# Patient Record
Sex: MALE | Race: WHITE | Employment: OTHER | URBAN - METROPOLITAN AREA
[De-identification: names, ages, dates, MRNs, and addresses within clinical notes are randomized per-mention and may not be internally consistent; named-entity substitution may affect disease eponyms.]

---

## 2020-05-07 ENCOUNTER — ANESTHESIA EVENT (OUTPATIENT)
Dept: OPERATING ROOM | Age: 65
DRG: 003 | End: 2020-05-07
Payer: COMMERCIAL

## 2020-05-07 ENCOUNTER — APPOINTMENT (OUTPATIENT)
Dept: GENERAL RADIOLOGY | Age: 65
DRG: 003 | End: 2020-05-07
Payer: COMMERCIAL

## 2020-05-07 ENCOUNTER — HOSPITAL ENCOUNTER (INPATIENT)
Age: 65
LOS: 29 days | Discharge: NURSING, REHAB OR LONG-TERM CARE | DRG: 003 | End: 2020-06-05
Attending: INTERNAL MEDICINE | Admitting: THORACIC SURGERY (CARDIOTHORACIC VASCULAR SURGERY)
Payer: COMMERCIAL

## 2020-05-07 PROBLEM — R07.9 CHEST PAIN: Status: ACTIVE | Noted: 2020-05-07

## 2020-05-07 PROBLEM — I21.4 NSTEMI (NON-ST ELEVATED MYOCARDIAL INFARCTION) (HCC): Status: ACTIVE | Noted: 2020-05-07

## 2020-05-07 LAB
A/G RATIO: 1.2 (ref 1.1–2.2)
ABO/RH: NORMAL
ALBUMIN SERPL-MCNC: 3.2 G/DL (ref 3.4–5)
ALBUMIN SERPL-MCNC: 4 G/DL (ref 3.4–5)
ALP BLD-CCNC: 43 U/L (ref 40–129)
ALP BLD-CCNC: 55 U/L (ref 40–129)
ALT SERPL-CCNC: 14 U/L (ref 10–40)
ALT SERPL-CCNC: 16 U/L (ref 10–40)
ANION GAP SERPL CALCULATED.3IONS-SCNC: 12 MMOL/L (ref 3–16)
ANION GAP SERPL CALCULATED.3IONS-SCNC: 14 MMOL/L (ref 3–16)
ANTIBODY SCREEN: NORMAL
APTT: 124.9 SEC (ref 24.2–36.2)
APTT: 78.9 SEC (ref 24.2–36.2)
APTT: 82.6 SEC (ref 24.2–36.2)
AST SERPL-CCNC: 17 U/L (ref 15–37)
AST SERPL-CCNC: 17 U/L (ref 15–37)
BASE EXCESS ARTERIAL: -3 MMOL/L (ref -3–3)
BASE EXCESS ARTERIAL: -3.6 MMOL/L (ref -3–3)
BASOPHILS ABSOLUTE: 0.1 K/UL (ref 0–0.2)
BASOPHILS RELATIVE PERCENT: 1.3 %
BILIRUB SERPL-MCNC: <0.2 MG/DL (ref 0–1)
BILIRUB SERPL-MCNC: <0.2 MG/DL (ref 0–1)
BILIRUBIN DIRECT: <0.2 MG/DL (ref 0–0.3)
BILIRUBIN URINE: NEGATIVE
BILIRUBIN, INDIRECT: ABNORMAL MG/DL (ref 0–1)
BLOOD, URINE: NEGATIVE
BUN BLDV-MCNC: 13 MG/DL (ref 7–20)
BUN BLDV-MCNC: 16 MG/DL (ref 7–20)
CALCIUM SERPL-MCNC: 8.4 MG/DL (ref 8.3–10.6)
CALCIUM SERPL-MCNC: 9.5 MG/DL (ref 8.3–10.6)
CARBOXYHEMOGLOBIN ARTERIAL: 1 % (ref 0–1.5)
CARBOXYHEMOGLOBIN ARTERIAL: 1.1 % (ref 0–1.5)
CHLORIDE BLD-SCNC: 105 MMOL/L (ref 99–110)
CHLORIDE BLD-SCNC: 106 MMOL/L (ref 99–110)
CHOLESTEROL, TOTAL: 163 MG/DL (ref 0–199)
CLARITY: CLEAR
CO2: 19 MMOL/L (ref 21–32)
CO2: 21 MMOL/L (ref 21–32)
COLOR: YELLOW
CREAT SERPL-MCNC: 0.8 MG/DL (ref 0.8–1.3)
CREAT SERPL-MCNC: 1.1 MG/DL (ref 0.8–1.3)
EKG ATRIAL RATE: 101 BPM
EKG ATRIAL RATE: 91 BPM
EKG DIAGNOSIS: NORMAL
EKG DIAGNOSIS: NORMAL
EKG P AXIS: 63 DEGREES
EKG P AXIS: 69 DEGREES
EKG P-R INTERVAL: 154 MS
EKG P-R INTERVAL: 154 MS
EKG Q-T INTERVAL: 342 MS
EKG Q-T INTERVAL: 366 MS
EKG QRS DURATION: 100 MS
EKG QRS DURATION: 98 MS
EKG QTC CALCULATION (BAZETT): 443 MS
EKG QTC CALCULATION (BAZETT): 450 MS
EKG R AXIS: -13 DEGREES
EKG R AXIS: -21 DEGREES
EKG T AXIS: 51 DEGREES
EKG T AXIS: 70 DEGREES
EKG VENTRICULAR RATE: 101 BPM
EKG VENTRICULAR RATE: 91 BPM
EOSINOPHILS ABSOLUTE: 0.2 K/UL (ref 0–0.6)
EOSINOPHILS RELATIVE PERCENT: 2.4 %
EPITHELIAL CELLS, UA: 0 /HPF (ref 0–5)
ESTIMATED AVERAGE GLUCOSE: 122.6 MG/DL
FIBRINOGEN: 328 MG/DL (ref 200–397)
GFR AFRICAN AMERICAN: >60
GFR AFRICAN AMERICAN: >60
GFR NON-AFRICAN AMERICAN: >60
GFR NON-AFRICAN AMERICAN: >60
GLOBULIN: 3.4 G/DL
GLUCOSE BLD-MCNC: 109 MG/DL (ref 70–99)
GLUCOSE BLD-MCNC: 94 MG/DL (ref 70–99)
GLUCOSE URINE: NEGATIVE MG/DL
HBA1C MFR BLD: 5.9 %
HCO3 ARTERIAL: 20.5 MMOL/L (ref 21–29)
HCO3 ARTERIAL: 20.7 MMOL/L (ref 21–29)
HCT VFR BLD CALC: 33.8 % (ref 40.5–52.5)
HCT VFR BLD CALC: 38 % (ref 40.5–52.5)
HDLC SERPL-MCNC: 47 MG/DL (ref 40–60)
HEMOGLOBIN, ART, EXTENDED: 10.8 G/DL (ref 13.5–17.5)
HEMOGLOBIN, ART, EXTENDED: 11.4 G/DL (ref 13.5–17.5)
HEMOGLOBIN: 10.9 G/DL (ref 13.5–17.5)
HEMOGLOBIN: 12.5 G/DL (ref 13.5–17.5)
HYALINE CASTS: 1 /LPF (ref 0–8)
INR BLD: 0.98 (ref 0.86–1.14)
KETONES, URINE: NEGATIVE MG/DL
LDL CHOLESTEROL CALCULATED: 103 MG/DL
LEUKOCYTE ESTERASE, URINE: NEGATIVE
LIPASE: 29 U/L (ref 13–60)
LV EF: 60 %
LVEF MODALITY: NORMAL
LYMPHOCYTES ABSOLUTE: 1.1 K/UL (ref 1–5.1)
LYMPHOCYTES RELATIVE PERCENT: 11.2 %
MCH RBC QN AUTO: 27 PG (ref 26–34)
MCH RBC QN AUTO: 27.4 PG (ref 26–34)
MCHC RBC AUTO-ENTMCNC: 32.3 G/DL (ref 31–36)
MCHC RBC AUTO-ENTMCNC: 32.9 G/DL (ref 31–36)
MCV RBC AUTO: 83.4 FL (ref 80–100)
MCV RBC AUTO: 83.6 FL (ref 80–100)
METHEMOGLOBIN ARTERIAL: 0.6 %
METHEMOGLOBIN ARTERIAL: 0.8 %
MICROSCOPIC EXAMINATION: YES
MONOCYTES ABSOLUTE: 0.5 K/UL (ref 0–1.3)
MONOCYTES RELATIVE PERCENT: 5.5 %
NEUTROPHILS ABSOLUTE: 7.9 K/UL (ref 1.7–7.7)
NEUTROPHILS RELATIVE PERCENT: 79.6 %
NITRITE, URINE: NEGATIVE
O2 CONTENT ARTERIAL: 15 ML/DL
O2 CONTENT ARTERIAL: 15 ML/DL
O2 SAT, ARTERIAL: 95 %
O2 SAT, ARTERIAL: 98.6 %
O2 THERAPY: ABNORMAL
O2 THERAPY: ABNORMAL
P2Y12 RESULT: 146 PRU (ref 194–418)
PCO2 ARTERIAL: 31.8 MMHG (ref 35–45)
PCO2 ARTERIAL: 32.8 MMHG (ref 35–45)
PDW BLD-RTO: 14.6 % (ref 12.4–15.4)
PDW BLD-RTO: 14.8 % (ref 12.4–15.4)
PH ARTERIAL: 7.4 (ref 7.35–7.45)
PH ARTERIAL: 7.42 (ref 7.35–7.45)
PH UA: 5.5 (ref 5–8)
PLATELET # BLD: 355 K/UL (ref 135–450)
PLATELET # BLD: 391 K/UL (ref 135–450)
PMV BLD AUTO: 7 FL (ref 5–10.5)
PMV BLD AUTO: 7.3 FL (ref 5–10.5)
PO2 ARTERIAL: 65.7 MMHG (ref 75–108)
PO2 ARTERIAL: 95.2 MMHG (ref 75–108)
POTASSIUM REFLEX MAGNESIUM: 3.6 MMOL/L (ref 3.5–5.1)
POTASSIUM REFLEX MAGNESIUM: 4.1 MMOL/L (ref 3.5–5.1)
PRO-BNP: 602 PG/ML (ref 0–124)
PROTEIN UA: ABNORMAL MG/DL
PROTHROMBIN TIME: 11.4 SEC (ref 10–13.2)
RBC # BLD: 4.04 M/UL (ref 4.2–5.9)
RBC # BLD: 4.56 M/UL (ref 4.2–5.9)
RBC UA: 9 /HPF (ref 0–4)
SARS-COV-2, NAAT: NOT DETECTED
SODIUM BLD-SCNC: 136 MMOL/L (ref 136–145)
SODIUM BLD-SCNC: 141 MMOL/L (ref 136–145)
SPECIFIC GRAVITY UA: 1.02 (ref 1–1.03)
TCO2 ARTERIAL: 21.5 MMOL/L
TCO2 ARTERIAL: 21.7 MMOL/L
TOTAL PROTEIN: 6 G/DL (ref 6.4–8.2)
TOTAL PROTEIN: 7.4 G/DL (ref 6.4–8.2)
TRIGL SERPL-MCNC: 66 MG/DL (ref 0–150)
TROPONIN: 0.07 NG/ML
TROPONIN: 0.11 NG/ML
TROPONIN: <0.01 NG/ML
URINE REFLEX TO CULTURE: ABNORMAL
URINE TYPE: ABNORMAL
UROBILINOGEN, URINE: 0.2 E.U./DL
VLDLC SERPL CALC-MCNC: 13 MG/DL
WBC # BLD: 10.4 K/UL (ref 4–11)
WBC # BLD: 9.9 K/UL (ref 4–11)
WBC UA: 3 /HPF (ref 0–5)

## 2020-05-07 PROCEDURE — 85027 COMPLETE CBC AUTOMATED: CPT

## 2020-05-07 PROCEDURE — 85610 PROTHROMBIN TIME: CPT

## 2020-05-07 PROCEDURE — U0002 COVID-19 LAB TEST NON-CDC: HCPCS

## 2020-05-07 PROCEDURE — 93458 L HRT ARTERY/VENTRICLE ANGIO: CPT

## 2020-05-07 PROCEDURE — 96374 THER/PROPH/DIAG INJ IV PUSH: CPT

## 2020-05-07 PROCEDURE — 93882 EXTRACRANIAL UNI/LTD STUDY: CPT

## 2020-05-07 PROCEDURE — 93306 TTE W/DOPPLER COMPLETE: CPT

## 2020-05-07 PROCEDURE — 80053 COMPREHEN METABOLIC PANEL: CPT

## 2020-05-07 PROCEDURE — 71045 X-RAY EXAM CHEST 1 VIEW: CPT

## 2020-05-07 PROCEDURE — 86850 RBC ANTIBODY SCREEN: CPT

## 2020-05-07 PROCEDURE — 33967 INSERT I-AORT PERCUT DEVICE: CPT

## 2020-05-07 PROCEDURE — 4A023N7 MEASUREMENT OF CARDIAC SAMPLING AND PRESSURE, LEFT HEART, PERCUTANEOUS APPROACH: ICD-10-PCS | Performed by: INTERNAL MEDICINE

## 2020-05-07 PROCEDURE — 93005 ELECTROCARDIOGRAM TRACING: CPT | Performed by: PHYSICIAN ASSISTANT

## 2020-05-07 PROCEDURE — 2780000010 HC IMPLANT OTHER

## 2020-05-07 PROCEDURE — 82803 BLOOD GASES ANY COMBINATION: CPT

## 2020-05-07 PROCEDURE — 6360000002 HC RX W HCPCS: Performed by: PHYSICIAN ASSISTANT

## 2020-05-07 PROCEDURE — 36600 WITHDRAWAL OF ARTERIAL BLOOD: CPT

## 2020-05-07 PROCEDURE — 99153 MOD SED SAME PHYS/QHP EA: CPT

## 2020-05-07 PROCEDURE — 86901 BLOOD TYPING SEROLOGIC RH(D): CPT

## 2020-05-07 PROCEDURE — 6360000002 HC RX W HCPCS: Performed by: INTERNAL MEDICINE

## 2020-05-07 PROCEDURE — 85384 FIBRINOGEN ACTIVITY: CPT

## 2020-05-07 PROCEDURE — 6370000000 HC RX 637 (ALT 250 FOR IP): Performed by: NURSE PRACTITIONER

## 2020-05-07 PROCEDURE — 6360000002 HC RX W HCPCS

## 2020-05-07 PROCEDURE — 93458 L HRT ARTERY/VENTRICLE ANGIO: CPT | Performed by: INTERNAL MEDICINE

## 2020-05-07 PROCEDURE — C1894 INTRO/SHEATH, NON-LASER: HCPCS

## 2020-05-07 PROCEDURE — 86900 BLOOD TYPING SEROLOGIC ABO: CPT

## 2020-05-07 PROCEDURE — 84484 ASSAY OF TROPONIN QUANT: CPT

## 2020-05-07 PROCEDURE — C1769 GUIDE WIRE: HCPCS

## 2020-05-07 PROCEDURE — 85576 BLOOD PLATELET AGGREGATION: CPT

## 2020-05-07 PROCEDURE — 85730 THROMBOPLASTIN TIME PARTIAL: CPT

## 2020-05-07 PROCEDURE — 80061 LIPID PANEL: CPT

## 2020-05-07 PROCEDURE — 36415 COLL VENOUS BLD VENIPUNCTURE: CPT

## 2020-05-07 PROCEDURE — 83036 HEMOGLOBIN GLYCOSYLATED A1C: CPT

## 2020-05-07 PROCEDURE — B2111ZZ FLUOROSCOPY OF MULTIPLE CORONARY ARTERIES USING LOW OSMOLAR CONTRAST: ICD-10-PCS | Performed by: INTERNAL MEDICINE

## 2020-05-07 PROCEDURE — 93971 EXTREMITY STUDY: CPT

## 2020-05-07 PROCEDURE — 94010 BREATHING CAPACITY TEST: CPT

## 2020-05-07 PROCEDURE — 33967 INSERT I-AORT PERCUT DEVICE: CPT | Performed by: INTERNAL MEDICINE

## 2020-05-07 PROCEDURE — 6360000004 HC RX CONTRAST MEDICATION: Performed by: INTERNAL MEDICINE

## 2020-05-07 PROCEDURE — 5A02210 ASSISTANCE WITH CARDIAC OUTPUT USING BALLOON PUMP, CONTINUOUS: ICD-10-PCS | Performed by: INTERNAL MEDICINE

## 2020-05-07 PROCEDURE — 2100000000 HC CCU R&B

## 2020-05-07 PROCEDURE — B2151ZZ FLUOROSCOPY OF LEFT HEART USING LOW OSMOLAR CONTRAST: ICD-10-PCS | Performed by: INTERNAL MEDICINE

## 2020-05-07 PROCEDURE — 2500000003 HC RX 250 WO HCPCS

## 2020-05-07 PROCEDURE — 83690 ASSAY OF LIPASE: CPT

## 2020-05-07 PROCEDURE — 99222 1ST HOSP IP/OBS MODERATE 55: CPT | Performed by: THORACIC SURGERY (CARDIOTHORACIC VASCULAR SURGERY)

## 2020-05-07 PROCEDURE — 93010 ELECTROCARDIOGRAM REPORT: CPT | Performed by: INTERNAL MEDICINE

## 2020-05-07 PROCEDURE — 86923 COMPATIBILITY TEST ELECTRIC: CPT

## 2020-05-07 PROCEDURE — 93005 ELECTROCARDIOGRAM TRACING: CPT | Performed by: EMERGENCY MEDICINE

## 2020-05-07 PROCEDURE — 94760 N-INVAS EAR/PLS OXIMETRY 1: CPT

## 2020-05-07 PROCEDURE — 6370000000 HC RX 637 (ALT 250 FOR IP): Performed by: INTERNAL MEDICINE

## 2020-05-07 PROCEDURE — 85025 COMPLETE CBC W/AUTO DIFF WBC: CPT

## 2020-05-07 PROCEDURE — 2500000003 HC RX 250 WO HCPCS: Performed by: INTERNAL MEDICINE

## 2020-05-07 PROCEDURE — 87641 MR-STAPH DNA AMP PROBE: CPT

## 2020-05-07 PROCEDURE — 6370000000 HC RX 637 (ALT 250 FOR IP): Performed by: PHYSICIAN ASSISTANT

## 2020-05-07 PROCEDURE — 81001 URINALYSIS AUTO W/SCOPE: CPT

## 2020-05-07 PROCEDURE — 2709999900 HC NON-CHARGEABLE SUPPLY

## 2020-05-07 PROCEDURE — 83880 ASSAY OF NATRIURETIC PEPTIDE: CPT

## 2020-05-07 PROCEDURE — P9035 PLATELET PHERES LEUKOREDUCED: HCPCS

## 2020-05-07 PROCEDURE — 99285 EMERGENCY DEPT VISIT HI MDM: CPT

## 2020-05-07 PROCEDURE — 99152 MOD SED SAME PHYS/QHP 5/>YRS: CPT

## 2020-05-07 PROCEDURE — P9016 RBC LEUKOCYTES REDUCED: HCPCS

## 2020-05-07 RX ORDER — SODIUM CHLORIDE 0.9 % (FLUSH) 0.9 %
10 SYRINGE (ML) INJECTION EVERY 12 HOURS SCHEDULED
Status: DISCONTINUED | OUTPATIENT
Start: 2020-05-07 | End: 2020-05-07 | Stop reason: SDUPTHER

## 2020-05-07 RX ORDER — ASPIRIN 81 MG/1
81 TABLET, CHEWABLE ORAL DAILY
Status: DISCONTINUED | OUTPATIENT
Start: 2020-05-08 | End: 2020-05-08

## 2020-05-07 RX ORDER — PROMETHAZINE HYDROCHLORIDE 25 MG/1
12.5 TABLET ORAL EVERY 6 HOURS PRN
Status: DISCONTINUED | OUTPATIENT
Start: 2020-05-07 | End: 2020-06-05 | Stop reason: HOSPADM

## 2020-05-07 RX ORDER — MORPHINE SULFATE 2 MG/ML
2 INJECTION, SOLUTION INTRAMUSCULAR; INTRAVENOUS ONCE
Status: COMPLETED | OUTPATIENT
Start: 2020-05-07 | End: 2020-05-07

## 2020-05-07 RX ORDER — FENTANYL CITRATE 50 UG/ML
50 INJECTION, SOLUTION INTRAMUSCULAR; INTRAVENOUS ONCE
Status: DISCONTINUED | OUTPATIENT
Start: 2020-05-07 | End: 2020-05-07

## 2020-05-07 RX ORDER — HEPARIN SODIUM 1000 [USP'U]/ML
40 INJECTION, SOLUTION INTRAVENOUS; SUBCUTANEOUS PRN
Status: DISCONTINUED | OUTPATIENT
Start: 2020-05-07 | End: 2020-05-07

## 2020-05-07 RX ORDER — ATORVASTATIN CALCIUM 80 MG/1
80 TABLET, FILM COATED ORAL NIGHTLY
Status: DISCONTINUED | OUTPATIENT
Start: 2020-05-07 | End: 2020-05-08

## 2020-05-07 RX ORDER — SODIUM CHLORIDE 9 MG/ML
INJECTION, SOLUTION INTRAVENOUS CONTINUOUS
Status: DISCONTINUED | OUTPATIENT
Start: 2020-05-08 | End: 2020-05-08 | Stop reason: DRUGHIGH

## 2020-05-07 RX ORDER — SODIUM CHLORIDE 0.9 % (FLUSH) 0.9 %
10 SYRINGE (ML) INJECTION PRN
Status: DISCONTINUED | OUTPATIENT
Start: 2020-05-07 | End: 2020-05-08 | Stop reason: ALTCHOICE

## 2020-05-07 RX ORDER — ALBUTEROL SULFATE 2.5 MG/3ML
2.5 SOLUTION RESPIRATORY (INHALATION) EVERY 4 HOURS PRN
Status: DISCONTINUED | OUTPATIENT
Start: 2020-05-07 | End: 2020-06-05 | Stop reason: HOSPADM

## 2020-05-07 RX ORDER — ALBUTEROL SULFATE 90 UG/1
2 AEROSOL, METERED RESPIRATORY (INHALATION) EVERY 6 HOURS PRN
COMMUNITY
End: 2020-11-11

## 2020-05-07 RX ORDER — ASPIRIN 81 MG/1
324 TABLET, CHEWABLE ORAL ONCE
Status: DISCONTINUED | OUTPATIENT
Start: 2020-05-07 | End: 2020-05-07

## 2020-05-07 RX ORDER — SODIUM CHLORIDE 0.9 % (FLUSH) 0.9 %
10 SYRINGE (ML) INJECTION EVERY 12 HOURS SCHEDULED
Status: DISCONTINUED | OUTPATIENT
Start: 2020-05-07 | End: 2020-05-08 | Stop reason: ALTCHOICE

## 2020-05-07 RX ORDER — POLYETHYLENE GLYCOL 3350 17 G/17G
17 POWDER, FOR SOLUTION ORAL DAILY PRN
Status: DISCONTINUED | OUTPATIENT
Start: 2020-05-07 | End: 2020-06-05 | Stop reason: HOSPADM

## 2020-05-07 RX ORDER — NITROGLYCERIN 2.5 MG/D
1 PATCH TRANSDERMAL DAILY
Status: DISCONTINUED | OUTPATIENT
Start: 2020-05-07 | End: 2020-05-07

## 2020-05-07 RX ORDER — PANTOPRAZOLE SODIUM 40 MG/10ML
40 INJECTION, POWDER, LYOPHILIZED, FOR SOLUTION INTRAVENOUS ONCE
Status: COMPLETED | OUTPATIENT
Start: 2020-05-08 | End: 2020-05-08

## 2020-05-07 RX ORDER — ACETAMINOPHEN 650 MG/1
650 SUPPOSITORY RECTAL EVERY 6 HOURS PRN
Status: DISCONTINUED | OUTPATIENT
Start: 2020-05-07 | End: 2020-06-05 | Stop reason: HOSPADM

## 2020-05-07 RX ORDER — METOCLOPRAMIDE HYDROCHLORIDE 5 MG/ML
10 INJECTION INTRAMUSCULAR; INTRAVENOUS ONCE
Status: COMPLETED | OUTPATIENT
Start: 2020-05-08 | End: 2020-05-08

## 2020-05-07 RX ORDER — NITROGLYCERIN 20 MG/100ML
20 INJECTION INTRAVENOUS CONTINUOUS
Status: DISCONTINUED | OUTPATIENT
Start: 2020-05-07 | End: 2020-05-08 | Stop reason: ALTCHOICE

## 2020-05-07 RX ORDER — HEPARIN SODIUM 10000 [USP'U]/100ML
13.4 INJECTION, SOLUTION INTRAVENOUS CONTINUOUS
Status: DISCONTINUED | OUTPATIENT
Start: 2020-05-07 | End: 2020-05-08

## 2020-05-07 RX ORDER — SODIUM CHLORIDE 0.9 % (FLUSH) 0.9 %
10 SYRINGE (ML) INJECTION PRN
Status: DISCONTINUED | OUTPATIENT
Start: 2020-05-07 | End: 2020-05-07 | Stop reason: SDUPTHER

## 2020-05-07 RX ORDER — HEPARIN SODIUM 10000 [USP'U]/100ML
12 INJECTION, SOLUTION INTRAVENOUS CONTINUOUS
Status: DISCONTINUED | OUTPATIENT
Start: 2020-05-07 | End: 2020-05-07

## 2020-05-07 RX ORDER — ATORVASTATIN CALCIUM 40 MG/1
40 TABLET, FILM COATED ORAL NIGHTLY
Status: DISCONTINUED | OUTPATIENT
Start: 2020-05-07 | End: 2020-05-07

## 2020-05-07 RX ORDER — AMLODIPINE BESYLATE 5 MG/1
5 TABLET ORAL DAILY
Status: DISCONTINUED | OUTPATIENT
Start: 2020-05-07 | End: 2020-05-07

## 2020-05-07 RX ORDER — DOCUSATE SODIUM 100 MG/1
100 CAPSULE, LIQUID FILLED ORAL 2 TIMES DAILY
Status: DISCONTINUED | OUTPATIENT
Start: 2020-05-07 | End: 2020-05-09

## 2020-05-07 RX ORDER — ONDANSETRON 2 MG/ML
4 INJECTION INTRAMUSCULAR; INTRAVENOUS EVERY 6 HOURS PRN
Status: DISCONTINUED | OUTPATIENT
Start: 2020-05-07 | End: 2020-05-08 | Stop reason: ALTCHOICE

## 2020-05-07 RX ORDER — CLOPIDOGREL BISULFATE 75 MG/1
75 TABLET ORAL DAILY
Status: ON HOLD | COMMUNITY
End: 2020-06-05 | Stop reason: HOSPADM

## 2020-05-07 RX ORDER — LISINOPRIL 40 MG/1
40 TABLET ORAL DAILY
Status: ON HOLD | COMMUNITY
End: 2020-06-05 | Stop reason: HOSPADM

## 2020-05-07 RX ORDER — CHLORHEXIDINE GLUCONATE 4 G/100ML
SOLUTION TOPICAL SEE ADMIN INSTRUCTIONS
Status: DISCONTINUED | OUTPATIENT
Start: 2020-05-07 | End: 2020-05-08

## 2020-05-07 RX ORDER — SODIUM CHLORIDE 9 MG/ML
10 INJECTION INTRAVENOUS ONCE
Status: DISCONTINUED | OUTPATIENT
Start: 2020-05-08 | End: 2020-06-05 | Stop reason: HOSPADM

## 2020-05-07 RX ORDER — CLOPIDOGREL BISULFATE 75 MG/1
75 TABLET ORAL DAILY
Status: DISCONTINUED | OUTPATIENT
Start: 2020-05-07 | End: 2020-05-07

## 2020-05-07 RX ORDER — HEPARIN SODIUM 1000 [USP'U]/ML
30 INJECTION, SOLUTION INTRAVENOUS; SUBCUTANEOUS PRN
Status: DISCONTINUED | OUTPATIENT
Start: 2020-05-07 | End: 2020-05-07

## 2020-05-07 RX ORDER — NITROGLYCERIN 0.4 MG/1
0.4 TABLET SUBLINGUAL EVERY 5 MIN PRN
Status: DISCONTINUED | OUTPATIENT
Start: 2020-05-07 | End: 2020-05-12

## 2020-05-07 RX ORDER — CHLORHEXIDINE GLUCONATE 0.12 MG/ML
15 RINSE ORAL ONCE
Status: COMPLETED | OUTPATIENT
Start: 2020-05-08 | End: 2020-05-08

## 2020-05-07 RX ORDER — HEPARIN SODIUM 1000 [USP'U]/ML
60 INJECTION, SOLUTION INTRAVENOUS; SUBCUTANEOUS PRN
Status: DISCONTINUED | OUTPATIENT
Start: 2020-05-07 | End: 2020-05-07

## 2020-05-07 RX ORDER — HEPARIN SODIUM 1000 [USP'U]/ML
5000 INJECTION, SOLUTION INTRAVENOUS; SUBCUTANEOUS ONCE
Status: DISCONTINUED | OUTPATIENT
Start: 2020-05-07 | End: 2020-05-07 | Stop reason: SDUPTHER

## 2020-05-07 RX ORDER — ACETAMINOPHEN 325 MG/1
650 TABLET ORAL EVERY 6 HOURS PRN
Status: DISCONTINUED | OUTPATIENT
Start: 2020-05-07 | End: 2020-05-07 | Stop reason: SDUPTHER

## 2020-05-07 RX ORDER — HEPARIN SODIUM 1000 [USP'U]/ML
80 INJECTION, SOLUTION INTRAVENOUS; SUBCUTANEOUS ONCE
Status: DISCONTINUED | OUTPATIENT
Start: 2020-05-07 | End: 2020-05-07

## 2020-05-07 RX ORDER — LISINOPRIL 40 MG/1
40 TABLET ORAL DAILY
Status: DISCONTINUED | OUTPATIENT
Start: 2020-05-07 | End: 2020-05-07

## 2020-05-07 RX ORDER — LORAZEPAM 2 MG/ML
0.5 INJECTION INTRAMUSCULAR EVERY 6 HOURS PRN
Status: DISCONTINUED | OUTPATIENT
Start: 2020-05-07 | End: 2020-05-10

## 2020-05-07 RX ORDER — AMLODIPINE BESYLATE 5 MG/1
5 TABLET ORAL DAILY
Status: ON HOLD | COMMUNITY
End: 2020-06-05 | Stop reason: HOSPADM

## 2020-05-07 RX ORDER — HEPARIN SODIUM 1000 [USP'U]/ML
80 INJECTION, SOLUTION INTRAVENOUS; SUBCUTANEOUS PRN
Status: DISCONTINUED | OUTPATIENT
Start: 2020-05-07 | End: 2020-05-07

## 2020-05-07 RX ORDER — HYDROCHLOROTHIAZIDE 12.5 MG/1
12.5 TABLET ORAL DAILY
COMMUNITY
Start: 2014-04-21 | End: 2020-05-07

## 2020-05-07 RX ORDER — METOPROLOL TARTRATE 50 MG/1
50 TABLET, FILM COATED ORAL 2 TIMES DAILY
Status: DISCONTINUED | OUTPATIENT
Start: 2020-05-07 | End: 2020-05-08

## 2020-05-07 RX ORDER — ACETAMINOPHEN 325 MG/1
650 TABLET ORAL EVERY 4 HOURS PRN
Status: DISCONTINUED | OUTPATIENT
Start: 2020-05-07 | End: 2020-05-08 | Stop reason: DRUGHIGH

## 2020-05-07 RX ADMIN — MORPHINE SULFATE 2 MG: 2 INJECTION, SOLUTION INTRAMUSCULAR; INTRAVENOUS at 06:47

## 2020-05-07 RX ADMIN — DOCUSATE SODIUM 100 MG: 100 CAPSULE, LIQUID FILLED ORAL at 20:20

## 2020-05-07 RX ADMIN — IOPAMIDOL 60 ML: 755 INJECTION, SOLUTION INTRAVENOUS at 12:11

## 2020-05-07 RX ADMIN — HEPARIN SODIUM AND DEXTROSE 15.1 ML/HR: 10000; 5 INJECTION INTRAVENOUS at 13:05

## 2020-05-07 RX ADMIN — MUPIROCIN: 20 OINTMENT TOPICAL at 23:41

## 2020-05-07 RX ADMIN — DOCUSATE SODIUM 100 MG: 100 CAPSULE, LIQUID FILLED ORAL at 15:31

## 2020-05-07 RX ADMIN — NITROGLYCERIN 0.4 MG: 0.4 TABLET, ORALLY DISINTEGRATING SUBLINGUAL at 06:40

## 2020-05-07 RX ADMIN — LORAZEPAM 0.5 MG: 2 INJECTION INTRAMUSCULAR; INTRAVENOUS at 20:14

## 2020-05-07 RX ADMIN — ATORVASTATIN CALCIUM 80 MG: 80 TABLET, FILM COATED ORAL at 20:20

## 2020-05-07 RX ADMIN — NITROGLYCERIN 0.4 MG: 0.4 TABLET, ORALLY DISINTEGRATING SUBLINGUAL at 06:33

## 2020-05-07 RX ADMIN — METOPROLOL TARTRATE 50 MG: 50 TABLET ORAL at 15:31

## 2020-05-07 RX ADMIN — NITROGLYCERIN 20 MCG/MIN: 20 INJECTION INTRAVENOUS at 13:05

## 2020-05-07 SDOH — HEALTH STABILITY: MENTAL HEALTH: HOW OFTEN DO YOU HAVE A DRINK CONTAINING ALCOHOL?: MONTHLY OR LESS

## 2020-05-07 ASSESSMENT — HEART SCORE: ECG: 1

## 2020-05-07 ASSESSMENT — PAIN DESCRIPTION - ORIENTATION
ORIENTATION: LEFT
ORIENTATION: LEFT
ORIENTATION: LEFT;MID
ORIENTATION: LEFT

## 2020-05-07 ASSESSMENT — ENCOUNTER SYMPTOMS
VOMITING: 0
COUGH: 0
SHORTNESS OF BREATH: 1
ABDOMINAL PAIN: 0
DIARRHEA: 0
NAUSEA: 1
CHEST TIGHTNESS: 1

## 2020-05-07 ASSESSMENT — PAIN SCALES - GENERAL
PAINLEVEL_OUTOF10: 4
PAINLEVEL_OUTOF10: 0
PAINLEVEL_OUTOF10: 10
PAINLEVEL_OUTOF10: 6
PAINLEVEL_OUTOF10: 0
PAINLEVEL_OUTOF10: 6
PAINLEVEL_OUTOF10: 0
PAINLEVEL_OUTOF10: 6
PAINLEVEL_OUTOF10: 8
PAINLEVEL_OUTOF10: 0
PAINLEVEL_OUTOF10: 6
PAINLEVEL_OUTOF10: 4

## 2020-05-07 ASSESSMENT — PAIN DESCRIPTION - PROGRESSION
CLINICAL_PROGRESSION: NOT CHANGED
CLINICAL_PROGRESSION: GRADUALLY WORSENING

## 2020-05-07 ASSESSMENT — PAIN DESCRIPTION - DESCRIPTORS
DESCRIPTORS: CRUSHING
DESCRIPTORS: ACHING
DESCRIPTORS: ACHING
DESCRIPTORS: DISCOMFORT

## 2020-05-07 ASSESSMENT — PAIN DESCRIPTION - ONSET
ONSET: GRADUAL
ONSET: SUDDEN
ONSET: GRADUAL

## 2020-05-07 ASSESSMENT — PAIN DESCRIPTION - FREQUENCY
FREQUENCY: CONTINUOUS
FREQUENCY: INTERMITTENT
FREQUENCY: INTERMITTENT
FREQUENCY: CONTINUOUS

## 2020-05-07 ASSESSMENT — PAIN DESCRIPTION - LOCATION
LOCATION: CHEST

## 2020-05-07 ASSESSMENT — PAIN - FUNCTIONAL ASSESSMENT
PAIN_FUNCTIONAL_ASSESSMENT: ACTIVITIES ARE NOT PREVENTED
PAIN_FUNCTIONAL_ASSESSMENT: ACTIVITIES ARE NOT PREVENTED

## 2020-05-07 ASSESSMENT — PAIN DESCRIPTION - PAIN TYPE
TYPE: ACUTE PAIN

## 2020-05-07 NOTE — ED NOTES
Per patient at this time, pt states he took 2 regular dose ASA this morning. Initially had reported one but now states it was a double pack of asa.      Mirlela Ang RN  05/07/20 9544

## 2020-05-07 NOTE — CONSULTS
superficial thrombosis. No records available. Took plavix last 5/6. Not given today. Check p2y12 stat. - repeat covid test. Must be run today. - LE duplex. Important given hx 'clot' RLE.   - carotid duplex  - echo  - watch u/o since 1 kidney, contrast today, IABP in.  Recheck Cr in am    Await results  Plan on urgent cabg in am 5/8      Discussed with Dr. Eloy Espinosa MD  5/7/2020  1:28 PM

## 2020-05-07 NOTE — PROGRESS NOTES
Pt arrived to unit at 0925 . Pt is alert and oriented X4. Pt oriented to unit and to room. Pt oriented to call light and to phone. White board updated. Pt denies any further needs at this time. Sinus arrhythmia on tele. Admission questions completed and informed beside eliud elena. Will continue to monitor and assess.    Electronically signed by Pradeep Caruso RN on 5/7/2020 at 9:43 AM

## 2020-05-07 NOTE — CONSULTS
active. Musculoskeletal: No muscle wasting or digital clubbing. Extremities: Extremities normal, atraumatic. No cyanosis or edema. Pulses: 2+ radial and carotid pulses, symmetric. Skin: No rashes or lesions. Pysch: Normal mood and affect. Alert and oriented x 4.    Neurologic: Normal gross motor and sensory exam.       Labs     CBC:   Lab Results   Component Value Date    WBC 9.9 05/07/2020    RBC 4.56 05/07/2020    HGB 12.5 05/07/2020    HCT 38.0 05/07/2020    MCV 83.4 05/07/2020    RDW 14.8 05/07/2020     05/07/2020     CMP:  Lab Results   Component Value Date     05/07/2020    K 4.1 05/07/2020     05/07/2020    CO2 21 05/07/2020    BUN 16 05/07/2020    CREATININE 1.1 05/07/2020    GFRAA >60 05/07/2020    AGRATIO 1.2 05/07/2020    LABGLOM >60 05/07/2020    GLUCOSE 109 05/07/2020    PROT 7.4 05/07/2020    CALCIUM 9.5 05/07/2020    BILITOT <0.2 05/07/2020    ALKPHOS 55 05/07/2020    AST 17 05/07/2020    ALT 16 05/07/2020     PT/INR:  No results found for: PTINR  HgBA1c:No results found for: LABA1C  Lab Results   Component Value Date    TROPONINI 0.07 (H) 05/07/2020     @lipid@      CURRENT Medications:  Current Facility-Administered Medications: nitroGLYCERIN (NITROSTAT) SL tablet 0.4 mg, 0.4 mg, Sublingual, Q5 Min PRN  albuterol (PROVENTIL) nebulizer solution 2.5 mg, 2.5 mg, Nebulization, Q4H PRN  amLODIPine (NORVASC) tablet 5 mg, 5 mg, Oral, Daily  clopidogrel (PLAVIX) tablet 75 mg, 75 mg, Oral, Daily  lisinopril (PRINIVIL;ZESTRIL) tablet 40 mg, 40 mg, Oral, Daily  sodium chloride flush 0.9 % injection 10 mL, 10 mL, Intravenous, 2 times per day  sodium chloride flush 0.9 % injection 10 mL, 10 mL, Intravenous, PRN  acetaminophen (TYLENOL) tablet 650 mg, 650 mg, Oral, Q6H PRN **OR** acetaminophen (TYLENOL) suppository 650 mg, 650 mg, Rectal, Q6H PRN  polyethylene glycol (GLYCOLAX) packet 17 g, 17 g, Oral, Daily PRN  promethazine (PHENERGAN) tablet 12.5 mg, 12.5 mg, Oral, Q6H PRN **OR**

## 2020-05-07 NOTE — PROGRESS NOTES
Clinical Pharmacy Note  Heparin Dosing       Lab Results   Component Value Date    APTT 78.9 05/07/2020     Lab Results   Component Value Date    HGB 10.9 05/07/2020    HCT 33.8 05/07/2020     05/07/2020    INR 0.98 05/07/2020       Current Infusion Rate: 15.1 mL/hr    Plan:  Rate: decrease to 13.4 mL/hr  Next aPTT: 05/08/20 0130    Pharmacy will continue to monitor and adjust based on aPTT results.   Chelly Weston [facility],5/7/2020,7:28 PM

## 2020-05-07 NOTE — PRE SEDATION
therapy use last 7 days: yes - plavix  Other anticoagulant use last 7 days: no  Additional Medication Information:  n/a      Pre-Sedation Documentation and Exam:   I have personally completed a history, physical exam & review of systems for this patient (see notes).     Mallampati Airway Assessment:  Mallampati Class I - (soft palate, fauces, uvula & anterior/posterior tonsillar pillars are visible)    Prior History of Anesthesia Complications:   none    ASA Classification:  Class 2 - A normal healthy patient with mild systemic disease    Sedation/ Anesthesia Plan:   intravenous sedation    Medications Planned:   midazolam (Versed) intravenously    Patient is an appropriate candidate for plan of sedation: yes    Electronically signed by Kyra Mendez MD on 5/7/2020 at 11:22 AM

## 2020-05-07 NOTE — ED PROVIDER NOTES
lungs every 6 hours as needed for Wheezing    AMLODIPINE (NORVASC) 5 MG TABLET    Take 5 mg by mouth daily    CLOPIDOGREL (PLAVIX) 75 MG TABLET    Take 75 mg by mouth daily    LISINOPRIL (PRINIVIL;ZESTRIL) 40 MG TABLET    Take 40 mg by mouth daily         ALLERGIES     Patient has no known allergies. FAMILYHISTORY     History reviewed. No pertinent family history. SOCIAL HISTORY       Social History     Tobacco Use    Smoking status: Former Smoker    Smokeless tobacco: Never Used   Substance Use Topics    Alcohol use: Never     Frequency: Never    Drug use: Never       SCREENINGS    [place] Coma Scale  Eye Opening: Spontaneous  Best Verbal Response: Oriented  Best Motor Response: Obeys commands  [place] Coma Scale Score: 15 Heart Score for chest pain patients  History: Highly Suspicious  ECG: Non-Specifc repolarization disturbance/LBTB/PM  Patient Age: > 39 and < 65 years  *Risk factors for Atherosclerotic disease: Hypertension, Positive family History  Risk Factors: 1 or 2 risk factors  Troponin: < 1X normal limit  Heart Score Total: 5      PHYSICAL EXAM    (up to 7 for level 4, 8 or more for level 5)     ED Triage Vitals   BP Temp Temp Source Pulse Resp SpO2 Height Weight   05/07/20 0543 05/07/20 0546 05/07/20 0546 05/07/20 0546 05/07/20 0546 05/07/20 0546 -- 05/07/20 0554   (!) 179/103 97.5 °F (36.4 °C) Oral 98 22 96 %  188 lb (85.3 kg)       Physical Exam  Vitals signs and nursing note reviewed. Constitutional:       General: He is not in acute distress. Appearance: He is well-developed. He is not ill-appearing, toxic-appearing or diaphoretic. HENT:      Head: Normocephalic and atraumatic. Mouth/Throat:      Mouth: Mucous membranes are moist.      Pharynx: Oropharynx is clear. Eyes:      Conjunctiva/sclera: Conjunctivae normal.      Pupils: Pupils are equal, round, and reactive to light. Cardiovascular:      Rate and Rhythm: Normal rate and regular rhythm.       Pulses: Normal pulses. Radial pulses are 2+ on the right side and 2+ on the left side. Dorsalis pedis pulses are 2+ on the right side and 2+ on the left side. Heart sounds: No murmur. Pulmonary:      Effort: Pulmonary effort is normal. No respiratory distress. Breath sounds: Normal breath sounds. No rales. Abdominal:      General: Abdomen is flat. Bowel sounds are normal. There is no distension. Palpations: Abdomen is soft. Tenderness: There is no abdominal tenderness. Musculoskeletal:      Right lower leg: No edema. Left lower leg: No edema. Skin:     General: Skin is warm and dry. Neurological:      General: No focal deficit present. Mental Status: He is alert and oriented to person, place, and time. Mental status is at baseline. Psychiatric:         Behavior: Behavior normal. Behavior is cooperative. Thought Content:  Thought content normal.       DIAGNOSTIC RESULTS   LABS:    Labs Reviewed   CBC WITH AUTO DIFFERENTIAL - Abnormal; Notable for the following components:       Result Value    Hemoglobin 12.5 (*)     Hematocrit 38.0 (*)     Neutrophils Absolute 7.9 (*)     All other components within normal limits    Narrative:     Performed at:  [address] [address]   Phone [phone]   COMPREHENSIVE METABOLIC PANEL W/ REFLEX TO MG FOR LOW K - Abnormal; Notable for the following components:    Glucose 109 (*)     All other components within normal limits    Narrative:     Performed at:  [address] [address]   Phone [phone]   BRAIN NATRIURETIC PEPTIDE - Abnormal; Notable for the following components:    Pro- (*)     All other components within normal limits    Narrative:     Performed at:  [address] [address]   Phone [phone]   CULTURE, BLOOD 1 LIPASE    Narrative:     Performed at:  [facility]  [address] [facility] [address]   Phone [phone]   TROPONIN    Narrative:     Performed at:  [facility]  [address] [facility] [address]   Phone [phone]   LACTIC ACID, PLASMA       All other labs were within normal range or not returned as of this dictation. EKG: All EKG's are interpreted by the Emergency Department Physician in the absence of a cardiologist.  Please see their note for interpretation of EKG. RADIOLOGY:   Non-plain film images such as CT, Ultrasound and MRI are read by the radiologist. Plain radiographic images are visualized and preliminarily interpreted by the ED Provider with the below findings:        Interpretation per the Radiologist below, if available at the time of this note:    XR CHEST PORTABLE   Final Result   Or bronchial wall thickening suggests inflammation, such as that seen with   asthma, bronchitis or smoking. Consolidative airspace disease. Rounded opacity in the right hilar region likely represents a vessel en face,   however cannot exclude a prominent lymph node or nodule. Comparison imaging   would be helpful if available, otherwise recommend CT chest.           Xr Chest Portable    Result Date: 5/7/2020  EXAMINATION: ONE XRAY VIEW OF THE CHEST 5/7/2020 5:47 am COMPARISON: None. HISTORY: ORDERING SYSTEM PROVIDED HISTORY: cp TECHNOLOGIST PROVIDED HISTORY: Reason for exam:->cp Reason for Exam: cp Acuity: Acute Type of Exam: Initial FINDINGS: No pneumothorax, pleural effusion or consolidative airspace disease. Rounded opacity in the right hilar region. Bronchial walls are thickened. Normal heart size and mediastinal contours. Acute osseous abnormality. Or bronchial wall thickening suggests inflammation, such as that seen with asthma, bronchitis or smoking. Consolidative airspace disease.  Rounded opacity in

## 2020-05-07 NOTE — ED PROVIDER NOTES
The Ekg interpreted by me in the absence of a cardiologist shows. Normal Sinus rhythm   Rate of   101  Axis is   Normal  QTc is  within an acceptable range  Intervals and Durations are unremarkable. Nonspecific ST-T wave changes appreciated. No evidence of acute ischemia. Repeat EKG  The Ekg interpreted by me in the absence of a cardiologist shows. Normal Sinus rhythm   Rate of   91  Axis is   Normal  QTc is  within an acceptable range  Intervals and Durations are unremarkable. Nonspecific ST-T wave changes appreciated. No evidence of acute ischemia.                        Светлана Obando MD  05/07/20 [address] [facility] Caleb Moon MD  05/07/20 8910

## 2020-05-08 ENCOUNTER — APPOINTMENT (OUTPATIENT)
Dept: GENERAL RADIOLOGY | Age: 65
DRG: 003 | End: 2020-05-08
Payer: COMMERCIAL

## 2020-05-08 ENCOUNTER — ANESTHESIA (OUTPATIENT)
Dept: OPERATING ROOM | Age: 65
DRG: 003 | End: 2020-05-08
Payer: COMMERCIAL

## 2020-05-08 VITALS
TEMPERATURE: 99 F | SYSTOLIC BLOOD PRESSURE: 141 MMHG | DIASTOLIC BLOOD PRESSURE: 70 MMHG | RESPIRATION RATE: 10 BRPM | OXYGEN SATURATION: 100 %

## 2020-05-08 LAB
ACTIVATED CLOTTING TIME: 110 SEC (ref 99–130)
ACTIVATED CLOTTING TIME: 376 SEC (ref 99–130)
ACTIVATED CLOTTING TIME: 434 SEC (ref 99–130)
ACTIVATED CLOTTING TIME: 467 SEC (ref 99–130)
ACTIVATED CLOTTING TIME: 475 SEC (ref 99–130)
ACTIVATED CLOTTING TIME: 501 SEC (ref 99–130)
ACTIVATED CLOTTING TIME: 624 SEC (ref 99–130)
ACTIVATED CLOTTING TIME: 89 SEC (ref 99–130)
ANION GAP SERPL CALCULATED.3IONS-SCNC: 11 MMOL/L (ref 3–16)
ANION GAP SERPL CALCULATED.3IONS-SCNC: 8 MMOL/L (ref 3–16)
ANION GAP SERPL CALCULATED.3IONS-SCNC: 9 MMOL/L (ref 3–16)
APTT: 28.1 SEC (ref 24.2–36.2)
APTT: 53 SEC (ref 24.2–36.2)
BASE EXCESS ARTERIAL: -1 (ref -3–3)
BASE EXCESS ARTERIAL: -11 (ref -3–3)
BASE EXCESS ARTERIAL: -3 (ref -3–3)
BASE EXCESS ARTERIAL: -3 (ref -3–3)
BASE EXCESS ARTERIAL: -4 (ref -3–3)
BASE EXCESS ARTERIAL: -5 (ref -3–3)
BASE EXCESS ARTERIAL: -8 (ref -3–3)
BASE EXCESS ARTERIAL: 0 (ref -3–3)
BASE EXCESS ARTERIAL: 0 (ref -3–3)
BASE EXCESS ARTERIAL: 3 (ref -3–3)
BUN BLDV-MCNC: 14 MG/DL (ref 7–20)
BUN BLDV-MCNC: 14 MG/DL (ref 7–20)
BUN BLDV-MCNC: 15 MG/DL (ref 7–20)
CALCIUM IONIZED: 1.05 MMOL/L (ref 1.12–1.32)
CALCIUM IONIZED: 1.08 MMOL/L (ref 1.12–1.32)
CALCIUM IONIZED: 1.11 MMOL/L (ref 1.12–1.32)
CALCIUM IONIZED: 1.12 MMOL/L (ref 1.12–1.32)
CALCIUM IONIZED: 1.13 MMOL/L (ref 1.12–1.32)
CALCIUM IONIZED: 1.22 MMOL/L (ref 1.12–1.32)
CALCIUM IONIZED: 1.23 MMOL/L (ref 1.12–1.32)
CALCIUM IONIZED: 1.27 MMOL/L (ref 1.12–1.32)
CALCIUM IONIZED: 1.34 MMOL/L (ref 1.12–1.32)
CALCIUM IONIZED: 1.44 MMOL/L (ref 1.12–1.32)
CALCIUM SERPL-MCNC: 8.2 MG/DL (ref 8.3–10.6)
CALCIUM SERPL-MCNC: 8.7 MG/DL (ref 8.3–10.6)
CALCIUM SERPL-MCNC: 9.1 MG/DL (ref 8.3–10.6)
CHLORIDE BLD-SCNC: 101 MMOL/L (ref 99–110)
CHLORIDE BLD-SCNC: 105 MMOL/L (ref 99–110)
CHLORIDE BLD-SCNC: 107 MMOL/L (ref 99–110)
CO2: 21 MMOL/L (ref 21–32)
CO2: 21 MMOL/L (ref 21–32)
CO2: 23 MMOL/L (ref 21–32)
CREAT SERPL-MCNC: 1 MG/DL (ref 0.8–1.3)
CREAT SERPL-MCNC: 1.1 MG/DL (ref 0.8–1.3)
CREAT SERPL-MCNC: 1.2 MG/DL (ref 0.8–1.3)
GFR AFRICAN AMERICAN: >60
GFR NON-AFRICAN AMERICAN: >60
GLUCOSE BLD-MCNC: 101 MG/DL (ref 70–99)
GLUCOSE BLD-MCNC: 106 MG/DL (ref 70–99)
GLUCOSE BLD-MCNC: 111 MG/DL (ref 70–99)
GLUCOSE BLD-MCNC: 113 MG/DL (ref 70–99)
GLUCOSE BLD-MCNC: 120 MG/DL (ref 70–99)
GLUCOSE BLD-MCNC: 122 MG/DL (ref 70–99)
GLUCOSE BLD-MCNC: 128 MG/DL (ref 70–99)
GLUCOSE BLD-MCNC: 135 MG/DL (ref 70–99)
GLUCOSE BLD-MCNC: 136 MG/DL (ref 70–99)
GLUCOSE BLD-MCNC: 136 MG/DL (ref 70–99)
GLUCOSE BLD-MCNC: 142 MG/DL (ref 70–99)
GLUCOSE BLD-MCNC: 148 MG/DL (ref 70–99)
GLUCOSE BLD-MCNC: 152 MG/DL (ref 70–99)
GLUCOSE BLD-MCNC: 186 MG/DL (ref 70–99)
GLUCOSE BLD-MCNC: 204 MG/DL (ref 70–99)
GLUCOSE BLD-MCNC: 61 MG/DL (ref 70–99)
GLUCOSE BLD-MCNC: 72 MG/DL (ref 70–99)
GLUCOSE BLD-MCNC: 95 MG/DL (ref 70–99)
GLUCOSE BLD-MCNC: 96 MG/DL (ref 70–99)
GLUCOSE BLD-MCNC: 98 MG/DL (ref 70–99)
GLUCOSE BLD-MCNC: 99 MG/DL (ref 70–99)
HCO3 ARTERIAL: 16.8 MMOL/L (ref 21–29)
HCO3 ARTERIAL: 20.6 MMOL/L (ref 21–29)
HCO3 ARTERIAL: 21.6 MMOL/L (ref 21–29)
HCO3 ARTERIAL: 21.9 MMOL/L (ref 21–29)
HCO3 ARTERIAL: 22.4 MMOL/L (ref 21–29)
HCO3 ARTERIAL: 22.6 MMOL/L (ref 21–29)
HCO3 ARTERIAL: 23.8 MMOL/L (ref 21–29)
HCO3 ARTERIAL: 23.9 MMOL/L (ref 21–29)
HCO3 ARTERIAL: 24.2 MMOL/L (ref 21–29)
HCO3 ARTERIAL: 24.4 MMOL/L (ref 21–29)
HCO3 ARTERIAL: 24.7 MMOL/L (ref 21–29)
HCO3 ARTERIAL: 25.4 MMOL/L (ref 21–29)
HCO3 ARTERIAL: 27.3 MMOL/L (ref 21–29)
HCT VFR BLD CALC: 24.1 % (ref 40.5–52.5)
HCT VFR BLD CALC: 24.5 % (ref 40.5–52.5)
HCT VFR BLD CALC: 33.2 % (ref 40.5–52.5)
HEMOGLOBIN: 10.8 G/DL (ref 13.5–17.5)
HEMOGLOBIN: 6.7 GM/DL (ref 13.5–17.5)
HEMOGLOBIN: 7 GM/DL (ref 13.5–17.5)
HEMOGLOBIN: 7.2 GM/DL (ref 13.5–17.5)
HEMOGLOBIN: 7.6 GM/DL (ref 13.5–17.5)
HEMOGLOBIN: 7.7 G/DL (ref 13.5–17.5)
HEMOGLOBIN: 7.9 GM/DL (ref 13.5–17.5)
HEMOGLOBIN: 7.9 GM/DL (ref 13.5–17.5)
HEMOGLOBIN: 8 G/DL (ref 13.5–17.5)
HEMOGLOBIN: 8.5 GM/DL (ref 13.5–17.5)
HEMOGLOBIN: 9.4 GM/DL (ref 13.5–17.5)
HEMOGLOBIN: 9.7 GM/DL (ref 13.5–17.5)
INR BLD: 1.28 (ref 0.86–1.14)
LACTATE: 0.45 MMOL/L (ref 0.4–2)
LACTATE: 0.67 MMOL/L (ref 0.4–2)
LACTATE: 0.74 MMOL/L (ref 0.4–2)
LACTATE: 0.78 MMOL/L (ref 0.4–2)
LACTATE: 0.79 MMOL/L (ref 0.4–2)
LACTATE: 0.83 MMOL/L (ref 0.4–2)
LACTATE: 1.17 MMOL/L (ref 0.4–2)
LACTATE: 1.44 MMOL/L (ref 0.4–2)
LACTATE: 3.94 MMOL/L (ref 0.4–2)
MAGNESIUM: 2.7 MG/DL (ref 1.8–2.4)
MCH RBC QN AUTO: 26.6 PG (ref 26–34)
MCH RBC QN AUTO: 26.8 PG (ref 26–34)
MCH RBC QN AUTO: 27 PG (ref 26–34)
MCHC RBC AUTO-ENTMCNC: 31.9 G/DL (ref 31–36)
MCHC RBC AUTO-ENTMCNC: 32.4 G/DL (ref 31–36)
MCHC RBC AUTO-ENTMCNC: 32.5 G/DL (ref 31–36)
MCV RBC AUTO: 82.3 FL (ref 80–100)
MCV RBC AUTO: 83.3 FL (ref 80–100)
MCV RBC AUTO: 83.5 FL (ref 80–100)
MRSA SCREEN RT-PCR: NORMAL
O2 SAT, ARTERIAL: 100 % (ref 93–100)
O2 SAT, ARTERIAL: 94 % (ref 93–100)
O2 SAT, ARTERIAL: 96 % (ref 93–100)
O2 SAT, ARTERIAL: 96 % (ref 93–100)
O2 SAT, ARTERIAL: 98 % (ref 93–100)
O2 SAT, ARTERIAL: 98 % (ref 93–100)
PCO2 ARTERIAL: 34.7 MM HG (ref 35–45)
PCO2 ARTERIAL: 36 MM HG (ref 35–45)
PCO2 ARTERIAL: 38.6 MM HG (ref 35–45)
PCO2 ARTERIAL: 40.1 MM HG (ref 35–45)
PCO2 ARTERIAL: 40.3 MM HG (ref 35–45)
PCO2 ARTERIAL: 40.5 MM HG (ref 35–45)
PCO2 ARTERIAL: 40.5 MM HG (ref 35–45)
PCO2 ARTERIAL: 40.6 MM HG (ref 35–45)
PCO2 ARTERIAL: 43.2 MM HG (ref 35–45)
PCO2 ARTERIAL: 44 MM HG (ref 35–45)
PCO2 ARTERIAL: 46 MM HG (ref 35–45)
PCO2 ARTERIAL: 47.5 MM HG (ref 35–45)
PCO2 ARTERIAL: 69.8 MM HG (ref 35–45)
PDW BLD-RTO: 14.3 % (ref 12.4–15.4)
PDW BLD-RTO: 14.6 % (ref 12.4–15.4)
PDW BLD-RTO: 14.6 % (ref 12.4–15.4)
PERFORMED ON: ABNORMAL
PERFORMED ON: NORMAL
PERFORMED ON: NORMAL
PH ARTERIAL: 7.1 (ref 7.35–7.45)
PH ARTERIAL: 7.23 (ref 7.35–7.45)
PH ARTERIAL: 7.29 (ref 7.35–7.45)
PH ARTERIAL: 7.34 (ref 7.35–7.45)
PH ARTERIAL: 7.35 (ref 7.35–7.45)
PH ARTERIAL: 7.35 (ref 7.35–7.45)
PH ARTERIAL: 7.38 (ref 7.35–7.45)
PH ARTERIAL: 7.39 (ref 7.35–7.45)
PH ARTERIAL: 7.41 (ref 7.35–7.45)
PH ARTERIAL: 7.44 (ref 7.35–7.45)
PLATELET # BLD: 274 K/UL (ref 135–450)
PLATELET # BLD: 319 K/UL (ref 135–450)
PLATELET # BLD: 360 K/UL (ref 135–450)
PMV BLD AUTO: 7.1 FL (ref 5–10.5)
PMV BLD AUTO: 7.3 FL (ref 5–10.5)
PMV BLD AUTO: 7.6 FL (ref 5–10.5)
PO2 ARTERIAL: 117 MM HG (ref 75–108)
PO2 ARTERIAL: 122 MM HG (ref 75–108)
PO2 ARTERIAL: 163.4 MM HG (ref 75–108)
PO2 ARTERIAL: 183.7 MM HG (ref 75–108)
PO2 ARTERIAL: 214.8 MM HG (ref 75–108)
PO2 ARTERIAL: 218.7 MM HG (ref 75–108)
PO2 ARTERIAL: 230.5 MM HG (ref 75–108)
PO2 ARTERIAL: 280.1 MM HG (ref 75–108)
PO2 ARTERIAL: 318.2 MM HG (ref 75–108)
PO2 ARTERIAL: 521.1 MM HG (ref 75–108)
PO2 ARTERIAL: 74.5 MM HG (ref 75–108)
PO2 ARTERIAL: 97 MM HG (ref 75–108)
PO2 ARTERIAL: 98 MM HG (ref 75–108)
POC HEMATOCRIT: 20 % (ref 40.5–52.5)
POC HEMATOCRIT: 21 % (ref 40.5–52.5)
POC HEMATOCRIT: 21 % (ref 40.5–52.5)
POC HEMATOCRIT: 22 % (ref 40.5–52.5)
POC HEMATOCRIT: 23 % (ref 40.5–52.5)
POC HEMATOCRIT: 23 % (ref 40.5–52.5)
POC HEMATOCRIT: 25 % (ref 40.5–52.5)
POC HEMATOCRIT: 28 % (ref 40.5–52.5)
POC HEMATOCRIT: 29 % (ref 40.5–52.5)
POC POTASSIUM: 4.2 MMOL/L (ref 3.5–5.1)
POC POTASSIUM: 4.4 MMOL/L (ref 3.5–5.1)
POC POTASSIUM: 4.5 MMOL/L (ref 3.5–5.1)
POC POTASSIUM: 4.5 MMOL/L (ref 3.5–5.1)
POC POTASSIUM: 4.6 MMOL/L (ref 3.5–5.1)
POC POTASSIUM: 4.7 MMOL/L (ref 3.5–5.1)
POC POTASSIUM: 5.2 MMOL/L (ref 3.5–5.1)
POC SAMPLE TYPE: ABNORMAL
POC SODIUM: 133 MMOL/L (ref 136–145)
POC SODIUM: 134 MMOL/L (ref 136–145)
POC SODIUM: 134 MMOL/L (ref 136–145)
POC SODIUM: 135 MMOL/L (ref 136–145)
POC SODIUM: 135 MMOL/L (ref 136–145)
POC SODIUM: 136 MMOL/L (ref 136–145)
POC SODIUM: 137 MMOL/L (ref 136–145)
POC SODIUM: 139 MMOL/L (ref 136–145)
POC SODIUM: 144 MMOL/L (ref 136–145)
POC SODIUM: 146 MMOL/L (ref 136–145)
POTASSIUM SERPL-SCNC: 4.3 MMOL/L (ref 3.5–5.1)
POTASSIUM SERPL-SCNC: 4.4 MMOL/L (ref 3.5–5.1)
POTASSIUM SERPL-SCNC: 4.5 MMOL/L (ref 3.5–5.1)
PROTHROMBIN TIME: 14.9 SEC (ref 10–13.2)
RBC # BLD: 2.89 M/UL (ref 4.2–5.9)
RBC # BLD: 2.98 M/UL (ref 4.2–5.9)
RBC # BLD: 3.98 M/UL (ref 4.2–5.9)
SODIUM BLD-SCNC: 133 MMOL/L (ref 136–145)
SODIUM BLD-SCNC: 134 MMOL/L (ref 136–145)
SODIUM BLD-SCNC: 139 MMOL/L (ref 136–145)
TCO2 ARTERIAL: 18 MMOL/L
TCO2 ARTERIAL: 22 MMOL/L
TCO2 ARTERIAL: 23 MMOL/L
TCO2 ARTERIAL: 24 MMOL/L
TCO2 ARTERIAL: 25 MMOL/L
TCO2 ARTERIAL: 25 MMOL/L
TCO2 ARTERIAL: 26 MMOL/L
TCO2 ARTERIAL: 27 MMOL/L
TCO2 ARTERIAL: 29 MMOL/L
WBC # BLD: 12.5 K/UL (ref 4–11)
WBC # BLD: 23.4 K/UL (ref 4–11)
WBC # BLD: 24.8 K/UL (ref 4–11)

## 2020-05-08 PROCEDURE — C9113 INJ PANTOPRAZOLE SODIUM, VIA: HCPCS | Performed by: NURSE PRACTITIONER

## 2020-05-08 PROCEDURE — P9041 ALBUMIN (HUMAN),5%, 50ML: HCPCS | Performed by: THORACIC SURGERY (CARDIOTHORACIC VASCULAR SURGERY)

## 2020-05-08 PROCEDURE — 3700000000 HC ANESTHESIA ATTENDED CARE: Performed by: THORACIC SURGERY (CARDIOTHORACIC VASCULAR SURGERY)

## 2020-05-08 PROCEDURE — 3600000018 HC SURGERY OHS ADDTL 15MIN: Performed by: THORACIC SURGERY (CARDIOTHORACIC VASCULAR SURGERY)

## 2020-05-08 PROCEDURE — 7100000000 HC PACU RECOVERY - FIRST 15 MIN

## 2020-05-08 PROCEDURE — 85347 COAGULATION TIME ACTIVATED: CPT

## 2020-05-08 PROCEDURE — 33519 CABG ARTERY-VEIN THREE: CPT | Performed by: THORACIC SURGERY (CARDIOTHORACIC VASCULAR SURGERY)

## 2020-05-08 PROCEDURE — 84132 ASSAY OF SERUM POTASSIUM: CPT

## 2020-05-08 PROCEDURE — 6370000000 HC RX 637 (ALT 250 FOR IP): Performed by: THORACIC SURGERY (CARDIOTHORACIC VASCULAR SURGERY)

## 2020-05-08 PROCEDURE — 2580000003 HC RX 258: Performed by: ANESTHESIOLOGY

## 2020-05-08 PROCEDURE — 5A1955Z RESPIRATORY VENTILATION, GREATER THAN 96 CONSECUTIVE HOURS: ICD-10-PCS | Performed by: THORACIC SURGERY (CARDIOTHORACIC VASCULAR SURGERY)

## 2020-05-08 PROCEDURE — 2500000003 HC RX 250 WO HCPCS: Performed by: INTERNAL MEDICINE

## 2020-05-08 PROCEDURE — 3700000001 HC ADD 15 MINUTES (ANESTHESIA): Performed by: THORACIC SURGERY (CARDIOTHORACIC VASCULAR SURGERY)

## 2020-05-08 PROCEDURE — 2709999900 HC NON-CHARGEABLE SUPPLY: Performed by: THORACIC SURGERY (CARDIOTHORACIC VASCULAR SURGERY)

## 2020-05-08 PROCEDURE — 06BQ4ZZ EXCISION OF LEFT SAPHENOUS VEIN, PERCUTANEOUS ENDOSCOPIC APPROACH: ICD-10-PCS | Performed by: THORACIC SURGERY (CARDIOTHORACIC VASCULAR SURGERY)

## 2020-05-08 PROCEDURE — 83605 ASSAY OF LACTIC ACID: CPT

## 2020-05-08 PROCEDURE — 6360000002 HC RX W HCPCS: Performed by: ANESTHESIOLOGY

## 2020-05-08 PROCEDURE — 99233 SBSQ HOSP IP/OBS HIGH 50: CPT | Performed by: INTERNAL MEDICINE

## 2020-05-08 PROCEDURE — 82947 ASSAY GLUCOSE BLOOD QUANT: CPT

## 2020-05-08 PROCEDURE — 6360000002 HC RX W HCPCS: Performed by: INTERNAL MEDICINE

## 2020-05-08 PROCEDURE — 4A143B0 MONITORING OF VENOUS PRESSURE, CENTRAL, PERCUTANEOUS APPROACH: ICD-10-PCS | Performed by: ANESTHESIOLOGY

## 2020-05-08 PROCEDURE — 36415 COLL VENOUS BLD VENIPUNCTURE: CPT

## 2020-05-08 PROCEDURE — 2500000003 HC RX 250 WO HCPCS

## 2020-05-08 PROCEDURE — 36620 INSERTION CATHETER ARTERY: CPT | Performed by: ANESTHESIOLOGY

## 2020-05-08 PROCEDURE — 2580000003 HC RX 258: Performed by: NURSE PRACTITIONER

## 2020-05-08 PROCEDURE — 6360000002 HC RX W HCPCS: Performed by: THORACIC SURGERY (CARDIOTHORACIC VASCULAR SURGERY)

## 2020-05-08 PROCEDURE — 94002 VENT MGMT INPAT INIT DAY: CPT

## 2020-05-08 PROCEDURE — 82330 ASSAY OF CALCIUM: CPT

## 2020-05-08 PROCEDURE — 84295 ASSAY OF SERUM SODIUM: CPT

## 2020-05-08 PROCEDURE — 2500000003 HC RX 250 WO HCPCS: Performed by: THORACIC SURGERY (CARDIOTHORACIC VASCULAR SURGERY)

## 2020-05-08 PROCEDURE — B246ZZ4 ULTRASONOGRAPHY OF RIGHT AND LEFT HEART, TRANSESOPHAGEAL: ICD-10-PCS | Performed by: THORACIC SURGERY (CARDIOTHORACIC VASCULAR SURGERY)

## 2020-05-08 PROCEDURE — 85027 COMPLETE CBC AUTOMATED: CPT

## 2020-05-08 PROCEDURE — 5A1221Z PERFORMANCE OF CARDIAC OUTPUT, CONTINUOUS: ICD-10-PCS | Performed by: THORACIC SURGERY (CARDIOTHORACIC VASCULAR SURGERY)

## 2020-05-08 PROCEDURE — 31500 INSERT EMERGENCY AIRWAY: CPT

## 2020-05-08 PROCEDURE — 33533 CABG ARTERIAL SINGLE: CPT | Performed by: THORACIC SURGERY (CARDIOTHORACIC VASCULAR SURGERY)

## 2020-05-08 PROCEDURE — 2580000003 HC RX 258: Performed by: THORACIC SURGERY (CARDIOTHORACIC VASCULAR SURGERY)

## 2020-05-08 PROCEDURE — 02100Z9 BYPASS CORONARY ARTERY, ONE ARTERY FROM LEFT INTERNAL MAMMARY, OPEN APPROACH: ICD-10-PCS | Performed by: THORACIC SURGERY (CARDIOTHORACIC VASCULAR SURGERY)

## 2020-05-08 PROCEDURE — 85730 THROMBOPLASTIN TIME PARTIAL: CPT

## 2020-05-08 PROCEDURE — 82803 BLOOD GASES ANY COMBINATION: CPT

## 2020-05-08 PROCEDURE — 2100000000 HC CCU R&B

## 2020-05-08 PROCEDURE — 3600000008 HC SURGERY OHS BASE: Performed by: THORACIC SURGERY (CARDIOTHORACIC VASCULAR SURGERY)

## 2020-05-08 PROCEDURE — 6360000002 HC RX W HCPCS: Performed by: NURSE PRACTITIONER

## 2020-05-08 PROCEDURE — 83735 ASSAY OF MAGNESIUM: CPT

## 2020-05-08 PROCEDURE — 2500000003 HC RX 250 WO HCPCS: Performed by: ANESTHESIOLOGY

## 2020-05-08 PROCEDURE — 80048 BASIC METABOLIC PNL TOTAL CA: CPT

## 2020-05-08 PROCEDURE — 71045 X-RAY EXAM CHEST 1 VIEW: CPT

## 2020-05-08 PROCEDURE — 6370000000 HC RX 637 (ALT 250 FOR IP): Performed by: NURSE PRACTITIONER

## 2020-05-08 PROCEDURE — P9041 ALBUMIN (HUMAN),5%, 50ML: HCPCS | Performed by: ANESTHESIOLOGY

## 2020-05-08 PROCEDURE — 2500000003 HC RX 250 WO HCPCS: Performed by: NURSE PRACTITIONER

## 2020-05-08 PROCEDURE — 85610 PROTHROMBIN TIME: CPT

## 2020-05-08 PROCEDURE — 03HY32Z INSERTION OF MONITORING DEVICE INTO UPPER ARTERY, PERCUTANEOUS APPROACH: ICD-10-PCS | Performed by: ANESTHESIOLOGY

## 2020-05-08 PROCEDURE — 85014 HEMATOCRIT: CPT

## 2020-05-08 PROCEDURE — C1729 CATH, DRAINAGE: HCPCS | Performed by: THORACIC SURGERY (CARDIOTHORACIC VASCULAR SURGERY)

## 2020-05-08 PROCEDURE — 94761 N-INVAS EAR/PLS OXIMETRY MLT: CPT

## 2020-05-08 PROCEDURE — 7100000001 HC PACU RECOVERY - ADDTL 15 MIN

## 2020-05-08 PROCEDURE — 2720000010 HC SURG SUPPLY STERILE: Performed by: THORACIC SURGERY (CARDIOTHORACIC VASCULAR SURGERY)

## 2020-05-08 PROCEDURE — 0BH17EZ INSERTION OF ENDOTRACHEAL AIRWAY INTO TRACHEA, VIA NATURAL OR ARTIFICIAL OPENING: ICD-10-PCS | Performed by: THORACIC SURGERY (CARDIOTHORACIC VASCULAR SURGERY)

## 2020-05-08 PROCEDURE — 94640 AIRWAY INHALATION TREATMENT: CPT

## 2020-05-08 PROCEDURE — C1751 CATH, INF, PER/CENT/MIDLINE: HCPCS | Performed by: THORACIC SURGERY (CARDIOTHORACIC VASCULAR SURGERY)

## 2020-05-08 PROCEDURE — 021209W BYPASS CORONARY ARTERY, THREE ARTERIES FROM AORTA WITH AUTOLOGOUS VENOUS TISSUE, OPEN APPROACH: ICD-10-PCS | Performed by: THORACIC SURGERY (CARDIOTHORACIC VASCULAR SURGERY)

## 2020-05-08 PROCEDURE — 6360000002 HC RX W HCPCS

## 2020-05-08 PROCEDURE — 33508 ENDOSCOPIC VEIN HARVEST: CPT | Performed by: THORACIC SURGERY (CARDIOTHORACIC VASCULAR SURGERY)

## 2020-05-08 RX ORDER — LIDOCAINE HYDROCHLORIDE 10 MG/ML
INJECTION, SOLUTION EPIDURAL; INFILTRATION; INTRACAUDAL; PERINEURAL
Status: DISPENSED
Start: 2020-05-08 | End: 2020-05-08

## 2020-05-08 RX ORDER — ALBUMIN, HUMAN INJ 5% 5 %
25 SOLUTION INTRAVENOUS PRN
Status: DISCONTINUED | OUTPATIENT
Start: 2020-05-08 | End: 2020-05-11

## 2020-05-08 RX ORDER — ACETAMINOPHEN 10 MG/ML
1000 INJECTION, SOLUTION INTRAVENOUS EVERY 6 HOURS
Status: COMPLETED | OUTPATIENT
Start: 2020-05-08 | End: 2020-05-09

## 2020-05-08 RX ORDER — PHENYLEPHRINE HCL IN 0.9% NACL 1 MG/10 ML
SYRINGE (ML) INTRAVENOUS PRN
Status: DISCONTINUED | OUTPATIENT
Start: 2020-05-08 | End: 2020-05-08 | Stop reason: SDUPTHER

## 2020-05-08 RX ORDER — LANOLIN ALCOHOL/MO/W.PET/CERES
400 CREAM (GRAM) TOPICAL DAILY
Status: DISCONTINUED | OUTPATIENT
Start: 2020-05-09 | End: 2020-05-14

## 2020-05-08 RX ORDER — METOCLOPRAMIDE HYDROCHLORIDE 5 MG/ML
10 INJECTION INTRAMUSCULAR; INTRAVENOUS EVERY 6 HOURS PRN
Status: DISCONTINUED | OUTPATIENT
Start: 2020-05-08 | End: 2020-05-08

## 2020-05-08 RX ORDER — PANTOPRAZOLE SODIUM 40 MG/1
40 TABLET, DELAYED RELEASE ORAL DAILY
Status: DISCONTINUED | OUTPATIENT
Start: 2020-05-09 | End: 2020-05-11

## 2020-05-08 RX ORDER — CHLORHEXIDINE GLUCONATE 0.12 MG/ML
15 RINSE ORAL 2 TIMES DAILY
Status: DISCONTINUED | OUTPATIENT
Start: 2020-05-08 | End: 2020-06-05 | Stop reason: HOSPADM

## 2020-05-08 RX ORDER — AMINOCAPROIC ACID 250 MG/ML
INJECTION, SOLUTION INTRAVENOUS PRN
Status: DISCONTINUED | OUTPATIENT
Start: 2020-05-08 | End: 2020-05-08 | Stop reason: SDUPTHER

## 2020-05-08 RX ORDER — KETAMINE HCL IN NACL, ISO-OSM 100MG/10ML
SYRINGE (ML) INJECTION PRN
Status: DISCONTINUED | OUTPATIENT
Start: 2020-05-08 | End: 2020-05-08 | Stop reason: SDUPTHER

## 2020-05-08 RX ORDER — 0.9 % SODIUM CHLORIDE 0.9 %
500 INTRAVENOUS SOLUTION INTRAVENOUS CONTINUOUS PRN
Status: DISCONTINUED | OUTPATIENT
Start: 2020-05-08 | End: 2020-05-11

## 2020-05-08 RX ORDER — SODIUM CHLORIDE 9 MG/ML
INJECTION, SOLUTION INTRAVENOUS CONTINUOUS
Status: DISCONTINUED | OUTPATIENT
Start: 2020-05-08 | End: 2020-05-11

## 2020-05-08 RX ORDER — ALBUTEROL SULFATE 90 UG/1
2 AEROSOL, METERED RESPIRATORY (INHALATION) EVERY 6 HOURS PRN
Status: DISCONTINUED | OUTPATIENT
Start: 2020-05-08 | End: 2020-06-05 | Stop reason: HOSPADM

## 2020-05-08 RX ORDER — MEPERIDINE HYDROCHLORIDE 50 MG/ML
25 INJECTION INTRAMUSCULAR; INTRAVENOUS; SUBCUTANEOUS
Status: ACTIVE | OUTPATIENT
Start: 2020-05-08 | End: 2020-05-08

## 2020-05-08 RX ORDER — NICOTINE POLACRILEX 4 MG
15 LOZENGE BUCCAL PRN
Status: DISCONTINUED | OUTPATIENT
Start: 2020-05-08 | End: 2020-05-11

## 2020-05-08 RX ORDER — SODIUM CHLORIDE 9 MG/ML
INJECTION, SOLUTION INTRAVENOUS CONTINUOUS PRN
Status: DISCONTINUED | OUTPATIENT
Start: 2020-05-08 | End: 2020-05-08 | Stop reason: SDUPTHER

## 2020-05-08 RX ORDER — HYDRALAZINE HYDROCHLORIDE 20 MG/ML
5 INJECTION INTRAMUSCULAR; INTRAVENOUS EVERY 5 MIN PRN
Status: DISCONTINUED | OUTPATIENT
Start: 2020-05-08 | End: 2020-05-28

## 2020-05-08 RX ORDER — PROPOFOL 10 MG/ML
INJECTION, EMULSION INTRAVENOUS PRN
Status: DISCONTINUED | OUTPATIENT
Start: 2020-05-08 | End: 2020-05-08 | Stop reason: SDUPTHER

## 2020-05-08 RX ORDER — CALCIUM CHLORIDE 100 MG/ML
1 INJECTION INTRAVENOUS; INTRAVENTRICULAR ONCE
Status: COMPLETED | OUTPATIENT
Start: 2020-05-08 | End: 2020-05-08

## 2020-05-08 RX ORDER — SODIUM CHLORIDE 0.9 % (FLUSH) 0.9 %
10 SYRINGE (ML) INJECTION PRN
Status: DISCONTINUED | OUTPATIENT
Start: 2020-05-08 | End: 2020-05-11

## 2020-05-08 RX ORDER — PANTOPRAZOLE SODIUM 40 MG/10ML
40 INJECTION, POWDER, LYOPHILIZED, FOR SOLUTION INTRAVENOUS DAILY
Status: DISCONTINUED | OUTPATIENT
Start: 2020-05-09 | End: 2020-06-05 | Stop reason: HOSPADM

## 2020-05-08 RX ORDER — ROCURONIUM BROMIDE 10 MG/ML
INJECTION, SOLUTION INTRAVENOUS PRN
Status: DISCONTINUED | OUTPATIENT
Start: 2020-05-08 | End: 2020-05-08 | Stop reason: SDUPTHER

## 2020-05-08 RX ORDER — CALCIUM CHLORIDE 100 MG/ML
1 INJECTION INTRAVENOUS; INTRAVENTRICULAR ONCE
Status: DISCONTINUED | OUTPATIENT
Start: 2020-05-08 | End: 2020-05-08

## 2020-05-08 RX ORDER — ATORVASTATIN CALCIUM 40 MG/1
40 TABLET, FILM COATED ORAL NIGHTLY
Status: DISCONTINUED | OUTPATIENT
Start: 2020-05-09 | End: 2020-05-21

## 2020-05-08 RX ORDER — 0.9 % SODIUM CHLORIDE 0.9 %
250 INTRAVENOUS SOLUTION INTRAVENOUS ONCE
Status: COMPLETED | OUTPATIENT
Start: 2020-05-08 | End: 2020-05-08

## 2020-05-08 RX ORDER — FENTANYL CITRATE 50 UG/ML
25 INJECTION, SOLUTION INTRAMUSCULAR; INTRAVENOUS
Status: DISCONTINUED | OUTPATIENT
Start: 2020-05-08 | End: 2020-06-02

## 2020-05-08 RX ORDER — METOPROLOL TARTRATE 5 MG/5ML
2.5 INJECTION INTRAVENOUS EVERY 10 MIN PRN
Status: DISCONTINUED | OUTPATIENT
Start: 2020-05-08 | End: 2020-05-13

## 2020-05-08 RX ORDER — INSULIN GLARGINE 100 [IU]/ML
0.15 INJECTION, SOLUTION SUBCUTANEOUS NIGHTLY
Status: DISCONTINUED | OUTPATIENT
Start: 2020-05-09 | End: 2020-05-11

## 2020-05-08 RX ORDER — PROPOFOL 10 MG/ML
10 INJECTION, EMULSION INTRAVENOUS
Status: DISCONTINUED | OUTPATIENT
Start: 2020-05-08 | End: 2020-05-27

## 2020-05-08 RX ORDER — MIDAZOLAM HYDROCHLORIDE 1 MG/ML
1 INJECTION INTRAMUSCULAR; INTRAVENOUS
Status: DISPENSED | OUTPATIENT
Start: 2020-05-08 | End: 2020-05-09

## 2020-05-08 RX ORDER — DEXMEDETOMIDINE HYDROCHLORIDE 4 UG/ML
0.4 INJECTION, SOLUTION INTRAVENOUS CONTINUOUS
Status: DISCONTINUED | OUTPATIENT
Start: 2020-05-08 | End: 2020-06-04

## 2020-05-08 RX ORDER — OXYCODONE HYDROCHLORIDE 10 MG/1
10 TABLET ORAL EVERY 4 HOURS PRN
Status: DISCONTINUED | OUTPATIENT
Start: 2020-05-08 | End: 2020-05-28

## 2020-05-08 RX ORDER — FONDAPARINUX SODIUM 2.5 MG/.5ML
2.5 INJECTION SUBCUTANEOUS DAILY
Status: DISCONTINUED | OUTPATIENT
Start: 2020-05-09 | End: 2020-05-09

## 2020-05-08 RX ORDER — ONDANSETRON 2 MG/ML
4 INJECTION INTRAMUSCULAR; INTRAVENOUS EVERY 8 HOURS PRN
Status: DISCONTINUED | OUTPATIENT
Start: 2020-05-08 | End: 2020-06-05 | Stop reason: HOSPADM

## 2020-05-08 RX ORDER — SENNA AND DOCUSATE SODIUM 50; 8.6 MG/1; MG/1
1 TABLET, FILM COATED ORAL 2 TIMES DAILY
Status: DISCONTINUED | OUTPATIENT
Start: 2020-05-08 | End: 2020-05-19

## 2020-05-08 RX ORDER — POTASSIUM CHLORIDE 750 MG/1
10 TABLET, FILM COATED, EXTENDED RELEASE ORAL
Status: DISCONTINUED | OUTPATIENT
Start: 2020-05-09 | End: 2020-05-09

## 2020-05-08 RX ORDER — ALBUTEROL SULFATE 2.5 MG/3ML
SOLUTION RESPIRATORY (INHALATION)
Status: COMPLETED
Start: 2020-05-08 | End: 2020-05-08

## 2020-05-08 RX ORDER — LISINOPRIL 5 MG/1
5 TABLET ORAL EVERY 24 HOURS
Status: DISCONTINUED | OUTPATIENT
Start: 2020-05-10 | End: 2020-05-08

## 2020-05-08 RX ORDER — POTASSIUM CHLORIDE 29.8 MG/ML
20 INJECTION INTRAVENOUS PRN
Status: DISCONTINUED | OUTPATIENT
Start: 2020-05-08 | End: 2020-06-05 | Stop reason: HOSPADM

## 2020-05-08 RX ORDER — DEXTROSE MONOHYDRATE 50 MG/ML
100 INJECTION, SOLUTION INTRAVENOUS PRN
Status: DISCONTINUED | OUTPATIENT
Start: 2020-05-08 | End: 2020-05-11

## 2020-05-08 RX ORDER — HEPARIN SODIUM 1000 [USP'U]/ML
INJECTION, SOLUTION INTRAVENOUS; SUBCUTANEOUS PRN
Status: DISCONTINUED | OUTPATIENT
Start: 2020-05-08 | End: 2020-05-08 | Stop reason: SDUPTHER

## 2020-05-08 RX ORDER — PROTAMINE SULFATE 10 MG/ML
50 INJECTION, SOLUTION INTRAVENOUS
Status: ACTIVE | OUTPATIENT
Start: 2020-05-08 | End: 2020-05-08

## 2020-05-08 RX ORDER — SODIUM CHLORIDE 9 MG/ML
10 INJECTION INTRAVENOUS DAILY
Status: DISCONTINUED | OUTPATIENT
Start: 2020-05-09 | End: 2020-06-05 | Stop reason: HOSPADM

## 2020-05-08 RX ORDER — NITROGLYCERIN 20 MG/100ML
10 INJECTION INTRAVENOUS CONTINUOUS PRN
Status: DISCONTINUED | OUTPATIENT
Start: 2020-05-08 | End: 2020-05-18

## 2020-05-08 RX ORDER — PROTAMINE SULFATE 10 MG/ML
INJECTION, SOLUTION INTRAVENOUS PRN
Status: DISCONTINUED | OUTPATIENT
Start: 2020-05-08 | End: 2020-05-08 | Stop reason: SDUPTHER

## 2020-05-08 RX ORDER — ALBUTEROL SULFATE 2.5 MG/3ML
2.5 SOLUTION RESPIRATORY (INHALATION)
Status: DISCONTINUED | OUTPATIENT
Start: 2020-05-08 | End: 2020-06-05 | Stop reason: HOSPADM

## 2020-05-08 RX ORDER — DEXTROSE MONOHYDRATE 25 G/50ML
12.5 INJECTION, SOLUTION INTRAVENOUS PRN
Status: DISCONTINUED | OUTPATIENT
Start: 2020-05-08 | End: 2020-05-11

## 2020-05-08 RX ORDER — SODIUM CHLORIDE 0.9 % (FLUSH) 0.9 %
10 SYRINGE (ML) INJECTION EVERY 12 HOURS SCHEDULED
Status: DISCONTINUED | OUTPATIENT
Start: 2020-05-08 | End: 2020-05-11

## 2020-05-08 RX ORDER — ALBUMIN, HUMAN INJ 5% 5 %
SOLUTION INTRAVENOUS
Status: COMPLETED
Start: 2020-05-08 | End: 2020-05-08

## 2020-05-08 RX ORDER — ACETAMINOPHEN 325 MG/1
650 TABLET ORAL EVERY 4 HOURS PRN
Status: DISCONTINUED | OUTPATIENT
Start: 2020-05-08 | End: 2020-05-30

## 2020-05-08 RX ORDER — METOCLOPRAMIDE HYDROCHLORIDE 5 MG/ML
5 INJECTION INTRAMUSCULAR; INTRAVENOUS EVERY 6 HOURS PRN
Status: DISCONTINUED | OUTPATIENT
Start: 2020-05-08 | End: 2020-06-05 | Stop reason: HOSPADM

## 2020-05-08 RX ORDER — CALCIUM CHLORIDE 100 MG/ML
1 INJECTION INTRAVENOUS; INTRAVENTRICULAR ONCE
Status: DISCONTINUED | OUTPATIENT
Start: 2020-05-08 | End: 2020-05-08 | Stop reason: ALTCHOICE

## 2020-05-08 RX ORDER — MILRINONE LACTATE 0.2 MG/ML
0.38 INJECTION, SOLUTION INTRAVENOUS CONTINUOUS PRN
Status: DISCONTINUED | OUTPATIENT
Start: 2020-05-08 | End: 2020-05-11

## 2020-05-08 RX ORDER — NITROGLYCERIN 40 MG/1
1 PATCH TRANSDERMAL DAILY
Status: COMPLETED | OUTPATIENT
Start: 2020-05-09 | End: 2020-05-11

## 2020-05-08 RX ORDER — ALBUMIN, HUMAN INJ 5% 5 %
SOLUTION INTRAVENOUS PRN
Status: DISCONTINUED | OUTPATIENT
Start: 2020-05-08 | End: 2020-05-08 | Stop reason: SDUPTHER

## 2020-05-08 RX ORDER — LANOLIN ALCOHOL/MO/W.PET/CERES
400 CREAM (GRAM) TOPICAL 2 TIMES DAILY
Status: DISCONTINUED | OUTPATIENT
Start: 2020-05-09 | End: 2020-05-08

## 2020-05-08 RX ORDER — SODIUM CHLORIDE 9 MG/ML
INJECTION, SOLUTION INTRAVENOUS CONTINUOUS
Status: DISCONTINUED | OUTPATIENT
Start: 2020-05-08 | End: 2020-05-08

## 2020-05-08 RX ORDER — FENTANYL CITRATE 0.05 MG/ML
INJECTION, SOLUTION INTRAMUSCULAR; INTRAVENOUS PRN
Status: DISCONTINUED | OUTPATIENT
Start: 2020-05-08 | End: 2020-05-08 | Stop reason: SDUPTHER

## 2020-05-08 RX ORDER — MAGNESIUM SULFATE IN WATER 40 MG/ML
2 INJECTION, SOLUTION INTRAVENOUS PRN
Status: DISCONTINUED | OUTPATIENT
Start: 2020-05-08 | End: 2020-06-05 | Stop reason: HOSPADM

## 2020-05-08 RX ORDER — FUROSEMIDE 10 MG/ML
40 INJECTION INTRAMUSCULAR; INTRAVENOUS 2 TIMES DAILY
Status: DISCONTINUED | OUTPATIENT
Start: 2020-05-09 | End: 2020-05-12

## 2020-05-08 RX ORDER — MIDAZOLAM HYDROCHLORIDE 1 MG/ML
INJECTION INTRAMUSCULAR; INTRAVENOUS PRN
Status: DISCONTINUED | OUTPATIENT
Start: 2020-05-08 | End: 2020-05-08 | Stop reason: SDUPTHER

## 2020-05-08 RX ORDER — OXYCODONE HYDROCHLORIDE 5 MG/1
5 TABLET ORAL EVERY 4 HOURS PRN
Status: DISCONTINUED | OUTPATIENT
Start: 2020-05-08 | End: 2020-05-28

## 2020-05-08 RX ADMIN — MIDAZOLAM 2 MG: 1 INJECTION INTRAMUSCULAR; INTRAVENOUS at 06:42

## 2020-05-08 RX ADMIN — CHLORHEXIDINE GLUCONATE 15 ML: 1.2 RINSE ORAL at 05:21

## 2020-05-08 RX ADMIN — MIDAZOLAM 5 MG: 1 INJECTION INTRAMUSCULAR; INTRAVENOUS at 08:59

## 2020-05-08 RX ADMIN — Medication 10 ML: at 20:45

## 2020-05-08 RX ADMIN — MUPIROCIN: 20 OINTMENT TOPICAL at 14:45

## 2020-05-08 RX ADMIN — ALBUMIN (HUMAN) 25 G: 12.5 INJECTION, SOLUTION INTRAVENOUS at 16:02

## 2020-05-08 RX ADMIN — AMINOCAPROIC ACID 5000 MG: 250 INJECTION, SOLUTION INTRAVENOUS at 08:28

## 2020-05-08 RX ADMIN — ALBUTEROL SULFATE 2.5 MG: 2.5 SOLUTION RESPIRATORY (INHALATION) at 20:08

## 2020-05-08 RX ADMIN — PROPOFOL 10 MCG/KG/MIN: 10 INJECTION, EMULSION INTRAVENOUS at 13:35

## 2020-05-08 RX ADMIN — ALBUTEROL SULFATE 2.5 MG: 2.5 SOLUTION RESPIRATORY (INHALATION) at 16:19

## 2020-05-08 RX ADMIN — SODIUM BICARBONATE 50 MEQ: 84 INJECTION, SOLUTION INTRAVENOUS at 13:15

## 2020-05-08 RX ADMIN — FENTANYL CITRATE 25 MCG: 50 INJECTION INTRAMUSCULAR; INTRAVENOUS at 16:43

## 2020-05-08 RX ADMIN — FENTANYL CITRATE 25 MCG: 50 INJECTION INTRAMUSCULAR; INTRAVENOUS at 20:35

## 2020-05-08 RX ADMIN — ROCURONIUM BROMIDE 50 MG: 10 INJECTION INTRAVENOUS at 08:59

## 2020-05-08 RX ADMIN — MIDAZOLAM 2 MG: 1 INJECTION INTRAMUSCULAR; INTRAVENOUS at 06:15

## 2020-05-08 RX ADMIN — PROPOFOL 120 MG: 10 INJECTION, EMULSION INTRAVENOUS at 07:06

## 2020-05-08 RX ADMIN — Medication 100 MCG: at 07:10

## 2020-05-08 RX ADMIN — SODIUM CHLORIDE: 9 INJECTION, SOLUTION INTRAVENOUS at 02:15

## 2020-05-08 RX ADMIN — FENTANYL CITRATE 25 MCG: 50 INJECTION INTRAMUSCULAR; INTRAVENOUS at 12:43

## 2020-05-08 RX ADMIN — SODIUM CHLORIDE: 9 INJECTION, SOLUTION INTRAVENOUS at 05:00

## 2020-05-08 RX ADMIN — FENTANYL CITRATE 100 MCG: 50 INJECTION, SOLUTION INTRAMUSCULAR; INTRAVENOUS at 07:07

## 2020-05-08 RX ADMIN — ALBUMIN (HUMAN) 250 ML: 12.5 INJECTION, SOLUTION INTRAVENOUS at 10:56

## 2020-05-08 RX ADMIN — DEXMEDETOMIDINE HYDROCHLORIDE 0.2 MCG/KG/HR: 4 INJECTION, SOLUTION INTRAVENOUS at 13:00

## 2020-05-08 RX ADMIN — FENTANYL CITRATE 25 MCG: 50 INJECTION INTRAMUSCULAR; INTRAVENOUS at 13:42

## 2020-05-08 RX ADMIN — ROCURONIUM BROMIDE 50 MG: 10 INJECTION INTRAVENOUS at 07:20

## 2020-05-08 RX ADMIN — FENTANYL CITRATE 100 MCG: 50 INJECTION, SOLUTION INTRAMUSCULAR; INTRAVENOUS at 06:15

## 2020-05-08 RX ADMIN — MIDAZOLAM HYDROCHLORIDE 1 MG: 2 INJECTION, SOLUTION INTRAMUSCULAR; INTRAVENOUS at 16:14

## 2020-05-08 RX ADMIN — INSULIN HUMAN 1.8 UNITS/HR: 1 INJECTION, SOLUTION INTRAVENOUS at 16:35

## 2020-05-08 RX ADMIN — ALBUTEROL SULFATE 2.5 MG: 2.5 SOLUTION RESPIRATORY (INHALATION) at 15:55

## 2020-05-08 RX ADMIN — FENTANYL CITRATE 25 MCG: 50 INJECTION INTRAMUSCULAR; INTRAVENOUS at 13:00

## 2020-05-08 RX ADMIN — Medication 10 MG: at 06:25

## 2020-05-08 RX ADMIN — SODIUM CHLORIDE 250 ML: 9 INJECTION, SOLUTION INTRAVENOUS at 00:36

## 2020-05-08 RX ADMIN — MILRINONE LACTATE IN DEXTROSE 0.38 MCG/KG/MIN: 200 INJECTION, SOLUTION INTRAVENOUS at 11:43

## 2020-05-08 RX ADMIN — MIDAZOLAM HYDROCHLORIDE 1 MG: 2 INJECTION, SOLUTION INTRAMUSCULAR; INTRAVENOUS at 22:47

## 2020-05-08 RX ADMIN — LORAZEPAM 0.5 MG: 2 INJECTION INTRAMUSCULAR; INTRAVENOUS at 16:25

## 2020-05-08 RX ADMIN — Medication 100 MCG: at 07:18

## 2020-05-08 RX ADMIN — SODIUM BICARBONATE 50 MEQ: 84 INJECTION, SOLUTION INTRAVENOUS at 13:59

## 2020-05-08 RX ADMIN — HEPARIN SODIUM 35000 UNITS: 1000 INJECTION, SOLUTION INTRAVENOUS; SUBCUTANEOUS at 08:27

## 2020-05-08 RX ADMIN — OXYCODONE HYDROCHLORIDE 10 MG: 10 TABLET ORAL at 22:44

## 2020-05-08 RX ADMIN — MIDAZOLAM 1 MG: 1 INJECTION INTRAMUSCULAR; INTRAVENOUS at 06:25

## 2020-05-08 RX ADMIN — MIDAZOLAM 5 MG: 1 INJECTION INTRAMUSCULAR; INTRAVENOUS at 10:11

## 2020-05-08 RX ADMIN — INSULIN HUMAN 2 UNITS/HR: 1 INJECTION, SOLUTION INTRAVENOUS at 11:50

## 2020-05-08 RX ADMIN — SODIUM CHLORIDE 5 MG/HR: 9 INJECTION, SOLUTION INTRAVENOUS at 12:00

## 2020-05-08 RX ADMIN — FENTANYL CITRATE 25 MCG: 50 INJECTION INTRAMUSCULAR; INTRAVENOUS at 19:17

## 2020-05-08 RX ADMIN — FENTANYL CITRATE 25 MCG: 50 INJECTION INTRAMUSCULAR; INTRAVENOUS at 14:42

## 2020-05-08 RX ADMIN — Medication 20 MEQ: at 21:01

## 2020-05-08 RX ADMIN — MUPIROCIN: 20 OINTMENT TOPICAL at 21:05

## 2020-05-08 RX ADMIN — PROPOFOL 45 MCG/KG/MIN: 10 INJECTION, EMULSION INTRAVENOUS at 18:06

## 2020-05-08 RX ADMIN — MIDAZOLAM HYDROCHLORIDE 1 MG: 2 INJECTION, SOLUTION INTRAMUSCULAR; INTRAVENOUS at 14:48

## 2020-05-08 RX ADMIN — Medication 10 MG: at 06:15

## 2020-05-08 RX ADMIN — MIDAZOLAM HYDROCHLORIDE 2 MG: 2 INJECTION, SOLUTION INTRAMUSCULAR; INTRAVENOUS at 12:33

## 2020-05-08 RX ADMIN — CEFAZOLIN 2 G: 1 INJECTION, POWDER, FOR SOLUTION INTRAMUSCULAR; INTRAVENOUS at 17:32

## 2020-05-08 RX ADMIN — SODIUM CHLORIDE: 9 INJECTION, SOLUTION INTRAVENOUS at 12:15

## 2020-05-08 RX ADMIN — FENTANYL CITRATE 250 MCG: 50 INJECTION, SOLUTION INTRAMUSCULAR; INTRAVENOUS at 08:59

## 2020-05-08 RX ADMIN — CEFAZOLIN SODIUM 2 G: 10 INJECTION, POWDER, FOR SOLUTION INTRAVENOUS at 10:57

## 2020-05-08 RX ADMIN — SODIUM CHLORIDE: 9 INJECTION, SOLUTION INTRAVENOUS at 06:55

## 2020-05-08 RX ADMIN — NITROGLYCERIN 10 MCG/MIN: 20 INJECTION INTRAVENOUS at 11:48

## 2020-05-08 RX ADMIN — FENTANYL CITRATE 25 MCG: 50 INJECTION INTRAMUSCULAR; INTRAVENOUS at 21:47

## 2020-05-08 RX ADMIN — Medication 10 MG: at 06:20

## 2020-05-08 RX ADMIN — OXYCODONE HYDROCHLORIDE 10 MG: 10 TABLET ORAL at 18:42

## 2020-05-08 RX ADMIN — CEFAZOLIN SODIUM 2 G: 10 INJECTION, POWDER, FOR SOLUTION INTRAVENOUS at 07:10

## 2020-05-08 RX ADMIN — Medication 20 MEQ: at 14:31

## 2020-05-08 RX ADMIN — MIDAZOLAM HYDROCHLORIDE 1 MG: 2 INJECTION, SOLUTION INTRAMUSCULAR; INTRAVENOUS at 13:30

## 2020-05-08 RX ADMIN — FENTANYL CITRATE 25 MCG: 50 INJECTION INTRAMUSCULAR; INTRAVENOUS at 18:09

## 2020-05-08 RX ADMIN — PANTOPRAZOLE SODIUM 40 MG: 40 INJECTION, POWDER, FOR SOLUTION INTRAVENOUS at 05:21

## 2020-05-08 RX ADMIN — Medication 100 MCG: at 08:45

## 2020-05-08 RX ADMIN — SODIUM BICARBONATE 50 MEQ: 84 INJECTION, SOLUTION INTRAVENOUS at 13:13

## 2020-05-08 RX ADMIN — HEPARIN SODIUM 10000 UNITS: 1000 INJECTION, SOLUTION INTRAVENOUS; SUBCUTANEOUS at 08:37

## 2020-05-08 RX ADMIN — FENTANYL CITRATE 50 MCG: 50 INJECTION, SOLUTION INTRAMUSCULAR; INTRAVENOUS at 06:20

## 2020-05-08 RX ADMIN — Medication 100 MCG: at 08:23

## 2020-05-08 RX ADMIN — NITROGLYCERIN 120 MCG/MIN: 20 INJECTION INTRAVENOUS at 00:44

## 2020-05-08 RX ADMIN — OXYCODONE HYDROCHLORIDE 10 MG: 10 TABLET ORAL at 14:34

## 2020-05-08 RX ADMIN — CALCIUM CHLORIDE 1 G: 100 INJECTION INTRAVENOUS; INTRAVENTRICULAR at 13:30

## 2020-05-08 RX ADMIN — MIDAZOLAM HYDROCHLORIDE 1 MG: 2 INJECTION, SOLUTION INTRAMUSCULAR; INTRAVENOUS at 18:24

## 2020-05-08 RX ADMIN — PROPOFOL 45 MCG/KG/MIN: 10 INJECTION, EMULSION INTRAVENOUS at 23:09

## 2020-05-08 RX ADMIN — VANCOMYCIN HYDROCHLORIDE 1500 MG: 5 INJECTION, POWDER, LYOPHILIZED, FOR SOLUTION INTRAVENOUS at 18:52

## 2020-05-08 RX ADMIN — METOCLOPRAMIDE HYDROCHLORIDE 10 MG: 5 INJECTION INTRAMUSCULAR; INTRAVENOUS at 05:21

## 2020-05-08 RX ADMIN — MIDAZOLAM HYDROCHLORIDE 1 MG: 2 INJECTION, SOLUTION INTRAMUSCULAR; INTRAVENOUS at 17:23

## 2020-05-08 RX ADMIN — SENNOSIDES AND DOCUSATE SODIUM 1 TABLET: 8.6; 5 TABLET ORAL at 20:45

## 2020-05-08 RX ADMIN — CHLORHEXIDINE GLUCONATE 15 ML: 1.2 RINSE ORAL at 14:38

## 2020-05-08 RX ADMIN — MIDAZOLAM HYDROCHLORIDE 1 MG: 2 INJECTION, SOLUTION INTRAMUSCULAR; INTRAVENOUS at 20:14

## 2020-05-08 RX ADMIN — MILRINONE LACTATE IN DEXTROSE 0.24 MCG/KG/MIN: 200 INJECTION, SOLUTION INTRAVENOUS at 19:01

## 2020-05-08 RX ADMIN — LORAZEPAM 0.5 MG: 2 INJECTION INTRAMUSCULAR; INTRAVENOUS at 02:14

## 2020-05-08 RX ADMIN — Medication 100 MCG: at 07:13

## 2020-05-08 RX ADMIN — PROTAMINE SULFATE 30 MG: 10 INJECTION, SOLUTION INTRAVENOUS at 10:55

## 2020-05-08 RX ADMIN — ROCURONIUM BROMIDE 50 MG: 10 INJECTION INTRAVENOUS at 10:11

## 2020-05-08 RX ADMIN — PROTAMINE SULFATE 400 MG: 10 INJECTION, SOLUTION INTRAVENOUS at 10:41

## 2020-05-08 RX ADMIN — ACETAMINOPHEN 1000 MG: 10 INJECTION, SOLUTION INTRAVENOUS at 17:02

## 2020-05-08 RX ADMIN — FENTANYL CITRATE 250 MCG: 50 INJECTION, SOLUTION INTRAMUSCULAR; INTRAVENOUS at 10:11

## 2020-05-08 RX ADMIN — Medication 1500 MG: at 06:45

## 2020-05-08 RX ADMIN — ROCURONIUM BROMIDE 50 MG: 10 INJECTION INTRAVENOUS at 07:16

## 2020-05-08 RX ADMIN — MIDAZOLAM HYDROCHLORIDE 1 MG: 2 INJECTION, SOLUTION INTRAMUSCULAR; INTRAVENOUS at 13:00

## 2020-05-08 RX ADMIN — FENTANYL CITRATE 250 MCG: 50 INJECTION, SOLUTION INTRAMUSCULAR; INTRAVENOUS at 07:34

## 2020-05-08 RX ADMIN — CHLORHEXIDINE GLUCONATE 15 ML: 1.2 RINSE ORAL at 20:44

## 2020-05-08 RX ADMIN — Medication 100 MCG: at 07:21

## 2020-05-08 ASSESSMENT — PULMONARY FUNCTION TESTS
PIF_VALUE: 21
PIF_VALUE: 19
PIF_VALUE: 17
PIF_VALUE: 20
PIF_VALUE: 15
PIF_VALUE: 0
PIF_VALUE: 5
PIF_VALUE: 17
PIF_VALUE: 1
PIF_VALUE: 7
PIF_VALUE: 19
PIF_VALUE: 21
PIF_VALUE: 0
PIF_VALUE: 20
PIF_VALUE: 25
PIF_VALUE: 19
PIF_VALUE: 0
PIF_VALUE: 18
PIF_VALUE: 0
PIF_VALUE: 20
PIF_VALUE: 21
PIF_VALUE: 20
PIF_VALUE: 18
PIF_VALUE: 0
PIF_VALUE: 19
PIF_VALUE: 18
PIF_VALUE: 17
PIF_VALUE: 17
PIF_VALUE: 0
PIF_VALUE: 17
PIF_VALUE: 0
PIF_VALUE: 19
PIF_VALUE: 18
PIF_VALUE: 0
PIF_VALUE: 18
PIF_VALUE: 17
PIF_VALUE: 0
PIF_VALUE: 18
PIF_VALUE: 21
PIF_VALUE: 0
PIF_VALUE: 1
PIF_VALUE: 0
PIF_VALUE: 18
PIF_VALUE: 19
PIF_VALUE: 19
PIF_VALUE: 0
PIF_VALUE: 19
PIF_VALUE: 0
PIF_VALUE: 19
PIF_VALUE: 0
PIF_VALUE: 0
PIF_VALUE: 20
PIF_VALUE: 0
PIF_VALUE: 19
PIF_VALUE: 18
PIF_VALUE: 18
PIF_VALUE: 16
PIF_VALUE: 19
PIF_VALUE: 20
PIF_VALUE: 18
PIF_VALUE: 0
PIF_VALUE: 20
PIF_VALUE: 17
PIF_VALUE: 23
PIF_VALUE: 14
PIF_VALUE: 22
PIF_VALUE: 0
PIF_VALUE: 17
PIF_VALUE: 21
PIF_VALUE: 19
PIF_VALUE: 21
PIF_VALUE: 16
PIF_VALUE: 20
PIF_VALUE: 1
PIF_VALUE: 19
PIF_VALUE: 0
PIF_VALUE: 0
PIF_VALUE: 17
PIF_VALUE: 0
PIF_VALUE: 19
PIF_VALUE: 0
PIF_VALUE: 18
PIF_VALUE: 0
PIF_VALUE: 21
PIF_VALUE: 17
PIF_VALUE: 19
PIF_VALUE: 0
PIF_VALUE: 17
PIF_VALUE: 18
PIF_VALUE: 0
PIF_VALUE: 17
PIF_VALUE: 19
PIF_VALUE: 20
PIF_VALUE: 18
PIF_VALUE: 16
PIF_VALUE: 0
PIF_VALUE: 17
PIF_VALUE: 20
PIF_VALUE: 19
PIF_VALUE: 20
PIF_VALUE: 18
PIF_VALUE: 20
PIF_VALUE: 15
PIF_VALUE: 20
PIF_VALUE: 0
PIF_VALUE: 0
PIF_VALUE: 1
PIF_VALUE: 0
PIF_VALUE: 19
PIF_VALUE: 19
PIF_VALUE: 17
PIF_VALUE: 19
PIF_VALUE: 18
PIF_VALUE: 18
PIF_VALUE: 0
PIF_VALUE: 25
PIF_VALUE: 18
PIF_VALUE: 19
PIF_VALUE: 20
PIF_VALUE: 0
PIF_VALUE: 17
PIF_VALUE: 19
PIF_VALUE: 0
PIF_VALUE: 18
PIF_VALUE: 0
PIF_VALUE: 19
PIF_VALUE: 0
PIF_VALUE: 20
PIF_VALUE: 0
PIF_VALUE: 19
PIF_VALUE: 21
PIF_VALUE: 17
PIF_VALUE: 18
PIF_VALUE: 0
PIF_VALUE: 20
PIF_VALUE: 21
PIF_VALUE: 16
PIF_VALUE: 20
PIF_VALUE: 0
PIF_VALUE: 21
PIF_VALUE: 20
PIF_VALUE: 18
PIF_VALUE: 0
PIF_VALUE: 19
PIF_VALUE: 0
PIF_VALUE: 18
PIF_VALUE: 18
PIF_VALUE: 19
PIF_VALUE: 17
PIF_VALUE: 0
PIF_VALUE: 18
PIF_VALUE: 22
PIF_VALUE: 20
PIF_VALUE: 19
PIF_VALUE: 15
PIF_VALUE: 17
PIF_VALUE: 20
PIF_VALUE: 17
PIF_VALUE: 17
PIF_VALUE: 19
PIF_VALUE: 18
PIF_VALUE: 0
PIF_VALUE: 0
PIF_VALUE: 17
PIF_VALUE: 0
PIF_VALUE: 19
PIF_VALUE: 19
PIF_VALUE: 20
PIF_VALUE: 19
PIF_VALUE: 0
PIF_VALUE: 20
PIF_VALUE: 0
PIF_VALUE: 21
PIF_VALUE: 0
PIF_VALUE: 0
PIF_VALUE: 18
PIF_VALUE: 0
PIF_VALUE: 19
PIF_VALUE: 17
PIF_VALUE: 19
PIF_VALUE: 18
PIF_VALUE: 0
PIF_VALUE: 15
PIF_VALUE: 19
PIF_VALUE: 20
PIF_VALUE: 0
PIF_VALUE: 20
PIF_VALUE: 1
PIF_VALUE: 25
PIF_VALUE: 0
PIF_VALUE: 19
PIF_VALUE: 0
PIF_VALUE: 17
PIF_VALUE: 21
PIF_VALUE: 0
PIF_VALUE: 0
PIF_VALUE: 20
PIF_VALUE: 18
PIF_VALUE: 18
PIF_VALUE: 21
PIF_VALUE: 18
PIF_VALUE: 0
PIF_VALUE: 20
PIF_VALUE: 23
PIF_VALUE: 17
PIF_VALUE: 20
PIF_VALUE: 18
PIF_VALUE: 20
PIF_VALUE: 0
PIF_VALUE: 19
PIF_VALUE: 0
PIF_VALUE: 19
PIF_VALUE: 21
PIF_VALUE: 17
PIF_VALUE: 20
PIF_VALUE: 0
PIF_VALUE: 19
PIF_VALUE: 0
PIF_VALUE: 16
PIF_VALUE: 18
PIF_VALUE: 20
PIF_VALUE: 23
PIF_VALUE: 18
PIF_VALUE: 18
PIF_VALUE: 0
PIF_VALUE: 18
PIF_VALUE: 19
PIF_VALUE: 0
PIF_VALUE: 19
PIF_VALUE: 20
PIF_VALUE: 17
PIF_VALUE: 20
PIF_VALUE: 17
PIF_VALUE: 18
PIF_VALUE: 20
PIF_VALUE: 19
PIF_VALUE: 21
PIF_VALUE: 0
PIF_VALUE: 0
PIF_VALUE: 17
PIF_VALUE: 0
PIF_VALUE: 21
PIF_VALUE: 1
PIF_VALUE: 17
PIF_VALUE: 18
PIF_VALUE: 0
PIF_VALUE: 20
PIF_VALUE: 0
PIF_VALUE: 20
PIF_VALUE: 0
PIF_VALUE: 19
PIF_VALUE: 1
PIF_VALUE: 19
PIF_VALUE: 20
PIF_VALUE: 15
PIF_VALUE: 19
PIF_VALUE: 18
PIF_VALUE: 21
PIF_VALUE: 16
PIF_VALUE: 20
PIF_VALUE: 17
PIF_VALUE: 0
PIF_VALUE: 0
PIF_VALUE: 17
PIF_VALUE: 21
PIF_VALUE: 17
PIF_VALUE: 0
PIF_VALUE: 0
PIF_VALUE: 17
PIF_VALUE: 0
PIF_VALUE: 0
PIF_VALUE: 2
PIF_VALUE: 17
PIF_VALUE: 21
PIF_VALUE: 26
PIF_VALUE: 20
PIF_VALUE: 0
PIF_VALUE: 0
PIF_VALUE: 21
PIF_VALUE: 17
PIF_VALUE: 20
PIF_VALUE: 0
PIF_VALUE: 18
PIF_VALUE: 0
PIF_VALUE: 18
PIF_VALUE: 18
PIF_VALUE: 23
PIF_VALUE: 18
PIF_VALUE: 16
PIF_VALUE: 17
PIF_VALUE: 20
PIF_VALUE: 21
PIF_VALUE: 18
PIF_VALUE: 0
PIF_VALUE: 0
PIF_VALUE: 22
PIF_VALUE: 19
PIF_VALUE: 19
PIF_VALUE: 0
PIF_VALUE: 9
PIF_VALUE: 20
PIF_VALUE: 0
PIF_VALUE: 19

## 2020-05-08 ASSESSMENT — PAIN SCALES - GENERAL
PAINLEVEL_OUTOF10: 5
PAINLEVEL_OUTOF10: 0
PAINLEVEL_OUTOF10: 5
PAINLEVEL_OUTOF10: 0
PAINLEVEL_OUTOF10: 10
PAINLEVEL_OUTOF10: 10
PAINLEVEL_OUTOF10: 5
PAINLEVEL_OUTOF10: 5
PAINLEVEL_OUTOF10: 0
PAINLEVEL_OUTOF10: 5
PAINLEVEL_OUTOF10: 5
PAINLEVEL_OUTOF10: 0
PAINLEVEL_OUTOF10: 0
PAINLEVEL_OUTOF10: 4
PAINLEVEL_OUTOF10: 6
PAINLEVEL_OUTOF10: 0
PAINLEVEL_OUTOF10: 5
PAINLEVEL_OUTOF10: 0
PAINLEVEL_OUTOF10: 5

## 2020-05-08 ASSESSMENT — PAIN DESCRIPTION - PROGRESSION
CLINICAL_PROGRESSION: NOT CHANGED

## 2020-05-08 ASSESSMENT — ENCOUNTER SYMPTOMS: SHORTNESS OF BREATH: 0

## 2020-05-08 NOTE — BRIEF OP NOTE
Date: 5/8/2020  Patient:  Leonor Miguel    Brief Op Note    Pre-op Diagnosis:  CAD, s/p IABP    Post-op Diagnosis:  same    Procedure:    1. JENN  2. Left greater saphenous EVH  3. Urgent cabgx4 (LIMA-LAD, SVG-D1, SVG-OM, SVG-PDA)    Surgeon: Kaylee Cancer    Assistant(s):  Keya Flynn    Anesthesia:  General    EBL: n/a    XC:  79       CPB:  94    Specimens:  none    Findings:  Well preserved LV function    Drains:  Left pleural, mediastinal    Drips:  Milrinone, norepi    Complications:  none    Disposition: To CVU in stable condition    Post-Op Note:  Dictated.      Frieda Kumar MD  5/8/2020  11:22 AM

## 2020-05-08 NOTE — PROGRESS NOTES
Cardiology progress  Referring Physician: Dr. Jimenez Irizarry   Reason for Consultation: elevated troponin  Chief Complaint:   Chief Complaint   Patient presents with    Chest Pain     patient presents to ER from work, crushing 10/10 CP x90 minutes that started while patient was at work. patient took 1, 81mg ASA, EMS gave 1 nitro. Interval History:  S/p cabgx4 (LIMA-LAD, SVG-D1, SVG-OM, SVG-PDA)  Extubated, however needed emergent re-intubation due to severe agitation  Hemodynamically stable      Subjective:   History of Present Illness:     Fitz Lowery is a 60 y.o. male who presents with the above chief complaint. He admits to several weeks of substernal chest pain. The pain has progressed over the past several weeks to now occurring at rest. No prior cardiac testing. The pain is associated with dyspnea. No  alleviating or specific exacerbating factors. Quite smoking ~ 4 months ago. He admits to a history of DVT and taking a \"blood thinner\". Cardiology is consulted for elevated troponin     Prior cardiac testing:    None     Past Medical History:   has a past medical history of Blood clot in vein, Bronchitis, Hypertension, Kidney disease, NSTEMI (non-ST elevated myocardial infarction) ([facility] [address] Respiratory compromise, and Tattoos. Surgical History:   has a past surgical history that includes total nephrectomy (Left); Coronary artery bypass graft (05/08/2020); and Coronary artery bypass graft (N/A, 5/8/2020). Social History:   reports that he quit smoking about 6 months ago. His smoking use included cigarettes. He has never used smokeless tobacco. He reports current alcohol use. He reports that he does not use drugs. Family History:  family history includes Heart Disease in his brother, father, mother, and paternal aunt. Home Medications:  Were reviewed and are listed in nursing record and/or below  Prior to Admission medications    Medication Sig Start Date End Date Taking?  Authorizing Provider mEq, Intravenous, Q30 Min PRN  [START ON 5/9/2020] atorvastatin (LIPITOR) tablet 40 mg, 40 mg, Oral, Nightly  [START ON 5/9/2020] fondaparinux (ARIXTRA) injection 2.5 mg, 2.5 mg, Subcutaneous, Daily  [START ON 5/9/2020] aspirin EC tablet 325 mg, 325 mg, Oral, Daily  meperidine (DEMEROL) injection 25 mg, 25 mg, Intravenous, Once PRN  albuterol (PROVENTIL) nebulizer solution 2.5 mg, 2.5 mg, Nebulization, Q4H WA  albuterol sulfate  (90 Base) MCG/ACT inhaler 2 puff, 2 puff, Inhalation, Q6H PRN  albumin human 5 % IV solution 25 g, 25 g, Intravenous, PRN  0.9 % sodium chloride bolus, 500 mL, Intravenous, Continuous PRN  milrinone (PRIMACOR) 20 mg in dextrose 5 % 100 mL infusion, 0.375 mcg/kg/min, Intravenous, Continuous PRN  norepinephrine (LEVOPHED) 16 mg in dextrose 5% 250 mL infusion, 2 mcg/min, Intravenous, Continuous PRN  nitroGLYCERIN 50 mg in dextrose 5% 250 mL infusion, 10 mcg/min, Intravenous, Continuous PRN  niCARdipine (CARDENE) 25 mg in sodium chloride 0.9 % 250 mL infusion, 5 mg/hr, Intravenous, Continuous PRN  insulin regular (MYXREDLIN) 100 units in sodium chloride 0.9 % 100 ml infusion, 1 Units/hr, Intravenous, Continuous  [START ON 5/9/2020] insulin glargine (LANTUS) injection vial 12 Units, 0.15 Units/kg, Subcutaneous, Nightly  [START ON 5/10/2020] insulin lispro (HUMALOG) injection vial 0-12 Units, 0-12 Units, Subcutaneous, TID WC  [START ON 5/10/2020] insulin lispro (HUMALOG) injection vial 0-6 Units, 0-6 Units, Subcutaneous, Nightly  glucose (GLUTOSE) 40 % oral gel 15 g, 15 g, Oral, PRN  dextrose 50 % IV solution, 12.5 g, Intravenous, PRN  glucagon (rDNA) injection 1 mg, 1 mg, Intramuscular, PRN  dextrose 5 % solution, 100 mL/hr, Intravenous, PRN  [START ON 5/9/2020] magnesium oxide (MAG-OX) tablet 400 mg, 400 mg, Oral, Daily  metoclopramide (REGLAN) injection 5 mg, 5 mg, Intravenous, Q6H PRN  dexmedetomidine (PRECEDEX) 400 mcg in sodium chloride 0.9 % 100 mL infusion, 0.2 mcg/kg/hr, Intravenous, Continuous  calcium chloride 1 g in sodium chloride 0.9 % 100 mL IVPB, 1 g, Intravenous, Once  acetaminophen (OFIRMEV) infusion 1,000 mg, 1,000 mg, Intravenous, Q6H  propofol injection, 10 mcg/kg/min, Intravenous, Titrated  nitroGLYCERIN (NITROSTAT) SL tablet 0.4 mg, 0.4 mg, Sublingual, Q5 Min PRN  albuterol (PROVENTIL) nebulizer solution 2.5 mg, 2.5 mg, Nebulization, Q4H PRN  [DISCONTINUED] acetaminophen (TYLENOL) tablet 650 mg, 650 mg, Oral, Q6H PRN **OR** acetaminophen (TYLENOL) suppository 650 mg, 650 mg, Rectal, Q6H PRN  polyethylene glycol (GLYCOLAX) packet 17 g, 17 g, Oral, Daily PRN  promethazine (PHENERGAN) tablet 12.5 mg, 12.5 mg, Oral, Q6H PRN **OR** [DISCONTINUED] ondansetron (ZOFRAN) injection 4 mg, 4 mg, Intravenous, Q6H PRN  magnesium hydroxide (MILK OF MAGNESIA) 400 MG/5ML suspension 30 mL, 30 mL, Oral, Daily PRN  docusate sodium (COLACE) capsule 100 mg, 100 mg, Oral, BID  [COMPLETED] pantoprazole (PROTONIX) injection 40 mg, 40 mg, Intravenous, Once **AND** sodium chloride (PF) 0.9 % injection 10 mL, 10 mL, Intravenous, Once  LORazepam (ATIVAN) injection 0.5 mg, 0.5 mg, Intravenous, Q6H PRN     Cardiac testing     EKG: SR, hyperacute anterior T waves     Echo:     Summary   Normal LV size, wall thickness and wall motion: EF is    60%. Grade I   diastolic dysfunction. Left atrium is of normal size. Normal right ventricular size and function. Trivial tricuspid regurgitation.          Stress Test:     Cath: 5/7/2020    ANGIOGRAM/CORONARY ARTERIOGRAM:        The left main coronary artery is severely calcified with 80% stenosis .     The left anterior descending artery is severely calcified with 80% proximal stenosis      The left circumflex artery is 99% occluded at its ostium .     The right coronary artery is dominant artery with diffuse disease, mid 60%, rPDA 90% .     LEFT VENTRICULOGRAM:  Left ventricular angiogram was done in the 30° HAYDEN projection and revealed normal left ventricular wall motion and systolic function with an estimated ejection fraction of 55%.         INTERVENTION  1. 50 cc IABP      SUMMARY:      3V obstructive CAD         RECOMMENDATION:      -Urgent CABG  - heparin gtt  - nitro gtt  - asa/statin     All above diagnostic testing was independently visualized and reviewed by me (not simply review of report)     Patient Active Problem List   Diagnosis    Chest pain    NSTEMI (non-ST elevated myocardial infarction) ([address]    Coronary artery disease involving native coronary artery of native heart with unstable angina pectoris ([address]    S/P CABG x 4         Assessment and Plan   1) NSTEMI   -s/p urgent CABG for critical LM   - IABP remains, can likely explant tomorrow   - asa/statin/beta blockade  - normal EF post op    2) Carotid artery disease   - asa/statin    3) tobacco abuse   - cessation strongly advised     Thank you for allowing us to participate in the care of Sandy Calvert.     Aries Justin MD [address] [facility]   [address]   (C): [phone]  [facility] [phone]

## 2020-05-08 NOTE — PROGRESS NOTES
Late entry due to patient care:    Pt arrived to [place] [address] room 1304 from [place] [address] via bed with CVOR staff and anesthesia at [phone]. Connected to CVU cardiac monitor. Pt arrived intubated and placed on ventilator on arrival. RIJ TLC with Bison-Lencho PA catheter in place at 51cm, connected to Vigilance cardiac monitorand Louis monitor on arrival for hemodynamic monitoring. Epicardial ventricular pacing wires connected to temporary PM, noted capture and sensing then PM turned off. Intrinsic rhythm NSR with HR 90's. IABP in place to R femoral on 1:1 setting. R fem site CD&I. Pt arrived with milrinone @0.375, and levo @ 7, see eMAR. CT x2 connected to atrium with minimal thin,sanguinous drainage, placed to -20cm suction on arrival. Hoang catheter draining clear, yellow urine. OG placed and connected to continuous LWS, placement verified by gastric content and CXR. R brachial art line in place, site WNL, connected to transduce for ABP. Labs drawn and sent to lab. Also ran POCT on bedside EPOC, including ABG for anesthesia review. Family in waiting room receiving post-op report from surgeon. 1230: Pt waking up and lifting head off pillow. Extubated per anesthesia. 1300: Pt is thrashing around, raising chest off of bed, trying to raise and bend legs. Unable to follow commands, desats quickly when agitated, HR rising, BP dropping with agitation. Multiple medication adjustments made to gtts to help keep hemodynamics wnl(see eMAR) Has required multiple doses of versed, fentanyl, ketamine, precedex started. 4-5 nurses at bedside to keep pt safely in bed. Bilateral wrist restraints placed at 1315. 3 amps bicarb given total.1gm CaCl given/  1330: Decision made to reintubate for airway protection, and pt safety. Intubated per anesthesia. Propofol started at 10mcg. OG replaced. CXR to verify placement. Pt currently with milrinone, insulin, levo, and propofol infusing. 1400: K being replaced per protocol.  Wean milrinone 1ml q2 for

## 2020-05-08 NOTE — PLAN OF CARE
Nutrition Problem: Increased nutrient needs  Intervention: Food and/or Nutrient Delivery: Continue current diet  Nutritional Goals: tolerate most appropriate form of nutrition

## 2020-05-08 NOTE — CONSULTS
Nutrition Assessment    Type and Reason for Visit: Initial, Consult(per heart surgery protocol)    Nutrition Recommendations:   Attempt to f/u another time for ed needs    Nutrition Assessment: Pt adm r/t chest pain. Found to have 3 vessel disease. Taken to surgery this date for CABG. Pt extubated. Diet advancedto cardiac / ccc / 2 gm Na / 1500 ml per day. Pt with no hx of DM with carb modification added to ensure tight glucose control. Malnutrition Assessment:  · Malnutrition Status: Insufficient data  Due to current CDC guidelines recommending 6-ft distancing for social isolation for COVID19 prevention, NFPE/malnutrition assessment was deferred at this time. Nutrition Risk Level: Moderate    Nutrient Needs:  · Estimated Daily Total Kcal: 9215-8782 (25-30 x ABW 82 kg)  · Estimated Daily Protein (g):  (1.2-1.5 x ABW)  · Estimated Daily Total Fluid (ml/day): 1500 per MD    Nutrition Diagnosis:   · Problem: Increased nutrient needs  · Etiology: related to Increased demand for energy/nutrients     Signs and symptoms:  as evidenced by Presence of wounds    Objective Information:  · Nutrition-Focused Physical Findings: BM 5/7. Colace & Senokot on board.  Noted no edema  · Wound Type: Surgical Wound(cath sites to right groin & wrist, SI to sternum, LLE & chest tube sites)  · Current Nutrition Therapies:  · Oral Diet Orders: 2gm Sodium, Carb Control 4 Carbs/Meal, Cardiac, Fluid Restriction   · Oral Diet intake: Unable to assess(pt just post op)  · Anthropometric Measures:  · Ht: 6' (182.9 cm)   · Current Body Wt: 181 lb (82.1 kg)  · Admission Body Wt: 184 lb (83.5 kg)  · Ideal Body Wt: 178 lb (80.7 kg), % Ideal Body    · BMI Classification: BMI 18.5 - 24.9 Normal Weight    Nutrition Interventions:   Continue current diet  Continued Inpatient Monitoring, Education not appropriate at this time    Nutrition Evaluation:   · Evaluation: Goals set   · Goals: tolerate most appropriate form of nutrition

## 2020-05-08 NOTE — PLAN OF CARE
Problem: Falls - Risk of:  Goal: Will remain free from falls  Description: Will remain free from falls  Outcome: Ongoing  Note: A: Sound com dome light, fall risk arm band, bed/chair alarm, bed in lowest position, wheels of bed/chair locked, non skid footwear, call light in reach, fall precaution education, intentional rounding. PT/OT eval/treat;  Goal: Absence of physical injury  Description: Absence of physical injury  Outcome: Ongoing     Problem: Pain:  Goal: Pain level will decrease  Description: Pain level will decrease  Outcome: Ongoing  Note: A: Assess for pain using appropriate scale. Reposition/non-pharmaceutical interventions and/or medicate as needed for pain. Reassess for effectiveness of intervention. Notify physician of unmanaged pain. Goal: Control of acute pain  Description: Control of acute pain  Outcome: Ongoing     Problem: Fluid Volume - Imbalance:  Goal: Will show no signs and symptoms of excessive bleeding  Description: Will show no signs and symptoms of excessive bleeding  Outcome: Ongoing  Note: A: Assess and educate for signs/symptoms of excessive bleeding, impaired coagulation    Goal: Ability to achieve a balanced intake and output will improve  Description: Ability to achieve a balanced intake and output will improve  Outcome: Ongoing  Note: A: Strict I/O, daily weight, follow ordered fluid restriction. Educate patient and family. Goal: Chest tube drainage is within specified parameters  Description: Chest tube drainage is within specified parameters  Outcome: Ongoing  Note: A: CT to suction as ordered. Assess for air leak and crepitus. Assess quality and quantity of CT drainage.  Notify physician if drainage greater then ordered parameters or meets criteria for removal.       Problem: Cardiac Output - Decreased:  Goal: Cardiac output within specified parameters  Description: Cardiac output within specified parameters  Outcome: Ongoing  Goal: Hemodynamic stability will skin temp, sensation, pulses, and cap refill       Problem: Tissue Perfusion - Renal, Altered:  Goal: Ability to achieve a balanced intake and output will improve  Description: Ability to achieve a balanced intake and output will improve  Outcome: Ongoing  Note: A: Strict I/O, daily weight, follow ordered fluid restriction. Educate patient and family. Goal: Electrolytes within specified parameters  Description: Electrolytes within specified parameters  Outcome: Ongoing  Note: A: Review lab results. Notify physician of abnormal results. Electrolyte per ordered protocols  Goal: Serum creatinine will be within specified parameters  Description: Serum creatinine will be within specified parameters  Outcome: Ongoing  Note: A: Review lab results. Notify physician of abnormal results. Problem: Restraint Use - Nonviolent/Non-Self-Destructive Behavior:  Goal: Absence of restraint indications  Description: Absence of restraint indications  Outcome: Ongoing  Note: A: Assess need for continued use. Assess for restraint related injury. Provide assistance with elimination, nutrition, and ROM. Educate patient/family re: criteria for removal.    Goal: Absence of restraint-related injury  Description: Absence of restraint-related injury  Outcome: Ongoing     Problem: Nutrition  Goal: Optimal nutrition therapy  5/8/2020 1416 by Jason Hwang RD, LD  Outcome: Ongoing     Problem: Gas Exchange - Impaired:  Goal: Levels of oxygenation will improve  Description: Levels of oxygenation will improve  Outcome: Ongoing  Note: A: SpO2 monitoring, titrate O2 to keep SpO2 >90%. Pt intubated on ventilator. Problem: Infection - Central Venous Catheter-Associated Bloodstream Infection:  Goal: Will show no infection signs and symptoms  Description: Will show no infection signs and symptoms  Outcome: Ongoing  Note: A: Assess need for continued use. Assess and educate for signs/symptoms of infection.   Occlusive dressing with antimicrobial patch in place. Alcohol swab cap on unused port. Aseptic technique when accessing ports. Problem: Urinary Elimination:  Goal: Complications related to the disease process, condition or treatment will be avoided or minimized  Description: Complications related to the disease process, condition or treatment will be avoided or minimized  Outcome: Ongoing  Note: A: Assess need for continued use. Assess and educate for signs/symptoms of infection. Monitor quality and quantity of urine output. Stat lock in place, drainage bag hanging below level of bladder, routine catheter care. Problem: Infection - Ventilator-Associated Pneumonia:  Goal: Prevent a ventilator associated event (VAE)  Description: Prevent a ventilator associated event (VAE)  Outcome: Ongoing  Note: A: Assess readiness to wean. Assess and communicate tolerance of ventilator settings; Collaborate with RT and pulmonologist.  RT to monitor and manage cuff pressure. Assess for signs/symptoms of infection. HOB 30 degrees. Routine oral care. Suction as needed, assess quality and quantity of secretions. GI prophylaxis as ordered. Manage sedation as ordered to keep RASS -1 to +1. Goal: Absence of pulmonary infection  Description: Absence of pulmonary infection  Outcome: Ongoing     Problem: Venous Thromboembolism:  Goal: Will show no signs or symptoms of venous thromboembolism  Description: Will show no signs or symptoms of venous thromboembolism  Outcome: Ongoing  Note: A: Assess and educate for signs/symptoms of VTE, PE or bleeding. Compression stockings, sequential compression device, promote progress activity plan.       Goal: Absence of signs or symptoms of impaired coagulation  Description: Absence of signs or symptoms of impaired coagulation  Outcome: Ongoing  Note: A: Assess and educate for signs/symptoms of excessive bleeding, impaired coagulation       Problem: Tobacco Use:  Goal: Will participate in inpatient tobacco-use cessation

## 2020-05-08 NOTE — ANESTHESIA PRE PROCEDURE
MD Hilary 13.4 mL/hr at 05/07/20 1935 13.4 mL/hr at 05/07/20 1935    nitroGLYCERIN 50 mg in dextrose 5% 250 mL infusion  20 mcg/min Intravenous Continuous Jairo Crowell MD 36 mL/hr at 05/08/20 0044 120 mcg/min at 05/08/20 0044    metoprolol tartrate (LOPRESSOR) tablet 50 mg  50 mg Oral BID Jairo Crowell MD   50 mg at 05/07/20 1531    sodium chloride flush 0.9 % injection 10 mL  10 mL Intravenous 2 times per day Jairo Crowell MD        sodium chloride flush 0.9 % injection 10 mL  10 mL Intravenous PRN Jairo Crowell MD        acetaminophen (TYLENOL) tablet 650 mg  650 mg Oral Q4H PRN Jairo Crowell MD        magnesium hydroxide (MILK OF MAGNESIA) 400 MG/5ML suspension 30 mL  30 mL Oral Daily PRN Jairo Crowell MD        docusate sodium (COLACE) capsule 100 mg  100 mg Oral BID Peggye Fonder, APRN - CNP   100 mg at 05/07/20 2020    sodium chloride flush 0.9 % injection 10 mL  10 mL Intravenous 2 times per day Peggye Fonder, APRN - CNP        sodium chloride flush 0.9 % injection 10 mL  10 mL Intravenous PRN Nolagye Harshad, APRN - CNP        vancomycin (VANCOCIN) 1500 mg in dextrose 5 % 250 mL IVPB  1,500 mg Intravenous On Call to [facility], KATHY - CNP        mupirocin (BACTROBAN) 2 % ointment   Nasal BID Peggye Fonder, APRN - CNP        chlorhexidine (HIBICLENS) 4 % liquid   Topical See Admin Instructions Peggye Fonder, APRN - CNP        0.9 % sodium chloride infusion   Intravenous Continuous Peggye Fonder, APRN - CNP 50 mL/hr at 05/08/20 0500      ceFAZolin (ANCEF) 2 g in dextrose 5 % 100 mL IVPB  2 g Intravenous On Call to [facility], KATHY - CNP        sodium chloride (PF) 0.9 % injection 10 mL  10 mL Intravenous Once Peggye Fondlawrence, KATHY - CNP        LORazepam (ATIVAN) injection 0.5 mg  0.5 mg Intravenous Q6H PRN Farzad Bell MD   0.5 mg at 05/08/20 0214       Allergies:  No Known Allergies    Problem List:    Patient Active Problem List   Diagnosis Code    Chest pain R07.9    NSTEMI (non-ST elevated myocardial infarction) ([address] I21.4       Past Medical History:        Diagnosis Date    Blood clot in vein 2018    right leg. no previous scans to know whether supf or dvt. no coumadin. put on plavix.  Bronchitis     doesn't remember when. outpt abx    Hypertension     Kidney disease     s/p nephrectomy age 17, pt not sure why     Respiratory compromise 2020    chemical exposure at work, seen at [facility], covid neg, given steroids    Tattoos        Past Surgical History:        Procedure Laterality Date    TOTAL NEPHRECTOMY Left     17 yo - pt doesn't know why       Social History:    Social History     Tobacco Use    Smoking status: Former Smoker     Types: Cigarettes     Last attempt to quit: 2019     Years since quittin.5    Smokeless tobacco: Never Used    Tobacco comment: started age 27   Substance Use Topics    Alcohol use: Yes     Frequency: Monthly or less     Comment: once a month, shot once a week. depends on mood. Counseling given: Not Answered  Comment: started age 27      Vital Signs (Current):   Vitals:    20 0533 20 0541 20 0602 20 0632   BP: (!) 130/50  (!) 145/61 130/69   Pulse: 69 70 70 79   Resp: 19 21   Temp:  97.5 °F (36.4 °C)     TempSrc:  Oral     SpO2: 92% 95% 93% 97%   Weight:       Height:                                                  BP Readings from Last 3 Encounters:   20 130/69       NPO Status:                                                                                 BMI:   Wt Readings from Last 3 Encounters:   20 180 lb 12.4 oz (82 kg)     Body mass index is 24.52 kg/m².     CBC:   Lab Results   Component Value Date    WBC 12.5 2020    RBC 3.98 2020    HGB 10.8 2020    HCT 33.2 2020    MCV 83.3 2020    RDW 14.6 2020     2020       CMP:   Lab Results   Component Value Date     2020    K 4.5

## 2020-05-08 NOTE — H&P
(Oral)   Resp 20   Ht 6' (1.829 m)   Wt 180 lb 12.4 oz (82 kg)   SpO2 94%   BMI 24.52 kg/m²  O2 Flow Rate (L/min): 2 L/min  CVP (Mean): 0 mmHg  PAP: 23/9  PAP (Mean): 15 mmHg  CCI: 5.2 L/min  SVO2 (%): 81 %  Admission Weight: Weight: 188 lb (85.3 kg)      CV: reg  Pulm: decreased throughout  Abd: soft  Ext: warm    CBC:   Lab Results   Component Value Date    WBC 12.5 05/08/2020    HGB 10.8 05/08/2020    HCT 33.2 05/08/2020    MCV 83.3 05/08/2020     05/08/2020     BMP:   Lab Results   Component Value Date     05/08/2020    K 4.5 05/08/2020    K 3.6 05/07/2020     05/08/2020    CO2 21 05/08/2020    BUN 14 05/08/2020    CREATININE 1.0 05/08/2020    CALCIUM 8.7 05/08/2020     Cardiac Enzymes:   Lab Results   Component Value Date    TROPONINI 0.11 05/07/2020     PT/INR:   Lab Results   Component Value Date    PROTIME 11.4 05/07/2020    INR 0.98 05/07/2020     APTT:   Lab Results   Component Value Date    APTT 53.0 05/08/2020     Liver Profile:  Lab Results   Component Value Date    AST 17 05/07/2020    ALT 14 05/07/2020    BILIDIR <0.2 05/07/2020    BILITOT <0.2 05/07/2020    ALKPHOS 43 05/07/2020    LABALBU 3.2 05/07/2020     Lab Results   Component Value Date    CHOL 163 05/07/2020    HDL 47 05/07/2020    TRIG 66 05/07/2020     HgbA1c:  Lab Results   Component Value Date    LABA1C 5.9 05/07/2020     UA:   Lab Results   Component Value Date    COLORU YELLOW 05/07/2020    PHUR 5.5 05/07/2020    WBCUA 3 05/07/2020    RBCUA 9 05/07/2020    CLARITYU Clear 05/07/2020    SPECGRAV 1.024 05/07/2020    LEUKOCYTESUR Negative 05/07/2020    UROBILINOGEN 0.2 05/07/2020    BILIRUBINUR Negative 05/07/2020    BLOODU Negative 05/07/2020    GLUCOSEU Negative 05/07/2020     The patient was counseled at length about the risks of sravani Covid-19 during their perioperative period and any recovery window from their procedure.   The patient was made aware that sravani Covid-19  may worsen their prognosis for
with multivessel disease; left main coronary artery disease with 80% stenosis, LAD with 80% proximal stenosis, left circumflex with 99% occlusion, right coronary artery is dominant with mid 60% disease  Balloon pump in place  Nitro drip  Aspirin and statin  Hold Plavix in light of future CABG  Left ventricular function still intact    Hypertension  Continue nitro drip      DVT Prophylaxis: Heparin  Diet: Diet NPO Effective Now  Code Status: Full Code  PT/OT Eval Status: Will need postop    Dispo -CVICU    I spent greater than 30 minutes of critical care time with patient regards to his life-threatening NSTEMI requiring emergent left heart cath and eventual CABG       Ranjan Cordova MD    Thank you No primary care provider on file. for the opportunity to be involved in this patient's care. If you have any questions or concerns please feel free to contact me at [phone].

## 2020-05-09 LAB
ANION GAP SERPL CALCULATED.3IONS-SCNC: 11 MMOL/L (ref 3–16)
APTT: 25.9 SEC (ref 24.2–36.2)
BASE EXCESS ARTERIAL: -1 (ref -3–3)
BASE EXCESS ARTERIAL: -1 (ref -3–3)
BUN BLDV-MCNC: 18 MG/DL (ref 7–20)
CALCIUM IONIZED: 1.17 MMOL/L (ref 1.12–1.32)
CALCIUM IONIZED: 1.2 MMOL/L (ref 1.12–1.32)
CALCIUM SERPL-MCNC: 8.4 MG/DL (ref 8.3–10.6)
CHLORIDE BLD-SCNC: 107 MMOL/L (ref 99–110)
CO2: 22 MMOL/L (ref 21–32)
CREAT SERPL-MCNC: 1.4 MG/DL (ref 0.8–1.3)
GFR AFRICAN AMERICAN: >60
GFR NON-AFRICAN AMERICAN: 51
GLUCOSE BLD-MCNC: 101 MG/DL (ref 70–99)
GLUCOSE BLD-MCNC: 118 MG/DL (ref 70–99)
GLUCOSE BLD-MCNC: 59 MG/DL (ref 70–99)
GLUCOSE BLD-MCNC: 67 MG/DL (ref 70–99)
GLUCOSE BLD-MCNC: 76 MG/DL (ref 70–99)
GLUCOSE BLD-MCNC: 78 MG/DL (ref 70–99)
GLUCOSE BLD-MCNC: 80 MG/DL (ref 70–99)
GLUCOSE BLD-MCNC: 81 MG/DL (ref 70–99)
GLUCOSE BLD-MCNC: 82 MG/DL (ref 70–99)
GLUCOSE BLD-MCNC: 83 MG/DL (ref 70–99)
GLUCOSE BLD-MCNC: 83 MG/DL (ref 70–99)
GLUCOSE BLD-MCNC: 86 MG/DL (ref 70–99)
GLUCOSE BLD-MCNC: 86 MG/DL (ref 70–99)
GLUCOSE BLD-MCNC: 87 MG/DL (ref 70–99)
GLUCOSE BLD-MCNC: 87 MG/DL (ref 70–99)
GLUCOSE BLD-MCNC: 93 MG/DL (ref 70–99)
GLUCOSE BLD-MCNC: 94 MG/DL (ref 70–99)
GLUCOSE BLD-MCNC: 94 MG/DL (ref 70–99)
HCO3 ARTERIAL: 23.9 MMOL/L (ref 21–29)
HCO3 ARTERIAL: 23.9 MMOL/L (ref 21–29)
HCT VFR BLD CALC: 20.7 % (ref 40.5–52.5)
HCT VFR BLD CALC: 21.9 % (ref 40.5–52.5)
HCT VFR BLD CALC: 25 % (ref 40.5–52.5)
HEMOGLOBIN: 6.5 G/DL (ref 13.5–17.5)
HEMOGLOBIN: 7 G/DL (ref 13.5–17.5)
HEMOGLOBIN: 8 G/DL (ref 13.5–17.5)
HEMOGLOBIN: 8 GM/DL (ref 13.5–17.5)
INR BLD: 1.07 (ref 0.86–1.14)
LACTATE: 0.55 MMOL/L (ref 0.4–2)
LACTATE: 0.55 MMOL/L (ref 0.4–2)
MAGNESIUM: 2.5 MG/DL (ref 1.8–2.4)
MCH RBC QN AUTO: 26.7 PG (ref 26–34)
MCH RBC QN AUTO: 26.8 PG (ref 26–34)
MCH RBC QN AUTO: 26.8 PG (ref 26–34)
MCHC RBC AUTO-ENTMCNC: 31.5 G/DL (ref 31–36)
MCHC RBC AUTO-ENTMCNC: 31.8 G/DL (ref 31–36)
MCHC RBC AUTO-ENTMCNC: 32 G/DL (ref 31–36)
MCV RBC AUTO: 83.7 FL (ref 80–100)
MCV RBC AUTO: 83.9 FL (ref 80–100)
MCV RBC AUTO: 84.8 FL (ref 80–100)
O2 SAT, ARTERIAL: 96 % (ref 93–100)
O2 SAT, ARTERIAL: 99 % (ref 93–100)
PCO2 ARTERIAL: 37.1 MM HG (ref 35–45)
PCO2 ARTERIAL: 38.5 MM HG (ref 35–45)
PDW BLD-RTO: 14.8 % (ref 12.4–15.4)
PDW BLD-RTO: 14.8 % (ref 12.4–15.4)
PDW BLD-RTO: 15.3 % (ref 12.4–15.4)
PERFORMED ON: ABNORMAL
PERFORMED ON: NORMAL
PH ARTERIAL: 7.4 (ref 7.35–7.45)
PH ARTERIAL: 7.42 (ref 7.35–7.45)
PLATELET # BLD: 184 K/UL (ref 135–450)
PLATELET # BLD: 191 K/UL (ref 135–450)
PLATELET # BLD: 221 K/UL (ref 135–450)
PMV BLD AUTO: 7.1 FL (ref 5–10.5)
PMV BLD AUTO: 7.4 FL (ref 5–10.5)
PMV BLD AUTO: 7.6 FL (ref 5–10.5)
PO2 ARTERIAL: 124.5 MM HG (ref 75–108)
PO2 ARTERIAL: 80.8 MM HG (ref 75–108)
POC HEMATOCRIT: 24 % (ref 40.5–52.5)
POC POTASSIUM: 4.5 MMOL/L (ref 3.5–5.1)
POC SAMPLE TYPE: ABNORMAL
POC SODIUM: 144 MMOL/L (ref 136–145)
POTASSIUM SERPL-SCNC: 4.7 MMOL/L (ref 3.5–5.1)
PROTHROMBIN TIME: 12.4 SEC (ref 10–13.2)
RBC # BLD: 2.44 M/UL (ref 4.2–5.9)
RBC # BLD: 2.62 M/UL (ref 4.2–5.9)
RBC # BLD: 2.98 M/UL (ref 4.2–5.9)
SODIUM BLD-SCNC: 140 MMOL/L (ref 136–145)
TCO2 ARTERIAL: 25 MMOL/L
TCO2 ARTERIAL: 25 MMOL/L
VANCOMYCIN TROUGH: 15.6 UG/ML (ref 10–20)
WBC # BLD: 12 K/UL (ref 4–11)
WBC # BLD: 12.4 K/UL (ref 4–11)
WBC # BLD: 15.4 K/UL (ref 4–11)

## 2020-05-09 PROCEDURE — 94750 HC PULMONARY COMPLIANCE STUDY: CPT

## 2020-05-09 PROCEDURE — 6360000002 HC RX W HCPCS: Performed by: INTERNAL MEDICINE

## 2020-05-09 PROCEDURE — 2700000000 HC OXYGEN THERAPY PER DAY

## 2020-05-09 PROCEDURE — 99024 POSTOP FOLLOW-UP VISIT: CPT | Performed by: THORACIC SURGERY (CARDIOTHORACIC VASCULAR SURGERY)

## 2020-05-09 PROCEDURE — 6370000000 HC RX 637 (ALT 250 FOR IP): Performed by: THORACIC SURGERY (CARDIOTHORACIC VASCULAR SURGERY)

## 2020-05-09 PROCEDURE — 36430 TRANSFUSION BLD/BLD COMPNT: CPT

## 2020-05-09 PROCEDURE — 85027 COMPLETE CBC AUTOMATED: CPT

## 2020-05-09 PROCEDURE — 82803 BLOOD GASES ANY COMBINATION: CPT

## 2020-05-09 PROCEDURE — 85014 HEMATOCRIT: CPT

## 2020-05-09 PROCEDURE — 94761 N-INVAS EAR/PLS OXIMETRY MLT: CPT

## 2020-05-09 PROCEDURE — 99232 SBSQ HOSP IP/OBS MODERATE 35: CPT | Performed by: INTERNAL MEDICINE

## 2020-05-09 PROCEDURE — 2500000003 HC RX 250 WO HCPCS: Performed by: THORACIC SURGERY (CARDIOTHORACIC VASCULAR SURGERY)

## 2020-05-09 PROCEDURE — 82330 ASSAY OF CALCIUM: CPT

## 2020-05-09 PROCEDURE — 6360000002 HC RX W HCPCS: Performed by: NURSE PRACTITIONER

## 2020-05-09 PROCEDURE — 2500000003 HC RX 250 WO HCPCS

## 2020-05-09 PROCEDURE — 2580000003 HC RX 258: Performed by: THORACIC SURGERY (CARDIOTHORACIC VASCULAR SURGERY)

## 2020-05-09 PROCEDURE — 83605 ASSAY OF LACTIC ACID: CPT

## 2020-05-09 PROCEDURE — 83735 ASSAY OF MAGNESIUM: CPT

## 2020-05-09 PROCEDURE — 85730 THROMBOPLASTIN TIME PARTIAL: CPT

## 2020-05-09 PROCEDURE — 94003 VENT MGMT INPAT SUBQ DAY: CPT

## 2020-05-09 PROCEDURE — 6360000002 HC RX W HCPCS: Performed by: THORACIC SURGERY (CARDIOTHORACIC VASCULAR SURGERY)

## 2020-05-09 PROCEDURE — 37799 UNLISTED PX VASCULAR SURGERY: CPT

## 2020-05-09 PROCEDURE — 84132 ASSAY OF SERUM POTASSIUM: CPT

## 2020-05-09 PROCEDURE — C9113 INJ PANTOPRAZOLE SODIUM, VIA: HCPCS | Performed by: THORACIC SURGERY (CARDIOTHORACIC VASCULAR SURGERY)

## 2020-05-09 PROCEDURE — 94640 AIRWAY INHALATION TREATMENT: CPT

## 2020-05-09 PROCEDURE — 84295 ASSAY OF SERUM SODIUM: CPT

## 2020-05-09 PROCEDURE — 82947 ASSAY GLUCOSE BLOOD QUANT: CPT

## 2020-05-09 PROCEDURE — 85610 PROTHROMBIN TIME: CPT

## 2020-05-09 PROCEDURE — 80202 ASSAY OF VANCOMYCIN: CPT

## 2020-05-09 PROCEDURE — 80048 BASIC METABOLIC PNL TOTAL CA: CPT

## 2020-05-09 PROCEDURE — 2100000000 HC CCU R&B

## 2020-05-09 RX ORDER — ASPIRIN 81 MG/1
324 TABLET, CHEWABLE ORAL DAILY
Status: DISCONTINUED | OUTPATIENT
Start: 2020-05-09 | End: 2020-05-28

## 2020-05-09 RX ORDER — 0.9 % SODIUM CHLORIDE 0.9 %
20 INTRAVENOUS SOLUTION INTRAVENOUS ONCE
Status: COMPLETED | OUTPATIENT
Start: 2020-05-09 | End: 2020-05-09

## 2020-05-09 RX ORDER — MIDAZOLAM HYDROCHLORIDE 1 MG/ML
1 INJECTION INTRAMUSCULAR; INTRAVENOUS
Status: DISCONTINUED | OUTPATIENT
Start: 2020-05-09 | End: 2020-05-11

## 2020-05-09 RX ADMIN — POTASSIUM BICARBONATE 10 MEQ: 782 TABLET, EFFERVESCENT ORAL at 15:06

## 2020-05-09 RX ADMIN — ACETAMINOPHEN 1000 MG: 10 INJECTION, SOLUTION INTRAVENOUS at 00:10

## 2020-05-09 RX ADMIN — CHLORHEXIDINE GLUCONATE 15 ML: 1.2 RINSE ORAL at 20:27

## 2020-05-09 RX ADMIN — CEFAZOLIN 2 G: 1 INJECTION, POWDER, FOR SOLUTION INTRAMUSCULAR; INTRAVENOUS at 09:38

## 2020-05-09 RX ADMIN — ACETAMINOPHEN 1000 MG: 10 INJECTION, SOLUTION INTRAVENOUS at 06:36

## 2020-05-09 RX ADMIN — SENNOSIDES AND DOCUSATE SODIUM 1 TABLET: 8.6; 5 TABLET ORAL at 20:26

## 2020-05-09 RX ADMIN — FENTANYL CITRATE 25 MCG: 50 INJECTION INTRAMUSCULAR; INTRAVENOUS at 02:10

## 2020-05-09 RX ADMIN — ATORVASTATIN CALCIUM 40 MG: 40 TABLET, FILM COATED ORAL at 20:26

## 2020-05-09 RX ADMIN — DEXTROSE MONOHYDRATE 12.5 G: 500 INJECTION PARENTERAL at 10:20

## 2020-05-09 RX ADMIN — SODIUM CHLORIDE 500 ML: 9 INJECTION, SOLUTION INTRAVENOUS at 22:15

## 2020-05-09 RX ADMIN — ALBUTEROL SULFATE 2.5 MG: 2.5 SOLUTION RESPIRATORY (INHALATION) at 07:53

## 2020-05-09 RX ADMIN — ALBUTEROL SULFATE 2.5 MG: 2.5 SOLUTION RESPIRATORY (INHALATION) at 20:11

## 2020-05-09 RX ADMIN — MIDAZOLAM HYDROCHLORIDE 1 MG: 2 INJECTION, SOLUTION INTRAMUSCULAR; INTRAVENOUS at 20:37

## 2020-05-09 RX ADMIN — FENTANYL CITRATE 25 MCG: 50 INJECTION INTRAMUSCULAR; INTRAVENOUS at 04:54

## 2020-05-09 RX ADMIN — FENTANYL CITRATE 25 MCG: 50 INJECTION INTRAMUSCULAR; INTRAVENOUS at 09:30

## 2020-05-09 RX ADMIN — CEFAZOLIN 2 G: 1 INJECTION, POWDER, FOR SOLUTION INTRAMUSCULAR; INTRAVENOUS at 17:33

## 2020-05-09 RX ADMIN — ASPIRIN 81 MG 324 MG: 81 TABLET ORAL at 10:54

## 2020-05-09 RX ADMIN — FENTANYL CITRATE 25 MCG: 50 INJECTION INTRAMUSCULAR; INTRAVENOUS at 12:49

## 2020-05-09 RX ADMIN — ACETAMINOPHEN 1000 MG: 10 INJECTION, SOLUTION INTRAVENOUS at 11:50

## 2020-05-09 RX ADMIN — PROPOFOL 45 MCG/KG/MIN: 10 INJECTION, EMULSION INTRAVENOUS at 04:00

## 2020-05-09 RX ADMIN — DEXMEDETOMIDINE HYDROCHLORIDE 1 MCG/KG/HR: 4 INJECTION, SOLUTION INTRAVENOUS at 21:30

## 2020-05-09 RX ADMIN — OXYCODONE HYDROCHLORIDE 10 MG: 10 TABLET ORAL at 19:51

## 2020-05-09 RX ADMIN — MIDAZOLAM HYDROCHLORIDE 1 MG: 2 INJECTION, SOLUTION INTRAMUSCULAR; INTRAVENOUS at 06:44

## 2020-05-09 RX ADMIN — PROPOFOL 25 MCG/KG/MIN: 10 INJECTION, EMULSION INTRAVENOUS at 22:18

## 2020-05-09 RX ADMIN — FENTANYL CITRATE 25 MCG: 50 INJECTION INTRAMUSCULAR; INTRAVENOUS at 14:45

## 2020-05-09 RX ADMIN — CHLORHEXIDINE GLUCONATE 15 ML: 1.2 RINSE ORAL at 07:53

## 2020-05-09 RX ADMIN — ALBUTEROL SULFATE 2.5 MG: 2.5 SOLUTION RESPIRATORY (INHALATION) at 11:47

## 2020-05-09 RX ADMIN — FENTANYL CITRATE 25 MCG: 50 INJECTION INTRAMUSCULAR; INTRAVENOUS at 19:47

## 2020-05-09 RX ADMIN — LORAZEPAM 0.5 MG: 2 INJECTION INTRAMUSCULAR; INTRAVENOUS at 12:45

## 2020-05-09 RX ADMIN — FENTANYL CITRATE 25 MCG: 50 INJECTION INTRAMUSCULAR; INTRAVENOUS at 03:13

## 2020-05-09 RX ADMIN — PROPOFOL 45 MCG/KG/MIN: 10 INJECTION, EMULSION INTRAVENOUS at 08:59

## 2020-05-09 RX ADMIN — Medication 10 ML: at 08:21

## 2020-05-09 RX ADMIN — FENTANYL CITRATE 25 MCG: 50 INJECTION INTRAMUSCULAR; INTRAVENOUS at 13:44

## 2020-05-09 RX ADMIN — MIDAZOLAM HYDROCHLORIDE 1 MG: 2 INJECTION, SOLUTION INTRAMUSCULAR; INTRAVENOUS at 16:36

## 2020-05-09 RX ADMIN — MUPIROCIN: 20 OINTMENT TOPICAL at 08:14

## 2020-05-09 RX ADMIN — LORAZEPAM 0.5 MG: 2 INJECTION INTRAMUSCULAR; INTRAVENOUS at 18:06

## 2020-05-09 RX ADMIN — FENTANYL CITRATE 25 MCG: 50 INJECTION INTRAMUSCULAR; INTRAVENOUS at 17:28

## 2020-05-09 RX ADMIN — DEXMEDETOMIDINE HYDROCHLORIDE 0.8 MCG/KG/HR: 4 INJECTION, SOLUTION INTRAVENOUS at 15:56

## 2020-05-09 RX ADMIN — SODIUM CHLORIDE 20 ML: 9 INJECTION, SOLUTION INTRAVENOUS at 12:05

## 2020-05-09 RX ADMIN — FENTANYL CITRATE 25 MCG: 50 INJECTION INTRAMUSCULAR; INTRAVENOUS at 18:47

## 2020-05-09 RX ADMIN — FENTANYL CITRATE 25 MCG: 50 INJECTION INTRAMUSCULAR; INTRAVENOUS at 23:20

## 2020-05-09 RX ADMIN — FUROSEMIDE 40 MG: 10 INJECTION, SOLUTION INTRAMUSCULAR; INTRAVENOUS at 17:52

## 2020-05-09 RX ADMIN — MIDAZOLAM HYDROCHLORIDE 1 MG: 2 INJECTION, SOLUTION INTRAMUSCULAR; INTRAVENOUS at 18:36

## 2020-05-09 RX ADMIN — MIDAZOLAM HYDROCHLORIDE 1 MG: 2 INJECTION, SOLUTION INTRAMUSCULAR; INTRAVENOUS at 07:56

## 2020-05-09 RX ADMIN — FENTANYL CITRATE 25 MCG: 50 INJECTION INTRAMUSCULAR; INTRAVENOUS at 11:02

## 2020-05-09 RX ADMIN — ALBUTEROL SULFATE 2.5 MG: 2.5 SOLUTION RESPIRATORY (INHALATION) at 15:46

## 2020-05-09 RX ADMIN — DOCUSATE SODIUM 100 MG: 50 LIQUID ORAL at 10:54

## 2020-05-09 RX ADMIN — INSULIN GLARGINE 12 UNITS: 100 INJECTION, SOLUTION SUBCUTANEOUS at 20:53

## 2020-05-09 RX ADMIN — Medication 10 ML: at 20:26

## 2020-05-09 RX ADMIN — POTASSIUM BICARBONATE 10 MEQ: 782 TABLET, EFFERVESCENT ORAL at 20:25

## 2020-05-09 RX ADMIN — FENTANYL CITRATE 25 MCG: 50 INJECTION INTRAMUSCULAR; INTRAVENOUS at 07:53

## 2020-05-09 RX ADMIN — PANTOPRAZOLE SODIUM 40 MG: 40 INJECTION, POWDER, FOR SOLUTION INTRAVENOUS at 06:36

## 2020-05-09 RX ADMIN — MIDAZOLAM HYDROCHLORIDE 1 MG: 2 INJECTION, SOLUTION INTRAMUSCULAR; INTRAVENOUS at 11:11

## 2020-05-09 RX ADMIN — FENTANYL CITRATE 25 MCG: 50 INJECTION INTRAMUSCULAR; INTRAVENOUS at 16:08

## 2020-05-09 RX ADMIN — MIDAZOLAM HYDROCHLORIDE 1 MG: 2 INJECTION, SOLUTION INTRAMUSCULAR; INTRAVENOUS at 14:05

## 2020-05-09 RX ADMIN — OXYCODONE HYDROCHLORIDE 10 MG: 10 TABLET ORAL at 08:35

## 2020-05-09 RX ADMIN — FENTANYL CITRATE 25 MCG: 50 INJECTION INTRAMUSCULAR; INTRAVENOUS at 01:02

## 2020-05-09 RX ADMIN — MIDAZOLAM HYDROCHLORIDE 1 MG: 2 INJECTION, SOLUTION INTRAMUSCULAR; INTRAVENOUS at 09:19

## 2020-05-09 RX ADMIN — FENTANYL CITRATE 25 MCG: 50 INJECTION INTRAMUSCULAR; INTRAVENOUS at 12:00

## 2020-05-09 RX ADMIN — MUPIROCIN: 20 OINTMENT TOPICAL at 20:26

## 2020-05-09 RX ADMIN — FUROSEMIDE 40 MG: 10 INJECTION, SOLUTION INTRAMUSCULAR; INTRAVENOUS at 10:57

## 2020-05-09 RX ADMIN — CEFAZOLIN 2 G: 1 INJECTION, POWDER, FOR SOLUTION INTRAMUSCULAR; INTRAVENOUS at 02:43

## 2020-05-09 RX ADMIN — SENNOSIDES AND DOCUSATE SODIUM 1 TABLET: 8.6; 5 TABLET ORAL at 10:54

## 2020-05-09 RX ADMIN — Medication 10 ML: at 06:36

## 2020-05-09 RX ADMIN — METOPROLOL TARTRATE 12.5 MG: 25 TABLET, FILM COATED ORAL at 08:35

## 2020-05-09 RX ADMIN — FENTANYL CITRATE 25 MCG: 50 INJECTION INTRAMUSCULAR; INTRAVENOUS at 05:50

## 2020-05-09 ASSESSMENT — PULMONARY FUNCTION TESTS
PIF_VALUE: 11
PIF_VALUE: 11
PIF_VALUE: 23
PIF_VALUE: 22
PIF_VALUE: 12
PIF_VALUE: 18
PIF_VALUE: 12
PIF_VALUE: 18
PIF_VALUE: 20
PIF_VALUE: 11
PIF_VALUE: 11
PIF_VALUE: 12
PIF_VALUE: 10
PIF_VALUE: 11
PIF_VALUE: 13
PIF_VALUE: 18
PIF_VALUE: 18
PIF_VALUE: 14
PIF_VALUE: 18
PIF_VALUE: 11
PIF_VALUE: 22
PIF_VALUE: 11
PIF_VALUE: 15
PIF_VALUE: 22
PIF_VALUE: 17
PIF_VALUE: 15
PIF_VALUE: 11
PIF_VALUE: 15
PIF_VALUE: 19
PIF_VALUE: 15
PIF_VALUE: 13
PIF_VALUE: 11
PIF_VALUE: 22
PIF_VALUE: 12
PIF_VALUE: 17
PIF_VALUE: 17
PIF_VALUE: 11
PIF_VALUE: 11
PIF_VALUE: 21
PIF_VALUE: 12
PIF_VALUE: 15
PIF_VALUE: 13
PIF_VALUE: 11
PIF_VALUE: 18
PIF_VALUE: 11
PIF_VALUE: 18
PIF_VALUE: 15
PIF_VALUE: 21
PIF_VALUE: 17
PIF_VALUE: 12
PIF_VALUE: 12
PIF_VALUE: 21
PIF_VALUE: 20
PIF_VALUE: 15
PIF_VALUE: 14
PIF_VALUE: 11
PIF_VALUE: 14
PIF_VALUE: 25
PIF_VALUE: 12
PIF_VALUE: 16
PIF_VALUE: 12
PIF_VALUE: 13
PIF_VALUE: 14
PIF_VALUE: 12
PIF_VALUE: 11
PIF_VALUE: 13
PIF_VALUE: 24
PIF_VALUE: 12
PIF_VALUE: 16
PIF_VALUE: 16
PIF_VALUE: 11
PIF_VALUE: 14
PIF_VALUE: 12
PIF_VALUE: 12
PIF_VALUE: 11
PIF_VALUE: 12
PIF_VALUE: 14
PIF_VALUE: 16
PIF_VALUE: 12
PIF_VALUE: 18
PIF_VALUE: 11
PIF_VALUE: 18
PIF_VALUE: 21
PIF_VALUE: 15
PIF_VALUE: 12
PIF_VALUE: 13
PIF_VALUE: 24
PIF_VALUE: 11
PIF_VALUE: 11
PIF_VALUE: 20
PIF_VALUE: 11
PIF_VALUE: 11
PIF_VALUE: 18
PIF_VALUE: 11
PIF_VALUE: 15
PIF_VALUE: 12
PIF_VALUE: 17
PIF_VALUE: 14
PIF_VALUE: 13
PIF_VALUE: 19
PIF_VALUE: 13
PIF_VALUE: 11
PIF_VALUE: 11
PIF_VALUE: 12
PIF_VALUE: 35
PIF_VALUE: 12
PIF_VALUE: 19
PIF_VALUE: 18
PIF_VALUE: 16

## 2020-05-09 ASSESSMENT — PAIN SCALES - GENERAL
PAINLEVEL_OUTOF10: 0
PAINLEVEL_OUTOF10: 6
PAINLEVEL_OUTOF10: 7
PAINLEVEL_OUTOF10: 5
PAINLEVEL_OUTOF10: 0
PAINLEVEL_OUTOF10: 3
PAINLEVEL_OUTOF10: 7
PAINLEVEL_OUTOF10: 0
PAINLEVEL_OUTOF10: 8
PAINLEVEL_OUTOF10: 7
PAINLEVEL_OUTOF10: 0
PAINLEVEL_OUTOF10: 6
PAINLEVEL_OUTOF10: 8
PAINLEVEL_OUTOF10: 1
PAINLEVEL_OUTOF10: 6
PAINLEVEL_OUTOF10: 5
PAINLEVEL_OUTOF10: 8
PAINLEVEL_OUTOF10: 5
PAINLEVEL_OUTOF10: 0
PAINLEVEL_OUTOF10: 5
PAINLEVEL_OUTOF10: 5
PAINLEVEL_OUTOF10: 8
PAINLEVEL_OUTOF10: 5
PAINLEVEL_OUTOF10: 1
PAINLEVEL_OUTOF10: 6
PAINLEVEL_OUTOF10: 0
PAINLEVEL_OUTOF10: 0
PAINLEVEL_OUTOF10: 1

## 2020-05-09 NOTE — PROGRESS NOTES
[facility]  Cardiology Consult    Giorgio Dominguez  1955    May 9, 2020        CC: Came in with chest pain      Subjective:     History of Present Illness:    Giorgio Dominguez is a 60 y.o. patient with a PMH significant for deep vein thrombosis, hypertension, coronary disease presented with complaints of chest pain  Had emergent left heart catheterization done. Had emergent coronary bypass surgery done. Past Medical History:   has a past medical history of Blood clot in vein, Bronchitis, Hypertension, Kidney disease, NSTEMI (non-ST elevated myocardial infarction) ([address] Respiratory compromise, and Tattoos. Surgical History:   has a past surgical history that includes total nephrectomy (Left); Coronary artery bypass graft (05/08/2020); and Coronary artery bypass graft (N/A, 5/8/2020). Social History:   reports that he quit smoking about 6 months ago. His smoking use included cigarettes. He has never used smokeless tobacco. He reports current alcohol use. He reports that he does not use drugs. Family History:  family history includes Heart Disease in his brother, father, mother, and paternal aunt. Home Medications:  Were reviewed and are listed in nursing record and/or below  Prior to Admission medications    Medication Sig Start Date End Date Taking?  Authorizing Provider   clopidogrel (PLAVIX) 75 MG tablet Take 75 mg by mouth daily   Yes Historical Provider, MD   lisinopril (PRINIVIL;ZESTRIL) 40 MG tablet Take 40 mg by mouth daily   Yes Historical Provider, MD   amLODIPine (NORVASC) 5 MG tablet Take 5 mg by mouth daily   Yes Historical Provider, MD   albuterol sulfate HFA (VENTOLIN HFA) 108 (90 Base) MCG/ACT inhaler Inhale 2 puffs into the lungs every 6 hours as needed for Wheezing   Yes Historical Provider, MD        CURRENT Medications:  0.9 % sodium chloride infusion, Continuous  sodium chloride flush 0.9 % injection 10 mL, 2 times per day  sodium chloride flush 0.9 % injection 10 wheeze, rales   Chest Wall:  No tenderness or deformity. Cardiovascular:    Pulses  Palpation: normal   Ascultation: Regular rate, S1/ S2 normal. No murmur, rub, or gallop. 2+ radial and pedal pulses, symmetric  Carotid  Femoral   Abdomen and Gastrointestinal:   Soft, non-tender, bowel sounds active. Liver and Spleen  Masses   Musculoskeletal: No muscle wasting  Back  Gait   Extremities: Extremities normal, atraumatic. No cyanosis or edema. No cyanosis clubbing       Skin: Inspection and palpation performed, no rashes or lesions. Pysch:  Not available   Neurologic:  Cannot be performed      Labs     All labs have been reviewed    Lab Results   Component Value Date    WBC 12.0 05/09/2020    RBC 2.62 05/09/2020    HGB 7.0 05/09/2020    HCT 21.9 05/09/2020    MCV 83.7 05/09/2020    RDW 14.8 05/09/2020     05/09/2020     Lab Results   Component Value Date     05/09/2020    K 4.7 05/09/2020    K 3.6 05/07/2020     05/09/2020    CO2 22 05/09/2020    BUN 18 05/09/2020    CREATININE 1.4 05/09/2020    GFRAA >60 05/09/2020    AGRATIO 1.2 05/07/2020    LABGLOM 51 05/09/2020    GLUCOSE 76 05/09/2020    PROT 6.0 05/07/2020    CALCIUM 8.4 05/09/2020    BILITOT <0.2 05/07/2020    ALKPHOS 43 05/07/2020    AST 17 05/07/2020    ALT 14 05/07/2020     No results found for: PTINR  Lab Results   Component Value Date    LABA1C 5.9 05/07/2020     Lab Results   Component Value Date    TROPONINI 0.11 (H) 05/07/2020       Cardiac, Vascular and Imaging Data all Personally Reviewed in Detail by Myself      EKG: Normal sinus rhythm    Echocardiogram:  Left Ventricle   Left ventricular cavity size is normal.   Normal left ventricular wall thickness. Ejection fraction is visually estimated to be 60%. Diastolic filling   parameters suggest grade I diastolic dysfunction.       Assessment and Plan     -Non-ST elevation myocardial infarction, acute coronary syndrome  Severe left main coronary artery disease stenosis  Emergent

## 2020-05-09 NOTE — PROGRESS NOTES
Late Entry:  @ 0715 Bedside handoff with Wood Herrera RN  @ 4729 New order noted to change IABP to 1:3 for 1 hr then back to 1:2. Changed at this time. @ 0730 Assessment completed. Pt turns head to verbal stimulation, becomes restless with stimulation turning head side to side and tugging at restraints, not following commands. SBP at high end of ordered parameters will attempt to wean NTG gtt.  @ 0800 RT at bedside for treatment. Pt restless, trying to sit up in bed. CPOT 5, RASS +1. PRN medication administered, see eMAR.  @ 0825 Pt resting quietly, moves head and opens eyes to stimulation. CPOT 1, RASS -1. IABP changed back to 1:2 at this time per order. @ 6372 Initial dose of betablocker administered via OG at this time. @ 4015 Dr Wai Loredo to bedside for CTS rounds. Status reviewed, questions answered, plan of care discussed. Order received to change oral medication to OG medications. Order received to start Precedex, wean Propofol and to place IABP to 1:3  @ 0915 Jan, pt's girlfriend called, updates provided. @ 0930 Dr Segundo Dhillon returned to bedside, IABP paused per bedside order prior to IABP removal. Pt medicated with PRN dose Versed and Fentanyl for IABP removal, see eMAR. IABP removed per Dr Segundo Dhillon. Manual pressure per Dr Segundo Dhillon, then [facility] applied with max pressure. Doppler pedal pulses. Order received to check pulses q5min x3 then removed femstop. If any bleeding or hematoma hold manual pressure. @ 8948 Right groin site soft, no hematoma, drainage noted on gauze under FemStop, weaning pressure to removed FemStop  @ 1000 Femstop weaned off, no oozing/hematoma, ecchymosis at site. Dry dressing and tegaderm applied to puncture site. RLE knee brace in place for remainder of flat bedrest to protect right groin site. @ 1006 NTG weaned off at this time. @ 1015 SBP below ordered parameters, Levophed gtt started see eMAR for titrations and flowsheets for SBP.  Dr Segundo Dhillon called RN for follow s/p IABP removal.

## 2020-05-09 NOTE — PROGRESS NOTES
Pt had a uneventful night with no complications. Infusions were titrated to keep him hemodynamically stable according to the order. See MAR. Pt was evaluated using the CPOT scale and given pain meds throughout the shift. See MAR.

## 2020-05-09 NOTE — PROGRESS NOTES
139 140   K 4.4 4.3 4.7    107 107   CO2 21 23 22   BUN 14 15 18   CREATININE 1.1 1.2 1.4*   CALCIUM 8.2* 9.1 8.4   MG 2.70*  --  2.50*     Cardiac Enzymes:   Recent Labs     05/07/20  0548 05/07/20  0953 05/07/20  1317   TROPONINI <0.01 0.07* 0.11*     PT/INR:   Recent Labs     05/07/20  1200 05/08/20  1150 05/09/20  0445   PROTIME 11.4 14.9* 12.4   INR 0.98 1.28* 1.07     APTT:   Recent Labs     05/08/20  0254 05/08/20  1150 05/09/20  0445   APTT 53.0* 28.1 25.9       Assessment/Plan:  S/P POD#1  CV - sinus tachycardia, HDS   -milrinone has d/c. CI 3.6   -nitroglycerin is being weaned   -on propofol, precedex added, and versed IVP; he is been very agitated    -on 12.5 metoprolol  pulm - CXR reviewed   -ABG 7.41/37.1/124.5/23.9/25/.1   -wean to extubate as tolerated   Renal - BUN/Cr 18/1.4 (creatinine slowly trending upward); will monitor    ->2.2L of urine / 24 hours   -today weight not charted yet   - retain Hoang for accurate I+O   -lasix 40 BID  GI        -NPO  Heme - acute blood loss anemia.   - H/H 7.0/21.9   -will recheck at noon, if H/H <7, will transfuse 1 U PRBC, RN updated    IABP removed this morning bedside, groin check performed, good distal pulses.  HDS  Keep chest tubes and pacing wires      Edmund Cao MD  5/9/2020  9:01 AM

## 2020-05-09 NOTE — OP NOTE
[address]                                OPERATIVE REPORT    PATIENT NAME: Elvin Jara                      :        1955  MED REC NO:   [id number]                          ROOM:       56  ACCOUNT NO:   [de-identified]                           ADMIT DATE: 2020  PROVIDER:     Jelly Hodges MD      DATE OF PROCEDURE:  2020    PREOPERATIVE DIAGNOSIS:  Coronary artery disease, status post  intraaortic balloon pump placement. POSTOPERATIVE DIAGNOSIS:  Coronary artery disease, status post  intraaortic balloon pump placement. OPERATION PERFORMED:  1.  JENN. 2.  Left greater saphenous endoscopic vein harvesting. 3.  Urgent coronary artery bypass grafting x4 (LIMA to LAD, SVG to D1,  SVG to OM, SVG to PDA). SURGEON:  Jelly Hodges MD    ASSISTANTS:  Larita Lefort, SA; Justin José    ANESTHESIA:  General.    INDICATIONS:  The patient is a 68-year-old gentleman with multiple risks  factors for coronary disease who presented with progressively worsening  anginal symptoms. He ruled in for a non-ST elevation myocardial  infarction and underwent cardiac catheterization on 2020 revealing  severe three-vessel coronary artery disease including significant left  main stenosis. Intra-aortic balloon pump was placed for ongoing chest  pain. The patient presents today for urgent coronary artery bypass  grafting. He is aware of the risks and possible complications of the  procedure and wishes to proceed. OPERATIVE FINDINGS:  The preprocedural JENN confirmed the presence of  well-preserved left ventricular function with an ejection fraction about  50% and absence of any significant valvular abnormalities. The  saphenous vein conduit was taken from the left leg and was of good  caliber and quality.   The patient has history of a blood clot of some  sort in the right leg, unclear whether it

## 2020-05-09 NOTE — PLAN OF CARE
Problem: Falls - Risk of:  Goal: Will remain free from falls  Description: Will remain free from falls  Outcome: Met This Shift     Problem: Falls - Risk of:  Goal: Absence of physical injury  Description: Absence of physical injury  Outcome: Met This Shift     Problem: Pain:  Goal: Control of acute pain  Description: Control of acute pain  Outcome: Ongoing     Problem: Pain:  Goal: Control of chronic pain  Description: Control of chronic pain  Outcome: Ongoing     Problem: Restraint Use - Nonviolent/Non-Self-Destructive Behavior:  Goal: Absence of restraint indications  Description: Absence of restraint indications  Outcome: Ongoing     Problem: Restraint Use - Nonviolent/Non-Self-Destructive Behavior:  Goal: Absence of restraint-related injury  Description: Absence of restraint-related injury  Outcome: Ongoing

## 2020-05-10 ENCOUNTER — APPOINTMENT (OUTPATIENT)
Dept: GENERAL RADIOLOGY | Age: 65
DRG: 003 | End: 2020-05-10
Payer: COMMERCIAL

## 2020-05-10 LAB
ANION GAP SERPL CALCULATED.3IONS-SCNC: 13 MMOL/L (ref 3–16)
APTT: 27.1 SEC (ref 24.2–36.2)
BASE EXCESS ARTERIAL: -1 (ref -3–3)
BASE EXCESS ARTERIAL: -2 (ref -3–3)
BASE EXCESS VENOUS: -3 (ref -3–3)
BLOOD BANK DISPENSE STATUS: NORMAL
BLOOD BANK DISPENSE STATUS: NORMAL
BLOOD BANK PRODUCT CODE: NORMAL
BLOOD BANK PRODUCT CODE: NORMAL
BPU ID: NORMAL
BPU ID: NORMAL
BUN BLDV-MCNC: 21 MG/DL (ref 7–20)
CALCIUM SERPL-MCNC: 8 MG/DL (ref 8.3–10.6)
CHLORIDE BLD-SCNC: 105 MMOL/L (ref 99–110)
CO2: 19 MMOL/L (ref 21–32)
CREAT SERPL-MCNC: 1.3 MG/DL (ref 0.8–1.3)
DESCRIPTION BLOOD BANK: NORMAL
DESCRIPTION BLOOD BANK: NORMAL
GFR AFRICAN AMERICAN: >60
GFR NON-AFRICAN AMERICAN: 56
GLUCOSE BLD-MCNC: 100 MG/DL (ref 70–99)
GLUCOSE BLD-MCNC: 112 MG/DL (ref 70–99)
GLUCOSE BLD-MCNC: 116 MG/DL (ref 70–99)
GLUCOSE BLD-MCNC: 140 MG/DL (ref 70–99)
GLUCOSE BLD-MCNC: 97 MG/DL (ref 70–99)
HCO3 ARTERIAL: 21.8 MMOL/L (ref 21–29)
HCO3 ARTERIAL: 22.6 MMOL/L (ref 21–29)
HCO3 VENOUS: 21 MMOL/L (ref 23–29)
HCT VFR BLD CALC: 24.4 % (ref 40.5–52.5)
HEMOGLOBIN: 7.9 G/DL (ref 13.5–17.5)
INR BLD: 1.25 (ref 0.86–1.14)
MAGNESIUM: 2.3 MG/DL (ref 1.8–2.4)
MCH RBC QN AUTO: 27.1 PG (ref 26–34)
MCHC RBC AUTO-ENTMCNC: 32.4 G/DL (ref 31–36)
MCV RBC AUTO: 83.7 FL (ref 80–100)
O2 SAT, ARTERIAL: 100 % (ref 93–100)
O2 SAT, ARTERIAL: 96 % (ref 93–100)
O2 SAT, VEN: 94 %
PCO2 ARTERIAL: 30.4 MM HG (ref 35–45)
PCO2 ARTERIAL: 31 MM HG (ref 35–45)
PCO2, VEN: 30.2 MM HG (ref 40–50)
PDW BLD-RTO: 15.2 % (ref 12.4–15.4)
PERFORMED ON: ABNORMAL
PERFORMED ON: NORMAL
PH ARTERIAL: 7.46 (ref 7.35–7.45)
PH ARTERIAL: 7.48 (ref 7.35–7.45)
PH VENOUS: 7.45 (ref 7.35–7.45)
PLATELET # BLD: 180 K/UL (ref 135–450)
PMV BLD AUTO: 8.2 FL (ref 5–10.5)
PO2 ARTERIAL: 208 MM HG (ref 75–108)
PO2 ARTERIAL: 73.1 MM HG (ref 75–108)
PO2, VEN: 66 MM HG
POC SAMPLE TYPE: ABNORMAL
POTASSIUM SERPL-SCNC: 4.3 MMOL/L (ref 3.5–5.1)
PROTHROMBIN TIME: 14.5 SEC (ref 10–13.2)
RBC # BLD: 2.92 M/UL (ref 4.2–5.9)
SODIUM BLD-SCNC: 137 MMOL/L (ref 136–145)
TCO2 ARTERIAL: 23 MMOL/L
TCO2 ARTERIAL: 24 MMOL/L
TCO2 CALC VENOUS: 22 MMOL/L
WBC # BLD: 11.3 K/UL (ref 4–11)

## 2020-05-10 PROCEDURE — 6370000000 HC RX 637 (ALT 250 FOR IP): Performed by: NURSE PRACTITIONER

## 2020-05-10 PROCEDURE — 6370000000 HC RX 637 (ALT 250 FOR IP): Performed by: THORACIC SURGERY (CARDIOTHORACIC VASCULAR SURGERY)

## 2020-05-10 PROCEDURE — 83735 ASSAY OF MAGNESIUM: CPT

## 2020-05-10 PROCEDURE — 6360000002 HC RX W HCPCS: Performed by: INTERNAL MEDICINE

## 2020-05-10 PROCEDURE — 2580000003 HC RX 258: Performed by: THORACIC SURGERY (CARDIOTHORACIC VASCULAR SURGERY)

## 2020-05-10 PROCEDURE — 2500000003 HC RX 250 WO HCPCS: Performed by: THORACIC SURGERY (CARDIOTHORACIC VASCULAR SURGERY)

## 2020-05-10 PROCEDURE — 37799 UNLISTED PX VASCULAR SURGERY: CPT

## 2020-05-10 PROCEDURE — C9113 INJ PANTOPRAZOLE SODIUM, VIA: HCPCS | Performed by: THORACIC SURGERY (CARDIOTHORACIC VASCULAR SURGERY)

## 2020-05-10 PROCEDURE — 2700000000 HC OXYGEN THERAPY PER DAY

## 2020-05-10 PROCEDURE — 82803 BLOOD GASES ANY COMBINATION: CPT

## 2020-05-10 PROCEDURE — 99232 SBSQ HOSP IP/OBS MODERATE 35: CPT | Performed by: INTERNAL MEDICINE

## 2020-05-10 PROCEDURE — 6370000000 HC RX 637 (ALT 250 FOR IP): Performed by: INTERNAL MEDICINE

## 2020-05-10 PROCEDURE — 6360000002 HC RX W HCPCS: Performed by: THORACIC SURGERY (CARDIOTHORACIC VASCULAR SURGERY)

## 2020-05-10 PROCEDURE — 94640 AIRWAY INHALATION TREATMENT: CPT

## 2020-05-10 PROCEDURE — 94750 HC PULMONARY COMPLIANCE STUDY: CPT

## 2020-05-10 PROCEDURE — 99024 POSTOP FOLLOW-UP VISIT: CPT | Performed by: THORACIC SURGERY (CARDIOTHORACIC VASCULAR SURGERY)

## 2020-05-10 PROCEDURE — 71045 X-RAY EXAM CHEST 1 VIEW: CPT

## 2020-05-10 PROCEDURE — 6360000002 HC RX W HCPCS: Performed by: NURSE PRACTITIONER

## 2020-05-10 PROCEDURE — 85027 COMPLETE CBC AUTOMATED: CPT

## 2020-05-10 PROCEDURE — 85730 THROMBOPLASTIN TIME PARTIAL: CPT

## 2020-05-10 PROCEDURE — 85610 PROTHROMBIN TIME: CPT

## 2020-05-10 PROCEDURE — 82947 ASSAY GLUCOSE BLOOD QUANT: CPT

## 2020-05-10 PROCEDURE — 94003 VENT MGMT INPAT SUBQ DAY: CPT

## 2020-05-10 PROCEDURE — 2100000000 HC CCU R&B

## 2020-05-10 PROCEDURE — 80048 BASIC METABOLIC PNL TOTAL CA: CPT

## 2020-05-10 RX ORDER — QUETIAPINE FUMARATE 25 MG/1
25 TABLET, FILM COATED ORAL EVERY 12 HOURS PRN
Status: DISCONTINUED | OUTPATIENT
Start: 2020-05-10 | End: 2020-05-10

## 2020-05-10 RX ORDER — LORAZEPAM 2 MG/ML
1 INJECTION INTRAMUSCULAR EVERY 6 HOURS PRN
Status: DISCONTINUED | OUTPATIENT
Start: 2020-05-10 | End: 2020-05-11

## 2020-05-10 RX ADMIN — LORAZEPAM 1 MG: 2 INJECTION, SOLUTION INTRAMUSCULAR; INTRAVENOUS at 18:01

## 2020-05-10 RX ADMIN — MIDAZOLAM HYDROCHLORIDE 1 MG: 2 INJECTION, SOLUTION INTRAMUSCULAR; INTRAVENOUS at 11:35

## 2020-05-10 RX ADMIN — DEXMEDETOMIDINE HYDROCHLORIDE 1.2 MCG/KG/HR: 4 INJECTION, SOLUTION INTRAVENOUS at 13:36

## 2020-05-10 RX ADMIN — Medication 10 ML: at 08:06

## 2020-05-10 RX ADMIN — DEXMEDETOMIDINE HYDROCHLORIDE 1 MCG/KG/HR: 4 INJECTION, SOLUTION INTRAVENOUS at 06:52

## 2020-05-10 RX ADMIN — ACETAMINOPHEN 650 MG: 650 SUPPOSITORY RECTAL at 20:36

## 2020-05-10 RX ADMIN — CEFAZOLIN 2 G: 1 INJECTION, POWDER, FOR SOLUTION INTRAMUSCULAR; INTRAVENOUS at 01:40

## 2020-05-10 RX ADMIN — ALBUTEROL SULFATE 2.5 MG: 2.5 SOLUTION RESPIRATORY (INHALATION) at 08:28

## 2020-05-10 RX ADMIN — POTASSIUM BICARBONATE 10 MEQ: 782 TABLET, EFFERVESCENT ORAL at 21:09

## 2020-05-10 RX ADMIN — MUPIROCIN: 20 OINTMENT TOPICAL at 08:05

## 2020-05-10 RX ADMIN — PROPOFOL 30 MCG/KG/MIN: 10 INJECTION, EMULSION INTRAVENOUS at 14:56

## 2020-05-10 RX ADMIN — ALBUTEROL SULFATE 2.5 MG: 2.5 SOLUTION RESPIRATORY (INHALATION) at 12:01

## 2020-05-10 RX ADMIN — Medication 10 ML: at 05:58

## 2020-05-10 RX ADMIN — FENTANYL CITRATE 25 MCG: 50 INJECTION INTRAMUSCULAR; INTRAVENOUS at 01:39

## 2020-05-10 RX ADMIN — DEXMEDETOMIDINE HYDROCHLORIDE 1.4 MCG/KG/HR: 4 INJECTION, SOLUTION INTRAVENOUS at 16:16

## 2020-05-10 RX ADMIN — PROPOFOL 30 MCG/KG/MIN: 10 INJECTION, EMULSION INTRAVENOUS at 22:55

## 2020-05-10 RX ADMIN — CHLORHEXIDINE GLUCONATE 15 ML: 1.2 RINSE ORAL at 08:10

## 2020-05-10 RX ADMIN — MUPIROCIN: 20 OINTMENT TOPICAL at 21:13

## 2020-05-10 RX ADMIN — SENNOSIDES AND DOCUSATE SODIUM 1 TABLET: 8.6; 5 TABLET ORAL at 08:04

## 2020-05-10 RX ADMIN — INSULIN LISPRO 2 UNITS: 100 INJECTION, SOLUTION INTRAVENOUS; SUBCUTANEOUS at 13:22

## 2020-05-10 RX ADMIN — DEXMEDETOMIDINE HYDROCHLORIDE 1.2 MCG/KG/HR: 4 INJECTION, SOLUTION INTRAVENOUS at 20:00

## 2020-05-10 RX ADMIN — INSULIN GLARGINE 12 UNITS: 100 INJECTION, SOLUTION SUBCUTANEOUS at 20:46

## 2020-05-10 RX ADMIN — ASPIRIN 81 MG 324 MG: 81 TABLET ORAL at 08:05

## 2020-05-10 RX ADMIN — FENTANYL CITRATE 25 MCG: 50 INJECTION INTRAMUSCULAR; INTRAVENOUS at 04:30

## 2020-05-10 RX ADMIN — Medication 400 MG: at 08:05

## 2020-05-10 RX ADMIN — POTASSIUM BICARBONATE 10 MEQ: 782 TABLET, EFFERVESCENT ORAL at 16:19

## 2020-05-10 RX ADMIN — MIDAZOLAM HYDROCHLORIDE 1 MG: 2 INJECTION, SOLUTION INTRAMUSCULAR; INTRAVENOUS at 09:38

## 2020-05-10 RX ADMIN — ACETAMINOPHEN 650 MG: 325 TABLET, FILM COATED ORAL at 16:19

## 2020-05-10 RX ADMIN — METOPROLOL TARTRATE 12.5 MG: 25 TABLET, FILM COATED ORAL at 21:18

## 2020-05-10 RX ADMIN — FENTANYL CITRATE 25 MCG: 50 INJECTION INTRAMUSCULAR; INTRAVENOUS at 20:36

## 2020-05-10 RX ADMIN — OXYCODONE HYDROCHLORIDE 10 MG: 10 TABLET ORAL at 22:24

## 2020-05-10 RX ADMIN — PANTOPRAZOLE SODIUM 40 MG: 40 INJECTION, POWDER, FOR SOLUTION INTRAVENOUS at 05:58

## 2020-05-10 RX ADMIN — Medication 10 ML: at 21:14

## 2020-05-10 RX ADMIN — ALBUTEROL SULFATE 2.5 MG: 2.5 SOLUTION RESPIRATORY (INHALATION) at 16:05

## 2020-05-10 RX ADMIN — FENTANYL CITRATE 25 MCG: 50 INJECTION INTRAMUSCULAR; INTRAVENOUS at 07:52

## 2020-05-10 RX ADMIN — CHLORHEXIDINE GLUCONATE 15 ML: 1.2 RINSE ORAL at 21:12

## 2020-05-10 RX ADMIN — FUROSEMIDE 40 MG: 10 INJECTION, SOLUTION INTRAMUSCULAR; INTRAVENOUS at 18:06

## 2020-05-10 RX ADMIN — MIDAZOLAM HYDROCHLORIDE 1 MG: 2 INJECTION, SOLUTION INTRAMUSCULAR; INTRAVENOUS at 00:10

## 2020-05-10 RX ADMIN — DEXMEDETOMIDINE HYDROCHLORIDE 1 MCG/KG/HR: 4 INJECTION, SOLUTION INTRAVENOUS at 02:55

## 2020-05-10 RX ADMIN — FENTANYL CITRATE 25 MCG: 50 INJECTION INTRAMUSCULAR; INTRAVENOUS at 03:26

## 2020-05-10 RX ADMIN — SENNOSIDES AND DOCUSATE SODIUM 1 TABLET: 8.6; 5 TABLET ORAL at 21:05

## 2020-05-10 RX ADMIN — FUROSEMIDE 40 MG: 10 INJECTION, SOLUTION INTRAMUSCULAR; INTRAVENOUS at 08:54

## 2020-05-10 RX ADMIN — OXYCODONE HYDROCHLORIDE 10 MG: 10 TABLET ORAL at 00:31

## 2020-05-10 RX ADMIN — PROPOFOL 30 MCG/KG/MIN: 10 INJECTION, EMULSION INTRAVENOUS at 05:30

## 2020-05-10 RX ADMIN — FENTANYL CITRATE 25 MCG: 50 INJECTION INTRAMUSCULAR; INTRAVENOUS at 05:36

## 2020-05-10 RX ADMIN — METOPROLOL TARTRATE 12.5 MG: 25 TABLET, FILM COATED ORAL at 08:04

## 2020-05-10 RX ADMIN — ALBUTEROL SULFATE 2.5 MG: 2.5 SOLUTION RESPIRATORY (INHALATION) at 20:09

## 2020-05-10 RX ADMIN — OXYCODONE HYDROCHLORIDE 10 MG: 10 TABLET ORAL at 08:04

## 2020-05-10 RX ADMIN — FENTANYL CITRATE 25 MCG: 50 INJECTION INTRAMUSCULAR; INTRAVENOUS at 19:35

## 2020-05-10 RX ADMIN — ATORVASTATIN CALCIUM 40 MG: 40 TABLET, FILM COATED ORAL at 21:05

## 2020-05-10 RX ADMIN — PROPOFOL 30 MCG/KG/MIN: 10 INJECTION, EMULSION INTRAVENOUS at 22:24

## 2020-05-10 RX ADMIN — ACETAMINOPHEN 650 MG: 325 TABLET, FILM COATED ORAL at 03:58

## 2020-05-10 RX ADMIN — POTASSIUM BICARBONATE 10 MEQ: 782 TABLET, EFFERVESCENT ORAL at 08:12

## 2020-05-10 RX ADMIN — DOCUSATE SODIUM 100 MG: 50 LIQUID ORAL at 08:04

## 2020-05-10 RX ADMIN — INSULIN LISPRO 1 UNITS: 100 INJECTION, SOLUTION INTRAVENOUS; SUBCUTANEOUS at 20:45

## 2020-05-10 RX ADMIN — LORAZEPAM 0.5 MG: 2 INJECTION INTRAMUSCULAR; INTRAVENOUS at 02:47

## 2020-05-10 ASSESSMENT — PULMONARY FUNCTION TESTS
PIF_VALUE: 11
PIF_VALUE: 12
PIF_VALUE: 21
PIF_VALUE: 16
PIF_VALUE: 11
PIF_VALUE: 19
PIF_VALUE: 22
PIF_VALUE: 20
PIF_VALUE: 12
PIF_VALUE: 16
PIF_VALUE: 14
PIF_VALUE: 12
PIF_VALUE: 10
PIF_VALUE: 16
PIF_VALUE: 11
PIF_VALUE: 11
PIF_VALUE: 10
PIF_VALUE: 10
PIF_VALUE: 13
PIF_VALUE: 11
PIF_VALUE: 11
PIF_VALUE: 22
PIF_VALUE: 11
PIF_VALUE: 18
PIF_VALUE: 11
PIF_VALUE: 11
PIF_VALUE: 12
PIF_VALUE: 11
PIF_VALUE: 13
PIF_VALUE: 20
PIF_VALUE: 11
PIF_VALUE: 19

## 2020-05-10 ASSESSMENT — PAIN SCALES - GENERAL
PAINLEVEL_OUTOF10: 7
PAINLEVEL_OUTOF10: 6
PAINLEVEL_OUTOF10: 0
PAINLEVEL_OUTOF10: 0
PAINLEVEL_OUTOF10: 7
PAINLEVEL_OUTOF10: 0
PAINLEVEL_OUTOF10: 6
PAINLEVEL_OUTOF10: 0
PAINLEVEL_OUTOF10: 6
PAINLEVEL_OUTOF10: 6
PAINLEVEL_OUTOF10: 7
PAINLEVEL_OUTOF10: 7

## 2020-05-10 NOTE — PROGRESS NOTES
Continuous  insulin glargine (LANTUS) injection vial 12 Units, Nightly  insulin lispro (HUMALOG) injection vial 0-12 Units, TID WC  insulin lispro (HUMALOG) injection vial 0-6 Units, Nightly  glucose (GLUTOSE) 40 % oral gel 15 g, PRN  dextrose 50 % IV solution, PRN  glucagon (rDNA) injection 1 mg, PRN  dextrose 5 % solution, PRN  magnesium oxide (MAG-OX) tablet 400 mg, Daily  metoclopramide (REGLAN) injection 5 mg, Q6H PRN  dexmedetomidine (PRECEDEX) 400 mcg in sodium chloride 0.9 % 100 mL infusion, Continuous  calcium chloride 1 g in sodium chloride 0.9 % 100 mL IVPB, Once  propofol injection, Titrated  nitroGLYCERIN (NITROSTAT) SL tablet 0.4 mg, Q5 Min PRN  albuterol (PROVENTIL) nebulizer solution 2.5 mg, Q4H PRN  acetaminophen (TYLENOL) suppository 650 mg, Q6H PRN  polyethylene glycol (GLYCOLAX) packet 17 g, Daily PRN  promethazine (PHENERGAN) tablet 12.5 mg, Q6H PRN  magnesium hydroxide (MILK OF MAGNESIA) 400 MG/5ML suspension 30 mL, Daily PRN  sodium chloride (PF) 0.9 % injection 10 mL, Once  LORazepam (ATIVAN) injection 0.5 mg, Q6H PRN        Allergies:  Patient has no known allergies. Review of Systems: SEE HPI   · Not available      Objective:     PHYSICAL EXAM:      Vitals:    05/10/20 0600   BP:    Pulse: 89   Resp: 24   Temp:    SpO2: 100%    Weight: 190 lb 14.7 oz (86.6 kg)       General Appearance:   Intubated   Head:  Normocephalic, without obvious abnormality, atraumatic. Eyes:  Pupils equal and round. No scleral icterus. Mouth: Moist mucosa, no pharyngeal erythema. Nose: Nares normal. No drainage or sinus tenderness. Neck: Supple, symmetrical, trachea midline. No adenopathy. No tenderness/mass/nodules. No carotid bruit or elevated JVD. Lungs:   Respiratory Effort: Normal   Auscultation: Clear to auscultation bilaterally, respirations unlabored. No wheeze, rales   Chest Wall:  No tenderness or deformity.    Cardiovascular:    Pulses  Palpation: normal   Ascultation: Regular rate,

## 2020-05-10 NOTE — PLAN OF CARE
Problem: Pain:  Goal: Control of acute pain  Description: Control of acute pain  5/10/2020 0644 by Delmis Gurrola RN  Outcome: Ongoing    Problem: Pain:  Goal: Control of chronic pain  Description: Control of chronic pain  Outcome: Ongoing     Problem: Restraint Use - Nonviolent/Non-Self-Destructive Behavior:  Goal: Absence of restraint indications  Description: Absence of restraint indications  5/10/2020 0644 by Delmis Gurrola RN  Outcome: Ongoing

## 2020-05-10 NOTE — PROGRESS NOTES
[facility]  Respiratory Therapy  Spontaneous Breathing Trial      Name: Fredrick Pennington Record Number: [id number]  Age; 60 y.o. Gender: male  : 1955  Today's date: 5/10/2020  Room: [address]      Patient Admission Diagnosis        Assessment            Patient Active Problem List   Diagnosis    Chest pain    [facility]    Coronary artery disease involving native coronary artery of native heart with unstable angina pectoris ([facility]    S/P CABG x 4       Social History     Social History     Tobacco Use    Smoking status: Former Smoker     Types: Cigarettes     Last attempt to quit: 2019     Years since quittin.5    Smokeless tobacco: Never Used    Tobacco comment: started age 27   Substance Use Topics    Alcohol use: Yes     Frequency: Monthly or less     Comment: once a month, shot once a week. depends on mood.  Drug use: Never           The patient was assessed for readiness to wean at 0830. The patient did not meet the criteria for a spontaneous breathing trial due to desaturation into 70's, increased RR 35-40. Placed pt on ordered settings, VC+ 20/450/0.8/+5/40%. sp02 remained in 60-70's. Peep increased from [address] to 10 cmh20. Fi02 increased to 100%. Suctioned small, white, cloudy. Dr. Oscar Craig called per Seven MARTINEZ.     Electronically signed by Radha Funk RCP on 5/10/2020 at 8:49 AM

## 2020-05-10 NOTE — PROGRESS NOTES
0800: Dr. Marco A Abdi at bedside. Wants to attempt to do an SBT and extubate patient. Propofol d/c'ed, Precedex decreased as well. Patient eventually flipped to SBT at 0830. Patient became very agitated, attempting to pull out ET tube and successfully pulled out OG. RN able to given medications down tube prior to him pulling it out. Remains restrained. RR increased to 40s, became tachycardic 130s-140s. SpO2 decreased to high 70s-low 80s and maintained there. Suctioned several times, did not effect SpO2. Decision made to switch patient back due to safety. 4 RN's + RT was attempting to hold down patient and protect his sternum. Patient eventually settled with PRN versed and increase in Precedex, turning back on Propofol. Orders to wait a few hours and perhaps attempt to try SBT again. 1200: Patient handed off to \[facility]\"".

## 2020-05-10 NOTE — PROGRESS NOTES
CREATININE 1.1 1.2 1.4* 1.3   CALCIUM 8.2* 9.1 8.4 8.0*   MG 2.70*  --  2.50* 2.30     Cardiac Enzymes:   Recent Labs     05/07/20  0953 05/07/20  1317   TROPONINI 0.07* 0.11*     PT/INR:   Recent Labs     05/08/20  1150 05/09/20  0445 05/10/20  0506   PROTIME 14.9* 12.4 14.5*   INR 1.28* 1.07 1. 25*     APTT:   Recent Labs     05/08/20  1150 05/09/20  0445 05/10/20  0506   APTT 28.1 25.9 27.1       Assessment/Plan:  S/P POD#2  Patient remains intubated (improving)    Neuro  -Less agitation, spontaneous movements 5/10   -on propofol and precedex, wean as tolerated 5/10  CV       -NSR 5/10, HDS   -no requirement for any inotropes or vasopressors 5/10   -sinus tachycardia 5/9, HDS              -milrinone has d/c 5/9. CI 3.6              -nitroglycerin is being weaned              -on propofol, precedex added, and versed IVP; he is been very agitated 5/9              -on 12.5 metoprolol   - on most recent ekg  pulm    -SBT trial this morning.  ABG ordered 5/10              -ABG 7.41/37.1/124.5/23.9/25/.1 (5/9/20)              -wean to extubate as tolerated   Renal   -BUN/Cr 21/1.3 (5/10/2020) -urine 3.4L / 24 hours   - BUN/Cr 18/1.4 (creatinine slowly trending upward); will monitor               ->2.2L of urine / 24 hours 5/9              -pre op weight 188, most recent charted weight 190              - retain Hoang for accurate I+O              -lasix 40 IV BID  GI        -NPO, OGT LWIS   -if unable to extubate patient today, would consider starting enteral feeds tomorrow  Heme   -received 1U PRBC yesterday, H/H stable today    -acute blood loss anemia.              - H/H 7.0/21.9 on 5/9/20               -will recheck at noon, if H/H <7, will transfuse 1 U PRBC, RN updated    Dispo: Plan for wean to extubate today; anticipate may require precedex transiently post-extubation for agitation; would start enteral feeds tomorrow if unable to extubate       Adela Jay MD  5/10/2020  8:40 AM

## 2020-05-11 ENCOUNTER — APPOINTMENT (OUTPATIENT)
Dept: GENERAL RADIOLOGY | Age: 65
DRG: 003 | End: 2020-05-11
Payer: COMMERCIAL

## 2020-05-11 ENCOUNTER — APPOINTMENT (OUTPATIENT)
Dept: CT IMAGING | Age: 65
DRG: 003 | End: 2020-05-11
Payer: COMMERCIAL

## 2020-05-11 LAB
ALBUMIN SERPL-MCNC: 2.6 G/DL (ref 3.4–5)
ALP BLD-CCNC: 71 U/L (ref 40–129)
ALT SERPL-CCNC: 839 U/L (ref 10–40)
AMMONIA: 51 UMOL/L (ref 16–60)
AMYLASE: 43 U/L (ref 25–115)
ANION GAP SERPL CALCULATED.3IONS-SCNC: 13 MMOL/L (ref 3–16)
APTT: 26.4 SEC (ref 24.2–36.2)
AST SERPL-CCNC: 1040 U/L (ref 15–37)
BASE EXCESS ARTERIAL: -0.2 MMOL/L (ref -3–3)
BILIRUB SERPL-MCNC: 0.3 MG/DL (ref 0–1)
BILIRUBIN DIRECT: <0.2 MG/DL (ref 0–0.3)
BILIRUBIN, INDIRECT: ABNORMAL MG/DL (ref 0–1)
BLOOD BANK DISPENSE STATUS: NORMAL
BLOOD BANK PRODUCT CODE: NORMAL
BPU ID: NORMAL
BUN BLDV-MCNC: 24 MG/DL (ref 7–20)
CALCIUM SERPL-MCNC: 8.3 MG/DL (ref 8.3–10.6)
CARBOXYHEMOGLOBIN ARTERIAL: 1.3 % (ref 0–1.5)
CHLORIDE BLD-SCNC: 107 MMOL/L (ref 99–110)
CO2: 21 MMOL/L (ref 21–32)
CREAT SERPL-MCNC: 1.2 MG/DL (ref 0.8–1.3)
DESCRIPTION BLOOD BANK: NORMAL
EKG ATRIAL RATE: 90 BPM
EKG DIAGNOSIS: NORMAL
EKG P AXIS: 78 DEGREES
EKG P-R INTERVAL: 134 MS
EKG Q-T INTERVAL: 378 MS
EKG QRS DURATION: 102 MS
EKG QTC CALCULATION (BAZETT): 462 MS
EKG R AXIS: -10 DEGREES
EKG T AXIS: -35 DEGREES
EKG VENTRICULAR RATE: 90 BPM
GFR AFRICAN AMERICAN: >60
GFR NON-AFRICAN AMERICAN: >60
GLUCOSE BLD-MCNC: 102 MG/DL (ref 70–99)
GLUCOSE BLD-MCNC: 130 MG/DL (ref 70–99)
GLUCOSE BLD-MCNC: 144 MG/DL (ref 70–99)
GLUCOSE BLD-MCNC: 176 MG/DL (ref 70–99)
GLUCOSE BLD-MCNC: 179 MG/DL (ref 70–99)
GLUCOSE BLD-MCNC: 74 MG/DL (ref 70–99)
GLUCOSE BLD-MCNC: 83 MG/DL (ref 70–99)
HCO3 ARTERIAL: 22.9 MMOL/L (ref 21–29)
HCT VFR BLD CALC: 23.1 % (ref 40.5–52.5)
HEMOGLOBIN, ART, EXTENDED: 7.5 G/DL (ref 13.5–17.5)
HEMOGLOBIN: 7.6 G/DL (ref 13.5–17.5)
INR BLD: 1.16 (ref 0.86–1.14)
LACTIC ACID: 1.2 MMOL/L (ref 0.4–2)
LIPASE: 28 U/L (ref 13–60)
MAGNESIUM: 2.3 MG/DL (ref 1.8–2.4)
MCH RBC QN AUTO: 27.3 PG (ref 26–34)
MCHC RBC AUTO-ENTMCNC: 32.7 G/DL (ref 31–36)
MCV RBC AUTO: 83.4 FL (ref 80–100)
METHEMOGLOBIN ARTERIAL: 1.1 %
O2 CONTENT ARTERIAL: 10 ML/DL
O2 SAT, ARTERIAL: 97.3 %
O2 THERAPY: ABNORMAL
PCO2 ARTERIAL: 30.2 MMHG (ref 35–45)
PDW BLD-RTO: 15.3 % (ref 12.4–15.4)
PERFORMED ON: ABNORMAL
PERFORMED ON: NORMAL
PERFORMED ON: NORMAL
PH ARTERIAL: 7.49 (ref 7.35–7.45)
PHOSPHORUS: 4 MG/DL (ref 2.5–4.9)
PLATELET # BLD: 170 K/UL (ref 135–450)
PMV BLD AUTO: 7.8 FL (ref 5–10.5)
PO2 ARTERIAL: 78 MMHG (ref 75–108)
POTASSIUM SERPL-SCNC: 4.1 MMOL/L (ref 3.5–5.1)
PROTHROMBIN TIME: 13.5 SEC (ref 10–13.2)
RBC # BLD: 2.77 M/UL (ref 4.2–5.9)
SODIUM BLD-SCNC: 141 MMOL/L (ref 136–145)
TCO2 ARTERIAL: 23.8 MMOL/L
TOTAL PROTEIN: 5.6 G/DL (ref 6.4–8.2)
WBC # BLD: 10.5 K/UL (ref 4–11)

## 2020-05-11 PROCEDURE — 6360000002 HC RX W HCPCS: Performed by: THORACIC SURGERY (CARDIOTHORACIC VASCULAR SURGERY)

## 2020-05-11 PROCEDURE — 70450 CT HEAD/BRAIN W/O DYE: CPT

## 2020-05-11 PROCEDURE — 2500000003 HC RX 250 WO HCPCS: Performed by: THORACIC SURGERY (CARDIOTHORACIC VASCULAR SURGERY)

## 2020-05-11 PROCEDURE — 2580000003 HC RX 258: Performed by: THORACIC SURGERY (CARDIOTHORACIC VASCULAR SURGERY)

## 2020-05-11 PROCEDURE — 85610 PROTHROMBIN TIME: CPT

## 2020-05-11 PROCEDURE — 82803 BLOOD GASES ANY COMBINATION: CPT

## 2020-05-11 PROCEDURE — 93005 ELECTROCARDIOGRAM TRACING: CPT | Performed by: THORACIC SURGERY (CARDIOTHORACIC VASCULAR SURGERY)

## 2020-05-11 PROCEDURE — 85027 COMPLETE CBC AUTOMATED: CPT

## 2020-05-11 PROCEDURE — 2500000003 HC RX 250 WO HCPCS

## 2020-05-11 PROCEDURE — 83690 ASSAY OF LIPASE: CPT

## 2020-05-11 PROCEDURE — 6370000000 HC RX 637 (ALT 250 FOR IP): Performed by: NURSE PRACTITIONER

## 2020-05-11 PROCEDURE — 82150 ASSAY OF AMYLASE: CPT

## 2020-05-11 PROCEDURE — 6370000000 HC RX 637 (ALT 250 FOR IP): Performed by: THORACIC SURGERY (CARDIOTHORACIC VASCULAR SURGERY)

## 2020-05-11 PROCEDURE — 6360000002 HC RX W HCPCS: Performed by: NURSE PRACTITIONER

## 2020-05-11 PROCEDURE — 80076 HEPATIC FUNCTION PANEL: CPT

## 2020-05-11 PROCEDURE — C9113 INJ PANTOPRAZOLE SODIUM, VIA: HCPCS | Performed by: THORACIC SURGERY (CARDIOTHORACIC VASCULAR SURGERY)

## 2020-05-11 PROCEDURE — 83605 ASSAY OF LACTIC ACID: CPT

## 2020-05-11 PROCEDURE — 2100000000 HC CCU R&B

## 2020-05-11 PROCEDURE — 94640 AIRWAY INHALATION TREATMENT: CPT

## 2020-05-11 PROCEDURE — 85730 THROMBOPLASTIN TIME PARTIAL: CPT

## 2020-05-11 PROCEDURE — 2500000003 HC RX 250 WO HCPCS: Performed by: NURSE PRACTITIONER

## 2020-05-11 PROCEDURE — 84100 ASSAY OF PHOSPHORUS: CPT

## 2020-05-11 PROCEDURE — 82140 ASSAY OF AMMONIA: CPT

## 2020-05-11 PROCEDURE — 99024 POSTOP FOLLOW-UP VISIT: CPT | Performed by: THORACIC SURGERY (CARDIOTHORACIC VASCULAR SURGERY)

## 2020-05-11 PROCEDURE — 2580000003 HC RX 258: Performed by: NURSE PRACTITIONER

## 2020-05-11 PROCEDURE — 99233 SBSQ HOSP IP/OBS HIGH 50: CPT | Performed by: INTERNAL MEDICINE

## 2020-05-11 PROCEDURE — 94003 VENT MGMT INPAT SUBQ DAY: CPT

## 2020-05-11 PROCEDURE — 93010 ELECTROCARDIOGRAM REPORT: CPT | Performed by: INTERNAL MEDICINE

## 2020-05-11 PROCEDURE — 2700000000 HC OXYGEN THERAPY PER DAY

## 2020-05-11 PROCEDURE — 94761 N-INVAS EAR/PLS OXIMETRY MLT: CPT

## 2020-05-11 PROCEDURE — 80048 BASIC METABOLIC PNL TOTAL CA: CPT

## 2020-05-11 PROCEDURE — 37799 UNLISTED PX VASCULAR SURGERY: CPT

## 2020-05-11 PROCEDURE — 71045 X-RAY EXAM CHEST 1 VIEW: CPT

## 2020-05-11 PROCEDURE — 83735 ASSAY OF MAGNESIUM: CPT

## 2020-05-11 PROCEDURE — 94750 HC PULMONARY COMPLIANCE STUDY: CPT

## 2020-05-11 RX ORDER — DEXTROSE MONOHYDRATE 50 MG/ML
100 INJECTION, SOLUTION INTRAVENOUS PRN
Status: DISCONTINUED | OUTPATIENT
Start: 2020-05-11 | End: 2020-06-05 | Stop reason: HOSPADM

## 2020-05-11 RX ORDER — LORAZEPAM 2 MG/ML
1 INJECTION INTRAMUSCULAR
Status: DISCONTINUED | OUTPATIENT
Start: 2020-05-11 | End: 2020-05-22

## 2020-05-11 RX ORDER — LORAZEPAM 1 MG/1
3 TABLET ORAL
Status: DISCONTINUED | OUTPATIENT
Start: 2020-05-11 | End: 2020-05-22

## 2020-05-11 RX ORDER — NICOTINE POLACRILEX 4 MG
15 LOZENGE BUCCAL PRN
Status: DISCONTINUED | OUTPATIENT
Start: 2020-05-11 | End: 2020-06-05 | Stop reason: HOSPADM

## 2020-05-11 RX ORDER — LORAZEPAM 1 MG/1
1 TABLET ORAL
Status: DISCONTINUED | OUTPATIENT
Start: 2020-05-11 | End: 2020-05-22

## 2020-05-11 RX ORDER — LORAZEPAM 2 MG/ML
2 INJECTION INTRAMUSCULAR
Status: DISCONTINUED | OUTPATIENT
Start: 2020-05-11 | End: 2020-05-22

## 2020-05-11 RX ORDER — LORAZEPAM 2 MG/ML
3 INJECTION INTRAMUSCULAR
Status: DISCONTINUED | OUTPATIENT
Start: 2020-05-11 | End: 2020-05-22

## 2020-05-11 RX ORDER — SODIUM CHLORIDE 0.9 % (FLUSH) 0.9 %
10 SYRINGE (ML) INJECTION EVERY 12 HOURS SCHEDULED
Status: DISCONTINUED | OUTPATIENT
Start: 2020-05-11 | End: 2020-06-05 | Stop reason: HOSPADM

## 2020-05-11 RX ORDER — HEPARIN SODIUM 5000 [USP'U]/ML
5000 INJECTION, SOLUTION INTRAVENOUS; SUBCUTANEOUS EVERY 8 HOURS SCHEDULED
Status: DISCONTINUED | OUTPATIENT
Start: 2020-05-11 | End: 2020-06-05 | Stop reason: HOSPADM

## 2020-05-11 RX ORDER — LORAZEPAM 2 MG/ML
4 INJECTION INTRAMUSCULAR
Status: DISCONTINUED | OUTPATIENT
Start: 2020-05-11 | End: 2020-05-22

## 2020-05-11 RX ORDER — LORAZEPAM 1 MG/1
4 TABLET ORAL
Status: DISCONTINUED | OUTPATIENT
Start: 2020-05-11 | End: 2020-05-22

## 2020-05-11 RX ORDER — LORAZEPAM 1 MG/1
2 TABLET ORAL
Status: DISCONTINUED | OUTPATIENT
Start: 2020-05-11 | End: 2020-05-22

## 2020-05-11 RX ORDER — FERROUS SULFATE 300 MG/5ML
300 LIQUID (ML) ORAL DAILY
Status: DISCONTINUED | OUTPATIENT
Start: 2020-05-12 | End: 2020-05-12

## 2020-05-11 RX ORDER — SODIUM CHLORIDE 0.9 % (FLUSH) 0.9 %
10 SYRINGE (ML) INJECTION PRN
Status: DISCONTINUED | OUTPATIENT
Start: 2020-05-11 | End: 2020-06-05 | Stop reason: HOSPADM

## 2020-05-11 RX ORDER — DEXTROSE MONOHYDRATE 25 G/50ML
12.5 INJECTION, SOLUTION INTRAVENOUS PRN
Status: DISCONTINUED | OUTPATIENT
Start: 2020-05-11 | End: 2020-06-05 | Stop reason: HOSPADM

## 2020-05-11 RX ADMIN — SENNOSIDES AND DOCUSATE SODIUM 1 TABLET: 8.6; 5 TABLET ORAL at 20:17

## 2020-05-11 RX ADMIN — LORAZEPAM 1 MG: 2 INJECTION, SOLUTION INTRAMUSCULAR; INTRAVENOUS at 09:05

## 2020-05-11 RX ADMIN — FENTANYL CITRATE 25 MCG: 50 INJECTION INTRAMUSCULAR; INTRAVENOUS at 16:17

## 2020-05-11 RX ADMIN — LORAZEPAM 1 MG: 2 INJECTION, SOLUTION INTRAMUSCULAR; INTRAVENOUS at 17:45

## 2020-05-11 RX ADMIN — MIDAZOLAM HYDROCHLORIDE 1 MG: 2 INJECTION, SOLUTION INTRAMUSCULAR; INTRAVENOUS at 04:26

## 2020-05-11 RX ADMIN — IRON SUCROSE 100 MG: 20 INJECTION, SOLUTION INTRAVENOUS at 08:44

## 2020-05-11 RX ADMIN — POTASSIUM BICARBONATE 10 MEQ: 782 TABLET, EFFERVESCENT ORAL at 08:42

## 2020-05-11 RX ADMIN — POTASSIUM BICARBONATE 10 MEQ: 782 TABLET, EFFERVESCENT ORAL at 13:35

## 2020-05-11 RX ADMIN — LORAZEPAM 1 MG: 2 INJECTION, SOLUTION INTRAMUSCULAR; INTRAVENOUS at 16:20

## 2020-05-11 RX ADMIN — SENNOSIDES AND DOCUSATE SODIUM 1 TABLET: 8.6; 5 TABLET ORAL at 08:43

## 2020-05-11 RX ADMIN — OXYCODONE HYDROCHLORIDE 10 MG: 10 TABLET ORAL at 13:15

## 2020-05-11 RX ADMIN — ACETAMINOPHEN 650 MG: 325 TABLET, FILM COATED ORAL at 05:45

## 2020-05-11 RX ADMIN — INSULIN LISPRO 3 UNITS: 100 INJECTION, SOLUTION INTRAVENOUS; SUBCUTANEOUS at 23:35

## 2020-05-11 RX ADMIN — DEXMEDETOMIDINE HYDROCHLORIDE 1.2 MCG/KG/HR: 4 INJECTION, SOLUTION INTRAVENOUS at 08:36

## 2020-05-11 RX ADMIN — INSULIN LISPRO 3 UNITS: 100 INJECTION, SOLUTION INTRAVENOUS; SUBCUTANEOUS at 20:11

## 2020-05-11 RX ADMIN — ASPIRIN 81 MG 324 MG: 81 TABLET ORAL at 08:43

## 2020-05-11 RX ADMIN — MIDAZOLAM HYDROCHLORIDE 1 MG: 2 INJECTION, SOLUTION INTRAMUSCULAR; INTRAVENOUS at 01:01

## 2020-05-11 RX ADMIN — FENTANYL CITRATE 25 MCG: 50 INJECTION INTRAMUSCULAR; INTRAVENOUS at 03:50

## 2020-05-11 RX ADMIN — FUROSEMIDE 40 MG: 10 INJECTION, SOLUTION INTRAMUSCULAR; INTRAVENOUS at 08:43

## 2020-05-11 RX ADMIN — OXYCODONE HYDROCHLORIDE 10 MG: 10 TABLET ORAL at 17:45

## 2020-05-11 RX ADMIN — Medication 400 MG: at 08:43

## 2020-05-11 RX ADMIN — DEXMEDETOMIDINE HYDROCHLORIDE 1.2 MCG/KG/HR: 4 INJECTION, SOLUTION INTRAVENOUS at 12:43

## 2020-05-11 RX ADMIN — PROPOFOL 30 MCG/KG/MIN: 10 INJECTION, EMULSION INTRAVENOUS at 05:45

## 2020-05-11 RX ADMIN — SODIUM CHLORIDE, PRESERVATIVE FREE 10 ML: 5 INJECTION INTRAVENOUS at 20:17

## 2020-05-11 RX ADMIN — PROPOFOL 40 MCG/KG/MIN: 10 INJECTION, EMULSION INTRAVENOUS at 17:52

## 2020-05-11 RX ADMIN — HEPARIN SODIUM 5000 UNITS: 5000 INJECTION INTRAVENOUS; SUBCUTANEOUS at 21:50

## 2020-05-11 RX ADMIN — POTASSIUM BICARBONATE 10 MEQ: 782 TABLET, EFFERVESCENT ORAL at 20:17

## 2020-05-11 RX ADMIN — MUPIROCIN: 20 OINTMENT TOPICAL at 20:16

## 2020-05-11 RX ADMIN — FOLIC ACID: 5 INJECTION, SOLUTION INTRAMUSCULAR; INTRAVENOUS; SUBCUTANEOUS at 15:07

## 2020-05-11 RX ADMIN — FENTANYL CITRATE 25 MCG: 50 INJECTION INTRAMUSCULAR; INTRAVENOUS at 09:04

## 2020-05-11 RX ADMIN — Medication 10 ML: at 06:01

## 2020-05-11 RX ADMIN — ALBUTEROL SULFATE 2.5 MG: 2.5 SOLUTION RESPIRATORY (INHALATION) at 07:57

## 2020-05-11 RX ADMIN — ALBUTEROL SULFATE 2.5 MG: 2.5 SOLUTION RESPIRATORY (INHALATION) at 15:37

## 2020-05-11 RX ADMIN — ALBUTEROL SULFATE 2.5 MG: 2.5 SOLUTION RESPIRATORY (INHALATION) at 11:04

## 2020-05-11 RX ADMIN — DOCUSATE SODIUM 100 MG: 50 LIQUID ORAL at 08:43

## 2020-05-11 RX ADMIN — DEXMEDETOMIDINE HYDROCHLORIDE 1.2 MCG/KG/HR: 4 INJECTION, SOLUTION INTRAVENOUS at 17:12

## 2020-05-11 RX ADMIN — METOPROLOL TARTRATE 12.5 MG: 25 TABLET, FILM COATED ORAL at 08:42

## 2020-05-11 RX ADMIN — ALBUTEROL SULFATE 2.5 MG: 2.5 SOLUTION RESPIRATORY (INHALATION) at 19:33

## 2020-05-11 RX ADMIN — Medication 10 ML: at 08:45

## 2020-05-11 RX ADMIN — PROPOFOL 30 MCG/KG/MIN: 10 INJECTION, EMULSION INTRAVENOUS at 23:36

## 2020-05-11 RX ADMIN — OXYCODONE HYDROCHLORIDE 10 MG: 10 TABLET ORAL at 05:45

## 2020-05-11 RX ADMIN — FUROSEMIDE 40 MG: 10 INJECTION, SOLUTION INTRAMUSCULAR; INTRAVENOUS at 17:15

## 2020-05-11 RX ADMIN — MUPIROCIN: 20 OINTMENT TOPICAL at 08:44

## 2020-05-11 RX ADMIN — CHLORHEXIDINE GLUCONATE 15 ML: 1.2 RINSE ORAL at 20:42

## 2020-05-11 RX ADMIN — FENTANYL CITRATE 25 MCG: 50 INJECTION INTRAMUSCULAR; INTRAVENOUS at 02:45

## 2020-05-11 RX ADMIN — ATORVASTATIN CALCIUM 40 MG: 40 TABLET, FILM COATED ORAL at 20:17

## 2020-05-11 RX ADMIN — FENTANYL CITRATE 25 MCG: 50 INJECTION INTRAMUSCULAR; INTRAVENOUS at 07:43

## 2020-05-11 RX ADMIN — FENTANYL CITRATE 25 MCG: 50 INJECTION INTRAMUSCULAR; INTRAVENOUS at 17:17

## 2020-05-11 RX ADMIN — PANTOPRAZOLE SODIUM 40 MG: 40 INJECTION, POWDER, FOR SOLUTION INTRAVENOUS at 06:00

## 2020-05-11 RX ADMIN — DEXMEDETOMIDINE HYDROCHLORIDE 1.2 MCG/KG/HR: 4 INJECTION, SOLUTION INTRAVENOUS at 00:15

## 2020-05-11 RX ADMIN — FENTANYL CITRATE 25 MCG: 50 INJECTION INTRAMUSCULAR; INTRAVENOUS at 04:51

## 2020-05-11 RX ADMIN — FENTANYL CITRATE 25 MCG: 50 INJECTION INTRAMUSCULAR; INTRAVENOUS at 12:35

## 2020-05-11 RX ADMIN — CHLORHEXIDINE GLUCONATE 15 ML: 1.2 RINSE ORAL at 08:05

## 2020-05-11 RX ADMIN — HEPARIN SODIUM 5000 UNITS: 5000 INJECTION INTRAVENOUS; SUBCUTANEOUS at 13:35

## 2020-05-11 RX ADMIN — DEXMEDETOMIDINE HYDROCHLORIDE 1.1 MCG/KG/HR: 4 INJECTION, SOLUTION INTRAVENOUS at 21:01

## 2020-05-11 RX ADMIN — DEXMEDETOMIDINE HYDROCHLORIDE 1.2 MCG/KG/HR: 4 INJECTION, SOLUTION INTRAVENOUS at 04:25

## 2020-05-11 RX ADMIN — PROPOFOL 20 MCG/KG/MIN: 10 INJECTION, EMULSION INTRAVENOUS at 12:22

## 2020-05-11 ASSESSMENT — PAIN SCALES - GENERAL
PAINLEVEL_OUTOF10: 0
PAINLEVEL_OUTOF10: 0
PAINLEVEL_OUTOF10: 8
PAINLEVEL_OUTOF10: 0
PAINLEVEL_OUTOF10: 7
PAINLEVEL_OUTOF10: 0
PAINLEVEL_OUTOF10: 8
PAINLEVEL_OUTOF10: 7
PAINLEVEL_OUTOF10: 0

## 2020-05-11 ASSESSMENT — PULMONARY FUNCTION TESTS
PIF_VALUE: 11
PIF_VALUE: 14
PIF_VALUE: 10
PIF_VALUE: 11
PIF_VALUE: 20
PIF_VALUE: 12
PIF_VALUE: 11
PIF_VALUE: 17
PIF_VALUE: 12
PIF_VALUE: 11
PIF_VALUE: 19
PIF_VALUE: 11
PIF_VALUE: 12
PIF_VALUE: 25
PIF_VALUE: 11
PIF_VALUE: 12
PIF_VALUE: 11
PIF_VALUE: 12
PIF_VALUE: 10
PIF_VALUE: 12
PIF_VALUE: 10
PIF_VALUE: 18

## 2020-05-11 NOTE — PROGRESS NOTES
At 0700 report received from [facility] ENDER RAMIREZ. Patient calm and sedated and intubated. Vitals stable at that time. CT x2, Estela, swan, TLC all in  At 0800 assessment completed. Dr. Dwaine Willingham and Misty Lees NP at the bedside. New orders placed to transition patient, remove swan and pacer wires. Medications given. At 0930-patient transitioned. Flat Top removed, central line dressing changed. Pacer wires removed without difficulty, dressing placed. At 1000 CT ordered of the head. Misty Lees placed a [employer]. Will obtain Xray for placement. Patient doesn't follow commands. Arouses at times very agitated and restless. Restraints in place. Will continue to monitor.   Electronically signed by Breanne Hooper RN on 5/11/2020 at 10:04 AM

## 2020-05-11 NOTE — PROGRESS NOTES
Progress Note    S/P cabgx4 5/8/20    Vital Signs:                                                 BP (!) 100/59   Pulse 88   Temp 100 °F (37.8 °C) (Core)   Resp 27   Ht 6' (1.829 m)   Wt 185 lb 13.6 oz (84.3 kg)   SpO2 100%   BMI 25.21 kg/m²  O2 Flow Rate (L/min): 3 L/min   NSR  CVP (Mean): 12 mmHg  PAP: 31/19  PAP (Mean): 24 mmHg  SVR (Using ABP Mean): 830.77 dyne*sec/cm5  CCI: 2.6 L/min  SVO2 (%): 82 %  Admission Weight: 188 lb (85.3 kg)      Vent Settings:  Vent Information  $Ventilation: $Subsequent Day  Skin Assessment: Clean, dry, & intact  Suction Catheter Diameter: 14  Equipment ID: [id number]  Equipment Changed: HME  Vent Type: [id number]  Vent Mode: AC/VC+  Vt Ordered: 450 mL  Rate Set: 20 bmp  Peak Flow: 0 L/min  Pressure Support: 0 cmH20  FiO2 : 40 %  SpO2: 100 %  SpO2/FiO2 ratio: 250  Sensitivity: 3  PEEP/CPAP: 5  I Time/ I Time %: 0.8 s  Humidification Source: HME     Drips:  Propofol, precedex    I/O:      Intake/Output Summary (Last 24 hours) at 5/11/2020 0805  Last data filed at 5/11/2020 0600  Gross per 24 hour   Intake 1488.2 ml   Output 3260 ml   Net -1771.8 ml     Chest Tube:  90, 70, 120    CV: reg, wound c/d/i  Pulm: decreased  Abd: soft  Ext: warm, 1+ edema    Data Review:  CBC:   Recent Labs     05/09/20  1452 05/10/20  0506 05/11/20  0412   WBC 15.4* 11.3* 10.5   HGB 8.0* 7.9* 7.6*   HCT 25.0* 24.4* 23.1*   MCV 83.9 83.7 83.4    180 170     BMP:   Recent Labs     05/09/20  0445 05/10/20  0506 05/11/20  0412    137 141   K 4.7 4.3 4.1    105 107   CO2 22 19* 21   BUN 18 21* 24*   CREATININE 1.4* 1.3 1.2   CALCIUM 8.4 8.0* 8.3   MG 2.50* 2.30 2.30     Cardiac Enzymes: No results for input(s): CKTOTAL, CKMB, CKMBINDEX, TROPONINI in the last 72 hours.   PT/INR:   Recent Labs     05/09/20  0445 05/10/20  0506 05/11/20  0412   PROTIME 12.4 14.5* 13.5*   INR 1.07 1.25* 1.16*     APTT:   Recent Labs     05/09/20  0445 05/10/20  0506 05/11/20  0412   APTT 25.9 27.1 26.4

## 2020-05-11 NOTE — PLAN OF CARE
Nutrition Problem: Increased nutrient needs  Intervention: Food and/or Nutrient Delivery: Start Tube Feeding  Nutritional Goals: tolerate most appropriate form of nutrition

## 2020-05-11 NOTE — PLAN OF CARE
Problem: Falls - Risk of:  Goal: Will remain free from falls  Description: Will remain free from falls  Outcome: Met This Shift     Problem: Falls - Risk of:  Goal: Absence of physical injury  Description: Absence of physical injury  Outcome: Met This Shift     Problem: Restraint Use - Nonviolent/Non-Self-Destructive Behavior:  Goal: Absence of restraint-related injury  Description: Absence of restraint-related injury  Outcome: Met This Shift

## 2020-05-11 NOTE — PROGRESS NOTES
Patient to CT and back no complications. On transport vent. QUYEN Molina and JUAN Saldaña at bedside.     Electronically signed by Hussein Khoury on 5/11/2020 at 11:03 AM

## 2020-05-11 NOTE — PROGRESS NOTES
PRN  pantoprazole (PROTONIX) injection 40 mg, 40 mg, Intravenous, Daily **AND** sodium chloride (PF) 0.9 % injection 10 mL, 10 mL, Intravenous, Daily  metoprolol tartrate (LOPRESSOR) tablet 12.5 mg, 12.5 mg, Oral, BID  chlorhexidine (PERIDEX) 0.12 % solution 15 mL, 15 mL, Mouth/Throat, BID  furosemide (LASIX) injection 40 mg, 40 mg, Intravenous, BID  hydrALAZINE (APRESOLINE) injection 5 mg, 5 mg, Intravenous, Q5 Min PRN  metoprolol (LOPRESSOR) injection 2.5 mg, 2.5 mg, Intravenous, Q10 Min PRN  mupirocin (BACTROBAN) 2 % ointment, , Nasal, BID  nitroGLYCERIN (NITRODUR) 0.2 MG/HR 1 patch, 1 patch, Transdermal, Daily  atorvastatin (LIPITOR) tablet 40 mg, 40 mg, Oral, Nightly  albuterol (PROVENTIL) nebulizer solution 2.5 mg, 2.5 mg, Nebulization, Q4H WA  albuterol sulfate  (90 Base) MCG/ACT inhaler 2 puff, 2 puff, Inhalation, Q6H PRN  norepinephrine (LEVOPHED) 16 mg in dextrose 5% 250 mL infusion, 2 mcg/min, Intravenous, Continuous PRN  nitroGLYCERIN 50 mg in dextrose 5% 250 mL infusion, 10 mcg/min, Intravenous, Continuous PRN  niCARdipine (CARDENE) 25 mg in sodium chloride 0.9 % 250 mL infusion, 5 mg/hr, Intravenous, Continuous PRN  magnesium oxide (MAG-OX) tablet 400 mg, 400 mg, Oral, Daily  metoclopramide (REGLAN) injection 5 mg, 5 mg, Intravenous, Q6H PRN  dexmedetomidine (PRECEDEX) 400 mcg in sodium chloride 0.9 % 100 mL infusion, 0.4 mcg/kg/hr, Intravenous, Continuous  propofol injection, 10 mcg/kg/min, Intravenous, Titrated  nitroGLYCERIN (NITROSTAT) SL tablet 0.4 mg, 0.4 mg, Sublingual, Q5 Min PRN  albuterol (PROVENTIL) nebulizer solution 2.5 mg, 2.5 mg, Nebulization, Q4H PRN  [DISCONTINUED] acetaminophen (TYLENOL) tablet 650 mg, 650 mg, Oral, Q6H PRN **OR** acetaminophen (TYLENOL) suppository 650 mg, 650 mg, Rectal, Q6H PRN  polyethylene glycol (GLYCOLAX) packet 17 g, 17 g, Oral, Daily PRN  promethazine (PHENERGAN) tablet 12.5 mg, 12.5 mg, Oral, Q6H PRN **OR** [DISCONTINUED] ondansetron (ZOFRAN)

## 2020-05-12 LAB
ABO/RH: NORMAL
ANION GAP SERPL CALCULATED.3IONS-SCNC: 10 MMOL/L (ref 3–16)
ANTIBODY SCREEN: NORMAL
APTT: 28.6 SEC (ref 24.2–36.2)
BASE EXCESS ARTERIAL: 0.4 MMOL/L (ref -3–3)
BILIRUBIN URINE: NEGATIVE
BLOOD, URINE: ABNORMAL
BUN BLDV-MCNC: 38 MG/DL (ref 7–20)
CALCIUM SERPL-MCNC: 8 MG/DL (ref 8.3–10.6)
CARBOXYHEMOGLOBIN ARTERIAL: 1 % (ref 0–1.5)
CHLORIDE BLD-SCNC: 111 MMOL/L (ref 99–110)
CLARITY: ABNORMAL
CO2: 23 MMOL/L (ref 21–32)
COLOR: YELLOW
COMMENT UA: ABNORMAL
CREAT SERPL-MCNC: 1.4 MG/DL (ref 0.8–1.3)
EPITHELIAL CELLS, UA: 3 /HPF (ref 0–5)
GFR AFRICAN AMERICAN: >60
GFR NON-AFRICAN AMERICAN: 51
GLUCOSE BLD-MCNC: 120 MG/DL (ref 70–99)
GLUCOSE BLD-MCNC: 159 MG/DL (ref 70–99)
GLUCOSE BLD-MCNC: 164 MG/DL (ref 70–99)
GLUCOSE BLD-MCNC: 168 MG/DL (ref 70–99)
GLUCOSE BLD-MCNC: 172 MG/DL (ref 70–99)
GLUCOSE BLD-MCNC: 175 MG/DL (ref 70–99)
GLUCOSE URINE: NEGATIVE MG/DL
HCO3 ARTERIAL: 24 MMOL/L (ref 21–29)
HCT VFR BLD CALC: 21.6 % (ref 40.5–52.5)
HCT VFR BLD CALC: 23 % (ref 40.5–52.5)
HEMOGLOBIN, ART, EXTENDED: 7 G/DL (ref 13.5–17.5)
HEMOGLOBIN: 6.9 G/DL (ref 13.5–17.5)
HEMOGLOBIN: 7.3 G/DL (ref 13.5–17.5)
HYALINE CASTS: 6 /LPF (ref 0–8)
INR BLD: 1.15 (ref 0.86–1.14)
KETONES, URINE: NEGATIVE MG/DL
LEUKOCYTE ESTERASE, URINE: ABNORMAL
MAGNESIUM: 2.4 MG/DL (ref 1.8–2.4)
MCH RBC QN AUTO: 26.7 PG (ref 26–34)
MCHC RBC AUTO-ENTMCNC: 31.8 G/DL (ref 31–36)
MCV RBC AUTO: 83.8 FL (ref 80–100)
METHEMOGLOBIN ARTERIAL: 1 %
MICROSCOPIC EXAMINATION: YES
NITRITE, URINE: NEGATIVE
O2 CONTENT ARTERIAL: 10 ML/DL
O2 SAT, ARTERIAL: 98.6 %
O2 THERAPY: ABNORMAL
PCO2 ARTERIAL: 32.8 MMHG (ref 35–45)
PDW BLD-RTO: 15.4 % (ref 12.4–15.4)
PERFORMED ON: ABNORMAL
PH ARTERIAL: 7.47 (ref 7.35–7.45)
PH UA: 5.5 (ref 5–8)
PLATELET # BLD: 189 K/UL (ref 135–450)
PMV BLD AUTO: 7.9 FL (ref 5–10.5)
PO2 ARTERIAL: 91.1 MMHG (ref 75–108)
POTASSIUM SERPL-SCNC: 4.3 MMOL/L (ref 3.5–5.1)
PROTEIN UA: 30 MG/DL
PROTHROMBIN TIME: 13.4 SEC (ref 10–13.2)
RBC # BLD: 2.58 M/UL (ref 4.2–5.9)
RBC UA: 9 /HPF (ref 0–4)
SODIUM BLD-SCNC: 144 MMOL/L (ref 136–145)
SPECIFIC GRAVITY UA: 1.02 (ref 1–1.03)
TCO2 ARTERIAL: 25 MMOL/L
TRIGL SERPL-MCNC: 117 MG/DL (ref 0–150)
URINE REFLEX TO CULTURE: YES
URINE TYPE: ABNORMAL
UROBILINOGEN, URINE: 0.2 E.U./DL
WBC # BLD: 9.4 K/UL (ref 4–11)
WBC UA: 14 /HPF (ref 0–5)

## 2020-05-12 PROCEDURE — 94761 N-INVAS EAR/PLS OXIMETRY MLT: CPT

## 2020-05-12 PROCEDURE — 81001 URINALYSIS AUTO W/SCOPE: CPT

## 2020-05-12 PROCEDURE — 87086 URINE CULTURE/COLONY COUNT: CPT

## 2020-05-12 PROCEDURE — 6370000000 HC RX 637 (ALT 250 FOR IP): Performed by: NURSE PRACTITIONER

## 2020-05-12 PROCEDURE — 2500000003 HC RX 250 WO HCPCS: Performed by: THORACIC SURGERY (CARDIOTHORACIC VASCULAR SURGERY)

## 2020-05-12 PROCEDURE — 6370000000 HC RX 637 (ALT 250 FOR IP): Performed by: THORACIC SURGERY (CARDIOTHORACIC VASCULAR SURGERY)

## 2020-05-12 PROCEDURE — 2580000003 HC RX 258: Performed by: THORACIC SURGERY (CARDIOTHORACIC VASCULAR SURGERY)

## 2020-05-12 PROCEDURE — 84478 ASSAY OF TRIGLYCERIDES: CPT

## 2020-05-12 PROCEDURE — 87070 CULTURE OTHR SPECIMN AEROBIC: CPT

## 2020-05-12 PROCEDURE — 83735 ASSAY OF MAGNESIUM: CPT

## 2020-05-12 PROCEDURE — 86900 BLOOD TYPING SEROLOGIC ABO: CPT

## 2020-05-12 PROCEDURE — 2700000000 HC OXYGEN THERAPY PER DAY

## 2020-05-12 PROCEDURE — 6360000002 HC RX W HCPCS: Performed by: NURSE PRACTITIONER

## 2020-05-12 PROCEDURE — 85018 HEMOGLOBIN: CPT

## 2020-05-12 PROCEDURE — 80048 BASIC METABOLIC PNL TOTAL CA: CPT

## 2020-05-12 PROCEDURE — 2500000003 HC RX 250 WO HCPCS: Performed by: NURSE PRACTITIONER

## 2020-05-12 PROCEDURE — 82803 BLOOD GASES ANY COMBINATION: CPT

## 2020-05-12 PROCEDURE — 99024 POSTOP FOLLOW-UP VISIT: CPT | Performed by: THORACIC SURGERY (CARDIOTHORACIC VASCULAR SURGERY)

## 2020-05-12 PROCEDURE — 85610 PROTHROMBIN TIME: CPT

## 2020-05-12 PROCEDURE — 2100000000 HC CCU R&B

## 2020-05-12 PROCEDURE — 85014 HEMATOCRIT: CPT

## 2020-05-12 PROCEDURE — C9113 INJ PANTOPRAZOLE SODIUM, VIA: HCPCS | Performed by: THORACIC SURGERY (CARDIOTHORACIC VASCULAR SURGERY)

## 2020-05-12 PROCEDURE — 37799 UNLISTED PX VASCULAR SURGERY: CPT

## 2020-05-12 PROCEDURE — 86923 COMPATIBILITY TEST ELECTRIC: CPT

## 2020-05-12 PROCEDURE — 2580000003 HC RX 258: Performed by: NURSE PRACTITIONER

## 2020-05-12 PROCEDURE — 94003 VENT MGMT INPAT SUBQ DAY: CPT

## 2020-05-12 PROCEDURE — 94750 HC PULMONARY COMPLIANCE STUDY: CPT

## 2020-05-12 PROCEDURE — 86901 BLOOD TYPING SEROLOGIC RH(D): CPT

## 2020-05-12 PROCEDURE — 6360000002 HC RX W HCPCS: Performed by: THORACIC SURGERY (CARDIOTHORACIC VASCULAR SURGERY)

## 2020-05-12 PROCEDURE — 87205 SMEAR GRAM STAIN: CPT

## 2020-05-12 PROCEDURE — 85027 COMPLETE CBC AUTOMATED: CPT

## 2020-05-12 PROCEDURE — 94640 AIRWAY INHALATION TREATMENT: CPT

## 2020-05-12 PROCEDURE — 87040 BLOOD CULTURE FOR BACTERIA: CPT

## 2020-05-12 PROCEDURE — 85730 THROMBOPLASTIN TIME PARTIAL: CPT

## 2020-05-12 PROCEDURE — 86850 RBC ANTIBODY SCREEN: CPT

## 2020-05-12 PROCEDURE — P9016 RBC LEUKOCYTES REDUCED: HCPCS

## 2020-05-12 RX ORDER — 0.9 % SODIUM CHLORIDE 0.9 %
20 INTRAVENOUS SOLUTION INTRAVENOUS ONCE
Status: DISCONTINUED | OUTPATIENT
Start: 2020-05-12 | End: 2020-05-18

## 2020-05-12 RX ORDER — FERROUS SULFATE 300 MG/5ML
300 LIQUID (ML) ORAL 2 TIMES DAILY
Status: DISCONTINUED | OUTPATIENT
Start: 2020-05-12 | End: 2020-06-05 | Stop reason: HOSPADM

## 2020-05-12 RX ORDER — FUROSEMIDE 10 MG/ML
40 INJECTION INTRAMUSCULAR; INTRAVENOUS 2 TIMES DAILY
Status: DISCONTINUED | OUTPATIENT
Start: 2020-05-12 | End: 2020-05-15

## 2020-05-12 RX ADMIN — MUPIROCIN: 20 OINTMENT TOPICAL at 09:32

## 2020-05-12 RX ADMIN — ACETAMINOPHEN 650 MG: 325 TABLET, FILM COATED ORAL at 09:20

## 2020-05-12 RX ADMIN — FUROSEMIDE 40 MG: 10 INJECTION, SOLUTION INTRAMUSCULAR; INTRAVENOUS at 12:39

## 2020-05-12 RX ADMIN — CHLORHEXIDINE GLUCONATE 15 ML: 1.2 RINSE ORAL at 20:23

## 2020-05-12 RX ADMIN — ACETAMINOPHEN 650 MG: 325 TABLET, FILM COATED ORAL at 21:00

## 2020-05-12 RX ADMIN — ALBUTEROL SULFATE 2.5 MG: 2.5 SOLUTION RESPIRATORY (INHALATION) at 21:27

## 2020-05-12 RX ADMIN — INSULIN LISPRO 3 UNITS: 100 INJECTION, SOLUTION INTRAVENOUS; SUBCUTANEOUS at 20:32

## 2020-05-12 RX ADMIN — LORAZEPAM 1 MG: 2 INJECTION, SOLUTION INTRAMUSCULAR; INTRAVENOUS at 05:45

## 2020-05-12 RX ADMIN — CALCIUM CHLORIDE 1 G: 100 INJECTION, SOLUTION INTRAVENOUS; INTRAVENTRICULAR at 08:31

## 2020-05-12 RX ADMIN — METOPROLOL TARTRATE 12.5 MG: 25 TABLET, FILM COATED ORAL at 20:22

## 2020-05-12 RX ADMIN — DEXMEDETOMIDINE HYDROCHLORIDE 0.9 MCG/KG/HR: 4 INJECTION, SOLUTION INTRAVENOUS at 13:34

## 2020-05-12 RX ADMIN — POTASSIUM BICARBONATE 10 MEQ: 782 TABLET, EFFERVESCENT ORAL at 20:22

## 2020-05-12 RX ADMIN — HEPARIN SODIUM 5000 UNITS: 5000 INJECTION INTRAVENOUS; SUBCUTANEOUS at 05:38

## 2020-05-12 RX ADMIN — MINERAL SUPPLEMENT IRON 300 MG / 5 ML STRENGTH LIQUID 100 PER BOX UNFLAVORED 300 MG: at 20:26

## 2020-05-12 RX ADMIN — Medication 10 ML: at 05:46

## 2020-05-12 RX ADMIN — Medication 10 ML: at 05:48

## 2020-05-12 RX ADMIN — DEXMEDETOMIDINE HYDROCHLORIDE 0.8 MCG/KG/HR: 4 INJECTION, SOLUTION INTRAVENOUS at 02:19

## 2020-05-12 RX ADMIN — ALBUTEROL SULFATE 2.5 MG: 2.5 SOLUTION RESPIRATORY (INHALATION) at 12:03

## 2020-05-12 RX ADMIN — PROPOFOL 30 MCG/KG/MIN: 10 INJECTION, EMULSION INTRAVENOUS at 05:40

## 2020-05-12 RX ADMIN — POTASSIUM BICARBONATE 10 MEQ: 782 TABLET, EFFERVESCENT ORAL at 13:35

## 2020-05-12 RX ADMIN — MUPIROCIN: 20 OINTMENT TOPICAL at 20:26

## 2020-05-12 RX ADMIN — CHLORHEXIDINE GLUCONATE 15 ML: 1.2 RINSE ORAL at 09:33

## 2020-05-12 RX ADMIN — DOCUSATE SODIUM 100 MG: 50 LIQUID ORAL at 09:20

## 2020-05-12 RX ADMIN — FOLIC ACID: 5 INJECTION, SOLUTION INTRAMUSCULAR; INTRAVENOUS; SUBCUTANEOUS at 09:39

## 2020-05-12 RX ADMIN — PROPOFOL 20 MCG/KG/MIN: 10 INJECTION, EMULSION INTRAVENOUS at 21:24

## 2020-05-12 RX ADMIN — DEXMEDETOMIDINE HYDROCHLORIDE 0.9 MCG/KG/HR: 4 INJECTION, SOLUTION INTRAVENOUS at 08:07

## 2020-05-12 RX ADMIN — PROPOFOL 15 MCG/KG/MIN: 10 INJECTION, EMULSION INTRAVENOUS at 13:34

## 2020-05-12 RX ADMIN — HEPARIN SODIUM 5000 UNITS: 5000 INJECTION INTRAVENOUS; SUBCUTANEOUS at 13:35

## 2020-05-12 RX ADMIN — Medication 10 ML: at 03:58

## 2020-05-12 RX ADMIN — Medication 400 MG: at 09:20

## 2020-05-12 RX ADMIN — Medication 10 ML: at 03:16

## 2020-05-12 RX ADMIN — INSULIN LISPRO 3 UNITS: 100 INJECTION, SOLUTION INTRAVENOUS; SUBCUTANEOUS at 08:42

## 2020-05-12 RX ADMIN — ALBUTEROL SULFATE 2.5 MG: 2.5 SOLUTION RESPIRATORY (INHALATION) at 15:45

## 2020-05-12 RX ADMIN — ACETAMINOPHEN 650 MG: 325 TABLET, FILM COATED ORAL at 13:35

## 2020-05-12 RX ADMIN — MINERAL SUPPLEMENT IRON 300 MG / 5 ML STRENGTH LIQUID 100 PER BOX UNFLAVORED 300 MG: at 09:39

## 2020-05-12 RX ADMIN — OXYCODONE 5 MG: 5 TABLET ORAL at 05:44

## 2020-05-12 RX ADMIN — INSULIN LISPRO 3 UNITS: 100 INJECTION, SOLUTION INTRAVENOUS; SUBCUTANEOUS at 16:02

## 2020-05-12 RX ADMIN — HEPARIN SODIUM 5000 UNITS: 5000 INJECTION INTRAVENOUS; SUBCUTANEOUS at 21:25

## 2020-05-12 RX ADMIN — SENNOSIDES AND DOCUSATE SODIUM 1 TABLET: 8.6; 5 TABLET ORAL at 20:22

## 2020-05-12 RX ADMIN — LORAZEPAM 1 MG: 2 INJECTION, SOLUTION INTRAMUSCULAR; INTRAVENOUS at 03:16

## 2020-05-12 RX ADMIN — ASPIRIN 81 MG 324 MG: 81 TABLET ORAL at 09:20

## 2020-05-12 RX ADMIN — POTASSIUM BICARBONATE 10 MEQ: 782 TABLET, EFFERVESCENT ORAL at 09:20

## 2020-05-12 RX ADMIN — PANTOPRAZOLE SODIUM 40 MG: 40 INJECTION, POWDER, FOR SOLUTION INTRAVENOUS at 05:47

## 2020-05-12 RX ADMIN — DEXMEDETOMIDINE HYDROCHLORIDE 0.9 MCG/KG/HR: 4 INJECTION, SOLUTION INTRAVENOUS at 19:26

## 2020-05-12 RX ADMIN — SENNOSIDES AND DOCUSATE SODIUM 1 TABLET: 8.6; 5 TABLET ORAL at 09:20

## 2020-05-12 RX ADMIN — FENTANYL CITRATE 25 MCG: 50 INJECTION INTRAMUSCULAR; INTRAVENOUS at 03:58

## 2020-05-12 RX ADMIN — METOPROLOL TARTRATE 12.5 MG: 25 TABLET, FILM COATED ORAL at 09:20

## 2020-05-12 RX ADMIN — INSULIN LISPRO 3 UNITS: 100 INJECTION, SOLUTION INTRAVENOUS; SUBCUTANEOUS at 04:16

## 2020-05-12 RX ADMIN — OXYCODONE HYDROCHLORIDE 10 MG: 10 TABLET ORAL at 15:55

## 2020-05-12 RX ADMIN — FUROSEMIDE 40 MG: 10 INJECTION, SOLUTION INTRAMUSCULAR; INTRAVENOUS at 18:34

## 2020-05-12 RX ADMIN — SODIUM CHLORIDE, PRESERVATIVE FREE 10 ML: 5 INJECTION INTRAVENOUS at 14:24

## 2020-05-12 RX ADMIN — FENTANYL CITRATE 25 MCG: 50 INJECTION INTRAMUSCULAR; INTRAVENOUS at 02:12

## 2020-05-12 RX ADMIN — LORAZEPAM 1 MG: 2 INJECTION, SOLUTION INTRAMUSCULAR; INTRAVENOUS at 14:24

## 2020-05-12 RX ADMIN — Medication 10 ML: at 05:47

## 2020-05-12 RX ADMIN — ALBUTEROL SULFATE 2.5 MG: 2.5 SOLUTION RESPIRATORY (INHALATION) at 08:20

## 2020-05-12 RX ADMIN — Medication 10 ML: at 02:12

## 2020-05-12 ASSESSMENT — PULMONARY FUNCTION TESTS
PIF_VALUE: 16
PIF_VALUE: 13
PIF_VALUE: 14
PIF_VALUE: 13
PIF_VALUE: 13
PIF_VALUE: 12
PIF_VALUE: 29
PIF_VALUE: 13
PIF_VALUE: 13
PIF_VALUE: 12
PIF_VALUE: 12
PIF_VALUE: 13
PIF_VALUE: 15
PIF_VALUE: 13
PIF_VALUE: 16
PIF_VALUE: 13
PIF_VALUE: 16
PIF_VALUE: 12
PIF_VALUE: 13
PIF_VALUE: 17
PIF_VALUE: 11
PIF_VALUE: 12
PIF_VALUE: 13
PIF_VALUE: 12
PIF_VALUE: 17
PIF_VALUE: 13
PIF_VALUE: 12
PIF_VALUE: 13

## 2020-05-12 ASSESSMENT — PAIN SCALES - GENERAL
PAINLEVEL_OUTOF10: 0
PAINLEVEL_OUTOF10: 4
PAINLEVEL_OUTOF10: 5
PAINLEVEL_OUTOF10: 0
PAINLEVEL_OUTOF10: 5
PAINLEVEL_OUTOF10: 2
PAINLEVEL_OUTOF10: 0
PAINLEVEL_OUTOF10: 2
PAINLEVEL_OUTOF10: 0

## 2020-05-12 NOTE — PROGRESS NOTES
drip to 1 mcg/min.    @ 0204 - Levophed drip discontinued. BP = 118/56 (MAP of 75). @ 0212 - Started waking-up, coughing against the ventilator, and pulling on his wrist restraints. This RN was able to suction a small amount of thin and cloudy white ETT secretions CPOT= 5. PRN Fentanyl 25 mcg IV given (see MAR). @ 6718 - After this RN finished bathing him, he was restless w/ RR on the 30s and HR on the 110s. CIWA = 8. PRN Ativan 1 mg IV was given (see MAR). @ 0355 - RASS (+) 1. This RN increased the Precedex drip to 0.9 mcg/kg/min (see MAR). This RN was able to suction again a small amount of thin and cloudy white ETT secretions. @ 0358 - CPOT=5. This RN gave another PRN Fentanyl 25 mcg IV (see MAR). Ref. Range 5/12/2020 04:15   Hemoglobin Quant Latest Ref Range: 13.5 - 17.5 g/dL 6.9 (LL)   Hematocrit Latest Ref Range: 40.5 - 52.5 % 21.6 (L)     @ 0445 - This RN paged on-call CVTS to inform about the H/H results as written above.    @ 0448 - Dr. Noah Urena called this RN back. This RN informed the said MD that the pt. is a POD # 4 Urgent CABG x 4, still intubated, that the reason for the call is this AM's H/H of 6.9/21.6, that yesterday's H/H was 7.6/ 23.1, that yesterday AM shift's chest tube output was 50 and for this shift, just 30, that the pt.'s recent Temp = 99.9 w/ Tmax of 100F this shift, that HR runs around 110s, that BP currently is 118/55, that the pt. had to be on Levophed drip this shift for about 5 hours and this was discontinued @ 2 AM, that current drips are Propofol & Precedex, and the pt. is on a tube feeding. Dr. Alexandru Martinez said that since the pt. is stable, he'll let Dr. Maribell Hernández decide if she wants to transfuse PRBC when she (Dr. Maribell Hernández) does her rounds later. No new orders from Dr. Alexandru Martinez. @ 7544 - CPOT=4. This RN gave his PRN Oxycodone IR 5 mg through the NGT (see MAR). @ 0545 - CIWA = 8, RR = 30s, HR on the 110s. PRN Ativan 1 mg IV was given (see MAR).     @ 0291 - Calm now

## 2020-05-13 ENCOUNTER — APPOINTMENT (OUTPATIENT)
Dept: GENERAL RADIOLOGY | Age: 65
DRG: 003 | End: 2020-05-13
Payer: COMMERCIAL

## 2020-05-13 LAB
ALBUMIN SERPL-MCNC: 2.4 G/DL (ref 3.4–5)
ALP BLD-CCNC: 154 U/L (ref 40–129)
ALT SERPL-CCNC: 524 U/L (ref 10–40)
ANION GAP SERPL CALCULATED.3IONS-SCNC: 12 MMOL/L (ref 3–16)
APTT: 30.4 SEC (ref 24.2–36.2)
AST SERPL-CCNC: 447 U/L (ref 15–37)
BASE EXCESS ARTERIAL: 1.4 MMOL/L (ref -3–3)
BILIRUB SERPL-MCNC: <0.2 MG/DL (ref 0–1)
BILIRUBIN DIRECT: <0.2 MG/DL (ref 0–0.3)
BILIRUBIN, INDIRECT: ABNORMAL MG/DL (ref 0–1)
BUN BLDV-MCNC: 40 MG/DL (ref 7–20)
CALCIUM SERPL-MCNC: 8.2 MG/DL (ref 8.3–10.6)
CARBOXYHEMOGLOBIN ARTERIAL: 0.9 % (ref 0–1.5)
CHLORIDE BLD-SCNC: 108 MMOL/L (ref 99–110)
CO2: 23 MMOL/L (ref 21–32)
CREAT SERPL-MCNC: 1.3 MG/DL (ref 0.8–1.3)
GFR AFRICAN AMERICAN: >60
GFR NON-AFRICAN AMERICAN: 56
GLUCOSE BLD-MCNC: 131 MG/DL (ref 70–99)
GLUCOSE BLD-MCNC: 137 MG/DL (ref 70–99)
GLUCOSE BLD-MCNC: 158 MG/DL (ref 70–99)
GLUCOSE BLD-MCNC: 165 MG/DL (ref 70–99)
GLUCOSE BLD-MCNC: 167 MG/DL (ref 70–99)
GLUCOSE BLD-MCNC: 170 MG/DL (ref 70–99)
GLUCOSE BLD-MCNC: 72 MG/DL (ref 70–99)
GLUCOSE BLD-MCNC: 82 MG/DL (ref 70–99)
HCO3 ARTERIAL: 24.4 MMOL/L (ref 21–29)
HCT VFR BLD CALC: 23.9 % (ref 40.5–52.5)
HEMOGLOBIN, ART, EXTENDED: 8.3 G/DL (ref 13.5–17.5)
HEMOGLOBIN: 7.8 G/DL (ref 13.5–17.5)
INR BLD: 1.1 (ref 0.86–1.14)
MAGNESIUM: 2.4 MG/DL (ref 1.8–2.4)
MCH RBC QN AUTO: 27.7 PG (ref 26–34)
MCHC RBC AUTO-ENTMCNC: 32.8 G/DL (ref 31–36)
MCV RBC AUTO: 84.4 FL (ref 80–100)
METHEMOGLOBIN ARTERIAL: 0.6 %
O2 CONTENT ARTERIAL: 11 ML/DL
O2 SAT, ARTERIAL: 96.7 %
O2 THERAPY: ABNORMAL
PCO2 ARTERIAL: 31.4 MMHG (ref 35–45)
PDW BLD-RTO: 15.6 % (ref 12.4–15.4)
PERFORMED ON: ABNORMAL
PERFORMED ON: NORMAL
PERFORMED ON: NORMAL
PH ARTERIAL: 7.5 (ref 7.35–7.45)
PLATELET # BLD: 233 K/UL (ref 135–450)
PMV BLD AUTO: 8.3 FL (ref 5–10.5)
PO2 ARTERIAL: 77.1 MMHG (ref 75–108)
POTASSIUM SERPL-SCNC: 4.1 MMOL/L (ref 3.5–5.1)
PROTHROMBIN TIME: 12.8 SEC (ref 10–13.2)
RBC # BLD: 2.83 M/UL (ref 4.2–5.9)
SODIUM BLD-SCNC: 143 MMOL/L (ref 136–145)
TCO2 ARTERIAL: 25.4 MMOL/L
TOTAL PROTEIN: 5.8 G/DL (ref 6.4–8.2)
URINE CULTURE, ROUTINE: NORMAL
WBC # BLD: 9.6 K/UL (ref 4–11)

## 2020-05-13 PROCEDURE — 6370000000 HC RX 637 (ALT 250 FOR IP): Performed by: NURSE PRACTITIONER

## 2020-05-13 PROCEDURE — 2500000003 HC RX 250 WO HCPCS: Performed by: NURSE PRACTITIONER

## 2020-05-13 PROCEDURE — 85027 COMPLETE CBC AUTOMATED: CPT

## 2020-05-13 PROCEDURE — 2100000000 HC CCU R&B

## 2020-05-13 PROCEDURE — 94003 VENT MGMT INPAT SUBQ DAY: CPT

## 2020-05-13 PROCEDURE — 94640 AIRWAY INHALATION TREATMENT: CPT

## 2020-05-13 PROCEDURE — 94761 N-INVAS EAR/PLS OXIMETRY MLT: CPT

## 2020-05-13 PROCEDURE — 6370000000 HC RX 637 (ALT 250 FOR IP): Performed by: THORACIC SURGERY (CARDIOTHORACIC VASCULAR SURGERY)

## 2020-05-13 PROCEDURE — 82803 BLOOD GASES ANY COMBINATION: CPT

## 2020-05-13 PROCEDURE — C9113 INJ PANTOPRAZOLE SODIUM, VIA: HCPCS | Performed by: THORACIC SURGERY (CARDIOTHORACIC VASCULAR SURGERY)

## 2020-05-13 PROCEDURE — 85610 PROTHROMBIN TIME: CPT

## 2020-05-13 PROCEDURE — 85730 THROMBOPLASTIN TIME PARTIAL: CPT

## 2020-05-13 PROCEDURE — 6360000002 HC RX W HCPCS: Performed by: NURSE PRACTITIONER

## 2020-05-13 PROCEDURE — 2500000003 HC RX 250 WO HCPCS: Performed by: THORACIC SURGERY (CARDIOTHORACIC VASCULAR SURGERY)

## 2020-05-13 PROCEDURE — 80048 BASIC METABOLIC PNL TOTAL CA: CPT

## 2020-05-13 PROCEDURE — 93970 EXTREMITY STUDY: CPT

## 2020-05-13 PROCEDURE — 2700000000 HC OXYGEN THERAPY PER DAY

## 2020-05-13 PROCEDURE — 83735 ASSAY OF MAGNESIUM: CPT

## 2020-05-13 PROCEDURE — 99024 POSTOP FOLLOW-UP VISIT: CPT | Performed by: THORACIC SURGERY (CARDIOTHORACIC VASCULAR SURGERY)

## 2020-05-13 PROCEDURE — 6360000002 HC RX W HCPCS: Performed by: THORACIC SURGERY (CARDIOTHORACIC VASCULAR SURGERY)

## 2020-05-13 PROCEDURE — 71045 X-RAY EXAM CHEST 1 VIEW: CPT

## 2020-05-13 PROCEDURE — 80076 HEPATIC FUNCTION PANEL: CPT

## 2020-05-13 PROCEDURE — 2580000003 HC RX 258: Performed by: THORACIC SURGERY (CARDIOTHORACIC VASCULAR SURGERY)

## 2020-05-13 PROCEDURE — 2580000003 HC RX 258: Performed by: NURSE PRACTITIONER

## 2020-05-13 RX ORDER — METOPROLOL TARTRATE 5 MG/5ML
2.5 INJECTION INTRAVENOUS EVERY 10 MIN PRN
Status: COMPLETED | OUTPATIENT
Start: 2020-05-13 | End: 2020-06-03

## 2020-05-13 RX ORDER — METOPROLOL TARTRATE 5 MG/5ML
2.5 INJECTION INTRAVENOUS EVERY 10 MIN PRN
Status: DISCONTINUED | OUTPATIENT
Start: 2020-05-13 | End: 2020-05-13

## 2020-05-13 RX ADMIN — ALBUTEROL SULFATE 2.5 MG: 2.5 SOLUTION RESPIRATORY (INHALATION) at 15:58

## 2020-05-13 RX ADMIN — CHLORHEXIDINE GLUCONATE 15 ML: 1.2 RINSE ORAL at 20:31

## 2020-05-13 RX ADMIN — OXYCODONE HYDROCHLORIDE 10 MG: 10 TABLET ORAL at 22:40

## 2020-05-13 RX ADMIN — SENNOSIDES AND DOCUSATE SODIUM 1 TABLET: 8.6; 5 TABLET ORAL at 07:55

## 2020-05-13 RX ADMIN — ALBUTEROL SULFATE 2.5 MG: 2.5 SOLUTION RESPIRATORY (INHALATION) at 08:05

## 2020-05-13 RX ADMIN — INSULIN LISPRO 3 UNITS: 100 INJECTION, SOLUTION INTRAVENOUS; SUBCUTANEOUS at 16:16

## 2020-05-13 RX ADMIN — FOLIC ACID: 5 INJECTION, SOLUTION INTRAMUSCULAR; INTRAVENOUS; SUBCUTANEOUS at 08:27

## 2020-05-13 RX ADMIN — ALBUTEROL SULFATE 2.5 MG: 2.5 SOLUTION RESPIRATORY (INHALATION) at 11:29

## 2020-05-13 RX ADMIN — SODIUM CHLORIDE 5 MG/HR: 9 INJECTION, SOLUTION INTRAVENOUS at 07:41

## 2020-05-13 RX ADMIN — METOPROLOL TARTRATE 25 MG: 25 TABLET, FILM COATED ORAL at 07:55

## 2020-05-13 RX ADMIN — FENTANYL CITRATE 25 MCG: 50 INJECTION INTRAMUSCULAR; INTRAVENOUS at 21:40

## 2020-05-13 RX ADMIN — PANTOPRAZOLE SODIUM 40 MG: 40 INJECTION, POWDER, FOR SOLUTION INTRAVENOUS at 06:03

## 2020-05-13 RX ADMIN — MINERAL SUPPLEMENT IRON 300 MG / 5 ML STRENGTH LIQUID 100 PER BOX UNFLAVORED 300 MG: at 20:44

## 2020-05-13 RX ADMIN — CALCIUM CHLORIDE 1 G: 100 INJECTION, SOLUTION INTRAVENOUS at 07:47

## 2020-05-13 RX ADMIN — FENTANYL CITRATE 25 MCG: 50 INJECTION INTRAMUSCULAR; INTRAVENOUS at 01:42

## 2020-05-13 RX ADMIN — SENNOSIDES AND DOCUSATE SODIUM 1 TABLET: 8.6; 5 TABLET ORAL at 20:30

## 2020-05-13 RX ADMIN — DEXMEDETOMIDINE HYDROCHLORIDE 1 MCG/KG/HR: 4 INJECTION, SOLUTION INTRAVENOUS at 08:46

## 2020-05-13 RX ADMIN — ACETAMINOPHEN 650 MG: 325 TABLET, FILM COATED ORAL at 11:21

## 2020-05-13 RX ADMIN — POTASSIUM BICARBONATE 10 MEQ: 782 TABLET, EFFERVESCENT ORAL at 13:57

## 2020-05-13 RX ADMIN — LORAZEPAM 1 MG: 2 INJECTION, SOLUTION INTRAMUSCULAR; INTRAVENOUS at 07:54

## 2020-05-13 RX ADMIN — OXYCODONE HYDROCHLORIDE 10 MG: 10 TABLET ORAL at 07:55

## 2020-05-13 RX ADMIN — SODIUM CHLORIDE, PRESERVATIVE FREE 10 ML: 5 INJECTION INTRAVENOUS at 08:08

## 2020-05-13 RX ADMIN — Medication 10 ML: at 06:03

## 2020-05-13 RX ADMIN — PROPOFOL 30 MCG/KG/MIN: 10 INJECTION, EMULSION INTRAVENOUS at 19:18

## 2020-05-13 RX ADMIN — CHLORHEXIDINE GLUCONATE 15 ML: 1.2 RINSE ORAL at 08:16

## 2020-05-13 RX ADMIN — PROPOFOL 25 MCG/KG/MIN: 10 INJECTION, EMULSION INTRAVENOUS at 11:16

## 2020-05-13 RX ADMIN — LORAZEPAM 1 MG: 2 INJECTION, SOLUTION INTRAMUSCULAR; INTRAVENOUS at 14:27

## 2020-05-13 RX ADMIN — HEPARIN SODIUM 5000 UNITS: 5000 INJECTION INTRAVENOUS; SUBCUTANEOUS at 06:03

## 2020-05-13 RX ADMIN — INSULIN LISPRO 3 UNITS: 100 INJECTION, SOLUTION INTRAVENOUS; SUBCUTANEOUS at 20:22

## 2020-05-13 RX ADMIN — OXYCODONE HYDROCHLORIDE 10 MG: 10 TABLET ORAL at 14:16

## 2020-05-13 RX ADMIN — HYDRALAZINE HYDROCHLORIDE 10 MG: 20 INJECTION INTRAMUSCULAR; INTRAVENOUS at 06:30

## 2020-05-13 RX ADMIN — ASPIRIN 81 MG 324 MG: 81 TABLET ORAL at 07:55

## 2020-05-13 RX ADMIN — DOCUSATE SODIUM 100 MG: 50 LIQUID ORAL at 07:55

## 2020-05-13 RX ADMIN — HYDRALAZINE HYDROCHLORIDE 5 MG: 20 INJECTION INTRAMUSCULAR; INTRAVENOUS at 01:24

## 2020-05-13 RX ADMIN — LORAZEPAM 2 MG: 2 INJECTION INTRAMUSCULAR; INTRAVENOUS at 01:59

## 2020-05-13 RX ADMIN — HYDRALAZINE HYDROCHLORIDE 5 MG: 20 INJECTION INTRAMUSCULAR; INTRAVENOUS at 01:32

## 2020-05-13 RX ADMIN — FENTANYL CITRATE 25 MCG: 50 INJECTION INTRAMUSCULAR; INTRAVENOUS at 23:55

## 2020-05-13 RX ADMIN — HEPARIN SODIUM 5000 UNITS: 5000 INJECTION INTRAVENOUS; SUBCUTANEOUS at 21:40

## 2020-05-13 RX ADMIN — ALBUTEROL SULFATE 2.5 MG: 2.5 SOLUTION RESPIRATORY (INHALATION) at 19:59

## 2020-05-13 RX ADMIN — POTASSIUM BICARBONATE 10 MEQ: 782 TABLET, EFFERVESCENT ORAL at 07:55

## 2020-05-13 RX ADMIN — ACETAMINOPHEN 650 MG: 325 TABLET, FILM COATED ORAL at 16:18

## 2020-05-13 RX ADMIN — DEXMEDETOMIDINE HYDROCHLORIDE 1 MCG/KG/HR: 4 INJECTION, SOLUTION INTRAVENOUS at 00:55

## 2020-05-13 RX ADMIN — DEXMEDETOMIDINE HYDROCHLORIDE 1 MCG/KG/HR: 4 INJECTION, SOLUTION INTRAVENOUS at 13:55

## 2020-05-13 RX ADMIN — LORAZEPAM 2 MG: 2 INJECTION INTRAMUSCULAR; INTRAVENOUS at 20:30

## 2020-05-13 RX ADMIN — INSULIN LISPRO 3 UNITS: 100 INJECTION, SOLUTION INTRAVENOUS; SUBCUTANEOUS at 11:23

## 2020-05-13 RX ADMIN — Medication 400 MG: at 07:55

## 2020-05-13 RX ADMIN — LORAZEPAM 2 MG: 2 INJECTION INTRAMUSCULAR; INTRAVENOUS at 22:33

## 2020-05-13 RX ADMIN — PROPOFOL 35 MCG/KG/MIN: 10 INJECTION, EMULSION INTRAVENOUS at 23:28

## 2020-05-13 RX ADMIN — FUROSEMIDE 40 MG: 10 INJECTION, SOLUTION INTRAMUSCULAR; INTRAVENOUS at 18:15

## 2020-05-13 RX ADMIN — HYDRALAZINE HYDROCHLORIDE 5 MG: 20 INJECTION INTRAMUSCULAR; INTRAVENOUS at 01:18

## 2020-05-13 RX ADMIN — POTASSIUM BICARBONATE 10 MEQ: 782 TABLET, EFFERVESCENT ORAL at 20:30

## 2020-05-13 RX ADMIN — MINERAL SUPPLEMENT IRON 300 MG / 5 ML STRENGTH LIQUID 100 PER BOX UNFLAVORED 300 MG: at 08:08

## 2020-05-13 RX ADMIN — METOPROLOL TARTRATE 25 MG: 25 TABLET, FILM COATED ORAL at 20:30

## 2020-05-13 RX ADMIN — HEPARIN SODIUM 5000 UNITS: 5000 INJECTION INTRAVENOUS; SUBCUTANEOUS at 13:57

## 2020-05-13 RX ADMIN — FENTANYL CITRATE 25 MCG: 50 INJECTION INTRAMUSCULAR; INTRAVENOUS at 19:35

## 2020-05-13 RX ADMIN — SODIUM CHLORIDE, PRESERVATIVE FREE 10 ML: 5 INJECTION INTRAVENOUS at 20:31

## 2020-05-13 RX ADMIN — FUROSEMIDE 40 MG: 10 INJECTION, SOLUTION INTRAMUSCULAR; INTRAVENOUS at 08:08

## 2020-05-13 RX ADMIN — DEXMEDETOMIDINE HYDROCHLORIDE 1 MCG/KG/HR: 4 INJECTION, SOLUTION INTRAVENOUS at 19:18

## 2020-05-13 ASSESSMENT — PULMONARY FUNCTION TESTS
PIF_VALUE: 11
PIF_VALUE: 11
PIF_VALUE: 13
PIF_VALUE: 11
PIF_VALUE: 14
PIF_VALUE: 11
PIF_VALUE: 11
PIF_VALUE: 12
PIF_VALUE: 14
PIF_VALUE: 10
PIF_VALUE: 12
PIF_VALUE: 11
PIF_VALUE: 11
PIF_VALUE: 13
PIF_VALUE: 11
PIF_VALUE: 11
PIF_VALUE: 26
PIF_VALUE: 11
PIF_VALUE: 13
PIF_VALUE: 26
PIF_VALUE: 14
PIF_VALUE: 14
PIF_VALUE: 12
PIF_VALUE: 11
PIF_VALUE: 11
PIF_VALUE: 15
PIF_VALUE: 11
PIF_VALUE: 12
PIF_VALUE: 11
PIF_VALUE: 13
PIF_VALUE: 13
PIF_VALUE: 14
PIF_VALUE: 14
PIF_VALUE: 11

## 2020-05-13 ASSESSMENT — PAIN SCALES - GENERAL
PAINLEVEL_OUTOF10: 0
PAINLEVEL_OUTOF10: 0
PAINLEVEL_OUTOF10: 3
PAINLEVEL_OUTOF10: 0
PAINLEVEL_OUTOF10: 0
PAINLEVEL_OUTOF10: 7
PAINLEVEL_OUTOF10: 6
PAINLEVEL_OUTOF10: 7

## 2020-05-13 NOTE — CARE COORDINATION
[id number] [facility] Wound Ostomy Continence Nurse  Consult Note       NAME:  Alma Turner  MEDICAL RECORD NUMBER:  [id number]  AGE: 60 y.o. GENDER: male  : 1955  TODAY'S DATE:  2020    Subjective   Reason for WOCN Evaluation and Assessment: DTI to bilat heels      Alma Turner is a 60 y.o. male referred by:   [] Physician  [x] Nursing  [] Other:     Wound Identification:  Wound Type: pressure  Contributing Factors: chronic pressure and decreased mobility    Wound History: noted today on assess  Current Wound Care Treatment:  Heel border, boots    Patient Goal of Care:  [x] Wound Healing  [] Odor Control  [] Palliative Care  [] Pain Control   [] Other:         PAST MEDICAL HISTORY        Diagnosis Date    Blood clot in vein 2018    right leg. no previous scans to know whether supf or dvt. no coumadin. put on plavix.  Bronchitis     doesn't remember when.  outpt abx    Hypertension     Kidney disease     s/p nephrectomy age 17, pt not sure why     NSTEMI (non-ST elevated myocardial infarction) ([facility] 2020    3VD    Respiratory compromise 2020    chemical exposure at work, seen at [facility], covid neg, given steroids    Tattoos        PAST SURGICAL HISTORY    Past Surgical History:   Procedure Laterality Date    CORONARY ARTERY BYPASS GRAFT  2020    cabgx4 (LIMA-LAD, SVG-D1, SVG-OM, SVG-PDA)    CORONARY ARTERY BYPASS GRAFT N/A 2020    CABG CORONARY ARTERY BYPASS GRAFT X3, LIMA TO LAS,  TRANSESOPHAGEAL ECHOCARDIOGRAM, TOTAL CARDIOPULMONARY BYPASS performed by Louis Jones MD at [address]     17 yo - pt doesn't know why       FAMILY HISTORY    Family History   Problem Relation Age of Onset    Heart Disease Mother          of MI age 36    Heart Disease Brother         MI mid 46s    Heart Disease Father          of MI age 68    Heart Disease Paternal Aunt         MI in her 46s       SOCIAL HISTORY    Social History     Tobacco Use    Smoking status: Former Smoker     Types: Cigarettes     Last attempt to quit: 2019     Years since quittin.5    Smokeless tobacco: Never Used    Tobacco comment: started age 27   Substance Use Topics    Alcohol use: Yes     Frequency: Monthly or less     Comment: once a month, shot once a week. depends on mood.  Drug use: Never       ALLERGIES    No Known Allergies    MEDICATIONS    No current facility-administered medications on file prior to encounter.       Current Outpatient Medications on File Prior to Encounter   Medication Sig Dispense Refill    clopidogrel (PLAVIX) 75 MG tablet Take 75 mg by mouth daily      lisinopril (PRINIVIL;ZESTRIL) 40 MG tablet Take 40 mg by mouth daily      amLODIPine (NORVASC) 5 MG tablet Take 5 mg by mouth daily      albuterol sulfate HFA (VENTOLIN HFA) 108 (90 Base) MCG/ACT inhaler Inhale 2 puffs into the lungs every 6 hours as needed for Wheezing         Objective    BP (!) 89/59   Pulse 91   Temp 98.9 °F (37.2 °C) (Axillary)   Resp 23   Ht 6' (1.829 m)   Wt 189 lb 9.5 oz (86 kg)   SpO2 99%   BMI 25.71 kg/m²     LABS:  WBC:    Lab Results   Component Value Date    WBC 9.6 2020     H/H:    Lab Results   Component Value Date    HGB 7.8 2020    HCT 23.9 2020     PTT:    Lab Results   Component Value Date    APTT 30.4 2020   [APTT}  PT/INR:    Lab Results   Component Value Date    PROTIME 12.8 2020    INR 1.10 2020     HgBA1c:    Lab Results   Component Value Date    LABA1C 5.9 2020       Assessment   Kip Risk Score: Kip Scale Score: 12    Patient Active Problem List   Diagnosis Code    Chest pain R07.9    NSTEMI (non-ST elevated myocardial infarction) ([facility] I21.4    Coronary artery disease involving native coronary artery of native heart with unstable angina pectoris (HCC) I25.110    S/P CABG x 4 Z95.1       Measurements:        Wound 20 Heel Left (Active)   Wound Image   2020 11:15 AM   Wound Deep tissue/Injury

## 2020-05-13 NOTE — PROGRESS NOTES
stable in SR. Replete Ca. - BB   - hold statin until lfts normalize  pulm - still intubated. Wean as able. Check abg.   - check cxr  ID - febrile last 24hrs. wbc normal. cx 5/12 neg so far. Renal - BUN/Cr a bit improved. Diurese. - retain Hoang for accurate I+O  Heme - acute blood loss anemia. Cont Fe.   - scds, sc hep. GI - TF at goal  Neuro - Withdrawal from alcohol. [facility] protocol.       Toi Smith MD  5/13/2020  6:36 AM

## 2020-05-14 LAB
ANION GAP SERPL CALCULATED.3IONS-SCNC: 12 MMOL/L (ref 3–16)
ANION GAP SERPL CALCULATED.3IONS-SCNC: 13 MMOL/L (ref 3–16)
APTT: 28.3 SEC (ref 24.2–36.2)
BASE EXCESS ARTERIAL: 1.6 MMOL/L (ref -3–3)
BUN BLDV-MCNC: 39 MG/DL (ref 7–20)
BUN BLDV-MCNC: 41 MG/DL (ref 7–20)
C-REACTIVE PROTEIN: 301.2 MG/L (ref 0–5.1)
CALCIUM SERPL-MCNC: 8 MG/DL (ref 8.3–10.6)
CALCIUM SERPL-MCNC: 9.4 MG/DL (ref 8.3–10.6)
CARBOXYHEMOGLOBIN ARTERIAL: 1.1 % (ref 0–1.5)
CHLORIDE BLD-SCNC: 113 MMOL/L (ref 99–110)
CHLORIDE BLD-SCNC: 113 MMOL/L (ref 99–110)
CO2: 23 MMOL/L (ref 21–32)
CO2: 24 MMOL/L (ref 21–32)
CREAT SERPL-MCNC: 1 MG/DL (ref 0.8–1.3)
CREAT SERPL-MCNC: 1 MG/DL (ref 0.8–1.3)
CULTURE, RESPIRATORY: NORMAL
GAMMA GLUTAMYL TRANSFERASE: 71 U/L (ref 8–61)
GFR AFRICAN AMERICAN: >60
GFR AFRICAN AMERICAN: >60
GFR NON-AFRICAN AMERICAN: >60
GFR NON-AFRICAN AMERICAN: >60
GLUCOSE BLD-MCNC: 120 MG/DL (ref 70–99)
GLUCOSE BLD-MCNC: 128 MG/DL (ref 70–99)
GLUCOSE BLD-MCNC: 135 MG/DL (ref 70–99)
GLUCOSE BLD-MCNC: 143 MG/DL (ref 70–99)
GLUCOSE BLD-MCNC: 151 MG/DL (ref 70–99)
GLUCOSE BLD-MCNC: 161 MG/DL (ref 70–99)
GLUCOSE BLD-MCNC: 163 MG/DL (ref 70–99)
GLUCOSE BLD-MCNC: 163 MG/DL (ref 70–99)
GLUCOSE BLD-MCNC: 166 MG/DL (ref 70–99)
GLUCOSE BLD-MCNC: 77 MG/DL (ref 70–99)
GRAM STAIN RESULT: NORMAL
HCO3 ARTERIAL: 25.7 MMOL/L (ref 21–29)
HCT VFR BLD CALC: 24.3 % (ref 40.5–52.5)
HEMOGLOBIN, ART, EXTENDED: 7.7 G/DL (ref 13.5–17.5)
HEMOGLOBIN: 7.8 G/DL (ref 13.5–17.5)
INR BLD: 1.14 (ref 0.86–1.14)
MAGNESIUM: 2.4 MG/DL (ref 1.8–2.4)
MAGNESIUM: 2.5 MG/DL (ref 1.8–2.4)
MCH RBC QN AUTO: 27.3 PG (ref 26–34)
MCHC RBC AUTO-ENTMCNC: 32.2 G/DL (ref 31–36)
MCV RBC AUTO: 85 FL (ref 80–100)
METHEMOGLOBIN ARTERIAL: 0.8 %
O2 CONTENT ARTERIAL: 11 ML/DL
O2 SAT, ARTERIAL: 99.5 %
O2 THERAPY: ABNORMAL
PCO2 ARTERIAL: 37.1 MMHG (ref 35–45)
PDW BLD-RTO: 15.8 % (ref 12.4–15.4)
PERFORMED ON: ABNORMAL
PERFORMED ON: NORMAL
PH ARTERIAL: 7.45 (ref 7.35–7.45)
PLATELET # BLD: 248 K/UL (ref 135–450)
PMV BLD AUTO: 8.4 FL (ref 5–10.5)
PO2 ARTERIAL: 113 MMHG (ref 75–108)
POTASSIUM SERPL-SCNC: 4.6 MMOL/L (ref 3.5–5.1)
POTASSIUM SERPL-SCNC: 4.6 MMOL/L (ref 3.5–5.1)
PROCALCITONIN: 1.32 NG/ML (ref 0–0.15)
PROTHROMBIN TIME: 13.2 SEC (ref 10–13.2)
RBC # BLD: 2.86 M/UL (ref 4.2–5.9)
SODIUM BLD-SCNC: 148 MMOL/L (ref 136–145)
SODIUM BLD-SCNC: 150 MMOL/L (ref 136–145)
TCO2 ARTERIAL: 26.8 MMOL/L
WBC # BLD: 9.2 K/UL (ref 4–11)

## 2020-05-14 PROCEDURE — 2580000003 HC RX 258: Performed by: INTERNAL MEDICINE

## 2020-05-14 PROCEDURE — 94003 VENT MGMT INPAT SUBQ DAY: CPT

## 2020-05-14 PROCEDURE — 2580000003 HC RX 258: Performed by: THORACIC SURGERY (CARDIOTHORACIC VASCULAR SURGERY)

## 2020-05-14 PROCEDURE — 6370000000 HC RX 637 (ALT 250 FOR IP): Performed by: NURSE PRACTITIONER

## 2020-05-14 PROCEDURE — 2100000000 HC CCU R&B

## 2020-05-14 PROCEDURE — 6370000000 HC RX 637 (ALT 250 FOR IP): Performed by: THORACIC SURGERY (CARDIOTHORACIC VASCULAR SURGERY)

## 2020-05-14 PROCEDURE — 2500000003 HC RX 250 WO HCPCS: Performed by: THORACIC SURGERY (CARDIOTHORACIC VASCULAR SURGERY)

## 2020-05-14 PROCEDURE — 2580000003 HC RX 258: Performed by: NURSE PRACTITIONER

## 2020-05-14 PROCEDURE — C9113 INJ PANTOPRAZOLE SODIUM, VIA: HCPCS | Performed by: THORACIC SURGERY (CARDIOTHORACIC VASCULAR SURGERY)

## 2020-05-14 PROCEDURE — 85027 COMPLETE CBC AUTOMATED: CPT

## 2020-05-14 PROCEDURE — 82977 ASSAY OF GGT: CPT

## 2020-05-14 PROCEDURE — 86140 C-REACTIVE PROTEIN: CPT

## 2020-05-14 PROCEDURE — 80048 BASIC METABOLIC PNL TOTAL CA: CPT

## 2020-05-14 PROCEDURE — 99223 1ST HOSP IP/OBS HIGH 75: CPT | Performed by: INTERNAL MEDICINE

## 2020-05-14 PROCEDURE — 94761 N-INVAS EAR/PLS OXIMETRY MLT: CPT

## 2020-05-14 PROCEDURE — 85730 THROMBOPLASTIN TIME PARTIAL: CPT

## 2020-05-14 PROCEDURE — 6360000002 HC RX W HCPCS: Performed by: INTERNAL MEDICINE

## 2020-05-14 PROCEDURE — 82803 BLOOD GASES ANY COMBINATION: CPT

## 2020-05-14 PROCEDURE — 85610 PROTHROMBIN TIME: CPT

## 2020-05-14 PROCEDURE — 6360000002 HC RX W HCPCS: Performed by: NURSE PRACTITIONER

## 2020-05-14 PROCEDURE — 83735 ASSAY OF MAGNESIUM: CPT

## 2020-05-14 PROCEDURE — 6360000002 HC RX W HCPCS: Performed by: THORACIC SURGERY (CARDIOTHORACIC VASCULAR SURGERY)

## 2020-05-14 PROCEDURE — 84145 PROCALCITONIN (PCT): CPT

## 2020-05-14 PROCEDURE — 2500000003 HC RX 250 WO HCPCS: Performed by: NURSE PRACTITIONER

## 2020-05-14 PROCEDURE — 99024 POSTOP FOLLOW-UP VISIT: CPT | Performed by: THORACIC SURGERY (CARDIOTHORACIC VASCULAR SURGERY)

## 2020-05-14 PROCEDURE — 37799 UNLISTED PX VASCULAR SURGERY: CPT

## 2020-05-14 PROCEDURE — 94750 HC PULMONARY COMPLIANCE STUDY: CPT

## 2020-05-14 PROCEDURE — 2700000000 HC OXYGEN THERAPY PER DAY

## 2020-05-14 PROCEDURE — 94640 AIRWAY INHALATION TREATMENT: CPT

## 2020-05-14 RX ADMIN — DEXMEDETOMIDINE HYDROCHLORIDE 1.2 MCG/KG/HR: 4 INJECTION, SOLUTION INTRAVENOUS at 03:50

## 2020-05-14 RX ADMIN — OXYCODONE HYDROCHLORIDE 10 MG: 10 TABLET ORAL at 17:54

## 2020-05-14 RX ADMIN — OXYCODONE HYDROCHLORIDE 10 MG: 10 TABLET ORAL at 08:19

## 2020-05-14 RX ADMIN — PROPOFOL 35 MCG/KG/MIN: 10 INJECTION, EMULSION INTRAVENOUS at 22:46

## 2020-05-14 RX ADMIN — PANTOPRAZOLE SODIUM 40 MG: 40 INJECTION, POWDER, FOR SOLUTION INTRAVENOUS at 06:18

## 2020-05-14 RX ADMIN — ALBUTEROL SULFATE 2.5 MG: 2.5 SOLUTION RESPIRATORY (INHALATION) at 08:09

## 2020-05-14 RX ADMIN — INSULIN LISPRO 3 UNITS: 100 INJECTION, SOLUTION INTRAVENOUS; SUBCUTANEOUS at 20:04

## 2020-05-14 RX ADMIN — FENTANYL CITRATE 25 MCG: 50 INJECTION INTRAMUSCULAR; INTRAVENOUS at 05:37

## 2020-05-14 RX ADMIN — ALBUTEROL SULFATE 2.5 MG: 2.5 SOLUTION RESPIRATORY (INHALATION) at 20:16

## 2020-05-14 RX ADMIN — FENTANYL CITRATE 25 MCG: 50 INJECTION INTRAMUSCULAR; INTRAVENOUS at 15:38

## 2020-05-14 RX ADMIN — Medication 10 ML: at 23:30

## 2020-05-14 RX ADMIN — CALCIUM CHLORIDE 1 G: 100 INJECTION, SOLUTION INTRAVENOUS; INTRAVENTRICULAR at 08:01

## 2020-05-14 RX ADMIN — MINERAL SUPPLEMENT IRON 300 MG / 5 ML STRENGTH LIQUID 100 PER BOX UNFLAVORED 300 MG: at 20:13

## 2020-05-14 RX ADMIN — DOCUSATE SODIUM 100 MG: 50 LIQUID ORAL at 08:19

## 2020-05-14 RX ADMIN — DEXMEDETOMIDINE HYDROCHLORIDE 1.2 MCG/KG/HR: 4 INJECTION, SOLUTION INTRAVENOUS at 00:00

## 2020-05-14 RX ADMIN — MINERAL SUPPLEMENT IRON 300 MG / 5 ML STRENGTH LIQUID 100 PER BOX UNFLAVORED 300 MG: at 08:21

## 2020-05-14 RX ADMIN — PIPERACILLIN AND TAZOBACTAM 3.38 G: 3; .375 INJECTION, POWDER, LYOPHILIZED, FOR SOLUTION INTRAVENOUS at 17:03

## 2020-05-14 RX ADMIN — Medication 10 ML: at 21:06

## 2020-05-14 RX ADMIN — DEXMEDETOMIDINE HYDROCHLORIDE 1.2 MCG/KG/HR: 4 INJECTION, SOLUTION INTRAVENOUS at 16:29

## 2020-05-14 RX ADMIN — INSULIN LISPRO 3 UNITS: 100 INJECTION, SOLUTION INTRAVENOUS; SUBCUTANEOUS at 15:32

## 2020-05-14 RX ADMIN — LORAZEPAM 1 MG: 2 INJECTION, SOLUTION INTRAMUSCULAR; INTRAVENOUS at 23:30

## 2020-05-14 RX ADMIN — INSULIN LISPRO 3 UNITS: 100 INJECTION, SOLUTION INTRAVENOUS; SUBCUTANEOUS at 11:01

## 2020-05-14 RX ADMIN — OXYCODONE 5 MG: 5 TABLET ORAL at 22:02

## 2020-05-14 RX ADMIN — POTASSIUM BICARBONATE 10 MEQ: 782 TABLET, EFFERVESCENT ORAL at 20:11

## 2020-05-14 RX ADMIN — SODIUM CHLORIDE, PRESERVATIVE FREE 10 ML: 5 INJECTION INTRAVENOUS at 20:11

## 2020-05-14 RX ADMIN — PROPOFOL 30 MCG/KG/MIN: 10 INJECTION, EMULSION INTRAVENOUS at 11:10

## 2020-05-14 RX ADMIN — LORAZEPAM 1 MG: 2 INJECTION, SOLUTION INTRAMUSCULAR; INTRAVENOUS at 15:33

## 2020-05-14 RX ADMIN — LORAZEPAM 2 MG: 2 INJECTION INTRAMUSCULAR; INTRAVENOUS at 06:21

## 2020-05-14 RX ADMIN — Medication 10 ML: at 21:43

## 2020-05-14 RX ADMIN — FENTANYL CITRATE 25 MCG: 50 INJECTION INTRAMUSCULAR; INTRAVENOUS at 06:50

## 2020-05-14 RX ADMIN — HEPARIN SODIUM 5000 UNITS: 5000 INJECTION INTRAVENOUS; SUBCUTANEOUS at 21:11

## 2020-05-14 RX ADMIN — SENNOSIDES AND DOCUSATE SODIUM 1 TABLET: 8.6; 5 TABLET ORAL at 20:11

## 2020-05-14 RX ADMIN — FUROSEMIDE 40 MG: 10 INJECTION, SOLUTION INTRAMUSCULAR; INTRAVENOUS at 17:54

## 2020-05-14 RX ADMIN — METOPROLOL TARTRATE 25 MG: 25 TABLET, FILM COATED ORAL at 08:19

## 2020-05-14 RX ADMIN — Medication 10 ML: at 06:19

## 2020-05-14 RX ADMIN — HEPARIN SODIUM 5000 UNITS: 5000 INJECTION INTRAVENOUS; SUBCUTANEOUS at 05:39

## 2020-05-14 RX ADMIN — DOCUSATE SODIUM 100 MG: 50 LIQUID ORAL at 20:11

## 2020-05-14 RX ADMIN — ASPIRIN 81 MG 324 MG: 81 TABLET ORAL at 08:19

## 2020-05-14 RX ADMIN — CHLORHEXIDINE GLUCONATE 15 ML: 1.2 RINSE ORAL at 08:24

## 2020-05-14 RX ADMIN — DEXMEDETOMIDINE HYDROCHLORIDE 1.2 MCG/KG/HR: 4 INJECTION, SOLUTION INTRAVENOUS at 20:46

## 2020-05-14 RX ADMIN — LORAZEPAM 1 MG: 2 INJECTION, SOLUTION INTRAMUSCULAR; INTRAVENOUS at 21:43

## 2020-05-14 RX ADMIN — METOPROLOL TARTRATE 25 MG: 25 TABLET, FILM COATED ORAL at 20:11

## 2020-05-14 RX ADMIN — ALBUTEROL SULFATE 2.5 MG: 2.5 SOLUTION RESPIRATORY (INHALATION) at 15:48

## 2020-05-14 RX ADMIN — PROPOFOL 35 MCG/KG/MIN: 10 INJECTION, EMULSION INTRAVENOUS at 18:00

## 2020-05-14 RX ADMIN — INSULIN LISPRO 3 UNITS: 100 INJECTION, SOLUTION INTRAVENOUS; SUBCUTANEOUS at 08:20

## 2020-05-14 RX ADMIN — FENTANYL CITRATE 25 MCG: 50 INJECTION INTRAMUSCULAR; INTRAVENOUS at 17:01

## 2020-05-14 RX ADMIN — FENTANYL CITRATE 25 MCG: 50 INJECTION INTRAMUSCULAR; INTRAVENOUS at 01:26

## 2020-05-14 RX ADMIN — LORAZEPAM 2 MG: 2 INJECTION INTRAMUSCULAR; INTRAVENOUS at 02:30

## 2020-05-14 RX ADMIN — FUROSEMIDE 40 MG: 10 INJECTION, SOLUTION INTRAMUSCULAR; INTRAVENOUS at 08:19

## 2020-05-14 RX ADMIN — FENTANYL CITRATE 25 MCG: 50 INJECTION INTRAMUSCULAR; INTRAVENOUS at 03:09

## 2020-05-14 RX ADMIN — OXYCODONE HYDROCHLORIDE 10 MG: 10 TABLET ORAL at 13:59

## 2020-05-14 RX ADMIN — PROPOFOL 30 MCG/KG/MIN: 10 INJECTION, EMULSION INTRAVENOUS at 05:01

## 2020-05-14 RX ADMIN — HEPARIN SODIUM 5000 UNITS: 5000 INJECTION INTRAVENOUS; SUBCUTANEOUS at 13:59

## 2020-05-14 RX ADMIN — SENNOSIDES AND DOCUSATE SODIUM 1 TABLET: 8.6; 5 TABLET ORAL at 08:19

## 2020-05-14 RX ADMIN — LORAZEPAM 1 MG: 2 INJECTION, SOLUTION INTRAMUSCULAR; INTRAVENOUS at 16:27

## 2020-05-14 RX ADMIN — POTASSIUM BICARBONATE 10 MEQ: 782 TABLET, EFFERVESCENT ORAL at 08:20

## 2020-05-14 RX ADMIN — POTASSIUM BICARBONATE 10 MEQ: 782 TABLET, EFFERVESCENT ORAL at 13:59

## 2020-05-14 RX ADMIN — CHLORHEXIDINE GLUCONATE 15 ML: 1.2 RINSE ORAL at 20:11

## 2020-05-14 RX ADMIN — FOLIC ACID: 5 INJECTION, SOLUTION INTRAMUSCULAR; INTRAVENOUS; SUBCUTANEOUS at 10:33

## 2020-05-14 RX ADMIN — ALBUTEROL SULFATE 2.5 MG: 2.5 SOLUTION RESPIRATORY (INHALATION) at 12:07

## 2020-05-14 RX ADMIN — DEXMEDETOMIDINE HYDROCHLORIDE 1.2 MCG/KG/HR: 4 INJECTION, SOLUTION INTRAVENOUS at 12:28

## 2020-05-14 RX ADMIN — SODIUM CHLORIDE, PRESERVATIVE FREE 10 ML: 5 INJECTION INTRAVENOUS at 08:32

## 2020-05-14 RX ADMIN — FENTANYL CITRATE 25 MCG: 50 INJECTION INTRAMUSCULAR; INTRAVENOUS at 21:05

## 2020-05-14 RX ADMIN — DEXMEDETOMIDINE HYDROCHLORIDE 1.2 MCG/KG/HR: 4 INJECTION, SOLUTION INTRAVENOUS at 08:01

## 2020-05-14 RX ADMIN — FENTANYL CITRATE 25 MCG: 50 INJECTION INTRAMUSCULAR; INTRAVENOUS at 04:24

## 2020-05-14 ASSESSMENT — PAIN SCALES - GENERAL
PAINLEVEL_OUTOF10: 0
PAINLEVEL_OUTOF10: 5
PAINLEVEL_OUTOF10: 3
PAINLEVEL_OUTOF10: 0
PAINLEVEL_OUTOF10: 2
PAINLEVEL_OUTOF10: 3
PAINLEVEL_OUTOF10: 0
PAINLEVEL_OUTOF10: 5
PAINLEVEL_OUTOF10: 0
PAINLEVEL_OUTOF10: 8
PAINLEVEL_OUTOF10: 0
PAINLEVEL_OUTOF10: 7
PAINLEVEL_OUTOF10: 7
PAINLEVEL_OUTOF10: 5
PAINLEVEL_OUTOF10: 0
PAINLEVEL_OUTOF10: 1
PAINLEVEL_OUTOF10: 4
PAINLEVEL_OUTOF10: 4
PAINLEVEL_OUTOF10: 7
PAINLEVEL_OUTOF10: 0
PAINLEVEL_OUTOF10: 3
PAINLEVEL_OUTOF10: 7

## 2020-05-14 ASSESSMENT — PULMONARY FUNCTION TESTS
PIF_VALUE: 12
PIF_VALUE: 15
PIF_VALUE: 11
PIF_VALUE: 22
PIF_VALUE: 24
PIF_VALUE: 25
PIF_VALUE: 11
PIF_VALUE: 23
PIF_VALUE: 16
PIF_VALUE: 19
PIF_VALUE: 12
PIF_VALUE: 12
PIF_VALUE: 18
PIF_VALUE: 16
PIF_VALUE: 15
PIF_VALUE: 19
PIF_VALUE: 15
PIF_VALUE: 20
PIF_VALUE: 12
PIF_VALUE: 17
PIF_VALUE: 18
PIF_VALUE: 20
PIF_VALUE: 11
PIF_VALUE: 22

## 2020-05-14 NOTE — CONSULTS
CORONARY ARTERY BYPASS GRAFT N/A 2020    CABG CORONARY ARTERY BYPASS GRAFT X3, LIMA TO LAS,  TRANSESOPHAGEAL ECHOCARDIOGRAM, TOTAL CARDIOPULMONARY BYPASS performed by Kirby Mcdowell MD at [address]     17 yo - pt doesn't know why       Current Medications:    Outpatient Medications Marked as Taking for the 20 encounter [facility] Encounter)   Medication Sig Dispense Refill    clopidogrel (PLAVIX) 75 MG tablet Take 75 mg by mouth daily      lisinopril (PRINIVIL;ZESTRIL) 40 MG tablet Take 40 mg by mouth daily      amLODIPine (NORVASC) 5 MG tablet Take 5 mg by mouth daily      albuterol sulfate HFA (VENTOLIN HFA) 108 (90 Base) MCG/ACT inhaler Inhale 2 puffs into the lungs every 6 hours as needed for Wheezing         Allergies:  Patient has no known allergies. Immunizations : There is no immunization history on file for this patient. Social History:     Social History     Tobacco Use    Smoking status: Former Smoker     Types: Cigarettes     Last attempt to quit: 2019     Years since quittin.5    Smokeless tobacco: Never Used    Tobacco comment: started age 27   Substance Use Topics    Alcohol use: Yes     Frequency: Monthly or less     Comment: once a month, shot once a week. depends on mood.  Drug use: Never     Social History     Tobacco Use   Smoking Status Former Smoker    Types: Cigarettes    Last attempt to quit: 2019    Years since quittin.5   Smokeless Tobacco Never Used   Tobacco Comment    started age 27      Family History   Problem Relation Age of Onset    Heart Disease Mother          of MI age 36    Heart Disease Brother         MI mid 46s    Heart Disease Father          of MI age 68    Heart Disease Paternal Aunt         MI in her 46s         REVIEW OF SYSTEMS:    No fever / chills / sweats. No weight loss. No visual change, eye pain, eye discharge. No oral lesion, sore throat, dysphagia.   Denies cough / sputum/Sob   Denies chest pain, palpitations/ dizziness  Denies nausea/ vomiting/abdominal pain/diarrhea. Denies dysuria or change in urinary function. Denies joint swelling or pain. No myalgia, arthralgia. No rashes, skin lesions   Denies focal weakness, sensory change or other neurologic symptoms  No lymph node swelling or tenderness. ROS not possible     PHYSICAL EXAM:      Vitals:  T max  102.3   /80   Pulse 79   Temp 99.6 °F (37.6 °C) (Rectal)   Resp 20   Ht 6' (1.829 m)   Wt 187 lb 13.3 oz (85.2 kg)   SpO2 97%   BMI 25.47 kg/m²     General Appearance: intubated sedated on the vent and , + pallor, + icterus sweating+ and extremities cold with acral cyanosis+  Skin: warm and dry, no rash or erythema  Head: normocephalic and atraumatic  Eyes: pupils equal, round, and reactive to light, conjunctivae normal  ENT: tympanic membrane, external ear and ear canal normal bilaterally, nose without deformity, nasal mucosa and turbinates normal without polyps  Neck: supple and non-tender without mass, no thyromegaly  no cervical lymphadenopathy  Pulmonary/Chest: few basal crepts  wheezes, rales or rhonchi, normal air movement, no respiratory distress  Cardiovascular:   S1 and S2, no murmurs, rubs, clicks, or gallops, no carotid bruits sternal incision clean binder +  Abdomen: soft, non-tender, non-distended, normal bowel sounds, no masses or organomegaly  Extremities: no cyanosis, clubbing or edema  Musculoskeletal: normal range of motion, no joint swelling, deformity or tenderness  Neurologic: reflexes normal and symmetric, no cranial nerve deficit  Lines:   Hoang  Ij+  NG tube+         DATA:    Lab Results   Component Value Date    WBC 9.2 05/14/2020    HGB 7.8 (L) 05/14/2020    HCT 24.3 (L) 05/14/2020    MCV 85.0 05/14/2020     05/14/2020     Lab Results   Component Value Date    CREATININE 1.0 05/14/2020    BUN 39 (H) 05/14/2020     (H) 05/14/2020    K 4.6 05/14/2020     (H) 2 [[id number]]    Order Status: Canceled Specimen: Blood          Urine Culture  Recent Labs     05/12/20  0830   LABURIN No growth at 18-36 hours     Imaging:   XR CHEST PORTABLE   Final Result   Interval removal of chest drains. Otherwise supportive tubing is stable. No pneumothorax. Increased left basilar opacity, likely combination of atelectasis and pleural   effusion. Vascular congestion. VL Extremity Venous Bilateral   Final Result      XR CHEST PORTABLE   Final Result   [place]-Lencho catheter has been removed. Enteric feeding tube has been added. Supportive tubing is otherwise stable. Left perihilar/basilar atelectasis. Pneumonia is a differential possibility. CT HEAD WO CONTRAST   Final Result   No hemorrhage or mass      Mild periventricular white matter disease, likely due to small-vessel   ischemic change      Mild paranasal sinus disease         XR CHEST PORTABLE   Final Result   Dependent left lower lobe opacification and effusion. Mild right perihilar   opacification. No pneumothorax. Satisfactory position of endotracheal tube. Enteric catheter tip in the gastric body. XR CHEST PORTABLE   Final Result   Relatively stable appearance of the chest.  Perhaps slight increasing   ground-glass opacification centrally. No residual pneumothorax detected. XR CHEST PORTABLE   Final Result   1. Mild pulmonary vascular congestion with a small left pleural effusion and   associated left basilar atelectasis. 2. Tiny left apical pneumothorax. Attention on follow-up imaging is   recommended. 3. Suboptimal visualization of the orogastric tube. Abdominal radiograph is   recommended for further evaluation. VL DUP CAROTID BILATERAL   Final Result      VL PRE OP VEIN MAPPING   Final Result      XR CHEST PORTABLE   Final Result   Or bronchial wall thickening suggests inflammation, such as that seen with   asthma, bronchitis or smoking. Consolidative airspace disease. Rounded opacity in the right hilar region likely represents a vessel en face,   however cannot exclude a prominent lymph node or nodule. Comparison imaging   would be helpful if available, otherwise recommend CT chest.             All pertinent images and reports for the current Hospitalization were reviewed by me. IMPRESSION:    Patient Active Problem List   Diagnosis    Chest pain    [facility]non-ST elevated [facility] infarction) [facility] [address]    Coronary artery disease involving native coronary artery of native heart with unstable angina pectoris ([facility] [address]    S/P CABG x 4     Admitted with chest pain   NSTEMI  Strong family history per HPI  S/P Urgent CABG -  on 5/8  Resp failure   On the ventilator  Post op fevers   Anemia   WBC elevation post op now trend down  ? DTS with Lft elevation     Given the intubated stated and elevated Procal and on going fevers will start IV abx and see his response-   Surgical incision is clean and lines are clean and Blood cx and Sputum cx from 5/12 NGTD  DTS can cause fevers would like to see the curve improve- will watch closely . Labs, Microbiology, Radiology and pertinent results from current hospitalization and care every where were reviewed by me as a part of the consultation. PLAN :  1. Check Procal if elevated will trend    2. Check CRP  3. Check Gamma GT  4. Will add IV Zosyn if the above elevated      Discussed with patient/Family and Nursing     Thanks for allowing me to participate in your patient's care please call me with any questions or concerns.     Dr. Nik Jones MD  [facility] Physician  Phone: [phone]   Fax : [phone]

## 2020-05-14 NOTE — PROGRESS NOTES
Late entry d/t pt care    0700: Handoff with Vena Becky    9985: new order placed for ABG, which was collected and sent to the lab at this time. 0730: Dr. Ele Zhu and Cali Martin NP rounding for CTS. See new orders. Weight was inaccurate from cooling blanket being placed and bed not being zeroed. 0800: Shift assessment complete and noted in chart. CPOT a 3. Unable to follow commands. Still remains intubated and sedated on propofol and precedex. Hoang output noted. Changed setting on TF pump to give 50ml flush every 4 hours per order. Med pass complete. , 3 units given. Oral care complete. 0830: Cali Martin NP at bedside to help assist with turn to place lift pad under pt. Lift pad was placed. Pt lifted using maxi-jaycee off bed. Second lift pad placed on bed for zeroing purpose. Bed was zeroed with fitted sheet, lift pad, cooling blanket and two pillows at 30 degrees as recommended. Pt was placed back down in bed and turned. 1100: reassessment complete and noted. No changed. Blood glucose 166. Covered with 3 units    1200: PCA checked Blood glucose peripherally, notified this RN it was 77. Rechecked from a-line 163.     1448: Dr. Cady Kellogg called this RN about labs collected this morning. Informed they were sent down at 6. Will call lab    1450: called lab. Looking to see if they misplaced    1510: found tube running labs now. Gamma should be done and c-reactive and procal should be done in the next 15-20 minutes    1530: reassessment complete. Remains unchanged. Color of skin on hands changes every so often to warm and pale to mottled and cold. 1700: Pt becoming anxious and coughing on the vent. Ativian and fentanyl given.      1900: Handoff with Yazan Jain

## 2020-05-15 ENCOUNTER — APPOINTMENT (OUTPATIENT)
Dept: GENERAL RADIOLOGY | Age: 65
DRG: 003 | End: 2020-05-15
Payer: COMMERCIAL

## 2020-05-15 LAB
ALBUMIN SERPL-MCNC: 2.2 G/DL (ref 3.4–5)
ALP BLD-CCNC: 155 U/L (ref 40–129)
ALT SERPL-CCNC: 274 U/L (ref 10–40)
ANION GAP SERPL CALCULATED.3IONS-SCNC: 11 MMOL/L (ref 3–16)
APTT: 28.9 SEC (ref 24.2–36.2)
AST SERPL-CCNC: 203 U/L (ref 15–37)
BASE EXCESS ARTERIAL: 2 MMOL/L (ref -3–3)
BILIRUB SERPL-MCNC: <0.2 MG/DL (ref 0–1)
BILIRUBIN DIRECT: <0.2 MG/DL (ref 0–0.3)
BILIRUBIN, INDIRECT: ABNORMAL MG/DL (ref 0–1)
BLOOD BANK DISPENSE STATUS: NORMAL
BLOOD BANK DISPENSE STATUS: NORMAL
BLOOD BANK PRODUCT CODE: NORMAL
BLOOD BANK PRODUCT CODE: NORMAL
BPU ID: NORMAL
BPU ID: NORMAL
BUN BLDV-MCNC: 35 MG/DL (ref 7–20)
CALCIUM SERPL-MCNC: 8.3 MG/DL (ref 8.3–10.6)
CARBOXYHEMOGLOBIN ARTERIAL: 1.2 % (ref 0–1.5)
CHLORIDE BLD-SCNC: 111 MMOL/L (ref 99–110)
CO2: 25 MMOL/L (ref 21–32)
CREAT SERPL-MCNC: 1 MG/DL (ref 0.8–1.3)
DESCRIPTION BLOOD BANK: NORMAL
DESCRIPTION BLOOD BANK: NORMAL
GFR AFRICAN AMERICAN: >60
GFR NON-AFRICAN AMERICAN: >60
GLUCOSE BLD-MCNC: 136 MG/DL (ref 70–99)
GLUCOSE BLD-MCNC: 138 MG/DL (ref 70–99)
GLUCOSE BLD-MCNC: 140 MG/DL (ref 70–99)
GLUCOSE BLD-MCNC: 144 MG/DL (ref 70–99)
GLUCOSE BLD-MCNC: 145 MG/DL (ref 70–99)
GLUCOSE BLD-MCNC: 145 MG/DL (ref 70–99)
GLUCOSE BLD-MCNC: 149 MG/DL (ref 70–99)
GLUCOSE BLD-MCNC: 35 MG/DL (ref 70–99)
GLUCOSE BLD-MCNC: 47 MG/DL (ref 70–99)
GLUCOSE BLD-MCNC: 55 MG/DL (ref 70–99)
HCO3 ARTERIAL: 26.4 MMOL/L (ref 21–29)
HCT VFR BLD CALC: 21.9 % (ref 40.5–52.5)
HEMOGLOBIN, ART, EXTENDED: 5.9 G/DL (ref 13.5–17.5)
HEMOGLOBIN: 7 G/DL (ref 13.5–17.5)
INR BLD: 1.08 (ref 0.86–1.14)
MAGNESIUM: 2.4 MG/DL (ref 1.8–2.4)
MCH RBC QN AUTO: 27.2 PG (ref 26–34)
MCHC RBC AUTO-ENTMCNC: 32 G/DL (ref 31–36)
MCV RBC AUTO: 85.1 FL (ref 80–100)
METHEMOGLOBIN ARTERIAL: 0.2 %
O2 CONTENT ARTERIAL: 8 ML/DL
O2 SAT, ARTERIAL: 98.5 %
O2 THERAPY: ABNORMAL
PCO2 ARTERIAL: 39.1 MMHG (ref 35–45)
PDW BLD-RTO: 15.7 % (ref 12.4–15.4)
PERFORMED ON: ABNORMAL
PH ARTERIAL: 7.44 (ref 7.35–7.45)
PLATELET # BLD: 246 K/UL (ref 135–450)
PMV BLD AUTO: 8.6 FL (ref 5–10.5)
PO2 ARTERIAL: 90.1 MMHG (ref 75–108)
POTASSIUM SERPL-SCNC: 4.5 MMOL/L (ref 3.5–5.1)
PROTHROMBIN TIME: 12.5 SEC (ref 10–13.2)
RBC # BLD: 2.57 M/UL (ref 4.2–5.9)
SODIUM BLD-SCNC: 147 MMOL/L (ref 136–145)
TCO2 ARTERIAL: 27.6 MMOL/L
TOTAL PROTEIN: 6.1 G/DL (ref 6.4–8.2)
TRIGL SERPL-MCNC: 164 MG/DL (ref 0–150)
WBC # BLD: 10.4 K/UL (ref 4–11)

## 2020-05-15 PROCEDURE — 99024 POSTOP FOLLOW-UP VISIT: CPT | Performed by: THORACIC SURGERY (CARDIOTHORACIC VASCULAR SURGERY)

## 2020-05-15 PROCEDURE — 71045 X-RAY EXAM CHEST 1 VIEW: CPT

## 2020-05-15 PROCEDURE — 85610 PROTHROMBIN TIME: CPT

## 2020-05-15 PROCEDURE — 94003 VENT MGMT INPAT SUBQ DAY: CPT

## 2020-05-15 PROCEDURE — 87205 SMEAR GRAM STAIN: CPT

## 2020-05-15 PROCEDURE — 6360000002 HC RX W HCPCS: Performed by: NURSE PRACTITIONER

## 2020-05-15 PROCEDURE — 2580000003 HC RX 258: Performed by: INTERNAL MEDICINE

## 2020-05-15 PROCEDURE — 94761 N-INVAS EAR/PLS OXIMETRY MLT: CPT

## 2020-05-15 PROCEDURE — 6370000000 HC RX 637 (ALT 250 FOR IP): Performed by: NURSE PRACTITIONER

## 2020-05-15 PROCEDURE — 36569 INSJ PICC 5 YR+ W/O IMAGING: CPT

## 2020-05-15 PROCEDURE — 99233 SBSQ HOSP IP/OBS HIGH 50: CPT | Performed by: INTERNAL MEDICINE

## 2020-05-15 PROCEDURE — 84478 ASSAY OF TRIGLYCERIDES: CPT

## 2020-05-15 PROCEDURE — 2580000003 HC RX 258: Performed by: THORACIC SURGERY (CARDIOTHORACIC VASCULAR SURGERY)

## 2020-05-15 PROCEDURE — 76937 US GUIDE VASCULAR ACCESS: CPT

## 2020-05-15 PROCEDURE — 2500000003 HC RX 250 WO HCPCS: Performed by: THORACIC SURGERY (CARDIOTHORACIC VASCULAR SURGERY)

## 2020-05-15 PROCEDURE — C9113 INJ PANTOPRAZOLE SODIUM, VIA: HCPCS | Performed by: THORACIC SURGERY (CARDIOTHORACIC VASCULAR SURGERY)

## 2020-05-15 PROCEDURE — 94750 HC PULMONARY COMPLIANCE STUDY: CPT

## 2020-05-15 PROCEDURE — 6360000002 HC RX W HCPCS: Performed by: THORACIC SURGERY (CARDIOTHORACIC VASCULAR SURGERY)

## 2020-05-15 PROCEDURE — 85027 COMPLETE CBC AUTOMATED: CPT

## 2020-05-15 PROCEDURE — 6360000002 HC RX W HCPCS: Performed by: INTERNAL MEDICINE

## 2020-05-15 PROCEDURE — 6370000000 HC RX 637 (ALT 250 FOR IP): Performed by: THORACIC SURGERY (CARDIOTHORACIC VASCULAR SURGERY)

## 2020-05-15 PROCEDURE — 94640 AIRWAY INHALATION TREATMENT: CPT

## 2020-05-15 PROCEDURE — 87070 CULTURE OTHR SPECIMN AEROBIC: CPT

## 2020-05-15 PROCEDURE — 82803 BLOOD GASES ANY COMBINATION: CPT

## 2020-05-15 PROCEDURE — 2700000000 HC OXYGEN THERAPY PER DAY

## 2020-05-15 PROCEDURE — 37799 UNLISTED PX VASCULAR SURGERY: CPT

## 2020-05-15 PROCEDURE — 80076 HEPATIC FUNCTION PANEL: CPT

## 2020-05-15 PROCEDURE — 2580000003 HC RX 258: Performed by: NURSE PRACTITIONER

## 2020-05-15 PROCEDURE — C1751 CATH, INF, PER/CENT/MIDLINE: HCPCS

## 2020-05-15 PROCEDURE — 85730 THROMBOPLASTIN TIME PARTIAL: CPT

## 2020-05-15 PROCEDURE — 80048 BASIC METABOLIC PNL TOTAL CA: CPT

## 2020-05-15 PROCEDURE — 2100000000 HC CCU R&B

## 2020-05-15 PROCEDURE — 83735 ASSAY OF MAGNESIUM: CPT

## 2020-05-15 PROCEDURE — 2500000003 HC RX 250 WO HCPCS: Performed by: NURSE PRACTITIONER

## 2020-05-15 PROCEDURE — 02HV33Z INSERTION OF INFUSION DEVICE INTO SUPERIOR VENA CAVA, PERCUTANEOUS APPROACH: ICD-10-PCS | Performed by: NURSE PRACTITIONER

## 2020-05-15 RX ORDER — LIDOCAINE HYDROCHLORIDE 10 MG/ML
5 INJECTION, SOLUTION EPIDURAL; INFILTRATION; INTRACAUDAL; PERINEURAL ONCE
Status: DISCONTINUED | OUTPATIENT
Start: 2020-05-15 | End: 2020-06-05 | Stop reason: HOSPADM

## 2020-05-15 RX ORDER — BISACODYL 10 MG
10 SUPPOSITORY, RECTAL RECTAL DAILY PRN
Status: DISCONTINUED | OUTPATIENT
Start: 2020-05-15 | End: 2020-05-29

## 2020-05-15 RX ORDER — 0.9 % SODIUM CHLORIDE 0.9 %
20 INTRAVENOUS SOLUTION INTRAVENOUS ONCE
Status: COMPLETED | OUTPATIENT
Start: 2020-05-15 | End: 2020-05-15

## 2020-05-15 RX ORDER — SODIUM CHLORIDE 0.9 % (FLUSH) 0.9 %
10 SYRINGE (ML) INJECTION EVERY 12 HOURS SCHEDULED
Status: DISCONTINUED | OUTPATIENT
Start: 2020-05-15 | End: 2020-05-15

## 2020-05-15 RX ORDER — FUROSEMIDE 10 MG/ML
40 INJECTION INTRAMUSCULAR; INTRAVENOUS 3 TIMES DAILY
Status: DISCONTINUED | OUTPATIENT
Start: 2020-05-15 | End: 2020-05-19

## 2020-05-15 RX ORDER — SODIUM CHLORIDE 0.9 % (FLUSH) 0.9 %
10 SYRINGE (ML) INJECTION PRN
Status: DISCONTINUED | OUTPATIENT
Start: 2020-05-15 | End: 2020-05-15 | Stop reason: SDUPTHER

## 2020-05-15 RX ADMIN — PIPERACILLIN AND TAZOBACTAM 3.38 G: 3; .375 INJECTION, POWDER, LYOPHILIZED, FOR SOLUTION INTRAVENOUS at 08:53

## 2020-05-15 RX ADMIN — FENTANYL CITRATE 25 MCG: 50 INJECTION INTRAMUSCULAR; INTRAVENOUS at 16:27

## 2020-05-15 RX ADMIN — OXYCODONE 5 MG: 5 TABLET ORAL at 02:13

## 2020-05-15 RX ADMIN — PROPOFOL 35 MCG/KG/MIN: 10 INJECTION, EMULSION INTRAVENOUS at 09:29

## 2020-05-15 RX ADMIN — LORAZEPAM 2 MG: 2 INJECTION INTRAMUSCULAR; INTRAVENOUS at 19:53

## 2020-05-15 RX ADMIN — BISACODYL 10 MG: 10 SUPPOSITORY RECTAL at 16:56

## 2020-05-15 RX ADMIN — SENNOSIDES AND DOCUSATE SODIUM 1 TABLET: 8.6; 5 TABLET ORAL at 08:53

## 2020-05-15 RX ADMIN — DEXMEDETOMIDINE HYDROCHLORIDE 1.1 MCG/KG/HR: 4 INJECTION, SOLUTION INTRAVENOUS at 11:13

## 2020-05-15 RX ADMIN — POTASSIUM BICARBONATE 10 MEQ: 782 TABLET, EFFERVESCENT ORAL at 09:36

## 2020-05-15 RX ADMIN — DOCUSATE SODIUM 100 MG: 50 LIQUID ORAL at 08:53

## 2020-05-15 RX ADMIN — Medication 10 ML: at 19:54

## 2020-05-15 RX ADMIN — PIPERACILLIN AND TAZOBACTAM 3.38 G: 3; .375 INJECTION, POWDER, LYOPHILIZED, FOR SOLUTION INTRAVENOUS at 00:34

## 2020-05-15 RX ADMIN — Medication 10 ML: at 06:24

## 2020-05-15 RX ADMIN — Medication 10 ML: at 02:13

## 2020-05-15 RX ADMIN — INSULIN LISPRO 3 UNITS: 100 INJECTION, SOLUTION INTRAVENOUS; SUBCUTANEOUS at 13:10

## 2020-05-15 RX ADMIN — LORAZEPAM 3 MG: 2 INJECTION INTRAMUSCULAR; INTRAVENOUS at 14:31

## 2020-05-15 RX ADMIN — PROPOFOL 35 MCG/KG/MIN: 10 INJECTION, EMULSION INTRAVENOUS at 15:45

## 2020-05-15 RX ADMIN — DEXMEDETOMIDINE HYDROCHLORIDE 1.1 MCG/KG/HR: 4 INJECTION, SOLUTION INTRAVENOUS at 06:25

## 2020-05-15 RX ADMIN — DEXMEDETOMIDINE HYDROCHLORIDE 1.1 MCG/KG/HR: 4 INJECTION, SOLUTION INTRAVENOUS at 15:22

## 2020-05-15 RX ADMIN — LORAZEPAM 2 MG: 2 INJECTION INTRAMUSCULAR; INTRAVENOUS at 21:11

## 2020-05-15 RX ADMIN — METOPROLOL TARTRATE 25 MG: 25 TABLET, FILM COATED ORAL at 08:54

## 2020-05-15 RX ADMIN — HEPARIN SODIUM 5000 UNITS: 5000 INJECTION INTRAVENOUS; SUBCUTANEOUS at 21:12

## 2020-05-15 RX ADMIN — ALBUTEROL SULFATE 2.5 MG: 2.5 SOLUTION RESPIRATORY (INHALATION) at 20:59

## 2020-05-15 RX ADMIN — LORAZEPAM 3 MG: 2 INJECTION INTRAMUSCULAR; INTRAVENOUS at 09:14

## 2020-05-15 RX ADMIN — LORAZEPAM 2 MG: 2 INJECTION INTRAMUSCULAR; INTRAVENOUS at 06:45

## 2020-05-15 RX ADMIN — Medication 10 ML: at 00:25

## 2020-05-15 RX ADMIN — INSULIN LISPRO 3 UNITS: 100 INJECTION, SOLUTION INTRAVENOUS; SUBCUTANEOUS at 15:43

## 2020-05-15 RX ADMIN — POTASSIUM BICARBONATE 10 MEQ: 782 TABLET, EFFERVESCENT ORAL at 13:45

## 2020-05-15 RX ADMIN — HEPARIN SODIUM 5000 UNITS: 5000 INJECTION INTRAVENOUS; SUBCUTANEOUS at 13:44

## 2020-05-15 RX ADMIN — ASPIRIN 81 MG 324 MG: 81 TABLET ORAL at 09:36

## 2020-05-15 RX ADMIN — SENNOSIDES AND DOCUSATE SODIUM 1 TABLET: 8.6; 5 TABLET ORAL at 20:54

## 2020-05-15 RX ADMIN — FENTANYL CITRATE 25 MCG: 50 INJECTION INTRAMUSCULAR; INTRAVENOUS at 07:38

## 2020-05-15 RX ADMIN — ACETAMINOPHEN 650 MG: 325 TABLET, FILM COATED ORAL at 00:18

## 2020-05-15 RX ADMIN — MINERAL SUPPLEMENT IRON 300 MG / 5 ML STRENGTH LIQUID 100 PER BOX UNFLAVORED 300 MG: at 09:37

## 2020-05-15 RX ADMIN — FENTANYL CITRATE 25 MCG: 50 INJECTION INTRAMUSCULAR; INTRAVENOUS at 20:45

## 2020-05-15 RX ADMIN — DEXTROSE MONOHYDRATE 12.5 G: 25 INJECTION, SOLUTION INTRAVENOUS at 08:36

## 2020-05-15 RX ADMIN — HEPARIN SODIUM 5000 UNITS: 5000 INJECTION INTRAVENOUS; SUBCUTANEOUS at 05:30

## 2020-05-15 RX ADMIN — PIPERACILLIN AND TAZOBACTAM 3.38 G: 3; .375 INJECTION, POWDER, LYOPHILIZED, FOR SOLUTION INTRAVENOUS at 16:44

## 2020-05-15 RX ADMIN — CHLORHEXIDINE GLUCONATE 15 ML: 1.2 RINSE ORAL at 09:06

## 2020-05-15 RX ADMIN — FENTANYL CITRATE 25 MCG: 50 INJECTION INTRAMUSCULAR; INTRAVENOUS at 00:00

## 2020-05-15 RX ADMIN — INSULIN LISPRO 3 UNITS: 100 INJECTION, SOLUTION INTRAVENOUS; SUBCUTANEOUS at 00:16

## 2020-05-15 RX ADMIN — LORAZEPAM 2 MG: 2 INJECTION INTRAMUSCULAR; INTRAVENOUS at 17:10

## 2020-05-15 RX ADMIN — FENTANYL CITRATE 25 MCG: 50 INJECTION INTRAMUSCULAR; INTRAVENOUS at 06:24

## 2020-05-15 RX ADMIN — PROPOFOL 40 MCG/KG/MIN: 10 INJECTION, EMULSION INTRAVENOUS at 21:47

## 2020-05-15 RX ADMIN — ALBUTEROL SULFATE 2.5 MG: 2.5 SOLUTION RESPIRATORY (INHALATION) at 15:54

## 2020-05-15 RX ADMIN — OXYCODONE 5 MG: 5 TABLET ORAL at 21:46

## 2020-05-15 RX ADMIN — Medication 10 ML: at 07:39

## 2020-05-15 RX ADMIN — Medication 10 ML: at 05:32

## 2020-05-15 RX ADMIN — LORAZEPAM 2 MG: 2 INJECTION INTRAMUSCULAR; INTRAVENOUS at 02:13

## 2020-05-15 RX ADMIN — Medication 10 ML: at 20:54

## 2020-05-15 RX ADMIN — MINERAL SUPPLEMENT IRON 300 MG / 5 ML STRENGTH LIQUID 100 PER BOX UNFLAVORED 300 MG: at 21:25

## 2020-05-15 RX ADMIN — POTASSIUM BICARBONATE 10 MEQ: 782 TABLET, EFFERVESCENT ORAL at 20:54

## 2020-05-15 RX ADMIN — Medication 10 ML: at 19:47

## 2020-05-15 RX ADMIN — ALBUTEROL SULFATE 2.5 MG: 2.5 SOLUTION RESPIRATORY (INHALATION) at 12:16

## 2020-05-15 RX ADMIN — LORAZEPAM 2 MG: 2 INJECTION INTRAMUSCULAR; INTRAVENOUS at 05:32

## 2020-05-15 RX ADMIN — Medication 10 ML: at 00:00

## 2020-05-15 RX ADMIN — ALBUTEROL SULFATE 2.5 MG: 2.5 SOLUTION RESPIRATORY (INHALATION) at 08:38

## 2020-05-15 RX ADMIN — Medication 10 ML: at 21:11

## 2020-05-15 RX ADMIN — DEXMEDETOMIDINE HYDROCHLORIDE 1.1 MCG/KG/HR: 4 INJECTION, SOLUTION INTRAVENOUS at 01:35

## 2020-05-15 RX ADMIN — LORAZEPAM 2 MG: 2 INJECTION INTRAMUSCULAR; INTRAVENOUS at 00:25

## 2020-05-15 RX ADMIN — FUROSEMIDE 40 MG: 10 INJECTION, SOLUTION INTRAMUSCULAR; INTRAVENOUS at 13:44

## 2020-05-15 RX ADMIN — FUROSEMIDE 40 MG: 10 INJECTION, SOLUTION INTRAMUSCULAR; INTRAVENOUS at 08:53

## 2020-05-15 RX ADMIN — PROPOFOL 35 MCG/KG/MIN: 10 INJECTION, EMULSION INTRAVENOUS at 04:54

## 2020-05-15 RX ADMIN — Medication 10 ML: at 06:45

## 2020-05-15 RX ADMIN — METOPROLOL TARTRATE 25 MG: 25 TABLET, FILM COATED ORAL at 20:54

## 2020-05-15 RX ADMIN — Medication 10 ML: at 04:48

## 2020-05-15 RX ADMIN — Medication 10 ML: at 05:33

## 2020-05-15 RX ADMIN — CHLORHEXIDINE GLUCONATE 15 ML: 1.2 RINSE ORAL at 20:58

## 2020-05-15 RX ADMIN — SODIUM CHLORIDE, PRESERVATIVE FREE 10 ML: 5 INJECTION INTRAVENOUS at 20:45

## 2020-05-15 RX ADMIN — SODIUM CHLORIDE, PRESERVATIVE FREE 10 ML: 5 INJECTION INTRAVENOUS at 08:53

## 2020-05-15 RX ADMIN — FENTANYL CITRATE 25 MCG: 50 INJECTION INTRAMUSCULAR; INTRAVENOUS at 04:48

## 2020-05-15 RX ADMIN — FUROSEMIDE 40 MG: 10 INJECTION, SOLUTION INTRAMUSCULAR; INTRAVENOUS at 20:54

## 2020-05-15 RX ADMIN — FENTANYL CITRATE 25 MCG: 50 INJECTION INTRAMUSCULAR; INTRAVENOUS at 19:47

## 2020-05-15 RX ADMIN — PANTOPRAZOLE SODIUM 40 MG: 40 INJECTION, POWDER, FOR SOLUTION INTRAVENOUS at 05:33

## 2020-05-15 RX ADMIN — DOCUSATE SODIUM 100 MG: 50 LIQUID ORAL at 20:54

## 2020-05-15 RX ADMIN — INSULIN LISPRO 3 UNITS: 100 INJECTION, SOLUTION INTRAVENOUS; SUBCUTANEOUS at 04:45

## 2020-05-15 RX ADMIN — FOLIC ACID: 5 INJECTION, SOLUTION INTRAMUSCULAR; INTRAVENOUS; SUBCUTANEOUS at 09:43

## 2020-05-15 RX ADMIN — LORAZEPAM 3 MG: 2 INJECTION INTRAMUSCULAR; INTRAVENOUS at 18:31

## 2020-05-15 RX ADMIN — DEXMEDETOMIDINE HYDROCHLORIDE 1.1 MCG/KG/HR: 4 INJECTION, SOLUTION INTRAVENOUS at 20:01

## 2020-05-15 RX ADMIN — SODIUM CHLORIDE 20 ML: 9 INJECTION, SOLUTION INTRAVENOUS at 08:06

## 2020-05-15 ASSESSMENT — PULMONARY FUNCTION TESTS
PIF_VALUE: 19
PIF_VALUE: 23
PIF_VALUE: 27
PIF_VALUE: 19
PIF_VALUE: 17
PIF_VALUE: 22
PIF_VALUE: 23
PIF_VALUE: 15
PIF_VALUE: 23
PIF_VALUE: 18
PIF_VALUE: 29
PIF_VALUE: 19
PIF_VALUE: 20
PIF_VALUE: 19
PIF_VALUE: 15
PIF_VALUE: 17
PIF_VALUE: 24
PIF_VALUE: 17
PIF_VALUE: 25
PIF_VALUE: 20
PIF_VALUE: 18
PIF_VALUE: 21
PIF_VALUE: 15
PIF_VALUE: 20
PIF_VALUE: 18

## 2020-05-15 ASSESSMENT — PAIN SCALES - GENERAL
PAINLEVEL_OUTOF10: 0
PAINLEVEL_OUTOF10: 5
PAINLEVEL_OUTOF10: 4
PAINLEVEL_OUTOF10: 5
PAINLEVEL_OUTOF10: 4
PAINLEVEL_OUTOF10: 1
PAINLEVEL_OUTOF10: 0
PAINLEVEL_OUTOF10: 1
PAINLEVEL_OUTOF10: 4
PAINLEVEL_OUTOF10: 4
PAINLEVEL_OUTOF10: 5
PAINLEVEL_OUTOF10: 1
PAINLEVEL_OUTOF10: 1

## 2020-05-15 NOTE — PROGRESS NOTES
Physical Therapy  Initial Evaluation Attempt    05/15/20    Name: Mimi Morrell   : 1955    MRN: [id number]    PT order noted. Patient unable to be seen by PT due to still being intubated - no plans to extubate any time soon. PT signing off. Please re-order when extubated and appropriate.     Electronically signed by Armani Ta PT on 5/15/2020 at 8:53 AM

## 2020-05-15 NOTE — CARE COORDINATION
LSW reviewed chart. Pt continues with Vent. CABGX4 5-8-2020. [place] Resident. Has [facility] for insurance. Social Work Team following for [place].   [place], [place]     Case Management   [phone]    5/15/2020  10:46 AM

## 2020-05-15 NOTE — PLAN OF CARE
Problem: Infection - Ventilator-Associated Pneumonia:  Goal: Prevent a ventilator associated event (VAE)  Description: Prevent a ventilator associated event (VAE)  Outcome: Ongoing  A: Assess readiness to wean. Assess for signs/symptoms of infection. HOB 45 degrees. Routine oral care. Suction as needed, assess quality and quantity of secretions. GI prophylaxis as ordered. Manage sedation to keep in range of ordered RASS. Problem: Gas Exchange - Impaired:  Goal: Levels of oxygenation will improve  Description: Levels of oxygenation will improve  Outcome: Ongoing  A: Respiratory assessment. Keep O2 sat > or = 92%. Keep HOB elevated to at least 45 degrees. ETT and oral suctioning PRN. Oral care every 4 hours. Problem: Restraint Use - Nonviolent/Non-Self-Destructive Behavior:  Goal: Absence of restraint indications  Description: Absence of restraint indications  Outcome: Ongoing  A: Perform visual checks every hour. Monitor for S/Sx of restraint-related injury, circulation & elimination, check for need for fluids/ food, and perform ROM every 2 hours. Problem: Urinary Elimination:  Goal: Complications related to the disease process, condition or treatment will be avoided or minimized  Description: Complications related to the disease process, condition or treatment will be avoided or minimized  Outcome: Ongoing  A: Assess need for continued omalley catheter use. Monitor quality and quantity of urine output. Keep Statlock stabilization device in place. Make sure the drainage bag is hanging below the level of the bladder. Routine catheter care. Problem: Infection - Central Venous Catheter-Associated Bloodstream Infection:  Goal: Will show no infection signs and symptoms  Description: Will show no infection signs and symptoms  Outcome: Ongoing  A: Assess need for continued central venous catheter use. Make sure that an occlusive dressing with antimicrobial patch is in place.  Place alcohol swab caps on unused

## 2020-05-15 NOTE — PROGRESS NOTES
MD at [address] Left     17 yo - pt doesn't know why       Current Medications:    Outpatient Medications Marked as Taking for the 20 encounter [facility] Encounter)   Medication Sig Dispense Refill    clopidogrel (PLAVIX) 75 MG tablet Take 75 mg by mouth daily      lisinopril (PRINIVIL;ZESTRIL) 40 MG tablet Take 40 mg by mouth daily      amLODIPine (NORVASC) 5 MG tablet Take 5 mg by mouth daily      albuterol sulfate HFA (VENTOLIN HFA) 108 (90 Base) MCG/ACT inhaler Inhale 2 puffs into the lungs every 6 hours as needed for Wheezing         Allergies:  Patient has no known allergies. Immunizations : There is no immunization history on file for this patient. Social History:    Social History     Tobacco Use    Smoking status: Former Smoker     Types: Cigarettes     Last attempt to quit: 2019     Years since quittin.5    Smokeless tobacco: Never Used    Tobacco comment: started age 27   Substance Use Topics    Alcohol use: Yes     Frequency: Monthly or less     Comment: once a month, shot once a week. depends on mood.  Drug use: Never     Social History     Tobacco Use   Smoking Status Former Smoker    Types: Cigarettes    Last attempt to quit: 2019    Years since quittin.5   Smokeless Tobacco Never Used   Tobacco Comment    started age 27      Family History   Problem Relation Age of Onset    Heart Disease Mother          of MI age 36    Heart Disease Brother         MI mid 46s    Heart Disease Father          of MI age 68    Heart Disease Paternal Aunt         MI in her 46s         REVIEW OF SYSTEMS:    No fever / chills / sweats. No weight loss. No visual change, eye pain, eye discharge. No oral lesion, sore throat, dysphagia. Denies cough / sputum/Sob   Denies chest pain, palpitations/ dizziness  Denies nausea/ vomiting/abdominal pain/diarrhea. Denies dysuria or change in urinary function. Denies joint swelling or pain.   No myalgia, 05/15/2020    BILITOT <0.2 05/15/2020    BILIDIR <0.2 05/15/2020    IBILI see below 05/15/2020    LABALBU 2.2 05/15/2020       UA:  Lab Results   Component Value Date    COLORU YELLOW 05/12/2020    CLARITYU CLOUDY 05/12/2020    GLUCOSEU Negative 05/12/2020    BILIRUBINUR Negative 05/12/2020    KETUA Negative 05/12/2020    SPECGRAV 1.021 05/12/2020    BLOODU LARGE 05/12/2020    PHUR 5.5 05/12/2020    PROTEINU 30 05/12/2020    UROBILINOGEN 0.2 05/12/2020    NITRU Negative 05/12/2020    LEUKOCYTESUR MODERATE 05/12/2020    LABMICR YES 05/12/2020    URINETYPE NotGiven 05/12/2020      Urine Microscopic:   Lab Results   Component Value Date    COMU see below 05/12/2020    HYALCAST 6 05/12/2020    WBCUA 14 05/12/2020    RBCUA 9 05/12/2020    EPIU 3 05/12/2020       MICRO: cultures reviewed and updated by me   Date/Time       Culture, Respiratory [[id number]]    Order Status: No result Specimen: Sputum, Suctioned    Culture, Respiratory [[id number]] Collected: 05/12/20 0820   Order Status: Completed Specimen: Throat from Endotracheal Updated: 05/14/20 1011    CULTURE, RESPIRATORY Normal respiratory gladys    Gram Stain Result 3+ WBC's (Polymorphonuclear)   1+ Epithelial Cells   No organisms seen    Narrative:     ORDER#: [id number]                          ORDERED BY: Sheldon Mancuso  SOURCE: Endotracheal                       COLLECTED:  05/12/20 08:20  ANTIBIOTICS AT NITISH. :                      RECEIVED :  05/12/20 08:30  Performed at:  [facility]  [address] [address]   Phone [phone]   Culture, Blood 1 [[id number]] Collected: 05/12/20 0848   Order Status: Completed Specimen: Blood Updated: 05/13/20 1015    Blood Culture, Routine No Growth to date.  Any change in status will be called.    Narrative:     ORDER#: [id number]                          ORDERED BY: Sheldon Mancuso  SOURCE: Blood                              COLLECTED:  05/12/20 08:48  ANTIBIOTICS AT NITISH.:                      RECEIVED :  05/12/20 09:05  If child <=2 yrs old please draw pediatric bottle. ~Blood Culture #1  Performed at:  [facility]  [address] [address] [facility], [place] [address]   Phone [phone]   Culture, Blood 2 [[id number]] Collected: 05/12/20 0854   Order Status: Completed Specimen: Blood Updated: 05/13/20 1015    Culture, Blood 2 No Growth to date.  Any change in status will be called. Narrative:     ORDER#: [id number]                          ORDERED BY: Arlette Damon  SOURCE: Blood                              COLLECTED:  05/12/20 08:54  ANTIBIOTICS AT NITISH. :                      RECEIVED :  05/12/20 09:06  If child <=2 yrs old please draw pediatric bottle. ~Blood Culture #2  Performed at:  [facility]  [address], [place] [address]   Phone [phone]   Culture, Urine [[id number]] Collected: 05/12/20 0830   Order Status: Completed Specimen: Urine, clean catch Updated: 05/13/20 0750    Urine Culture, Routine No growth at 18-36 hours   Narrative:     ORDER#: [id number]                          ORDERED BY: Arlette Damon  SOURCE: Urine Clean Catch                  COLLECTED:  05/12/20 08:30  ANTIBIOTICS AT NITISH. :                      RECEIVED :  05/12/20 09:55  Performed at:  [facility]  [address], [place] [address]   Phone [phone]   MRSA DNA Probe, Nasal [[id number]] Collected: 05/07/20 1312   Order Status: Completed Specimen: Nasal from Nares Updated: 05/08/20 0711    MRSA SCREEN RT-PCR --    Negative - MRSA DNA not detected. Normal Range: Not detected    Narrative:     ORDER#: [id number]                          ORDERED BY: Arlette Damon  SOURCE: Nares                              COLLECTED:  05/07/20 13:12  ANTIBIOTICS AT NITISH. :                      RECEIVED :  05/07/20 13:18  Performed at:  [facility]  [address] [facility] [place]   Phone [phone]   Culture, Blood 1 [[id number]] Collected: 05/07/20 0743   Order Status: Canceled Specimen: Blood    Culture, Blood 2 [[id number]]    Order Status: Canceled Specimen: Blood          IMAGING:    XR CHEST PORTABLE   Final Result   Stable chest.         XR CHEST PORTABLE   Final Result   Interval removal of chest drains. Otherwise supportive tubing is stable. No pneumothorax. Increased left basilar opacity, likely combination of atelectasis and pleural   effusion. Vascular congestion. VL Extremity Venous Bilateral   Final Result      XR CHEST PORTABLE   Final Result   [place]-Lencho catheter has been removed. Enteric feeding tube has been added. Supportive tubing is otherwise stable. Left perihilar/basilar atelectasis. Pneumonia is a differential possibility. CT HEAD WO CONTRAST   Final Result   No hemorrhage or mass      Mild periventricular white matter disease, likely due to small-vessel   ischemic change      Mild paranasal sinus disease         XR CHEST PORTABLE   Final Result   Dependent left lower lobe opacification and effusion. Mild right perihilar   opacification. No pneumothorax. Satisfactory position of endotracheal tube. Enteric catheter tip in the gastric body. XR CHEST PORTABLE   Final Result   Relatively stable appearance of the chest.  Perhaps slight increasing   ground-glass opacification centrally. No residual pneumothorax detected. XR CHEST PORTABLE   Final Result   1. Mild pulmonary vascular congestion with a small left pleural effusion and   associated left basilar atelectasis. 2. Tiny left apical pneumothorax. Attention on follow-up imaging is   recommended. 3. Suboptimal visualization of the orogastric tube. Abdominal radiograph is   recommended for further evaluation.          VL DUP CAROTID BILATERAL   Final Result      VL PRE OP VEIN MAPPING   Final Result      XR CHEST chloride, magnesium sulfate, calcium chloride IVPB, calcium chloride IVPB, acetaminophen, oxyCODONE **OR** oxyCODONE, fentanNYL, ondansetron, hydrALAZINE, albuterol sulfate HFA, nitroGLYCERIN, metoclopramide, albuterol, [DISCONTINUED] acetaminophen **OR** acetaminophen, polyethylene glycol, promethazine **OR** [DISCONTINUED] ondansetron, magnesium hydroxide      Assessment:     Patient Active Problem List   Diagnosis    Chest pain    [facility] (non-ST elevated [facility] infarction) ([facility] [address]    Coronary artery disease involving native coronary artery of native heart with unstable angina pectoris (HCC)    S/P CABG x 4     Admitted with chest pain   NSTEMI  Strong family history per HPI  S/P Urgent CABG -  on 5/8  Resp failure   On the ventilator  Post op fevers   Anemia   WBC elevation post op now trend down  ? DTS with Lft elevation      Given the intubated stated and elevated Procal and on going fevers will start IV abx and see his response-   Surgical incision is clean and lines are clean and Blood cx and Sputum cx from 5/12 NGTD  DTS can cause fevers would like to see the curve improve- will watch closely . Fevers trend down Procal elevated and will follow and once fevers better able to d/c his IV abx and resp new cx sent and in process      Labs, Microbiology, Radiology and all the pertinent results from current hospitalization and  care every where were reviewed  by me as a part of the evaluation   Plan:   1. Trend Procal tomorrow  2. CRP VERY elevated - will screen for Rheumatological disease given acral cyanosis bi lateral   3. Gamma GT elevated and Lfts now down trend  4. Cont  IV Zosyn x 3.375 gm x Q 8 hrs  5. Resp cx in process from   5/15   6. Check Hepatitis and if positive will check for Cryoglobulins given acral cyanosis, check SREE, ESR as CRP very elevated    Discussed with patient/Family and Nursing staff   Risk of Complications/Morbidity: High      · Illness(es)/ Infection present that pose threat to bodily function. · There is potential for severe exacerbation of infection/side effects of treatment. Therapy requires intensive monitoring for antimicrobial agent toxicity. Thanks for allowing me to participate in your patient's care and please call me with any questions or concerns.     Sammy Benitez MD  Infectious Disease  [facility] ([facility]) Physician  Phone: [phone]   Fax : [phone]

## 2020-05-15 NOTE — PROGRESS NOTES
Occupational Therapy    OT order noted. Patient unable to be seen by OT due to still being intubated - no plans to extubate any time soon. OT signing off. Please re-order when extubated and appropriate. RN aware.     Merly Salomon, OTR/L

## 2020-05-15 NOTE — PROGRESS NOTES
Pt. Tolerated PICC placement well, no difficulty accessing basilic vein and 3CG technology used to verify PICC tip placement. Positive P wave with no negative deflection. Printed wave form and placed In chart.  Reported off to [employer]

## 2020-05-16 LAB
ANION GAP SERPL CALCULATED.3IONS-SCNC: 12 MMOL/L (ref 3–16)
ANTI-NUCLEAR ANTIBODY (ANA): NEGATIVE
APTT: 29.8 SEC (ref 24.2–36.2)
BASE EXCESS ARTERIAL: 4.7 MMOL/L (ref -3–3)
BLOOD CULTURE, ROUTINE: NORMAL
BUN BLDV-MCNC: 39 MG/DL (ref 7–20)
CALCIUM SERPL-MCNC: 8.3 MG/DL (ref 8.3–10.6)
CARBOXYHEMOGLOBIN ARTERIAL: 1 % (ref 0–1.5)
CHLORIDE BLD-SCNC: 107 MMOL/L (ref 99–110)
CO2: 26 MMOL/L (ref 21–32)
CREAT SERPL-MCNC: 1 MG/DL (ref 0.8–1.3)
CULTURE, BLOOD 2: NORMAL
GFR AFRICAN AMERICAN: >60
GFR NON-AFRICAN AMERICAN: >60
GLUCOSE BLD-MCNC: 124 MG/DL (ref 70–99)
GLUCOSE BLD-MCNC: 125 MG/DL (ref 70–99)
GLUCOSE BLD-MCNC: 135 MG/DL (ref 70–99)
GLUCOSE BLD-MCNC: 149 MG/DL (ref 70–99)
GLUCOSE BLD-MCNC: 156 MG/DL (ref 70–99)
GLUCOSE BLD-MCNC: 170 MG/DL (ref 70–99)
GLUCOSE BLD-MCNC: 171 MG/DL (ref 70–99)
GLUCOSE BLD-MCNC: 85 MG/DL (ref 70–99)
HAV IGM SER IA-ACNC: ABNORMAL
HCO3 ARTERIAL: 28.9 MMOL/L (ref 21–29)
HCT VFR BLD CALC: 25.1 % (ref 40.5–52.5)
HEMOGLOBIN, ART, EXTENDED: 7.6 G/DL (ref 13.5–17.5)
HEMOGLOBIN: 8 G/DL (ref 13.5–17.5)
HEPATITIS B CORE IGM ANTIBODY: ABNORMAL
HEPATITIS B SURFACE ANTIGEN INTERPRETATION: ABNORMAL
HEPATITIS C ANTIBODY INTERPRETATION: REACTIVE
INR BLD: 1.14 (ref 0.86–1.14)
MAGNESIUM: 2.4 MG/DL (ref 1.8–2.4)
MCH RBC QN AUTO: 27.2 PG (ref 26–34)
MCHC RBC AUTO-ENTMCNC: 31.9 G/DL (ref 31–36)
MCV RBC AUTO: 85.4 FL (ref 80–100)
METHEMOGLOBIN ARTERIAL: 0.4 %
O2 CONTENT ARTERIAL: 11 ML/DL
O2 SAT, ARTERIAL: 98.9 %
O2 THERAPY: ABNORMAL
PCO2 ARTERIAL: 40.4 MMHG (ref 35–45)
PDW BLD-RTO: 15.9 % (ref 12.4–15.4)
PERFORMED ON: ABNORMAL
PERFORMED ON: NORMAL
PH ARTERIAL: 7.46 (ref 7.35–7.45)
PLATELET # BLD: 300 K/UL (ref 135–450)
PMV BLD AUTO: 8.5 FL (ref 5–10.5)
PO2 ARTERIAL: 102 MMHG (ref 75–108)
POTASSIUM SERPL-SCNC: 4.3 MMOL/L (ref 3.5–5.1)
PROCALCITONIN: 0.69 NG/ML (ref 0–0.15)
PROTHROMBIN TIME: 13.3 SEC (ref 10–13.2)
RBC # BLD: 2.95 M/UL (ref 4.2–5.9)
SEDIMENTATION RATE, ERYTHROCYTE: >120 MM/HR (ref 0–20)
SODIUM BLD-SCNC: 145 MMOL/L (ref 136–145)
TCO2 ARTERIAL: 30.2 MMOL/L
WBC # BLD: 11.6 K/UL (ref 4–11)

## 2020-05-16 PROCEDURE — 99233 SBSQ HOSP IP/OBS HIGH 50: CPT | Performed by: INTERNAL MEDICINE

## 2020-05-16 PROCEDURE — 6370000000 HC RX 637 (ALT 250 FOR IP): Performed by: THORACIC SURGERY (CARDIOTHORACIC VASCULAR SURGERY)

## 2020-05-16 PROCEDURE — 6360000002 HC RX W HCPCS: Performed by: THORACIC SURGERY (CARDIOTHORACIC VASCULAR SURGERY)

## 2020-05-16 PROCEDURE — 85730 THROMBOPLASTIN TIME PARTIAL: CPT

## 2020-05-16 PROCEDURE — 6360000002 HC RX W HCPCS: Performed by: NURSE PRACTITIONER

## 2020-05-16 PROCEDURE — 2580000003 HC RX 258: Performed by: INTERNAL MEDICINE

## 2020-05-16 PROCEDURE — 2580000003 HC RX 258: Performed by: NURSE PRACTITIONER

## 2020-05-16 PROCEDURE — 37799 UNLISTED PX VASCULAR SURGERY: CPT

## 2020-05-16 PROCEDURE — 82803 BLOOD GASES ANY COMBINATION: CPT

## 2020-05-16 PROCEDURE — 86038 ANTINUCLEAR ANTIBODIES: CPT

## 2020-05-16 PROCEDURE — 99024 POSTOP FOLLOW-UP VISIT: CPT | Performed by: THORACIC SURGERY (CARDIOTHORACIC VASCULAR SURGERY)

## 2020-05-16 PROCEDURE — 86702 HIV-2 ANTIBODY: CPT

## 2020-05-16 PROCEDURE — 2500000003 HC RX 250 WO HCPCS: Performed by: NURSE PRACTITIONER

## 2020-05-16 PROCEDURE — 85652 RBC SED RATE AUTOMATED: CPT

## 2020-05-16 PROCEDURE — 86701 HIV-1ANTIBODY: CPT

## 2020-05-16 PROCEDURE — 2700000000 HC OXYGEN THERAPY PER DAY

## 2020-05-16 PROCEDURE — 84145 PROCALCITONIN (PCT): CPT

## 2020-05-16 PROCEDURE — 94750 HC PULMONARY COMPLIANCE STUDY: CPT

## 2020-05-16 PROCEDURE — 94640 AIRWAY INHALATION TREATMENT: CPT

## 2020-05-16 PROCEDURE — 6370000000 HC RX 637 (ALT 250 FOR IP): Performed by: NURSE PRACTITIONER

## 2020-05-16 PROCEDURE — 87390 HIV-1 AG IA: CPT

## 2020-05-16 PROCEDURE — 85610 PROTHROMBIN TIME: CPT

## 2020-05-16 PROCEDURE — 80048 BASIC METABOLIC PNL TOTAL CA: CPT

## 2020-05-16 PROCEDURE — 87522 HEPATITIS C REVRS TRNSCRPJ: CPT

## 2020-05-16 PROCEDURE — 6360000002 HC RX W HCPCS: Performed by: INTERNAL MEDICINE

## 2020-05-16 PROCEDURE — 94003 VENT MGMT INPAT SUBQ DAY: CPT

## 2020-05-16 PROCEDURE — 83735 ASSAY OF MAGNESIUM: CPT

## 2020-05-16 PROCEDURE — 2500000003 HC RX 250 WO HCPCS: Performed by: THORACIC SURGERY (CARDIOTHORACIC VASCULAR SURGERY)

## 2020-05-16 PROCEDURE — C9113 INJ PANTOPRAZOLE SODIUM, VIA: HCPCS | Performed by: THORACIC SURGERY (CARDIOTHORACIC VASCULAR SURGERY)

## 2020-05-16 PROCEDURE — 2100000000 HC CCU R&B

## 2020-05-16 PROCEDURE — 2580000003 HC RX 258: Performed by: THORACIC SURGERY (CARDIOTHORACIC VASCULAR SURGERY)

## 2020-05-16 PROCEDURE — 94761 N-INVAS EAR/PLS OXIMETRY MLT: CPT

## 2020-05-16 PROCEDURE — 85027 COMPLETE CBC AUTOMATED: CPT

## 2020-05-16 PROCEDURE — 80074 ACUTE HEPATITIS PANEL: CPT

## 2020-05-16 PROCEDURE — 82595 ASSAY OF CRYOGLOBULIN: CPT

## 2020-05-16 RX ADMIN — CHLORHEXIDINE GLUCONATE 15 ML: 1.2 RINSE ORAL at 09:26

## 2020-05-16 RX ADMIN — FENTANYL CITRATE 25 MCG: 50 INJECTION INTRAMUSCULAR; INTRAVENOUS at 07:17

## 2020-05-16 RX ADMIN — Medication 10 ML: at 07:17

## 2020-05-16 RX ADMIN — MINERAL SUPPLEMENT IRON 300 MG / 5 ML STRENGTH LIQUID 100 PER BOX UNFLAVORED 300 MG: at 20:19

## 2020-05-16 RX ADMIN — METOPROLOL TARTRATE 25 MG: 25 TABLET, FILM COATED ORAL at 08:40

## 2020-05-16 RX ADMIN — ALBUTEROL SULFATE 2.5 MG: 2.5 SOLUTION RESPIRATORY (INHALATION) at 15:35

## 2020-05-16 RX ADMIN — LORAZEPAM 2 MG: 2 INJECTION INTRAMUSCULAR; INTRAVENOUS at 03:26

## 2020-05-16 RX ADMIN — ALBUTEROL SULFATE 2.5 MG: 2.5 SOLUTION RESPIRATORY (INHALATION) at 08:05

## 2020-05-16 RX ADMIN — PROPOFOL 30 MCG/KG/MIN: 10 INJECTION, EMULSION INTRAVENOUS at 04:50

## 2020-05-16 RX ADMIN — LORAZEPAM 2 MG: 2 INJECTION INTRAMUSCULAR; INTRAVENOUS at 16:17

## 2020-05-16 RX ADMIN — ALBUTEROL SULFATE 2.5 MG: 2.5 SOLUTION RESPIRATORY (INHALATION) at 11:53

## 2020-05-16 RX ADMIN — MINERAL SUPPLEMENT IRON 300 MG / 5 ML STRENGTH LIQUID 100 PER BOX UNFLAVORED 300 MG: at 08:39

## 2020-05-16 RX ADMIN — PIPERACILLIN AND TAZOBACTAM 3.38 G: 3; .375 INJECTION, POWDER, LYOPHILIZED, FOR SOLUTION INTRAVENOUS at 01:06

## 2020-05-16 RX ADMIN — Medication 10 ML: at 03:01

## 2020-05-16 RX ADMIN — Medication 10 ML: at 07:02

## 2020-05-16 RX ADMIN — LORAZEPAM 2 MG: 2 INJECTION INTRAMUSCULAR; INTRAVENOUS at 18:31

## 2020-05-16 RX ADMIN — DEXMEDETOMIDINE HYDROCHLORIDE 1.2 MCG/KG/HR: 4 INJECTION, SOLUTION INTRAVENOUS at 00:22

## 2020-05-16 RX ADMIN — PIPERACILLIN AND TAZOBACTAM 3.38 G: 3; .375 INJECTION, POWDER, LYOPHILIZED, FOR SOLUTION INTRAVENOUS at 16:38

## 2020-05-16 RX ADMIN — LORAZEPAM 3 MG: 1 TABLET ORAL at 20:10

## 2020-05-16 RX ADMIN — DOCUSATE SODIUM 100 MG: 50 LIQUID ORAL at 08:39

## 2020-05-16 RX ADMIN — POTASSIUM BICARBONATE 10 MEQ: 782 TABLET, EFFERVESCENT ORAL at 20:11

## 2020-05-16 RX ADMIN — PROPOFOL 35 MCG/KG/MIN: 10 INJECTION, EMULSION INTRAVENOUS at 21:12

## 2020-05-16 RX ADMIN — SODIUM CHLORIDE, PRESERVATIVE FREE 10 ML: 5 INJECTION INTRAVENOUS at 08:40

## 2020-05-16 RX ADMIN — Medication 10 ML: at 04:05

## 2020-05-16 RX ADMIN — DOCUSATE SODIUM 100 MG: 50 LIQUID ORAL at 20:10

## 2020-05-16 RX ADMIN — DEXMEDETOMIDINE HYDROCHLORIDE 1.2 MCG/KG/HR: 4 INJECTION, SOLUTION INTRAVENOUS at 13:30

## 2020-05-16 RX ADMIN — SODIUM CHLORIDE, PRESERVATIVE FREE 10 ML: 5 INJECTION INTRAVENOUS at 20:13

## 2020-05-16 RX ADMIN — INSULIN LISPRO 3 UNITS: 100 INJECTION, SOLUTION INTRAVENOUS; SUBCUTANEOUS at 13:19

## 2020-05-16 RX ADMIN — LORAZEPAM 2 MG: 2 INJECTION INTRAMUSCULAR; INTRAVENOUS at 04:31

## 2020-05-16 RX ADMIN — INSULIN LISPRO 3 UNITS: 100 INJECTION, SOLUTION INTRAVENOUS; SUBCUTANEOUS at 09:09

## 2020-05-16 RX ADMIN — Medication 10 ML: at 06:06

## 2020-05-16 RX ADMIN — INSULIN LISPRO 3 UNITS: 100 INJECTION, SOLUTION INTRAVENOUS; SUBCUTANEOUS at 00:29

## 2020-05-16 RX ADMIN — PANTOPRAZOLE SODIUM 40 MG: 40 INJECTION, POWDER, FOR SOLUTION INTRAVENOUS at 05:45

## 2020-05-16 RX ADMIN — OXYCODONE 5 MG: 5 TABLET ORAL at 04:56

## 2020-05-16 RX ADMIN — INSULIN LISPRO 3 UNITS: 100 INJECTION, SOLUTION INTRAVENOUS; SUBCUTANEOUS at 16:21

## 2020-05-16 RX ADMIN — HEPARIN SODIUM 5000 UNITS: 5000 INJECTION INTRAVENOUS; SUBCUTANEOUS at 13:31

## 2020-05-16 RX ADMIN — ALBUTEROL SULFATE 2.5 MG: 2.5 SOLUTION RESPIRATORY (INHALATION) at 20:11

## 2020-05-16 RX ADMIN — SENNOSIDES AND DOCUSATE SODIUM 1 TABLET: 8.6; 5 TABLET ORAL at 20:12

## 2020-05-16 RX ADMIN — FENTANYL CITRATE 25 MCG: 50 INJECTION INTRAMUSCULAR; INTRAVENOUS at 04:00

## 2020-05-16 RX ADMIN — ASPIRIN 81 MG 324 MG: 81 TABLET ORAL at 08:39

## 2020-05-16 RX ADMIN — LORAZEPAM 2 MG: 2 INJECTION INTRAMUSCULAR; INTRAVENOUS at 06:06

## 2020-05-16 RX ADMIN — HEPARIN SODIUM 5000 UNITS: 5000 INJECTION INTRAVENOUS; SUBCUTANEOUS at 05:45

## 2020-05-16 RX ADMIN — CHLORHEXIDINE GLUCONATE 15 ML: 1.2 RINSE ORAL at 20:09

## 2020-05-16 RX ADMIN — FUROSEMIDE 40 MG: 10 INJECTION, SOLUTION INTRAMUSCULAR; INTRAVENOUS at 13:31

## 2020-05-16 RX ADMIN — FUROSEMIDE 40 MG: 10 INJECTION, SOLUTION INTRAMUSCULAR; INTRAVENOUS at 08:39

## 2020-05-16 RX ADMIN — FOLIC ACID: 5 INJECTION, SOLUTION INTRAMUSCULAR; INTRAVENOUS; SUBCUTANEOUS at 09:20

## 2020-05-16 RX ADMIN — METOPROLOL TARTRATE 25 MG: 25 TABLET, FILM COATED ORAL at 20:10

## 2020-05-16 RX ADMIN — PROPOFOL 40 MCG/KG/MIN: 10 INJECTION, EMULSION INTRAVENOUS at 09:20

## 2020-05-16 RX ADMIN — Medication 10 ML: at 03:27

## 2020-05-16 RX ADMIN — FENTANYL CITRATE 25 MCG: 50 INJECTION INTRAMUSCULAR; INTRAVENOUS at 03:01

## 2020-05-16 RX ADMIN — DEXMEDETOMIDINE HYDROCHLORIDE 1.2 MCG/KG/HR: 4 INJECTION, SOLUTION INTRAVENOUS at 09:02

## 2020-05-16 RX ADMIN — HEPARIN SODIUM 5000 UNITS: 5000 INJECTION INTRAVENOUS; SUBCUTANEOUS at 21:20

## 2020-05-16 RX ADMIN — POTASSIUM BICARBONATE 10 MEQ: 782 TABLET, EFFERVESCENT ORAL at 10:00

## 2020-05-16 RX ADMIN — Medication 10 ML: at 05:45

## 2020-05-16 RX ADMIN — DEXMEDETOMIDINE HYDROCHLORIDE 1.2 MCG/KG/HR: 4 INJECTION, SOLUTION INTRAVENOUS at 04:38

## 2020-05-16 RX ADMIN — FUROSEMIDE 40 MG: 10 INJECTION, SOLUTION INTRAMUSCULAR; INTRAVENOUS at 20:10

## 2020-05-16 RX ADMIN — Medication 10 ML: at 04:31

## 2020-05-16 RX ADMIN — SENNOSIDES AND DOCUSATE SODIUM 1 TABLET: 8.6; 5 TABLET ORAL at 08:39

## 2020-05-16 RX ADMIN — LORAZEPAM 2 MG: 2 INJECTION INTRAMUSCULAR; INTRAVENOUS at 07:02

## 2020-05-16 RX ADMIN — PIPERACILLIN AND TAZOBACTAM 3.38 G: 3; .375 INJECTION, POWDER, LYOPHILIZED, FOR SOLUTION INTRAVENOUS at 08:39

## 2020-05-16 RX ADMIN — DEXMEDETOMIDINE HYDROCHLORIDE 1.1 MCG/KG/HR: 4 INJECTION, SOLUTION INTRAVENOUS at 18:03

## 2020-05-16 RX ADMIN — POTASSIUM BICARBONATE 10 MEQ: 782 TABLET, EFFERVESCENT ORAL at 08:39

## 2020-05-16 RX ADMIN — PROPOFOL 40 MCG/KG/MIN: 10 INJECTION, EMULSION INTRAVENOUS at 14:45

## 2020-05-16 ASSESSMENT — PAIN SCALES - GENERAL
PAINLEVEL_OUTOF10: 4
PAINLEVEL_OUTOF10: 5
PAINLEVEL_OUTOF10: 0
PAINLEVEL_OUTOF10: 1
PAINLEVEL_OUTOF10: 4
PAINLEVEL_OUTOF10: 4
PAINLEVEL_OUTOF10: 2
PAINLEVEL_OUTOF10: 0

## 2020-05-16 ASSESSMENT — PULMONARY FUNCTION TESTS
PIF_VALUE: 16
PIF_VALUE: 15
PIF_VALUE: 18
PIF_VALUE: 13
PIF_VALUE: 11
PIF_VALUE: 19
PIF_VALUE: 19
PIF_VALUE: 17
PIF_VALUE: 20
PIF_VALUE: 17
PIF_VALUE: 20
PIF_VALUE: 21
PIF_VALUE: 25
PIF_VALUE: 17
PIF_VALUE: 16
PIF_VALUE: 14
PIF_VALUE: 20
PIF_VALUE: 19
PIF_VALUE: 20
PIF_VALUE: 18
PIF_VALUE: 19
PIF_VALUE: 11
PIF_VALUE: 17
PIF_VALUE: 21
PIF_VALUE: 23

## 2020-05-16 NOTE — PROGRESS NOTES
Late entry due to patient care needs in the unit.    05/15/2020    @ 1935 - Bedside shift hand-off done w/ off-going RN JOYCE Isaacs. Pt. is very restless, w/ tensed muscles, tachycardic (low 100s), tachypneic (high 30s), and w/ SBP around 138-139. He was coughing against the ventilator. This RN tried to re-orient him then suctioned him orally & on the ETT. This RN was able to suction a moderate amount of thick, cloudy white/ light yellowish ETT secretions. He doesn't follow commands, doesn't open his eyes, but moves his extremities w/ painful stimuli. @ 1947 - PRN Fentanyl IV given for CPOT=5 (see MAR). @ 1951 - Increased Propofol drip from 35 to 40 mcg/kg/min (see MAR). @ 1953 - PRN Ativan 2 mg IV given (see MAR). @ 2000 - Needing suction. This RN was able to suction moderate amount of thick, cloudy white/ light yellowish ETT secretions. @ 2005 - Still restless, tachycardic, and tachypneic. Precedex drip was increased from 1.1 to 1.2 mcg/kg/hr (see MAR). @ 2045 - PRN Fentanyl IV given for CPOT=5 (see MAR). @ 2100 - RT was able to suction moderate amount of thick, cloudy white/ light yellowish ETT secretions. @ 2111 - PRN Ativan 2 mg IV given (see MAR). @ 2141 - Still coughing against the ventilator, tachypneic, & w/ tensed muscles. Precedex drip was increased to 1.3 mcg/kg/hr (see MAR). @ 2146 - PRN Oxycodone IR 5 mg via NGT was given for CPOT=5 (see MAR). @ 2200 - Calmer now. HR=81, RR=29, & SBP on the 120s.    05/16/2020    @ 0002 - Still calm and not in any distress. Vitals are stable. This RN titrated his Precedex drip down to 1.2 mcg/kg/hr (see MAR). @ 0010 - Reassessment unchanged, except rhonchi was heard on the L upper lobe and that the pt is calm now. This RN was able to suction a small amount of thick, cloudy, light yellowish ETT secretions. @ 0015 - Propofol drip was weaned-down to 30 mcg/kg/min (see MAR)    @ 0139 - Propofol drip now @ 10 mcg/kg/min.  Pt. is relaxed

## 2020-05-16 NOTE — PLAN OF CARE
Problem: Infection - Ventilator-Associated Pneumonia:  Goal: Prevent a ventilator associated event (VAE)  Description: Prevent a ventilator associated event (VAE)  5/16/2020 1247 by Korey Marcelo RN  Outcome: Ongoing  A: Assess readiness to wean. Assess for signs/symptoms of infection. HOB 45 degrees. Routine oral care. Suction as needed, assess quality and quantity of secretions. GI prophylaxis as ordered. Manage sedation to keep in range of ordered RASS. Problem: Urinary Elimination:  Goal: Complications related to the disease process, condition or treatment will be avoided or minimized  Description: Complications related to the disease process, condition or treatment will be avoided or minimized  Outcome: Ongoing  A: Assess need for continued omalley catheter use. Monitor quality and quantity of urine output. Keep Statlock stabilization device in place. Make sure the drainage bag is hanging below the level of the bladder. Routine catheter care. Problem: Infection - Central Venous Catheter-Associated Bloodstream Infection:  Goal: Will show no infection signs and symptoms  Description: Will show no infection signs and symptoms  Outcome: Ongoing  A: Assess need for continued PICC line use. Make sure that an occlusive dressing with antimicrobial patch is in place. Place alcohol swab caps on unused ports. Observe aseptic technique when accessing ports. Daily CHG wipes bath. Problem: Gas Exchange - Impaired:  Goal: Levels of oxygenation will improve  Description: Levels of oxygenation will improve  5/16/2020 0817 by Korey Marcelo RN  Outcome: Ongoing  A: Respiratory assessment. Keep O2 sat > or = 92%. Keep HOB elevated to at least 45 degrees. ETT and oral suctioning PRN. Oral care every 4 hours.      Problem: Restraint Use - Nonviolent/Non-Self-Destructive Behavior:  Goal: Absence of restraint indications  Description: Absence of restraint indications  5/16/2020 0851 by Korey Marcelo

## 2020-05-16 NOTE — PROGRESS NOTES
Progress Note    S/P cabgx4 5/8/20    Vital Signs:                                                 BP (!) 110/51   Pulse 84   Temp 98.6 °F (37 °C) (Rectal)   Resp 22   Ht 6' (1.829 m)   Wt 188 lb 4.4 oz (85.4 kg)   SpO2 98%   BMI 25.53 kg/m²  O2 Flow Rate (L/min): 3 L/min   NSR  Admission Weight: 188 lb (85.3 kg)    T max 99.6    Vent Settings:  Vent Information  $Ventilation: $Subsequent Day  Skin Assessment: Clean, dry, & intact  Suction Catheter Diameter: 14  Equipment ID: [id number]  Equipment Changed: Humidification  Vent Type: [id number]  Vent Mode: AC/VC+  Vt Ordered: 450 mL  Rate Set: 20 bmp  Peak Flow: 0 L/min  Pressure Support: 0 cmH20  FiO2 : 40 %  SpO2: 98 %  SpO2/FiO2 ratio: 245  Sensitivity: 3  PEEP/CPAP: 5  I Time/ I Time %: 0.7 s  Humidification Source: Heated wire  Humidification Temp: 37  Humidification Temp Measured: 36.8  Circuit Condensation: Drained     Drips:  Propofol, precedex  TF at goal    I/O:      Intake/Output Summary (Last 24 hours) at 5/16/2020 0730  Last data filed at 5/16/2020 0717  Gross per 24 hour   Intake 4923.2 ml   Output 5205 ml   Net -281.8 ml     CV: reg, wound c/d/i  Pulm: decreased, coarse but improved  Abd: soft  Ext: warm, no edema. Fingertips violaceous - fading a bit. Neuro: opened eyes. Not tracking. Data Review:  CBC:   Recent Labs     05/14/20  0428 05/15/20  0440 05/16/20  0440   WBC 9.2 10.4 11.6*   HGB 7.8* 7.0* 8.0*   HCT 24.3* 21.9* 25.1*   MCV 85.0 85.1 85.4    246 300     BMP:   Recent Labs     05/14/20  1058 05/15/20  0440 05/16/20  0440   * 147* 145   K 4.6 4.5 4.3   * 111* 107   CO2 24 25 26   BUN 41* 35* 39*   CREATININE 1.0 1.0 1.0   CALCIUM 9.4 8.3 8.3   MG 2.40 2.40 2.40     Cardiac Enzymes: No results for input(s): CKTOTAL, CKMB, CKMBINDEX, TROPONINI in the last 72 hours.   PT/INR:   Recent Labs     05/14/20  0428 05/15/20  0440 05/16/20  0440   PROTIME 13.2 12.5 13.3*   INR 1.14 1.08 1.14     APTT:   Recent Labs

## 2020-05-16 NOTE — PROGRESS NOTES
LAS,  TRANSESOPHAGEAL ECHOCARDIOGRAM, TOTAL CARDIOPULMONARY BYPASS performed by Maddy Zavaleta MD at [address]     17 yo - pt doesn't know why       Current Medications:    Outpatient Medications Marked as Taking for the 20 encounter [facility]OS[facility] Encounter)   Medication Sig Dispense Refill    clopidogrel (PLAVIX) 75 MG tablet Take 75 mg by mouth daily      lisinopril (PRINIVIL;ZESTRIL) 40 MG tablet Take 40 mg by mouth daily      amLODIPine (NORVASC) 5 MG tablet Take 5 mg by mouth daily      albuterol sulfate HFA (VENTOLIN HFA) 108 (90 Base) MCG/ACT inhaler Inhale 2 puffs into the lungs every 6 hours as needed for Wheezing         Allergies:  Patient has no known allergies. Immunizations : There is no immunization history on file for this patient. Social History:    Social History     Tobacco Use    Smoking status: Former Smoker     Types: Cigarettes     Last attempt to quit: 2019     Years since quittin.5    Smokeless tobacco: Never Used    Tobacco comment: started age 27   Substance Use Topics    Alcohol use: Yes     Frequency: Monthly or less     Comment: once a month, shot once a week. depends on mood.  Drug use: Never     Social History     Tobacco Use   Smoking Status Former Smoker    Types: Cigarettes    Last attempt to quit: 2019    Years since quittin.5   Smokeless Tobacco Never Used   Tobacco Comment    started age 27      Family History   Problem Relation Age of Onset    Heart Disease Mother          of MI age 36    Heart Disease Brother         MI mid 46s    Heart Disease Father          of MI age 68    Heart Disease Paternal Aunt         MI in her 46s         REVIEW OF SYSTEMS:    No fever / chills / sweats. No weight loss. No visual change, eye pain, eye discharge. No oral lesion, sore throat, dysphagia.   Denies cough / sputum/Sob   Denies chest pain, palpitations/ dizziness  Denies nausea/ vomiting/abdominal pain/diarrhea. Denies dysuria or change in urinary function. Denies joint swelling or pain. No myalgia, arthralgia. No rashes, skin lesions   Denies focal weakness, sensory change or other neurologic symptoms  No lymph node swelling or tenderness. ROS not possible    PHYSICAL EXAM:      Vitals:  T max  102.3   BP (!) 110/51   Pulse 73   Temp 98.6 °F (37 °C) (Rectal)   Resp 22   Ht 6' (1.829 m)   Wt 188 lb 4.4 oz (85.4 kg)   SpO2 99%   BMI 25.53 kg/m²   General Appearance: intubated sedated on the vent and , + pallor, + icterus sweating+ and extremities cold with acral cyanosis+ NG tube in place  Skin: warm and dry, no rash or erythema  Head: normocephalic and atraumatic  Eyes: pupils equal, round, and reactive to light, conjunctivae normal  ENT: tympanic membrane, external ear and ear canal normal bilaterally, nose without deformity, nasal mucosa and turbinates normal without polyps  Neck: supple and non-tender without mass, no thyromegaly  no cervical lymphadenopathy  Pulmonary/Chest: few basal crepts  wheezes, rales or rhonchi, normal air movement, no respiratory distress  Cardiovascular:   S1 and S2, no murmurs, rubs, clicks, or gallops, no carotid bruits sternal incision clean binder +  Abdomen: soft, non-tender, non-distended, normal bowel sounds, no masses or organomegaly  Extremities: no cyanosis, clubbing or edema  Musculoskeletal: normal range of motion, no joint swelling, deformity or tenderness  Neurologic: reflexes normal and symmetric, no cranial nerve deficit  Lines:   Hoang  Ij+  NG tube+       Data Review:    Lab Results   Component Value Date    WBC 11.6 (H) 05/16/2020    HGB 8.0 (L) 05/16/2020    HCT 25.1 (L) 05/16/2020    MCV 85.4 05/16/2020     05/16/2020     Lab Results   Component Value Date    CREATININE 1.0 05/16/2020    BUN 39 (H) 05/16/2020     05/16/2020    K 4.3 05/16/2020     05/16/2020    CO2 26 05/16/2020       Hepatic Function Panel:   Lab Results Component Value Date    ALKPHOS 155 05/15/2020     05/15/2020     05/15/2020    PROT 6.1 05/15/2020    BILITOT <0.2 05/15/2020    BILIDIR <0.2 05/15/2020    IBILI see below 05/15/2020    LABALBU 2.2 05/15/2020       UA:  Lab Results   Component Value Date    COLORU YELLOW 05/12/2020    CLARITYU CLOUDY 05/12/2020    GLUCOSEU Negative 05/12/2020    BILIRUBINUR Negative 05/12/2020    KETUA Negative 05/12/2020    SPECGRAV 1.021 05/12/2020    BLOODU LARGE 05/12/2020    PHUR 5.5 05/12/2020    PROTEINU 30 05/12/2020    UROBILINOGEN 0.2 05/12/2020    NITRU Negative 05/12/2020    LEUKOCYTESUR MODERATE 05/12/2020    LABMICR YES 05/12/2020    URINETYPE NotGiven 05/12/2020      Urine Microscopic:   Lab Results   Component Value Date    COMU see below 05/12/2020    HYALCAST 6 05/12/2020    WBCUA 14 05/12/2020    RBCUA 9 05/12/2020    EPIU 3 05/12/2020     Procal  0.69   Hep C Ab Interp   Hepatitis Panel, Acute   Collected: 05/16/20 0440   Result status: Final   Resulting lab: [address] [place] [address] LAB   Reference range: Non-reactive   Value: REACTIVEAbnormal     Comment: REACTIVE Screen:   Confirmation with Hepatitis C RIBA not available. Depending on clinical   history, Hepatitis C Virus (HCV)by Quantitative NAAT ([id number]) should be   considered as an alternative to this test although it is not an equivalent.      If HCV by Quantitative NAAT is desired, please reorder       MICRO: cultures reviewed and updated by me   Date/Time       Culture, Respiratory [[id number]]    Order Status: No result Specimen: Sputum, Suctioned    Culture, Respiratory [[id number]] Collected: 05/12/20 0820   Order Status: Completed Specimen: Throat from Endotracheal Updated: 05/14/20 1011    CULTURE, RESPIRATORY Normal respiratory gladys    Gram Stain Result 3+ WBC's (Polymorphonuclear)   1+ Epithelial Cells   No organisms seen    Narrative:     ORDER#: [id number]                          ORDERED BY: Kt Miller  SOURCE: Endotracheal                       COLLECTED:  05/12/20 08:20  ANTIBIOTICS AT NITISH. :                      RECEIVED :  05/12/20 08:30  Performed at:  [facility]  [address] [place] [employer] [address]   Phone [phone]   Culture, Blood 1 [[id number]] Collected: 05/12/20 0848   Order Status: Completed Specimen: Blood Updated: 05/13/20 1015    Blood Culture, Routine No Growth to date.  Any change in status will be called. Narrative:     ORDER#: [id number]                          ORDERED BY: Lynn Duenas  SOURCE: Blood                              COLLECTED:  05/12/20 08:48  ANTIBIOTICS AT NITISH. :                      RECEIVED :  05/12/20 09:05  If child <=2 yrs old please draw pediatric bottle. ~Blood Culture #1  Performed at:  [facility]  [address] [place] [employer] [address]   Phone [phone]   Culture, Blood 2 [[id number]] Collected: 05/12/20 0854   Order Status: Completed Specimen: Blood Updated: 05/13/20 1015    Culture, Blood 2 No Growth to date.  Any change in status will be called. Narrative:     ORDER#: [id number]                          ORDERED BY: Lynn Duenas  SOURCE: Blood                              COLLECTED:  05/12/20 08:54  ANTIBIOTICS AT NITISH. :                      RECEIVED :  05/12/20 09:06  If child <=2 yrs old please draw pediatric bottle. ~Blood Culture #2  Performed at:  [facility]  [address] [place] [employer] [address]   Phone [phone]   Culture, Urine [[id number]] Collected: 05/12/20 0830   Order Status: Completed Specimen: Urine, clean catch Updated: 05/13/20 0750    Urine Culture, Routine No growth at 18-36 hours   Narrative:     ORDER#: [id number]                          ORDERED BY: Lynn Duenas  SOURCE: Urine Clean Catch                  COLLECTED:  05/12/20 08:30  ANTIBIOTICS AT NITISH. :                      RECEIVED :  05/12/20 09:55  Performed at:  [facility]  [address] [place] [address]   Phone [phone]   MRSA DNA Probe, Nasal [[id number]] Collected: 05/07/20 1312   Order Status: Completed Specimen: Nasal from Nares Updated: 05/08/20 0711    MRSA SCREEN RT-PCR --    Negative - MRSA DNA not detected. Normal Range: Not detected    Narrative:     ORDER#: [id number]                          ORDERED BY: Asia Arivzu  SOURCE: Nares                              COLLECTED:  05/07/20 13:12  ANTIBIOTICS AT NITISH. :                      RECEIVED :  05/07/20 13:18  Performed at:  [facility]  [address] [facility] [employer] [address]   Phone [phone]   Culture, Blood 1 [[id number]] Collected: 05/07/20 0743   Order Status: Canceled Specimen: Blood    Culture, Blood 2 [[id number]]    Order Status: Canceled Specimen: Blood          IMAGING:    XR CHEST PORTABLE   Final Result   1. Left upper extremity PICC terminates over the SVC. Additional support   hardware, as detailed above. 2. Persistent findings of pulmonary interstitial edema and small left pleural   effusion. 3. Unchanged left basilar pulmonary opacity may represent atelectasis,   pneumonia, and/or aspiration. RECOMMENDATION:   Continued radiographic follow-up. XR CHEST PORTABLE   Final Result   Stable chest.         XR CHEST PORTABLE   Final Result   Interval removal of chest drains. Otherwise supportive tubing is stable. No pneumothorax. Increased left basilar opacity, likely combination of atelectasis and pleural   effusion. Vascular congestion. VL Extremity Venous Bilateral   Final Result      XR CHEST PORTABLE   Final Result   [place] catheter has been removed. Enteric feeding tube has been added. Supportive tubing is otherwise stable. Left perihilar/basilar atelectasis. Pneumonia is a differential possibility.          CT HEAD WO CONTRAST   Final Result flush  10 mL Intravenous 2 times per day    insulin lispro  0-18 Units Subcutaneous Q4H    aspirin  324 mg Oral Daily    potassium bicarb-citric acid  10 mEq Oral TID    sennosides-docusate sodium  1 tablet Oral BID    pantoprazole  40 mg Intravenous Daily    And    sodium chloride (PF)  10 mL Intravenous Daily    chlorhexidine  15 mL Mouth/Throat BID    [Held by provider] atorvastatin  40 mg Oral Nightly    albuterol  2.5 mg Nebulization Q4H WA    sodium chloride (PF)  10 mL Intravenous Once       Continuous Infusions:   niCARdipine Stopped (05/13/20 0830)    dextrose      nitroGLYCERIN Stopped (05/10/20 2123)    dexmedetomidine 1.2 mcg/kg/hr (05/16/20 0902)    propofol 40 mcg/kg/min (05/16/20 0920)       PRN Meds:  bisacodyl, metoprolol, glucose, dextrose, glucagon (rDNA), dextrose, sodium chloride flush, LORazepam **OR** LORazepam **OR** LORazepam **OR** LORazepam **OR** LORazepam **OR** LORazepam **OR** LORazepam **OR** LORazepam, potassium chloride, magnesium sulfate, calcium chloride IVPB, calcium chloride IVPB, acetaminophen, oxyCODONE **OR** oxyCODONE, fentanNYL, ondansetron, hydrALAZINE, albuterol sulfate HFA, nitroGLYCERIN, metoclopramide, albuterol, [DISCONTINUED] acetaminophen **OR** acetaminophen, polyethylene glycol, promethazine **OR** [DISCONTINUED] ondansetron, magnesium hydroxide      Assessment:     Patient Active Problem List   Diagnosis    Chest pain    NSTEMI (non-ST elevated myocardial infarction) [facility] [address]    Coronary artery disease involving native coronary artery of native heart with unstable angina pectoris ([facility] [address]    S/P CABG x 4     Admitted with chest pain   NSTEMI  Strong family history per HPI  S/P Urgent CABG -  on 5/8  Resp failure   On the ventilator  Post op fevers   Anemia   WBC elevation post op now trend down  ?  DTS with Lft elevation      Given the intubated stated and elevated Procal and on going fevers will start IV abx and see his response-   Surgical incision is clean and lines are clean and Blood cx and Sputum cx from 5/12 NGTD  DTS can cause fevers would like to see the curve improve- will watch closely . Fevers trend down Procal elevated and will follow and once fevers better able to d/c his IV abx and resp new cx sent and in process      Hep C+ve noted will check PCR and Cryoglobulins and HIV screen pending       Labs, Microbiology, Radiology and all the pertinent results from current hospitalization and  care every where were reviewed  by me as a part of the evaluation   Plan:   1. Trend Procal tomorrow now improving on IV abx  2. CRP VERY elevated - will screen for Rheumatological disease given acral cyanosis bi lateral   3. Gamma GT elevated and Lfts now down trend  4. Cont  IV Zosyn x 3.375 gm x Q 8 hrs  5. Resp cx in process from   5/15   6. Hep C+ve check for  Cryoglobulins given acral cyanosis,   SREE-ve But ESR elevated      Discussed with patient/Family and Nursing staff   Risk of Complications/Morbidity: High      · Illness(es)/ Infection present that pose threat to bodily function. · There is potential for severe exacerbation of infection/side effects of treatment. Therapy requires intensive monitoring for antimicrobial agent toxicity. Thanks for allowing me to participate in your patient's care and please call me with any questions or concerns.     Corin Bean MD  Infectious Disease  [facility]) Physician  Phone: [phone]   Fax : [phone]

## 2020-05-16 NOTE — PROGRESS NOTES
Call received (person talked to said he was pt's son Shy Reyes). This RN informed him that pt's information can't be given to him since he is not on the chart as emergency contact. Person did verbalized \"yes you are right\". Person asked \"is he doing all right\"? This RN then informed him that pt is stable but any additional information can be received from Uziel Davis who is the emergency contact on our chart. Person then said \"ok, thank you very much\". Charge nurse notified about this. Call received from [facility] and updated on pt. Did inform her about pt's son calling earlier, did state \" I have just talked to him about Harvey\". Called received from information desk that Pedro is there for pt's package that was delivered yesterday. Package sent out to Jan. No new/acurte event occurred this shift. Pt left in a stable condition.    Electronically signed by Osorio Soto RN on 5/16/2020 at 7:51 PM

## 2020-05-17 LAB
ANION GAP SERPL CALCULATED.3IONS-SCNC: 11 MMOL/L (ref 3–16)
APTT: 27.1 SEC (ref 24.2–36.2)
BUN BLDV-MCNC: 40 MG/DL (ref 7–20)
CALCIUM SERPL-MCNC: 8.2 MG/DL (ref 8.3–10.6)
CHLORIDE BLD-SCNC: 106 MMOL/L (ref 99–110)
CO2: 25 MMOL/L (ref 21–32)
CREAT SERPL-MCNC: 1 MG/DL (ref 0.8–1.3)
CULTURE, RESPIRATORY: NORMAL
GFR AFRICAN AMERICAN: >60
GFR NON-AFRICAN AMERICAN: >60
GLUCOSE BLD-MCNC: 139 MG/DL (ref 70–99)
GLUCOSE BLD-MCNC: 144 MG/DL (ref 70–99)
GLUCOSE BLD-MCNC: 146 MG/DL (ref 70–99)
GLUCOSE BLD-MCNC: 148 MG/DL (ref 70–99)
GLUCOSE BLD-MCNC: 150 MG/DL (ref 70–99)
GLUCOSE BLD-MCNC: 175 MG/DL (ref 70–99)
GLUCOSE BLD-MCNC: 70 MG/DL (ref 70–99)
GLUCOSE BLD-MCNC: 88 MG/DL (ref 70–99)
GRAM STAIN RESULT: NORMAL
HCT VFR BLD CALC: 23 % (ref 40.5–52.5)
HEMOGLOBIN: 7.5 G/DL (ref 13.5–17.5)
INR BLD: 1.15 (ref 0.86–1.14)
MAGNESIUM: 2.4 MG/DL (ref 1.8–2.4)
MCH RBC QN AUTO: 27.9 PG (ref 26–34)
MCHC RBC AUTO-ENTMCNC: 32.8 G/DL (ref 31–36)
MCV RBC AUTO: 85.1 FL (ref 80–100)
PDW BLD-RTO: 16.1 % (ref 12.4–15.4)
PERFORMED ON: ABNORMAL
PERFORMED ON: NORMAL
PERFORMED ON: NORMAL
PLATELET # BLD: 358 K/UL (ref 135–450)
PMV BLD AUTO: 8.6 FL (ref 5–10.5)
POTASSIUM SERPL-SCNC: 3.8 MMOL/L (ref 3.5–5.1)
PROTHROMBIN TIME: 13.4 SEC (ref 10–13.2)
RBC # BLD: 2.7 M/UL (ref 4.2–5.9)
SODIUM BLD-SCNC: 142 MMOL/L (ref 136–145)
WBC # BLD: 8.5 K/UL (ref 4–11)

## 2020-05-17 PROCEDURE — 80048 BASIC METABOLIC PNL TOTAL CA: CPT

## 2020-05-17 PROCEDURE — 85027 COMPLETE CBC AUTOMATED: CPT

## 2020-05-17 PROCEDURE — C9113 INJ PANTOPRAZOLE SODIUM, VIA: HCPCS | Performed by: THORACIC SURGERY (CARDIOTHORACIC VASCULAR SURGERY)

## 2020-05-17 PROCEDURE — 2700000000 HC OXYGEN THERAPY PER DAY

## 2020-05-17 PROCEDURE — 2580000003 HC RX 258: Performed by: INTERNAL MEDICINE

## 2020-05-17 PROCEDURE — 94761 N-INVAS EAR/PLS OXIMETRY MLT: CPT

## 2020-05-17 PROCEDURE — 2580000003 HC RX 258: Performed by: NURSE PRACTITIONER

## 2020-05-17 PROCEDURE — 6360000002 HC RX W HCPCS: Performed by: NURSE PRACTITIONER

## 2020-05-17 PROCEDURE — 6360000002 HC RX W HCPCS: Performed by: INTERNAL MEDICINE

## 2020-05-17 PROCEDURE — 6360000002 HC RX W HCPCS: Performed by: THORACIC SURGERY (CARDIOTHORACIC VASCULAR SURGERY)

## 2020-05-17 PROCEDURE — 99024 POSTOP FOLLOW-UP VISIT: CPT | Performed by: THORACIC SURGERY (CARDIOTHORACIC VASCULAR SURGERY)

## 2020-05-17 PROCEDURE — 6370000000 HC RX 637 (ALT 250 FOR IP): Performed by: THORACIC SURGERY (CARDIOTHORACIC VASCULAR SURGERY)

## 2020-05-17 PROCEDURE — 6370000000 HC RX 637 (ALT 250 FOR IP): Performed by: NURSE PRACTITIONER

## 2020-05-17 PROCEDURE — 2500000003 HC RX 250 WO HCPCS: Performed by: THORACIC SURGERY (CARDIOTHORACIC VASCULAR SURGERY)

## 2020-05-17 PROCEDURE — 94003 VENT MGMT INPAT SUBQ DAY: CPT

## 2020-05-17 PROCEDURE — 2580000003 HC RX 258: Performed by: THORACIC SURGERY (CARDIOTHORACIC VASCULAR SURGERY)

## 2020-05-17 PROCEDURE — 85610 PROTHROMBIN TIME: CPT

## 2020-05-17 PROCEDURE — 85730 THROMBOPLASTIN TIME PARTIAL: CPT

## 2020-05-17 PROCEDURE — 2100000000 HC CCU R&B

## 2020-05-17 PROCEDURE — 94640 AIRWAY INHALATION TREATMENT: CPT

## 2020-05-17 PROCEDURE — 83735 ASSAY OF MAGNESIUM: CPT

## 2020-05-17 RX ORDER — CALCIUM GLUCONATE 20 MG/ML
1 INJECTION, SOLUTION INTRAVENOUS ONCE
Status: COMPLETED | OUTPATIENT
Start: 2020-05-17 | End: 2020-05-17

## 2020-05-17 RX ORDER — METOPROLOL TARTRATE 50 MG/1
50 TABLET, FILM COATED ORAL 2 TIMES DAILY
Status: DISCONTINUED | OUTPATIENT
Start: 2020-05-17 | End: 2020-05-19

## 2020-05-17 RX ADMIN — FUROSEMIDE 40 MG: 10 INJECTION, SOLUTION INTRAMUSCULAR; INTRAVENOUS at 14:13

## 2020-05-17 RX ADMIN — HEPARIN SODIUM 5000 UNITS: 5000 INJECTION INTRAVENOUS; SUBCUTANEOUS at 14:16

## 2020-05-17 RX ADMIN — HYDRALAZINE HYDROCHLORIDE 5 MG: 20 INJECTION INTRAMUSCULAR; INTRAVENOUS at 22:19

## 2020-05-17 RX ADMIN — SENNOSIDES AND DOCUSATE SODIUM 1 TABLET: 8.6; 5 TABLET ORAL at 08:22

## 2020-05-17 RX ADMIN — PIPERACILLIN AND TAZOBACTAM 3.38 G: 3; .375 INJECTION, POWDER, LYOPHILIZED, FOR SOLUTION INTRAVENOUS at 01:13

## 2020-05-17 RX ADMIN — METOPROLOL TARTRATE 50 MG: 50 TABLET, FILM COATED ORAL at 08:22

## 2020-05-17 RX ADMIN — LORAZEPAM 2 MG: 1 TABLET ORAL at 14:15

## 2020-05-17 RX ADMIN — DEXMEDETOMIDINE HYDROCHLORIDE 1 MCG/KG/HR: 4 INJECTION, SOLUTION INTRAVENOUS at 14:45

## 2020-05-17 RX ADMIN — CHLORHEXIDINE GLUCONATE 15 ML: 1.2 RINSE ORAL at 09:56

## 2020-05-17 RX ADMIN — POTASSIUM BICARBONATE 10 MEQ: 782 TABLET, EFFERVESCENT ORAL at 14:14

## 2020-05-17 RX ADMIN — SODIUM CHLORIDE, PRESERVATIVE FREE 10 ML: 5 INJECTION INTRAVENOUS at 20:35

## 2020-05-17 RX ADMIN — DEXMEDETOMIDINE HYDROCHLORIDE 1 MCG/KG/HR: 4 INJECTION, SOLUTION INTRAVENOUS at 04:54

## 2020-05-17 RX ADMIN — PROPOFOL 35 MCG/KG/MIN: 10 INJECTION, EMULSION INTRAVENOUS at 14:28

## 2020-05-17 RX ADMIN — LORAZEPAM 2 MG: 1 TABLET ORAL at 01:28

## 2020-05-17 RX ADMIN — MINERAL SUPPLEMENT IRON 300 MG / 5 ML STRENGTH LIQUID 100 PER BOX UNFLAVORED 300 MG: at 20:35

## 2020-05-17 RX ADMIN — LORAZEPAM 4 MG: 1 TABLET ORAL at 22:41

## 2020-05-17 RX ADMIN — ALBUTEROL SULFATE 2.5 MG: 2.5 SOLUTION RESPIRATORY (INHALATION) at 08:17

## 2020-05-17 RX ADMIN — POTASSIUM BICARBONATE 10 MEQ: 782 TABLET, EFFERVESCENT ORAL at 20:30

## 2020-05-17 RX ADMIN — INSULIN LISPRO 3 UNITS: 100 INJECTION, SOLUTION INTRAVENOUS; SUBCUTANEOUS at 01:28

## 2020-05-17 RX ADMIN — PROPOFOL 35 MCG/KG/MIN: 10 INJECTION, EMULSION INTRAVENOUS at 20:31

## 2020-05-17 RX ADMIN — CHLORHEXIDINE GLUCONATE 15 ML: 1.2 RINSE ORAL at 20:36

## 2020-05-17 RX ADMIN — FUROSEMIDE 40 MG: 10 INJECTION, SOLUTION INTRAMUSCULAR; INTRAVENOUS at 20:31

## 2020-05-17 RX ADMIN — ALBUTEROL SULFATE 2.5 MG: 2.5 SOLUTION RESPIRATORY (INHALATION) at 20:06

## 2020-05-17 RX ADMIN — INSULIN LISPRO 3 UNITS: 100 INJECTION, SOLUTION INTRAVENOUS; SUBCUTANEOUS at 20:32

## 2020-05-17 RX ADMIN — PIPERACILLIN AND TAZOBACTAM 3.38 G: 3; .375 INJECTION, POWDER, LYOPHILIZED, FOR SOLUTION INTRAVENOUS at 16:36

## 2020-05-17 RX ADMIN — DEXMEDETOMIDINE HYDROCHLORIDE 1 MCG/KG/HR: 4 INJECTION, SOLUTION INTRAVENOUS at 22:51

## 2020-05-17 RX ADMIN — SODIUM CHLORIDE, PRESERVATIVE FREE 10 ML: 5 INJECTION INTRAVENOUS at 08:41

## 2020-05-17 RX ADMIN — Medication 10 ML: at 06:44

## 2020-05-17 RX ADMIN — DEXMEDETOMIDINE HYDROCHLORIDE 1 MCG/KG/HR: 4 INJECTION, SOLUTION INTRAVENOUS at 09:55

## 2020-05-17 RX ADMIN — INSULIN LISPRO 3 UNITS: 100 INJECTION, SOLUTION INTRAVENOUS; SUBCUTANEOUS at 05:17

## 2020-05-17 RX ADMIN — ALBUTEROL SULFATE 2.5 MG: 2.5 SOLUTION RESPIRATORY (INHALATION) at 12:21

## 2020-05-17 RX ADMIN — METOPROLOL TARTRATE 50 MG: 50 TABLET, FILM COATED ORAL at 20:31

## 2020-05-17 RX ADMIN — PROPOFOL 35 MCG/KG/MIN: 10 INJECTION, EMULSION INTRAVENOUS at 02:41

## 2020-05-17 RX ADMIN — LORAZEPAM 2 MG: 2 INJECTION INTRAMUSCULAR; INTRAVENOUS at 08:45

## 2020-05-17 RX ADMIN — LORAZEPAM 2 MG: 1 TABLET ORAL at 20:31

## 2020-05-17 RX ADMIN — ACETAMINOPHEN 650 MG: 325 TABLET, FILM COATED ORAL at 01:13

## 2020-05-17 RX ADMIN — INSULIN LISPRO 3 UNITS: 100 INJECTION, SOLUTION INTRAVENOUS; SUBCUTANEOUS at 11:54

## 2020-05-17 RX ADMIN — HEPARIN SODIUM 5000 UNITS: 5000 INJECTION INTRAVENOUS; SUBCUTANEOUS at 06:40

## 2020-05-17 RX ADMIN — PROPOFOL 35 MCG/KG/MIN: 10 INJECTION, EMULSION INTRAVENOUS at 08:38

## 2020-05-17 RX ADMIN — SENNOSIDES AND DOCUSATE SODIUM 1 TABLET: 8.6; 5 TABLET ORAL at 20:31

## 2020-05-17 RX ADMIN — ACETAMINOPHEN 650 MG: 325 TABLET, FILM COATED ORAL at 22:54

## 2020-05-17 RX ADMIN — ASPIRIN 81 MG 324 MG: 81 TABLET ORAL at 08:22

## 2020-05-17 RX ADMIN — CALCIUM GLUCONATE 1 G: 20 INJECTION, SOLUTION INTRAVENOUS at 08:11

## 2020-05-17 RX ADMIN — FUROSEMIDE 40 MG: 10 INJECTION, SOLUTION INTRAMUSCULAR; INTRAVENOUS at 08:40

## 2020-05-17 RX ADMIN — DOCUSATE SODIUM 100 MG: 50 LIQUID ORAL at 08:22

## 2020-05-17 RX ADMIN — PIPERACILLIN AND TAZOBACTAM 3.38 G: 3; .375 INJECTION, POWDER, LYOPHILIZED, FOR SOLUTION INTRAVENOUS at 09:27

## 2020-05-17 RX ADMIN — POTASSIUM BICARBONATE 10 MEQ: 782 TABLET, EFFERVESCENT ORAL at 08:22

## 2020-05-17 RX ADMIN — DOCUSATE SODIUM 100 MG: 50 LIQUID ORAL at 20:30

## 2020-05-17 RX ADMIN — PANTOPRAZOLE SODIUM 40 MG: 40 INJECTION, POWDER, FOR SOLUTION INTRAVENOUS at 06:40

## 2020-05-17 RX ADMIN — MINERAL SUPPLEMENT IRON 300 MG / 5 ML STRENGTH LIQUID 100 PER BOX UNFLAVORED 300 MG: at 08:22

## 2020-05-17 RX ADMIN — HEPARIN SODIUM 5000 UNITS: 5000 INJECTION INTRAVENOUS; SUBCUTANEOUS at 22:22

## 2020-05-17 RX ADMIN — ALBUTEROL SULFATE 2.5 MG: 2.5 SOLUTION RESPIRATORY (INHALATION) at 15:35

## 2020-05-17 RX ADMIN — DEXMEDETOMIDINE HYDROCHLORIDE 1 MCG/KG/HR: 4 INJECTION, SOLUTION INTRAVENOUS at 00:03

## 2020-05-17 ASSESSMENT — PAIN SCALES - GENERAL
PAINLEVEL_OUTOF10: 3
PAINLEVEL_OUTOF10: 1
PAINLEVEL_OUTOF10: 0

## 2020-05-17 ASSESSMENT — PULMONARY FUNCTION TESTS
PIF_VALUE: 12
PIF_VALUE: 17
PIF_VALUE: 12
PIF_VALUE: 18
PIF_VALUE: 16
PIF_VALUE: 13
PIF_VALUE: 15
PIF_VALUE: 19
PIF_VALUE: 18
PIF_VALUE: 22
PIF_VALUE: 20
PIF_VALUE: 18
PIF_VALUE: 20
PIF_VALUE: 21
PIF_VALUE: 19
PIF_VALUE: 13
PIF_VALUE: 21
PIF_VALUE: 16
PIF_VALUE: 18
PIF_VALUE: 20
PIF_VALUE: 14
PIF_VALUE: 12
PIF_VALUE: 21
PIF_VALUE: 24
PIF_VALUE: 21
PIF_VALUE: 18
PIF_VALUE: 12
PIF_VALUE: 15

## 2020-05-17 NOTE — PROGRESS NOTES
pain/diarrhea. Denies dysuria or change in urinary function. Denies joint swelling or pain. No myalgia, arthralgia. No rashes, skin lesions   Denies focal weakness, sensory change or other neurologic symptoms  No lymph node swelling or tenderness. ROS not possible    PHYSICAL EXAM:      Vitals:  T max  102.3   BP (!) 122/49   Pulse 99   Temp 98.6 °F (37 °C)   Resp 27   Ht 6' (1.829 m)   Wt 188 lb 0.8 oz (85.3 kg)   SpO2 96%   BMI 25.50 kg/m²   General Appearance: intubated sedated on the vent and , + pallor, + icterus sweating+ and extremities cold with acral cyanosis+ NG tube in place  Skin: warm and dry, no rash or erythema  Head: normocephalic and atraumatic  Eyes: pupils equal, round, and reactive to light, conjunctivae normal  ENT: tympanic membrane, external ear and ear canal normal bilaterally, nose without deformity, nasal mucosa and turbinates normal without polyps  Neck: supple and non-tender without mass, no thyromegaly  no cervical lymphadenopathy  Pulmonary/Chest: few basal crepts  wheezes, rales or rhonchi, normal air movement, no respiratory distress  Cardiovascular:   S1 and S2, no murmurs, rubs, clicks, or gallops, no carotid bruits sternal incision clean binder +  Abdomen: soft, non-tender, non-distended, normal bowel sounds, no masses or organomegaly  Extremities: no cyanosis, clubbing or edema  Musculoskeletal: normal range of motion, no joint swelling, deformity or tenderness  Neurologic: reflexes normal and symmetric, no cranial nerve deficit  Lines:   Hoang  Ij+  NG tube+       Data Review:    Lab Results   Component Value Date    WBC 11.0 05/18/2020    HGB 7.9 (L) 05/18/2020    HCT 24.8 (L) 05/18/2020    MCV 84.4 05/18/2020     (H) 05/18/2020     Lab Results   Component Value Date    CREATININE 0.8 05/18/2020    BUN 36 (H) 05/18/2020     05/18/2020    K 4.1 05/18/2020     05/18/2020    CO2 25 05/18/2020       Hepatic Function Panel:   Lab Results   Component Value Date    ALKPHOS 167 05/18/2020     05/18/2020    AST 93 05/18/2020    PROT 6.5 05/18/2020    BILITOT <0.2 05/18/2020    BILIDIR <0.2 05/18/2020    IBILI see below 05/18/2020    LABALBU 2.1 05/18/2020       UA:  Lab Results   Component Value Date    COLORU YELLOW 05/12/2020    CLARITYU CLOUDY 05/12/2020    GLUCOSEU Negative 05/12/2020    BILIRUBINUR Negative 05/12/2020    KETUA Negative 05/12/2020    SPECGRAV 1.021 05/12/2020    BLOODU LARGE 05/12/2020    PHUR 5.5 05/12/2020    PROTEINU 30 05/12/2020    UROBILINOGEN 0.2 05/12/2020    NITRU Negative 05/12/2020    LEUKOCYTESUR MODERATE 05/12/2020    LABMICR YES 05/12/2020    URINETYPE NotGiven 05/12/2020      Urine Microscopic:   Lab Results   Component Value Date    COMU see below 05/12/2020    HYALCAST 6 05/12/2020    WBCUA 14 05/12/2020    RBCUA 9 05/12/2020    EPIU 3 05/12/2020     Procal  0.69   Hep C Ab Interp   Hepatitis Panel, Acute   Collected: 05/16/20 0440   Result status: Final   Resulting lab: [address] [place] [address] LAB   Reference range: Non-reactive   Value: REACTIVEAbnormal     Comment: REACTIVE Screen:   Confirmation with Hepatitis C RIBA not available. Depending on clinical   history, Hepatitis C Virus (HCV)by Quantitative NAAT ([id number]) should be   considered as an alternative to this test although it is not an equivalent.      If HCV by Quantitative NAAT is desired, please reorder       MICRO: cultures reviewed and updated by me   Date/Time       Culture, Respiratory [[id number]]    Order Status: No result Specimen: Sputum, Suctioned    Culture, Respiratory [[id number]] Collected: 05/12/20 0820   Order Status: Completed Specimen: Throat from Endotracheal Updated: 05/14/20 1011    CULTURE, RESPIRATORY Normal respiratory gladys    Gram Stain Result 3+ WBC's (Polymorphonuclear)   1+ Epithelial Cells   No organisms seen    Narrative:     ORDER#: [id number]                          ORDERED BY: Marco A Coe  SOURCE: Endotracheal         supportive tubing is stable. No pneumothorax. Increased left basilar opacity, likely combination of atelectasis and pleural   effusion. Vascular congestion. VL Extremity Venous Bilateral   Final Result      XR CHEST PORTABLE   Final Result   [place] catheter has been removed. Enteric feeding tube has been added. Supportive tubing is otherwise stable. Left perihilar/basilar atelectasis. Pneumonia is a differential possibility. CT HEAD WO CONTRAST   Final Result   No hemorrhage or mass      Mild periventricular white matter disease, likely due to small-vessel   ischemic change      Mild paranasal sinus disease         XR CHEST PORTABLE   Final Result   Dependent left lower lobe opacification and effusion. Mild right perihilar   opacification. No pneumothorax. Satisfactory position of endotracheal tube. Enteric catheter tip in the gastric body. XR CHEST PORTABLE   Final Result   Relatively stable appearance of the chest.  Perhaps slight increasing   ground-glass opacification centrally. No residual pneumothorax detected. XR CHEST PORTABLE   Final Result   1. Mild pulmonary vascular congestion with a small left pleural effusion and   associated left basilar atelectasis. 2. Tiny left apical pneumothorax. Attention on follow-up imaging is   recommended. 3. Suboptimal visualization of the orogastric tube. Abdominal radiograph is   recommended for further evaluation. VL DUP CAROTID BILATERAL   Final Result      VL PRE OP VEIN MAPPING   Final Result      XR CHEST PORTABLE   Final Result   Or bronchial wall thickening suggests inflammation, such as that seen with   asthma, bronchitis or smoking. Consolidative airspace disease. Rounded opacity in the right hilar region likely represents a vessel en face,   however cannot exclude a prominent lymph node or nodule.   Comparison imaging   would be helpful if available, otherwise recommend CT Physician  Phone: [phone]   Fax : [phone]

## 2020-05-17 NOTE — PROGRESS NOTES
0700: Report received from [facility], patient turned and repositioned in bed, sacrum assessed, no BM noted at this time     0745: Dr. Tara Doyle for CTS rounding, new orders received. 5966: Shift assessment complete. Patient breathing over vent and restless in bed. Ativan given per prn orders and CIWA score. Patient continues to have upward trending temperature, cooling blanket resumed. Remains in restraints for safety     1200: Reassessment complete. VSS per mike. Remains intubated and sedated. Q4 accu check drawn from arterial line, due to acrocyansosis BG falsely low from finger sticks. Remains on cooling blanket to prevent spike in temp. Restraints in place    1415: Patient more anxious at this time, breathing heavily over vent and with tremors. Ativan given per prn order based on CIWA score    1630: Reassessment complete.      1900: Report given to [facility]

## 2020-05-18 LAB
ALBUMIN SERPL-MCNC: 2.1 G/DL (ref 3.4–5)
ALP BLD-CCNC: 167 U/L (ref 40–129)
ALT SERPL-CCNC: 134 U/L (ref 10–40)
ANION GAP SERPL CALCULATED.3IONS-SCNC: 11 MMOL/L (ref 3–16)
APTT: 27.4 SEC (ref 24.2–36.2)
AST SERPL-CCNC: 93 U/L (ref 15–37)
BASE EXCESS ARTERIAL: 4.5 MMOL/L (ref -3–3)
BILIRUB SERPL-MCNC: <0.2 MG/DL (ref 0–1)
BILIRUBIN DIRECT: <0.2 MG/DL (ref 0–0.3)
BILIRUBIN, INDIRECT: ABNORMAL MG/DL (ref 0–1)
BUN BLDV-MCNC: 36 MG/DL (ref 7–20)
CALCIUM SERPL-MCNC: 8.3 MG/DL (ref 8.3–10.6)
CARBOXYHEMOGLOBIN ARTERIAL: 0.9 % (ref 0–1.5)
CHLORIDE BLD-SCNC: 104 MMOL/L (ref 99–110)
CO2: 25 MMOL/L (ref 21–32)
CREAT SERPL-MCNC: 0.8 MG/DL (ref 0.8–1.3)
GFR AFRICAN AMERICAN: >60
GFR NON-AFRICAN AMERICAN: >60
GLUCOSE BLD-MCNC: 122 MG/DL (ref 70–99)
GLUCOSE BLD-MCNC: 125 MG/DL (ref 70–99)
GLUCOSE BLD-MCNC: 127 MG/DL (ref 70–99)
GLUCOSE BLD-MCNC: 130 MG/DL (ref 70–99)
GLUCOSE BLD-MCNC: 131 MG/DL (ref 70–99)
GLUCOSE BLD-MCNC: 140 MG/DL (ref 70–99)
GLUCOSE BLD-MCNC: 147 MG/DL (ref 70–99)
HCO3 ARTERIAL: 27.8 MMOL/L (ref 21–29)
HCT VFR BLD CALC: 24.8 % (ref 40.5–52.5)
HEMOGLOBIN, ART, EXTENDED: 7.3 G/DL (ref 13.5–17.5)
HEMOGLOBIN: 7.9 G/DL (ref 13.5–17.5)
HIV AG/AB: NORMAL
HIV ANTIGEN: NORMAL
HIV-1 ANTIBODY: NORMAL
HIV-2 AB: NORMAL
INR BLD: 1.16 (ref 0.86–1.14)
MAGNESIUM: 2.5 MG/DL (ref 1.8–2.4)
MCH RBC QN AUTO: 27.1 PG (ref 26–34)
MCHC RBC AUTO-ENTMCNC: 32 G/DL (ref 31–36)
MCV RBC AUTO: 84.4 FL (ref 80–100)
METHEMOGLOBIN ARTERIAL: 0.4 %
O2 CONTENT ARTERIAL: 10 ML/DL
O2 SAT, ARTERIAL: 94.5 %
O2 THERAPY: ABNORMAL
PCO2 ARTERIAL: 34.9 MMHG (ref 35–45)
PDW BLD-RTO: 16 % (ref 12.4–15.4)
PERFORMED ON: ABNORMAL
PH ARTERIAL: 7.51 (ref 7.35–7.45)
PLATELET # BLD: 493 K/UL (ref 135–450)
PMV BLD AUTO: 8.4 FL (ref 5–10.5)
PO2 ARTERIAL: 66.9 MMHG (ref 75–108)
POTASSIUM SERPL-SCNC: 4.1 MMOL/L (ref 3.5–5.1)
PROCALCITONIN: 0.44 NG/ML (ref 0–0.15)
PROTHROMBIN TIME: 13.5 SEC (ref 10–13.2)
RBC # BLD: 2.93 M/UL (ref 4.2–5.9)
SODIUM BLD-SCNC: 140 MMOL/L (ref 136–145)
TCO2 ARTERIAL: 28.9 MMOL/L
TOTAL PROTEIN: 6.5 G/DL (ref 6.4–8.2)
TRIGL SERPL-MCNC: 183 MG/DL (ref 0–150)
WBC # BLD: 11 K/UL (ref 4–11)

## 2020-05-18 PROCEDURE — 2580000003 HC RX 258: Performed by: THORACIC SURGERY (CARDIOTHORACIC VASCULAR SURGERY)

## 2020-05-18 PROCEDURE — 2580000003 HC RX 258: Performed by: INTERNAL MEDICINE

## 2020-05-18 PROCEDURE — 94640 AIRWAY INHALATION TREATMENT: CPT

## 2020-05-18 PROCEDURE — 6370000000 HC RX 637 (ALT 250 FOR IP): Performed by: THORACIC SURGERY (CARDIOTHORACIC VASCULAR SURGERY)

## 2020-05-18 PROCEDURE — 2100000000 HC CCU R&B

## 2020-05-18 PROCEDURE — 99233 SBSQ HOSP IP/OBS HIGH 50: CPT | Performed by: INTERNAL MEDICINE

## 2020-05-18 PROCEDURE — 6360000002 HC RX W HCPCS: Performed by: INTERNAL MEDICINE

## 2020-05-18 PROCEDURE — 94761 N-INVAS EAR/PLS OXIMETRY MLT: CPT

## 2020-05-18 PROCEDURE — 83735 ASSAY OF MAGNESIUM: CPT

## 2020-05-18 PROCEDURE — 99024 POSTOP FOLLOW-UP VISIT: CPT | Performed by: THORACIC SURGERY (CARDIOTHORACIC VASCULAR SURGERY)

## 2020-05-18 PROCEDURE — 6360000002 HC RX W HCPCS: Performed by: THORACIC SURGERY (CARDIOTHORACIC VASCULAR SURGERY)

## 2020-05-18 PROCEDURE — 6360000002 HC RX W HCPCS: Performed by: NURSE PRACTITIONER

## 2020-05-18 PROCEDURE — 94750 HC PULMONARY COMPLIANCE STUDY: CPT

## 2020-05-18 PROCEDURE — 2580000003 HC RX 258: Performed by: NURSE PRACTITIONER

## 2020-05-18 PROCEDURE — 2500000003 HC RX 250 WO HCPCS: Performed by: THORACIC SURGERY (CARDIOTHORACIC VASCULAR SURGERY)

## 2020-05-18 PROCEDURE — 2700000000 HC OXYGEN THERAPY PER DAY

## 2020-05-18 PROCEDURE — 36415 COLL VENOUS BLD VENIPUNCTURE: CPT

## 2020-05-18 PROCEDURE — 84145 PROCALCITONIN (PCT): CPT

## 2020-05-18 PROCEDURE — 2500000003 HC RX 250 WO HCPCS: Performed by: NURSE PRACTITIONER

## 2020-05-18 PROCEDURE — 6370000000 HC RX 637 (ALT 250 FOR IP): Performed by: NURSE PRACTITIONER

## 2020-05-18 PROCEDURE — 82803 BLOOD GASES ANY COMBINATION: CPT

## 2020-05-18 PROCEDURE — 85730 THROMBOPLASTIN TIME PARTIAL: CPT

## 2020-05-18 PROCEDURE — 85027 COMPLETE CBC AUTOMATED: CPT

## 2020-05-18 PROCEDURE — 80048 BASIC METABOLIC PNL TOTAL CA: CPT

## 2020-05-18 PROCEDURE — 84478 ASSAY OF TRIGLYCERIDES: CPT

## 2020-05-18 PROCEDURE — 80076 HEPATIC FUNCTION PANEL: CPT

## 2020-05-18 PROCEDURE — 85610 PROTHROMBIN TIME: CPT

## 2020-05-18 PROCEDURE — C9113 INJ PANTOPRAZOLE SODIUM, VIA: HCPCS | Performed by: THORACIC SURGERY (CARDIOTHORACIC VASCULAR SURGERY)

## 2020-05-18 PROCEDURE — 2500000003 HC RX 250 WO HCPCS

## 2020-05-18 PROCEDURE — 94003 VENT MGMT INPAT SUBQ DAY: CPT

## 2020-05-18 RX ADMIN — METOPROLOL TARTRATE 50 MG: 50 TABLET, FILM COATED ORAL at 08:05

## 2020-05-18 RX ADMIN — LORAZEPAM 3 MG: 2 INJECTION INTRAMUSCULAR; INTRAVENOUS at 20:14

## 2020-05-18 RX ADMIN — FUROSEMIDE 40 MG: 10 INJECTION, SOLUTION INTRAMUSCULAR; INTRAVENOUS at 13:55

## 2020-05-18 RX ADMIN — DEXMEDETOMIDINE HYDROCHLORIDE 1.1 MCG/KG/HR: 4 INJECTION, SOLUTION INTRAVENOUS at 20:09

## 2020-05-18 RX ADMIN — NICARDIPINE HYDROCHLORIDE 2.5 MG/HR: 0.2 INJECTION, SOLUTION INTRAVENOUS at 08:24

## 2020-05-18 RX ADMIN — LORAZEPAM 2 MG: 2 INJECTION INTRAMUSCULAR; INTRAVENOUS at 16:29

## 2020-05-18 RX ADMIN — FENTANYL CITRATE 25 MCG: 50 INJECTION INTRAMUSCULAR; INTRAVENOUS at 12:24

## 2020-05-18 RX ADMIN — SENNOSIDES AND DOCUSATE SODIUM 1 TABLET: 8.6; 5 TABLET ORAL at 20:14

## 2020-05-18 RX ADMIN — DEXMEDETOMIDINE HYDROCHLORIDE 1 MCG/KG/HR: 4 INJECTION, SOLUTION INTRAVENOUS at 04:51

## 2020-05-18 RX ADMIN — PIPERACILLIN AND TAZOBACTAM 3.38 G: 3; .375 INJECTION, POWDER, LYOPHILIZED, FOR SOLUTION INTRAVENOUS at 16:33

## 2020-05-18 RX ADMIN — ASPIRIN 81 MG 324 MG: 81 TABLET ORAL at 08:05

## 2020-05-18 RX ADMIN — POTASSIUM BICARBONATE 10 MEQ: 782 TABLET, EFFERVESCENT ORAL at 20:13

## 2020-05-18 RX ADMIN — PROPOFOL 25 MCG/KG/MIN: 10 INJECTION, EMULSION INTRAVENOUS at 23:42

## 2020-05-18 RX ADMIN — ALBUTEROL SULFATE 2.5 MG: 2.5 SOLUTION RESPIRATORY (INHALATION) at 20:03

## 2020-05-18 RX ADMIN — Medication 10 ML: at 07:04

## 2020-05-18 RX ADMIN — MINERAL SUPPLEMENT IRON 300 MG / 5 ML STRENGTH LIQUID 100 PER BOX UNFLAVORED 300 MG: at 20:13

## 2020-05-18 RX ADMIN — OXYCODONE 5 MG: 5 TABLET ORAL at 08:07

## 2020-05-18 RX ADMIN — FUROSEMIDE 40 MG: 10 INJECTION, SOLUTION INTRAMUSCULAR; INTRAVENOUS at 20:14

## 2020-05-18 RX ADMIN — SODIUM CHLORIDE, PRESERVATIVE FREE 10 ML: 5 INJECTION INTRAVENOUS at 08:28

## 2020-05-18 RX ADMIN — ALBUTEROL SULFATE 2.5 MG: 2.5 SOLUTION RESPIRATORY (INHALATION) at 07:42

## 2020-05-18 RX ADMIN — SENNOSIDES AND DOCUSATE SODIUM 1 TABLET: 8.6; 5 TABLET ORAL at 08:05

## 2020-05-18 RX ADMIN — CHLORHEXIDINE GLUCONATE 15 ML: 1.2 RINSE ORAL at 20:09

## 2020-05-18 RX ADMIN — INSULIN LISPRO 3 UNITS: 100 INJECTION, SOLUTION INTRAVENOUS; SUBCUTANEOUS at 16:22

## 2020-05-18 RX ADMIN — DEXMEDETOMIDINE HYDROCHLORIDE 0.7 MCG/KG/HR: 4 INJECTION, SOLUTION INTRAVENOUS at 09:38

## 2020-05-18 RX ADMIN — PROPOFOL 40 MCG/KG/MIN: 10 INJECTION, EMULSION INTRAVENOUS at 16:11

## 2020-05-18 RX ADMIN — DEXMEDETOMIDINE HYDROCHLORIDE 1 MCG/KG/HR: 4 INJECTION, SOLUTION INTRAVENOUS at 15:01

## 2020-05-18 RX ADMIN — CHLORHEXIDINE GLUCONATE 15 ML: 1.2 RINSE ORAL at 08:09

## 2020-05-18 RX ADMIN — PROPOFOL 25 MCG/KG/MIN: 10 INJECTION, EMULSION INTRAVENOUS at 08:37

## 2020-05-18 RX ADMIN — DOCUSATE SODIUM 100 MG: 50 LIQUID ORAL at 08:05

## 2020-05-18 RX ADMIN — PIPERACILLIN AND TAZOBACTAM 3.38 G: 3; .375 INJECTION, POWDER, LYOPHILIZED, FOR SOLUTION INTRAVENOUS at 08:05

## 2020-05-18 RX ADMIN — HEPARIN SODIUM 5000 UNITS: 5000 INJECTION INTRAVENOUS; SUBCUTANEOUS at 20:23

## 2020-05-18 RX ADMIN — FUROSEMIDE 40 MG: 10 INJECTION, SOLUTION INTRAMUSCULAR; INTRAVENOUS at 08:06

## 2020-05-18 RX ADMIN — DOCUSATE SODIUM 100 MG: 50 LIQUID ORAL at 20:13

## 2020-05-18 RX ADMIN — PANTOPRAZOLE SODIUM 40 MG: 40 INJECTION, POWDER, FOR SOLUTION INTRAVENOUS at 07:04

## 2020-05-18 RX ADMIN — PROPOFOL 35 MCG/KG/MIN: 10 INJECTION, EMULSION INTRAVENOUS at 02:44

## 2020-05-18 RX ADMIN — HEPARIN SODIUM 5000 UNITS: 5000 INJECTION INTRAVENOUS; SUBCUTANEOUS at 13:55

## 2020-05-18 RX ADMIN — SODIUM CHLORIDE 5 MG/HR: 9 INJECTION, SOLUTION INTRAVENOUS at 17:53

## 2020-05-18 RX ADMIN — ALBUTEROL SULFATE 2.5 MG: 2.5 SOLUTION RESPIRATORY (INHALATION) at 11:23

## 2020-05-18 RX ADMIN — METOPROLOL TARTRATE 50 MG: 50 TABLET, FILM COATED ORAL at 20:14

## 2020-05-18 RX ADMIN — POTASSIUM BICARBONATE 10 MEQ: 782 TABLET, EFFERVESCENT ORAL at 13:54

## 2020-05-18 RX ADMIN — LORAZEPAM 2 MG: 1 TABLET ORAL at 02:53

## 2020-05-18 RX ADMIN — HEPARIN SODIUM 5000 UNITS: 5000 INJECTION INTRAVENOUS; SUBCUTANEOUS at 06:30

## 2020-05-18 RX ADMIN — ALBUTEROL SULFATE 2.5 MG: 2.5 SOLUTION RESPIRATORY (INHALATION) at 15:33

## 2020-05-18 RX ADMIN — MINERAL SUPPLEMENT IRON 300 MG / 5 ML STRENGTH LIQUID 100 PER BOX UNFLAVORED 300 MG: at 08:05

## 2020-05-18 RX ADMIN — LORAZEPAM 1 MG: 2 INJECTION, SOLUTION INTRAMUSCULAR; INTRAVENOUS at 12:15

## 2020-05-18 RX ADMIN — PIPERACILLIN AND TAZOBACTAM 3.38 G: 3; .375 INJECTION, POWDER, LYOPHILIZED, FOR SOLUTION INTRAVENOUS at 01:55

## 2020-05-18 RX ADMIN — POTASSIUM BICARBONATE 10 MEQ: 782 TABLET, EFFERVESCENT ORAL at 08:05

## 2020-05-18 RX ADMIN — SODIUM CHLORIDE, PRESERVATIVE FREE 10 ML: 5 INJECTION INTRAVENOUS at 20:14

## 2020-05-18 RX ADMIN — INSULIN LISPRO 3 UNITS: 100 INJECTION, SOLUTION INTRAVENOUS; SUBCUTANEOUS at 00:21

## 2020-05-18 ASSESSMENT — PULMONARY FUNCTION TESTS
PIF_VALUE: 16
PIF_VALUE: 12
PIF_VALUE: 11
PIF_VALUE: 33
PIF_VALUE: 13
PIF_VALUE: 11
PIF_VALUE: 15
PIF_VALUE: 12
PIF_VALUE: 13
PIF_VALUE: 14
PIF_VALUE: 19
PIF_VALUE: 9
PIF_VALUE: 21
PIF_VALUE: 11
PIF_VALUE: 10
PIF_VALUE: 9
PIF_VALUE: 13
PIF_VALUE: 15
PIF_VALUE: 18
PIF_VALUE: 24
PIF_VALUE: 16
PIF_VALUE: 30
PIF_VALUE: 19
PIF_VALUE: 13
PIF_VALUE: 11
PIF_VALUE: 14
PIF_VALUE: 13
PIF_VALUE: 10
PIF_VALUE: 21
PIF_VALUE: 25
PIF_VALUE: 16
PIF_VALUE: 20
PIF_VALUE: 23

## 2020-05-18 ASSESSMENT — PAIN SCALES - GENERAL
PAINLEVEL_OUTOF10: 0
PAINLEVEL_OUTOF10: 8
PAINLEVEL_OUTOF10: 5
PAINLEVEL_OUTOF10: 0
PAINLEVEL_OUTOF10: 8
PAINLEVEL_OUTOF10: 5

## 2020-05-18 NOTE — CARE COORDINATION
LSW reviewed chart. Patient remains intubated /vented following CABGX4 on 5-8-2020. Social Work Team following for [employer].   Sanjeev Faust     Case Management   [phone]    5/18/2020  11:01 AM

## 2020-05-18 NOTE — PROGRESS NOTES
0700: Shift handoff report received from ELLY Killian RN.   2071: Initial assessment completed, see flow sheet(including dose-rate changes). 2630: Pt restless, HR in 100s, BP in 170s, R in the 40s. Cardene (40-0.83)mg gtt started by Alecia Roca (CNP). 1000: Pt noted to be moving Lt leg, turning head from left to right but does not follow commands or open eyes with voice prompts. 1215: Ativan given  1216: Call received from Pedro Hector and updated. 1224: Fentanyl given   1411: Inpatient consult to neurology placed and notified by [facility]. 1525: Dr. Araceli Jensen rounding, no new orders placed. 1600: Reassessment completed and unchanged. 1611: Bilateral pneumatic compression stockings removed, skin assessed, skin unchanged from previous assessment. Cardene (40-0.83)mg gtt completed  1734: Bilateral pneumatic compression stockings placed back on. Pt still being tachypeneic, Dr. Miguel Campbell and Denins Lazo. aware, no orders placed. Waiting on neurology. 1753: Cardene 25mg gtt restarted. 1900: Shift handoff report given to [facility]. B. RN to resume care. Pt left in a stable condition.    Electronically signed by Roberto Sky RN on 5/18/2020 at 7:06 PM

## 2020-05-18 NOTE — PROGRESS NOTES
Nasal probe found on left ear. Moved to left nare. RN aware.     Electronically signed by Anton Jay on 5/18/2020 at 7:51 AM

## 2020-05-19 LAB
ANION GAP SERPL CALCULATED.3IONS-SCNC: 12 MMOL/L (ref 3–16)
APTT: 28.3 SEC (ref 24.2–36.2)
BASE EXCESS ARTERIAL: 4.5 MMOL/L (ref -3–3)
BUN BLDV-MCNC: 50 MG/DL (ref 7–20)
CALCIUM SERPL-MCNC: 8.3 MG/DL (ref 8.3–10.6)
CARBOXYHEMOGLOBIN ARTERIAL: 0.4 % (ref 0–1.5)
CHLORIDE BLD-SCNC: 105 MMOL/L (ref 99–110)
CO2: 25 MMOL/L (ref 21–32)
CREAT SERPL-MCNC: 0.9 MG/DL (ref 0.8–1.3)
GFR AFRICAN AMERICAN: >60
GFR NON-AFRICAN AMERICAN: >60
GLUCOSE BLD-MCNC: 128 MG/DL (ref 70–99)
GLUCOSE BLD-MCNC: 135 MG/DL (ref 70–99)
GLUCOSE BLD-MCNC: 137 MG/DL (ref 70–99)
GLUCOSE BLD-MCNC: 138 MG/DL (ref 70–99)
GLUCOSE BLD-MCNC: 148 MG/DL (ref 70–99)
GLUCOSE BLD-MCNC: 150 MG/DL (ref 70–99)
GLUCOSE BLD-MCNC: 160 MG/DL (ref 70–99)
HCO3 ARTERIAL: 28.1 MMOL/L (ref 21–29)
HCT VFR BLD CALC: 25.6 % (ref 40.5–52.5)
HCV QNT BY NAAT IU/ML: NOT DETECTED IU/ML
HCV QNT BY NAAT LOG IU/ML: NOT DETECTED LOG IU/ML
HEMOGLOBIN, ART, EXTENDED: 10.8 G/DL (ref 13.5–17.5)
HEMOGLOBIN: 8.3 G/DL (ref 13.5–17.5)
INR BLD: 1.19 (ref 0.86–1.14)
INTERPRETATION: NOT DETECTED
MAGNESIUM: 2.7 MG/DL (ref 1.8–2.4)
MCH RBC QN AUTO: 27.4 PG (ref 26–34)
MCHC RBC AUTO-ENTMCNC: 32.3 G/DL (ref 31–36)
MCV RBC AUTO: 85 FL (ref 80–100)
METHEMOGLOBIN ARTERIAL: 0.4 %
O2 CONTENT ARTERIAL: 15 ML/DL
O2 SAT, ARTERIAL: 99 %
O2 THERAPY: ABNORMAL
PCO2 ARTERIAL: 37.4 MMHG (ref 35–45)
PDW BLD-RTO: 15.8 % (ref 12.4–15.4)
PERFORMED ON: ABNORMAL
PH ARTERIAL: 7.48 (ref 7.35–7.45)
PLATELET # BLD: 568 K/UL (ref 135–450)
PMV BLD AUTO: 8 FL (ref 5–10.5)
PO2 ARTERIAL: 136 MMHG (ref 75–108)
POTASSIUM SERPL-SCNC: 4.4 MMOL/L (ref 3.5–5.1)
PROTHROMBIN TIME: 13.8 SEC (ref 10–13.2)
RBC # BLD: 3.01 M/UL (ref 4.2–5.9)
SODIUM BLD-SCNC: 142 MMOL/L (ref 136–145)
TCO2 ARTERIAL: 29.3 MMOL/L
WBC # BLD: 11.4 K/UL (ref 4–11)

## 2020-05-19 PROCEDURE — 6360000002 HC RX W HCPCS: Performed by: INTERNAL MEDICINE

## 2020-05-19 PROCEDURE — 6360000002 HC RX W HCPCS: Performed by: THORACIC SURGERY (CARDIOTHORACIC VASCULAR SURGERY)

## 2020-05-19 PROCEDURE — 99233 SBSQ HOSP IP/OBS HIGH 50: CPT | Performed by: INTERNAL MEDICINE

## 2020-05-19 PROCEDURE — 2500000003 HC RX 250 WO HCPCS: Performed by: NURSE PRACTITIONER

## 2020-05-19 PROCEDURE — 36592 COLLECT BLOOD FROM PICC: CPT

## 2020-05-19 PROCEDURE — 94003 VENT MGMT INPAT SUBQ DAY: CPT

## 2020-05-19 PROCEDURE — 85610 PROTHROMBIN TIME: CPT

## 2020-05-19 PROCEDURE — 6360000002 HC RX W HCPCS: Performed by: NURSE PRACTITIONER

## 2020-05-19 PROCEDURE — 94640 AIRWAY INHALATION TREATMENT: CPT

## 2020-05-19 PROCEDURE — 2580000003 HC RX 258: Performed by: NURSE PRACTITIONER

## 2020-05-19 PROCEDURE — 94761 N-INVAS EAR/PLS OXIMETRY MLT: CPT

## 2020-05-19 PROCEDURE — 85027 COMPLETE CBC AUTOMATED: CPT

## 2020-05-19 PROCEDURE — 2580000003 HC RX 258: Performed by: THORACIC SURGERY (CARDIOTHORACIC VASCULAR SURGERY)

## 2020-05-19 PROCEDURE — 6370000000 HC RX 637 (ALT 250 FOR IP): Performed by: NURSE PRACTITIONER

## 2020-05-19 PROCEDURE — 99024 POSTOP FOLLOW-UP VISIT: CPT | Performed by: THORACIC SURGERY (CARDIOTHORACIC VASCULAR SURGERY)

## 2020-05-19 PROCEDURE — 2500000003 HC RX 250 WO HCPCS: Performed by: THORACIC SURGERY (CARDIOTHORACIC VASCULAR SURGERY)

## 2020-05-19 PROCEDURE — 2700000000 HC OXYGEN THERAPY PER DAY

## 2020-05-19 PROCEDURE — 80048 BASIC METABOLIC PNL TOTAL CA: CPT

## 2020-05-19 PROCEDURE — 2100000000 HC CCU R&B

## 2020-05-19 PROCEDURE — 6370000000 HC RX 637 (ALT 250 FOR IP): Performed by: THORACIC SURGERY (CARDIOTHORACIC VASCULAR SURGERY)

## 2020-05-19 PROCEDURE — 85730 THROMBOPLASTIN TIME PARTIAL: CPT

## 2020-05-19 PROCEDURE — 83735 ASSAY OF MAGNESIUM: CPT

## 2020-05-19 PROCEDURE — C9113 INJ PANTOPRAZOLE SODIUM, VIA: HCPCS | Performed by: THORACIC SURGERY (CARDIOTHORACIC VASCULAR SURGERY)

## 2020-05-19 PROCEDURE — 2580000003 HC RX 258: Performed by: INTERNAL MEDICINE

## 2020-05-19 PROCEDURE — 94750 HC PULMONARY COMPLIANCE STUDY: CPT

## 2020-05-19 PROCEDURE — 82803 BLOOD GASES ANY COMBINATION: CPT

## 2020-05-19 RX ORDER — CASTOR OIL AND BALSAM, PERU 788; 87 MG/G; MG/G
OINTMENT TOPICAL 2 TIMES DAILY
Status: DISCONTINUED | OUTPATIENT
Start: 2020-05-19 | End: 2020-06-05 | Stop reason: HOSPADM

## 2020-05-19 RX ORDER — FUROSEMIDE 10 MG/ML
20 INJECTION INTRAMUSCULAR; INTRAVENOUS 2 TIMES DAILY
Status: DISCONTINUED | OUTPATIENT
Start: 2020-05-19 | End: 2020-05-25

## 2020-05-19 RX ORDER — SENNA AND DOCUSATE SODIUM 50; 8.6 MG/1; MG/1
1 TABLET, FILM COATED ORAL DAILY PRN
Status: DISCONTINUED | OUTPATIENT
Start: 2020-05-19 | End: 2020-06-05 | Stop reason: HOSPADM

## 2020-05-19 RX ADMIN — MINERAL SUPPLEMENT IRON 300 MG / 5 ML STRENGTH LIQUID 100 PER BOX UNFLAVORED 300 MG: at 09:58

## 2020-05-19 RX ADMIN — DOCUSATE SODIUM 100 MG: 50 LIQUID ORAL at 09:57

## 2020-05-19 RX ADMIN — SODIUM CHLORIDE 12.5 MG/HR: 9 INJECTION, SOLUTION INTRAVENOUS at 10:06

## 2020-05-19 RX ADMIN — PANTOPRAZOLE SODIUM 40 MG: 40 INJECTION, POWDER, FOR SOLUTION INTRAVENOUS at 06:15

## 2020-05-19 RX ADMIN — CHLORHEXIDINE GLUCONATE 15 ML: 1.2 RINSE ORAL at 21:26

## 2020-05-19 RX ADMIN — INSULIN LISPRO 3 UNITS: 100 INJECTION, SOLUTION INTRAVENOUS; SUBCUTANEOUS at 18:09

## 2020-05-19 RX ADMIN — ALBUTEROL SULFATE 2.5 MG: 2.5 SOLUTION RESPIRATORY (INHALATION) at 11:44

## 2020-05-19 RX ADMIN — Medication 10 ML: at 06:22

## 2020-05-19 RX ADMIN — HEPARIN SODIUM 5000 UNITS: 5000 INJECTION INTRAVENOUS; SUBCUTANEOUS at 13:34

## 2020-05-19 RX ADMIN — INSULIN LISPRO 3 UNITS: 100 INJECTION, SOLUTION INTRAVENOUS; SUBCUTANEOUS at 00:08

## 2020-05-19 RX ADMIN — ALBUTEROL SULFATE 2.5 MG: 2.5 SOLUTION RESPIRATORY (INHALATION) at 07:54

## 2020-05-19 RX ADMIN — PROPOFOL 45 MCG/KG/MIN: 10 INJECTION, EMULSION INTRAVENOUS at 16:43

## 2020-05-19 RX ADMIN — POTASSIUM BICARBONATE 10 MEQ: 782 TABLET, EFFERVESCENT ORAL at 14:33

## 2020-05-19 RX ADMIN — FENTANYL CITRATE 25 MCG: 50 INJECTION INTRAMUSCULAR; INTRAVENOUS at 20:57

## 2020-05-19 RX ADMIN — HEPARIN SODIUM 5000 UNITS: 5000 INJECTION INTRAVENOUS; SUBCUTANEOUS at 06:15

## 2020-05-19 RX ADMIN — METOPROLOL TARTRATE 75 MG: 25 TABLET, FILM COATED ORAL at 09:57

## 2020-05-19 RX ADMIN — ALBUTEROL SULFATE 2.5 MG: 2.5 SOLUTION RESPIRATORY (INHALATION) at 16:03

## 2020-05-19 RX ADMIN — SODIUM CHLORIDE 5 MG/HR: 9 INJECTION, SOLUTION INTRAVENOUS at 17:51

## 2020-05-19 RX ADMIN — CHLORHEXIDINE GLUCONATE 15 ML: 1.2 RINSE ORAL at 10:07

## 2020-05-19 RX ADMIN — SODIUM CHLORIDE, PRESERVATIVE FREE 10 ML: 5 INJECTION INTRAVENOUS at 10:08

## 2020-05-19 RX ADMIN — PIPERACILLIN AND TAZOBACTAM 3.38 G: 3; .375 INJECTION, POWDER, LYOPHILIZED, FOR SOLUTION INTRAVENOUS at 18:11

## 2020-05-19 RX ADMIN — FENTANYL CITRATE 25 MCG: 50 INJECTION INTRAMUSCULAR; INTRAVENOUS at 19:45

## 2020-05-19 RX ADMIN — DEXMEDETOMIDINE HYDROCHLORIDE 0.5 MCG/KG/HR: 4 INJECTION, SOLUTION INTRAVENOUS at 07:58

## 2020-05-19 RX ADMIN — MINERAL SUPPLEMENT IRON 300 MG / 5 ML STRENGTH LIQUID 100 PER BOX UNFLAVORED 300 MG: at 22:15

## 2020-05-19 RX ADMIN — PIPERACILLIN AND TAZOBACTAM 3.38 G: 3; .375 INJECTION, POWDER, LYOPHILIZED, FOR SOLUTION INTRAVENOUS at 09:59

## 2020-05-19 RX ADMIN — DEXMEDETOMIDINE HYDROCHLORIDE 0.5 MCG/KG/HR: 4 INJECTION, SOLUTION INTRAVENOUS at 18:13

## 2020-05-19 RX ADMIN — PROPOFOL 45 MCG/KG/MIN: 10 INJECTION, EMULSION INTRAVENOUS at 11:52

## 2020-05-19 RX ADMIN — DEXMEDETOMIDINE HYDROCHLORIDE 1 MCG/KG/HR: 4 INJECTION, SOLUTION INTRAVENOUS at 00:57

## 2020-05-19 RX ADMIN — LORAZEPAM 2 MG: 2 INJECTION INTRAMUSCULAR; INTRAVENOUS at 20:46

## 2020-05-19 RX ADMIN — PIPERACILLIN AND TAZOBACTAM 3.38 G: 3; .375 INJECTION, POWDER, LYOPHILIZED, FOR SOLUTION INTRAVENOUS at 00:41

## 2020-05-19 RX ADMIN — PROPOFOL 25 MCG/KG/MIN: 10 INJECTION, EMULSION INTRAVENOUS at 05:33

## 2020-05-19 RX ADMIN — PROPOFOL 40 MCG/KG/MIN: 10 INJECTION, EMULSION INTRAVENOUS at 21:32

## 2020-05-19 RX ADMIN — METOPROLOL TARTRATE 75 MG: 25 TABLET, FILM COATED ORAL at 20:45

## 2020-05-19 RX ADMIN — FUROSEMIDE 20 MG: 10 INJECTION, SOLUTION INTRAMUSCULAR; INTRAVENOUS at 18:11

## 2020-05-19 RX ADMIN — DOCUSATE SODIUM 100 MG: 50 LIQUID ORAL at 20:45

## 2020-05-19 RX ADMIN — ASPIRIN 81 MG 324 MG: 81 TABLET ORAL at 09:56

## 2020-05-19 RX ADMIN — INSULIN LISPRO 3 UNITS: 100 INJECTION, SOLUTION INTRAVENOUS; SUBCUTANEOUS at 13:34

## 2020-05-19 RX ADMIN — POTASSIUM BICARBONATE 10 MEQ: 782 TABLET, EFFERVESCENT ORAL at 09:57

## 2020-05-19 RX ADMIN — SENNOSIDES AND DOCUSATE SODIUM 1 TABLET: 8.6; 5 TABLET ORAL at 09:58

## 2020-05-19 RX ADMIN — FENTANYL CITRATE 25 MCG: 50 INJECTION INTRAMUSCULAR; INTRAVENOUS at 08:12

## 2020-05-19 RX ADMIN — ALBUTEROL SULFATE 2.5 MG: 2.5 SOLUTION RESPIRATORY (INHALATION) at 20:20

## 2020-05-19 RX ADMIN — SODIUM CHLORIDE 10 MG/HR: 9 INJECTION, SOLUTION INTRAVENOUS at 09:28

## 2020-05-19 RX ADMIN — FUROSEMIDE 20 MG: 10 INJECTION, SOLUTION INTRAMUSCULAR; INTRAVENOUS at 10:02

## 2020-05-19 RX ADMIN — SODIUM CHLORIDE 7.5 MG/HR: 9 INJECTION, SOLUTION INTRAVENOUS at 12:59

## 2020-05-19 RX ADMIN — HEPARIN SODIUM 5000 UNITS: 5000 INJECTION INTRAVENOUS; SUBCUTANEOUS at 20:45

## 2020-05-19 RX ADMIN — PROPOFOL 45 MCG/KG/MIN: 10 INJECTION, EMULSION INTRAVENOUS at 09:29

## 2020-05-19 RX ADMIN — POTASSIUM BICARBONATE 10 MEQ: 782 TABLET, EFFERVESCENT ORAL at 20:45

## 2020-05-19 ASSESSMENT — PULMONARY FUNCTION TESTS
PIF_VALUE: 13
PIF_VALUE: 11
PIF_VALUE: 11
PIF_VALUE: 42
PIF_VALUE: 11
PIF_VALUE: 12
PIF_VALUE: 20
PIF_VALUE: 16
PIF_VALUE: 11
PIF_VALUE: 16
PIF_VALUE: 7
PIF_VALUE: 10
PIF_VALUE: 11
PIF_VALUE: 12
PIF_VALUE: 12
PIF_VALUE: 13
PIF_VALUE: 8
PIF_VALUE: 9
PIF_VALUE: 11
PIF_VALUE: 10
PIF_VALUE: 46
PIF_VALUE: 9
PIF_VALUE: 19
PIF_VALUE: 14
PIF_VALUE: 16
PIF_VALUE: 9
PIF_VALUE: 13
PIF_VALUE: 45
PIF_VALUE: 11
PIF_VALUE: 10
PIF_VALUE: 11
PIF_VALUE: 12
PIF_VALUE: 12
PIF_VALUE: 26
PIF_VALUE: 9
PIF_VALUE: 10
PIF_VALUE: 9

## 2020-05-19 ASSESSMENT — PAIN SCALES - GENERAL
PAINLEVEL_OUTOF10: 1
PAINLEVEL_OUTOF10: 0
PAINLEVEL_OUTOF10: 5
PAINLEVEL_OUTOF10: 0
PAINLEVEL_OUTOF10: 1
PAINLEVEL_OUTOF10: 3

## 2020-05-19 NOTE — CONSULTS
Neurology Consult Note  Reason for Consult: mental status changes post CABG    Chief complaint: unable to obtain from the patient at this time. Dr Kia Kee MD asked me to see Cynthia Morales in consultation for evaluation of mental status changes post CABG    History of Present Illness:  Cynthia Morales is a 60 y.o. male who presented for CABG. I obtained my information via chart review and conversation w/ the patient's RN. The patient arrived to the hospital on the 7th w/ complaints of chest pain and shortness of breath for a few weeks. His troponin trended up. Cardiology performed an emergent [facility] catheterization, which identified multivessel disease. CT surgery subsequently did an urgent CABGx4 on 5/8. Since surgery, the patient has continued to require sedation for behavior and hemodynamic reasons. When able to be observed, he has been agitated and combative. At one point, he became febrile w/ leukocytosis. He was noted to have some cyanosis of his hands. A CT head was obtained on 5/11 w/out any acute abnormality. Currently, he remains intubated as he was unable to tolerate breathing trials, on precedex/propofol/fentynal.      His significant other reports a hx of undisclosed ETOH use. She says she has found hidden bottles of liquor around the home. Medical History:  Past Medical History:   Diagnosis Date    Blood clot in vein 2018    right leg. no previous scans to know whether supf or dvt. no coumadin. put on plavix.  Bronchitis     doesn't remember when.  outpt abx    Hepatitis C antibody positive in blood 05/16/2020    Hypertension     Kidney disease     s/p nephrectomy age 17, pt not sure why     NSTEMI (non-ST elevated myocardial infarction) ([facility] [address] 05/07/2020    3VD    Respiratory compromise 05/2020    chemical exposure at work, seen at [facility], covid neg, given steroids    Tattoos      Past Surgical History:   Procedure Laterality Date    CORONARY ARTERY kaylee given encephalopathy  Tone: no increased tone or rigidity. Gait: limited exam given encephalopathy and intubated with ventilator. Labs  Glucose 128  Na 142  K 4.4  BUN 50  Creatinine 0.9  Mg 2.70    WBC 11.4K  Hg 8.3  Platelets 834    Sed Rate > 120  SREE negative    Ammonia 51    Hep C Ab reactive  HIV non reactive  Blood cultures NG  Urine culture NG  COVID negative    Studies  CT head w/o 5/11/20, independently reviewed  No acute abnormality. Impression  1. Metabolic encephalopathy/delirium, likely multifactorial.  He remains intubated, sedated. CT head w/out any acute findings. 2.  S/p CABG x4.    3.  ETOH abuse. 4.  Febrile. ID following. 5.  Hep C +    Recommendations  1. Routine EEG. 2.  Continue supportive care. 3.  Wean sedation when able. 4.  ID following.       Nichelle Dorantes NP  [address] [place] [address] [id number] Neurology    A copy of this note was provided for Dr Ramila Sawyer MD

## 2020-05-19 NOTE — PROGRESS NOTES
4 Eyes Skin Assessment     The patient is being assess for  Shift Handoff    I agree that 2 RN's have performed a thorough Head to Toe Skin Assessment on the patient. ALL assessment sites listed below have been assessed. Areas assessed by both nurses: Eloy Trimble  [x]   Head, Face, and Ears   [x]   Shoulders, Back, and Chest  [x]   Arms, Elbows, and Hands   [x]   Coccyx, Sacrum, and IschIum  [x]   Legs, Feet, and Heels        Does the Patient have Skin Breakdown? Yes    Pt has many DTIs.  Will ask wound care to come see him        Kip Prevention initiated:  Yes   Wound Care Orders initiated:  Yes      [address]  nurse consulted for Pressure Injury (Stage 3,4, Unstageable, DTI, NWPT, and Complex wounds), New and Established Ostomies:  Yes      Nurse 1 eSignature: Electronically signed by Angelita Robles RN on 5/19/20 at 2:29 PM EDT    **SHARE this note so that the co-signing nurse is able to place an eSignature**    Nurse 2 eSignature:

## 2020-05-19 NOTE — CARE COORDINATION
[id number] [facility] Wound Ostomy Continence Nurse  Follow-up Progress Note       NAME:  Giorgio Dominguez  MEDICAL RECORD NUMBER:  [id number]  AGE:  60 y.o. GENDER:  male  :  1955  TODAY'S DATE:  2020    Subjective:   Wound Identification:  Wound Type: pressure  Contributing Factors: chronic pressure and decreased mobility        Patient Goal of Care:  [] Wound Healing  [] Odor Control  [] Palliative Care  [] Pain Control   [x] Other: sedated on vent    Objective:    BP (!) 97/58   Pulse 84   Temp 98.6 °F (37 °C) (Rectal)   Resp (!) 38   Ht 6' (1.829 m)   Wt 184 lb 11.9 oz (83.8 kg)   SpO2 96%   BMI 25.06 kg/m²   Kip Risk Score: Kip Scale Score: 13  Assessment:   Measurements:          Wound 20 Heel Left (Active)   Wound Image   2020 10:45 AM   Wound Deep tissue/Injury 2020 10:45 AM   Dressing/Treatment Barrier film 2020 10:45 AM   Wound Length (cm) 6.5 cm 2020 10:45 AM   Wound Width (cm) 7.5 cm 2020 10:45 AM   Wound Depth (cm) 0 cm 2020 10:45 AM   Wound Surface Area (cm^2) 48.75 cm^2 2020 10:45 AM   Change in Wound Size % (l*w) -21.88 2020 10:45 AM   Wound Volume (cm^3) 0 cm^3 2020 10:45 AM   Wound Assessment Blood filled blister 2020 10:45 AM   Drainage Amount None 2020 10:45 AM   Odor None 2020 10:45 AM   Margins Defined edges; Attached edges 2020 10:45 AM   Becky-wound Assessment Pink;Clean;Dry 2020 10:45 AM   Purple%Wound Bed 100 2020  4:00 AM   Culture Taken No 2020 10:45 AM   Number of days: 6           Wound 20 Heel Right (Active)   Wound Image   2020 10:45 AM   Wound Deep tissue/Injury 2020 10:45 AM   Dressing/Treatment Barrier film 2020 10:45 AM   Wound Length (cm) 5 cm 2020 10:45 AM   Wound Width (cm) 7.5 cm 2020 10:45 AM   Wound Depth (cm) 0 cm 2020 10:45 AM   Wound Surface Area (cm^2) 37.5 cm^2 2020 10:45 AM   Change in Wound Size % (l*w) 5/19/2020 10:45 AM   Wound Volume (cm^3) 0 cm^3 5/19/2020 10:45 AM   Wound Assessment Purple 5/19/2020 10:45 AM   Drainage Amount None 5/19/2020 10:45 AM   Odor None 5/19/2020 10:45 AM   Margins Defined edges 5/19/2020 10:45 AM   Becky-wound Assessment Clean;Dry 5/19/2020 10:45 AM   Purple%Wound Bed 100 5/19/2020 10:45 AM   Number of days: 0     Pt seen. Has multiple areas of DTI. Heel DTIs are evolving. Right foot with multiple areas of DTI - top of foot is 3x5cm, purple; lateral 3x1.5cm, purple, lateral 1x1.5cm. All are purple, without drainage, no odor. Response to treatment:  Well tolerated by patient. Plan:   Plan of Care: Incision 05/08/20 Sternum Anterior-Dressing/Treatment: Open to air  Incision 05/08/20 Leg Left-Dressing/Treatment: Open to air  Wound 05/13/20 Heel Left-Dressing/Treatment: Barrier film  Wound 05/13/20 Heel Right-Dressing/Treatment: Barrier film  Incision 05/14/20 Chest Lower;Mid-Dressing/Treatment: Open to air  Wound 05/19/20 Sacrum DTI-Dressing/Treatment: Pharmaceutical agent (see MAR)  Wound 05/19/20 Pretibial Right-Dressing/Treatment: Barrier film  Wound 05/19/20 Foot Anterior;Right-Dressing/Treatment: Barrier film  Wound 05/19/20 Pretibial Left-Dressing/Treatment: Barrier film  Wound 05/19/20 Foot Anterior; Left-Dressing/Treatment: Barrier film  Wound 05/19/20 Ankle Right;Posterior achilles-Dressing/Treatment: Barrier film    Specialty Bed Required : No   [] Low Air Loss   [] Pressure Redistribution  [] Fluid Immersion  [] Bariatric  [] Total Pressure Relief  [] Other:     Current Diet: DIET TUBE FEED CONTINUOUS/CYCLIC NPO; STANDARD WITH FIBER; Orogastric; Continuous; 25; 60  Dietician consult:  Yes    Discharge Plan:  Placement for patient upon discharge: TBD  Patient appropriate for Outpatient [address]: [facility]    Referrals:  []   [] [facility]  [] Supplies  [] Other    Patient/Caregiver Teaching:  Level of patient/caregiver understanding able to:   []

## 2020-05-19 NOTE — PROGRESS NOTES
ARTERY BYPASS GRAFT N/A 2020    CABG CORONARY ARTERY BYPASS GRAFT X4,  TRANSESOPHAGEAL ECHOCARDIOGRAM, TOTAL CARDIOPULMONARY BYPASS performed by Bing Owusu MD at [address]     17 yo - pt doesn't know why       Current Medications:    Outpatient Medications Marked as Taking for the 20 encounter [facility] Encounter)   Medication Sig Dispense Refill    clopidogrel (PLAVIX) 75 MG tablet Take 75 mg by mouth daily      lisinopril (PRINIVIL;ZESTRIL) 40 MG tablet Take 40 mg by mouth daily      amLODIPine (NORVASC) 5 MG tablet Take 5 mg by mouth daily      albuterol sulfate HFA (VENTOLIN HFA) 108 (90 Base) MCG/ACT inhaler Inhale 2 puffs into the lungs every 6 hours as needed for Wheezing         Allergies:  Patient has no known allergies. Immunizations : There is no immunization history on file for this patient. Social History:    Social History     Tobacco Use    Smoking status: Former Smoker     Types: Cigarettes     Last attempt to quit: 2019     Years since quittin.5    Smokeless tobacco: Never Used    Tobacco comment: started age 27   Substance Use Topics    Alcohol use: Yes     Frequency: Monthly or less     Comment: once a month, shot once a week. depends on mood.  Drug use: Never     Social History     Tobacco Use   Smoking Status Former Smoker    Types: Cigarettes    Last attempt to quit: 2019    Years since quittin.5   Smokeless Tobacco Never Used   Tobacco Comment    started age 27      Family History   Problem Relation Age of Onset    Heart Disease Mother          of MI age 36    Heart Disease Brother         MI mid 46s    Heart Disease Father          of MI age 68    Heart Disease Paternal Aunt         MI in her 46s         REVIEW OF SYSTEMS:    No fever / chills / sweats. No weight loss. No visual change, eye pain, eye discharge. No oral lesion, sore throat, dysphagia.   Denies cough / sputum/Sob   Denies chest pain, 105 05/19/2020    CO2 25 05/19/2020       Hepatic Function Panel:   Lab Results   Component Value Date    ALKPHOS 167 05/18/2020     05/18/2020    AST 93 05/18/2020    PROT 6.5 05/18/2020    BILITOT <0.2 05/18/2020    BILIDIR <0.2 05/18/2020    IBILI see below 05/18/2020    LABALBU 2.1 05/18/2020       UA:  Lab Results   Component Value Date    COLORU YELLOW 05/12/2020    CLARITYU CLOUDY 05/12/2020    GLUCOSEU Negative 05/12/2020    BILIRUBINUR Negative 05/12/2020    KETUA Negative 05/12/2020    SPECGRAV 1.021 05/12/2020    BLOODU LARGE 05/12/2020    PHUR 5.5 05/12/2020    PROTEINU 30 05/12/2020    UROBILINOGEN 0.2 05/12/2020    NITRU Negative 05/12/2020    LEUKOCYTESUR MODERATE 05/12/2020    LABMICR YES 05/12/2020    URINETYPE NotGiven 05/12/2020      Urine Microscopic:   Lab Results   Component Value Date    COMU see below 05/12/2020    HYALCAST 6 05/12/2020    WBCUA 14 05/12/2020    RBCUA 9 05/12/2020    EPIU 3 05/12/2020     Procal  0.69   Hep C Ab Interp   Hepatitis Panel, Acute   Collected: 05/16/20 0440   Result status: Final   Resulting lab: [address] [place] [address] LAB   Reference range: Non-reactive   Value: REACTIVEAbnormal     Comment: REACTIVE Screen:   Confirmation with Hepatitis C RIBA not available. Depending on clinical   history, Hepatitis C Virus (HCV)by Quantitative NAAT ([id number]) should be   considered as an alternative to this test although it is not an equivalent.      If HCV by Quantitative NAAT is desired, please reorder       MICRO: cultures reviewed and updated by me   Date/Time       Culture, Respiratory [[id number]]    Order Status: No result Specimen: Sputum, Suctioned    Culture, Respiratory [[id number]] Collected: 05/12/20 0820   Order Status: Completed Specimen: Throat from Endotracheal Updated: 05/14/20 1011    CULTURE, RESPIRATORY Normal respiratory gladys    Gram Stain Result 3+ WBC's (Polymorphonuclear)   1+ Epithelial Cells   No organisms seen    Narrative:     ORDER#: NITISH.:                      RECEIVED :  05/12/20 09:55  Performed at:  [facility]  [address], [employer] [address]   Phone [phone]   MRSA DNA Probe, Nasal [[id number]] Collected: 05/07/20 1312   Order Status: Completed Specimen: Nasal from Nares Updated: 05/08/20 0711    MRSA SCREEN RT-PCR --    Negative - MRSA DNA not detected. Normal Range: Not detected    Narrative:     ORDER#: [id number]                          ORDERED BY: Aleida Lewis  SOURCE: Nares                              COLLECTED:  05/07/20 13:12  ANTIBIOTICS AT NITISH. :                      RECEIVED :  05/07/20 13:18  Performed at:  [facility]  [address] [facility] [employer] [address]   Phone [phone]   Culture, Blood 1 [[id number]] Collected: 05/07/20 0743   Order Status: Canceled Specimen: Blood    Culture, Blood 2 [[id number]]    Order Status: Canceled Specimen: Blood        Date/Time       Culture, Respiratory [[id number]] Collected: 05/15/20 1045   Order Status: Completed Specimen: Sputum, Suctioned Updated: 05/17/20 0956    CULTURE, RESPIRATORY No growth 36-48 hours    Gram Stain Result 1+ WBC's (Polymorphonuclear)   1+ Epithelial Cells   1+ Gram positive cocci    Narrative:     ORDER#: [id number]                          ORDERED BY: Aleida Lewis  SOURCE: Sputum Suctioned                   COLLECTED:  05/15/20 10:45  ANTIBIOTICS AT NITISH. :                      RECEIVED :  05/15/20 10:52 /18/2020 11:49 AM - Mady Ng Lab Results From Soft (Epic Adt)     Component Value Ref Range & Units Status Collected Lab   HIV Ag/Ab Non-Reactive  Non-reactive Final 05/16/2020  2:40 PM [address] Lab   HIV-1 Antibody Non-Reactive  Non-reactive Final 05/16/2020  2:40 PM [address] Lab   HIV ANTIGEN Non-Reactive  Non-reactive Final 05/16/2020  2:40 PM [address] Lab   HIV-2 Ab Non-Reactive  Non-reactive Final 05/16/2020  2:40 PM  - [facility] the orogastric tube. Abdominal radiograph is   recommended for further evaluation. VL DUP CAROTID BILATERAL   Final Result      VL PRE OP VEIN MAPPING   Final Result      XR CHEST PORTABLE   Final Result   Or bronchial wall thickening suggests inflammation, such as that seen with   asthma, bronchitis or smoking. Consolidative airspace disease. Rounded opacity in the right hilar region likely represents a vessel en face,   however cannot exclude a prominent lymph node or nodule.   Comparison imaging   would be helpful if available, otherwise recommend CT chest.               All the pertinent images and reports for the current Hospitalization were reviewed by me     Scheduled Meds:   metoprolol tartrate  75 mg Oral BID    furosemide  40 mg Intravenous TID    lidocaine 1 % injection  5 mL Intradermal Once    docusate  100 mg Oral BID    piperacillin-tazobactam  3.375 g Intravenous Q8H    ferrous sulfate  300 mg Oral BID    heparin (porcine)  5,000 Units Subcutaneous 3 times per day    sodium chloride flush  10 mL Intravenous 2 times per day    insulin lispro  0-18 Units Subcutaneous Q4H    aspirin  324 mg Oral Daily    potassium bicarb-citric acid  10 mEq Oral TID    sennosides-docusate sodium  1 tablet Oral BID    pantoprazole  40 mg Intravenous Daily    And    sodium chloride (PF)  10 mL Intravenous Daily    chlorhexidine  15 mL Mouth/Throat BID    [Held by provider] atorvastatin  40 mg Oral Nightly    albuterol  2.5 mg Nebulization Q4H WA    sodium chloride (PF)  10 mL Intravenous Once       Continuous Infusions:   niCARdipine 10 mg/hr (05/19/20 0928)    dextrose      dexmedetomidine 0.5 mcg/kg/hr (05/19/20 8178)    propofol 45 mcg/kg/min (05/19/20 0964)       PRN Meds:  bisacodyl, metoprolol, glucose, dextrose, glucagon (rDNA), dextrose, sodium chloride flush, LORazepam **OR** LORazepam **OR** LORazepam **OR** LORazepam **OR** LORazepam **OR** LORazepam **OR** LORazepam **OR** NGTD  6. Hep C+ve check for  Cryoglobulins given acral cyanosis,   SREE-ve But ESR elevated  7. HIV -ve   8. MRI BRAIN pending      Discussed with patient/Family and Nursing staff   Risk of Complications/Morbidity: High      · Illness(es)/ Infection present that pose threat to bodily function. · There is potential for severe exacerbation of infection/side effects of treatment. Therapy requires intensive monitoring for antimicrobial agent toxicity. Thanks for allowing me to participate in your patient's care and please call me with any questions or concerns.     Gwen Lopez MD  Infectious Disease  [facility] Physician  Phone: [phone]   Fax : [phone]

## 2020-05-19 NOTE — CARE COORDINATION
LSW reviewed chart. Patient continues on Ventilator. Social Work Team following for Blanca.   [facility]     Case Management   [phone]    5/19/2020  11:49 AM

## 2020-05-20 ENCOUNTER — APPOINTMENT (OUTPATIENT)
Dept: MRI IMAGING | Age: 65
DRG: 003 | End: 2020-05-20
Payer: COMMERCIAL

## 2020-05-20 LAB
ANION GAP SERPL CALCULATED.3IONS-SCNC: 14 MMOL/L (ref 3–16)
APTT: 26 SEC (ref 24.2–36.2)
BASE EXCESS ARTERIAL: 2.6 MMOL/L (ref -3–3)
BUN BLDV-MCNC: 50 MG/DL (ref 7–20)
C-REACTIVE PROTEIN: 201.4 MG/L (ref 0–5.1)
CALCIUM SERPL-MCNC: 8.6 MG/DL (ref 8.3–10.6)
CARBOXYHEMOGLOBIN ARTERIAL: 0.6 % (ref 0–1.5)
CHLORIDE BLD-SCNC: 104 MMOL/L (ref 99–110)
CO2: 24 MMOL/L (ref 21–32)
CREAT SERPL-MCNC: 0.9 MG/DL (ref 0.8–1.3)
GFR AFRICAN AMERICAN: >60
GFR NON-AFRICAN AMERICAN: >60
GLUCOSE BLD-MCNC: 113 MG/DL (ref 70–99)
GLUCOSE BLD-MCNC: 124 MG/DL (ref 70–99)
GLUCOSE BLD-MCNC: 126 MG/DL (ref 70–99)
GLUCOSE BLD-MCNC: 136 MG/DL (ref 70–99)
GLUCOSE BLD-MCNC: 140 MG/DL (ref 70–99)
GLUCOSE BLD-MCNC: 159 MG/DL (ref 70–99)
GLUCOSE BLD-MCNC: 163 MG/DL (ref 70–99)
HCO3 ARTERIAL: 26.5 MMOL/L (ref 21–29)
HCT VFR BLD CALC: 22 % (ref 40.5–52.5)
HEMOGLOBIN, ART, EXTENDED: 10 G/DL (ref 13.5–17.5)
HEMOGLOBIN: 7.2 G/DL (ref 13.5–17.5)
INR BLD: 1.14 (ref 0.86–1.14)
MAGNESIUM: 2.6 MG/DL (ref 1.8–2.4)
MCH RBC QN AUTO: 27.8 PG (ref 26–34)
MCHC RBC AUTO-ENTMCNC: 32.9 G/DL (ref 31–36)
MCV RBC AUTO: 84.5 FL (ref 80–100)
METHEMOGLOBIN ARTERIAL: 0.4 %
O2 CONTENT ARTERIAL: 14 ML/DL
O2 SAT, ARTERIAL: 99.3 %
O2 THERAPY: ABNORMAL
PCO2 ARTERIAL: 37.5 MMHG (ref 35–45)
PDW BLD-RTO: 16 % (ref 12.4–15.4)
PERFORMED ON: ABNORMAL
PH ARTERIAL: 7.46 (ref 7.35–7.45)
PLATELET # BLD: 574 K/UL (ref 135–450)
PMV BLD AUTO: 7.5 FL (ref 5–10.5)
PO2 ARTERIAL: 142 MMHG (ref 75–108)
POTASSIUM SERPL-SCNC: 3.7 MMOL/L (ref 3.5–5.1)
POTASSIUM SERPL-SCNC: 4.3 MMOL/L (ref 3.5–5.1)
PROCALCITONIN: 0.48 NG/ML (ref 0–0.15)
PROTHROMBIN TIME: 13.2 SEC (ref 10–13.2)
RBC # BLD: 2.6 M/UL (ref 4.2–5.9)
SODIUM BLD-SCNC: 142 MMOL/L (ref 136–145)
TCO2 ARTERIAL: 27.7 MMOL/L
WBC # BLD: 10.6 K/UL (ref 4–11)

## 2020-05-20 PROCEDURE — 2580000003 HC RX 258: Performed by: THORACIC SURGERY (CARDIOTHORACIC VASCULAR SURGERY)

## 2020-05-20 PROCEDURE — 86140 C-REACTIVE PROTEIN: CPT

## 2020-05-20 PROCEDURE — 99233 SBSQ HOSP IP/OBS HIGH 50: CPT | Performed by: INTERNAL MEDICINE

## 2020-05-20 PROCEDURE — 6360000002 HC RX W HCPCS: Performed by: THORACIC SURGERY (CARDIOTHORACIC VASCULAR SURGERY)

## 2020-05-20 PROCEDURE — 83735 ASSAY OF MAGNESIUM: CPT

## 2020-05-20 PROCEDURE — 94003 VENT MGMT INPAT SUBQ DAY: CPT

## 2020-05-20 PROCEDURE — 85610 PROTHROMBIN TIME: CPT

## 2020-05-20 PROCEDURE — 99024 POSTOP FOLLOW-UP VISIT: CPT | Performed by: THORACIC SURGERY (CARDIOTHORACIC VASCULAR SURGERY)

## 2020-05-20 PROCEDURE — 6370000000 HC RX 637 (ALT 250 FOR IP): Performed by: NURSE PRACTITIONER

## 2020-05-20 PROCEDURE — 2100000000 HC CCU R&B

## 2020-05-20 PROCEDURE — 37799 UNLISTED PX VASCULAR SURGERY: CPT

## 2020-05-20 PROCEDURE — 2500000003 HC RX 250 WO HCPCS: Performed by: INTERNAL MEDICINE

## 2020-05-20 PROCEDURE — 95816 EEG AWAKE AND DROWSY: CPT

## 2020-05-20 PROCEDURE — 82803 BLOOD GASES ANY COMBINATION: CPT

## 2020-05-20 PROCEDURE — 6370000000 HC RX 637 (ALT 250 FOR IP): Performed by: THORACIC SURGERY (CARDIOTHORACIC VASCULAR SURGERY)

## 2020-05-20 PROCEDURE — 85730 THROMBOPLASTIN TIME PARTIAL: CPT

## 2020-05-20 PROCEDURE — 2580000003 HC RX 258: Performed by: INTERNAL MEDICINE

## 2020-05-20 PROCEDURE — 2580000003 HC RX 258: Performed by: NURSE PRACTITIONER

## 2020-05-20 PROCEDURE — 2700000000 HC OXYGEN THERAPY PER DAY

## 2020-05-20 PROCEDURE — 84145 PROCALCITONIN (PCT): CPT

## 2020-05-20 PROCEDURE — C9113 INJ PANTOPRAZOLE SODIUM, VIA: HCPCS | Performed by: THORACIC SURGERY (CARDIOTHORACIC VASCULAR SURGERY)

## 2020-05-20 PROCEDURE — 94761 N-INVAS EAR/PLS OXIMETRY MLT: CPT

## 2020-05-20 PROCEDURE — 6360000002 HC RX W HCPCS: Performed by: INTERNAL MEDICINE

## 2020-05-20 PROCEDURE — 80048 BASIC METABOLIC PNL TOTAL CA: CPT

## 2020-05-20 PROCEDURE — 84132 ASSAY OF SERUM POTASSIUM: CPT

## 2020-05-20 PROCEDURE — 6360000002 HC RX W HCPCS: Performed by: NURSE PRACTITIONER

## 2020-05-20 PROCEDURE — 2500000003 HC RX 250 WO HCPCS: Performed by: THORACIC SURGERY (CARDIOTHORACIC VASCULAR SURGERY)

## 2020-05-20 PROCEDURE — 94640 AIRWAY INHALATION TREATMENT: CPT

## 2020-05-20 PROCEDURE — 70551 MRI BRAIN STEM W/O DYE: CPT

## 2020-05-20 PROCEDURE — 85027 COMPLETE CBC AUTOMATED: CPT

## 2020-05-20 RX ADMIN — METOPROLOL TARTRATE 75 MG: 25 TABLET, FILM COATED ORAL at 09:39

## 2020-05-20 RX ADMIN — Medication 20 MEQ: at 06:41

## 2020-05-20 RX ADMIN — INSULIN LISPRO 3 UNITS: 100 INJECTION, SOLUTION INTRAVENOUS; SUBCUTANEOUS at 20:41

## 2020-05-20 RX ADMIN — PIPERACILLIN AND TAZOBACTAM 3.38 G: 3; .375 INJECTION, POWDER, LYOPHILIZED, FOR SOLUTION INTRAVENOUS at 01:15

## 2020-05-20 RX ADMIN — MINERAL SUPPLEMENT IRON 300 MG / 5 ML STRENGTH LIQUID 100 PER BOX UNFLAVORED 300 MG: at 09:40

## 2020-05-20 RX ADMIN — INSULIN LISPRO 3 UNITS: 100 INJECTION, SOLUTION INTRAVENOUS; SUBCUTANEOUS at 05:00

## 2020-05-20 RX ADMIN — METOPROLOL TARTRATE 75 MG: 25 TABLET, FILM COATED ORAL at 20:36

## 2020-05-20 RX ADMIN — PROPOFOL 30 MCG/KG/MIN: 10 INJECTION, EMULSION INTRAVENOUS at 04:00

## 2020-05-20 RX ADMIN — PROPOFOL 30 MCG/KG/MIN: 10 INJECTION, EMULSION INTRAVENOUS at 17:46

## 2020-05-20 RX ADMIN — CASTOR OIL AND BALSAM, PERU: 788; 87 OINTMENT TOPICAL at 20:26

## 2020-05-20 RX ADMIN — DEXMEDETOMIDINE HYDROCHLORIDE 0.9 MCG/KG/HR: 4 INJECTION, SOLUTION INTRAVENOUS at 01:01

## 2020-05-20 RX ADMIN — FUROSEMIDE 20 MG: 10 INJECTION, SOLUTION INTRAMUSCULAR; INTRAVENOUS at 17:50

## 2020-05-20 RX ADMIN — SODIUM CHLORIDE, PRESERVATIVE FREE 10 ML: 5 INJECTION INTRAVENOUS at 09:40

## 2020-05-20 RX ADMIN — DEXMEDETOMIDINE HYDROCHLORIDE 1 MCG/KG/HR: 4 INJECTION, SOLUTION INTRAVENOUS at 15:23

## 2020-05-20 RX ADMIN — DEXMEDETOMIDINE HYDROCHLORIDE 0.9 MCG/KG/HR: 4 INJECTION, SOLUTION INTRAVENOUS at 06:38

## 2020-05-20 RX ADMIN — POTASSIUM BICARBONATE 10 MEQ: 782 TABLET, EFFERVESCENT ORAL at 20:39

## 2020-05-20 RX ADMIN — DEXMEDETOMIDINE HYDROCHLORIDE 1 MCG/KG/HR: 4 INJECTION, SOLUTION INTRAVENOUS at 13:08

## 2020-05-20 RX ADMIN — PIPERACILLIN AND TAZOBACTAM 3.38 G: 3; .375 INJECTION, POWDER, LYOPHILIZED, FOR SOLUTION INTRAVENOUS at 09:40

## 2020-05-20 RX ADMIN — ALBUTEROL SULFATE 2.5 MG: 2.5 SOLUTION RESPIRATORY (INHALATION) at 16:28

## 2020-05-20 RX ADMIN — HEPARIN SODIUM 5000 UNITS: 5000 INJECTION INTRAVENOUS; SUBCUTANEOUS at 22:08

## 2020-05-20 RX ADMIN — SODIUM CHLORIDE, PRESERVATIVE FREE 10 ML: 5 INJECTION INTRAVENOUS at 20:26

## 2020-05-20 RX ADMIN — HEPARIN SODIUM 5000 UNITS: 5000 INJECTION INTRAVENOUS; SUBCUTANEOUS at 14:19

## 2020-05-20 RX ADMIN — Medication 10 ML: at 05:11

## 2020-05-20 RX ADMIN — ALBUTEROL SULFATE 2.5 MG: 2.5 SOLUTION RESPIRATORY (INHALATION) at 19:47

## 2020-05-20 RX ADMIN — PROPOFOL 30 MCG/KG/MIN: 10 INJECTION, EMULSION INTRAVENOUS at 13:08

## 2020-05-20 RX ADMIN — DEXMEDETOMIDINE HYDROCHLORIDE 1 MCG/KG/HR: 4 INJECTION, SOLUTION INTRAVENOUS at 20:24

## 2020-05-20 RX ADMIN — CHLORHEXIDINE GLUCONATE 15 ML: 1.2 RINSE ORAL at 10:01

## 2020-05-20 RX ADMIN — LORAZEPAM 2 MG: 2 INJECTION INTRAMUSCULAR; INTRAVENOUS at 16:40

## 2020-05-20 RX ADMIN — ALBUTEROL SULFATE 2.5 MG: 2.5 SOLUTION RESPIRATORY (INHALATION) at 12:55

## 2020-05-20 RX ADMIN — CHLORHEXIDINE GLUCONATE 15 ML: 1.2 RINSE ORAL at 20:26

## 2020-05-20 RX ADMIN — PANTOPRAZOLE SODIUM 40 MG: 40 INJECTION, POWDER, FOR SOLUTION INTRAVENOUS at 05:00

## 2020-05-20 RX ADMIN — POTASSIUM BICARBONATE 10 MEQ: 782 TABLET, EFFERVESCENT ORAL at 14:19

## 2020-05-20 RX ADMIN — WATER 2 G: 1 INJECTION INTRAMUSCULAR; INTRAVENOUS; SUBCUTANEOUS at 17:49

## 2020-05-20 RX ADMIN — ASPIRIN 81 MG 324 MG: 81 TABLET ORAL at 09:39

## 2020-05-20 RX ADMIN — HEPARIN SODIUM 5000 UNITS: 5000 INJECTION INTRAVENOUS; SUBCUTANEOUS at 05:00

## 2020-05-20 RX ADMIN — LORAZEPAM 2 MG: 2 INJECTION INTRAMUSCULAR; INTRAVENOUS at 01:15

## 2020-05-20 RX ADMIN — FUROSEMIDE 20 MG: 10 INJECTION, SOLUTION INTRAMUSCULAR; INTRAVENOUS at 09:39

## 2020-05-20 RX ADMIN — LORAZEPAM 2 MG: 2 INJECTION INTRAMUSCULAR; INTRAVENOUS at 14:41

## 2020-05-20 RX ADMIN — CASTOR OIL AND BALSAM, PERU: 788; 87 OINTMENT TOPICAL at 15:00

## 2020-05-20 RX ADMIN — MINERAL SUPPLEMENT IRON 300 MG / 5 ML STRENGTH LIQUID 100 PER BOX UNFLAVORED 300 MG: at 20:39

## 2020-05-20 RX ADMIN — POTASSIUM BICARBONATE 10 MEQ: 782 TABLET, EFFERVESCENT ORAL at 09:39

## 2020-05-20 RX ADMIN — PROPOFOL 30 MCG/KG/MIN: 10 INJECTION, EMULSION INTRAVENOUS at 07:49

## 2020-05-20 RX ADMIN — LORAZEPAM 2 MG: 2 INJECTION INTRAMUSCULAR; INTRAVENOUS at 20:12

## 2020-05-20 RX ADMIN — ALBUTEROL SULFATE 2.5 MG: 2.5 SOLUTION RESPIRATORY (INHALATION) at 07:37

## 2020-05-20 RX ADMIN — METRONIDAZOLE 500 MG: 500 INJECTION, SOLUTION INTRAVENOUS at 17:49

## 2020-05-20 ASSESSMENT — PULMONARY FUNCTION TESTS
PIF_VALUE: 25
PIF_VALUE: 14
PIF_VALUE: 14
PIF_VALUE: 9
PIF_VALUE: 13
PIF_VALUE: 12
PIF_VALUE: 7
PIF_VALUE: 9
PIF_VALUE: 13
PIF_VALUE: 11
PIF_VALUE: 27
PIF_VALUE: 15
PIF_VALUE: 25
PIF_VALUE: 15
PIF_VALUE: 31
PIF_VALUE: 15
PIF_VALUE: 17
PIF_VALUE: 25
PIF_VALUE: 23
PIF_VALUE: 9
PIF_VALUE: 7
PIF_VALUE: 18
PIF_VALUE: 0
PIF_VALUE: 15
PIF_VALUE: 13
PIF_VALUE: 13
PIF_VALUE: 7
PIF_VALUE: 18

## 2020-05-20 ASSESSMENT — PAIN SCALES - GENERAL
PAINLEVEL_OUTOF10: 1
PAINLEVEL_OUTOF10: 3
PAINLEVEL_OUTOF10: 6
PAINLEVEL_OUTOF10: 1
PAINLEVEL_OUTOF10: 1

## 2020-05-20 NOTE — PROGRESS NOTES
4 Eyes Skin Assessment     The patient is being assess for  Shift Handoff    I agree that 2 RN's have performed a thorough Head to Toe Skin Assessment on the patient. ALL assessment sites listed below have been assessed. Areas assessed by both nurses: Jayant Marcus  [x]   Head, Face, and Ears   [x]   Shoulders, Back, and Chest  [x]   Arms, Elbows, and Hands   [x]   Coccyx, Sacrum, and IschIum  [x]   Legs, Feet, and Heels        Does the Patient have Skin Breakdown?   Yes LDA WOUND CARE was Initiated documentation include the Becky-wound, Wound Assessment, Measurements, Dressing Treatment, Drainage, and Color\",         Kip Prevention initiated:  Yes   Wound Care Orders initiated:  Yes      [id number]  nurse consulted for Pressure Injury (Stage 3,4, Unstageable, DTI, NWPT, and Complex wounds), New and Established Ostomies:  Yes      Nurse 1 eSignature: Electronically signed by Barry Rudd RN on 5/20/20 at 7:45 PM EDT    **SHARE this note so that the co-signing nurse is able to place an eSignature**    Nurse 2 eSignature: Electronically signed by Rosena Galeazzi, RN on 5/21/20 at 8:15 AM EDT

## 2020-05-20 NOTE — PLAN OF CARE
thromboembolism  Outcome: Ongoing     Problem: Venous Thromboembolism:  Goal: Absence of signs or symptoms of impaired coagulation  Description: Absence of signs or symptoms of impaired coagulation  Outcome: Ongoing

## 2020-05-20 NOTE — PROGRESS NOTES
0715-Handoff report done with Priyanka Mckenzie RN. Pt intubated and restrained in bed.     0749--EEG being done in room. 0802--Dr. Tara Doyle and Prince Canela at bedside. 1100--I spoke with Pedro, pt's girlfriend. I notified her that pt was going to get his MRI. 1105--Pt going to MRI. 1245--Pt back to room from MRI. 1400--Carlos from neurology at bedside. 1547--I notified Prince Canela and Dr. Tara Doyle of what Mathieu Read said about pt's MRI.     1605--Dr. Tara Doyle and Prince Canela at bedside. 1618--Dr. De La Fuente at bedside. Dr. Tara Doyle spoke with pt's girlfriend Pedro over phone. 1700--I spoke with MRI tech. They stated they won't be able to do the imaging today, so they will do it tomorrow. 1930--Handoff done with Ailin Henderson RN. Pt intubated and restrained in bed. 4eyes done.      Electronically signed by Marva Carolina RN on 5/20/2020 at 7:47 PM

## 2020-05-20 NOTE — PROGRESS NOTES
current hospitalization and  care every where were reviewed  by me as a part of the evaluation   Plan:   1. Trend Procal now improving on IV abx  2. CRP VERY elevated now trending down and  cyanosis bi lateral hands improving   3. Gamma GT elevated and Lfts now down trend  4. Change IV Zosyn to IV Cefepime x 2 gm Q 12 HR    5. IV Flagyl x 500 mg Q  8 hrs for anaerobes   6. Hep C+ve check for  Cryoglobulins given acral cyanosis,   SREE-ve But ESR elevated  7. HIV -ve   8. MRI BRAIN abnormal with get MRA and MRA neck per Neurology      Discussed with patient/Family and Nursing staff   Risk of Complications/Morbidity: High      · Illness(es)/ Infection present that pose threat to bodily function. · There is potential for severe exacerbation of infection/side effects of treatment. Therapy requires intensive monitoring for antimicrobial agent toxicity. Thanks for allowing me to participate in your patient's care and please call me with any questions or concerns.     Rosamaria Pro MD  Infectious Disease  [facility]) Physician  Phone: [phone]   Fax : [phone]

## 2020-05-20 NOTE — PROGRESS NOTES
Progress Note    S/P cabgx4 5/8/20    Vital Signs:                                                 BP (!) 92/56   Pulse 83   Temp 98.1 °F (36.7 °C) (Rectal)   Resp (!) 31   Ht 6' (1.829 m)   Wt 184 lb 1.4 oz (83.5 kg)   SpO2 100%   BMI 24.97 kg/m²  O2 Flow Rate (L/min): 3 L/min   NSR  Admission Weight: 188 lb (85.3 kg)      Vent Settings:  Vent Information  $Ventilation: $Subsequent Day  Skin Assessment: Clean, dry, & intact  Suction Catheter Diameter: 14  Equipment ID: [id number]  Equipment Changed: HME  Vent Type: 840  Vent Mode: AC/VC  Vt Ordered: 450 mL  Rate Set: 20 bmp  Peak Flow: 70 L/min  Pressure Support: 0 cmH20  FiO2 : 50 %  SpO2: 100 %  SpO2/FiO2 ratio: 200  Sensitivity: 3  PEEP/CPAP: 5  I Time/ I Time %: 0.7 s  Humidification Source: Heated wire  Humidification Temp: 37  Humidification Temp Measured: 37  Circuit Condensation: Drained     Drips:  Propofol, precedex  TF at goal    I/O:      Intake/Output Summary (Last 24 hours) at 5/20/2020 0800  Last data filed at 5/20/2020 0539  Gross per 24 hour   Intake 4540.01 ml   Output 3450 ml   Net 1090.01 ml     CV: reg, wound c/d/i  Pulm: decreased throughout  Abd: soft  Ext: warm, no edema. Fingertips violaceous - almost resolved  Neuro: sedated    Data Review:  CBC:   Recent Labs     05/18/20  0500 05/19/20  0420 05/20/20  0430   WBC 11.0 11.4* 10.6   HGB 7.9* 8.3* 7.2*   HCT 24.8* 25.6* 22.0*   MCV 84.4 85.0 84.5   * 568* 574*     BMP:   Recent Labs     05/18/20  0500 05/19/20  0420 05/20/20  0430    142 142   K 4.1 4.4 3.7    105 104   CO2 25 25 24   BUN 36* 50* 50*   CREATININE 0.8 0.9 0.9   CALCIUM 8.3 8.3 8.6   MG 2.50* 2.70* 2.60*     Cardiac Enzymes: No results for input(s): CKTOTAL, CKMB, CKMBINDEX, TROPONINI in the last 72 hours.   PT/INR:   Recent Labs     05/18/20  0500 05/19/20  0420 05/20/20  0430   PROTIME 13.5* 13.8* 13.2   INR 1.16* 1.19* 1.14     APTT:   Recent Labs     05/18/20  0500 05/19/20  0420

## 2020-05-20 NOTE — PROGRESS NOTES
1900-Report and handoff with Serge Olena. Patient turned and repositioned. 2000-Assessment completed. 2100-Evening meds given. Patient very restless. Breathing 40 times a minute. 2200-Prn fentanyl and ativan have been given. Patient much calmer now. 0000- Reassessment completed. 0100-PRN ativan given. Seems to help patient the most. A-line dressing change and pressure tubing changed. Vent suction cannister and tubing changed. 0300-Propofol tubing changed. Patient seems to have broken fever. Temp now 98 with water temp increasing. With controlled temp patient calmer. Vitals stable. 0400- Reassessment completed. Labs drawn and sent. 0500-Patient temp increasing. 0600-Hoang emptied and pumps cleared. 0700-Report and handoff with Serge Olena.

## 2020-05-21 ENCOUNTER — APPOINTMENT (OUTPATIENT)
Dept: MRI IMAGING | Age: 65
DRG: 003 | End: 2020-05-21
Payer: COMMERCIAL

## 2020-05-21 LAB
ABO/RH: NORMAL
ALBUMIN SERPL-MCNC: 2.1 G/DL (ref 3.4–5)
ALP BLD-CCNC: 124 U/L (ref 40–129)
ALT SERPL-CCNC: 78 U/L (ref 10–40)
ANION GAP SERPL CALCULATED.3IONS-SCNC: 10 MMOL/L (ref 3–16)
ANTIBODY SCREEN: NORMAL
APTT: 26.5 SEC (ref 24.2–36.2)
AST SERPL-CCNC: 55 U/L (ref 15–37)
BASE EXCESS ARTERIAL: 1.4 MMOL/L (ref -3–3)
BILIRUB SERPL-MCNC: <0.2 MG/DL (ref 0–1)
BILIRUBIN DIRECT: <0.2 MG/DL (ref 0–0.3)
BILIRUBIN, INDIRECT: ABNORMAL MG/DL (ref 0–1)
BLOOD BANK DISPENSE STATUS: NORMAL
BLOOD BANK PRODUCT CODE: NORMAL
BPU ID: NORMAL
BUN BLDV-MCNC: 44 MG/DL (ref 7–20)
C DIFF TOXIN/ANTIGEN: NORMAL
CALCIUM SERPL-MCNC: 8.1 MG/DL (ref 8.3–10.6)
CARBOXYHEMOGLOBIN ARTERIAL: 0.4 % (ref 0–1.5)
CHLORIDE BLD-SCNC: 109 MMOL/L (ref 99–110)
CO2: 24 MMOL/L (ref 21–32)
CREAT SERPL-MCNC: 0.8 MG/DL (ref 0.8–1.3)
DESCRIPTION BLOOD BANK: NORMAL
GFR AFRICAN AMERICAN: >60
GFR NON-AFRICAN AMERICAN: >60
GLUCOSE BLD-MCNC: 109 MG/DL (ref 70–99)
GLUCOSE BLD-MCNC: 115 MG/DL (ref 70–99)
GLUCOSE BLD-MCNC: 116 MG/DL (ref 70–99)
GLUCOSE BLD-MCNC: 122 MG/DL (ref 70–99)
GLUCOSE BLD-MCNC: 131 MG/DL (ref 70–99)
GLUCOSE BLD-MCNC: 144 MG/DL (ref 70–99)
GLUCOSE BLD-MCNC: 146 MG/DL (ref 70–99)
HCO3 ARTERIAL: 25.2 MMOL/L (ref 21–29)
HCT VFR BLD CALC: 21.4 % (ref 40.5–52.5)
HEMOGLOBIN, ART, EXTENDED: 9.9 G/DL (ref 13.5–17.5)
HEMOGLOBIN: 7 G/DL (ref 13.5–17.5)
INR BLD: 1.16 (ref 0.86–1.14)
MAGNESIUM: 2.6 MG/DL (ref 1.8–2.4)
MCH RBC QN AUTO: 28.2 PG (ref 26–34)
MCHC RBC AUTO-ENTMCNC: 32.9 G/DL (ref 31–36)
MCV RBC AUTO: 85.7 FL (ref 80–100)
METHEMOGLOBIN ARTERIAL: 0.4 %
O2 CONTENT ARTERIAL: 14 ML/DL
O2 SAT, ARTERIAL: 98.8 %
O2 THERAPY: ABNORMAL
PCO2 ARTERIAL: 35.5 MMHG (ref 35–45)
PDW BLD-RTO: 16.1 % (ref 12.4–15.4)
PERFORMED ON: ABNORMAL
PH ARTERIAL: 7.46 (ref 7.35–7.45)
PLATELET # BLD: 612 K/UL (ref 135–450)
PMV BLD AUTO: 7.6 FL (ref 5–10.5)
PO2 ARTERIAL: 115 MMHG (ref 75–108)
POTASSIUM SERPL-SCNC: 4.2 MMOL/L (ref 3.5–5.1)
PROTHROMBIN TIME: 13.5 SEC (ref 10–13.2)
RBC # BLD: 2.5 M/UL (ref 4.2–5.9)
SODIUM BLD-SCNC: 143 MMOL/L (ref 136–145)
TCO2 ARTERIAL: 26.2 MMOL/L
TOTAL PROTEIN: 6 G/DL (ref 6.4–8.2)
TRIGL SERPL-MCNC: 409 MG/DL (ref 0–150)
WBC # BLD: 9.1 K/UL (ref 4–11)

## 2020-05-21 PROCEDURE — 6370000000 HC RX 637 (ALT 250 FOR IP): Performed by: THORACIC SURGERY (CARDIOTHORACIC VASCULAR SURGERY)

## 2020-05-21 PROCEDURE — 85027 COMPLETE CBC AUTOMATED: CPT

## 2020-05-21 PROCEDURE — 86850 RBC ANTIBODY SCREEN: CPT

## 2020-05-21 PROCEDURE — 70552 MRI BRAIN STEM W/DYE: CPT

## 2020-05-21 PROCEDURE — 70544 MR ANGIOGRAPHY HEAD W/O DYE: CPT

## 2020-05-21 PROCEDURE — 2500000003 HC RX 250 WO HCPCS: Performed by: THORACIC SURGERY (CARDIOTHORACIC VASCULAR SURGERY)

## 2020-05-21 PROCEDURE — 80076 HEPATIC FUNCTION PANEL: CPT

## 2020-05-21 PROCEDURE — 86900 BLOOD TYPING SEROLOGIC ABO: CPT

## 2020-05-21 PROCEDURE — 94640 AIRWAY INHALATION TREATMENT: CPT

## 2020-05-21 PROCEDURE — 87324 CLOSTRIDIUM AG IA: CPT

## 2020-05-21 PROCEDURE — 94761 N-INVAS EAR/PLS OXIMETRY MLT: CPT

## 2020-05-21 PROCEDURE — 2580000003 HC RX 258: Performed by: INTERNAL MEDICINE

## 2020-05-21 PROCEDURE — 6360000002 HC RX W HCPCS: Performed by: THORACIC SURGERY (CARDIOTHORACIC VASCULAR SURGERY)

## 2020-05-21 PROCEDURE — 6360000002 HC RX W HCPCS: Performed by: NURSE PRACTITIONER

## 2020-05-21 PROCEDURE — 85730 THROMBOPLASTIN TIME PARTIAL: CPT

## 2020-05-21 PROCEDURE — 2500000003 HC RX 250 WO HCPCS: Performed by: INTERNAL MEDICINE

## 2020-05-21 PROCEDURE — C9113 INJ PANTOPRAZOLE SODIUM, VIA: HCPCS | Performed by: THORACIC SURGERY (CARDIOTHORACIC VASCULAR SURGERY)

## 2020-05-21 PROCEDURE — 87449 NOS EACH ORGANISM AG IA: CPT

## 2020-05-21 PROCEDURE — A9577 INJ MULTIHANCE: HCPCS | Performed by: NURSE PRACTITIONER

## 2020-05-21 PROCEDURE — 6370000000 HC RX 637 (ALT 250 FOR IP): Performed by: NURSE PRACTITIONER

## 2020-05-21 PROCEDURE — 80048 BASIC METABOLIC PNL TOTAL CA: CPT

## 2020-05-21 PROCEDURE — 2700000000 HC OXYGEN THERAPY PER DAY

## 2020-05-21 PROCEDURE — 82803 BLOOD GASES ANY COMBINATION: CPT

## 2020-05-21 PROCEDURE — 2100000000 HC CCU R&B

## 2020-05-21 PROCEDURE — 70549 MR ANGIOGRAPH NECK W/O&W/DYE: CPT

## 2020-05-21 PROCEDURE — 86901 BLOOD TYPING SEROLOGIC RH(D): CPT

## 2020-05-21 PROCEDURE — 2580000003 HC RX 258: Performed by: NURSE PRACTITIONER

## 2020-05-21 PROCEDURE — 83735 ASSAY OF MAGNESIUM: CPT

## 2020-05-21 PROCEDURE — P9016 RBC LEUKOCYTES REDUCED: HCPCS

## 2020-05-21 PROCEDURE — 99233 SBSQ HOSP IP/OBS HIGH 50: CPT | Performed by: INTERNAL MEDICINE

## 2020-05-21 PROCEDURE — 86923 COMPATIBILITY TEST ELECTRIC: CPT

## 2020-05-21 PROCEDURE — 85610 PROTHROMBIN TIME: CPT

## 2020-05-21 PROCEDURE — 94003 VENT MGMT INPAT SUBQ DAY: CPT

## 2020-05-21 PROCEDURE — 6360000002 HC RX W HCPCS: Performed by: INTERNAL MEDICINE

## 2020-05-21 PROCEDURE — 99024 POSTOP FOLLOW-UP VISIT: CPT | Performed by: THORACIC SURGERY (CARDIOTHORACIC VASCULAR SURGERY)

## 2020-05-21 PROCEDURE — 2500000003 HC RX 250 WO HCPCS: Performed by: NURSE PRACTITIONER

## 2020-05-21 PROCEDURE — 6360000004 HC RX CONTRAST MEDICATION: Performed by: NURSE PRACTITIONER

## 2020-05-21 PROCEDURE — 84478 ASSAY OF TRIGLYCERIDES: CPT

## 2020-05-21 PROCEDURE — 2580000003 HC RX 258: Performed by: THORACIC SURGERY (CARDIOTHORACIC VASCULAR SURGERY)

## 2020-05-21 RX ORDER — 0.9 % SODIUM CHLORIDE 0.9 %
20 INTRAVENOUS SOLUTION INTRAVENOUS ONCE
Status: COMPLETED | OUTPATIENT
Start: 2020-05-21 | End: 2020-05-21

## 2020-05-21 RX ORDER — ATORVASTATIN CALCIUM 20 MG/1
20 TABLET, FILM COATED ORAL NIGHTLY
Status: DISCONTINUED | OUTPATIENT
Start: 2020-05-21 | End: 2020-06-05 | Stop reason: HOSPADM

## 2020-05-21 RX ORDER — ALBUTEROL SULFATE 2.5 MG/3ML
SOLUTION RESPIRATORY (INHALATION)
Status: DISPENSED
Start: 2020-05-21 | End: 2020-05-22

## 2020-05-21 RX ORDER — PROPOFOL 10 MG/ML
12 INJECTION, EMULSION INTRAVENOUS CONTINUOUS PRN
Status: DISCONTINUED | OUTPATIENT
Start: 2020-05-21 | End: 2020-05-28

## 2020-05-21 RX ADMIN — Medication 10 ML: at 08:00

## 2020-05-21 RX ADMIN — ALBUTEROL SULFATE 2.5 MG: 2.5 SOLUTION RESPIRATORY (INHALATION) at 20:21

## 2020-05-21 RX ADMIN — HEPARIN SODIUM 5000 UNITS: 5000 INJECTION INTRAVENOUS; SUBCUTANEOUS at 22:26

## 2020-05-21 RX ADMIN — LORAZEPAM 2 MG: 2 INJECTION INTRAMUSCULAR; INTRAVENOUS at 02:01

## 2020-05-21 RX ADMIN — POTASSIUM BICARBONATE 10 MEQ: 782 TABLET, EFFERVESCENT ORAL at 08:24

## 2020-05-21 RX ADMIN — INSULIN LISPRO 3 UNITS: 100 INJECTION, SOLUTION INTRAVENOUS; SUBCUTANEOUS at 05:11

## 2020-05-21 RX ADMIN — DEXMEDETOMIDINE HYDROCHLORIDE 1 MCG/KG/HR: 4 INJECTION, SOLUTION INTRAVENOUS at 18:27

## 2020-05-21 RX ADMIN — METRONIDAZOLE 500 MG: 500 INJECTION, SOLUTION INTRAVENOUS at 10:11

## 2020-05-21 RX ADMIN — HEPARIN SODIUM 5000 UNITS: 5000 INJECTION INTRAVENOUS; SUBCUTANEOUS at 13:44

## 2020-05-21 RX ADMIN — ALBUTEROL SULFATE 2.5 MG: 2.5 SOLUTION RESPIRATORY (INHALATION) at 11:20

## 2020-05-21 RX ADMIN — ATORVASTATIN CALCIUM 20 MG: 20 TABLET, FILM COATED ORAL at 20:50

## 2020-05-21 RX ADMIN — PROPOFOL 25 MCG/KG/MIN: 10 INJECTION, EMULSION INTRAVENOUS at 14:48

## 2020-05-21 RX ADMIN — WATER 2 G: 1 INJECTION INTRAMUSCULAR; INTRAVENOUS; SUBCUTANEOUS at 17:47

## 2020-05-21 RX ADMIN — CHLORHEXIDINE GLUCONATE 15 ML: 1.2 RINSE ORAL at 08:25

## 2020-05-21 RX ADMIN — CASTOR OIL AND BALSAM, PERU: 788; 87 OINTMENT TOPICAL at 20:50

## 2020-05-21 RX ADMIN — FUROSEMIDE 20 MG: 10 INJECTION, SOLUTION INTRAMUSCULAR; INTRAVENOUS at 17:47

## 2020-05-21 RX ADMIN — WATER 2 G: 1 INJECTION INTRAMUSCULAR; INTRAVENOUS; SUBCUTANEOUS at 05:20

## 2020-05-21 RX ADMIN — PROPOFOL 40 MCG/KG/MIN: 10 INJECTION, EMULSION INTRAVENOUS at 20:12

## 2020-05-21 RX ADMIN — HEPARIN SODIUM 5000 UNITS: 5000 INJECTION INTRAVENOUS; SUBCUTANEOUS at 05:43

## 2020-05-21 RX ADMIN — ALBUTEROL SULFATE 2.5 MG: 2.5 SOLUTION RESPIRATORY (INHALATION) at 07:37

## 2020-05-21 RX ADMIN — CASTOR OIL AND BALSAM, PERU: 788; 87 OINTMENT TOPICAL at 08:26

## 2020-05-21 RX ADMIN — MINERAL SUPPLEMENT IRON 300 MG / 5 ML STRENGTH LIQUID 100 PER BOX UNFLAVORED 300 MG: at 20:56

## 2020-05-21 RX ADMIN — ACETAMINOPHEN 650 MG: 325 TABLET, FILM COATED ORAL at 20:50

## 2020-05-21 RX ADMIN — METRONIDAZOLE 500 MG: 500 INJECTION, SOLUTION INTRAVENOUS at 20:28

## 2020-05-21 RX ADMIN — DEXMEDETOMIDINE HYDROCHLORIDE 0.9 MCG/KG/HR: 4 INJECTION, SOLUTION INTRAVENOUS at 11:52

## 2020-05-21 RX ADMIN — DEXMEDETOMIDINE HYDROCHLORIDE 1 MCG/KG/HR: 4 INJECTION, SOLUTION INTRAVENOUS at 22:40

## 2020-05-21 RX ADMIN — DEXMEDETOMIDINE HYDROCHLORIDE 1 MCG/KG/HR: 4 INJECTION, SOLUTION INTRAVENOUS at 06:10

## 2020-05-21 RX ADMIN — SODIUM CHLORIDE 20 ML: 9 INJECTION, SOLUTION INTRAVENOUS at 10:02

## 2020-05-21 RX ADMIN — SODIUM CHLORIDE, PRESERVATIVE FREE 10 ML: 5 INJECTION INTRAVENOUS at 08:24

## 2020-05-21 RX ADMIN — POTASSIUM BICARBONATE 10 MEQ: 782 TABLET, EFFERVESCENT ORAL at 13:43

## 2020-05-21 RX ADMIN — PROPOFOL 30 MCG/KG/MIN: 10 INJECTION, EMULSION INTRAVENOUS at 00:48

## 2020-05-21 RX ADMIN — POTASSIUM BICARBONATE 10 MEQ: 782 TABLET, EFFERVESCENT ORAL at 20:51

## 2020-05-21 RX ADMIN — ASPIRIN 81 MG 324 MG: 81 TABLET ORAL at 08:24

## 2020-05-21 RX ADMIN — GADOBENATE DIMEGLUMINE 15 ML: 529 INJECTION, SOLUTION INTRAVENOUS at 20:17

## 2020-05-21 RX ADMIN — METOPROLOL TARTRATE 75 MG: 25 TABLET, FILM COATED ORAL at 08:23

## 2020-05-21 RX ADMIN — FUROSEMIDE 20 MG: 10 INJECTION, SOLUTION INTRAMUSCULAR; INTRAVENOUS at 08:23

## 2020-05-21 RX ADMIN — SODIUM CHLORIDE, PRESERVATIVE FREE 10 ML: 5 INJECTION INTRAVENOUS at 20:50

## 2020-05-21 RX ADMIN — MINERAL SUPPLEMENT IRON 300 MG / 5 ML STRENGTH LIQUID 100 PER BOX UNFLAVORED 300 MG: at 08:23

## 2020-05-21 RX ADMIN — CHLORHEXIDINE GLUCONATE 15 ML: 1.2 RINSE ORAL at 21:05

## 2020-05-21 RX ADMIN — PROPOFOL 30 MCG/KG/MIN: 10 INJECTION, EMULSION INTRAVENOUS at 06:09

## 2020-05-21 RX ADMIN — METRONIDAZOLE 500 MG: 500 INJECTION, SOLUTION INTRAVENOUS at 01:30

## 2020-05-21 RX ADMIN — CALCIUM CHLORIDE 1 G: 100 INJECTION, SOLUTION INTRAVENOUS at 07:17

## 2020-05-21 RX ADMIN — INSULIN LISPRO 3 UNITS: 100 INJECTION, SOLUTION INTRAVENOUS; SUBCUTANEOUS at 12:18

## 2020-05-21 RX ADMIN — LORAZEPAM 2 MG: 2 INJECTION INTRAMUSCULAR; INTRAVENOUS at 20:36

## 2020-05-21 RX ADMIN — PANTOPRAZOLE SODIUM 40 MG: 40 INJECTION, POWDER, FOR SOLUTION INTRAVENOUS at 05:20

## 2020-05-21 RX ADMIN — ALBUTEROL SULFATE 2.5 MG: 2.5 SOLUTION RESPIRATORY (INHALATION) at 16:35

## 2020-05-21 RX ADMIN — METOPROLOL TARTRATE 75 MG: 25 TABLET, FILM COATED ORAL at 20:50

## 2020-05-21 ASSESSMENT — PULMONARY FUNCTION TESTS
PIF_VALUE: 25
PIF_VALUE: 18
PIF_VALUE: 13
PIF_VALUE: 23
PIF_VALUE: 23
PIF_VALUE: 24
PIF_VALUE: 20
PIF_VALUE: 14
PIF_VALUE: 23
PIF_VALUE: 23
PIF_VALUE: 25
PIF_VALUE: 24
PIF_VALUE: 23
PIF_VALUE: 21
PIF_VALUE: 24
PIF_VALUE: 22
PIF_VALUE: 23
PIF_VALUE: 24
PIF_VALUE: 13
PIF_VALUE: 15

## 2020-05-21 ASSESSMENT — PAIN SCALES - GENERAL
PAINLEVEL_OUTOF10: 1
PAINLEVEL_OUTOF10: 4
PAINLEVEL_OUTOF10: 4

## 2020-05-21 NOTE — PROGRESS NOTES
Progress Note    Updates  No significant events overnight. Intubated, sedated.       Past Medical History:   Diagnosis Date    Blood clot in vein 2018    Bronchitis     Carotid stenosis 05/07/2020    Hepatitis C antibody positive in blood 05/16/2020    Hypertension     Kidney disease     NSTEMI (non-ST elevated myocardial infarction) ([facility] [address] 05/07/2020    PAD (peripheral artery disease) ([facility] [address]     Respiratory compromise 05/2020    Tattoos      Past Surgical History:   Procedure Laterality Date    CORONARY ARTERY BYPASS GRAFT  05/08/2020    cabgx4 (LIMA-LAD, SVG-D1, SVG-OM, SVG-PDA)    CORONARY ARTERY BYPASS GRAFT N/A 5/8/2020    CABG CORONARY ARTERY BYPASS GRAFT X4,  TRANSESOPHAGEAL ECHOCARDIOGRAM, TOTAL CARDIOPULMONARY BYPASS performed by Patricia Zeng MD at [address]     17 yo - pt doesn't know why     Current Facility-Administered Medications:     atorvastatin (LIPITOR) tablet 20 mg, 20 mg, Oral, Nightly, KATHY Ortega CNP    propofol injection 120 mg, 12 mL, Intravenous, Continuous PRN, KATHY Ortega CNP    docusate (COLACE) 50 MG/5ML liquid 100 mg, 100 mg, Oral, BID PRN, KATHY Ortega CNP    ceFEPIme (MAXIPIME) 2 g in sterile water 20 mL IV syringe, 2 g, Intravenous, Q12H, Lily De La Fuente MD, 2 g at 05/21/20 0520    metronidazole (FLAGYL) 500 mg in NaCl 100 mL IVPB premix, 500 mg, Intravenous, Q8H, Lily De La Fuente MD, Stopped at 05/21/20 1111    metoprolol tartrate (LOPRESSOR) tablet 75 mg, 75 mg, Oral, BID, KATHY Ortega - CNP, 75 mg at 05/21/20 0265    furosemide (LASIX) injection 20 mg, 20 mg, Intravenous, BID, KATHY Ortega CNP, 20 mg at 05/21/20 1736    Venelex ointment, , Topical, BID, KATHY Ortega CNP    sennosides-docusate sodium (SENOKOT-S) 8.6-50 MG tablet 1 tablet, 1 tablet, Oral, Daily PRN, KATHY Ortega CNP    niCARdipine (CARDENE) 25 mg in sodium chloride 0.9 % 250 mL infusion, 5 mg/hr, Intravenous, Continuous, Rolando Armas APRN - CNP, Stopped at 05/19/20 2202    lidocaine PF 1 % injection 5 mL, 5 mL, Intradermal, Once, KATHY Thomas - CNP    bisacodyl (DULCOLAX) suppository 10 mg, 10 mg, Rectal, Daily PRN, Frankiee Pawel, APRN - CNP, 10 mg at 05/15/20 1656    metoprolol (LOPRESSOR) injection 2.5 mg, 2.5 mg, Intravenous, Q10 Min PRN, Rolando Armas, APRN - CNP    ferrous sulfate 300 (60 Fe) MG/5ML syrup 300 mg, 300 mg, Oral, BID, Rolando Armas, APRN - CNP, 300 mg at 05/21/20 8162    heparin (porcine) injection 5,000 Units, 5,000 Units, Subcutaneous, 3 times per day, Rolando Armas, APRN - CNP, 5,000 Units at 05/21/20 1344    glucose (GLUTOSE) 40 % oral gel 15 g, 15 g, Oral, PRN, Frankiee Pawel, APRN - CNP    dextrose 50 % IV solution, 12.5 g, Intravenous, PRN, Frankiee Pawel, APRN - CNP, 12.5 g at 05/15/20 0836    glucagon (rDNA) injection 1 mg, 1 mg, Intramuscular, PRN, Frankiee Pawel, APRN - CNP    dextrose 5 % solution, 100 mL/hr, Intravenous, PRN, Frankiee Pawel, APRN - CNP    sodium chloride flush 0.9 % injection 10 mL, 10 mL, Intravenous, 2 times per day, Frankiee Pawel, APRN - CNP, 10 mL at 05/21/20 0824    sodium chloride flush 0.9 % injection 10 mL, 10 mL, Intravenous, PRN, Lucbobe Pawel, APRN - CNP, 10 mL at 05/16/20 0717    LORazepam (ATIVAN) tablet 1 mg, 1 mg, Oral, Q1H PRN **OR** LORazepam (ATIVAN) injection 1 mg, 1 mg, Intravenous, Q1H PRN, 1 mg at 05/18/20 1215 **OR** LORazepam (ATIVAN) tablet 2 mg, 2 mg, Oral, Q1H PRN, 2 mg at 05/18/20 0253 **OR** LORazepam (ATIVAN) injection 2 mg, 2 mg, Intravenous, Q1H PRN, 2 mg at 05/21/20 0201 **OR** LORazepam (ATIVAN) tablet 3 mg, 3 mg, Oral, Q1H PRN, 3 mg at 05/16/20 2010 **OR** LORazepam (ATIVAN) injection 3 mg, 3 mg, Intravenous, Q1H PRN, 3 mg at 05/18/20 2014 **OR** LORazepam (ATIVAN) tablet 4 mg, 4 mg, Oral, Q1H PRN, 4 mg at 05/17/20 2241 **OR** LORazepam (ATIVAN) injection 4 mg, 4 mg, Denis Rodgers MD, 2.5 mg at 05/21/20 1120    albuterol sulfate  (90 Base) MCG/ACT inhaler 2 puff, 2 puff, Inhalation, Q6H PRN, Pradeep Delgado MD    metoclopramide (REGLAN) injection 5 mg, 5 mg, Intravenous, Q6H PRN, KATHY Navarro CNP    dexmedetomidine (PRECEDEX) 400 mcg in sodium chloride 0.9 % 100 mL infusion, 0.4 mcg/kg/hr, Intravenous, Continuous, Issac Elaine MD, Last Rate: 18.5 mL/hr at 05/21/20 1152, 0.9 mcg/kg/hr at 05/21/20 1152    propofol injection, 10 mcg/kg/min, Intravenous, Titrated, KATHY Navarro CNP, Last Rate: 12.3 mL/hr at 05/21/20 1448, 25 mcg/kg/min at 05/21/20 1448    albuterol (PROVENTIL) nebulizer solution 2.5 mg, 2.5 mg, Nebulization, Q4H PRN, Pradeep Delgado MD    [DISCONTINUED] acetaminophen (TYLENOL) tablet 650 mg, 650 mg, Oral, Q6H PRN **OR** acetaminophen (TYLENOL) suppository 650 mg, 650 mg, Rectal, Q6H PRN, Ainsley Guy MD, 650 mg at 05/10/20 2036    polyethylene glycol (GLYCOLAX) packet 17 g, 17 g, Oral, Daily PRN, Ainsley Guy MD    promethazine (PHENERGAN) tablet 12.5 mg, 12.5 mg, Oral, Q6H PRN **OR** [DISCONTINUED] ondansetron (ZOFRAN) injection 4 mg, 4 mg, Intravenous, Q6H PRN, Ainsley Guy MD    magnesium hydroxide (MILK OF MAGNESIA) 400 MG/5ML suspension 30 mL, 30 mL, Oral, Daily PRN, Ingrid Crawford MD    [COMPLETED] pantoprazole (PROTONIX) injection 40 mg, 40 mg, Intravenous, Once, 40 mg at 05/08/20 0521 **AND** sodium chloride (PF) 0.9 % injection 10 mL, 10 mL, Intravenous, Once, Suzie Hack, APRN - CNP    Exam  Blood pressure (!) 89/49, pulse 73, temperature 99.1 °F (37.3 °C), temperature source Rectal, resp. rate 23, height 6' (1.829 m), weight 186 lb 1.1 oz (84.4 kg), SpO2 100 %. Constitutional                          Vital signs: BP, HR, and RR reviewed            General depressed mental status  Eyes: unable to visualize the fundi  Cardiovascular: pulses not assessed at this time.     Psychiatric: limited exam febrile. Remains intubated, sedated.       MRI brain w/ R frontal lobe diffusion restriction, possibly an acute/subacute infarct. Radiology did suggest the possibility of underlying abscess, however. EEG w/ slowing and beta activity, non-specific. Recommendations  1. MRI brain w/; MRA head/neck. 2.  Supportive care. Wean sedation as able. 3.  ID following.       Altamease Hazard NP  [address] [place] [address] [address] Neurology    A copy of this note was provided for Dr Kirby Mcdowell MD

## 2020-05-21 NOTE — PROGRESS NOTES
Infectious Disease Follow up Notes  Admit Date: 5/7/2020  Hospital Day: 15    Antibiotics :   IV Cefepime  Iv flagyl      CHIEF COMPLAINT:     NSTEMI  CABG Emergent   Fevers   Resp failure  ?dts  Subjective interval History :  60 y.o. man admitted with chest pain on going for some time with acute relief from Nitro per HPI and noted to have EKG changes with elevated cardiac enzymes underwent emergent cardiac cath and subsequently taken for CABG on 5/8 by  and post op now in CVICU on the ventilator and WBC elevation post op up to 24 k AND Hb low at 6.7 post op and has been resuscitated now having fevers from 5/10 increasing T max slowly now up to 102.3 and WBC has trended down but noted to have Lft lELEVATION and there was some concern for DTS based on the history started on MVI and Lorazepam and Blood cx , sputum cx from 5/12 NGTD and we are consulted for recommendations given the fevers . Remains intubated and sedated on the vent and agitated a bit during exam and NG tube in place , Tobin and Rectal tube+ Fevers down and plans for MRA noted and Neurology following EEG with Encephalopathy+    Past Medical History:    Past Medical History:   Diagnosis Date    Blood clot in vein 2018    right leg. no previous scans to know whether supf or dvt. no coumadin. put on plavix.  Bronchitis     doesn't remember when.  outpt abx    Carotid stenosis 05/07/2020    bilat 50-79% stenosis    Hepatitis C antibody positive in blood 05/16/2020    Hypertension     Kidney disease     s/p nephrectomy age 17, pt not sure why     NSTEMI (non-ST elevated myocardial infarction) ([facility] [address] 05/07/2020    3VD    PAD (peripheral artery disease) ([facility] [address]     Respiratory compromise 05/2020    chemical exposure at work, seen at [facility], covid neg, given steroids    Tattoos        Past Surgical History:    Past Surgical History:   Procedure Laterality Date    CORONARY ARTERY BYPASS GRAFT  2020    cabgx4 (LIMA-LAD, SVG-D1, SVG-OM, SVG-PDA)    CORONARY ARTERY BYPASS GRAFT N/A 2020    CABG CORONARY ARTERY BYPASS GRAFT X4,  TRANSESOPHAGEAL ECHOCARDIOGRAM, TOTAL CARDIOPULMONARY BYPASS performed by Jeffery Wolfe MD at [address]     17 yo - pt doesn't know why       Current Medications:    Outpatient Medications Marked as Taking for the 20 encounter [facility] HOSPITAL Encounter)   Medication Sig Dispense Refill    clopidogrel (PLAVIX) 75 MG tablet Take 75 mg by mouth daily      lisinopril (PRINIVIL;ZESTRIL) 40 MG tablet Take 40 mg by mouth daily      amLODIPine (NORVASC) 5 MG tablet Take 5 mg by mouth daily      albuterol sulfate HFA (VENTOLIN HFA) 108 (90 Base) MCG/ACT inhaler Inhale 2 puffs into the lungs every 6 hours as needed for Wheezing         Allergies:  Patient has no known allergies. Immunizations : There is no immunization history on file for this patient. Social History:    Social History     Tobacco Use    Smoking status: Former Smoker     Types: Cigarettes     Last attempt to quit: 2019     Years since quittin.5    Smokeless tobacco: Never Used    Tobacco comment: started age 27   Substance Use Topics    Alcohol use: Yes     Frequency: Monthly or less     Comment: once a month, shot once a week. depends on mood.  Drug use: Never     Social History     Tobacco Use   Smoking Status Former Smoker    Types: Cigarettes    Last attempt to quit: 2019    Years since quittin.5   Smokeless Tobacco Never Used   Tobacco Comment    started age 27      Family History   Problem Relation Age of Onset    Heart Disease Mother          of MI age 36    Heart Disease Brother         MI mid 46s    Heart Disease Father          of MI age 68    Heart Disease Paternal Aunt         MI in her 46s         REVIEW OF SYSTEMS:    No fever / chills / sweats. No weight loss.   No visual change, eye pain, eye Non-Reactive  Non-reactive Final 05/16/2020  2:40 PM [address] [facility]   HIV-1 Antibody Non-Reactive  Non-reactive Final 05/16/2020  2:40 PM [address] [facility]   HIV ANTIGEN Non-Reactive  Non-reactive Final 05/16/2020  2:40 PM [address] [facility]   HIV-2 Ab Non-Reactive  Non-reactive Final 05/16/2020  2:40 PM [address]          IMAGING:    MRI BRAIN WO CONTRAST   Final Result   1. Focal area of restricted diffusion involving the posterior right frontal   lobe, near the right precentral gyrus and corona radiata, most compatible   with an acute/early subacute infarct. However, there is a focal area of   rounded low T2 signal noted centrally in this lesion (series 6, image 19),   raising the possibility of an underlying abscess. Postcontrast imaging may   be of benefit for further evaluation. 2. Mild chronic microvascular white matter ischemic disease. XR CHEST PORTABLE   Final Result   1. Left upper extremity PICC terminates over the SVC. Additional support   hardware, as detailed above. 2. Persistent findings of pulmonary interstitial edema and small left pleural   effusion. 3. Unchanged left basilar pulmonary opacity may represent atelectasis,   pneumonia, and/or aspiration. RECOMMENDATION:   Continued radiographic follow-up. XR CHEST PORTABLE   Final Result   Stable chest.         XR CHEST PORTABLE   Final Result   Interval removal of chest drains. Otherwise supportive tubing is stable. No pneumothorax. Increased left basilar opacity, likely combination of atelectasis and pleural   effusion. Vascular congestion. VL Extremity Venous Bilateral   Final Result      XR CHEST PORTABLE   Final Result   [place] catheter has been removed. Enteric feeding tube has been added. Supportive tubing is otherwise stable. Left perihilar/basilar atelectasis. Pneumonia is a differential possibility.          CT HEAD WO CONTRAST   Final Result No hemorrhage or mass      Mild periventricular white matter disease, likely due to small-vessel   ischemic change      Mild paranasal sinus disease         XR CHEST PORTABLE   Final Result   Dependent left lower lobe opacification and effusion. Mild right perihilar   opacification. No pneumothorax. Satisfactory position of endotracheal tube. Enteric catheter tip in the gastric body. XR CHEST PORTABLE   Final Result   Relatively stable appearance of the chest.  Perhaps slight increasing   ground-glass opacification centrally. No residual pneumothorax detected. XR CHEST PORTABLE   Final Result   1. Mild pulmonary vascular congestion with a small left pleural effusion and   associated left basilar atelectasis. 2. Tiny left apical pneumothorax. Attention on follow-up imaging is   recommended. 3. Suboptimal visualization of the orogastric tube. Abdominal radiograph is   recommended for further evaluation. VL DUP CAROTID BILATERAL   Final Result      VL PRE OP VEIN MAPPING   Final Result      XR CHEST PORTABLE   Final Result   Or bronchial wall thickening suggests inflammation, such as that seen with   asthma, bronchitis or smoking. Consolidative airspace disease. Rounded opacity in the right hilar region likely represents a vessel en face,   however cannot exclude a prominent lymph node or nodule.   Comparison imaging   would be helpful if available, otherwise recommend CT chest.         MRI BRAIN W CONTRAST    (Results Pending)   MRA HEAD WO CONTRAST    (Results Pending)   MRA NECK W WO CONTRAST    (Results Pending)         All the pertinent images and reports for the current Hospitalization were reviewed by me     Scheduled Meds:   atorvastatin  20 mg Oral Nightly    sodium chloride  20 mL Intravenous Once    cefepime  2 g Intravenous Q12H    metroNIDAZOLE  500 mg Intravenous Q8H    metoprolol tartrate  75 mg Oral BID    furosemide  20 mg Intravenous BID   

## 2020-05-21 NOTE — PROGRESS NOTES
Progress Note    S/P cabgx4 5/8/20    Vital Signs:                                                 BP (!) 91/48   Pulse 74   Temp 98 °F (36.7 °C) (Rectal)   Resp 29   Ht 6' (1.829 m)   Wt 186 lb 1.1 oz (84.4 kg)   SpO2 99%   BMI 25.24 kg/m²  O2 Flow Rate (L/min): 3 L/min   NSR  Admission Weight: 188 lb (85.3 kg)      Vent Settings:  Vent Information  $Ventilation: $Subsequent Day  Skin Assessment: Clean, dry, & intact  Suction Catheter Diameter: 14  Equipment ID: [id number]  Equipment Changed: Humidification  Vent Type: 840  Vent Mode: AC/VC  Vt Ordered: 450 mL  Rate Set: 20 bmp  Peak Flow: 80 L/min  Pressure Support: 0 cmH20  FiO2 : 50 %  SpO2: 99 %  SpO2/FiO2 ratio: 198  Sensitivity: 3  PEEP/CPAP: 5  I Time/ I Time %: 0.7 s  Humidification Source: Heated wire  Humidification Temp: 37  Humidification Temp Measured: 37.2  Circuit Condensation: Not drained     Drips:  Propofol, precedex  TF at goal    I/O:      Intake/Output Summary (Last 24 hours) at 5/21/2020 0637  Last data filed at 5/21/2020 0532  Gross per 24 hour   Intake 3431.1 ml   Output 3460 ml   Net -28.9 ml     CV: reg, wound c/d/i  Pulm: decreased throughout  Abd: soft  Ext: warm, no edema. Fingertips violaceous - pretty much resolved. New blisters bilat pedal, erythematous. Neuro: sedated    Data Review:  CBC:   Recent Labs     05/19/20 0420 05/20/20  0430 05/21/20  0440   WBC 11.4* 10.6 9.1   HGB 8.3* 7.2* 7.0*   HCT 25.6* 22.0* 21.4*   MCV 85.0 84.5 85.7   * 574* 612*     BMP:   Recent Labs     05/19/20  0420 05/20/20  0430 05/20/20  0925 05/21/20  0440    142  --  143   K 4.4 3.7 4.3 4.2    104  --  109   CO2 25 24  --  24   BUN 50* 50*  --  44*   CREATININE 0.9 0.9  --  0.8   CALCIUM 8.3 8.6  --  8.1*   MG 2.70* 2.60*  --  2.60*     Cardiac Enzymes: No results for input(s): CKTOTAL, CKMB, CKMBINDEX, TROPONINI in the last 72 hours.   PT/INR:   Recent Labs     05/19/20  0420 05/20/20  0430 05/21/20  0440 PROTIME 13.8* 13.2 13.5*   INR 1.19* 1.14 1.16*     APTT:   Recent Labs     05/19/20  0420 05/20/20  0430 05/21/20  0440   APTT 28.3 26.0 26.5       Assessment/Plan:  CV - stable in SR. Replete Ca. - BB   - hold statin until lfts normalize. Very close to normal now. pulm - still intubated. Wean as able. Mental status not yet appropriate for extubation. ID - febrile 5/12, 5/13. cx 5/12 neg, 5/15 sput GPC. wbc nl. abx per ID recs. - violaceous fingers. Doubt HIT, not on pressors, covid neg 5/7. cryoglob pending. HIV neg. GI - hep C Ab+   - TF. Optimize since low alb   - diarrhea. Send for C. diff  Renal - Cr nl. Diurese. Almost back to preop weight. - retain Hoang for accurate I+O  Heme - acute blood loss anemia. Cont Fe. Cont to drift down. Transfuse 1 PC.   - dvt prophylaxis scds, sc hep. GI - TF at goal  Neuro - EtOH withdrawal/delirium. CIWA protocol. Neurology consulted. MRI noted. EEG 5/20 mild to mod enceph. CTA head/neck today.       Lázaro Andrea MD  5/21/2020  6:37 AM

## 2020-05-21 NOTE — PROGRESS NOTES
Nutrition Assessment (Enteral Nutrition)    Type and Reason for Visit: Reassess    Nutrition Recommendations:   Continue Jevity 1.2 per NG, goal rate of 60 ml per hour + 2 bottles of Proteinex per feeding tube per day. Do not mix directly with TF  Flush per Provider  Monitor propofol dose for need to adjust TF regimen    Nutrition Assessment: Pt remains intubated. Noted that triglycerides are 409 taken this date, up from result of 183 taken on 5/18. Noted propofol decreased to 14.8 ml per hour adding 391 kcals per day. Pt tolerating Jevity 1.2 goal rate of 60 ml per hour + 2 bottles of Proteinex per feeding tube per day. Flush per provider. Regimen remains appropriate at this time. MD noted that mental status not well enough to extubate at this time. Neurology on board. Malnutrition Assessment:  · Malnutrition Status: Insufficient data    Nutrition Risk Level: High    Nutrition Needs:  · Estimated Daily Total Kcal: 2446-7735 (25-30 x ABW 82 kg)  · Estimated Daily Protein (g):  (1.2-1.5 x ABW)  · Estimated Daily Fluid (ml/day): 1500 per MD    Nutrition Diagnosis:   · Problem: Increased nutrient needs  · Etiology: related to Increased demand for energy/nutrients     Signs and symptoms:  as evidenced by Presence of wounds    Objective Information:  · Nutrition-Focused Physical Findings: Noted loose BM per rectal tube. Noted c-dif negative. Noted +2 generalized edema with trace edema to BLE. · Wound Type: (Pt noted with abrasion to right ear, multiple areas of DTI to sacrum & BLE, including feet.  Noted no issues with SI sites.)  · Current Nutrition Therapies:  · Oral Diet Orders: NPO   · Oral Diet intake: NPO  · Oral Nutrition Supplement (ONS) Orders: None  · ONS intake: NPO  · Tube Feeding (TF) Orders:   · Feeding Route: Nasogastric  · Formula: Standard w/Fiber  · Rate (ml/hr):60 (rate adjusted for routine Nursing care (~20 hour run) & propofol use    · Volume (ml/day): 1200  · Duration:

## 2020-05-22 LAB
ALBUMIN SERPL-MCNC: 2.3 G/DL (ref 3.4–5)
ALP BLD-CCNC: 118 U/L (ref 40–129)
ALT SERPL-CCNC: 68 U/L (ref 10–40)
AMYLASE: 74 U/L (ref 25–115)
ANION GAP SERPL CALCULATED.3IONS-SCNC: 10 MMOL/L (ref 3–16)
APTT: 27.5 SEC (ref 24.2–36.2)
AST SERPL-CCNC: 46 U/L (ref 15–37)
BASE EXCESS ARTERIAL: 0.1 MMOL/L (ref -3–3)
BILIRUB SERPL-MCNC: <0.2 MG/DL (ref 0–1)
BILIRUBIN DIRECT: <0.2 MG/DL (ref 0–0.3)
BILIRUBIN, INDIRECT: ABNORMAL MG/DL (ref 0–1)
BUN BLDV-MCNC: 41 MG/DL (ref 7–20)
CALCIUM SERPL-MCNC: 8.5 MG/DL (ref 8.3–10.6)
CARBOXYHEMOGLOBIN ARTERIAL: 0.9 % (ref 0–1.5)
CHLORIDE BLD-SCNC: 108 MMOL/L (ref 99–110)
CO2: 24 MMOL/L (ref 21–32)
CREAT SERPL-MCNC: 0.8 MG/DL (ref 0.8–1.3)
CRYOGLOBULIN, QUALITATIVE: NORMAL
GFR AFRICAN AMERICAN: >60
GFR NON-AFRICAN AMERICAN: >60
GLUCOSE BLD-MCNC: 111 MG/DL (ref 70–99)
GLUCOSE BLD-MCNC: 126 MG/DL (ref 70–99)
GLUCOSE BLD-MCNC: 131 MG/DL (ref 70–99)
GLUCOSE BLD-MCNC: 133 MG/DL (ref 70–99)
GLUCOSE BLD-MCNC: 158 MG/DL (ref 70–99)
GLUCOSE BLD-MCNC: 170 MG/DL (ref 70–99)
GLUCOSE BLD-MCNC: 93 MG/DL (ref 70–99)
HCO3 ARTERIAL: 23.9 MMOL/L (ref 21–29)
HCT VFR BLD CALC: 23.3 % (ref 40.5–52.5)
HEMOGLOBIN, ART, EXTENDED: 8.2 G/DL (ref 13.5–17.5)
HEMOGLOBIN: 7.7 G/DL (ref 13.5–17.5)
INR BLD: 1.22 (ref 0.86–1.14)
LIPASE: 70 U/L (ref 13–60)
MAGNESIUM: 2.3 MG/DL (ref 1.8–2.4)
MCH RBC QN AUTO: 28.1 PG (ref 26–34)
MCHC RBC AUTO-ENTMCNC: 33.1 G/DL (ref 31–36)
MCV RBC AUTO: 84.9 FL (ref 80–100)
METHEMOGLOBIN ARTERIAL: 0.7 %
O2 CONTENT ARTERIAL: 12 ML/DL
O2 SAT, ARTERIAL: 99.9 %
O2 THERAPY: ABNORMAL
PCO2 ARTERIAL: 34.2 MMHG (ref 35–45)
PDW BLD-RTO: 15.8 % (ref 12.4–15.4)
PERFORMED ON: ABNORMAL
PERFORMED ON: NORMAL
PH ARTERIAL: 7.45 (ref 7.35–7.45)
PLATELET # BLD: 551 K/UL (ref 135–450)
PMV BLD AUTO: 7.3 FL (ref 5–10.5)
PO2 ARTERIAL: 134 MMHG (ref 75–108)
POTASSIUM SERPL-SCNC: 4.2 MMOL/L (ref 3.5–5.1)
PROTHROMBIN TIME: 14.2 SEC (ref 10–13.2)
RBC # BLD: 2.74 M/UL (ref 4.2–5.9)
SODIUM BLD-SCNC: 142 MMOL/L (ref 136–145)
TCO2 ARTERIAL: 25 MMOL/L
TOTAL PROTEIN: 6.2 G/DL (ref 6.4–8.2)
TRIGL SERPL-MCNC: 547 MG/DL (ref 0–150)
WBC # BLD: 8.5 K/UL (ref 4–11)

## 2020-05-22 PROCEDURE — 94003 VENT MGMT INPAT SUBQ DAY: CPT

## 2020-05-22 PROCEDURE — 82150 ASSAY OF AMYLASE: CPT

## 2020-05-22 PROCEDURE — 6360000002 HC RX W HCPCS: Performed by: INTERNAL MEDICINE

## 2020-05-22 PROCEDURE — 83690 ASSAY OF LIPASE: CPT

## 2020-05-22 PROCEDURE — 82803 BLOOD GASES ANY COMBINATION: CPT

## 2020-05-22 PROCEDURE — 2700000000 HC OXYGEN THERAPY PER DAY

## 2020-05-22 PROCEDURE — 85610 PROTHROMBIN TIME: CPT

## 2020-05-22 PROCEDURE — 6370000000 HC RX 637 (ALT 250 FOR IP): Performed by: NURSE PRACTITIONER

## 2020-05-22 PROCEDURE — 94750 HC PULMONARY COMPLIANCE STUDY: CPT

## 2020-05-22 PROCEDURE — 6360000002 HC RX W HCPCS: Performed by: NURSE PRACTITIONER

## 2020-05-22 PROCEDURE — 6360000002 HC RX W HCPCS: Performed by: THORACIC SURGERY (CARDIOTHORACIC VASCULAR SURGERY)

## 2020-05-22 PROCEDURE — 99233 SBSQ HOSP IP/OBS HIGH 50: CPT | Performed by: INTERNAL MEDICINE

## 2020-05-22 PROCEDURE — 94640 AIRWAY INHALATION TREATMENT: CPT

## 2020-05-22 PROCEDURE — C9113 INJ PANTOPRAZOLE SODIUM, VIA: HCPCS | Performed by: THORACIC SURGERY (CARDIOTHORACIC VASCULAR SURGERY)

## 2020-05-22 PROCEDURE — 99024 POSTOP FOLLOW-UP VISIT: CPT | Performed by: THORACIC SURGERY (CARDIOTHORACIC VASCULAR SURGERY)

## 2020-05-22 PROCEDURE — 94761 N-INVAS EAR/PLS OXIMETRY MLT: CPT

## 2020-05-22 PROCEDURE — 2580000003 HC RX 258: Performed by: INTERNAL MEDICINE

## 2020-05-22 PROCEDURE — 6370000000 HC RX 637 (ALT 250 FOR IP): Performed by: THORACIC SURGERY (CARDIOTHORACIC VASCULAR SURGERY)

## 2020-05-22 PROCEDURE — 2580000003 HC RX 258: Performed by: THORACIC SURGERY (CARDIOTHORACIC VASCULAR SURGERY)

## 2020-05-22 PROCEDURE — 2580000003 HC RX 258: Performed by: NURSE PRACTITIONER

## 2020-05-22 PROCEDURE — 2500000003 HC RX 250 WO HCPCS: Performed by: THORACIC SURGERY (CARDIOTHORACIC VASCULAR SURGERY)

## 2020-05-22 PROCEDURE — 83735 ASSAY OF MAGNESIUM: CPT

## 2020-05-22 PROCEDURE — 36415 COLL VENOUS BLD VENIPUNCTURE: CPT

## 2020-05-22 PROCEDURE — 80048 BASIC METABOLIC PNL TOTAL CA: CPT

## 2020-05-22 PROCEDURE — 84478 ASSAY OF TRIGLYCERIDES: CPT

## 2020-05-22 PROCEDURE — 2500000003 HC RX 250 WO HCPCS: Performed by: INTERNAL MEDICINE

## 2020-05-22 PROCEDURE — 2100000000 HC CCU R&B

## 2020-05-22 PROCEDURE — 2500000003 HC RX 250 WO HCPCS: Performed by: NURSE PRACTITIONER

## 2020-05-22 PROCEDURE — 85027 COMPLETE CBC AUTOMATED: CPT

## 2020-05-22 PROCEDURE — 80076 HEPATIC FUNCTION PANEL: CPT

## 2020-05-22 PROCEDURE — 85730 THROMBOPLASTIN TIME PARTIAL: CPT

## 2020-05-22 RX ORDER — LORAZEPAM 2 MG/ML
2 INJECTION INTRAMUSCULAR EVERY 4 HOURS PRN
Status: DISCONTINUED | OUTPATIENT
Start: 2020-05-22 | End: 2020-05-25

## 2020-05-22 RX ADMIN — SODIUM CHLORIDE, PRESERVATIVE FREE 10 ML: 5 INJECTION INTRAVENOUS at 21:20

## 2020-05-22 RX ADMIN — OXYCODONE HYDROCHLORIDE 10 MG: 10 TABLET ORAL at 15:38

## 2020-05-22 RX ADMIN — ALBUTEROL SULFATE 2.5 MG: 2.5 SOLUTION RESPIRATORY (INHALATION) at 20:00

## 2020-05-22 RX ADMIN — WATER 2 G: 1 INJECTION INTRAMUSCULAR; INTRAVENOUS; SUBCUTANEOUS at 05:20

## 2020-05-22 RX ADMIN — POTASSIUM BICARBONATE 10 MEQ: 782 TABLET, EFFERVESCENT ORAL at 21:18

## 2020-05-22 RX ADMIN — ASPIRIN 81 MG 324 MG: 81 TABLET ORAL at 08:28

## 2020-05-22 RX ADMIN — LORAZEPAM 2 MG: 2 INJECTION INTRAMUSCULAR; INTRAVENOUS at 13:34

## 2020-05-22 RX ADMIN — WATER 2 G: 1 INJECTION INTRAMUSCULAR; INTRAVENOUS; SUBCUTANEOUS at 18:16

## 2020-05-22 RX ADMIN — MINERAL SUPPLEMENT IRON 300 MG / 5 ML STRENGTH LIQUID 100 PER BOX UNFLAVORED 300 MG: at 21:18

## 2020-05-22 RX ADMIN — ATORVASTATIN CALCIUM 20 MG: 20 TABLET, FILM COATED ORAL at 21:18

## 2020-05-22 RX ADMIN — FUROSEMIDE 20 MG: 10 INJECTION, SOLUTION INTRAMUSCULAR; INTRAVENOUS at 08:28

## 2020-05-22 RX ADMIN — FENTANYL CITRATE 25 MCG: 50 INJECTION INTRAMUSCULAR; INTRAVENOUS at 17:15

## 2020-05-22 RX ADMIN — HEPARIN SODIUM 5000 UNITS: 5000 INJECTION INTRAVENOUS; SUBCUTANEOUS at 13:37

## 2020-05-22 RX ADMIN — PANTOPRAZOLE SODIUM 40 MG: 40 INJECTION, POWDER, FOR SOLUTION INTRAVENOUS at 06:57

## 2020-05-22 RX ADMIN — METOPROLOL TARTRATE 75 MG: 25 TABLET, FILM COATED ORAL at 21:18

## 2020-05-22 RX ADMIN — DEXMEDETOMIDINE HYDROCHLORIDE 1.4 MCG/KG/HR: 4 INJECTION, SOLUTION INTRAVENOUS at 16:53

## 2020-05-22 RX ADMIN — DEXMEDETOMIDINE HYDROCHLORIDE 1.4 MCG/KG/HR: 4 INJECTION, SOLUTION INTRAVENOUS at 21:00

## 2020-05-22 RX ADMIN — INSULIN LISPRO 3 UNITS: 100 INJECTION, SOLUTION INTRAVENOUS; SUBCUTANEOUS at 21:24

## 2020-05-22 RX ADMIN — POTASSIUM BICARBONATE 10 MEQ: 782 TABLET, EFFERVESCENT ORAL at 08:27

## 2020-05-22 RX ADMIN — HEPARIN SODIUM 5000 UNITS: 5000 INJECTION INTRAVENOUS; SUBCUTANEOUS at 05:20

## 2020-05-22 RX ADMIN — PROPOFOL 15 MCG/KG/MIN: 10 INJECTION, EMULSION INTRAVENOUS at 15:22

## 2020-05-22 RX ADMIN — CHLORHEXIDINE GLUCONATE 15 ML: 1.2 RINSE ORAL at 22:25

## 2020-05-22 RX ADMIN — ALBUTEROL SULFATE 2.5 MG: 2.5 SOLUTION RESPIRATORY (INHALATION) at 08:19

## 2020-05-22 RX ADMIN — LORAZEPAM 2 MG: 2 INJECTION INTRAMUSCULAR; INTRAVENOUS at 08:28

## 2020-05-22 RX ADMIN — MINERAL SUPPLEMENT IRON 300 MG / 5 ML STRENGTH LIQUID 100 PER BOX UNFLAVORED 300 MG: at 08:32

## 2020-05-22 RX ADMIN — HYDRALAZINE HYDROCHLORIDE 5 MG: 20 INJECTION INTRAMUSCULAR; INTRAVENOUS at 19:07

## 2020-05-22 RX ADMIN — SODIUM CHLORIDE, PRESERVATIVE FREE 10 ML: 5 INJECTION INTRAVENOUS at 08:32

## 2020-05-22 RX ADMIN — ALBUTEROL SULFATE 2.5 MG: 2.5 SOLUTION RESPIRATORY (INHALATION) at 12:56

## 2020-05-22 RX ADMIN — DEXMEDETOMIDINE HYDROCHLORIDE 1 MCG/KG/HR: 4 INJECTION, SOLUTION INTRAVENOUS at 08:16

## 2020-05-22 RX ADMIN — METRONIDAZOLE 500 MG: 500 INJECTION, SOLUTION INTRAVENOUS at 18:16

## 2020-05-22 RX ADMIN — ALBUTEROL SULFATE 2.5 MG: 2.5 SOLUTION RESPIRATORY (INHALATION) at 15:43

## 2020-05-22 RX ADMIN — METOPROLOL TARTRATE 75 MG: 25 TABLET, FILM COATED ORAL at 08:26

## 2020-05-22 RX ADMIN — PROPOFOL 30 MCG/KG/MIN: 10 INJECTION, EMULSION INTRAVENOUS at 08:16

## 2020-05-22 RX ADMIN — PROPOFOL 35 MCG/KG/MIN: 10 INJECTION, EMULSION INTRAVENOUS at 01:39

## 2020-05-22 RX ADMIN — CHLORHEXIDINE GLUCONATE 15 ML: 1.2 RINSE ORAL at 08:29

## 2020-05-22 RX ADMIN — HEPARIN SODIUM 5000 UNITS: 5000 INJECTION INTRAVENOUS; SUBCUTANEOUS at 21:21

## 2020-05-22 RX ADMIN — Medication 10 ML: at 06:57

## 2020-05-22 RX ADMIN — Medication 10 ML: at 08:28

## 2020-05-22 RX ADMIN — METRONIDAZOLE 500 MG: 500 INJECTION, SOLUTION INTRAVENOUS at 02:58

## 2020-05-22 RX ADMIN — FENTANYL CITRATE 25 MCG: 50 INJECTION INTRAMUSCULAR; INTRAVENOUS at 16:49

## 2020-05-22 RX ADMIN — CASTOR OIL AND BALSAM, PERU: 788; 87 OINTMENT TOPICAL at 22:25

## 2020-05-22 RX ADMIN — INSULIN LISPRO 3 UNITS: 100 INJECTION, SOLUTION INTRAVENOUS; SUBCUTANEOUS at 00:50

## 2020-05-22 RX ADMIN — Medication 200 MCG/HR: at 21:00

## 2020-05-22 RX ADMIN — HYDRALAZINE HYDROCHLORIDE 5 MG: 20 INJECTION INTRAMUSCULAR; INTRAVENOUS at 16:50

## 2020-05-22 RX ADMIN — FENTANYL CITRATE 25 MCG: 50 INJECTION INTRAMUSCULAR; INTRAVENOUS at 18:14

## 2020-05-22 RX ADMIN — POTASSIUM BICARBONATE 10 MEQ: 782 TABLET, EFFERVESCENT ORAL at 13:36

## 2020-05-22 RX ADMIN — OXYCODONE HYDROCHLORIDE 10 MG: 10 TABLET ORAL at 08:27

## 2020-05-22 RX ADMIN — FUROSEMIDE 20 MG: 10 INJECTION, SOLUTION INTRAMUSCULAR; INTRAVENOUS at 18:15

## 2020-05-22 RX ADMIN — METRONIDAZOLE 500 MG: 500 INJECTION, SOLUTION INTRAVENOUS at 10:00

## 2020-05-22 RX ADMIN — CASTOR OIL AND BALSAM, PERU: 788; 87 OINTMENT TOPICAL at 08:32

## 2020-05-22 RX ADMIN — Medication 200 MCG/HR: at 23:22

## 2020-05-22 RX ADMIN — Medication 100 MCG/HR: at 18:08

## 2020-05-22 RX ADMIN — DEXMEDETOMIDINE HYDROCHLORIDE 1.1 MCG/KG/HR: 4 INJECTION, SOLUTION INTRAVENOUS at 12:34

## 2020-05-22 RX ADMIN — FENTANYL CITRATE 25 MCG: 50 INJECTION INTRAMUSCULAR; INTRAVENOUS at 19:42

## 2020-05-22 RX ADMIN — SODIUM CHLORIDE 5 MG/HR: 9 INJECTION, SOLUTION INTRAVENOUS at 18:32

## 2020-05-22 ASSESSMENT — PULMONARY FUNCTION TESTS
PIF_VALUE: 16
PIF_VALUE: 19
PIF_VALUE: 16
PIF_VALUE: 19
PIF_VALUE: 12
PIF_VALUE: 18
PIF_VALUE: 7
PIF_VALUE: 13
PIF_VALUE: 15
PIF_VALUE: 18
PIF_VALUE: 16
PIF_VALUE: 12
PIF_VALUE: 14
PIF_VALUE: 17
PIF_VALUE: 16
PIF_VALUE: 18
PIF_VALUE: 22
PIF_VALUE: 25
PIF_VALUE: 15
PIF_VALUE: 15
PIF_VALUE: 14
PIF_VALUE: 24
PIF_VALUE: 13
PIF_VALUE: 23
PIF_VALUE: 25
PIF_VALUE: 15
PIF_VALUE: 21
PIF_VALUE: 10

## 2020-05-22 ASSESSMENT — PAIN SCALES - GENERAL
PAINLEVEL_OUTOF10: 7
PAINLEVEL_OUTOF10: 10
PAINLEVEL_OUTOF10: 10
PAINLEVEL_OUTOF10: 0
PAINLEVEL_OUTOF10: 0
PAINLEVEL_OUTOF10: 8
PAINLEVEL_OUTOF10: 1
PAINLEVEL_OUTOF10: 0
PAINLEVEL_OUTOF10: 4

## 2020-05-22 NOTE — PROGRESS NOTES
Vascular lab call RN at 15:40 5/22/20 to inform that patient just had a carotid duplex done on 5/7/2020 that showed signficiant stenosis of bilateral carotid arteries. Please see results below. Right Impression   The right internal carotid artery appears to have a high end 50-79% diameter   (could be higher by NAVIX criteria) reducing stenosis based on velocity   criteria. Elevated velocities noted in the right external carotid artery. The right vertebral artery demonstrates normal antegrade flow. Left Impression   The left internal carotid artery appears to have a 50-79% diameter reducing   stenosis based on velocity criteria. The left vertebral artery demonstrates normal antegrade flow.

## 2020-05-22 NOTE — PROGRESS NOTES
encephalopathy  Strength: some movement and hand grasp on command w/ his RUE. Otherwise, difficult to assess. Appears to be moving his L side less.     Sensory: not much response to nailbed pressure in all four limbs.  Does grimace/react to trapezius pressure.    Cerebellar/coordination: kaylee given encephalopathy  Tone: no increased tone or rigidity. Gait: limited exam given encephalopathy and intubated with ventilator. ROS - unable to obtain from patient at this time due to encephalopathy. Labs  Glucose 126  Na 142  K 4.2  BUN 41  Creatinine 0.8  Mg 2.30    WBC 8.5K  Hg 7.7  Platelets 293    Sed Rate > 120  SREE negative     Ammonia 51     Hep C Ab reactive  HIV non reactive  Blood cultures NG  Urine culture NG  COVID negative     Procalcitonin 0.48    Studies  EEG 5/21/20  Beta activity. Background slowing and disorganization. No definite epileptiform activity.       MRI brain w/o 5/20/20  Motion degraded. Focal area of restricted diffusion involving the posterior right frontal lobe, near the right precentral gyrus and corona radiata, most compatible with an acute/early subacute infarct.  However, there is a focal area of rounded low T2 signal noted centrally in this lesion (series 6, image 19), raising the possibility of an underlying abscess.        Carotid US 5/7/20  High end 50-79% stenosis FAITH. LICA 55-16% stenosis. TTE 5/7/20  EF 60%  Normal L atrium.     Impression  1. Metabolic encephalopathy/delirium s/p CABG.  Hx of ETOH abuse.  Had been febrile.  Remains intubated, sedated. No significant changes clinically.       MRI brain w/ R frontal lobe diffusion restriction, possibly an acute/subacute infarct.  Radiology did suggest the possibility of underlying abscess. No abnormal enhancement was noted on contrasted study, though both the repeat brain imaging and vascular studies were very poor and essentially non-diagnostic.       EEG w/ slowing and beta activity, non-specific.

## 2020-05-22 NOTE — PROGRESS NOTES
Progress Note    S/P cabgx4 5/8/20    Vital Signs:                                                 /68   Pulse 80   Temp 98.8 °F (37.1 °C) (Rectal)   Resp (!) 36   Ht 6' (1.829 m)   Wt 182 lb 12.2 oz (82.9 kg)   SpO2 99%   BMI 24.79 kg/m²  O2 Flow Rate (L/min): 3 L/min   NSR  Admission Weight: 188 lb (85.3 kg)      Vent Settings:  Vent Information  $Ventilation: $Subsequent Day  Skin Assessment: Clean, dry, & intact  Suction Catheter Diameter: 14  Equipment ID: [id number]  Equipment Changed: Humidification  Vent Type: 840  Vent Mode: AC/VC  Vt Ordered: 450 mL  Rate Set: 20 bmp  Peak Flow: 80 L/min  Pressure Support: 0 cmH20  FiO2 : 50 %  SpO2: 99 %  SpO2/FiO2 ratio: 198  Sensitivity: 3  PEEP/CPAP: 5  I Time/ I Time %: 0.7 s  Humidification Source: Heated wire  Humidification Temp: 37  Humidification Temp Measured: 36.9  Circuit Condensation: Drained     Drips:  Propofol, precedex  TF at goal    I/O:      Intake/Output Summary (Last 24 hours) at 5/22/2020 0657  Last data filed at 5/22/2020 0510  Gross per 24 hour   Intake 3184.15 ml   Output 3930 ml   Net -745.85 ml     CV: reg, wound c/d/i  Pulm: decreased throughout, coarse  Abd: soft  Ext: warm, no edema. Fingertips violaceous - pretty much resolved. blisters bilat pedal, erythematous surround. Neuro: slight spont movt only    Data Review:  CBC:   Recent Labs     05/20/20  0430 05/21/20  0440 05/22/20  0430   WBC 10.6 9.1 8.5   HGB 7.2* 7.0* 7.7*   HCT 22.0* 21.4* 23.3*   MCV 84.5 85.7 84.9   * 612* 551*     BMP:   Recent Labs     05/20/20  0430 05/20/20  0925 05/21/20  0440 05/22/20  0430     --  143 142   K 3.7 4.3 4.2 4.2     --  109 108   CO2 24  --  24 24   BUN 50*  --  44* 41*   CREATININE 0.9  --  0.8 0.8   CALCIUM 8.6  --  8.1* 8.5   MG 2.60*  --  2.60* 2.30     Cardiac Enzymes: No results for input(s): CKTOTAL, CKMB, CKMBINDEX, TROPONINI in the last 72 hours.   PT/INR:   Recent Labs     05/20/20  0662 05/21/20  0440 05/22/20  0430   PROTIME 13.2 13.5* 14.2*   INR 1.14 1.16* 1.22*     APTT:   Recent Labs     05/20/20  0430 05/21/20  0440 05/22/20  0430   APTT 26.0 26.5 27.5       Assessment/Plan:  CV - stable in SR.   - BB   - hold statin until lfts normalize. Very close to normal now. Recheck 5/25. pulm - still intubated. Wean as able. Mental status not yet appropriate for extubation. At this rate, consider trach/peg next week  ID - febrile 5/12, 5/13. cx 5/12 neg, 5/15 sput GPC. wbc nl. abx per ID recs. - violaceous fingers. Doubt HIT, not on pressors, covid neg 5/7. cryoglob still pending. HIV neg. GI - hep C Ab+   - TF. Optimize since low alb   - diarrhea. C.diff neg 5/21  Renal - Cr nl. Diurese. Almost back to preop weight. - retain Hoang for accurate I+O  Heme - acute blood loss anemia. Cont Fe.   - dvt prophylaxis scds, sc hep. GI - TF at goal  Neuro - EtOH withdrawal/delirium. D/c CIWA protocol, use prn ativan. Neurology consulted. MRI noted. EEG 5/20 mild to mod enceph. MRA head/neck 5/21 limited by pt motion.       Lázaro Andrea MD  5/22/2020  6:57 AM

## 2020-05-22 NOTE — PROGRESS NOTES
CORONARY ARTERY BYPASS GRAFT  2020    cabgx4 (LIMA-LAD, SVG-D1, SVG-OM, SVG-PDA)    CORONARY ARTERY BYPASS GRAFT N/A 2020    CABG CORONARY ARTERY BYPASS GRAFT X4,  TRANSESOPHAGEAL ECHOCARDIOGRAM, TOTAL CARDIOPULMONARY BYPASS performed by Stephan Stearns MD at [address]     17 yo - pt doesn't know why       Current Medications:    Outpatient Medications Marked as Taking for the 20 encounter [facility] HOSPITAL Encounter)   Medication Sig Dispense Refill    clopidogrel (PLAVIX) 75 MG tablet Take 75 mg by mouth daily      lisinopril (PRINIVIL;ZESTRIL) 40 MG tablet Take 40 mg by mouth daily      amLODIPine (NORVASC) 5 MG tablet Take 5 mg by mouth daily      albuterol sulfate HFA (VENTOLIN HFA) 108 (90 Base) MCG/ACT inhaler Inhale 2 puffs into the lungs every 6 hours as needed for Wheezing         Allergies:  Patient has no known allergies. Immunizations : There is no immunization history on file for this patient. Social History:    Social History     Tobacco Use    Smoking status: Former Smoker     Types: Cigarettes     Last attempt to quit: 2019     Years since quittin.5    Smokeless tobacco: Never Used    Tobacco comment: started age 27   Substance Use Topics    Alcohol use: Yes     Frequency: Monthly or less     Comment: once a month, shot once a week. depends on mood.  Drug use: Never     Social History     Tobacco Use   Smoking Status Former Smoker    Types: Cigarettes    Last attempt to quit: 2019    Years since quittin.5   Smokeless Tobacco Never Used   Tobacco Comment    started age 27      Family History   Problem Relation Age of Onset    Heart Disease Mother          of MI age 36    Heart Disease Brother         MI mid 46s    Heart Disease Father          of MI age 68    Heart Disease Paternal Aunt         MI in her 46s         REVIEW OF SYSTEMS:    No fever / chills / sweats. No weight loss.   No visual change, eye pain, eye PORTABLE   Final Result   Stable chest.         XR CHEST PORTABLE   Final Result   Interval removal of chest drains. Otherwise supportive tubing is stable. No pneumothorax. Increased left basilar opacity, likely combination of atelectasis and pleural   effusion. Vascular congestion. VL Extremity Venous Bilateral   Final Result      XR CHEST PORTABLE   Final Result   [place] catheter has been removed. Enteric feeding tube has been added. Supportive tubing is otherwise stable. Left perihilar/basilar atelectasis. Pneumonia is a differential possibility. CT HEAD WO CONTRAST   Final Result   No hemorrhage or mass      Mild periventricular white matter disease, likely due to small-vessel   ischemic change      Mild paranasal sinus disease         XR CHEST PORTABLE   Final Result   Dependent left lower lobe opacification and effusion. Mild right perihilar   opacification. No pneumothorax. Satisfactory position of endotracheal tube. Enteric catheter tip in the gastric body. XR CHEST PORTABLE   Final Result   Relatively stable appearance of the chest.  Perhaps slight increasing   ground-glass opacification centrally. No residual pneumothorax detected. XR CHEST PORTABLE   Final Result   1. Mild pulmonary vascular congestion with a small left pleural effusion and   associated left basilar atelectasis. 2. Tiny left apical pneumothorax. Attention on follow-up imaging is   recommended. 3. Suboptimal visualization of the orogastric tube. Abdominal radiograph is   recommended for further evaluation. VL DUP CAROTID BILATERAL   Final Result      VL PRE OP VEIN MAPPING   Final Result      XR CHEST PORTABLE   Final Result   Or bronchial wall thickening suggests inflammation, such as that seen with   asthma, bronchitis or smoking. Consolidative airspace disease.       Rounded opacity in the right hilar region likely represents a vessel en face, bodily function. · There is potential for severe exacerbation of infection/side effects of treatment. Therapy requires intensive monitoring for antimicrobial agent toxicity. Thanks for allowing me to participate in your patient's care and please call me with any questions or concerns.     Brennon Lozano MD  Infectious Disease  [facility] ([facility]) Physician  Phone: [phone]   Fax : [phone]

## 2020-05-22 NOTE — PROGRESS NOTES
Dr. Ken Vail called regarding patient's agitation and sedation. Doctor updated on the plan of care. New orders given. Cardene has been ordered and pharmacy called to have sent up as soon as possible. Fentanyl drip started. Will continue to monitor.

## 2020-05-23 ENCOUNTER — APPOINTMENT (OUTPATIENT)
Dept: CT IMAGING | Age: 65
DRG: 003 | End: 2020-05-23
Payer: COMMERCIAL

## 2020-05-23 LAB
ANION GAP SERPL CALCULATED.3IONS-SCNC: 9 MMOL/L (ref 3–16)
APTT: 29.3 SEC (ref 24.2–36.2)
BASE EXCESS ARTERIAL: -1 MMOL/L (ref -3–3)
BLOOD BANK DISPENSE STATUS: NORMAL
BLOOD BANK PRODUCT CODE: NORMAL
BPU ID: NORMAL
BUN BLDV-MCNC: 40 MG/DL (ref 7–20)
C-REACTIVE PROTEIN: 100 MG/L (ref 0–5.1)
CALCIUM SERPL-MCNC: 8.1 MG/DL (ref 8.3–10.6)
CARBOXYHEMOGLOBIN ARTERIAL: 0.6 % (ref 0–1.5)
CHLORIDE BLD-SCNC: 113 MMOL/L (ref 99–110)
CO2: 23 MMOL/L (ref 21–32)
CREAT SERPL-MCNC: 0.8 MG/DL (ref 0.8–1.3)
DESCRIPTION BLOOD BANK: NORMAL
GFR AFRICAN AMERICAN: >60
GFR NON-AFRICAN AMERICAN: >60
GLUCOSE BLD-MCNC: 100 MG/DL (ref 70–99)
GLUCOSE BLD-MCNC: 120 MG/DL (ref 70–99)
GLUCOSE BLD-MCNC: 126 MG/DL (ref 70–99)
GLUCOSE BLD-MCNC: 133 MG/DL (ref 70–99)
GLUCOSE BLD-MCNC: 134 MG/DL (ref 70–99)
GLUCOSE BLD-MCNC: 136 MG/DL (ref 70–99)
HCO3 ARTERIAL: 24.2 MMOL/L (ref 21–29)
HCT VFR BLD CALC: 21.6 % (ref 40.5–52.5)
HEMOGLOBIN, ART, EXTENDED: 7.9 G/DL (ref 13.5–17.5)
HEMOGLOBIN: 7.1 G/DL (ref 13.5–17.5)
INR BLD: 1.2 (ref 0.86–1.14)
MAGNESIUM: 2.3 MG/DL (ref 1.8–2.4)
MCH RBC QN AUTO: 28.3 PG (ref 26–34)
MCHC RBC AUTO-ENTMCNC: 32.9 G/DL (ref 31–36)
MCV RBC AUTO: 85.9 FL (ref 80–100)
METHEMOGLOBIN ARTERIAL: 0.9 %
O2 CONTENT ARTERIAL: 11 ML/DL
O2 SAT, ARTERIAL: 98.9 %
O2 THERAPY: ABNORMAL
PCO2 ARTERIAL: 41.6 MMHG (ref 35–45)
PDW BLD-RTO: 16.4 % (ref 12.4–15.4)
PERFORMED ON: ABNORMAL
PH ARTERIAL: 7.37 (ref 7.35–7.45)
PLATELET # BLD: 627 K/UL (ref 135–450)
PMV BLD AUTO: 7.6 FL (ref 5–10.5)
PO2 ARTERIAL: 126 MMHG (ref 75–108)
POTASSIUM SERPL-SCNC: 4 MMOL/L (ref 3.5–5.1)
PROCALCITONIN: 0.92 NG/ML (ref 0–0.15)
PROTHROMBIN TIME: 13.9 SEC (ref 10–13.2)
RBC # BLD: 2.52 M/UL (ref 4.2–5.9)
SODIUM BLD-SCNC: 145 MMOL/L (ref 136–145)
TCO2 ARTERIAL: 25.5 MMOL/L
WBC # BLD: 10.3 K/UL (ref 4–11)

## 2020-05-23 PROCEDURE — 6370000000 HC RX 637 (ALT 250 FOR IP): Performed by: THORACIC SURGERY (CARDIOTHORACIC VASCULAR SURGERY)

## 2020-05-23 PROCEDURE — 85730 THROMBOPLASTIN TIME PARTIAL: CPT

## 2020-05-23 PROCEDURE — 2580000003 HC RX 258: Performed by: NURSE PRACTITIONER

## 2020-05-23 PROCEDURE — 94640 AIRWAY INHALATION TREATMENT: CPT

## 2020-05-23 PROCEDURE — 2580000003 HC RX 258: Performed by: PSYCHIATRY & NEUROLOGY

## 2020-05-23 PROCEDURE — 2100000000 HC CCU R&B

## 2020-05-23 PROCEDURE — 2500000003 HC RX 250 WO HCPCS: Performed by: INTERNAL MEDICINE

## 2020-05-23 PROCEDURE — 94750 HC PULMONARY COMPLIANCE STUDY: CPT

## 2020-05-23 PROCEDURE — 2500000003 HC RX 250 WO HCPCS: Performed by: THORACIC SURGERY (CARDIOTHORACIC VASCULAR SURGERY)

## 2020-05-23 PROCEDURE — 2700000000 HC OXYGEN THERAPY PER DAY

## 2020-05-23 PROCEDURE — 6360000002 HC RX W HCPCS: Performed by: NURSE PRACTITIONER

## 2020-05-23 PROCEDURE — 99232 SBSQ HOSP IP/OBS MODERATE 35: CPT | Performed by: INTERNAL MEDICINE

## 2020-05-23 PROCEDURE — 6360000002 HC RX W HCPCS: Performed by: PSYCHIATRY & NEUROLOGY

## 2020-05-23 PROCEDURE — 85610 PROTHROMBIN TIME: CPT

## 2020-05-23 PROCEDURE — 2580000003 HC RX 258: Performed by: INTERNAL MEDICINE

## 2020-05-23 PROCEDURE — C9113 INJ PANTOPRAZOLE SODIUM, VIA: HCPCS | Performed by: THORACIC SURGERY (CARDIOTHORACIC VASCULAR SURGERY)

## 2020-05-23 PROCEDURE — 94003 VENT MGMT INPAT SUBQ DAY: CPT

## 2020-05-23 PROCEDURE — 6360000002 HC RX W HCPCS: Performed by: INTERNAL MEDICINE

## 2020-05-23 PROCEDURE — 85027 COMPLETE CBC AUTOMATED: CPT

## 2020-05-23 PROCEDURE — 99024 POSTOP FOLLOW-UP VISIT: CPT | Performed by: THORACIC SURGERY (CARDIOTHORACIC VASCULAR SURGERY)

## 2020-05-23 PROCEDURE — 84145 PROCALCITONIN (PCT): CPT

## 2020-05-23 PROCEDURE — 6360000002 HC RX W HCPCS: Performed by: THORACIC SURGERY (CARDIOTHORACIC VASCULAR SURGERY)

## 2020-05-23 PROCEDURE — 83735 ASSAY OF MAGNESIUM: CPT

## 2020-05-23 PROCEDURE — 94761 N-INVAS EAR/PLS OXIMETRY MLT: CPT

## 2020-05-23 PROCEDURE — 80048 BASIC METABOLIC PNL TOTAL CA: CPT

## 2020-05-23 PROCEDURE — 86140 C-REACTIVE PROTEIN: CPT

## 2020-05-23 PROCEDURE — 82803 BLOOD GASES ANY COMBINATION: CPT

## 2020-05-23 PROCEDURE — 70450 CT HEAD/BRAIN W/O DYE: CPT

## 2020-05-23 PROCEDURE — 6370000000 HC RX 637 (ALT 250 FOR IP): Performed by: NURSE PRACTITIONER

## 2020-05-23 RX ORDER — 0.9 % SODIUM CHLORIDE 0.9 %
20 INTRAVENOUS SOLUTION INTRAVENOUS ONCE
Status: DISCONTINUED | OUTPATIENT
Start: 2020-05-23 | End: 2020-05-28

## 2020-05-23 RX ORDER — METOPROLOL TARTRATE 50 MG/1
50 TABLET, FILM COATED ORAL 2 TIMES DAILY
Status: DISCONTINUED | OUTPATIENT
Start: 2020-05-23 | End: 2020-05-29

## 2020-05-23 RX ADMIN — LORAZEPAM 2 MG: 2 INJECTION INTRAMUSCULAR; INTRAVENOUS at 22:45

## 2020-05-23 RX ADMIN — DEXMEDETOMIDINE HYDROCHLORIDE 1.4 MCG/KG/HR: 4 INJECTION, SOLUTION INTRAVENOUS at 16:33

## 2020-05-23 RX ADMIN — Medication 200 MCG/HR: at 09:35

## 2020-05-23 RX ADMIN — LEVETIRACETAM 1000 MG: 100 INJECTION, SOLUTION INTRAVENOUS at 13:44

## 2020-05-23 RX ADMIN — Medication 150 MCG/HR: at 23:01

## 2020-05-23 RX ADMIN — HEPARIN SODIUM 5000 UNITS: 5000 INJECTION INTRAVENOUS; SUBCUTANEOUS at 21:22

## 2020-05-23 RX ADMIN — HEPARIN SODIUM 5000 UNITS: 5000 INJECTION INTRAVENOUS; SUBCUTANEOUS at 06:02

## 2020-05-23 RX ADMIN — Medication 150 MCG/HR: at 19:53

## 2020-05-23 RX ADMIN — DEXMEDETOMIDINE HYDROCHLORIDE 1.4 MCG/KG/HR: 4 INJECTION, SOLUTION INTRAVENOUS at 18:46

## 2020-05-23 RX ADMIN — CASTOR OIL AND BALSAM, PERU: 788; 87 OINTMENT TOPICAL at 09:35

## 2020-05-23 RX ADMIN — FUROSEMIDE 20 MG: 10 INJECTION, SOLUTION INTRAMUSCULAR; INTRAVENOUS at 09:34

## 2020-05-23 RX ADMIN — ATORVASTATIN CALCIUM 20 MG: 20 TABLET, FILM COATED ORAL at 21:22

## 2020-05-23 RX ADMIN — METOPROLOL TARTRATE 50 MG: 50 TABLET, FILM COATED ORAL at 21:22

## 2020-05-23 RX ADMIN — Medication 200 MCG/HR: at 01:49

## 2020-05-23 RX ADMIN — SODIUM CHLORIDE, PRESERVATIVE FREE 10 ML: 5 INJECTION INTRAVENOUS at 09:35

## 2020-05-23 RX ADMIN — DEXMEDETOMIDINE HYDROCHLORIDE 1.3 MCG/KG/HR: 4 INJECTION, SOLUTION INTRAVENOUS at 11:30

## 2020-05-23 RX ADMIN — WATER 2 G: 1 INJECTION INTRAMUSCULAR; INTRAVENOUS; SUBCUTANEOUS at 18:19

## 2020-05-23 RX ADMIN — PROPOFOL 15 MCG/KG/MIN: 10 INJECTION, EMULSION INTRAVENOUS at 14:23

## 2020-05-23 RX ADMIN — HEPARIN SODIUM 5000 UNITS: 5000 INJECTION INTRAVENOUS; SUBCUTANEOUS at 16:30

## 2020-05-23 RX ADMIN — ASPIRIN 81 MG 324 MG: 81 TABLET ORAL at 09:35

## 2020-05-23 RX ADMIN — POTASSIUM BICARBONATE 10 MEQ: 782 TABLET, EFFERVESCENT ORAL at 09:35

## 2020-05-23 RX ADMIN — METRONIDAZOLE 500 MG: 500 INJECTION, SOLUTION INTRAVENOUS at 18:19

## 2020-05-23 RX ADMIN — DEXMEDETOMIDINE HYDROCHLORIDE 1.4 MCG/KG/HR: 4 INJECTION, SOLUTION INTRAVENOUS at 14:23

## 2020-05-23 RX ADMIN — MINERAL SUPPLEMENT IRON 300 MG / 5 ML STRENGTH LIQUID 100 PER BOX UNFLAVORED 300 MG: at 21:22

## 2020-05-23 RX ADMIN — LORAZEPAM 2 MG: 2 INJECTION INTRAMUSCULAR; INTRAVENOUS at 16:28

## 2020-05-23 RX ADMIN — CHLORHEXIDINE GLUCONATE 15 ML: 1.2 RINSE ORAL at 21:25

## 2020-05-23 RX ADMIN — Medication 200 MCG/HR: at 04:16

## 2020-05-23 RX ADMIN — Medication 200 MCG/HR: at 11:35

## 2020-05-23 RX ADMIN — FUROSEMIDE 20 MG: 10 INJECTION, SOLUTION INTRAMUSCULAR; INTRAVENOUS at 18:19

## 2020-05-23 RX ADMIN — METOPROLOL TARTRATE 75 MG: 25 TABLET, FILM COATED ORAL at 09:34

## 2020-05-23 RX ADMIN — POTASSIUM BICARBONATE 10 MEQ: 782 TABLET, EFFERVESCENT ORAL at 21:22

## 2020-05-23 RX ADMIN — PANTOPRAZOLE SODIUM 40 MG: 40 INJECTION, POWDER, FOR SOLUTION INTRAVENOUS at 06:25

## 2020-05-23 RX ADMIN — DEXMEDETOMIDINE HYDROCHLORIDE 1.4 MCG/KG/HR: 4 INJECTION, SOLUTION INTRAVENOUS at 04:16

## 2020-05-23 RX ADMIN — Medication 200 MCG/HR: at 06:45

## 2020-05-23 RX ADMIN — DEXMEDETOMIDINE HYDROCHLORIDE 1.4 MCG/KG/HR: 4 INJECTION, SOLUTION INTRAVENOUS at 00:45

## 2020-05-23 RX ADMIN — ALBUTEROL SULFATE 2.5 MG: 2.5 SOLUTION RESPIRATORY (INHALATION) at 11:36

## 2020-05-23 RX ADMIN — ALBUTEROL SULFATE 2.5 MG: 2.5 SOLUTION RESPIRATORY (INHALATION) at 20:16

## 2020-05-23 RX ADMIN — METRONIDAZOLE 500 MG: 500 INJECTION, SOLUTION INTRAVENOUS at 09:26

## 2020-05-23 RX ADMIN — CASTOR OIL AND BALSAM, PERU: 788; 87 OINTMENT TOPICAL at 21:25

## 2020-05-23 RX ADMIN — POTASSIUM BICARBONATE 10 MEQ: 782 TABLET, EFFERVESCENT ORAL at 16:30

## 2020-05-23 RX ADMIN — CHLORHEXIDINE GLUCONATE 15 ML: 1.2 RINSE ORAL at 09:35

## 2020-05-23 RX ADMIN — MINERAL SUPPLEMENT IRON 300 MG / 5 ML STRENGTH LIQUID 100 PER BOX UNFLAVORED 300 MG: at 09:35

## 2020-05-23 RX ADMIN — Medication 200 MCG/HR: at 14:20

## 2020-05-23 RX ADMIN — METRONIDAZOLE 500 MG: 500 INJECTION, SOLUTION INTRAVENOUS at 01:30

## 2020-05-23 RX ADMIN — ALBUTEROL SULFATE 2.5 MG: 2.5 SOLUTION RESPIRATORY (INHALATION) at 15:52

## 2020-05-23 RX ADMIN — WATER 2 G: 1 INJECTION INTRAMUSCULAR; INTRAVENOUS; SUBCUTANEOUS at 06:02

## 2020-05-23 RX ADMIN — DEXMEDETOMIDINE HYDROCHLORIDE 1 MCG/KG/HR: 4 INJECTION, SOLUTION INTRAVENOUS at 23:09

## 2020-05-23 RX ADMIN — ALBUTEROL SULFATE 2.5 MG: 2.5 SOLUTION RESPIRATORY (INHALATION) at 07:57

## 2020-05-23 ASSESSMENT — PULMONARY FUNCTION TESTS
PIF_VALUE: 18
PIF_VALUE: 15
PIF_VALUE: 18
PIF_VALUE: 17
PIF_VALUE: 17
PIF_VALUE: 18
PIF_VALUE: 15
PIF_VALUE: 16
PIF_VALUE: 19
PIF_VALUE: 21
PIF_VALUE: 19
PIF_VALUE: 12
PIF_VALUE: 17
PIF_VALUE: 18
PIF_VALUE: 27
PIF_VALUE: 23
PIF_VALUE: 18
PIF_VALUE: 18
PIF_VALUE: 16
PIF_VALUE: 19
PIF_VALUE: 14
PIF_VALUE: 12
PIF_VALUE: 15
PIF_VALUE: 18
PIF_VALUE: 20
PIF_VALUE: 19

## 2020-05-23 ASSESSMENT — PAIN SCALES - GENERAL
PAINLEVEL_OUTOF10: 1
PAINLEVEL_OUTOF10: 2
PAINLEVEL_OUTOF10: 10

## 2020-05-23 NOTE — PROGRESS NOTES
Late entry due to patient care needs. Patient stable. Increased sedation needs overnight due to waking up and having unpurposeful movements, safety reasons. Pupillary reactions, however looks like patient awoke and had seizure x 2, with BL arm posturing. Neurology informed. CT ordered, Keppra ordered as well. Dr. Danitza Smith ordered RN to attempt to decrease sedation. Patient however awakes and fights vent, pulls at lines. Remains on TF, intubated. Medications administered.

## 2020-05-23 NOTE — PROGRESS NOTES
Procedure Laterality Date    CORONARY ARTERY BYPASS GRAFT  2020    cabgx4 (LIMA-LAD, SVG-D1, SVG-OM, SVG-PDA)    CORONARY ARTERY BYPASS GRAFT N/A 2020    CABG CORONARY ARTERY BYPASS GRAFT X4,  TRANSESOPHAGEAL ECHOCARDIOGRAM, TOTAL CARDIOPULMONARY BYPASS performed by Kirby Mcdowell MD at [address]     17 yo - pt doesn't know why       Current Medications:    Outpatient Medications Marked as Taking for the 20 encounter [facility] Encounter)   Medication Sig Dispense Refill    clopidogrel (PLAVIX) 75 MG tablet Take 75 mg by mouth daily      lisinopril (PRINIVIL;ZESTRIL) 40 MG tablet Take 40 mg by mouth daily      amLODIPine (NORVASC) 5 MG tablet Take 5 mg by mouth daily      albuterol sulfate HFA (VENTOLIN HFA) 108 (90 Base) MCG/ACT inhaler Inhale 2 puffs into the lungs every 6 hours as needed for Wheezing         Allergies:  Patient has no known allergies. Immunizations : There is no immunization history on file for this patient. Social History:    Social History     Tobacco Use    Smoking status: Former Smoker     Types: Cigarettes     Last attempt to quit: 2019     Years since quittin.5    Smokeless tobacco: Never Used    Tobacco comment: started age 27   Substance Use Topics    Alcohol use: Yes     Frequency: Monthly or less     Comment: once a month, shot once a week. depends on mood.  Drug use: Never     Social History     Tobacco Use   Smoking Status Former Smoker    Types: Cigarettes    Last attempt to quit: 2019    Years since quittin.5   Smokeless Tobacco Never Used   Tobacco Comment    started age 27      Family History   Problem Relation Age of Onset    Heart Disease Mother          of MI age 36    Heart Disease Brother         MI mid 46s    Heart Disease Father          of MI age 68    Heart Disease Paternal Aunt         MI in her 46s         REVIEW OF SYSTEMS:    No fever / chills / sweats. No weight loss.   No visual change, eye pain, eye discharge. No oral lesion, sore throat, dysphagia. Denies cough / sputum/Sob   Denies chest pain, palpitations/ dizziness  Denies nausea/ vomiting/abdominal pain/diarrhea. Denies dysuria or change in urinary function. Denies joint swelling or pain. No myalgia, arthralgia. No rashes, skin lesions   Denies focal weakness, sensory change or other neurologic symptoms  No lymph node swelling or tenderness. ROS not possible    PHYSICAL EXAM:      Vitals:  T max  102.3   BP (!) 106/55   Pulse 65   Temp 98.8 °F (37.1 °C) (Rectal)   Resp 18   Ht 6' (1.829 m)   Wt 182 lb 1.6 oz (82.6 kg)   SpO2 93%   BMI 24.70 kg/m²   General Appearance: intubated sedated on the vent and , + pallor, + icterus  and extremities cold    improved  NG tube in place  Skin: warm and dry, no rash or erythema  Head: normocephalic and atraumatic  Eyes: pupils equal, round, and reactive to light, conjunctivae normal  ENT: tympanic membrane, external ear and ear canal normal bilaterally, nose without deformity, nasal mucosa and turbinates normal without polyps  Neck: supple and non-tender without mass, no thyromegaly  no cervical lymphadenopathy  Pulmonary/Chest: few basal crepts  wheezes, rales or rhonchi, normal air movement, no respiratory distress  Cardiovascular:   S1 and S2, no murmurs, rubs, clicks, or gallops, no carotid bruits sternal incision clean binder +  Abdomen: soft, non-tender, non-distended, normal bowel sounds, no masses or organomegaly  Extremities: no cyanosis, clubbing or edema  Musculoskeletal: normal range of motion, no joint swelling, deformity or tenderness  Neurologic: reflexes normal and symmetric, no cranial nerve deficit  Lines:   Hoang  Ij+  NG tube+   Rectal tube+       Data Review:    Lab Results   Component Value Date    WBC 10.3 05/23/2020    HGB 7.1 (L) 05/23/2020    HCT 21.6 (L) 05/23/2020    MCV 85.9 05/23/2020     (H) 05/23/2020     Lab Results   Component Value 215-0100   Culture, Urine [[id number]] Collected: 05/12/20 0830   Order Status: Completed Specimen: Urine, clean catch Updated: 05/13/20 0750    Urine Culture, Routine No growth at 18-36 hours   Narrative:     ORDER#: [id number]                          ORDERED BY: Trupti Goodwin  SOURCE: Urine Clean Catch                  COLLECTED:  05/12/20 08:30  ANTIBIOTICS AT NITISH. :                      RECEIVED :  05/12/20 09:55  Performed at:  [facility]  [address] [facility] [address] [facility] [address]   Phone [phone]   MRSA DNA Probe, Nasal [[id number]] Collected: 05/07/20 1312   Order Status: Completed Specimen: Nasal from Nares Updated: 05/08/20 0711    MRSA SCREEN RT-PCR --    Negative - MRSA DNA not detected. Normal Range: Not detected    Narrative:     ORDER#: [id number]                          ORDERED BY: Trupti Goodwin  SOURCE: Nares                              COLLECTED:  05/07/20 13:12  ANTIBIOTICS AT NITISH. :                      RECEIVED :  05/07/20 13:18  Performed at:  [facility]  [address] [facility], [address]   Phone [phone]   Culture, Blood 1 [[id number]] Collected: 05/07/20 0743   Order Status: Canceled Specimen: Blood    Culture, Blood 2 [[id number]]    Order Status: Canceled Specimen: Blood        Date/Time       Culture, Respiratory [[id number]] Collected: 05/15/20 1045   Order Status: Completed Specimen: Sputum, Suctioned Updated: 05/17/20 0956    CULTURE, RESPIRATORY No growth 36-48 hours    Gram Stain Result 1+ WBC's (Polymorphonuclear)   1+ Epithelial Cells   1+ Gram positive cocci    Narrative:     ORDER#: [id number]                          ORDERED BY: Trupti Goodwin  SOURCE: Sputum Suctioned                   COLLECTED:  05/15/20 10:45  ANTIBIOTICS AT NITISH. :                      RECEIVED :  05/15/20 10:52 /18/2020 11:49 AM - Christiano [facility] Incoming Lab Results From Soft (Epic Adt)     Component Value Ref Range & Units represents a vessel en face,   however cannot exclude a prominent lymph node or nodule.   Comparison imaging   would be helpful if available, otherwise recommend CT chest.               All the pertinent images and reports for the current Hospitalization were reviewed by me     Scheduled Meds:   sodium chloride  20 mL Intravenous Once    atorvastatin  20 mg Oral Nightly    cefepime  2 g Intravenous Q12H    metroNIDAZOLE  500 mg Intravenous Q8H    metoprolol tartrate  75 mg Oral BID    furosemide  20 mg Intravenous BID    Venelex   Topical BID    lidocaine 1 % injection  5 mL Intradermal Once    ferrous sulfate  300 mg Oral BID    heparin (porcine)  5,000 Units Subcutaneous 3 times per day    sodium chloride flush  10 mL Intravenous 2 times per day    insulin lispro  0-18 Units Subcutaneous Q4H    aspirin  324 mg Oral Daily    potassium bicarb-citric acid  10 mEq Oral TID    pantoprazole  40 mg Intravenous Daily    And    sodium chloride (PF)  10 mL Intravenous Daily    chlorhexidine  15 mL Mouth/Throat BID    albuterol  2.5 mg Nebulization Q4H WA    sodium chloride (PF)  10 mL Intravenous Once       Continuous Infusions:   fentaNYL 200 mcg/hr (05/23/20 0966)    propofol      niCARdipine 1 mg/hr (05/22/20 8024)    dextrose      dexmedetomidine 1.4 mcg/kg/hr (05/23/20 1326)    propofol 15 mcg/kg/min (05/22/20 1522)       PRN Meds:  LORazepam, propofol, docusate, sennosides-docusate sodium, bisacodyl, metoprolol, glucose, dextrose, glucagon (rDNA), dextrose, sodium chloride flush, potassium chloride, magnesium sulfate, calcium chloride IVPB, calcium chloride IVPB, acetaminophen, oxyCODONE **OR** oxyCODONE, fentanNYL, ondansetron, hydrALAZINE, albuterol sulfate HFA, metoclopramide, albuterol, [DISCONTINUED] acetaminophen **OR** acetaminophen, polyethylene glycol, promethazine **OR** [DISCONTINUED] ondansetron, magnesium hydroxide      Assessment:     Patient Active Problem List   Diagnosis    Chest pain    [facility]STEM[facility] (non-ST elevated myocardial infarction) [facility] [address]    Coronary artery disease involving native coronary artery of native heart with unstable angina pectoris ([facility] [address]    S/P CABG x 4     Admitted with chest pain   NSTEMI  Strong family history per HPI  S/P Urgent CABG -  on 5/8  Resp failure   On the ventilator  Post op fevers   Anemia   WBC elevation post op now trend down  ? DTS with Lft elevation   MRI brain with CVA+      Given the intubated stated and elevated Procal and on going fevers will start IV abx and see his response-   Surgical incision is clean and lines are clean and Blood cx and Sputum cx from 5/12 NGTD  DTS can cause fevers would like to see the curve improve- will watch closely . Fevers trend down Procal elevated and will follow and once fevers better able to d/c his IV abx and resp new cx sent and in process      Hep C+ve noted will check PCR and Cryoglobulins     HIV screen -ve and resp cx negative trending Procal and once down will be able to d/c soon      MRI brain reported to be abnormal and CVA possible infection  With abscess reported but I feel this more in favor for stroke given CAD    MRA -ve and  WBC trend down and EEG with Encephalopathy noted      CT head evolving infarct as noted on the previous MRi - CRP trend down and low grade fevers noted          Labs, Microbiology, Radiology and all the pertinent results from current hospitalization and  care every where were reviewed  by me as a part of the evaluation   Plan:   1. Trend Procal now improving on IV abx will check tomorrow  2. CRP   now trending down and  cyanosis bi lateral hands improving   3. Gamma GT elevated and Lfts now down trend  4. Cont  IV Cefepime x 2 gm Q 12 HR    5. IV Flagyl x 500 mg Q  8 hrs for anaerobes  Given the concern that was raised on MRI of possible brain abscess   6. Hep C+ve check for  Cryoglobulins  -ve      SREE-ve But ESR elevated  7. HIV -ve   8.  MRI BRAIN abnormal Rt Precentral gyrus and corona radiata  infarct      9. MRA limited by motion artifact   10. Ct brain consistent with evolving ischemia     Discussed with patient/Family and Nursing staff   Risk of Complications/Morbidity: High      · Illness(es)/ Infection present that pose threat to bodily function. · There is potential for severe exacerbation of infection/side effects of treatment. Therapy requires intensive monitoring for antimicrobial agent toxicity. Thanks for allowing me to participate in your patient's care and please call me with any questions or concerns.     Lyric Marshall MD  Infectious Disease  [facility]) Physician  Phone: [phone]   Fax : [phone]

## 2020-05-23 NOTE — PROGRESS NOTES
hours.  PT/INR:   Recent Labs     05/21/20  0440 05/22/20  0430 05/23/20  0442   PROTIME 13.5* 14.2* 13.9*   INR 1.16* 1.22* 1.20*     APTT:   Recent Labs     05/21/20  0440 05/22/20  0430 05/23/20 0442   APTT 26.5 27.5 29.3       Assessment/Plan:  CV       - HR 55-60s and sbp of mid 90s, will lower metoprolol to 50mg              - continue BB              - hold statin until lfts normalize. Very close to normal now. Recheck 5/25. pulm    -7.37, 41.6, 126.0, 24.2, 25.5, -1.0    -still intubated. Wean as able. Mental status not yet appropriate for extubation. ID         - febrile 5/12, 5/13. cx 5/12 neg, 5/15 sput GPC. wbc nl. abx per ID recs. - violaceous fingers. Doubt HIT, not on pressors, covid neg 5/7. cryoglob still pending. HIV neg. GI        - hep C Ab+              - TF. Optimize since low alb              - diarrhea. C.diff neg 5/21  Renal   - Cr nl. Diurese. Almost back to preop weight. - retain Hoang for accurate I+O  Heme   - 1U PRBCs administered overnight              - dvt prophylaxis scds, sc hep. GI        - TF at goal  Neuro  -still awaiting neurology consultation; seizure-like activity noted per RN today   -propofol decreased, precedex at 1.4, fentanyl decreased to 150mcg    -EtOH withdrawal/delirium. D/c CIWA protocol, use prn ativan. Neurology consulted. MRI noted. EEG 5/20 mild to mod enceph.  MRA head/neck 5/21 limited by pt motion    Dispo: serial h/h, f/u neurology consultation,  trach/peg next week.      -     Noah Camp MD  5/23/2020  11:49 AM

## 2020-05-24 LAB
ANION GAP SERPL CALCULATED.3IONS-SCNC: 11 MMOL/L (ref 3–16)
APTT: 30.9 SEC (ref 24.2–36.2)
BASE EXCESS ARTERIAL: -3.3 MMOL/L (ref -3–3)
BUN BLDV-MCNC: 37 MG/DL (ref 7–20)
CALCIUM SERPL-MCNC: 8.7 MG/DL (ref 8.3–10.6)
CARBOXYHEMOGLOBIN ARTERIAL: 0.8 % (ref 0–1.5)
CHLORIDE BLD-SCNC: 113 MMOL/L (ref 99–110)
CO2: 22 MMOL/L (ref 21–32)
CREAT SERPL-MCNC: 0.9 MG/DL (ref 0.8–1.3)
GFR AFRICAN AMERICAN: >60
GFR NON-AFRICAN AMERICAN: >60
GLUCOSE BLD-MCNC: 102 MG/DL (ref 70–99)
GLUCOSE BLD-MCNC: 102 MG/DL (ref 70–99)
GLUCOSE BLD-MCNC: 104 MG/DL (ref 70–99)
GLUCOSE BLD-MCNC: 114 MG/DL (ref 70–99)
GLUCOSE BLD-MCNC: 119 MG/DL (ref 70–99)
GLUCOSE BLD-MCNC: 124 MG/DL (ref 70–99)
GLUCOSE BLD-MCNC: 161 MG/DL (ref 70–99)
HCO3 ARTERIAL: 22.3 MMOL/L (ref 21–29)
HCT VFR BLD CALC: 31.6 % (ref 40.5–52.5)
HEMOGLOBIN, ART, EXTENDED: 11.7 G/DL (ref 13.5–17.5)
HEMOGLOBIN: 10 G/DL (ref 13.5–17.5)
INR BLD: 1.2 (ref 0.86–1.14)
MAGNESIUM: 2.2 MG/DL (ref 1.8–2.4)
MCH RBC QN AUTO: 27.2 PG (ref 26–34)
MCHC RBC AUTO-ENTMCNC: 31.8 G/DL (ref 31–36)
MCV RBC AUTO: 85.8 FL (ref 80–100)
METHEMOGLOBIN ARTERIAL: 0.4 %
O2 CONTENT ARTERIAL: 16 ML/DL
O2 SAT, ARTERIAL: 96.4 %
O2 THERAPY: ABNORMAL
PCO2 ARTERIAL: 41.5 MMHG (ref 35–45)
PDW BLD-RTO: 16 % (ref 12.4–15.4)
PERFORMED ON: ABNORMAL
PH ARTERIAL: 7.34 (ref 7.35–7.45)
PLATELET # BLD: 766 K/UL (ref 135–450)
PMV BLD AUTO: 7.3 FL (ref 5–10.5)
PO2 ARTERIAL: 80.5 MMHG (ref 75–108)
POTASSIUM REFLEX MAGNESIUM: 4.5 MMOL/L (ref 3.5–5.1)
POTASSIUM SERPL-SCNC: 5.3 MMOL/L (ref 3.5–5.1)
PROTHROMBIN TIME: 13.9 SEC (ref 10–13.2)
RBC # BLD: 3.68 M/UL (ref 4.2–5.9)
SODIUM BLD-SCNC: 146 MMOL/L (ref 136–145)
TCO2 ARTERIAL: 23.6 MMOL/L
WBC # BLD: 13.7 K/UL (ref 4–11)

## 2020-05-24 PROCEDURE — 2500000003 HC RX 250 WO HCPCS: Performed by: THORACIC SURGERY (CARDIOTHORACIC VASCULAR SURGERY)

## 2020-05-24 PROCEDURE — 6360000002 HC RX W HCPCS: Performed by: THORACIC SURGERY (CARDIOTHORACIC VASCULAR SURGERY)

## 2020-05-24 PROCEDURE — 2580000003 HC RX 258: Performed by: INTERNAL MEDICINE

## 2020-05-24 PROCEDURE — 84132 ASSAY OF SERUM POTASSIUM: CPT

## 2020-05-24 PROCEDURE — 82803 BLOOD GASES ANY COMBINATION: CPT

## 2020-05-24 PROCEDURE — 6370000000 HC RX 637 (ALT 250 FOR IP): Performed by: NURSE PRACTITIONER

## 2020-05-24 PROCEDURE — 2580000003 HC RX 258: Performed by: PSYCHIATRY & NEUROLOGY

## 2020-05-24 PROCEDURE — 85730 THROMBOPLASTIN TIME PARTIAL: CPT

## 2020-05-24 PROCEDURE — 6370000000 HC RX 637 (ALT 250 FOR IP): Performed by: THORACIC SURGERY (CARDIOTHORACIC VASCULAR SURGERY)

## 2020-05-24 PROCEDURE — C9113 INJ PANTOPRAZOLE SODIUM, VIA: HCPCS | Performed by: THORACIC SURGERY (CARDIOTHORACIC VASCULAR SURGERY)

## 2020-05-24 PROCEDURE — 94640 AIRWAY INHALATION TREATMENT: CPT

## 2020-05-24 PROCEDURE — 2700000000 HC OXYGEN THERAPY PER DAY

## 2020-05-24 PROCEDURE — 2500000003 HC RX 250 WO HCPCS: Performed by: NURSE PRACTITIONER

## 2020-05-24 PROCEDURE — 94761 N-INVAS EAR/PLS OXIMETRY MLT: CPT

## 2020-05-24 PROCEDURE — 6360000002 HC RX W HCPCS: Performed by: NURSE PRACTITIONER

## 2020-05-24 PROCEDURE — 6360000002 HC RX W HCPCS: Performed by: PSYCHIATRY & NEUROLOGY

## 2020-05-24 PROCEDURE — 83735 ASSAY OF MAGNESIUM: CPT

## 2020-05-24 PROCEDURE — 85610 PROTHROMBIN TIME: CPT

## 2020-05-24 PROCEDURE — 2100000000 HC CCU R&B

## 2020-05-24 PROCEDURE — 99024 POSTOP FOLLOW-UP VISIT: CPT | Performed by: THORACIC SURGERY (CARDIOTHORACIC VASCULAR SURGERY)

## 2020-05-24 PROCEDURE — 80048 BASIC METABOLIC PNL TOTAL CA: CPT

## 2020-05-24 PROCEDURE — 2500000003 HC RX 250 WO HCPCS: Performed by: INTERNAL MEDICINE

## 2020-05-24 PROCEDURE — 2580000003 HC RX 258: Performed by: NURSE PRACTITIONER

## 2020-05-24 PROCEDURE — 94003 VENT MGMT INPAT SUBQ DAY: CPT

## 2020-05-24 PROCEDURE — 2580000003 HC RX 258: Performed by: THORACIC SURGERY (CARDIOTHORACIC VASCULAR SURGERY)

## 2020-05-24 PROCEDURE — 6360000002 HC RX W HCPCS: Performed by: INTERNAL MEDICINE

## 2020-05-24 PROCEDURE — 85027 COMPLETE CBC AUTOMATED: CPT

## 2020-05-24 RX ADMIN — PANTOPRAZOLE SODIUM 40 MG: 40 INJECTION, POWDER, FOR SOLUTION INTRAVENOUS at 06:17

## 2020-05-24 RX ADMIN — CASTOR OIL AND BALSAM, PERU: 788; 87 OINTMENT TOPICAL at 08:48

## 2020-05-24 RX ADMIN — LORAZEPAM 2 MG: 2 INJECTION INTRAMUSCULAR; INTRAVENOUS at 06:55

## 2020-05-24 RX ADMIN — FENTANYL CITRATE 25 MCG: 50 INJECTION INTRAMUSCULAR; INTRAVENOUS at 17:46

## 2020-05-24 RX ADMIN — LORAZEPAM 2 MG: 2 INJECTION INTRAMUSCULAR; INTRAVENOUS at 12:39

## 2020-05-24 RX ADMIN — METOPROLOL TARTRATE 50 MG: 50 TABLET, FILM COATED ORAL at 21:09

## 2020-05-24 RX ADMIN — METRONIDAZOLE 500 MG: 500 INJECTION, SOLUTION INTRAVENOUS at 09:44

## 2020-05-24 RX ADMIN — Medication 10 ML: at 06:32

## 2020-05-24 RX ADMIN — ALBUTEROL SULFATE 2.5 MG: 2.5 SOLUTION RESPIRATORY (INHALATION) at 16:23

## 2020-05-24 RX ADMIN — ASPIRIN 81 MG 324 MG: 81 TABLET ORAL at 08:48

## 2020-05-24 RX ADMIN — SODIUM CHLORIDE 5 MG/HR: 9 INJECTION, SOLUTION INTRAVENOUS at 08:47

## 2020-05-24 RX ADMIN — HEPARIN SODIUM 5000 UNITS: 5000 INJECTION INTRAVENOUS; SUBCUTANEOUS at 05:05

## 2020-05-24 RX ADMIN — DEXMEDETOMIDINE HYDROCHLORIDE 1.4 MCG/KG/HR: 4 INJECTION, SOLUTION INTRAVENOUS at 06:36

## 2020-05-24 RX ADMIN — PROPOFOL 12 MG: 10 INJECTION, EMULSION INTRAVENOUS at 15:18

## 2020-05-24 RX ADMIN — ALBUTEROL SULFATE 2.5 MG: 2.5 SOLUTION RESPIRATORY (INHALATION) at 11:39

## 2020-05-24 RX ADMIN — DEXMEDETOMIDINE HYDROCHLORIDE 1.4 MCG/KG/HR: 4 INJECTION, SOLUTION INTRAVENOUS at 18:47

## 2020-05-24 RX ADMIN — ALBUTEROL SULFATE 2.5 MG: 2.5 SOLUTION RESPIRATORY (INHALATION) at 19:24

## 2020-05-24 RX ADMIN — METRONIDAZOLE 500 MG: 500 INJECTION, SOLUTION INTRAVENOUS at 17:44

## 2020-05-24 RX ADMIN — CHLORHEXIDINE GLUCONATE 15 ML: 1.2 RINSE ORAL at 21:09

## 2020-05-24 RX ADMIN — INSULIN LISPRO 3 UNITS: 100 INJECTION, SOLUTION INTRAVENOUS; SUBCUTANEOUS at 12:37

## 2020-05-24 RX ADMIN — CHLORHEXIDINE GLUCONATE 15 ML: 1.2 RINSE ORAL at 08:43

## 2020-05-24 RX ADMIN — WATER 2 G: 1 INJECTION INTRAMUSCULAR; INTRAVENOUS; SUBCUTANEOUS at 05:06

## 2020-05-24 RX ADMIN — FENTANYL CITRATE 25 MCG: 50 INJECTION INTRAMUSCULAR; INTRAVENOUS at 02:32

## 2020-05-24 RX ADMIN — LEVETIRACETAM 500 MG: 100 INJECTION, SOLUTION INTRAVENOUS at 12:44

## 2020-05-24 RX ADMIN — MEROPENEM 500 MG: 500 INJECTION, POWDER, FOR SOLUTION INTRAVENOUS at 16:11

## 2020-05-24 RX ADMIN — Medication 125 MCG/HR: at 17:03

## 2020-05-24 RX ADMIN — PROPOFOL 5 MCG/KG/MIN: 10 INJECTION, EMULSION INTRAVENOUS at 15:18

## 2020-05-24 RX ADMIN — CASTOR OIL AND BALSAM, PERU: 788; 87 OINTMENT TOPICAL at 21:10

## 2020-05-24 RX ADMIN — POTASSIUM BICARBONATE 10 MEQ: 782 TABLET, EFFERVESCENT ORAL at 21:09

## 2020-05-24 RX ADMIN — POTASSIUM BICARBONATE 10 MEQ: 782 TABLET, EFFERVESCENT ORAL at 13:36

## 2020-05-24 RX ADMIN — LORAZEPAM 2 MG: 2 INJECTION INTRAMUSCULAR; INTRAVENOUS at 16:41

## 2020-05-24 RX ADMIN — SODIUM CHLORIDE, PRESERVATIVE FREE 10 ML: 5 INJECTION INTRAVENOUS at 21:11

## 2020-05-24 RX ADMIN — METRONIDAZOLE 500 MG: 500 INJECTION, SOLUTION INTRAVENOUS at 02:02

## 2020-05-24 RX ADMIN — SODIUM CHLORIDE 10 MG/HR: 9 INJECTION, SOLUTION INTRAVENOUS at 04:59

## 2020-05-24 RX ADMIN — HEPARIN SODIUM 5000 UNITS: 5000 INJECTION INTRAVENOUS; SUBCUTANEOUS at 14:39

## 2020-05-24 RX ADMIN — FENTANYL CITRATE 25 MCG: 50 INJECTION INTRAMUSCULAR; INTRAVENOUS at 18:40

## 2020-05-24 RX ADMIN — MEROPENEM 500 MG: 500 INJECTION, POWDER, FOR SOLUTION INTRAVENOUS at 09:38

## 2020-05-24 RX ADMIN — MINERAL SUPPLEMENT IRON 300 MG / 5 ML STRENGTH LIQUID 100 PER BOX UNFLAVORED 300 MG: at 21:09

## 2020-05-24 RX ADMIN — MINERAL SUPPLEMENT IRON 300 MG / 5 ML STRENGTH LIQUID 100 PER BOX UNFLAVORED 300 MG: at 08:48

## 2020-05-24 RX ADMIN — Medication 125 MCG/HR: at 13:07

## 2020-05-24 RX ADMIN — ALBUTEROL SULFATE 2.5 MG: 2.5 SOLUTION RESPIRATORY (INHALATION) at 08:21

## 2020-05-24 RX ADMIN — Medication 175 MCG/HR: at 09:55

## 2020-05-24 RX ADMIN — ATORVASTATIN CALCIUM 20 MG: 20 TABLET, FILM COATED ORAL at 21:09

## 2020-05-24 RX ADMIN — Medication 150 MCG/HR: at 02:01

## 2020-05-24 RX ADMIN — FUROSEMIDE 20 MG: 10 INJECTION, SOLUTION INTRAMUSCULAR; INTRAVENOUS at 08:49

## 2020-05-24 RX ADMIN — Medication 200 MCG/HR: at 07:23

## 2020-05-24 RX ADMIN — LEVETIRACETAM 500 MG: 100 INJECTION, SOLUTION INTRAVENOUS at 01:06

## 2020-05-24 RX ADMIN — HEPARIN SODIUM 5000 UNITS: 5000 INJECTION INTRAVENOUS; SUBCUTANEOUS at 22:04

## 2020-05-24 RX ADMIN — Medication 170 MCG/HR: at 20:30

## 2020-05-24 RX ADMIN — FUROSEMIDE 20 MG: 10 INJECTION, SOLUTION INTRAMUSCULAR; INTRAVENOUS at 17:20

## 2020-05-24 RX ADMIN — METOPROLOL TARTRATE 50 MG: 50 TABLET, FILM COATED ORAL at 08:48

## 2020-05-24 RX ADMIN — MEROPENEM 500 MG: 500 INJECTION, POWDER, FOR SOLUTION INTRAVENOUS at 22:04

## 2020-05-24 RX ADMIN — Medication 175 MCG/HR: at 05:00

## 2020-05-24 RX ADMIN — DEXMEDETOMIDINE HYDROCHLORIDE 1.4 MCG/KG/HR: 4 INJECTION, SOLUTION INTRAVENOUS at 03:55

## 2020-05-24 RX ADMIN — LORAZEPAM 2 MG: 2 INJECTION INTRAMUSCULAR; INTRAVENOUS at 02:45

## 2020-05-24 RX ADMIN — Medication 150 MCG/HR: at 23:25

## 2020-05-24 RX ADMIN — DEXMEDETOMIDINE HYDROCHLORIDE 1.4 MCG/KG/HR: 4 INJECTION, SOLUTION INTRAVENOUS at 22:49

## 2020-05-24 RX ADMIN — DEXMEDETOMIDINE HYDROCHLORIDE 1 MCG/KG/HR: 4 INJECTION, SOLUTION INTRAVENOUS at 14:11

## 2020-05-24 RX ADMIN — DEXMEDETOMIDINE HYDROCHLORIDE 1.3 MCG/KG/HR: 4 INJECTION, SOLUTION INTRAVENOUS at 09:48

## 2020-05-24 ASSESSMENT — PULMONARY FUNCTION TESTS
PIF_VALUE: 18
PIF_VALUE: 16
PIF_VALUE: 21
PIF_VALUE: 20
PIF_VALUE: 14
PIF_VALUE: 17
PIF_VALUE: 20
PIF_VALUE: 18
PIF_VALUE: 27
PIF_VALUE: 25
PIF_VALUE: 17
PIF_VALUE: 18
PIF_VALUE: 21
PIF_VALUE: 18
PIF_VALUE: 16
PIF_VALUE: 20
PIF_VALUE: 18
PIF_VALUE: 15
PIF_VALUE: 17
PIF_VALUE: 23
PIF_VALUE: 20
PIF_VALUE: 19
PIF_VALUE: 16
PIF_VALUE: 19
PIF_VALUE: 23
PIF_VALUE: 13
PIF_VALUE: 16
PIF_VALUE: 19
PIF_VALUE: 17
PIF_VALUE: 18
PIF_VALUE: 13
PIF_VALUE: 19
PIF_VALUE: 18
PIF_VALUE: 26
PIF_VALUE: 18
PIF_VALUE: 13
PIF_VALUE: 17
PIF_VALUE: 18
PIF_VALUE: 15
PIF_VALUE: 18
PIF_VALUE: 20
PIF_VALUE: 17
PIF_VALUE: 13
PIF_VALUE: 19
PIF_VALUE: 19
PIF_VALUE: 15
PIF_VALUE: 18
PIF_VALUE: 18
PIF_VALUE: 19
PIF_VALUE: 26
PIF_VALUE: 12
PIF_VALUE: 19
PIF_VALUE: 18
PIF_VALUE: 20
PIF_VALUE: 24
PIF_VALUE: 20
PIF_VALUE: 15
PIF_VALUE: 17
PIF_VALUE: 16
PIF_VALUE: 24
PIF_VALUE: 19
PIF_VALUE: 17
PIF_VALUE: 17
PIF_VALUE: 20
PIF_VALUE: 19
PIF_VALUE: 15
PIF_VALUE: 11
PIF_VALUE: 20
PIF_VALUE: 23
PIF_VALUE: 18
PIF_VALUE: 18
PIF_VALUE: 19
PIF_VALUE: 14
PIF_VALUE: 24
PIF_VALUE: 19
PIF_VALUE: 13
PIF_VALUE: 18
PIF_VALUE: 18
PIF_VALUE: 16
PIF_VALUE: 20

## 2020-05-24 ASSESSMENT — PAIN SCALES - GENERAL
PAINLEVEL_OUTOF10: 6
PAINLEVEL_OUTOF10: 0
PAINLEVEL_OUTOF10: 8
PAINLEVEL_OUTOF10: 4
PAINLEVEL_OUTOF10: 6

## 2020-05-24 NOTE — PROGRESS NOTES
Progress Note    S/P cabg x 4 5/8    CC: intubated, sedated    Hospital Course:  5/24 intubated, sedated, hypertensive cardene resumed  5/23 seizure-like activity noted, neurology started keppra    Vital Signs:                                                 BP (!) 101/57   Pulse 81   Temp 99.3 °F (37.4 °C)   Resp 19   Ht 6' (1.829 m)   Wt 180 lb 9.8 oz (81.9 kg)   SpO2 98%   BMI 24.50 kg/m²  O2 Flow Rate (L/min): 60 L/min     CVP (Mean): 15 mmHg  PAP: 37/18  PAP (Mean): 26 mmHg  SVR (Using ABP Mean): 830.77 dyne*sec/cm5  CCI: 2.6 L/min  SVO2 (%): 82 %  Admission Weight: 188 lb (85.3 kg)      Vent Settings:  Vent Information  $Ventilation: $Subsequent Day  Skin Assessment: Clean, dry, & intact  Suction Catheter Diameter: 14  Equipment ID: [id number]  Equipment Changed: Humidification  Vent Type: [id number]  Vent Mode: AC/VC+  Vt Ordered: 450 mL  Rate Set: 18 bmp  Peak Flow: 0 L/min  Pressure Support: 0 cmH20  FiO2 : 50 %  SpO2: 98 %  SpO2/FiO2 ratio: 196  Sensitivity: 3  PEEP/CPAP: 5  I Time/ I Time %: 0.85 s  Humidification Source: Heated wire  Humidification Temp: 37  Humidification Temp Measured: 37  Circuit Condensation: Drained     Drips:  Cardene, precedex, propofol, fentanyl    I/O:      Intake/Output Summary (Last 24 hours) at 5/24/2020 1718  Last data filed at 5/24/2020 1643  Gross per 24 hour   Intake 5190.02 ml   Output 4165 ml   Net 1025.02 ml     Chest Tube:     O/E  GEN: intubated  HEENT: central access  CV: reg, wound c/d/i  Pulm: decreased  Abd: soft, nt, nd, no peritoneal signs  Ext: warm, edema, wound c/d/i    Data Review:  CBC:   Recent Labs     05/22/20  0430 05/23/20  0442 05/24/20  0500   WBC 8.5 10.3 13.7*   HGB 7.7* 7.1* 10.0*   HCT 23.3* 21.6* 31.6*   MCV 84.9 85.9 85.8   * 627* 766*     BMP:   Recent Labs     05/22/20  0430 05/23/20  0442 05/24/20  0500 05/24/20  1207    145 146*  --    K 4.2 4.0 5.3* 4.5    113* 113*  --    CO2 24 23 22  --    BUN 41* 40* 37*  -- CREATININE 0.8 0.8 0.9  --    CALCIUM 8.5 8.1* 8.7  --    MG 2.30 2.30 2.20  --      Cardiac Enzymes: No results for input(s): CKTOTAL, CKMB, CKMBINDEX, TROPONINI in the last 72 hours. PT/INR:   Recent Labs     05/22/20  0430 05/23/20  0442 05/24/20  0500   PROTIME 14.2* 13.9* 13.9*   INR 1.22* 1.20* 1.20*     APTT:   Recent Labs     05/22/20  0430 05/23/20  0442 05/24/20  0500   APTT 27.5 29.3 30.9       Assessment/Plan:     Assessment/Plan:  CV       - cardene gtt restarted for hypertension   -HR 55-60s and sbp of mid 90s, lowered metoprolol to 50mg on 5/23              - continue BB              - hold statin until lfts normalize. Very close to normal now. Recheck 5/25. pulm    --still intubated. Wean as able. Mental status not yet appropriate for extubation.   KR         - febrile 5/12, 5/13. cx 5/12 neg, 5/15 sput GPC. wbc nl. abx per ID recs.             - violaceous fingers. Doubt HIT, not on pressors, covid neg 5/7. cryoglob still pending. HIV neg. AR        - hep C Ab+              - TF. Optimize since low alb              - diarrhea. C.diff neg 5/21  Renal   - Cr nl. Diurese. Almost back to preop weight.              - retain Hoang for accurate I+O  Heme   - 1U PRBCs administered overnight              - dvt prophylaxis scds, sc hep. SH        - TF at goal  Neuro  -keppra 500mg IV q6 started per neurology; seizure-like activity noted per RN today              -propofol decreased, precedex at 1.4, fentanyl decreased to 150mcg               -EtOH withdrawal/delirium. D/c CIWA protocol, use prn ativan. Neurology consulted. MRI noted.  EEG 5/20 mild to mod enceph. MRA head/neck 5/21 limited by pt motion     Dispo: trach/peg next week; wean sedation as tolerated.      -     Huong Camargo MD  Cardiothoracic Surgery  5/24/2020  5:18 PM

## 2020-05-24 NOTE — PROGRESS NOTES
Due to the current efforts to prevent transmission of COVID-19 and also the need to preserve PPE for other caregivers, a face-to-face encounter with the patient was not performed. That being said, all relevant records and diagnostic tests were reviewed, including laboratory results and imaging. Please reference any relevant documentation elsewhere. Care will be coordinated with the primary service. Neurology Follow Up Consult Note    S: Same spells as yesterday, however per bedside RN, she wouldn't describe these as posturing as described previously, nor has she observed any twitches, jerking, or other spells concerning for seizure, rather she reports he is fighting the vent and trying to pull his tubing out when sedation is weaned. Those occasions consistently recur when precedex is weaned down, and improve when titrated back up.         Current Facility-Administered Medications:     meropenem (MERREM) 500 mg in sterile water 10 mL IV syringe, 500 mg, Intravenous, Q6H, Lily De La Fuente MD, 500 mg at 05/24/20 0938    0.9 % sodium chloride bolus, 20 mL, Intravenous, Once, Gabriele Samaniego MD    metoprolol tartrate (LOPRESSOR) tablet 50 mg, 50 mg, Oral, BID, Dwana Mortimer, MD, 50 mg at 05/24/20 0848    levETIRAcetam (KEPPRA) 500 mg in sodium chloride 0.9 % 100 mL IVPB, 500 mg, Intravenous, Q12H, Isaiah Meza MD, Stopped at 05/24/20 1300    LORazepam (ATIVAN) injection 2 mg, 2 mg, Intravenous, Q4H PRN, KATHY Tracy CNP, 2 mg at 05/24/20 1239    fentaNYL 10 mcg/mL infusion, 50 mcg/hr, Intravenous, Continuous, Gabriele Samaniego MD, Last Rate: 12.5 mL/hr at 05/24/20 1307, 125 mcg/hr at 05/24/20 1307    atorvastatin (LIPITOR) tablet 20 mg, 20 mg, Oral, Nightly, KATHY Tracy CNP, 20 mg at 05/23/20 2122    propofol injection 120 mg, 12 mL, Intravenous, Continuous PRN, KATHY Tracy CNP    docusate (COLACE) 50 MG/5ML liquid 100 mg, 100 mg, Oral, BID PRN, Juan Molina KATHY Munoz - CNP    metronidazole (FLAGYL) 500 mg in NaCl 100 mL IVPB premix, 500 mg, Intravenous, Q8H, Bertha Whittaker MD, Stopped at 05/24/20 1045    furosemide (LASIX) injection 20 mg, 20 mg, Intravenous, BID, Pegmontserrate Fonder, APRN - CNP, 20 mg at 05/24/20 0849    Venelex ointment, , Topical, BID, Ritae Harshad, APRN - CNP    sennosides-docusate sodium (SENOKOT-S) 8.6-50 MG tablet 1 tablet, 1 tablet, Oral, Daily PRN, Peggye Fonder, APRN - CNP    niCARdipine (CARDENE) 25 mg in sodium chloride 0.9 % 250 mL infusion, 5 mg/hr, Intravenous, Continuous, Ritae Harshad, APRNAT - CNP, Last Rate: 30 mL/hr at 05/24/20 1308, 3 mg/hr at 05/24/20 1308    lidocaine PF 1 % injection 5 mL, 5 mL, Intradermal, Once, Ritae Harshad, APRN - CNP    bisacodyl (DULCOLAX) suppository 10 mg, 10 mg, Rectal, Daily PRN, Peggye Fonder, APRN - CNP, 10 mg at 05/15/20 1656    metoprolol (LOPRESSOR) injection 2.5 mg, 2.5 mg, Intravenous, Q10 Min PRN, Nolagye Harshad, APRN - CNP    ferrous sulfate 300 (60 Fe) MG/5ML syrup 300 mg, 300 mg, Oral, BID, Peggye Fonder, APRN - CNP, 300 mg at 05/24/20 0848    heparin (porcine) injection 5,000 Units, 5,000 Units, Subcutaneous, 3 times per day, Peggye Fonder, APRN - CNP, 5,000 Units at 05/24/20 0505    glucose (GLUTOSE) 40 % oral gel 15 g, 15 g, Oral, PRN, Peggye Fonder, APRN - CNP    dextrose 50 % IV solution, 12.5 g, Intravenous, PRN, Peggye Fonder, APRN - CNP, 12.5 g at 05/15/20 0836    glucagon (rDNA) injection 1 mg, 1 mg, Intramuscular, PRN, Peggye Fonder, APRN - CNP    dextrose 5 % solution, 100 mL/hr, Intravenous, PRN, Peggye Fonder, APRN - CNP    sodium chloride flush 0.9 % injection 10 mL, 10 mL, Intravenous, 2 times per day, Peggye Fonder, APRN - CNP, 10 mL at 05/23/20 0935    sodium chloride flush 0.9 % injection 10 mL, 10 mL, Intravenous, PRN, Peggye Fonder, APRN - CNP, 10 mL at 05/22/20 0828    insulin lispro (HUMALOG) injection vial 0-18 Units,

## 2020-05-24 NOTE — PROGRESS NOTES
1900-Report and handoff with [address]   2000-Assessment completed. Sedation titrated down. 2100-Evening meds given. 2130-Patient woke up. BP increased to 664 systolic. Patient shaking limbs. Not following commands. Sedation increased. 0000-Reassessment completed. Patient much calmer now. 0100-Patient given CHG bath. Hoang care provided. Hair washed. All linens changed. Sternal binder changed. 0200-Patient pressure again increasing. Patient thrashing head in bed and pushing with feet and legs. Not following commands. Attempting to reach for ETT.   0300- Temp also increasing. Reading 100 on probe. Appears that when patients temp increases and water temp has to become cold in order to keep pt from becoming febrile is when patient is most restless. 0400-Labs drawn and sent. 0500-Temp reading 101.5 now. Water temp on cooling blanket 40. Patient continues to be restless. Requiring cardene for BP.   0600-Temp dropping but patient still very restless. Not well sedated with current regimen. Pumps cleared. Hoang bag emptied. 0650-PRN Ativan given. 0710-Report and handoff with Carson Kinney RN.

## 2020-05-24 NOTE — PROGRESS NOTES
Therapies:  · Oral Diet Orders: NPO   · Oral Diet intake: NPO  · Oral Nutrition Supplement (ONS) Orders: None  · ONS intake: NPO  · Tube Feeding (TF) Orders:   · Feeding Route: Nasogastric  · Formula: Standard w/Fiber  · Rate (ml/hr):60 (rate adjusted for routine Nursing care (~20 hour run) & propofol use    · Volume (ml/day): 1200  · Duration: Continuous  · Additives/Modulars: Protein(2 bottles of Proteinex per feeding tube per day. Do not mix directly with TF)  · Water Flushes: per provider  · Current TF & Flush Orders Provides: 1200 m TV / 1648 kcals (1440 (TF) + 208 (Proteinex) / 119 gms pro (additional 15 gms or 1.4 gms per kg per day) /968 ml free water per day.   · Additional Calories: Propofol down further to 7.4 ml per hour adding 195 kcals per day  · Anthropometric Measures:  · Ht: 6' (182.9 cm)   · Current Body Wt: 181 lb (82.1 kg)  · Admission Body Wt: 184 lb (83.5 kg)   · \Weight Change: Noted wt on day of surgery at 180 lbs   · Ideal Body Wt: 178 lb (80.7 kg), % Ideal Body    · \BMI Classification: BMI 18.5 - 24.9 Normal Weight    Nutrition Interventions:   Continue current Tube Feeding  Continued Inpatient Monitoring    Nutrition Evaluation:   · Evaluation: Goal achieved   · Goals: tolerate most appropriate form of nutrition   · Monitoring: TF Intake, TF Tolerance, Wound Healing, Mental Status/Confusion, Weight, Pertinent Labs, Nausea or Vomiting, Diarrhea, Constipation      Electronically signed by Marylene Fujisawa, RD, LD on 5/24/20 at 8:38 AM EDT    Contact Number: [phone]

## 2020-05-25 ENCOUNTER — APPOINTMENT (OUTPATIENT)
Dept: CT IMAGING | Age: 65
DRG: 003 | End: 2020-05-25
Payer: COMMERCIAL

## 2020-05-25 LAB
ALBUMIN SERPL-MCNC: 2.4 G/DL (ref 3.4–5)
ALP BLD-CCNC: 121 U/L (ref 40–129)
ALT SERPL-CCNC: 55 U/L (ref 10–40)
ANION GAP SERPL CALCULATED.3IONS-SCNC: 9 MMOL/L (ref 3–16)
APTT: 31.3 SEC (ref 24.2–36.2)
AST SERPL-CCNC: 33 U/L (ref 15–37)
BASE EXCESS ARTERIAL: -1.3 MMOL/L (ref -3–3)
BILIRUB SERPL-MCNC: <0.2 MG/DL (ref 0–1)
BILIRUBIN DIRECT: <0.2 MG/DL (ref 0–0.3)
BILIRUBIN, INDIRECT: ABNORMAL MG/DL (ref 0–1)
BUN BLDV-MCNC: 38 MG/DL (ref 7–20)
C DIFF TOXIN/ANTIGEN: NORMAL
CALCIUM SERPL-MCNC: 8.4 MG/DL (ref 8.3–10.6)
CARBOXYHEMOGLOBIN ARTERIAL: 1 % (ref 0–1.5)
CHLORIDE BLD-SCNC: 112 MMOL/L (ref 99–110)
CO2: 23 MMOL/L (ref 21–32)
CREAT SERPL-MCNC: 0.8 MG/DL (ref 0.8–1.3)
GFR AFRICAN AMERICAN: >60
GFR NON-AFRICAN AMERICAN: >60
GLUCOSE BLD-MCNC: 103 MG/DL (ref 70–99)
GLUCOSE BLD-MCNC: 119 MG/DL (ref 70–99)
GLUCOSE BLD-MCNC: 151 MG/DL (ref 70–99)
GLUCOSE BLD-MCNC: 160 MG/DL (ref 70–99)
GLUCOSE BLD-MCNC: 171 MG/DL (ref 70–99)
GLUCOSE BLD-MCNC: 99 MG/DL (ref 70–99)
HCO3 ARTERIAL: 24.4 MMOL/L (ref 21–29)
HCT VFR BLD CALC: 26.8 % (ref 40.5–52.5)
HEMOGLOBIN, ART, EXTENDED: 9.1 G/DL (ref 13.5–17.5)
HEMOGLOBIN: 8.6 G/DL (ref 13.5–17.5)
INR BLD: 1.23 (ref 0.86–1.14)
MAGNESIUM: 2.1 MG/DL (ref 1.8–2.4)
MCH RBC QN AUTO: 27.8 PG (ref 26–34)
MCHC RBC AUTO-ENTMCNC: 32.3 G/DL (ref 31–36)
MCV RBC AUTO: 86.1 FL (ref 80–100)
METHEMOGLOBIN ARTERIAL: 0.8 %
O2 CONTENT ARTERIAL: 13 ML/DL
O2 SAT, ARTERIAL: 99 %
O2 THERAPY: ABNORMAL
PCO2 ARTERIAL: 44.3 MMHG (ref 35–45)
PDW BLD-RTO: 15.7 % (ref 12.4–15.4)
PERFORMED ON: ABNORMAL
PERFORMED ON: NORMAL
PH ARTERIAL: 7.35 (ref 7.35–7.45)
PLATELET # BLD: 513 K/UL (ref 135–450)
PMV BLD AUTO: 7.3 FL (ref 5–10.5)
PO2 ARTERIAL: 107 MMHG (ref 75–108)
POTASSIUM SERPL-SCNC: 4.1 MMOL/L (ref 3.5–5.1)
PROTHROMBIN TIME: 14.3 SEC (ref 10–13.2)
RBC # BLD: 3.11 M/UL (ref 4.2–5.9)
SODIUM BLD-SCNC: 144 MMOL/L (ref 136–145)
TCO2 ARTERIAL: 25.7 MMOL/L
TOTAL PROTEIN: 5.9 G/DL (ref 6.4–8.2)
TRIGL SERPL-MCNC: 111 MG/DL (ref 0–150)
WBC # BLD: 7.1 K/UL (ref 4–11)

## 2020-05-25 PROCEDURE — 6360000002 HC RX W HCPCS: Performed by: PSYCHIATRY & NEUROLOGY

## 2020-05-25 PROCEDURE — 82803 BLOOD GASES ANY COMBINATION: CPT

## 2020-05-25 PROCEDURE — 94003 VENT MGMT INPAT SUBQ DAY: CPT

## 2020-05-25 PROCEDURE — 6360000002 HC RX W HCPCS: Performed by: NURSE PRACTITIONER

## 2020-05-25 PROCEDURE — P9041 ALBUMIN (HUMAN),5%, 50ML: HCPCS | Performed by: THORACIC SURGERY (CARDIOTHORACIC VASCULAR SURGERY)

## 2020-05-25 PROCEDURE — 6360000002 HC RX W HCPCS: Performed by: THORACIC SURGERY (CARDIOTHORACIC VASCULAR SURGERY)

## 2020-05-25 PROCEDURE — 2580000003 HC RX 258: Performed by: THORACIC SURGERY (CARDIOTHORACIC VASCULAR SURGERY)

## 2020-05-25 PROCEDURE — 2580000003 HC RX 258: Performed by: INTERNAL MEDICINE

## 2020-05-25 PROCEDURE — 2700000000 HC OXYGEN THERAPY PER DAY

## 2020-05-25 PROCEDURE — 70450 CT HEAD/BRAIN W/O DYE: CPT

## 2020-05-25 PROCEDURE — 37799 UNLISTED PX VASCULAR SURGERY: CPT

## 2020-05-25 PROCEDURE — 85610 PROTHROMBIN TIME: CPT

## 2020-05-25 PROCEDURE — 84478 ASSAY OF TRIGLYCERIDES: CPT

## 2020-05-25 PROCEDURE — 87205 SMEAR GRAM STAIN: CPT

## 2020-05-25 PROCEDURE — 2580000003 HC RX 258: Performed by: PSYCHIATRY & NEUROLOGY

## 2020-05-25 PROCEDURE — 6370000000 HC RX 637 (ALT 250 FOR IP): Performed by: THORACIC SURGERY (CARDIOTHORACIC VASCULAR SURGERY)

## 2020-05-25 PROCEDURE — 76937 US GUIDE VASCULAR ACCESS: CPT

## 2020-05-25 PROCEDURE — 94760 N-INVAS EAR/PLS OXIMETRY 1: CPT

## 2020-05-25 PROCEDURE — 87070 CULTURE OTHR SPECIMN AEROBIC: CPT

## 2020-05-25 PROCEDURE — 2580000003 HC RX 258: Performed by: NURSE PRACTITIONER

## 2020-05-25 PROCEDURE — 80048 BASIC METABOLIC PNL TOTAL CA: CPT

## 2020-05-25 PROCEDURE — 83735 ASSAY OF MAGNESIUM: CPT

## 2020-05-25 PROCEDURE — 2500000003 HC RX 250 WO HCPCS: Performed by: INTERNAL MEDICINE

## 2020-05-25 PROCEDURE — 36569 INSJ PICC 5 YR+ W/O IMAGING: CPT

## 2020-05-25 PROCEDURE — 99024 POSTOP FOLLOW-UP VISIT: CPT | Performed by: THORACIC SURGERY (CARDIOTHORACIC VASCULAR SURGERY)

## 2020-05-25 PROCEDURE — 94750 HC PULMONARY COMPLIANCE STUDY: CPT

## 2020-05-25 PROCEDURE — 80076 HEPATIC FUNCTION PANEL: CPT

## 2020-05-25 PROCEDURE — 85027 COMPLETE CBC AUTOMATED: CPT

## 2020-05-25 PROCEDURE — 6370000000 HC RX 637 (ALT 250 FOR IP): Performed by: NURSE PRACTITIONER

## 2020-05-25 PROCEDURE — 94640 AIRWAY INHALATION TREATMENT: CPT

## 2020-05-25 PROCEDURE — 6360000002 HC RX W HCPCS: Performed by: INTERNAL MEDICINE

## 2020-05-25 PROCEDURE — 2100000000 HC CCU R&B

## 2020-05-25 PROCEDURE — 87324 CLOSTRIDIUM AG IA: CPT

## 2020-05-25 PROCEDURE — 02HV33Z INSERTION OF INFUSION DEVICE INTO SUPERIOR VENA CAVA, PERCUTANEOUS APPROACH: ICD-10-PCS | Performed by: THORACIC SURGERY (CARDIOTHORACIC VASCULAR SURGERY)

## 2020-05-25 PROCEDURE — 85730 THROMBOPLASTIN TIME PARTIAL: CPT

## 2020-05-25 PROCEDURE — 2500000003 HC RX 250 WO HCPCS: Performed by: THORACIC SURGERY (CARDIOTHORACIC VASCULAR SURGERY)

## 2020-05-25 PROCEDURE — 87449 NOS EACH ORGANISM AG IA: CPT

## 2020-05-25 PROCEDURE — C9113 INJ PANTOPRAZOLE SODIUM, VIA: HCPCS | Performed by: THORACIC SURGERY (CARDIOTHORACIC VASCULAR SURGERY)

## 2020-05-25 RX ORDER — ALBUMIN, HUMAN INJ 5% 5 %
12.5 SOLUTION INTRAVENOUS ONCE
Status: COMPLETED | OUTPATIENT
Start: 2020-05-25 | End: 2020-05-25

## 2020-05-25 RX ORDER — SODIUM CHLORIDE 0.9 % (FLUSH) 0.9 %
10 SYRINGE (ML) INJECTION EVERY 12 HOURS SCHEDULED
Status: DISCONTINUED | OUTPATIENT
Start: 2020-05-25 | End: 2020-06-05 | Stop reason: HOSPADM

## 2020-05-25 RX ORDER — LORAZEPAM 2 MG/ML
1 INJECTION INTRAMUSCULAR EVERY 4 HOURS PRN
Status: DISCONTINUED | OUTPATIENT
Start: 2020-05-25 | End: 2020-06-05 | Stop reason: HOSPADM

## 2020-05-25 RX ORDER — LIDOCAINE HYDROCHLORIDE 10 MG/ML
5 INJECTION, SOLUTION EPIDURAL; INFILTRATION; INTRACAUDAL; PERINEURAL ONCE
Status: DISCONTINUED | OUTPATIENT
Start: 2020-05-25 | End: 2020-06-05 | Stop reason: HOSPADM

## 2020-05-25 RX ORDER — FUROSEMIDE 10 MG/ML
20 INJECTION INTRAMUSCULAR; INTRAVENOUS 3 TIMES DAILY
Status: COMPLETED | OUTPATIENT
Start: 2020-05-25 | End: 2020-05-25

## 2020-05-25 RX ORDER — SODIUM CHLORIDE 0.9 % (FLUSH) 0.9 %
10 SYRINGE (ML) INJECTION PRN
Status: DISCONTINUED | OUTPATIENT
Start: 2020-05-25 | End: 2020-06-05 | Stop reason: HOSPADM

## 2020-05-25 RX ORDER — FUROSEMIDE 10 MG/ML
20 INJECTION INTRAMUSCULAR; INTRAVENOUS 2 TIMES DAILY
Status: DISCONTINUED | OUTPATIENT
Start: 2020-05-26 | End: 2020-05-26

## 2020-05-25 RX ORDER — ALBUMIN, HUMAN INJ 5% 5 %
SOLUTION INTRAVENOUS
Status: DISPENSED
Start: 2020-05-25 | End: 2020-05-25

## 2020-05-25 RX ADMIN — CASTOR OIL AND BALSAM, PERU: 788; 87 OINTMENT TOPICAL at 21:16

## 2020-05-25 RX ADMIN — ALBUTEROL SULFATE 2.5 MG: 2.5 SOLUTION RESPIRATORY (INHALATION) at 16:23

## 2020-05-25 RX ADMIN — ALBUMIN (HUMAN) 12.5 G: 12.5 INJECTION, SOLUTION INTRAVENOUS at 09:38

## 2020-05-25 RX ADMIN — MEROPENEM 500 MG: 500 INJECTION, POWDER, FOR SOLUTION INTRAVENOUS at 17:23

## 2020-05-25 RX ADMIN — LEVETIRACETAM 500 MG: 100 INJECTION, SOLUTION INTRAVENOUS at 00:30

## 2020-05-25 RX ADMIN — ALBUTEROL SULFATE 2.5 MG: 2.5 SOLUTION RESPIRATORY (INHALATION) at 07:25

## 2020-05-25 RX ADMIN — ASPIRIN 81 MG 324 MG: 81 TABLET ORAL at 08:16

## 2020-05-25 RX ADMIN — POTASSIUM BICARBONATE 10 MEQ: 782 TABLET, EFFERVESCENT ORAL at 21:04

## 2020-05-25 RX ADMIN — ALBUTEROL SULFATE 2.5 MG: 2.5 SOLUTION RESPIRATORY (INHALATION) at 11:59

## 2020-05-25 RX ADMIN — DEXMEDETOMIDINE HYDROCHLORIDE 1.4 MCG/KG/HR: 4 INJECTION, SOLUTION INTRAVENOUS at 21:03

## 2020-05-25 RX ADMIN — CHLORHEXIDINE GLUCONATE 15 ML: 1.2 RINSE ORAL at 21:16

## 2020-05-25 RX ADMIN — MEROPENEM 500 MG: 500 INJECTION, POWDER, FOR SOLUTION INTRAVENOUS at 21:03

## 2020-05-25 RX ADMIN — Medication 10 ML: at 05:37

## 2020-05-25 RX ADMIN — ALBUTEROL SULFATE 2.5 MG: 2.5 SOLUTION RESPIRATORY (INHALATION) at 20:25

## 2020-05-25 RX ADMIN — PROPOFOL 12 MCG/KG/MIN: 10 INJECTION, EMULSION INTRAVENOUS at 14:05

## 2020-05-25 RX ADMIN — METRONIDAZOLE 500 MG: 500 INJECTION, SOLUTION INTRAVENOUS at 17:49

## 2020-05-25 RX ADMIN — LORAZEPAM 1 MG: 2 INJECTION INTRAMUSCULAR; INTRAVENOUS at 17:16

## 2020-05-25 RX ADMIN — LEVETIRACETAM 500 MG: 100 INJECTION, SOLUTION INTRAVENOUS at 13:50

## 2020-05-25 RX ADMIN — PANTOPRAZOLE SODIUM 40 MG: 40 INJECTION, POWDER, FOR SOLUTION INTRAVENOUS at 05:37

## 2020-05-25 RX ADMIN — DEXMEDETOMIDINE HYDROCHLORIDE 1.3 MCG/KG/HR: 4 INJECTION, SOLUTION INTRAVENOUS at 17:01

## 2020-05-25 RX ADMIN — FUROSEMIDE 20 MG: 10 INJECTION, SOLUTION INTRAMUSCULAR; INTRAVENOUS at 21:03

## 2020-05-25 RX ADMIN — Medication 150 MCG/HR: at 02:40

## 2020-05-25 RX ADMIN — DEXMEDETOMIDINE HYDROCHLORIDE 1.3 MCG/KG/HR: 4 INJECTION, SOLUTION INTRAVENOUS at 05:37

## 2020-05-25 RX ADMIN — HEPARIN SODIUM 5000 UNITS: 5000 INJECTION INTRAVENOUS; SUBCUTANEOUS at 13:48

## 2020-05-25 RX ADMIN — METOPROLOL TARTRATE 50 MG: 50 TABLET, FILM COATED ORAL at 21:03

## 2020-05-25 RX ADMIN — PROPOFOL 12 MCG/KG/MIN: 10 INJECTION, EMULSION INTRAVENOUS at 02:40

## 2020-05-25 RX ADMIN — ACETAMINOPHEN 650 MG: 325 TABLET, FILM COATED ORAL at 02:00

## 2020-05-25 RX ADMIN — HEPARIN SODIUM 5000 UNITS: 5000 INJECTION INTRAVENOUS; SUBCUTANEOUS at 05:43

## 2020-05-25 RX ADMIN — INSULIN LISPRO 3 UNITS: 100 INJECTION, SOLUTION INTRAVENOUS; SUBCUTANEOUS at 09:05

## 2020-05-25 RX ADMIN — Medication 150 MCG/HR: at 05:43

## 2020-05-25 RX ADMIN — METOPROLOL TARTRATE 50 MG: 50 TABLET, FILM COATED ORAL at 08:16

## 2020-05-25 RX ADMIN — HEPARIN SODIUM 5000 UNITS: 5000 INJECTION INTRAVENOUS; SUBCUTANEOUS at 21:03

## 2020-05-25 RX ADMIN — POTASSIUM BICARBONATE 10 MEQ: 782 TABLET, EFFERVESCENT ORAL at 08:16

## 2020-05-25 RX ADMIN — FUROSEMIDE 20 MG: 10 INJECTION, SOLUTION INTRAMUSCULAR; INTRAVENOUS at 08:20

## 2020-05-25 RX ADMIN — MINERAL SUPPLEMENT IRON 300 MG / 5 ML STRENGTH LIQUID 100 PER BOX UNFLAVORED 300 MG: at 08:16

## 2020-05-25 RX ADMIN — MEROPENEM 500 MG: 500 INJECTION, POWDER, FOR SOLUTION INTRAVENOUS at 13:24

## 2020-05-25 RX ADMIN — SODIUM CHLORIDE, PRESERVATIVE FREE 10 ML: 5 INJECTION INTRAVENOUS at 08:18

## 2020-05-25 RX ADMIN — CASTOR OIL AND BALSAM, PERU: 788; 87 OINTMENT TOPICAL at 08:16

## 2020-05-25 RX ADMIN — Medication 125 MCG/HR: at 13:53

## 2020-05-25 RX ADMIN — DEXMEDETOMIDINE HYDROCHLORIDE 1.4 MCG/KG/HR: 4 INJECTION, SOLUTION INTRAVENOUS at 02:00

## 2020-05-25 RX ADMIN — Medication 125 MCG/HR: at 17:58

## 2020-05-25 RX ADMIN — DEXMEDETOMIDINE HYDROCHLORIDE 1.3 MCG/KG/HR: 4 INJECTION, SOLUTION INTRAVENOUS at 13:10

## 2020-05-25 RX ADMIN — MEROPENEM 500 MG: 500 INJECTION, POWDER, FOR SOLUTION INTRAVENOUS at 04:23

## 2020-05-25 RX ADMIN — CHLORHEXIDINE GLUCONATE 15 ML: 1.2 RINSE ORAL at 08:17

## 2020-05-25 RX ADMIN — Medication 125 MCG/HR: at 21:47

## 2020-05-25 RX ADMIN — POTASSIUM BICARBONATE 10 MEQ: 782 TABLET, EFFERVESCENT ORAL at 13:48

## 2020-05-25 RX ADMIN — MINERAL SUPPLEMENT IRON 300 MG / 5 ML STRENGTH LIQUID 100 PER BOX UNFLAVORED 300 MG: at 21:04

## 2020-05-25 RX ADMIN — METRONIDAZOLE 500 MG: 500 INJECTION, SOLUTION INTRAVENOUS at 02:00

## 2020-05-25 RX ADMIN — SODIUM CHLORIDE, PRESERVATIVE FREE 10 ML: 5 INJECTION INTRAVENOUS at 21:16

## 2020-05-25 RX ADMIN — FUROSEMIDE 20 MG: 10 INJECTION, SOLUTION INTRAMUSCULAR; INTRAVENOUS at 13:48

## 2020-05-25 RX ADMIN — LORAZEPAM 2 MG: 2 INJECTION INTRAMUSCULAR; INTRAVENOUS at 08:49

## 2020-05-25 RX ADMIN — DEXMEDETOMIDINE HYDROCHLORIDE 1.3 MCG/KG/HR: 4 INJECTION, SOLUTION INTRAVENOUS at 09:08

## 2020-05-25 RX ADMIN — Medication 100 MCG/HR: at 09:29

## 2020-05-25 RX ADMIN — INSULIN LISPRO 3 UNITS: 100 INJECTION, SOLUTION INTRAVENOUS; SUBCUTANEOUS at 17:07

## 2020-05-25 RX ADMIN — ATORVASTATIN CALCIUM 20 MG: 20 TABLET, FILM COATED ORAL at 21:04

## 2020-05-25 RX ADMIN — LORAZEPAM 1 MG: 2 INJECTION INTRAMUSCULAR; INTRAVENOUS at 21:03

## 2020-05-25 RX ADMIN — METRONIDAZOLE 500 MG: 500 INJECTION, SOLUTION INTRAVENOUS at 13:39

## 2020-05-25 ASSESSMENT — PULMONARY FUNCTION TESTS
PIF_VALUE: 16
PIF_VALUE: 19
PIF_VALUE: 16
PIF_VALUE: 17
PIF_VALUE: 20
PIF_VALUE: 16
PIF_VALUE: 23
PIF_VALUE: 20
PIF_VALUE: 19
PIF_VALUE: 18
PIF_VALUE: 19
PIF_VALUE: 19
PIF_VALUE: 18
PIF_VALUE: 20
PIF_VALUE: 18
PIF_VALUE: 19
PIF_VALUE: 19
PIF_VALUE: 20
PIF_VALUE: 20
PIF_VALUE: 33
PIF_VALUE: 18
PIF_VALUE: 19
PIF_VALUE: 17
PIF_VALUE: 18
PIF_VALUE: 17
PIF_VALUE: 19
PIF_VALUE: 20
PIF_VALUE: 19
PIF_VALUE: 20
PIF_VALUE: 21
PIF_VALUE: 25

## 2020-05-25 ASSESSMENT — PAIN SCALES - GENERAL
PAINLEVEL_OUTOF10: 6
PAINLEVEL_OUTOF10: 0

## 2020-05-25 NOTE — PLAN OF CARE
bleeding, impaired coagulation    Goal: Absence of imbalanced fluid volume signs and symptoms  Description: Absence of imbalanced fluid volume signs and symptoms  Outcome: Ongoing  Note:   A: Strict I/O, daily weight, assess for edema. Educate patient and family. Goal: Ability to achieve a balanced intake and output will improve  Description: Ability to achieve a balanced intake and output will improve  Outcome: Ongoing     Problem: Anxiety:  Goal: Level of anxiety will decrease  Description: Level of anxiety will decrease  Outcome: Ongoing  Note: A: Assess for anxiety. Observe for restless behavior, tearful episodes, and agitation. Offer calm environment, therapeutic touch, active listening and reassurance of safety. Administer Ativan PRN as ordered. Titrate sedation per ordered parameters. Problem: Tissue Perfusion - Cardiopulmonary, Altered:  Goal: Circulatory function within specified parameters  Description: Circulatory function within specified parameters  Outcome: Ongoing  Note: A: Assess peripheral color, skin temp, sensation, pulses, and cap refill       Problem: Tissue Perfusion - Renal, Altered:  Goal: Ability to achieve a balanced intake and output will improve  Description: Ability to achieve a balanced intake and output will improve  Outcome: Ongoing  Goal: Electrolytes within specified parameters  Description: Electrolytes within specified parameters  Outcome: Ongoing  Note: A: Review lab results. Notify physician of abnormal results. Goal: Serum creatinine will be within specified parameters  Description: Serum creatinine will be within specified parameters  Outcome: Ongoing  Note: A: Review lab results. Notify physician of abnormal results.        Problem: Restraint Use - Nonviolent/Non-Self-Destructive Behavior:  Goal: Absence of restraint indications  Description: Absence of restraint indications  5/24/2020 2005 by Kaity Partida RN  Outcome: Ongoing  Note: A: Assess need for continued

## 2020-05-25 NOTE — PROGRESS NOTES
Neurology Progress Note    Updates:  Has been on precedex, propofol, and fentanyl; propofol turned off 20 min prior to my visit though he was given a push of LZP. Moving right upper limb more readily than the left but chronicity of this is uncertain  Febrile and requiring cooling blanket    Exam:  -Mental status: sedated and unable to assess orientation given sedation and intubation  -Speech & Language: intubated; makes no attempt to speak  -Cranial nerves: pupils symmetric 2 mm no notable dysconjugate gaze; eyes midline; no movement with VOR though limited by sedation no facial asymmetry  -Motor: 0/5 but limited by encephalopathy and sedation  -Sensory: lifts eyebrows and slight grimace to noxious stim in all four limbs  -Other: no adventitious movements noted  Other Systems  -General Appearance: well-developed, well-nourished, no apparent distress  -Neck: supple  -Lungs: breathing unlabored, regular, no audible wheezes  -CV: pulses strong x 4 extremities  -Abd: flat    Labs:   --> 100   WBC 7K  INR 1.23      Studies:    EEG: background slowing and disorganization with beta activity; no epis    MRI brain      Impression:  Maggy Xiao is a 60 y.o. 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 MRI brain additionally found a right fronto-parietal infarct, though radiology suggested possibility of abscess, however repeat MRI brain w/ ANGELINA didn't enhanced, less suggestive of brain abscess. EEG reported generalized slowing. Spells of posturing previously, actually appear to be attempts per RN at pulling ET tube out by patient. Today he has been noted to not be moving left upper limb as well as the right. My bedside exam was blunted by recent benzodiazepine administration but he does respond to pain symmetrically.  Clinically this could be explained by involvement of his infarct in the precentral gyrus though given uncertainty of chronicity, favor excluding ICH.     Recommendations:  1. Head CT (ordered)   2. If encephalopathy fails to persistently improve or fevers persist, given MRI reading noted above, would advise team pursue repeat complete metabolic and infectious workup including a spinal tap (when obtaining, please order CSF glucose, protein, bacterial culture, ACE, viral culture, HSV PCR, cell count, and cytology) to evaluate for CNS infectious or inflammatory conditions. 3. Appreciate ID recommendations. 4. Continue keppra 500 mg BID. 5. While agitation related to weaning of the vent appears ventilator related, if spells persistent, or new or concerning episodes occur would suggest team consider pursuit of continuous video EEG (CVEEG) at [facility], [facility], or . 6. Continue anti-platelet and statin as stroke preventive therapy. 7. If any acute worsening of patient's condition or examination a stat CT head is recommended, and if edema or bleed is present stat neurosurgical consultation is recommended. 8. The patient should follow up with Neurology as an outpatient after discharge.     Your medical management. No further recommendations at this time, please call back if any abnormalities, any questions, or any concerns.     A copy of this note was provided for MD Halie Kapadia NP  Neurology  [phone]  Evenings, weekends, and off weeks please discuss with the covering neurologist.

## 2020-05-25 NOTE — PROGRESS NOTES
PICC PLACEMENT:  Preprocedure & timeout done w primary RN Nisha Powell; Initial order for Evaluation of existing L sided TL PICC that is \"leaking\"; upon my assessment, it is indeed leaking from the insertion site while flushing; there are no signs of infection at the insertion site; will attempt to save the line via guidewire exchange to place new picc. The guidewire exchange of the L sided TL PICC was unsuccessful; I was unable to advance the guidewire through the picc in any of the 3 lumens, in order to exchange the line properly; US assessment of the L basilic vein (where the picc is located), vein is non compressible, and the fact of the PICC leaking the way it is makes me suspicious of DVT; will attempt PICC placement to R arm.     MY RECOMMENDATION:  Vascular dopper of L arm s/p PICC removal    New PICC placed in R arm without difficulty or complications; R Basilic vein is accessed vein without use of tourniquet d/t R brachial arterial line in place; no difficulty or complications w advancing PICC catheter into SVC; picc tip is verified using Sherlock 3cg Confirmation system w positive pwave and no negative deflection visualized at 0cm out; waveform printed and placed in chart; reported all of the above, along w ok to use PICC to primary RN; pt tolerated procedure very well while sedated; pt will remain in bed at rest post procedure to promote hemostasis to the insertion site; pt is left in a position of comfort w siderails up x3, call light in reach and bed is locked in lowest position; Order for OK to use PICC is placed in EMR

## 2020-05-26 LAB
ANION GAP SERPL CALCULATED.3IONS-SCNC: 9 MMOL/L (ref 3–16)
APTT: 32 SEC (ref 24.2–36.2)
BASE EXCESS ARTERIAL: 1.8 MMOL/L (ref -3–3)
BUN BLDV-MCNC: 31 MG/DL (ref 7–20)
CALCIUM SERPL-MCNC: 8.5 MG/DL (ref 8.3–10.6)
CARBOXYHEMOGLOBIN ARTERIAL: 1.2 % (ref 0–1.5)
CHLORIDE BLD-SCNC: 112 MMOL/L (ref 99–110)
CO2: 25 MMOL/L (ref 21–32)
CREAT SERPL-MCNC: 0.7 MG/DL (ref 0.8–1.3)
GFR AFRICAN AMERICAN: >60
GFR NON-AFRICAN AMERICAN: >60
GLUCOSE BLD-MCNC: 102 MG/DL (ref 70–99)
GLUCOSE BLD-MCNC: 120 MG/DL (ref 70–99)
GLUCOSE BLD-MCNC: 121 MG/DL (ref 70–99)
GLUCOSE BLD-MCNC: 126 MG/DL (ref 70–99)
GLUCOSE BLD-MCNC: 126 MG/DL (ref 70–99)
GLUCOSE BLD-MCNC: 134 MG/DL (ref 70–99)
GLUCOSE BLD-MCNC: 142 MG/DL (ref 70–99)
GLUCOSE BLD-MCNC: 77 MG/DL (ref 70–99)
GLUCOSE BLD-MCNC: 85 MG/DL (ref 70–99)
HCO3 ARTERIAL: 26.9 MMOL/L (ref 21–29)
HCT VFR BLD CALC: 27.3 % (ref 40.5–52.5)
HEMOGLOBIN, ART, EXTENDED: 8.8 G/DL (ref 13.5–17.5)
HEMOGLOBIN: 8.8 G/DL (ref 13.5–17.5)
INR BLD: 1.25 (ref 0.86–1.14)
MAGNESIUM: 2.1 MG/DL (ref 1.8–2.4)
MCH RBC QN AUTO: 27.3 PG (ref 26–34)
MCHC RBC AUTO-ENTMCNC: 32.1 G/DL (ref 31–36)
MCV RBC AUTO: 85.2 FL (ref 80–100)
METHEMOGLOBIN ARTERIAL: 0.6 %
O2 CONTENT ARTERIAL: 12 ML/DL
O2 SAT, ARTERIAL: 97.5 %
O2 THERAPY: ABNORMAL
PCO2 ARTERIAL: 43.3 MMHG (ref 35–45)
PDW BLD-RTO: 16 % (ref 12.4–15.4)
PERFORMED ON: ABNORMAL
PERFORMED ON: NORMAL
PERFORMED ON: NORMAL
PH ARTERIAL: 7.4 (ref 7.35–7.45)
PLATELET # BLD: 489 K/UL (ref 135–450)
PMV BLD AUTO: 7.2 FL (ref 5–10.5)
PO2 ARTERIAL: 82.2 MMHG (ref 75–108)
POTASSIUM SERPL-SCNC: 4.2 MMOL/L (ref 3.5–5.1)
PROTHROMBIN TIME: 14.5 SEC (ref 10–13.2)
RBC # BLD: 3.21 M/UL (ref 4.2–5.9)
SARS-COV-2, PCR: NOT DETECTED
SODIUM BLD-SCNC: 146 MMOL/L (ref 136–145)
TCO2 ARTERIAL: 28.2 MMOL/L
WBC # BLD: 6.2 K/UL (ref 4–11)

## 2020-05-26 PROCEDURE — 2500000003 HC RX 250 WO HCPCS: Performed by: THORACIC SURGERY (CARDIOTHORACIC VASCULAR SURGERY)

## 2020-05-26 PROCEDURE — 6360000002 HC RX W HCPCS: Performed by: THORACIC SURGERY (CARDIOTHORACIC VASCULAR SURGERY)

## 2020-05-26 PROCEDURE — 6370000000 HC RX 637 (ALT 250 FOR IP): Performed by: THORACIC SURGERY (CARDIOTHORACIC VASCULAR SURGERY)

## 2020-05-26 PROCEDURE — 2580000003 HC RX 258: Performed by: NURSE PRACTITIONER

## 2020-05-26 PROCEDURE — 99233 SBSQ HOSP IP/OBS HIGH 50: CPT | Performed by: INTERNAL MEDICINE

## 2020-05-26 PROCEDURE — 94640 AIRWAY INHALATION TREATMENT: CPT

## 2020-05-26 PROCEDURE — 6360000002 HC RX W HCPCS: Performed by: INTERNAL MEDICINE

## 2020-05-26 PROCEDURE — 6360000002 HC RX W HCPCS: Performed by: NURSE PRACTITIONER

## 2020-05-26 PROCEDURE — 2580000003 HC RX 258: Performed by: THORACIC SURGERY (CARDIOTHORACIC VASCULAR SURGERY)

## 2020-05-26 PROCEDURE — 85730 THROMBOPLASTIN TIME PARTIAL: CPT

## 2020-05-26 PROCEDURE — 94750 HC PULMONARY COMPLIANCE STUDY: CPT

## 2020-05-26 PROCEDURE — 99024 POSTOP FOLLOW-UP VISIT: CPT | Performed by: THORACIC SURGERY (CARDIOTHORACIC VASCULAR SURGERY)

## 2020-05-26 PROCEDURE — 2700000000 HC OXYGEN THERAPY PER DAY

## 2020-05-26 PROCEDURE — 2100000000 HC CCU R&B

## 2020-05-26 PROCEDURE — 94003 VENT MGMT INPAT SUBQ DAY: CPT

## 2020-05-26 PROCEDURE — U0003 INFECTIOUS AGENT DETECTION BY NUCLEIC ACID (DNA OR RNA); SEVERE ACUTE RESPIRATORY SYNDROME CORONAVIRUS 2 (SARS-COV-2) (CORONAVIRUS DISEASE [COVID-19]), AMPLIFIED PROBE TECHNIQUE, MAKING USE OF HIGH THROUGHPUT TECHNOLOGIES AS DESCRIBED BY CMS-2020-01-R: HCPCS

## 2020-05-26 PROCEDURE — 85610 PROTHROMBIN TIME: CPT

## 2020-05-26 PROCEDURE — 80048 BASIC METABOLIC PNL TOTAL CA: CPT

## 2020-05-26 PROCEDURE — 6370000000 HC RX 637 (ALT 250 FOR IP): Performed by: NURSE PRACTITIONER

## 2020-05-26 PROCEDURE — 2580000003 HC RX 258: Performed by: INTERNAL MEDICINE

## 2020-05-26 PROCEDURE — 2580000003 HC RX 258: Performed by: PSYCHIATRY & NEUROLOGY

## 2020-05-26 PROCEDURE — 2500000003 HC RX 250 WO HCPCS: Performed by: INTERNAL MEDICINE

## 2020-05-26 PROCEDURE — C9113 INJ PANTOPRAZOLE SODIUM, VIA: HCPCS | Performed by: THORACIC SURGERY (CARDIOTHORACIC VASCULAR SURGERY)

## 2020-05-26 PROCEDURE — 6360000002 HC RX W HCPCS: Performed by: PSYCHIATRY & NEUROLOGY

## 2020-05-26 PROCEDURE — 85027 COMPLETE CBC AUTOMATED: CPT

## 2020-05-26 PROCEDURE — 83735 ASSAY OF MAGNESIUM: CPT

## 2020-05-26 PROCEDURE — 93971 EXTREMITY STUDY: CPT

## 2020-05-26 PROCEDURE — 82803 BLOOD GASES ANY COMBINATION: CPT

## 2020-05-26 PROCEDURE — 94761 N-INVAS EAR/PLS OXIMETRY MLT: CPT

## 2020-05-26 RX ADMIN — LORAZEPAM 1 MG: 2 INJECTION INTRAMUSCULAR; INTRAVENOUS at 10:49

## 2020-05-26 RX ADMIN — MEROPENEM 500 MG: 500 INJECTION, POWDER, FOR SOLUTION INTRAVENOUS at 09:49

## 2020-05-26 RX ADMIN — FUROSEMIDE 20 MG: 10 INJECTION, SOLUTION INTRAMUSCULAR; INTRAVENOUS at 08:36

## 2020-05-26 RX ADMIN — Medication 150 MCG/HR: at 09:42

## 2020-05-26 RX ADMIN — MINERAL SUPPLEMENT IRON 300 MG / 5 ML STRENGTH LIQUID 100 PER BOX UNFLAVORED 300 MG: at 09:03

## 2020-05-26 RX ADMIN — CASTOR OIL AND BALSAM, PERU: 788; 87 OINTMENT TOPICAL at 20:47

## 2020-05-26 RX ADMIN — FENTANYL CITRATE 25 MCG: 50 INJECTION INTRAMUSCULAR; INTRAVENOUS at 12:34

## 2020-05-26 RX ADMIN — ALBUTEROL SULFATE 2.5 MG: 2.5 SOLUTION RESPIRATORY (INHALATION) at 15:56

## 2020-05-26 RX ADMIN — DEXMEDETOMIDINE HYDROCHLORIDE 1.2 MCG/KG/HR: 4 INJECTION, SOLUTION INTRAVENOUS at 04:10

## 2020-05-26 RX ADMIN — DEXMEDETOMIDINE HYDROCHLORIDE 1.4 MCG/KG/HR: 4 INJECTION, SOLUTION INTRAVENOUS at 19:46

## 2020-05-26 RX ADMIN — METRONIDAZOLE 500 MG: 500 INJECTION, SOLUTION INTRAVENOUS at 09:57

## 2020-05-26 RX ADMIN — Medication 150 MCG/HR: at 13:02

## 2020-05-26 RX ADMIN — CHLORHEXIDINE GLUCONATE 15 ML: 1.2 RINSE ORAL at 20:49

## 2020-05-26 RX ADMIN — DEXMEDETOMIDINE HYDROCHLORIDE 1.4 MCG/KG/HR: 4 INJECTION, SOLUTION INTRAVENOUS at 23:23

## 2020-05-26 RX ADMIN — ATORVASTATIN CALCIUM 20 MG: 20 TABLET, FILM COATED ORAL at 20:48

## 2020-05-26 RX ADMIN — Medication 125 MCG/HR: at 05:49

## 2020-05-26 RX ADMIN — PROPOFOL 120 MG: 10 INJECTION, EMULSION INTRAVENOUS at 23:19

## 2020-05-26 RX ADMIN — SODIUM CHLORIDE, PRESERVATIVE FREE 10 ML: 5 INJECTION INTRAVENOUS at 20:49

## 2020-05-26 RX ADMIN — PANTOPRAZOLE SODIUM 40 MG: 40 INJECTION, POWDER, FOR SOLUTION INTRAVENOUS at 05:50

## 2020-05-26 RX ADMIN — PROPOFOL 15 MCG/KG/MIN: 10 INJECTION, EMULSION INTRAVENOUS at 00:03

## 2020-05-26 RX ADMIN — Medication 200 MCG/HR: at 23:54

## 2020-05-26 RX ADMIN — ASPIRIN 81 MG 324 MG: 81 TABLET ORAL at 08:36

## 2020-05-26 RX ADMIN — LEVETIRACETAM 500 MG: 100 INJECTION, SOLUTION INTRAVENOUS at 12:33

## 2020-05-26 RX ADMIN — METRONIDAZOLE 500 MG: 500 INJECTION, SOLUTION INTRAVENOUS at 02:09

## 2020-05-26 RX ADMIN — LORAZEPAM 1 MG: 2 INJECTION INTRAMUSCULAR; INTRAVENOUS at 06:40

## 2020-05-26 RX ADMIN — MEROPENEM 500 MG: 500 INJECTION, POWDER, FOR SOLUTION INTRAVENOUS at 16:09

## 2020-05-26 RX ADMIN — CHLORHEXIDINE GLUCONATE 15 ML: 1.2 RINSE ORAL at 08:46

## 2020-05-26 RX ADMIN — Medication 200 MCG/HR: at 21:17

## 2020-05-26 RX ADMIN — ACETAMINOPHEN 650 MG: 325 TABLET, FILM COATED ORAL at 15:07

## 2020-05-26 RX ADMIN — ALBUTEROL SULFATE 2.5 MG: 2.5 SOLUTION RESPIRATORY (INHALATION) at 11:46

## 2020-05-26 RX ADMIN — METOPROLOL TARTRATE 50 MG: 50 TABLET, FILM COATED ORAL at 08:36

## 2020-05-26 RX ADMIN — PROPOFOL 15 MCG/KG/MIN: 10 INJECTION, EMULSION INTRAVENOUS at 23:20

## 2020-05-26 RX ADMIN — FENTANYL CITRATE 25 MCG: 50 INJECTION INTRAMUSCULAR; INTRAVENOUS at 08:32

## 2020-05-26 RX ADMIN — FENTANYL CITRATE 25 MCG: 50 INJECTION INTRAMUSCULAR; INTRAVENOUS at 15:50

## 2020-05-26 RX ADMIN — ALBUTEROL SULFATE 2.5 MG: 2.5 SOLUTION RESPIRATORY (INHALATION) at 08:01

## 2020-05-26 RX ADMIN — CASTOR OIL AND BALSAM, PERU: 788; 87 OINTMENT TOPICAL at 09:03

## 2020-05-26 RX ADMIN — DEXMEDETOMIDINE HYDROCHLORIDE 1.2 MCG/KG/HR: 4 INJECTION, SOLUTION INTRAVENOUS at 08:34

## 2020-05-26 RX ADMIN — FENTANYL CITRATE 25 MCG: 50 INJECTION INTRAMUSCULAR; INTRAVENOUS at 23:04

## 2020-05-26 RX ADMIN — POTASSIUM BICARBONATE 10 MEQ: 782 TABLET, EFFERVESCENT ORAL at 08:37

## 2020-05-26 RX ADMIN — ALBUTEROL SULFATE 2.5 MG: 2.5 SOLUTION RESPIRATORY (INHALATION) at 20:02

## 2020-05-26 RX ADMIN — HEPARIN SODIUM 5000 UNITS: 5000 INJECTION INTRAVENOUS; SUBCUTANEOUS at 13:03

## 2020-05-26 RX ADMIN — DEXMEDETOMIDINE HYDROCHLORIDE 1.2 MCG/KG/HR: 4 INJECTION, SOLUTION INTRAVENOUS at 12:25

## 2020-05-26 RX ADMIN — HEPARIN SODIUM 5000 UNITS: 5000 INJECTION INTRAVENOUS; SUBCUTANEOUS at 05:49

## 2020-05-26 RX ADMIN — METOPROLOL TARTRATE 50 MG: 50 TABLET, FILM COATED ORAL at 20:48

## 2020-05-26 RX ADMIN — Medication 125 MCG/HR: at 01:49

## 2020-05-26 RX ADMIN — Medication 200 MCG/HR: at 18:46

## 2020-05-26 RX ADMIN — LORAZEPAM 1 MG: 2 INJECTION INTRAMUSCULAR; INTRAVENOUS at 15:49

## 2020-05-26 RX ADMIN — LORAZEPAM 1 MG: 2 INJECTION INTRAMUSCULAR; INTRAVENOUS at 02:47

## 2020-05-26 RX ADMIN — DEXMEDETOMIDINE HYDROCHLORIDE 1.2 MCG/KG/HR: 4 INJECTION, SOLUTION INTRAVENOUS at 00:37

## 2020-05-26 RX ADMIN — LEVETIRACETAM 500 MG: 100 INJECTION, SOLUTION INTRAVENOUS at 01:49

## 2020-05-26 RX ADMIN — MINERAL SUPPLEMENT IRON 300 MG / 5 ML STRENGTH LIQUID 100 PER BOX UNFLAVORED 300 MG: at 20:48

## 2020-05-26 RX ADMIN — Medication 10 ML: at 05:50

## 2020-05-26 RX ADMIN — INSULIN LISPRO 3 UNITS: 100 INJECTION, SOLUTION INTRAVENOUS; SUBCUTANEOUS at 16:07

## 2020-05-26 RX ADMIN — FENTANYL CITRATE 25 MCG: 50 INJECTION INTRAMUSCULAR; INTRAVENOUS at 10:16

## 2020-05-26 RX ADMIN — SODIUM CHLORIDE, PRESERVATIVE FREE 10 ML: 5 INJECTION INTRAVENOUS at 20:48

## 2020-05-26 RX ADMIN — MEROPENEM 500 MG: 500 INJECTION, POWDER, FOR SOLUTION INTRAVENOUS at 21:48

## 2020-05-26 RX ADMIN — MEROPENEM 500 MG: 500 INJECTION, POWDER, FOR SOLUTION INTRAVENOUS at 04:09

## 2020-05-26 RX ADMIN — LORAZEPAM 1 MG: 2 INJECTION INTRAMUSCULAR; INTRAVENOUS at 19:51

## 2020-05-26 ASSESSMENT — PAIN SCALES - GENERAL
PAINLEVEL_OUTOF10: 8
PAINLEVEL_OUTOF10: 5
PAINLEVEL_OUTOF10: 6
PAINLEVEL_OUTOF10: 4
PAINLEVEL_OUTOF10: 4
PAINLEVEL_OUTOF10: 0

## 2020-05-26 ASSESSMENT — PULMONARY FUNCTION TESTS
PIF_VALUE: 20
PIF_VALUE: 20
PIF_VALUE: 16
PIF_VALUE: 22
PIF_VALUE: 21
PIF_VALUE: 18
PIF_VALUE: 19
PIF_VALUE: 24
PIF_VALUE: 19
PIF_VALUE: 19
PIF_VALUE: 21
PIF_VALUE: 20
PIF_VALUE: 14
PIF_VALUE: 19
PIF_VALUE: 18
PIF_VALUE: 21
PIF_VALUE: 20
PIF_VALUE: 19
PIF_VALUE: 19
PIF_VALUE: 18
PIF_VALUE: 25
PIF_VALUE: 19
PIF_VALUE: 22
PIF_VALUE: 20
PIF_VALUE: 29
PIF_VALUE: 21
PIF_VALUE: 22
PIF_VALUE: 20
PIF_VALUE: 26
PIF_VALUE: 18

## 2020-05-26 NOTE — PROGRESS NOTES
feeding changed; protein modular administered per orders. 25 mcg fentanyl administered  @1307- precedex increased to 1.4 mcg/kg/hr for pt fighting ventilator. @0319- NP Natali Avilez with neurology at bedside to see pt. No new findings regarding assessment. Still not following commands, not tracking with eyes. No updates or change in plan of care for neurology  @1500- pt rectal temp 101.1; cooling blanket turned back on. Tylenol 650 mg administered via NGT. Dr. Chester Rodriguez with ID at bedside. No changes to plan of care  @9939- pt became extremely agitated when staff repositioned pt. Propofol increased to 15 mcg/kg/min; Fentanyl increased to 200 mcg/hr. Prn dose Ativan 1 mg and 25 mcg fentanyl administered per orders. Pt fighting against restraints again, attempting pull and push himself into a different position. Fighting against ventilator. BP increased to systolic of 081H. @2904- pt starting to calm down and refrain from fighting the vent/restraints. @2903- handoff report given to [facility].

## 2020-05-26 NOTE — PROGRESS NOTES
SVG-D1, SVG-OM, SVG-PDA)    CORONARY ARTERY BYPASS GRAFT N/A 2020    CABG CORONARY ARTERY BYPASS GRAFT X4,  TRANSESOPHAGEAL ECHOCARDIOGRAM, TOTAL CARDIOPULMONARY BYPASS performed by Tanja Miguel MD at [address]     17 yo - pt doesn't know why       Current Medications:    Outpatient Medications Marked as Taking for the 20 encounter [facility] Encounter)   Medication Sig Dispense Refill    clopidogrel (PLAVIX) 75 MG tablet Take 75 mg by mouth daily      lisinopril (PRINIVIL;ZESTRIL) 40 MG tablet Take 40 mg by mouth daily      amLODIPine (NORVASC) 5 MG tablet Take 5 mg by mouth daily      albuterol sulfate HFA (VENTOLIN HFA) 108 (90 Base) MCG/ACT inhaler Inhale 2 puffs into the lungs every 6 hours as needed for Wheezing         Allergies:  Patient has no known allergies. Immunizations : There is no immunization history on file for this patient. Social History:    Social History     Tobacco Use    Smoking status: Former Smoker     Types: Cigarettes     Last attempt to quit: 2019     Years since quittin.5    Smokeless tobacco: Never Used    Tobacco comment: started age 27   Substance Use Topics    Alcohol use: Yes     Frequency: Monthly or less     Comment: once a month, shot once a week. depends on mood.  Drug use: Never     Social History     Tobacco Use   Smoking Status Former Smoker    Types: Cigarettes    Last attempt to quit: 2019    Years since quittin.5   Smokeless Tobacco Never Used   Tobacco Comment    started age 27      Family History   Problem Relation Age of Onset    Heart Disease Mother          of MI age 36    Heart Disease Brother         MI mid 46s    Heart Disease Father          of MI age 68    Heart Disease Paternal Aunt         MI in her 46s         REVIEW OF SYSTEMS:    No fever / chills / sweats. No weight loss. No visual change, eye pain, eye discharge. No oral lesion, sore throat, dysphagia.   Denies [[id number]] Collected: 05/12/20 0820   Order Status: Completed Specimen: Throat from Endotracheal Updated: 05/14/20 1011    CULTURE, RESPIRATORY Normal respiratory gladys    Gram Stain Result 3+ WBC's (Polymorphonuclear)   1+ Epithelial Cells   No organisms seen    Narrative:     ORDER#: [id number]                          ORDERED BY: Radha Morales  SOURCE: Endotracheal                       COLLECTED:  05/12/20 08:20  ANTIBIOTICS AT NITISH. :                      RECEIVED :  05/12/20 08:30  Performed at:  [facility]  [address] [facility] [employer] [address]   Phone [phone]   Culture, Blood 1 [[id number]] Collected: 05/12/20 0848   Order Status: Completed Specimen: Blood Updated: 05/13/20 1015    Blood Culture, Routine No Growth to date.  Any change in status will be called. Narrative:     ORDER#: [id number]                          ORDERED BY: Radha Morales  SOURCE: Blood                              COLLECTED:  05/12/20 08:48  ANTIBIOTICS AT NITISH. :                      RECEIVED :  05/12/20 09:05  If child <=2 yrs old please draw pediatric bottle. ~Blood Culture #1  Performed at:  [facility]  [address] [facility] [employer] [address]   Phone [phone]   Culture, Blood 2 [[id number]] Collected: 05/12/20 0854   Order Status: Completed Specimen: Blood Updated: 05/13/20 1015    Culture, Blood 2 No Growth to date.  Any change in status will be called. Narrative:     ORDER#: [id number]                          ORDERED BY: Radha Morales  SOURCE: Blood                              COLLECTED:  05/12/20 08:54  ANTIBIOTICS AT NITISH. :                      RECEIVED :  05/12/20 09:06  If child <=2 yrs old please draw pediatric bottle. ~Blood Culture #2  Performed at:  [facility]  [address], [employer] [address]   Phone [phone]   Culture, Urine [[id number]] Collected: 05/12/20 0830   Order Status: Completed Specimen: Urine, clean catch Updated: 05/13/20 0750    Urine Culture, Routine No growth at 18-36 hours   Narrative:     ORDER#: [id number]                          ORDERED BY: Radha Morales  SOURCE: Urine Clean Catch                  COLLECTED:  05/12/20 08:30  ANTIBIOTICS AT NITISH. :                      RECEIVED :  05/12/20 09:55  Performed at:  [facility]  [address] [facility] [employer] [address]   Phone [phone]   MRSA DNA Probe, Nasal [[id number]] Collected: 05/07/20 1312   Order Status: Completed Specimen: Nasal from Nares Updated: 05/08/20 0711    MRSA SCREEN RT-PCR --    Negative - MRSA DNA not detected. Normal Range: Not detected    Narrative:     ORDER#: [id number]                          ORDERED BY: Radha Morales  SOURCE: Nares                              COLLECTED:  05/07/20 13:12  ANTIBIOTICS AT NITISH. :                      RECEIVED :  05/07/20 13:18  Performed at:  [facility]  [address] [facility] [employer] [address]   Phone [phone]   Culture, Blood 1 [[id number]] Collected: 05/07/20 0743   Order Status: Canceled Specimen: Blood    Culture, Blood 2 [[id number]]    Order Status: Canceled Specimen: Blood        Date/Time       Culture, Respiratory [[id number]] Collected: 05/15/20 1045   Order Status: Completed Specimen: Sputum, Suctioned Updated: 05/17/20 0956    CULTURE, RESPIRATORY No growth 36-48 hours    Gram Stain Result 1+ WBC's (Polymorphonuclear)   1+ Epithelial Cells   1+ Gram positive cocci    Narrative:     ORDER#: [id number]                          ORDERED BY: Radha Morales  SOURCE: Sputum Suctioned                   COLLECTED:  05/15/20 10:45  ANTIBIOTICS AT NITISH. :                      RECEIVED :  05/15/20 10:52 /18/2020 11:49 AM - Jessica Concepcion Incoming Lab Results From Soft (Epic Adt)     Component Value Ref Range & Units Status Collected Lab   HIV Ag/Ab Non-Reactive  Non-reactive Final 05/16/2020 pulmonary vascular congestion with a small left pleural effusion and   associated left basilar atelectasis. 2. Tiny left apical pneumothorax. Attention on follow-up imaging is   recommended. 3. Suboptimal visualization of the orogastric tube. Abdominal radiograph is   recommended for further evaluation. VL DUP CAROTID BILATERAL   Final Result      VL PRE OP VEIN MAPPING   Final Result      XR CHEST PORTABLE   Final Result   Or bronchial wall thickening suggests inflammation, such as that seen with   asthma, bronchitis or smoking. Consolidative airspace disease. Rounded opacity in the right hilar region likely represents a vessel en face,   however cannot exclude a prominent lymph node or nodule.   Comparison imaging   would be helpful if available, otherwise recommend CT chest.               All the pertinent images and reports for the current Hospitalization were reviewed by me     Scheduled Meds:   furosemide  20 mg Intravenous BID    lidocaine 1 % injection  5 mL Intradermal Once    sodium chloride flush  10 mL Intravenous 2 times per day    meropenem  500 mg Intravenous Q6H    sodium chloride  20 mL Intravenous Once    metoprolol tartrate  50 mg Oral BID    levetiracetam  500 mg Intravenous Q12H    atorvastatin  20 mg Oral Nightly    metroNIDAZOLE  500 mg Intravenous Q8H    Venelex   Topical BID    lidocaine 1 % injection  5 mL Intradermal Once    ferrous sulfate  300 mg Oral BID    heparin (porcine)  5,000 Units Subcutaneous 3 times per day    sodium chloride flush  10 mL Intravenous 2 times per day    insulin lispro  0-18 Units Subcutaneous Q4H    aspirin  324 mg Oral Daily    potassium bicarb-citric acid  10 mEq Oral TID    pantoprazole  40 mg Intravenous Daily    And    sodium chloride (PF)  10 mL Intravenous Daily    chlorhexidine  15 mL Mouth/Throat BID    albuterol  2.5 mg Nebulization Q4H WA    sodium chloride (PF)  10 mL Intravenous Once       Continuous

## 2020-05-26 NOTE — PROGRESS NOTES
Progress Note    S/P cabgx4 5/8/20    Vital Signs:                                                 BP (!) 163/64   Pulse 91   Temp 98.3 °F (36.8 °C) (Rectal)   Resp 18   Ht 6' (1.829 m)   Wt 180 lb 2.2 oz (81.7 kg)   SpO2 97%   BMI 24.43 kg/m²  O2 Flow Rate (L/min): 60 L/min   NSR  Admission Weight: 188 lb (85.3 kg)      Vent Settings:  Vent Information  $Ventilation: $Subsequent Day  Skin Assessment: Clean, dry, & intact  Suction Catheter Diameter: 14  Equipment ID: [id number]  Equipment Changed: Humidification  Vent Type: [id number]  Vent Mode: AC/VC+  Vt Ordered: 450 mL  Rate Set: 18 bmp  Peak Flow: 0 L/min  Pressure Support: 0 cmH20  FiO2 : 50 %  SpO2: 97 %  SpO2/FiO2 ratio: 190  Sensitivity: 3  PEEP/CPAP: 5  I Time/ I Time %: 0.85 s  Humidification Source: Heated wire  Humidification Temp: 37  Humidification Temp Measured: 37.1  Circuit Condensation: Drained     Drips:  Propofol, precedex, fentanyl  TF at goal    I/O:      Intake/Output Summary (Last 24 hours) at 5/26/2020 1004  Last data filed at 5/26/2020 0957  Gross per 24 hour   Intake 4226.14 ml   Output 3735 ml   Net 491.14 ml     CV: reg, wound c/d/i  Pulm: decreased throughout, always tachypneic  Abd: soft, + BS  Ext: warm, no edema. Multiple pressure scabs bilat LE. Neuro: eyes open, bilat spont movt, nothing to command    Data Review:  CBC:   Recent Labs     05/24/20  0500 05/25/20  0405 05/26/20  0418   WBC 13.7* 7.1 6.2   HGB 10.0* 8.6* 8.8*   HCT 31.6* 26.8* 27.3*   MCV 85.8 86.1 85.2   * 513* 489*     BMP:   Recent Labs     05/24/20  0500 05/24/20  1207 05/25/20  0405 05/26/20  0418   *  --  144 146*   K 5.3* 4.5 4.1 4.2   *  --  112* 112*   CO2 22  --  23 25   BUN 37*  --  38* 31*   CREATININE 0.9  --  0.8 0.7*   CALCIUM 8.7  --  8.4 8.5   MG 2.20  --  2.10 2.10     Cardiac Enzymes: No results for input(s): CKTOTAL, CKMB, CKMBINDEX, TROPONINI in the last 72 hours.   PT/INR:   Recent Labs     05/24/20  0500

## 2020-05-26 NOTE — PROGRESS NOTES
encephalopathy.    Attention poor  Language kaylee due to encephalopathy. Comprehension not following commands consistently.    CN2: + corneal reflexes.    CN 3,4,6: no forced gaze deviation. Pupils equal, round, reactive.    CN7: face symmetric but exam limited by ET tube  CN8: hearing exam limited.    CN11: limited exam given encephalopathy  CN12: limited exam given encephalopathy  Strength: some movement and hand grasp on command w/ his RUE.  Otherwise, difficult to assess.  Less movement in LUE is noted. Sensory: slight withdraw to nailbed pressure in all four limbs. Cerebellar/coordination: kaylee given encephalopathy  Tone: no increased tone or rigidity.    Gait: limited exam given encephalopathy and intubated with ventilator. ROS - unable to obtain from patient at this time due to encephalopathy. Labs  Glucose 102  Na 146  K 4.2  BUN 31  Creatinine 0.7  Mg 2.10    WBC 6.2K  Hg 8.8  Platelets 759    Sed Rate > 120  SREE negative     Ammonia 51     Hep C Ab reactive  HIV non reactive  Blood cultures NG  Urine culture NG    COVID negative. Repeat pending.       Procalcitonin 0.92    Studies  EEG 5/21/20  Beta activity.    Background slowing and disorganization.    No definite epileptiform activity.        MRI brain w/o 5/20/20  Motion degraded. Focal area of restricted diffusion involving the posterior right frontal lobe, near the right precentral gyrus and corona radiata, most compatible with an acute/early subacute infarct.  However, there is a focal area of rounded low T2 signal noted centrally in this lesion (series 6, image 19), raising the possibility of an underlying abscess.        Carotid US 5/7/20  High end 50-79% stenosis FAITH. LICA 81-71% stenosis. TTE 5/7/20   EF 60%   Normal L atrium. Impression  1.   Metabolic encephalopathy/delirium s/p CABG, prolonged.  Hx of ETOH abuse.  Had been febrile.  Remains intubated, sedated.       MRI brain w/ R frontal lobe diffusion restriction, possibly an acute/subacute infarct.  Radiology did suggest the possibility of underlying abscess. No abnormal enhancement was noted on contrasted study, though both the repeat brain imaging and vascular studies were very poor and essentially non-diagnostic.       EEG w/ slowing and beta activity, non-specific.  Had some reported posturing by RN staff this weekend, though not evident since. Recommendations  1. Continue Keppra. 2.  Antiplatelet, statin for stroke prevention. Would recommend vascular surgery evaluation when appropriate regarding FAITH stenosis. 3.  If his encephalopathy fails to improve, CSF is a reasonable consideration. 4.  It appears his agitation is ventilator related. If it become evident that his exam is fluctuating or has any other spells concerning for seizure, consider cvEEG. 5.  ID following. 6.  Plan is for trach/PEG tomorrow. Will follow from periphery. Please call back w/ any questions or concerns. Thank you.       Melina Schmitt NP  [address] [place] [address] [address] Neurology    A copy of this note was provided for Dr Zhanna Nieto MD

## 2020-05-26 NOTE — DISCHARGE INSTR - COC
10:45 AM   Wound Width (cm) 7.5 cm 5/19/2020 10:45 AM   Wound Depth (cm) 0 cm 5/19/2020 10:45 AM   Wound Surface Area (cm^2) 48.75 cm^2 5/19/2020 10:45 AM   Change in Wound Size % (l*w) -21.88 5/19/2020 10:45 AM   Wound Volume (cm^3) 0 cm^3 5/19/2020 10:45 AM   Wound Assessment Dry; Intact; Clean;Red 5/26/2020 12:00 PM   Drainage Amount None 5/26/2020 12:00 PM   Odor None 5/26/2020  4:00 AM   Margins Attached edges 5/26/2020 12:00 PM   Becky-wound Assessment Clean;Dry; Intact; Black 5/26/2020 12:00 PM   Purple%Wound Bed 100 5/23/2020  8:00 AM   Culture Taken No 5/24/2020  8:00 PM   Number of days: 13       Wound 05/13/20 Heel Right (Active)   Wound Image   5/19/2020 10:45 AM   Wound Deep tissue/Injury 5/26/2020  8:00 AM   Offloading for Diabetic Foot Ulcers Offloading boot 5/26/2020  8:00 AM   Dressing Status Other (Comment) 5/26/2020 12:00 PM   Dressing/Treatment Barrier film 5/26/2020  4:00 AM   Wound Length (cm) 5 cm 5/19/2020 10:45 AM   Wound Width (cm) 7.5 cm 5/19/2020 10:45 AM   Wound Depth (cm) 0 cm 5/19/2020 10:45 AM   Wound Surface Area (cm^2) 37.5 cm^2 5/19/2020 10:45 AM   Change in Wound Size % (l*w) -17.19 5/19/2020 10:45 AM   Wound Volume (cm^3) 0 cm^3 5/19/2020 10:45 AM   Wound Assessment Dry;Clean; Intact; Black 5/26/2020 12:00 PM   Drainage Amount None 5/26/2020 12:00 PM   Odor None 5/26/2020  4:00 AM   Margins Attached edges 5/26/2020 12:00 PM   Becky-wound Assessment Clean;Dry; Intact; Red 5/26/2020 12:00 PM   [place]%Wound Bed 75 5/19/2020  4:00 AM   Purple%Wound Bed 100 5/23/2020  8:00 AM   Culture Taken No 5/24/2020  8:00 PM   Number of days: 13       Wound 05/19/20 Sacrum DTI (Active)   Wound Image   5/19/2020 10:45 AM   Wound Deep tissue/Injury 5/25/2020  3:59 PM   Dressing Status Other (Comment) 5/26/2020  4:00 AM   Dressing/Treatment Pharmaceutical agent (see MAR) 5/26/2020  4:00 AM   Wound Cleansed Rinsed/Irrigated with saline 5/24/2020  8:00 PM   Wound Length (cm) 4 cm 5/19/2020 10:45 AM   Wound Width

## 2020-05-26 NOTE — CARE COORDINATION
LSW reviewed chart. Patient continues to be intubated and sedated. S/P CABGX4 on 5-8-2020. Social Work Team following for Blanca.   Martin Chiu [place]     Case Management   [phone]    5/26/2020  10:06 AM

## 2020-05-27 ENCOUNTER — ANESTHESIA EVENT (OUTPATIENT)
Dept: ENDOSCOPY | Age: 65
DRG: 003 | End: 2020-05-27
Payer: COMMERCIAL

## 2020-05-27 ENCOUNTER — ANESTHESIA (OUTPATIENT)
Dept: ENDOSCOPY | Age: 65
DRG: 003 | End: 2020-05-27
Payer: COMMERCIAL

## 2020-05-27 VITALS — TEMPERATURE: 96.8 F | OXYGEN SATURATION: 99 %

## 2020-05-27 LAB
ANION GAP SERPL CALCULATED.3IONS-SCNC: 8 MMOL/L (ref 3–16)
APTT: 30.4 SEC (ref 24.2–36.2)
BASE EXCESS ARTERIAL: 1.8 MMOL/L (ref -3–3)
BUN BLDV-MCNC: 32 MG/DL (ref 7–20)
C-REACTIVE PROTEIN: 60.9 MG/L (ref 0–5.1)
CALCIUM SERPL-MCNC: 8.4 MG/DL (ref 8.3–10.6)
CARBOXYHEMOGLOBIN ARTERIAL: 1.1 % (ref 0–1.5)
CHLORIDE BLD-SCNC: 110 MMOL/L (ref 99–110)
CO2: 25 MMOL/L (ref 21–32)
CREAT SERPL-MCNC: 0.6 MG/DL (ref 0.8–1.3)
CULTURE, RESPIRATORY: NORMAL
GFR AFRICAN AMERICAN: >60
GFR NON-AFRICAN AMERICAN: >60
GLUCOSE BLD-MCNC: 110 MG/DL (ref 70–99)
GLUCOSE BLD-MCNC: 113 MG/DL (ref 70–99)
GLUCOSE BLD-MCNC: 113 MG/DL (ref 70–99)
GLUCOSE BLD-MCNC: 114 MG/DL (ref 70–99)
GLUCOSE BLD-MCNC: 115 MG/DL (ref 70–99)
GLUCOSE BLD-MCNC: 121 MG/DL (ref 70–99)
GLUCOSE BLD-MCNC: 95 MG/DL (ref 70–99)
GLUCOSE BLD-MCNC: 96 MG/DL (ref 70–99)
GRAM STAIN RESULT: NORMAL
HCO3 ARTERIAL: 26.6 MMOL/L (ref 21–29)
HCT VFR BLD CALC: 27.9 % (ref 40.5–52.5)
HEMOGLOBIN, ART, EXTENDED: 9.1 G/DL (ref 13.5–17.5)
HEMOGLOBIN: 9 G/DL (ref 13.5–17.5)
INR BLD: 1.23 (ref 0.86–1.14)
MAGNESIUM: 2 MG/DL (ref 1.8–2.4)
MCH RBC QN AUTO: 27.5 PG (ref 26–34)
MCHC RBC AUTO-ENTMCNC: 32.3 G/DL (ref 31–36)
MCV RBC AUTO: 85.1 FL (ref 80–100)
METHEMOGLOBIN ARTERIAL: 0.8 %
O2 CONTENT ARTERIAL: 13 ML/DL
O2 SAT, ARTERIAL: 98.6 %
O2 THERAPY: ABNORMAL
PCO2 ARTERIAL: 41.6 MMHG (ref 35–45)
PDW BLD-RTO: 16.1 % (ref 12.4–15.4)
PERFORMED ON: ABNORMAL
PERFORMED ON: NORMAL
PERFORMED ON: NORMAL
PH ARTERIAL: 7.41 (ref 7.35–7.45)
PLATELET # BLD: 502 K/UL (ref 135–450)
PMV BLD AUTO: 7.3 FL (ref 5–10.5)
PO2 ARTERIAL: 95.4 MMHG (ref 75–108)
POTASSIUM SERPL-SCNC: 4 MMOL/L (ref 3.5–5.1)
PROCALCITONIN: 0.58 NG/ML (ref 0–0.15)
PROTHROMBIN TIME: 14.3 SEC (ref 10–13.2)
RBC # BLD: 3.28 M/UL (ref 4.2–5.9)
SEDIMENTATION RATE, ERYTHROCYTE: 105 MM/HR (ref 0–20)
SODIUM BLD-SCNC: 143 MMOL/L (ref 136–145)
TCO2 ARTERIAL: 27.9 MMOL/L
WBC # BLD: 7 K/UL (ref 4–11)

## 2020-05-27 PROCEDURE — 6360000002 HC RX W HCPCS: Performed by: THORACIC SURGERY (CARDIOTHORACIC VASCULAR SURGERY)

## 2020-05-27 PROCEDURE — 2580000003 HC RX 258: Performed by: INTERNAL MEDICINE

## 2020-05-27 PROCEDURE — 80048 BASIC METABOLIC PNL TOTAL CA: CPT

## 2020-05-27 PROCEDURE — 43246 EGD PLACE GASTROSTOMY TUBE: CPT | Performed by: THORACIC SURGERY (CARDIOTHORACIC VASCULAR SURGERY)

## 2020-05-27 PROCEDURE — 6360000002 HC RX W HCPCS: Performed by: PSYCHIATRY & NEUROLOGY

## 2020-05-27 PROCEDURE — 6360000002 HC RX W HCPCS: Performed by: NURSE PRACTITIONER

## 2020-05-27 PROCEDURE — 2709999900 HC NON-CHARGEABLE SUPPLY: Performed by: THORACIC SURGERY (CARDIOTHORACIC VASCULAR SURGERY)

## 2020-05-27 PROCEDURE — 0B113F4 BYPASS TRACHEA TO CUTANEOUS WITH TRACHEOSTOMY DEVICE, PERCUTANEOUS APPROACH: ICD-10-PCS | Performed by: THORACIC SURGERY (CARDIOTHORACIC VASCULAR SURGERY)

## 2020-05-27 PROCEDURE — 86140 C-REACTIVE PROTEIN: CPT

## 2020-05-27 PROCEDURE — 2580000003 HC RX 258: Performed by: NURSE PRACTITIONER

## 2020-05-27 PROCEDURE — 99232 SBSQ HOSP IP/OBS MODERATE 35: CPT | Performed by: INTERNAL MEDICINE

## 2020-05-27 PROCEDURE — 83735 ASSAY OF MAGNESIUM: CPT

## 2020-05-27 PROCEDURE — 82803 BLOOD GASES ANY COMBINATION: CPT

## 2020-05-27 PROCEDURE — 3700000001 HC ADD 15 MINUTES (ANESTHESIA): Performed by: THORACIC SURGERY (CARDIOTHORACIC VASCULAR SURGERY)

## 2020-05-27 PROCEDURE — 85652 RBC SED RATE AUTOMATED: CPT

## 2020-05-27 PROCEDURE — 3E0G76Z INTRODUCTION OF NUTRITIONAL SUBSTANCE INTO UPPER GI, VIA NATURAL OR ARTIFICIAL OPENING: ICD-10-PCS | Performed by: THORACIC SURGERY (CARDIOTHORACIC VASCULAR SURGERY)

## 2020-05-27 PROCEDURE — 2720000010 HC SURG SUPPLY STERILE: Performed by: THORACIC SURGERY (CARDIOTHORACIC VASCULAR SURGERY)

## 2020-05-27 PROCEDURE — 2580000003 HC RX 258: Performed by: PSYCHIATRY & NEUROLOGY

## 2020-05-27 PROCEDURE — 85610 PROTHROMBIN TIME: CPT

## 2020-05-27 PROCEDURE — 99024 POSTOP FOLLOW-UP VISIT: CPT | Performed by: THORACIC SURGERY (CARDIOTHORACIC VASCULAR SURGERY)

## 2020-05-27 PROCEDURE — C9113 INJ PANTOPRAZOLE SODIUM, VIA: HCPCS | Performed by: THORACIC SURGERY (CARDIOTHORACIC VASCULAR SURGERY)

## 2020-05-27 PROCEDURE — 2500000003 HC RX 250 WO HCPCS: Performed by: NURSE ANESTHETIST, CERTIFIED REGISTERED

## 2020-05-27 PROCEDURE — 85027 COMPLETE CBC AUTOMATED: CPT

## 2020-05-27 PROCEDURE — 94761 N-INVAS EAR/PLS OXIMETRY MLT: CPT

## 2020-05-27 PROCEDURE — 31600 PLANNED TRACHEOSTOMY: CPT | Performed by: THORACIC SURGERY (CARDIOTHORACIC VASCULAR SURGERY)

## 2020-05-27 PROCEDURE — 6360000002 HC RX W HCPCS: Performed by: INTERNAL MEDICINE

## 2020-05-27 PROCEDURE — 85730 THROMBOPLASTIN TIME PARTIAL: CPT

## 2020-05-27 PROCEDURE — 94640 AIRWAY INHALATION TREATMENT: CPT

## 2020-05-27 PROCEDURE — 6370000000 HC RX 637 (ALT 250 FOR IP): Performed by: THORACIC SURGERY (CARDIOTHORACIC VASCULAR SURGERY)

## 2020-05-27 PROCEDURE — 2700000000 HC OXYGEN THERAPY PER DAY

## 2020-05-27 PROCEDURE — 3700000000 HC ANESTHESIA ATTENDED CARE: Performed by: THORACIC SURGERY (CARDIOTHORACIC VASCULAR SURGERY)

## 2020-05-27 PROCEDURE — 0DH63UZ INSERTION OF FEEDING DEVICE INTO STOMACH, PERCUTANEOUS APPROACH: ICD-10-PCS | Performed by: THORACIC SURGERY (CARDIOTHORACIC VASCULAR SURGERY)

## 2020-05-27 PROCEDURE — 2580000003 HC RX 258: Performed by: THORACIC SURGERY (CARDIOTHORACIC VASCULAR SURGERY)

## 2020-05-27 PROCEDURE — 2500000003 HC RX 250 WO HCPCS: Performed by: THORACIC SURGERY (CARDIOTHORACIC VASCULAR SURGERY)

## 2020-05-27 PROCEDURE — 94003 VENT MGMT INPAT SUBQ DAY: CPT

## 2020-05-27 PROCEDURE — 2100000000 HC CCU R&B

## 2020-05-27 PROCEDURE — 31615 TRCHEOBRNCHSC EST TRACHS INC: CPT | Performed by: THORACIC SURGERY (CARDIOTHORACIC VASCULAR SURGERY)

## 2020-05-27 PROCEDURE — 84145 PROCALCITONIN (PCT): CPT

## 2020-05-27 PROCEDURE — 3609013300 HC EGD TUBE PLACEMENT: Performed by: THORACIC SURGERY (CARDIOTHORACIC VASCULAR SURGERY)

## 2020-05-27 PROCEDURE — 0BJ08ZZ INSPECTION OF TRACHEOBRONCHIAL TREE, VIA NATURAL OR ARTIFICIAL OPENING ENDOSCOPIC: ICD-10-PCS | Performed by: THORACIC SURGERY (CARDIOTHORACIC VASCULAR SURGERY)

## 2020-05-27 RX ORDER — PROPOFOL 10 MG/ML
10 INJECTION, EMULSION INTRAVENOUS
Status: DISCONTINUED | OUTPATIENT
Start: 2020-05-27 | End: 2020-05-28

## 2020-05-27 RX ORDER — KETOROLAC TROMETHAMINE 15 MG/ML
15 INJECTION, SOLUTION INTRAMUSCULAR; INTRAVENOUS EVERY 6 HOURS PRN
Status: DISCONTINUED | OUTPATIENT
Start: 2020-05-27 | End: 2020-05-28

## 2020-05-27 RX ORDER — ROCURONIUM BROMIDE 10 MG/ML
INJECTION, SOLUTION INTRAVENOUS PRN
Status: DISCONTINUED | OUTPATIENT
Start: 2020-05-27 | End: 2020-05-27 | Stop reason: SDUPTHER

## 2020-05-27 RX ADMIN — Medication 200 MCG/HR: at 16:09

## 2020-05-27 RX ADMIN — MEROPENEM 500 MG: 500 INJECTION, POWDER, FOR SOLUTION INTRAVENOUS at 10:50

## 2020-05-27 RX ADMIN — FENTANYL CITRATE 25 MCG: 50 INJECTION INTRAMUSCULAR; INTRAVENOUS at 12:30

## 2020-05-27 RX ADMIN — CHLORHEXIDINE GLUCONATE 15 ML: 1.2 RINSE ORAL at 08:34

## 2020-05-27 RX ADMIN — PANTOPRAZOLE SODIUM 40 MG: 40 INJECTION, POWDER, FOR SOLUTION INTRAVENOUS at 06:12

## 2020-05-27 RX ADMIN — KETOROLAC TROMETHAMINE 15 MG: 15 INJECTION, SOLUTION INTRAMUSCULAR; INTRAVENOUS at 23:34

## 2020-05-27 RX ADMIN — ALBUTEROL SULFATE 2.5 MG: 2.5 SOLUTION RESPIRATORY (INHALATION) at 19:29

## 2020-05-27 RX ADMIN — Medication 200 MCG/HR: at 10:49

## 2020-05-27 RX ADMIN — ALBUTEROL SULFATE 2.5 MG: 2.5 SOLUTION RESPIRATORY (INHALATION) at 11:52

## 2020-05-27 RX ADMIN — FENTANYL CITRATE 25 MCG: 50 INJECTION INTRAMUSCULAR; INTRAVENOUS at 20:05

## 2020-05-27 RX ADMIN — ALBUTEROL SULFATE 2.5 MG: 2.5 SOLUTION RESPIRATORY (INHALATION) at 07:40

## 2020-05-27 RX ADMIN — Medication 200 MCG/HR: at 02:21

## 2020-05-27 RX ADMIN — Medication 200 MCG/HR: at 23:44

## 2020-05-27 RX ADMIN — DEXMEDETOMIDINE HYDROCHLORIDE 1.4 MCG/KG/HR: 4 INJECTION, SOLUTION INTRAVENOUS at 12:57

## 2020-05-27 RX ADMIN — Medication 200 MCG/HR: at 21:08

## 2020-05-27 RX ADMIN — CHLORHEXIDINE GLUCONATE 15 ML: 1.2 RINSE ORAL at 20:26

## 2020-05-27 RX ADMIN — MEROPENEM 500 MG: 500 INJECTION, POWDER, FOR SOLUTION INTRAVENOUS at 04:11

## 2020-05-27 RX ADMIN — Medication 200 MCG/HR: at 13:36

## 2020-05-27 RX ADMIN — LEVETIRACETAM 500 MG: 100 INJECTION, SOLUTION INTRAVENOUS at 13:42

## 2020-05-27 RX ADMIN — LORAZEPAM 1 MG: 2 INJECTION INTRAMUSCULAR; INTRAVENOUS at 08:57

## 2020-05-27 RX ADMIN — PROPOFOL 120 MG: 10 INJECTION, EMULSION INTRAVENOUS at 11:48

## 2020-05-27 RX ADMIN — LORAZEPAM 1 MG: 2 INJECTION INTRAMUSCULAR; INTRAVENOUS at 12:57

## 2020-05-27 RX ADMIN — SODIUM CHLORIDE, PRESERVATIVE FREE 10 ML: 5 INJECTION INTRAVENOUS at 21:04

## 2020-05-27 RX ADMIN — FENTANYL CITRATE 25 MCG: 50 INJECTION INTRAMUSCULAR; INTRAVENOUS at 08:21

## 2020-05-27 RX ADMIN — FENTANYL CITRATE 25 MCG: 50 INJECTION INTRAMUSCULAR; INTRAVENOUS at 04:35

## 2020-05-27 RX ADMIN — MEROPENEM 500 MG: 500 INJECTION, POWDER, FOR SOLUTION INTRAVENOUS at 16:26

## 2020-05-27 RX ADMIN — DEXMEDETOMIDINE HYDROCHLORIDE 1.4 MCG/KG/HR: 4 INJECTION, SOLUTION INTRAVENOUS at 20:09

## 2020-05-27 RX ADMIN — ROCURONIUM BROMIDE 50 MG: 10 INJECTION INTRAVENOUS at 14:28

## 2020-05-27 RX ADMIN — PROPOFOL 15 MCG/KG/MIN: 10 INJECTION, EMULSION INTRAVENOUS at 21:56

## 2020-05-27 RX ADMIN — CEFAZOLIN SODIUM 2 G: 1 INJECTION, POWDER, FOR SOLUTION INTRAMUSCULAR; INTRAVENOUS at 14:07

## 2020-05-27 RX ADMIN — Medication 10 ML: at 06:13

## 2020-05-27 RX ADMIN — LORAZEPAM 1 MG: 2 INJECTION INTRAMUSCULAR; INTRAVENOUS at 20:15

## 2020-05-27 RX ADMIN — FENTANYL CITRATE 25 MCG: 50 INJECTION INTRAMUSCULAR; INTRAVENOUS at 22:45

## 2020-05-27 RX ADMIN — Medication 200 MCG/HR: at 04:50

## 2020-05-27 RX ADMIN — LORAZEPAM 1 MG: 2 INJECTION INTRAMUSCULAR; INTRAVENOUS at 23:30

## 2020-05-27 RX ADMIN — SODIUM CHLORIDE, PRESERVATIVE FREE 10 ML: 5 INJECTION INTRAVENOUS at 21:05

## 2020-05-27 RX ADMIN — CASTOR OIL AND BALSAM, PERU: 788; 87 OINTMENT TOPICAL at 10:54

## 2020-05-27 RX ADMIN — LEVETIRACETAM 500 MG: 100 INJECTION, SOLUTION INTRAVENOUS at 00:36

## 2020-05-27 RX ADMIN — DEXMEDETOMIDINE HYDROCHLORIDE 1.4 MCG/KG/HR: 4 INJECTION, SOLUTION INTRAVENOUS at 06:13

## 2020-05-27 RX ADMIN — DEXMEDETOMIDINE HYDROCHLORIDE 1.4 MCG/KG/HR: 4 INJECTION, SOLUTION INTRAVENOUS at 03:09

## 2020-05-27 RX ADMIN — ROCURONIUM BROMIDE 50 MG: 10 INJECTION INTRAVENOUS at 14:07

## 2020-05-27 RX ADMIN — LORAZEPAM 1 MG: 2 INJECTION INTRAMUSCULAR; INTRAVENOUS at 04:43

## 2020-05-27 RX ADMIN — ALBUTEROL SULFATE 2.5 MG: 2.5 SOLUTION RESPIRATORY (INHALATION) at 15:23

## 2020-05-27 RX ADMIN — FENTANYL CITRATE 25 MCG: 50 INJECTION INTRAMUSCULAR; INTRAVENOUS at 13:39

## 2020-05-27 RX ADMIN — Medication 200 MCG/HR: at 07:22

## 2020-05-27 RX ADMIN — CASTOR OIL AND BALSAM, PERU: 788; 87 OINTMENT TOPICAL at 21:04

## 2020-05-27 RX ADMIN — PROPOFOL 15 MCG/KG/MIN: 10 INJECTION, EMULSION INTRAVENOUS at 11:48

## 2020-05-27 RX ADMIN — MEROPENEM 500 MG: 500 INJECTION, POWDER, FOR SOLUTION INTRAVENOUS at 21:55

## 2020-05-27 RX ADMIN — PROPOFOL 120 MG: 10 INJECTION, EMULSION INTRAVENOUS at 21:56

## 2020-05-27 RX ADMIN — DEXMEDETOMIDINE HYDROCHLORIDE 1.4 MCG/KG/HR: 4 INJECTION, SOLUTION INTRAVENOUS at 16:26

## 2020-05-27 ASSESSMENT — PULMONARY FUNCTION TESTS
PIF_VALUE: 16
PIF_VALUE: 18
PIF_VALUE: 21
PIF_VALUE: 18
PIF_VALUE: 19
PIF_VALUE: 18
PIF_VALUE: 19
PIF_VALUE: 14
PIF_VALUE: 20
PIF_VALUE: 17
PIF_VALUE: 19
PIF_VALUE: 20
PIF_VALUE: 19
PIF_VALUE: 18
PIF_VALUE: 17
PIF_VALUE: 17
PIF_VALUE: 18
PIF_VALUE: 24
PIF_VALUE: 20
PIF_VALUE: 20
PIF_VALUE: 18
PIF_VALUE: 19
PIF_VALUE: 17
PIF_VALUE: 21
PIF_VALUE: 20
PIF_VALUE: 21

## 2020-05-27 ASSESSMENT — PAIN SCALES - GENERAL
PAINLEVEL_OUTOF10: 7
PAINLEVEL_OUTOF10: 0
PAINLEVEL_OUTOF10: 5
PAINLEVEL_OUTOF10: 5
PAINLEVEL_OUTOF10: 0
PAINLEVEL_OUTOF10: 2
PAINLEVEL_OUTOF10: 0
PAINLEVEL_OUTOF10: 5
PAINLEVEL_OUTOF10: 0
PAINLEVEL_OUTOF10: 1
PAINLEVEL_OUTOF10: 5
PAINLEVEL_OUTOF10: 9

## 2020-05-27 ASSESSMENT — ENCOUNTER SYMPTOMS: SHORTNESS OF BREATH: 0

## 2020-05-27 NOTE — PROGRESS NOTES
[[id number]] Collected: 05/12/20 0820   Order Status: Completed Specimen: Throat from Endotracheal Updated: 05/14/20 1011    CULTURE, RESPIRATORY Normal respiratory gladys    Gram Stain Result 3+ WBC's (Polymorphonuclear)   1+ Epithelial Cells   No organisms seen    Narrative:     ORDER#: [id number]                          ORDERED BY: Lianna Carbajal  SOURCE: Endotracheal                       COLLECTED:  05/12/20 08:20  ANTIBIOTICS AT NITISH. :                      RECEIVED :  05/12/20 08:30  Performed at:  [address] [employer] [address]   Phone [phone]   Culture, Blood 1 [[id number]] Collected: 05/12/20 0848   Order Status: Completed Specimen: Blood Updated: 05/13/20 1015    Blood Culture, Routine No Growth to date.  Any change in status will be called. Narrative:     ORDER#: [id number]                          ORDERED BY: Lianna Carbajal  SOURCE: Blood                              COLLECTED:  05/12/20 08:48  ANTIBIOTICS AT NITISH. :                      RECEIVED :  05/12/20 09:05  If child <=2 yrs old please draw pediatric bottle. ~Blood Culture #1  Performed at:  [place] [address] [employer] [address]   Phone [phone]   Culture, Blood 2 [[id number]] Collected: 05/12/20 0854   Order Status: Completed Specimen: Blood Updated: 05/13/20 1015    Culture, Blood 2 No Growth to date.  Any change in status will be called. Narrative:     ORDER#: [id number]                          ORDERED BY: Lianna Carbajal  SOURCE: Blood                              COLLECTED:  05/12/20 08:54  ANTIBIOTICS AT NITISH. :                      RECEIVED :  05/12/20 09:06  If child <=2 yrs old please draw pediatric bottle. ~Blood Culture #2  Performed at:  [facility]  [address] [employer] [address]   Phone [phone]   Culture, Urine [[id number]] Collected: 05/12/20 0830   Order Status: low T2 signal noted centrally in this lesion (series 6, image 19),   raising the possibility of an underlying abscess. Postcontrast imaging may   be of benefit for further evaluation. 2. Mild chronic microvascular white matter ischemic disease. XR CHEST PORTABLE   Final Result   1. Left upper extremity PICC terminates over the SVC. Additional support   hardware, as detailed above. 2. Persistent findings of pulmonary interstitial edema and small left pleural   effusion. 3. Unchanged left basilar pulmonary opacity may represent atelectasis,   pneumonia, and/or aspiration. RECOMMENDATION:   Continued radiographic follow-up. XR CHEST PORTABLE   Final Result   Stable chest.         XR CHEST PORTABLE   Final Result   Interval removal of chest drains. Otherwise supportive tubing is stable. No pneumothorax. Increased left basilar opacity, likely combination of atelectasis and pleural   effusion. Vascular congestion. VL Extremity Venous Bilateral   Final Result      XR CHEST PORTABLE   Final Result   Lemont-Lencho catheter has been removed. Enteric feeding tube has been added. Supportive tubing is otherwise stable. Left perihilar/basilar atelectasis. Pneumonia is a differential possibility. CT HEAD WO CONTRAST   Final Result   No hemorrhage or mass      Mild periventricular white matter disease, likely due to small-vessel   ischemic change      Mild paranasal sinus disease         XR CHEST PORTABLE   Final Result   Dependent left lower lobe opacification and effusion. Mild right perihilar   opacification. No pneumothorax. Satisfactory position of endotracheal tube. Enteric catheter tip in the gastric body. XR CHEST PORTABLE   Final Result   Relatively stable appearance of the chest.  Perhaps slight increasing   ground-glass opacification centrally. No residual pneumothorax detected. XR CHEST PORTABLE   Final Result   1.  Mild

## 2020-05-27 NOTE — PROGRESS NOTES
Progress Note    S/P cabgx4 5/8/20  covid neg 5/7, 5/26    Vital Signs:                                                 BP (!) 163/64   Pulse 68   Temp 99.1 °F (37.3 °C) (Rectal)   Resp 18   Ht 6' (1.829 m)   Wt 181 lb 12.8 oz (82.5 kg)   SpO2 92%   BMI 24.66 kg/m²  O2 Flow Rate (L/min): 60 L/min   NSR  Admission Weight: 188 lb (85.3 kg)      Vent Settings:  Vent Information  $Ventilation: $Subsequent Day  Skin Assessment: Clean, dry, & intact  Suction Catheter Diameter: 14  Equipment ID: [id number]  Equipment Changed: Humidification  Vent Type: [id number]  Vent Mode: AC/VC+  Vt Ordered: 450 mL  Rate Set: 18 bmp  Peak Flow: 0 L/min  Pressure Support: 0 cmH20  FiO2 : 40 %  SpO2: 92 %  SpO2/FiO2 ratio: 230  Sensitivity: 3  PEEP/CPAP: 5  I Time/ I Time %: 0.85 s  Humidification Source: Heated wire  Humidification Temp: 37  Humidification Temp Measured: 36.9  Circuit Condensation: Drained     Drips:  Propofol, precedex, fentanyl  TF at goal - on hold since mn for peg today    I/O:      Intake/Output Summary (Last 24 hours) at 5/27/2020 0728  Last data filed at 5/27/2020 0600  Gross per 24 hour   Intake 3321.93 ml   Output 3165 ml   Net 156.93 ml     CV: reg, wound c/d/i  Pulm: decreased throughout, always tachypneic  Abd: soft, + BS  Ext: warm, no edema. Multiple pressure scabs bilat LE. Neuro: eyes closed, no spont movt. Sedated. Data Review:  CBC:   Recent Labs     05/25/20  0405 05/26/20  0418 05/27/20  0400   WBC 7.1 6.2 7.0   HGB 8.6* 8.8* 9.0*   HCT 26.8* 27.3* 27.9*   MCV 86.1 85.2 85.1   * 489* 502*     BMP:   Recent Labs     05/25/20  0405 05/26/20  0418 05/27/20  0400    146* 143   K 4.1 4.2 4.0   * 112* 110   CO2 23 25 25   BUN 38* 31* 32*   CREATININE 0.8 0.7* 0.6*   CALCIUM 8.4 8.5 8.4   MG 2.10 2.10 2.00     Cardiac Enzymes: No results for input(s): CKTOTAL, CKMB, CKMBINDEX, TROPONINI in the last 72 hours.   PT/INR:   Recent Labs     05/25/20  0405 05/26/20  474 05/27/20  0400   PROTIME 14.3* 14.5* 14.3*   INR 1.23* 1.25* 1.23*     APTT:   Recent Labs     05/25/20  0405 05/26/20  0418 05/27/20  0400   APTT 31.3 32.0 30.4       Assessment/Plan:  CV - stable in SR.   - BB  pulm - intubated. Mental status not appropriate for extubation. Trach today. ID - febrile 5/12, 5/13. cx 5/12 neg, 5/15 sput GPC. 5/22, 5/25 C diff neg. wbc nl. Empiric abx - merrem. - flagyl for possible brain abscess  GI - TF-hold for procedures. Optimize given low alb  Renal - Cr nl. back to preop weight. Stop diuretics. - retain Hoang for accurate I+O  Heme - acute blood loss anemia. Cont Fe.   - dvt prophylaxis scds, sc hep-hold for procedures. Neuro - EtOH withdrawal/delirium. Neurology - MRI brain 5/20 poss R frontal abscess/infarct. EEG 5/20 mild to mod enceph. MRA head/neck 5/21 limited by pt motion. keppra added 5/24. CT head 5/25 noted.        Frieda Kumar MD  5/27/2020  7:28 AM

## 2020-05-27 NOTE — ANESTHESIA PRE PROCEDURE
Department of Anesthesiology  Preprocedure Note       Name:  Leonor Miguel   Age:  60 y.o.  :  1955                                          MRN:  [id number]         Date:  2020      Surgeon: Ivone Osei):  Brionna Ramirez MD    Procedure: Procedure(s):  TRACHEOSTOMY, WITH PERCUTANEOUS ENDOSCOPIC GASTROSTOMY FEEDING TUBE PLACEMENT    Medications prior to admission:   Prior to Admission medications    Medication Sig Start Date End Date Taking?  Authorizing Provider   clopidogrel (PLAVIX) 75 MG tablet Take 75 mg by mouth daily   Yes Historical Provider, MD   lisinopril (PRINIVIL;ZESTRIL) 40 MG tablet Take 40 mg by mouth daily   Yes Historical Provider, MD   amLODIPine (NORVASC) 5 MG tablet Take 5 mg by mouth daily   Yes Historical Provider, MD   albuterol sulfate HFA (VENTOLIN HFA) 108 (90 Base) MCG/ACT inhaler Inhale 2 puffs into the lungs every 6 hours as needed for Wheezing   Yes Historical Provider, MD       Current medications:    Current Facility-Administered Medications   Medication Dose Route Frequency Provider Last Rate Last Dose    ceFAZolin (ANCEF) 2 g in sterile water 20 mL IV syringe  2 g Intravenous Once KATHY Escalona - CNP        LORazepam (ATIVAN) injection 1 mg  1 mg Intravenous Q4H PRN Christy Velarde MD   1 mg at 20 1257    lidocaine PF 1 % injection 5 mL  5 mL Intradermal Once Christy Velarde MD        sodium chloride flush 0.9 % injection 10 mL  10 mL Intravenous 2 times per day Christy Velarde MD   10 mL at 20 2048    sodium chloride flush 0.9 % injection 10 mL  10 mL Intravenous PRN Christy Velarde MD        meropenem (MERREM) 500 mg in sterile water 10 mL IV syringe  500 mg Intravenous Q6H Molly Willard MD   500 mg at 20 1050    0.9 % sodium chloride bolus  20 mL Intravenous Once Gabriele Samaniego MD        metoprolol tartrate (LOPRESSOR) tablet 50 mg  50 mg Oral BID Christy Velarde MD   Stopped at 20 0953    levETIRAcetam (KEPPRA) 500 mg injection 10 mL  10 mL Intravenous 2 times per day Giorgio Place, APRN - CNP   10 mL at 05/26/20 2049    sodium chloride flush 0.9 % injection 10 mL  10 mL Intravenous PRN Giorgio Place, APRN - CNP   10 mL at 05/22/20 0828    insulin lispro (HUMALOG) injection vial 0-18 Units  0-18 Units Subcutaneous Q4H Giorgio Place, APRN - CNP   3 Units at 05/26/20 1607    [Held by provider] aspirin chewable tablet 324 mg  324 mg Oral Daily Mattie Aguila MD   324 mg at 05/26/20 0836    potassium chloride 20 mEq/50 mL IVPB (Central Line)  20 mEq Intravenous PRN Giorgio Place, APRN - CNP   Stopped at 05/20/20 0745    magnesium sulfate 2 g in 50 mL IVPB premix  2 g Intravenous PRN Danika Coulter MD        calcium chloride 1 g in sodium chloride 0.9 % 100 mL IVPB  1 g Intravenous PRN Danika Coulter MD        calcium chloride 2 g in sodium chloride 0.9 % 100 mL IVPB  2 g Intravenous PRN Danika Coulter MD        acetaminophen (TYLENOL) tablet 650 mg  650 mg Oral Q4H PRN Danika Coulter MD   650 mg at 05/26/20 1507    oxyCODONE (ROXICODONE) immediate release tablet 5 mg  5 mg Oral Q4H PRN Danika Coulter MD   5 mg at 05/18/20 1753    Or    oxyCODONE HCl (OXY-IR) immediate release tablet 10 mg  10 mg Oral Q4H PRN Danika Coulter MD   10 mg at 05/22/20 1538    fentaNYL (SUBLIMAZE) injection 25 mcg  25 mcg Intravenous Q1H PRN Danika Coulter MD   25 mcg at 05/27/20 1230    ondansetron (ZOFRAN) injection 4 mg  4 mg Intravenous Q8H PRN Danika Coulter MD        pantoprazole (PROTONIX) injection 40 mg  40 mg Intravenous Daily Danika Coulter MD   40 mg at 05/27/20 0625    And    sodium chloride (PF) 0.9 % injection 10 mL  10 mL Intravenous Daily Danika Coulter MD   10 mL at 05/27/20 0613    chlorhexidine (PERIDEX) 0.12 % solution 15 mL  15 mL Mouth/Throat BID Danika Coulter MD   15 mL at 05/27/20 0834    hydrALAZINE (APRESOLINE) injection 5 mg  5 mg Intravenous Q5 Min PRN Danika Coulter MD   5 mg at 05/22/20 1907    albuterol (PROVENTIL) nebulizer solution 2.5 mg  2.5 mg Nebulization Q4H WA Missy Marino MD   2.5 mg at 05/27/20 1152    albuterol sulfate  (90 Base) MCG/ACT inhaler 2 puff  2 puff Inhalation Q6H PRN Jeffery Wolfe MD        metoclopramide (REGLAN) injection 5 mg  5 mg Intravenous Q6H PRN KATHY Gomes CNP        dexmedetomidine (PRECEDEX) 400 mcg in sodium chloride 0.9 % 100 mL infusion  0.4 mcg/kg/hr Intravenous Continuous Panchito Brown MD 28.7 mL/hr at 05/27/20 1257 1.4 mcg/kg/hr at 05/27/20 1257    propofol injection  10 mcg/kg/min Intravenous Titrated KATHY Gomes CNP 7.4 mL/hr at 05/27/20 1148 15 mcg/kg/min at 05/27/20 1148    albuterol (PROVENTIL) nebulizer solution 2.5 mg  2.5 mg Nebulization Q4H PRN Jeffery Wolfe MD        acetaminophen (TYLENOL) suppository 650 mg  650 mg Rectal Q6H PRN Gaby Gottlieb MD   650 mg at 05/10/20 2036    polyethylene glycol (GLYCOLAX) packet 17 g  17 g Oral Daily PRN Gaby Gottlieb MD        promethazine (PHENERGAN) tablet 12.5 mg  12.5 mg Oral Q6H PRN Gaby Gottlieb MD        magnesium hydroxide (MILK OF MAGNESIA) 400 MG/5ML suspension 30 mL  30 mL Oral Daily PRN Hue Funes MD        sodium chloride (PF) 0.9 % injection 10 mL  10 mL Intravenous Once KATHY Gomes CNP           Allergies:  No Known Allergies    Problem List:    Patient Active Problem List   Diagnosis Code    Chest pain R07.9    NSTEMI (non-ST elevated myocardial infarction) ([facility] [address] I21.4    Coronary artery disease involving native coronary artery of native heart with unstable angina pectoris ([facility] [address] I25.110    S/P CABG x 4 Z95.1       Past Medical History:        Diagnosis Date    Blood clot in vein 2018    right leg. no previous scans to know whether supf or dvt. no coumadin. put on plavix.  Bronchitis     doesn't remember when.  outpt abx    Carotid stenosis 05/07/2020    bilat 50-79% stenosis    Hepatitis C antibody positive 24.66 kg/m².     CBC:   Lab Results   Component Value Date    WBC 7.0 05/27/2020    RBC 3.28 05/27/2020    HGB 9.0 05/27/2020    HCT 27.9 05/27/2020    MCV 85.1 05/27/2020    RDW 16.1 05/27/2020     05/27/2020       CMP:   Lab Results   Component Value Date     05/27/2020    K 4.0 05/27/2020    K 4.5 05/24/2020     05/27/2020    CO2 25 05/27/2020    BUN 32 05/27/2020    CREATININE 0.6 05/27/2020    GFRAA >60 05/27/2020    AGRATIO 1.2 05/07/2020    LABGLOM >60 05/27/2020    GLUCOSE 113 05/27/2020    PROT 5.9 05/25/2020    CALCIUM 8.4 05/27/2020    BILITOT <0.2 05/25/2020    ALKPHOS 121 05/25/2020    AST 33 05/25/2020    ALT 55 05/25/2020       POC Tests:   Recent Labs     05/27/20  1140   POCGLU 115*       Coags:   Lab Results   Component Value Date    PROTIME 14.3 05/27/2020    INR 1.23 05/27/2020    APTT 30.4 05/27/2020       HCG (If Applicable): No results found for: PREGTESTUR, PREGSERUM, HCG, HCGQUANT     ABGs:   Lab Results   Component Value Date    PHART 7.414 05/27/2020    PO2ART 95.4 05/27/2020    EDK4YZX 41.6 05/27/2020    EBS5IJL 26.6 05/27/2020    BEART 1.8 05/27/2020    G5FRGLVK 98.6 05/27/2020        Type & Screen (If Applicable):  No results found for: LABABO, LABRH    Drug/Infectious Status (If Applicable):  No results found for: HIV, HEPCAB    COVID-19 Screening (If Applicable):   Lab Results   Component Value Date    COVID19 Not Detected 05/26/2020         Anesthesia Evaluation  Patient summary reviewed  Airway: Mallampati: III       Comment: Intubated   Dental:          Pulmonary:       (-) COPD, asthma and shortness of breath                          ROS comment: Unable to be weaned from ventilator   Cardiovascular:    (+) hypertension:, angina:, past MI: no interval change, CAD: no interval change, CABG/stent: no interval change,     (-) valvular problems/murmurs and dysrhythmias                Neuro/Psych:      (-) seizures, TIA and CVA            ROS comment: Patient unable to

## 2020-05-27 NOTE — PLAN OF CARE
Problem: Falls - Risk of:  Goal: Will remain free from falls  Description: Will remain free from falls  Outcome: Met This Shift  Goal: Absence of physical injury  Description: Absence of physical injury  Outcome: Met This Shift     Problem: Pain:  Goal: Pain level will decrease  Description: Pain level will decrease  Outcome: Ongoing  Goal: Control of acute pain  Description: Control of acute pain  Outcome: Ongoing     Problem: Cardiac:  Goal: Ability to maintain vital signs within normal range will improve  Description: Ability to maintain vital signs within normal range will improve  Outcome: Ongoing     Problem: Discharge Planning:  Goal: Discharged to appropriate level of care  Description: Discharged to appropriate level of care  Outcome: Ongoing     Problem: Fluid Volume - Imbalance:  Goal: Will show no signs and symptoms of excessive bleeding  Description: Will show no signs and symptoms of excessive bleeding  Outcome: Ongoing  Goal: Absence of imbalanced fluid volume signs and symptoms  Description: Absence of imbalanced fluid volume signs and symptoms  Outcome: Ongoing  Goal: Ability to achieve a balanced intake and output will improve  Description: Ability to achieve a balanced intake and output will improve  Outcome: Ongoing     Problem: Anxiety:  Goal: Level of anxiety will decrease  Description: Level of anxiety will decrease  Outcome: Ongoing     Problem: Cardiac Output - Decreased:  Goal: Hemodynamic stability will improve  Description: Hemodynamic stability will improve  Outcome: Ongoing     Problem: Tissue Perfusion - Cardiopulmonary, Altered:  Goal: Hemodynamic stability will improve  Description: Hemodynamic stability will improve  Outcome: Ongoing  Goal: Will show no evidence of cardiac arrhythmias  Description: Will show no evidence of cardiac arrhythmias  Outcome: Ongoing     Problem: Tissue Perfusion - Peripheral, Altered:  Goal: Circulatory function of lower extremities is within specified

## 2020-05-27 NOTE — PROGRESS NOTES
want to assume responsibility and make medical decisions, he will be the next person to do so. Informed him that social work would be in contact with him regarding follow-on care and LTAC/Rehab placement.   @1881- handoff report given to [address] [place]

## 2020-05-28 ENCOUNTER — APPOINTMENT (OUTPATIENT)
Dept: GENERAL RADIOLOGY | Age: 65
DRG: 003 | End: 2020-05-28
Payer: COMMERCIAL

## 2020-05-28 LAB
ANION GAP SERPL CALCULATED.3IONS-SCNC: 9 MMOL/L (ref 3–16)
APTT: 30.9 SEC (ref 24.2–36.2)
BASE EXCESS ARTERIAL: 0 MMOL/L (ref -3–3)
BILIRUBIN URINE: NEGATIVE
BLOOD, URINE: NEGATIVE
BUN BLDV-MCNC: 22 MG/DL (ref 7–20)
CALCIUM SERPL-MCNC: 8.6 MG/DL (ref 8.3–10.6)
CARBOXYHEMOGLOBIN ARTERIAL: 1.1 % (ref 0–1.5)
CHLORIDE BLD-SCNC: 110 MMOL/L (ref 99–110)
CLARITY: CLEAR
CO2: 25 MMOL/L (ref 21–32)
COLOR: YELLOW
CREAT SERPL-MCNC: 0.7 MG/DL (ref 0.8–1.3)
EPITHELIAL CELLS, UA: 1 /HPF (ref 0–5)
GFR AFRICAN AMERICAN: >60
GFR NON-AFRICAN AMERICAN: >60
GLUCOSE BLD-MCNC: 101 MG/DL (ref 70–99)
GLUCOSE BLD-MCNC: 104 MG/DL (ref 70–99)
GLUCOSE BLD-MCNC: 105 MG/DL (ref 70–99)
GLUCOSE BLD-MCNC: 107 MG/DL (ref 70–99)
GLUCOSE BLD-MCNC: 134 MG/DL (ref 70–99)
GLUCOSE BLD-MCNC: 97 MG/DL (ref 70–99)
GLUCOSE URINE: NEGATIVE MG/DL
HCO3 ARTERIAL: 25.2 MMOL/L (ref 21–29)
HCT VFR BLD CALC: 30.3 % (ref 40.5–52.5)
HEMOGLOBIN, ART, EXTENDED: 9.8 G/DL (ref 13.5–17.5)
HEMOGLOBIN: 9.8 G/DL (ref 13.5–17.5)
HYALINE CASTS: 2 /LPF (ref 0–8)
INR BLD: 1.24 (ref 0.86–1.14)
KETONES, URINE: 15 MG/DL
LEUKOCYTE ESTERASE, URINE: NEGATIVE
MAGNESIUM: 1.9 MG/DL (ref 1.8–2.4)
MCH RBC QN AUTO: 27.4 PG (ref 26–34)
MCHC RBC AUTO-ENTMCNC: 32.4 G/DL (ref 31–36)
MCV RBC AUTO: 84.7 FL (ref 80–100)
METHEMOGLOBIN ARTERIAL: 0.7 %
MICROSCOPIC EXAMINATION: YES
NITRITE, URINE: NEGATIVE
O2 CONTENT ARTERIAL: 14 ML/DL
O2 SAT, ARTERIAL: 99.3 %
O2 THERAPY: ABNORMAL
PCO2 ARTERIAL: 42.8 MMHG (ref 35–45)
PDW BLD-RTO: 16.1 % (ref 12.4–15.4)
PERFORMED ON: ABNORMAL
PERFORMED ON: NORMAL
PH ARTERIAL: 7.38 (ref 7.35–7.45)
PH UA: 5.5 (ref 5–8)
PLATELET # BLD: 519 K/UL (ref 135–450)
PMV BLD AUTO: 7.2 FL (ref 5–10.5)
PO2 ARTERIAL: 114 MMHG (ref 75–108)
POTASSIUM SERPL-SCNC: 4.4 MMOL/L (ref 3.5–5.1)
PROTEIN UA: ABNORMAL MG/DL
PROTHROMBIN TIME: 14.4 SEC (ref 10–13.2)
RBC # BLD: 3.58 M/UL (ref 4.2–5.9)
RBC UA: 10 /HPF (ref 0–4)
SODIUM BLD-SCNC: 144 MMOL/L (ref 136–145)
SPECIFIC GRAVITY UA: 1.02 (ref 1–1.03)
TCO2 ARTERIAL: 26.5 MMOL/L
URINE REFLEX TO CULTURE: ABNORMAL
URINE TYPE: ABNORMAL
UROBILINOGEN, URINE: 0.2 E.U./DL
WBC # BLD: 9.1 K/UL (ref 4–11)
WBC UA: 2 /HPF (ref 0–5)

## 2020-05-28 PROCEDURE — 2700000000 HC OXYGEN THERAPY PER DAY

## 2020-05-28 PROCEDURE — 2100000000 HC CCU R&B

## 2020-05-28 PROCEDURE — 2580000003 HC RX 258: Performed by: THORACIC SURGERY (CARDIOTHORACIC VASCULAR SURGERY)

## 2020-05-28 PROCEDURE — 82803 BLOOD GASES ANY COMBINATION: CPT

## 2020-05-28 PROCEDURE — 85027 COMPLETE CBC AUTOMATED: CPT

## 2020-05-28 PROCEDURE — 6370000000 HC RX 637 (ALT 250 FOR IP)

## 2020-05-28 PROCEDURE — 94003 VENT MGMT INPAT SUBQ DAY: CPT

## 2020-05-28 PROCEDURE — 2580000003 HC RX 258: Performed by: PSYCHIATRY & NEUROLOGY

## 2020-05-28 PROCEDURE — 85730 THROMBOPLASTIN TIME PARTIAL: CPT

## 2020-05-28 PROCEDURE — 6370000000 HC RX 637 (ALT 250 FOR IP): Performed by: NURSE PRACTITIONER

## 2020-05-28 PROCEDURE — 99024 POSTOP FOLLOW-UP VISIT: CPT | Performed by: THORACIC SURGERY (CARDIOTHORACIC VASCULAR SURGERY)

## 2020-05-28 PROCEDURE — 6360000002 HC RX W HCPCS: Performed by: THORACIC SURGERY (CARDIOTHORACIC VASCULAR SURGERY)

## 2020-05-28 PROCEDURE — 81001 URINALYSIS AUTO W/SCOPE: CPT

## 2020-05-28 PROCEDURE — 2500000003 HC RX 250 WO HCPCS: Performed by: NURSE PRACTITIONER

## 2020-05-28 PROCEDURE — 2500000003 HC RX 250 WO HCPCS: Performed by: THORACIC SURGERY (CARDIOTHORACIC VASCULAR SURGERY)

## 2020-05-28 PROCEDURE — 6360000002 HC RX W HCPCS: Performed by: NURSE PRACTITIONER

## 2020-05-28 PROCEDURE — 6360000002 HC RX W HCPCS: Performed by: INTERNAL MEDICINE

## 2020-05-28 PROCEDURE — 87040 BLOOD CULTURE FOR BACTERIA: CPT

## 2020-05-28 PROCEDURE — 71045 X-RAY EXAM CHEST 1 VIEW: CPT

## 2020-05-28 PROCEDURE — 80048 BASIC METABOLIC PNL TOTAL CA: CPT

## 2020-05-28 PROCEDURE — 36415 COLL VENOUS BLD VENIPUNCTURE: CPT

## 2020-05-28 PROCEDURE — 87070 CULTURE OTHR SPECIMN AEROBIC: CPT

## 2020-05-28 PROCEDURE — 94640 AIRWAY INHALATION TREATMENT: CPT

## 2020-05-28 PROCEDURE — 99233 SBSQ HOSP IP/OBS HIGH 50: CPT | Performed by: INTERNAL MEDICINE

## 2020-05-28 PROCEDURE — 87205 SMEAR GRAM STAIN: CPT

## 2020-05-28 PROCEDURE — 6370000000 HC RX 637 (ALT 250 FOR IP): Performed by: THORACIC SURGERY (CARDIOTHORACIC VASCULAR SURGERY)

## 2020-05-28 PROCEDURE — 2580000003 HC RX 258: Performed by: NURSE PRACTITIONER

## 2020-05-28 PROCEDURE — 94750 HC PULMONARY COMPLIANCE STUDY: CPT

## 2020-05-28 PROCEDURE — 94761 N-INVAS EAR/PLS OXIMETRY MLT: CPT

## 2020-05-28 PROCEDURE — C9113 INJ PANTOPRAZOLE SODIUM, VIA: HCPCS | Performed by: THORACIC SURGERY (CARDIOTHORACIC VASCULAR SURGERY)

## 2020-05-28 PROCEDURE — 85610 PROTHROMBIN TIME: CPT

## 2020-05-28 PROCEDURE — 6360000002 HC RX W HCPCS: Performed by: PSYCHIATRY & NEUROLOGY

## 2020-05-28 PROCEDURE — 83735 ASSAY OF MAGNESIUM: CPT

## 2020-05-28 PROCEDURE — 2580000003 HC RX 258: Performed by: INTERNAL MEDICINE

## 2020-05-28 RX ORDER — METOPROLOL TARTRATE 5 MG/5ML
5 INJECTION INTRAVENOUS ONCE
Status: COMPLETED | OUTPATIENT
Start: 2020-05-28 | End: 2020-05-28

## 2020-05-28 RX ORDER — HALOPERIDOL 5 MG/ML
2 INJECTION INTRAMUSCULAR
Status: DISCONTINUED | OUTPATIENT
Start: 2020-05-28 | End: 2020-05-29

## 2020-05-28 RX ORDER — HYDRALAZINE HYDROCHLORIDE 20 MG/ML
10 INJECTION INTRAMUSCULAR; INTRAVENOUS EVERY 4 HOURS PRN
Status: DISCONTINUED | OUTPATIENT
Start: 2020-05-28 | End: 2020-06-05 | Stop reason: HOSPADM

## 2020-05-28 RX ORDER — CHLORHEXIDINE GLUCONATE 0.12 MG/ML
RINSE ORAL
Status: COMPLETED
Start: 2020-05-28 | End: 2020-05-28

## 2020-05-28 RX ORDER — ASPIRIN 81 MG/1
324 TABLET, CHEWABLE ORAL DAILY
Status: DISCONTINUED | OUTPATIENT
Start: 2020-05-28 | End: 2020-06-05 | Stop reason: HOSPADM

## 2020-05-28 RX ADMIN — METOPROLOL TARTRATE 5 MG: 5 INJECTION INTRAVENOUS at 12:12

## 2020-05-28 RX ADMIN — METOPROLOL TARTRATE 5 MG: 5 INJECTION INTRAVENOUS at 08:36

## 2020-05-28 RX ADMIN — DEXMEDETOMIDINE HYDROCHLORIDE 1.4 MCG/KG/HR: 4 INJECTION, SOLUTION INTRAVENOUS at 20:41

## 2020-05-28 RX ADMIN — PANTOPRAZOLE SODIUM 40 MG: 40 INJECTION, POWDER, FOR SOLUTION INTRAVENOUS at 06:14

## 2020-05-28 RX ADMIN — DEXMEDETOMIDINE HYDROCHLORIDE 1.4 MCG/KG/HR: 4 INJECTION, SOLUTION INTRAVENOUS at 10:00

## 2020-05-28 RX ADMIN — LORAZEPAM 1 MG: 2 INJECTION INTRAMUSCULAR; INTRAVENOUS at 04:32

## 2020-05-28 RX ADMIN — Medication 150 MCG/HR: at 23:41

## 2020-05-28 RX ADMIN — ACETAMINOPHEN 650 MG: 325 TABLET, FILM COATED ORAL at 21:14

## 2020-05-28 RX ADMIN — MEROPENEM 500 MG: 500 INJECTION, POWDER, FOR SOLUTION INTRAVENOUS at 21:21

## 2020-05-28 RX ADMIN — Medication 150 MCG/HR: at 20:24

## 2020-05-28 RX ADMIN — ACETAMINOPHEN 650 MG: 325 TABLET, FILM COATED ORAL at 16:12

## 2020-05-28 RX ADMIN — HEPARIN SODIUM 5000 UNITS: 5000 INJECTION INTRAVENOUS; SUBCUTANEOUS at 14:05

## 2020-05-28 RX ADMIN — ALBUTEROL SULFATE 2.5 MG: 2.5 SOLUTION RESPIRATORY (INHALATION) at 19:39

## 2020-05-28 RX ADMIN — ATORVASTATIN CALCIUM 20 MG: 20 TABLET, FILM COATED ORAL at 20:22

## 2020-05-28 RX ADMIN — Medication 200 MCG/HR: at 07:15

## 2020-05-28 RX ADMIN — FENTANYL CITRATE 25 MCG: 50 INJECTION INTRAMUSCULAR; INTRAVENOUS at 04:42

## 2020-05-28 RX ADMIN — DEXMEDETOMIDINE HYDROCHLORIDE 1.4 MCG/KG/HR: 4 INJECTION, SOLUTION INTRAVENOUS at 03:24

## 2020-05-28 RX ADMIN — ALBUTEROL SULFATE 2.5 MG: 2.5 SOLUTION RESPIRATORY (INHALATION) at 12:27

## 2020-05-28 RX ADMIN — DEXMEDETOMIDINE HYDROCHLORIDE 1.4 MCG/KG/HR: 4 INJECTION, SOLUTION INTRAVENOUS at 13:40

## 2020-05-28 RX ADMIN — HYDRALAZINE HYDROCHLORIDE 10 MG: 20 INJECTION INTRAMUSCULAR; INTRAVENOUS at 11:10

## 2020-05-28 RX ADMIN — CASTOR OIL AND BALSAM, PERU: 788; 87 OINTMENT TOPICAL at 10:36

## 2020-05-28 RX ADMIN — CHLORHEXIDINE GLUCONATE 15 ML: 1.2 RINSE ORAL at 20:22

## 2020-05-28 RX ADMIN — MEROPENEM 500 MG: 500 INJECTION, POWDER, FOR SOLUTION INTRAVENOUS at 16:04

## 2020-05-28 RX ADMIN — DEXMEDETOMIDINE HYDROCHLORIDE 1.4 MCG/KG/HR: 4 INJECTION, SOLUTION INTRAVENOUS at 06:14

## 2020-05-28 RX ADMIN — CASTOR OIL AND BALSAM, PERU: 788; 87 OINTMENT TOPICAL at 20:23

## 2020-05-28 RX ADMIN — CHLORHEXIDINE GLUCONATE 15 ML: 1.2 RINSE ORAL at 10:35

## 2020-05-28 RX ADMIN — HYDRALAZINE HYDROCHLORIDE 10 MG: 20 INJECTION INTRAMUSCULAR; INTRAVENOUS at 15:25

## 2020-05-28 RX ADMIN — METOPROLOL TARTRATE 5 MG: 5 INJECTION INTRAVENOUS at 16:04

## 2020-05-28 RX ADMIN — LEVETIRACETAM 500 MG: 100 INJECTION, SOLUTION INTRAVENOUS at 00:45

## 2020-05-28 RX ADMIN — SODIUM CHLORIDE, PRESERVATIVE FREE 10 ML: 5 INJECTION INTRAVENOUS at 20:23

## 2020-05-28 RX ADMIN — FENTANYL CITRATE 25 MCG: 50 INJECTION INTRAMUSCULAR; INTRAVENOUS at 03:42

## 2020-05-28 RX ADMIN — MEROPENEM 500 MG: 500 INJECTION, POWDER, FOR SOLUTION INTRAVENOUS at 10:01

## 2020-05-28 RX ADMIN — HEPARIN SODIUM 5000 UNITS: 5000 INJECTION INTRAVENOUS; SUBCUTANEOUS at 21:21

## 2020-05-28 RX ADMIN — METOPROLOL TARTRATE 50 MG: 50 TABLET, FILM COATED ORAL at 20:22

## 2020-05-28 RX ADMIN — LORAZEPAM 1 MG: 2 INJECTION INTRAMUSCULAR; INTRAVENOUS at 16:04

## 2020-05-28 RX ADMIN — ALBUTEROL SULFATE 2.5 MG: 2.5 SOLUTION RESPIRATORY (INHALATION) at 07:53

## 2020-05-28 RX ADMIN — LEVETIRACETAM 500 MG: 100 INJECTION, SOLUTION INTRAVENOUS at 13:19

## 2020-05-28 RX ADMIN — PROPOFOL 15 MCG/KG/MIN: 10 INJECTION, EMULSION INTRAVENOUS at 04:31

## 2020-05-28 RX ADMIN — Medication 150 MCG/HR: at 13:39

## 2020-05-28 RX ADMIN — Medication 10 ML: at 06:14

## 2020-05-28 RX ADMIN — MINERAL SUPPLEMENT IRON 300 MG / 5 ML STRENGTH LIQUID 100 PER BOX UNFLAVORED 300 MG: at 20:46

## 2020-05-28 RX ADMIN — MEROPENEM 500 MG: 500 INJECTION, POWDER, FOR SOLUTION INTRAVENOUS at 04:30

## 2020-05-28 RX ADMIN — Medication 200 MCG/HR: at 02:13

## 2020-05-28 RX ADMIN — ALBUTEROL SULFATE 2.5 MG: 2.5 SOLUTION RESPIRATORY (INHALATION) at 15:39

## 2020-05-28 RX ADMIN — ASPIRIN 81 MG 324 MG: 81 TABLET ORAL at 16:04

## 2020-05-28 RX ADMIN — Medication 150 MCG/HR: at 10:19

## 2020-05-28 RX ADMIN — HYDRALAZINE HYDROCHLORIDE 10 MG: 20 INJECTION INTRAMUSCULAR; INTRAVENOUS at 08:27

## 2020-05-28 RX ADMIN — Medication 200 MCG/HR: at 04:44

## 2020-05-28 ASSESSMENT — PULMONARY FUNCTION TESTS
PIF_VALUE: 13
PIF_VALUE: 15
PIF_VALUE: 15
PIF_VALUE: 16
PIF_VALUE: 17
PIF_VALUE: 16
PIF_VALUE: 15
PIF_VALUE: 19
PIF_VALUE: 16
PIF_VALUE: 14
PIF_VALUE: 13
PIF_VALUE: 17
PIF_VALUE: 18
PIF_VALUE: 22
PIF_VALUE: 13
PIF_VALUE: 14
PIF_VALUE: 18
PIF_VALUE: 15
PIF_VALUE: 20
PIF_VALUE: 16
PIF_VALUE: 13
PIF_VALUE: 20
PIF_VALUE: 30
PIF_VALUE: 24
PIF_VALUE: 16
PIF_VALUE: 14
PIF_VALUE: 17
PIF_VALUE: 14
PIF_VALUE: 16

## 2020-05-28 ASSESSMENT — PAIN SCALES - GENERAL
PAINLEVEL_OUTOF10: 5
PAINLEVEL_OUTOF10: 0
PAINLEVEL_OUTOF10: 0

## 2020-05-28 NOTE — PLAN OF CARE
Problem: Falls - Risk of:  Goal: Will remain free from falls  Description: Will remain free from falls  Outcome: Met This Shift     Problem: Falls - Risk of:  Goal: Absence of physical injury  Description: Absence of physical injury  Outcome: Met This Shift     Problem: Pain:  Goal: Pain level will decrease  Description: Pain level will decrease  Outcome: Ongoing  Goal: Control of acute pain  Description: Control of acute pain  Outcome: Ongoing     Problem: Cardiac:  Goal: Ability to maintain vital signs within normal range will improve  Description: Ability to maintain vital signs within normal range will improve  Outcome: Ongoing     Problem: Discharge Planning:  Goal: Discharged to appropriate level of care  Description: Discharged to appropriate level of care  Outcome: Ongoing     Problem: Anxiety:  Goal: Level of anxiety will decrease  Description: Level of anxiety will decrease  Outcome: Ongoing     Problem: Restraint Use - Nonviolent/Non-Self-Destructive Behavior:  Goal: Absence of restraint-related injury  Description: Absence of restraint-related injury  Outcome: Ongoing     Problem: Nutrition  Goal: Optimal nutrition therapy  Outcome: Ongoing

## 2020-05-28 NOTE — OP NOTE
[facility]           [address]                                OPERATIVE REPORT    PATIENT NAME: Berenice Acuña                      :        1955  MED REC NO:   [id number]                          ROOM:       [id number]  ACCOUNT NO:   [de-identified]                           ADMIT DATE: 2020  PROVIDER:     Monserrat Tony MD    DATE OF PROCEDURE:  2020    LOCATION:  [address] [place] [address] DIAGNOSIS:  Acute respiratory failure. POSTOPERATIVE DIAGNOSIS:  Acute respiratory failure. PROCEDURE PERFORMED:  1. Percutaneous tracheostomy tube placement. 2.  Bronchoscopy. 3.  Percutaneous gastrostomy feeding tube. 4.  EGD. STAFF SURGEON:  Monserrat Tony MD    FIRST ASSIST:  Dr. Giulia Rocha. ANESTHESIA:  General.    COMPLICATION:  None immediate. ESTIMATED BLOOD LOSS:  0.    PROCEDURE DESCRIPTION:  The patient was lying in the ICU bed. With the  presence of Anesthesia, muscle paralytic and sufficient sedation was  supplied. Vent was changed to assist-control with 100%. Bronchoscopy  was performed. Tube was pulled back all the way to the subglottal area. Needle was inserted. Wire was advanced and dilation was performed by  Rhino all the way to the black line under bronchoscopy. Next, trach  size #8 Shiley was inserted with no difficulty. Bronchoscopy was  performed through the trach. All blood was suctioned. Next, trach was  sutured into position, insufflated and ventilation with end tidal CO2  was seen. Next, bronchoscopy was _____. EGD was inserted through the  mouth, advanced all the way to the stomach, indentation was identified. Needle was inserted and wire was advanced, snared and pulled through the  mouth. Percutaneous gastrostomy feeding tube was pulled back all the  way to the stomach. Next, clear tear of the tube was seen. Stomach was  deflated.   Gastrostomy feeding tube was fixed at 2.5 cm from the skin,  fixed to the Hoang catheter bag. EGD was pulled back with no  complication.         Connor Forman MD    D: 05/27/2020 22:28:40       T: 05/28/2020 1:56:35     [email or web address]  Job#: [id number]     Doc#: [id number]    CC:

## 2020-05-28 NOTE — PROGRESS NOTES
0700: Handoff with Sarahy Gutierrez RN  [id number]: Dr. Elvira Garzon and Panfilo Granger NP rounding. Propofol decreased to 10mcg/kg/min by Panfilo Granger NP, fentanyl also decreased to 150mcg/hr. 0815: Pt starting to wake up more and becoming agitated in bed. Kicking feet around, tremors noted. 0830: PRN hydralazine given along with metoprolol. Can use peg later this afternoon for PO meds and to restart tube feeding. Until then no PO meds given. Orders to remove a-line. Nursing communication order placed if pt spikes temp of >101 to obtain cultures   0849: pt remains agitated in bed. Kicking legs up and down the bed. Unable to be redirected. Therapeutic communication used to try and calm down pt, no evidence worked. 1000: rectal temp up to 101.8. cultures ordered  1015: lab called and notified on blood cultures that need to be drawn. 1st blood culture labels sent down per phlebotomist request.   1020: attempt to obtain sputum culture. Unable to obtain any sputum. Will try again later. 1030: omalley catheter removed and replaced new one to obtain urine culture. Rectal temp noted to be 102. Cooling blanket restarted. 1045: Lab at bedside for blood cultures. 1050: Panfilo Granger NP decreased propofol to 5mcg/kg/min. 1100: , jarrod NP increased hydralazine orders to 10mg. Dose was given at this time. 1200: reassessment complete and noted in chart. Unchanged. Pt still get agitated when stimulated or feels presence in the room. Does occasionally get worked up when no one is in the room, but does remain calm. Sputum culture obtained. 1340: propofol stopped. 1600: reassessment complete. Does follow commands on right side. Not sure if its purposeful. Does not follow any other commands. Pt very agitated in bed, moving around. Applied cold wash cloth and pt moving head back and forth. PRN ativan given. One time 5mg of metoprolol given since previous dose of hydralazine did not work. ASA given along with tylenol for temp. Tube feedings started @ 25.

## 2020-05-28 NOTE — PROGRESS NOTES
Hepatic Function Panel:   Lab Results   Component Value Date    ALKPHOS 121 05/25/2020    ALT 55 05/25/2020    AST 33 05/25/2020    PROT 5.9 05/25/2020    BILITOT <0.2 05/25/2020    BILIDIR <0.2 05/25/2020    IBILI see below 05/25/2020    LABALBU 2.4 05/25/2020       UA:  Lab Results   Component Value Date    COLORU YELLOW 05/12/2020    CLARITYU CLOUDY 05/12/2020    GLUCOSEU Negative 05/12/2020    BILIRUBINUR Negative 05/12/2020    KETUA Negative 05/12/2020    SPECGRAV 1.021 05/12/2020    BLOODU LARGE 05/12/2020    PHUR 5.5 05/12/2020    PROTEINU 30 05/12/2020    UROBILINOGEN 0.2 05/12/2020    NITRU Negative 05/12/2020    LEUKOCYTESUR MODERATE 05/12/2020    LABMICR YES 05/12/2020    URINETYPE NotGiven 05/12/2020      Urine Microscopic:   Lab Results   Component Value Date    COMU see below 05/12/2020    HYALCAST 6 05/12/2020    WBCUA 14 05/12/2020    RBCUA 9 05/12/2020    EPIU 3 05/12/2020     Procal  0.58       CRP 60.9        Ref Range & Units 05/16/20 1500   HCV QNT by NAAT IU/ML IU/mL Not Detected    HCV Qnt by NAAT log IU/ml log IU/mL Not Detected    Interpretation Not Detected Not Detected    Comment: INTERPRETIVE INFORMATION: HCV by Quantitative NAAT        Hep C Ab Interp   Hepatitis Panel, Acute   Collected: 05/16/20 0440   Result status: Final   Resulting lab: [facility]   Reference range: Non-reactive   Value: REACTIVEAbnormal     Comment: REACTIVE Screen:   Confirmation with Hepatitis C RIBA not available. Depending on clinical   history, Hepatitis C Virus (HCV)by Quantitative NAAT ([id number]) should be   considered as an alternative to this test although it is not an equivalent.      If HCV by Quantitative NAAT is desired, please reorder       MICRO: cultures reviewed and updated by me          Date/Time       Culture, Respiratory [[id number]]    Order Status: No result Specimen: Sputum, Suctioned    Culture, Respiratory [[id number]] Collected: 05/12/20 0820   Order Status: will trend    Follow up CT brain noted -  Resp cx in process - C diff -ve noted    S/p Trach and on the vent and PEG tube placed on 5/27    New fevers today and pancultures sent will check CXR and if any concern for aspiration will add IV Linezolid    Labs, Microbiology, Radiology and all the pertinent results from current hospitalization and  care every where were reviewed  by me as a part of the evaluation   Plan:   1. Trend Procal   2. CRP  Trending down   3. Resp cx in process from 5/24  NGTD  4. Cont IV Meropenem x 500 mG X q 6 hrs will cover anaerobes and gram -ves  Given the concern that was raised on MRI of possible brain abscess   5. Unable to wean from the vent  S/p Trach    6. Hep C+ve ,SREE-ve But ESR elevated  7. HIV -ve   8. MRI BRAIN abnormal Rt Precentral gyrus and corona radiata  infarct    9. C diff -ve  10. High fever today and new cx sent   11. Check CXR if positive will add Linezolid IV       Discussed with patient/Family and Nursing staff   Risk of Complications/Morbidity: High      · Illness(es)/ Infection present that pose threat to bodily function. · There is potential for severe exacerbation of infection/side effects of treatment. Therapy requires intensive monitoring for antimicrobial agent toxicity. Thanks for allowing me to participate in your patient's care and please call me with any questions or concerns.     Dakota Silva MD  Infectious Disease  [facility]) Physician  Phone: [phone]   Fax : [phone]

## 2020-05-28 NOTE — BRIEF OP NOTE
Date: 5/27/2020  Patient:  Agnes Meraz        Brief Operative Note       Pre-op Diagnosis:  resp failure    Post-op Diagnosis:  same    Procedure:  Tracheostomy, PEG    Surgeon:  Nemesio Foster MD    Assistant(s):  Claudio Harry MD    Anesthesia:  iv sedation    EBL: <5cc     Specimens:  none    Findings: no abnormalities    Drains:  -    Drips:  Continued propofol, precedex, fentanyl    Complications:  none    Disposition:  stable    Post-Op Note:  Dictated.     Porter Guzman MD  5/28/2020  8:00 AM

## 2020-05-29 ENCOUNTER — APPOINTMENT (OUTPATIENT)
Dept: CT IMAGING | Age: 65
DRG: 003 | End: 2020-05-29
Payer: COMMERCIAL

## 2020-05-29 ENCOUNTER — APPOINTMENT (OUTPATIENT)
Dept: GENERAL RADIOLOGY | Age: 65
DRG: 003 | End: 2020-05-29
Payer: COMMERCIAL

## 2020-05-29 ENCOUNTER — APPOINTMENT (OUTPATIENT)
Dept: ULTRASOUND IMAGING | Age: 65
DRG: 003 | End: 2020-05-29
Payer: COMMERCIAL

## 2020-05-29 LAB
ALBUMIN FLUID: 1.3 G/DL
ANION GAP SERPL CALCULATED.3IONS-SCNC: 10 MMOL/L (ref 3–16)
APPEARANCE FLUID: NORMAL
APTT: 32.5 SEC (ref 24.2–36.2)
BASE EXCESS ARTERIAL: 1.2 MMOL/L (ref -3–3)
BUN BLDV-MCNC: 19 MG/DL (ref 7–20)
C-REACTIVE PROTEIN: 75.4 MG/L (ref 0–5.1)
CALCIUM SERPL-MCNC: 8.9 MG/DL (ref 8.3–10.6)
CARBOXYHEMOGLOBIN ARTERIAL: 1.2 % (ref 0–1.5)
CELL COUNT FLUID TYPE: NORMAL
CHLORIDE BLD-SCNC: 109 MMOL/L (ref 99–110)
CLOT EVALUATION: NORMAL
CO2: 25 MMOL/L (ref 21–32)
COLOR FLUID: NORMAL
CREAT SERPL-MCNC: 0.7 MG/DL (ref 0.8–1.3)
EOSINOPHIL FLUID: 14 %
FLUID TYPE: NORMAL
GFR AFRICAN AMERICAN: >60
GFR NON-AFRICAN AMERICAN: >60
GLUCOSE BLD-MCNC: 100 MG/DL (ref 70–99)
GLUCOSE BLD-MCNC: 101 MG/DL (ref 70–99)
GLUCOSE BLD-MCNC: 108 MG/DL (ref 70–99)
GLUCOSE BLD-MCNC: 112 MG/DL (ref 70–99)
GLUCOSE BLD-MCNC: 125 MG/DL (ref 70–99)
GLUCOSE BLD-MCNC: 128 MG/DL (ref 70–99)
GLUCOSE BLD-MCNC: 140 MG/DL (ref 70–99)
GLUCOSE, FLUID: 115 MG/DL
HCO3 ARTERIAL: 25.7 MMOL/L (ref 21–29)
HCT VFR BLD CALC: 31.7 % (ref 40.5–52.5)
HEMOGLOBIN, ART, EXTENDED: 10 G/DL (ref 13.5–17.5)
HEMOGLOBIN: 9.8 G/DL (ref 13.5–17.5)
INR BLD: 1.28 (ref 0.86–1.14)
LD, FLUID: 592 U/L
LYMPHOCYTES, BODY FLUID: 7 %
MACROPHAGE FLUID: 9 %
MAGNESIUM: 1.8 MG/DL (ref 1.8–2.4)
MCH RBC QN AUTO: 26.3 PG (ref 26–34)
MCHC RBC AUTO-ENTMCNC: 30.9 G/DL (ref 31–36)
MCV RBC AUTO: 85.2 FL (ref 80–100)
METHEMOGLOBIN ARTERIAL: 0.7 %
MONOCYTE, FLUID: 4 %
NEUTROPHIL, FLUID: 66 %
NUCLEATED CELLS FLUID: 1205 /CUMM
NUMBER OF CELLS COUNTED FLUID: 100
O2 CONTENT ARTERIAL: 14 ML/DL
O2 SAT, ARTERIAL: 98.4 %
O2 THERAPY: ABNORMAL
PCO2 ARTERIAL: 39.2 MMHG (ref 35–45)
PDW BLD-RTO: 16.4 % (ref 12.4–15.4)
PERFORMED ON: ABNORMAL
PH ARTERIAL: 7.42 (ref 7.35–7.45)
PH, BODY FLUID: 7.8
PLATELET # BLD: 483 K/UL (ref 135–450)
PMV BLD AUTO: 7.3 FL (ref 5–10.5)
PO2 ARTERIAL: 92.5 MMHG (ref 75–108)
POTASSIUM SERPL-SCNC: 4.2 MMOL/L (ref 3.5–5.1)
PROCALCITONIN: 0.27 NG/ML (ref 0–0.15)
PROTEIN FLUID: 2.8 G/DL
PROTHROMBIN TIME: 14.9 SEC (ref 10–13.2)
RBC # BLD: 3.73 M/UL (ref 4.2–5.9)
RBC FLUID: NORMAL /CUMM
SODIUM BLD-SCNC: 144 MMOL/L (ref 136–145)
TCO2 ARTERIAL: 26.9 MMOL/L
WBC # BLD: 11.1 K/UL (ref 4–11)

## 2020-05-29 PROCEDURE — 2500000003 HC RX 250 WO HCPCS: Performed by: NURSE PRACTITIONER

## 2020-05-29 PROCEDURE — 6370000000 HC RX 637 (ALT 250 FOR IP): Performed by: THORACIC SURGERY (CARDIOTHORACIC VASCULAR SURGERY)

## 2020-05-29 PROCEDURE — 2580000003 HC RX 258: Performed by: INTERNAL MEDICINE

## 2020-05-29 PROCEDURE — C9113 INJ PANTOPRAZOLE SODIUM, VIA: HCPCS | Performed by: THORACIC SURGERY (CARDIOTHORACIC VASCULAR SURGERY)

## 2020-05-29 PROCEDURE — 2500000003 HC RX 250 WO HCPCS: Performed by: THORACIC SURGERY (CARDIOTHORACIC VASCULAR SURGERY)

## 2020-05-29 PROCEDURE — 83986 ASSAY PH BODY FLUID NOS: CPT

## 2020-05-29 PROCEDURE — 2580000003 HC RX 258: Performed by: THORACIC SURGERY (CARDIOTHORACIC VASCULAR SURGERY)

## 2020-05-29 PROCEDURE — 80048 BASIC METABOLIC PNL TOTAL CA: CPT

## 2020-05-29 PROCEDURE — 99233 SBSQ HOSP IP/OBS HIGH 50: CPT | Performed by: INTERNAL MEDICINE

## 2020-05-29 PROCEDURE — 6360000002 HC RX W HCPCS: Performed by: THORACIC SURGERY (CARDIOTHORACIC VASCULAR SURGERY)

## 2020-05-29 PROCEDURE — 6370000000 HC RX 637 (ALT 250 FOR IP): Performed by: NURSE PRACTITIONER

## 2020-05-29 PROCEDURE — 71250 CT THORAX DX C-: CPT

## 2020-05-29 PROCEDURE — C1729 CATH, DRAINAGE: HCPCS

## 2020-05-29 PROCEDURE — 2700000000 HC OXYGEN THERAPY PER DAY

## 2020-05-29 PROCEDURE — 83735 ASSAY OF MAGNESIUM: CPT

## 2020-05-29 PROCEDURE — 84157 ASSAY OF PROTEIN OTHER: CPT

## 2020-05-29 PROCEDURE — 6360000002 HC RX W HCPCS: Performed by: INTERNAL MEDICINE

## 2020-05-29 PROCEDURE — 70450 CT HEAD/BRAIN W/O DYE: CPT

## 2020-05-29 PROCEDURE — 83615 LACTATE (LD) (LDH) ENZYME: CPT

## 2020-05-29 PROCEDURE — 86140 C-REACTIVE PROTEIN: CPT

## 2020-05-29 PROCEDURE — 84145 PROCALCITONIN (PCT): CPT

## 2020-05-29 PROCEDURE — 6360000002 HC RX W HCPCS: Performed by: NURSE PRACTITIONER

## 2020-05-29 PROCEDURE — 82042 OTHER SOURCE ALBUMIN QUAN EA: CPT

## 2020-05-29 PROCEDURE — 71045 X-RAY EXAM CHEST 1 VIEW: CPT

## 2020-05-29 PROCEDURE — 85610 PROTHROMBIN TIME: CPT

## 2020-05-29 PROCEDURE — 88342 IMHCHEM/IMCYTCHM 1ST ANTB: CPT

## 2020-05-29 PROCEDURE — 82945 GLUCOSE OTHER FLUID: CPT

## 2020-05-29 PROCEDURE — 87070 CULTURE OTHR SPECIMN AEROBIC: CPT

## 2020-05-29 PROCEDURE — 88112 CYTOPATH CELL ENHANCE TECH: CPT

## 2020-05-29 PROCEDURE — 82803 BLOOD GASES ANY COMBINATION: CPT

## 2020-05-29 PROCEDURE — 2100000000 HC CCU R&B

## 2020-05-29 PROCEDURE — 89051 BODY FLUID CELL COUNT: CPT

## 2020-05-29 PROCEDURE — 88305 TISSUE EXAM BY PATHOLOGIST: CPT

## 2020-05-29 PROCEDURE — 85730 THROMBOPLASTIN TIME PARTIAL: CPT

## 2020-05-29 PROCEDURE — 94761 N-INVAS EAR/PLS OXIMETRY MLT: CPT

## 2020-05-29 PROCEDURE — 2580000003 HC RX 258: Performed by: PSYCHIATRY & NEUROLOGY

## 2020-05-29 PROCEDURE — 94640 AIRWAY INHALATION TREATMENT: CPT

## 2020-05-29 PROCEDURE — 85027 COMPLETE CBC AUTOMATED: CPT

## 2020-05-29 PROCEDURE — 87205 SMEAR GRAM STAIN: CPT

## 2020-05-29 PROCEDURE — 6360000002 HC RX W HCPCS: Performed by: PSYCHIATRY & NEUROLOGY

## 2020-05-29 PROCEDURE — 6360000002 HC RX W HCPCS

## 2020-05-29 PROCEDURE — 0W9B3ZZ DRAINAGE OF LEFT PLEURAL CAVITY, PERCUTANEOUS APPROACH: ICD-10-PCS | Performed by: RADIOLOGY

## 2020-05-29 PROCEDURE — 94003 VENT MGMT INPAT SUBQ DAY: CPT

## 2020-05-29 RX ORDER — PROPOFOL 10 MG/ML
10 INJECTION, EMULSION INTRAVENOUS
Status: DISCONTINUED | OUTPATIENT
Start: 2020-05-29 | End: 2020-05-31

## 2020-05-29 RX ORDER — METOPROLOL TARTRATE 5 MG/5ML
5 INJECTION INTRAVENOUS ONCE
Status: COMPLETED | OUTPATIENT
Start: 2020-05-29 | End: 2020-05-29

## 2020-05-29 RX ORDER — HALOPERIDOL 5 MG/ML
5 INJECTION INTRAMUSCULAR
Status: DISCONTINUED | OUTPATIENT
Start: 2020-05-29 | End: 2020-06-01

## 2020-05-29 RX ORDER — PROPOFOL 10 MG/ML
12 INJECTION, EMULSION INTRAVENOUS CONTINUOUS PRN
Status: DISCONTINUED | OUTPATIENT
Start: 2020-05-29 | End: 2020-06-01

## 2020-05-29 RX ORDER — PROPOFOL 10 MG/ML
INJECTION, EMULSION INTRAVENOUS
Status: COMPLETED
Start: 2020-05-29 | End: 2020-05-29

## 2020-05-29 RX ADMIN — MINERAL SUPPLEMENT IRON 300 MG / 5 ML STRENGTH LIQUID 100 PER BOX UNFLAVORED 300 MG: at 08:04

## 2020-05-29 RX ADMIN — HYDRALAZINE HYDROCHLORIDE 10 MG: 20 INJECTION INTRAMUSCULAR; INTRAVENOUS at 00:51

## 2020-05-29 RX ADMIN — FENTANYL CITRATE 25 MCG: 50 INJECTION INTRAMUSCULAR; INTRAVENOUS at 01:12

## 2020-05-29 RX ADMIN — Medication 100 MCG/HR: at 22:30

## 2020-05-29 RX ADMIN — PROPOFOL 10 MCG/KG/MIN: 10 INJECTION, EMULSION INTRAVENOUS at 02:01

## 2020-05-29 RX ADMIN — HEPARIN SODIUM 5000 UNITS: 5000 INJECTION INTRAVENOUS; SUBCUTANEOUS at 22:30

## 2020-05-29 RX ADMIN — DEXMEDETOMIDINE HYDROCHLORIDE 1.4 MCG/KG/HR: 4 INJECTION, SOLUTION INTRAVENOUS at 09:43

## 2020-05-29 RX ADMIN — CASTOR OIL AND BALSAM, PERU: 788; 87 OINTMENT TOPICAL at 08:07

## 2020-05-29 RX ADMIN — LORAZEPAM 1 MG: 2 INJECTION INTRAMUSCULAR; INTRAVENOUS at 20:50

## 2020-05-29 RX ADMIN — HYDRALAZINE HYDROCHLORIDE 10 MG: 20 INJECTION INTRAMUSCULAR; INTRAVENOUS at 17:27

## 2020-05-29 RX ADMIN — ALBUTEROL SULFATE 2.5 MG: 2.5 SOLUTION RESPIRATORY (INHALATION) at 11:12

## 2020-05-29 RX ADMIN — METOPROLOL TARTRATE 75 MG: 25 TABLET, FILM COATED ORAL at 20:29

## 2020-05-29 RX ADMIN — Medication 100 MCG/HR: at 17:33

## 2020-05-29 RX ADMIN — CHLORHEXIDINE GLUCONATE 15 ML: 1.2 RINSE ORAL at 08:14

## 2020-05-29 RX ADMIN — LORAZEPAM 1 MG: 2 INJECTION INTRAMUSCULAR; INTRAVENOUS at 13:19

## 2020-05-29 RX ADMIN — Medication 100 MCG/HR: at 13:01

## 2020-05-29 RX ADMIN — MINERAL SUPPLEMENT IRON 300 MG / 5 ML STRENGTH LIQUID 100 PER BOX UNFLAVORED 300 MG: at 20:29

## 2020-05-29 RX ADMIN — HALOPERIDOL LACTATE 5 MG: 5 INJECTION, SOLUTION INTRAMUSCULAR at 17:49

## 2020-05-29 RX ADMIN — HALOPERIDOL LACTATE 5 MG: 5 INJECTION, SOLUTION INTRAMUSCULAR at 20:26

## 2020-05-29 RX ADMIN — DEXMEDETOMIDINE HYDROCHLORIDE 1.4 MCG/KG/HR: 4 INJECTION, SOLUTION INTRAVENOUS at 13:02

## 2020-05-29 RX ADMIN — MEROPENEM 500 MG: 500 INJECTION, POWDER, FOR SOLUTION INTRAVENOUS at 09:44

## 2020-05-29 RX ADMIN — DEXMEDETOMIDINE HYDROCHLORIDE 1.4 MCG/KG/HR: 4 INJECTION, SOLUTION INTRAVENOUS at 00:08

## 2020-05-29 RX ADMIN — HALOPERIDOL LACTATE 5 MG: 5 INJECTION, SOLUTION INTRAMUSCULAR at 08:03

## 2020-05-29 RX ADMIN — SODIUM CHLORIDE, PRESERVATIVE FREE 10 ML: 5 INJECTION INTRAVENOUS at 20:30

## 2020-05-29 RX ADMIN — LORAZEPAM 1 MG: 2 INJECTION INTRAMUSCULAR; INTRAVENOUS at 17:30

## 2020-05-29 RX ADMIN — HEPARIN SODIUM 5000 UNITS: 5000 INJECTION INTRAVENOUS; SUBCUTANEOUS at 05:57

## 2020-05-29 RX ADMIN — Medication 150 MCG/HR: at 06:23

## 2020-05-29 RX ADMIN — Medication 150 MCG/HR: at 09:42

## 2020-05-29 RX ADMIN — Medication 150 MCG/HR: at 03:00

## 2020-05-29 RX ADMIN — HALOPERIDOL LACTATE 2 MG: 5 INJECTION, SOLUTION INTRAMUSCULAR at 01:04

## 2020-05-29 RX ADMIN — DEXMEDETOMIDINE HYDROCHLORIDE 1.4 MCG/KG/HR: 4 INJECTION, SOLUTION INTRAVENOUS at 03:00

## 2020-05-29 RX ADMIN — ALBUTEROL SULFATE 2.5 MG: 2.5 SOLUTION RESPIRATORY (INHALATION) at 19:39

## 2020-05-29 RX ADMIN — MEROPENEM 500 MG: 500 INJECTION, POWDER, FOR SOLUTION INTRAVENOUS at 21:57

## 2020-05-29 RX ADMIN — PANTOPRAZOLE SODIUM 40 MG: 40 INJECTION, POWDER, FOR SOLUTION INTRAVENOUS at 05:58

## 2020-05-29 RX ADMIN — LEVETIRACETAM 500 MG: 100 INJECTION, SOLUTION INTRAVENOUS at 01:27

## 2020-05-29 RX ADMIN — LEVETIRACETAM 500 MG: 100 INJECTION, SOLUTION INTRAVENOUS at 14:10

## 2020-05-29 RX ADMIN — MEROPENEM 500 MG: 500 INJECTION, POWDER, FOR SOLUTION INTRAVENOUS at 16:11

## 2020-05-29 RX ADMIN — ALBUTEROL SULFATE 2.5 MG: 2.5 SOLUTION RESPIRATORY (INHALATION) at 15:40

## 2020-05-29 RX ADMIN — METOPROLOL TARTRATE 2.5 MG: 5 INJECTION INTRAVENOUS at 04:08

## 2020-05-29 RX ADMIN — DEXMEDETOMIDINE HYDROCHLORIDE 1.4 MCG/KG/HR: 4 INJECTION, SOLUTION INTRAVENOUS at 06:13

## 2020-05-29 RX ADMIN — METOPROLOL TARTRATE 50 MG: 50 TABLET, FILM COATED ORAL at 08:04

## 2020-05-29 RX ADMIN — HEPARIN SODIUM 5000 UNITS: 5000 INJECTION INTRAVENOUS; SUBCUTANEOUS at 16:11

## 2020-05-29 RX ADMIN — MEROPENEM 500 MG: 500 INJECTION, POWDER, FOR SOLUTION INTRAVENOUS at 03:56

## 2020-05-29 RX ADMIN — ATORVASTATIN CALCIUM 20 MG: 20 TABLET, FILM COATED ORAL at 20:29

## 2020-05-29 RX ADMIN — ALBUTEROL SULFATE 2.5 MG: 2.5 SOLUTION RESPIRATORY (INHALATION) at 07:41

## 2020-05-29 RX ADMIN — METOPROLOL TARTRATE 2.5 MG: 5 INJECTION INTRAVENOUS at 04:48

## 2020-05-29 RX ADMIN — INSULIN LISPRO 3 UNITS: 100 INJECTION, SOLUTION INTRAVENOUS; SUBCUTANEOUS at 04:15

## 2020-05-29 RX ADMIN — CASTOR OIL AND BALSAM, PERU: 788; 87 OINTMENT TOPICAL at 20:31

## 2020-05-29 RX ADMIN — CHLORHEXIDINE GLUCONATE 15 ML: 1.2 RINSE ORAL at 21:58

## 2020-05-29 RX ADMIN — DEXMEDETOMIDINE HYDROCHLORIDE 1.4 MCG/KG/HR: 4 INJECTION, SOLUTION INTRAVENOUS at 17:28

## 2020-05-29 RX ADMIN — HYDRALAZINE HYDROCHLORIDE 10 MG: 20 INJECTION INTRAMUSCULAR; INTRAVENOUS at 10:05

## 2020-05-29 RX ADMIN — METOPROLOL TARTRATE 5 MG: 5 INJECTION INTRAVENOUS at 12:29

## 2020-05-29 RX ADMIN — DEXMEDETOMIDINE HYDROCHLORIDE 1.4 MCG/KG/HR: 4 INJECTION, SOLUTION INTRAVENOUS at 20:25

## 2020-05-29 RX ADMIN — LORAZEPAM 1 MG: 2 INJECTION INTRAMUSCULAR; INTRAVENOUS at 00:55

## 2020-05-29 RX ADMIN — Medication 10 ML: at 06:00

## 2020-05-29 ASSESSMENT — PULMONARY FUNCTION TESTS
PIF_VALUE: 18
PIF_VALUE: 16
PIF_VALUE: 19
PIF_VALUE: 18
PIF_VALUE: 16
PIF_VALUE: 13
PIF_VALUE: 16
PIF_VALUE: 23
PIF_VALUE: 12
PIF_VALUE: 14
PIF_VALUE: 16
PIF_VALUE: 27
PIF_VALUE: 16
PIF_VALUE: 18
PIF_VALUE: 16
PIF_VALUE: 13
PIF_VALUE: 18
PIF_VALUE: 16
PIF_VALUE: 17
PIF_VALUE: 18
PIF_VALUE: 18
PIF_VALUE: 15
PIF_VALUE: 13
PIF_VALUE: 14
PIF_VALUE: 20
PIF_VALUE: 18
PIF_VALUE: 16
PIF_VALUE: 15
PIF_VALUE: 21
PIF_VALUE: 13
PIF_VALUE: 14
PIF_VALUE: 21
PIF_VALUE: 14
PIF_VALUE: 28
PIF_VALUE: 15
PIF_VALUE: 14
PIF_VALUE: 14
PIF_VALUE: 19

## 2020-05-29 NOTE — PROGRESS NOTES
Infectious Disease Follow up Notes  Admit Date: 5/7/2020  Hospital Day: 23    Antibiotics :   IV Meropenem     CHIEF COMPLAINT:     NSTEMI  CABG Emergent   Fevers   Resp failure  ?dts  CVA   S/p Trach  S/p Left thoracentesis on 5/29     Subjective interval History :  60 y.o. man admitted with chest pain on going for some time with acute relief from Nitro per HPI and noted to have EKG changes with elevated cardiac enzymes underwent emergent cardiac cath and subsequently taken for CABG on 5/8 by  and post op now in CVICU on the ventilator and WBC elevation post op up to 24 k AND Hb low at 6.7 post op and has been resuscitated now having fevers from 5/10 increasing T max slowly now up to 102.3 and WBC has trended down but noted to have Lft lELEVATION and there was some concern for DTS based on the history started on MVI and Lorazepam and Blood cx , sputum cx from 5/12 NGTD and we are consulted for recommendations given the fevers . S/P Bed side thoracentesis and pleural fluid cx added , and fevers trend down and tolerating IV abx ok but becomes agitated off sedation and remains on the vent via trach and PEG tube+ and has loose stools from tube feeds     Past Medical History:    Past Medical History:   Diagnosis Date    Blood clot in vein 2018    right leg. no previous scans to know whether supf or dvt. no coumadin. put on plavix.  Bronchitis     doesn't remember when.  outpt abx    Carotid stenosis 05/07/2020    bilat 50-79% stenosis    Hepatitis C antibody positive in blood 05/16/2020    Hypertension     Kidney disease     s/p nephrectomy age 17, pt not sure why     NSTEMI (non-ST elevated myocardial infarction) ([facility] [address] 05/07/2020    3VD    PAD (peripheral artery disease) ([facility] [address]     Respiratory compromise 05/2020    chemical exposure at work, seen at [facility], covid neg, given steroids    Tattoos        Past Surgical chills / sweats. No weight loss. No visual change, eye pain, eye discharge. No oral lesion, sore throat, dysphagia. Denies cough / sputum/Sob   Denies chest pain, palpitations/ dizziness  Denies nausea/ vomiting/abdominal pain/diarrhea. Denies dysuria or change in urinary function. Denies joint swelling or pain. No myalgia, arthralgia. No rashes, skin lesions   Denies focal weakness, sensory change or other neurologic symptoms  No lymph node swelling or tenderness. ROS not possible    PHYSICAL EXAM:      Vitals:  T max  101.3     BP (!) 156/77   Pulse 122   Temp 99.2 °F (37.3 °C) (Rectal)   Resp 23   Ht 6' (1.829 m)   Wt 183 lb 6.8 oz (83.2 kg)   SpO2 100%   BMI 24.88 kg/m²   General Appearance: intubated via Trach ++  on the vent and , + pallor, + icterus sweating+     Skin: warm and dry, no rash or erythema  Head: normocephalic and atraumatic  Eyes: pupils equal, round, and reactive to light, conjunctivae normal  ENT: tympanic membrane, external ear and ear canal normal bilaterally, nose without deformity, nasal mucosa and turbinates normal without polyps  Neck: supple and non-tender without mass, no thyromegaly  no cervical lymphadenopathy  Pulmonary/Chest: few basal crepts  wheezes, rales or rhonchi, normal air movement, no respiratory distress  Cardiovascular:   S1 and S2, no murmurs, rubs, clicks, or gallops, no carotid bruits sternal incision clean binder +  Abdomen: soft, non-tender, non-distended, normal bowel sounds, no masses or organomegaly  Extremities: no cyanosis, clubbing or edema  Musculoskeletal: normal range of motion, no joint swelling, deformity or tenderness  Neurologic less responsive on the vent    Lines:   Hoang  PICC  Rectal tube+   S/p Trach site clean sutured+      Data Review:    Lab Results   Component Value Date    WBC 11.1 (H) 05/29/2020    HGB 9.8 (L) 05/29/2020    HCT 31.7 (L) 05/29/2020    MCV 85.2 05/29/2020     (H) 05/29/2020     Lab Results Component Value Date    CREATININE 0.7 (L) 05/29/2020    BUN 19 05/29/2020     05/29/2020    K 4.2 05/29/2020     05/29/2020    CO2 25 05/29/2020       Hepatic Function Panel:   Lab Results   Component Value Date    ALKPHOS 121 05/25/2020    ALT 55 05/25/2020    AST 33 05/25/2020    PROT 5.9 05/25/2020    BILITOT <0.2 05/25/2020    BILIDIR <0.2 05/25/2020    IBILI see below 05/25/2020    LABALBU 2.4 05/25/2020       UA:  Lab Results   Component Value Date    COLORU YELLOW 05/28/2020    CLARITYU Clear 05/28/2020    GLUCOSEU Negative 05/28/2020    BILIRUBINUR Negative 05/28/2020    KETUA 15 05/28/2020    SPECGRAV 1.019 05/28/2020    BLOODU Negative 05/28/2020    PHUR 5.5 05/28/2020    PROTEINU TRACE 05/28/2020    UROBILINOGEN 0.2 05/28/2020    NITRU Negative 05/28/2020    LEUKOCYTESUR Negative 05/28/2020    LABMICR YES 05/28/2020    URINETYPE NotGiven 05/28/2020      Urine Microscopic:   Lab Results   Component Value Date    COMU see below 05/12/2020    HYALCAST 2 05/28/2020    WBCUA 2 05/28/2020    RBCUA 10 05/28/2020    EPIU 1 05/28/2020     Procal  0.58       CRP 60.9        Ref Range & Units 05/16/20 1500   HCV QNT by NAAT IU/ML IU/mL Not Detected    HCV Qnt by NAAT log IU/ml log IU/mL Not Detected    Interpretation Not Detected Not Detected    Comment: INTERPRETIVE INFORMATION: HCV by Quantitative NAAT        Hep C Ab Interp   Hepatitis Panel, Acute   Collected: 05/16/20 0440   Result status: Final   Resulting lab: [facility]   Reference range: Non-reactive   Value: REACTIVEAbnormal     Comment: REACTIVE Screen:   Confirmation with Hepatitis C RIBA not available. Depending on clinical   history, Hepatitis C Virus (HCV)by Quantitative NAAT ([id number]) should be   considered as an alternative to this test although it is not an equivalent.      If HCV by Quantitative NAAT is desired, please reorder       MICRO: cultures reviewed and updated by me          Date/Time       Culture, per day    sodium chloride flush  10 mL Intravenous 2 times per day    insulin lispro  0-18 Units Subcutaneous Q4H    pantoprazole  40 mg Intravenous Daily    And    sodium chloride (PF)  10 mL Intravenous Daily    chlorhexidine  15 mL Mouth/Throat BID    albuterol  2.5 mg Nebulization Q4H WA    sodium chloride (PF)  10 mL Intravenous Once       Continuous Infusions:   propofol 5 mcg/kg/min (05/29/20 0813)    propofol      fentaNYL 150 mcg/hr (05/29/20 0403)    dextrose      dexmedetomidine 1.4 mcg/kg/hr (05/29/20 8990)       PRN Meds:  propofol, haloperidol lactate, hydrALAZINE, LORazepam, sodium chloride flush, docusate, sennosides-docusate sodium, bisacodyl, metoprolol, glucose, dextrose, glucagon (rDNA), dextrose, sodium chloride flush, potassium chloride, magnesium sulfate, calcium chloride IVPB, calcium chloride IVPB, acetaminophen, fentanNYL, ondansetron, albuterol sulfate HFA, metoclopramide, albuterol, [DISCONTINUED] acetaminophen **OR** acetaminophen, polyethylene glycol, promethazine **OR** [DISCONTINUED] ondansetron, magnesium hydroxide      Assessment:     Patient Active Problem List   Diagnosis    Chest pain    [facility]STEM[facility] (non-ST elevated [facility] infarction) ([facility] [address]    Coronary artery disease involving native coronary artery of native heart with unstable angina pectoris ([facility] [address]    S/P CABG x 4     Admitted with chest pain   NSTEMI  Strong family history per HPI  S/P Urgent CABG -  on 5/8  Resp failure   On the ventilator  Post op fevers   Anemia   WBC elevation post op now trend down  ? DTS with Lft elevation   MRI brain with CVA+      Given the intubated stated and elevated Procal and on going fevers will start IV abx and see his response-   Surgical incision is clean and lines are clean and Blood cx and Sputum cx from 5/12 NGTD  DTS can cause fevers would like to see the curve improve- will watch closely .     Fevers trend down Procal elevated and will follow and once fevers better able to d/c his IV abx and resp new cx sent and in process      Hep C+ve noted will check PCR and Cryoglobulins     HIV screen -ve and resp cx negative trending Procal and once down will be able to d/c soon      MRI brain reported to be abnormal and CVA possible infection  With abscess reported but I feel this more in favor for stroke given CAD    MRA -ve and  WBC trend down and EEG with Encephalopathy noted      CT head evolving infarct as noted on the previous MRi - CRP trend down and fevers noted and will trend    Follow up CT brain noted -  Resp cx in process - C diff -ve noted    S/p Trach and on the vent and PEG tube placed on 5/27    New fevers  and pancultures sent   CXR from 5/28 with large effusion and underwent CT chest/abd/pelvis    Now s/pbed side thoracentesis by IR and Fluic cx requested  Will follow    Labs, Microbiology, Radiology and all the pertinent results from current hospitalization and  care every where were reviewed  by me as a part of the evaluation   Plan:   1. Trend Procal   2. CRP  Trending down   3. Resp cx in process from 5/28 NGTD  4. Cont IV Meropenem x 500 mG X q 6 hrs will cover anaerobes and gram -ves  Given the concern that was raised on MRI of possible brain abscess   5. Unable to wean from the vent  S/p Trach    6. Hep C+ve ,SREE-ve But ESR elevated  7. HIV -ve   8. MRI BRAIN abnormal Rt Precentral gyrus and corona radiata  infarct    9. C diff -ve  10. High fever on 5/28 and new cx sent   11. CXR with Large Left effusion s/p thoracentesis on 5/29 and pleural fluids sent for studies d/w RN and d/w Radiology        Discussed with patient/Family and Nursing staff   Risk of Complications/Morbidity: High      · Illness(es)/ Infection present that pose threat to bodily function. · There is potential for severe exacerbation of infection/side effects of treatment. Therapy requires intensive monitoring for antimicrobial agent toxicity. Thanks for allowing me to participate in your

## 2020-05-29 NOTE — PROGRESS NOTES
0700: Handoff with Gagan MARTINEZ. Pt resting in bed quietly, tolerating vent well. On 1.4mcg/kg/mhr of precedex and 150mcg/hr of fentanyl.   0729: Blood glucose obtained 125, no coverage needed. Shift assessment complete and noted in chart. Does follow commands on right side only. spontaneously moves legs. Does move head in the direction you are speaking but does not make eye contact. Pt suctioned. Attempted to orally suction and pt clenches mouth shut.   0735: Bashir Turk NP at bedside. Updated on events overnight. Orders to stop tube feed as going for thoracentesis later today and for CT. Also orders placed to hold ASA and heparin. 0750: Pt becoming anxious, PRN halodol given and per Bashir Turk NP can restart propofol @ 5mcg/kg/min. May need it for procedure. Scheduled meds still given. 0820: spoke with Niurka Montero in specials, will come to the bedside for thoracentesis. 1172: spoke with CT tech. CT scheduled for 12pm  1003: PRN hydralazine given for SBP >150   1134: reassessment complete. 1200: pt transported to CT with RT at bedside  1220: pt returned to room   1320: tech at bedside to prepare for thoracentesis. 1345: procedure complete. Removed 1.3L  1600: blood glucose 100. No coverage needed. Reassessment unchanged. Tube feed restarted @ 25ml/hr. 1730: pt starting to become very agitated pulling up on restraints on the right side. Fighting the vent and thrashing head around. Does not let you orally suction. Suction via trach. PRN ativan given.    1900: Handoff with Raymond Rebolledo

## 2020-05-30 LAB
ANION GAP SERPL CALCULATED.3IONS-SCNC: 8 MMOL/L (ref 3–16)
APTT: 27.8 SEC (ref 24.2–36.2)
BASE EXCESS ARTERIAL: 0.7 MMOL/L (ref -3–3)
BUN BLDV-MCNC: 15 MG/DL (ref 7–20)
CALCIUM SERPL-MCNC: 8.3 MG/DL (ref 8.3–10.6)
CARBOXYHEMOGLOBIN ARTERIAL: 1.3 % (ref 0–1.5)
CHLORIDE BLD-SCNC: 112 MMOL/L (ref 99–110)
CO2: 25 MMOL/L (ref 21–32)
CREAT SERPL-MCNC: 0.6 MG/DL (ref 0.8–1.3)
CULTURE, RESPIRATORY: NORMAL
GFR AFRICAN AMERICAN: >60
GFR NON-AFRICAN AMERICAN: >60
GLUCOSE BLD-MCNC: 119 MG/DL (ref 70–99)
GLUCOSE BLD-MCNC: 120 MG/DL (ref 70–99)
GLUCOSE BLD-MCNC: 121 MG/DL (ref 70–99)
GLUCOSE BLD-MCNC: 125 MG/DL (ref 70–99)
GLUCOSE BLD-MCNC: 135 MG/DL (ref 70–99)
GLUCOSE BLD-MCNC: 139 MG/DL (ref 70–99)
GRAM STAIN RESULT: NORMAL
HCO3 ARTERIAL: 24.9 MMOL/L (ref 21–29)
HCT VFR BLD CALC: 26.1 % (ref 40.5–52.5)
HEMOGLOBIN, ART, EXTENDED: 9 G/DL (ref 13.5–17.5)
HEMOGLOBIN: 8.3 G/DL (ref 13.5–17.5)
INR BLD: 1.32 (ref 0.86–1.14)
MAGNESIUM: 1.8 MG/DL (ref 1.8–2.4)
MCH RBC QN AUTO: 27 PG (ref 26–34)
MCHC RBC AUTO-ENTMCNC: 31.7 G/DL (ref 31–36)
MCV RBC AUTO: 85.2 FL (ref 80–100)
METHEMOGLOBIN ARTERIAL: 0.8 %
O2 CONTENT ARTERIAL: 12 ML/DL
O2 SAT, ARTERIAL: 98.4 %
O2 THERAPY: ABNORMAL
PCO2 ARTERIAL: 37.2 MMHG (ref 35–45)
PDW BLD-RTO: 16.1 % (ref 12.4–15.4)
PERFORMED ON: ABNORMAL
PH ARTERIAL: 7.43 (ref 7.35–7.45)
PLATELET # BLD: 315 K/UL (ref 135–450)
PMV BLD AUTO: 7.2 FL (ref 5–10.5)
PO2 ARTERIAL: 90.9 MMHG (ref 75–108)
POTASSIUM SERPL-SCNC: 3.6 MMOL/L (ref 3.5–5.1)
PROTHROMBIN TIME: 15.3 SEC (ref 10–13.2)
RBC # BLD: 3.07 M/UL (ref 4.2–5.9)
SODIUM BLD-SCNC: 145 MMOL/L (ref 136–145)
TCO2 ARTERIAL: 26.1 MMOL/L
WBC # BLD: 6 K/UL (ref 4–11)

## 2020-05-30 PROCEDURE — 2580000003 HC RX 258: Performed by: THORACIC SURGERY (CARDIOTHORACIC VASCULAR SURGERY)

## 2020-05-30 PROCEDURE — 6360000002 HC RX W HCPCS: Performed by: THORACIC SURGERY (CARDIOTHORACIC VASCULAR SURGERY)

## 2020-05-30 PROCEDURE — 85610 PROTHROMBIN TIME: CPT

## 2020-05-30 PROCEDURE — 99024 POSTOP FOLLOW-UP VISIT: CPT | Performed by: THORACIC SURGERY (CARDIOTHORACIC VASCULAR SURGERY)

## 2020-05-30 PROCEDURE — 2700000000 HC OXYGEN THERAPY PER DAY

## 2020-05-30 PROCEDURE — 94003 VENT MGMT INPAT SUBQ DAY: CPT

## 2020-05-30 PROCEDURE — 82803 BLOOD GASES ANY COMBINATION: CPT

## 2020-05-30 PROCEDURE — 6370000000 HC RX 637 (ALT 250 FOR IP): Performed by: THORACIC SURGERY (CARDIOTHORACIC VASCULAR SURGERY)

## 2020-05-30 PROCEDURE — 36592 COLLECT BLOOD FROM PICC: CPT

## 2020-05-30 PROCEDURE — 6370000000 HC RX 637 (ALT 250 FOR IP): Performed by: NURSE PRACTITIONER

## 2020-05-30 PROCEDURE — 2580000003 HC RX 258: Performed by: INTERNAL MEDICINE

## 2020-05-30 PROCEDURE — 6360000002 HC RX W HCPCS: Performed by: PSYCHIATRY & NEUROLOGY

## 2020-05-30 PROCEDURE — 2500000003 HC RX 250 WO HCPCS: Performed by: THORACIC SURGERY (CARDIOTHORACIC VASCULAR SURGERY)

## 2020-05-30 PROCEDURE — 85730 THROMBOPLASTIN TIME PARTIAL: CPT

## 2020-05-30 PROCEDURE — 2580000003 HC RX 258: Performed by: PSYCHIATRY & NEUROLOGY

## 2020-05-30 PROCEDURE — 94640 AIRWAY INHALATION TREATMENT: CPT

## 2020-05-30 PROCEDURE — 6360000002 HC RX W HCPCS: Performed by: NURSE PRACTITIONER

## 2020-05-30 PROCEDURE — C9113 INJ PANTOPRAZOLE SODIUM, VIA: HCPCS | Performed by: THORACIC SURGERY (CARDIOTHORACIC VASCULAR SURGERY)

## 2020-05-30 PROCEDURE — 6360000002 HC RX W HCPCS: Performed by: INTERNAL MEDICINE

## 2020-05-30 PROCEDURE — 80048 BASIC METABOLIC PNL TOTAL CA: CPT

## 2020-05-30 PROCEDURE — 36600 WITHDRAWAL OF ARTERIAL BLOOD: CPT

## 2020-05-30 PROCEDURE — 83735 ASSAY OF MAGNESIUM: CPT

## 2020-05-30 PROCEDURE — 2100000000 HC CCU R&B

## 2020-05-30 PROCEDURE — 85027 COMPLETE CBC AUTOMATED: CPT

## 2020-05-30 RX ORDER — ACETAMINOPHEN 160 MG/5ML
650 SUSPENSION, ORAL (FINAL DOSE FORM) ORAL EVERY 4 HOURS PRN
Status: DISCONTINUED | OUTPATIENT
Start: 2020-05-30 | End: 2020-05-30

## 2020-05-30 RX ORDER — ACETAMINOPHEN 325 MG/1
650 TABLET ORAL EVERY 4 HOURS PRN
Status: DISCONTINUED | OUTPATIENT
Start: 2020-05-30 | End: 2020-06-05 | Stop reason: HOSPADM

## 2020-05-30 RX ADMIN — Medication 100 MCG/HR: at 12:57

## 2020-05-30 RX ADMIN — LORAZEPAM 1 MG: 2 INJECTION INTRAMUSCULAR; INTRAVENOUS at 07:56

## 2020-05-30 RX ADMIN — MEROPENEM 500 MG: 500 INJECTION, POWDER, FOR SOLUTION INTRAVENOUS at 10:40

## 2020-05-30 RX ADMIN — LORAZEPAM 1 MG: 2 INJECTION INTRAMUSCULAR; INTRAVENOUS at 17:25

## 2020-05-30 RX ADMIN — HALOPERIDOL LACTATE 5 MG: 5 INJECTION, SOLUTION INTRAMUSCULAR at 15:11

## 2020-05-30 RX ADMIN — ALBUTEROL SULFATE 2.5 MG: 2.5 SOLUTION RESPIRATORY (INHALATION) at 12:06

## 2020-05-30 RX ADMIN — HEPARIN SODIUM 5000 UNITS: 5000 INJECTION INTRAVENOUS; SUBCUTANEOUS at 13:37

## 2020-05-30 RX ADMIN — HEPARIN SODIUM 5000 UNITS: 5000 INJECTION INTRAVENOUS; SUBCUTANEOUS at 22:37

## 2020-05-30 RX ADMIN — LORAZEPAM 1 MG: 2 INJECTION INTRAMUSCULAR; INTRAVENOUS at 03:02

## 2020-05-30 RX ADMIN — CASTOR OIL AND BALSAM, PERU: 788; 87 OINTMENT TOPICAL at 08:04

## 2020-05-30 RX ADMIN — ALBUTEROL SULFATE 2.5 MG: 2.5 SOLUTION RESPIRATORY (INHALATION) at 07:47

## 2020-05-30 RX ADMIN — DEXMEDETOMIDINE HYDROCHLORIDE 1.4 MCG/KG/HR: 4 INJECTION, SOLUTION INTRAVENOUS at 21:52

## 2020-05-30 RX ADMIN — ALBUTEROL SULFATE 2.5 MG: 2.5 SOLUTION RESPIRATORY (INHALATION) at 16:39

## 2020-05-30 RX ADMIN — ALBUTEROL SULFATE 2.5 MG: 2.5 SOLUTION RESPIRATORY (INHALATION) at 20:42

## 2020-05-30 RX ADMIN — DEXMEDETOMIDINE HYDROCHLORIDE 1.4 MCG/KG/HR: 4 INJECTION, SOLUTION INTRAVENOUS at 07:24

## 2020-05-30 RX ADMIN — MEROPENEM 500 MG: 500 INJECTION, POWDER, FOR SOLUTION INTRAVENOUS at 04:43

## 2020-05-30 RX ADMIN — DEXMEDETOMIDINE HYDROCHLORIDE 1.4 MCG/KG/HR: 4 INJECTION, SOLUTION INTRAVENOUS at 18:21

## 2020-05-30 RX ADMIN — MINERAL SUPPLEMENT IRON 300 MG / 5 ML STRENGTH LIQUID 100 PER BOX UNFLAVORED 300 MG: at 08:10

## 2020-05-30 RX ADMIN — HEPARIN SODIUM 5000 UNITS: 5000 INJECTION INTRAVENOUS; SUBCUTANEOUS at 06:37

## 2020-05-30 RX ADMIN — METOPROLOL TARTRATE 75 MG: 25 TABLET, FILM COATED ORAL at 20:25

## 2020-05-30 RX ADMIN — LORAZEPAM 1 MG: 2 INJECTION INTRAMUSCULAR; INTRAVENOUS at 20:28

## 2020-05-30 RX ADMIN — DEXMEDETOMIDINE HYDROCHLORIDE 1.4 MCG/KG/HR: 4 INJECTION, SOLUTION INTRAVENOUS at 14:39

## 2020-05-30 RX ADMIN — PROPOFOL 5 MCG/KG/MIN: 10 INJECTION, EMULSION INTRAVENOUS at 06:10

## 2020-05-30 RX ADMIN — DEXMEDETOMIDINE HYDROCHLORIDE 1.4 MCG/KG/HR: 4 INJECTION, SOLUTION INTRAVENOUS at 00:12

## 2020-05-30 RX ADMIN — METOPROLOL TARTRATE 75 MG: 25 TABLET, FILM COATED ORAL at 07:55

## 2020-05-30 RX ADMIN — ACETAMINOPHEN 650 MG: 325 TABLET ORAL at 17:13

## 2020-05-30 RX ADMIN — PANTOPRAZOLE SODIUM 40 MG: 40 INJECTION, POWDER, FOR SOLUTION INTRAVENOUS at 06:37

## 2020-05-30 RX ADMIN — LEVETIRACETAM 500 MG: 100 INJECTION, SOLUTION INTRAVENOUS at 13:01

## 2020-05-30 RX ADMIN — LORAZEPAM 1 MG: 2 INJECTION INTRAMUSCULAR; INTRAVENOUS at 13:37

## 2020-05-30 RX ADMIN — ATORVASTATIN CALCIUM 20 MG: 20 TABLET, FILM COATED ORAL at 20:25

## 2020-05-30 RX ADMIN — MEROPENEM 500 MG: 500 INJECTION, POWDER, FOR SOLUTION INTRAVENOUS at 22:01

## 2020-05-30 RX ADMIN — Medication 100 MCG/HR: at 22:37

## 2020-05-30 RX ADMIN — SODIUM CHLORIDE, PRESERVATIVE FREE 10 ML: 5 INJECTION INTRAVENOUS at 20:27

## 2020-05-30 RX ADMIN — ASPIRIN 81 MG 324 MG: 81 TABLET ORAL at 07:55

## 2020-05-30 RX ADMIN — Medication 100 MCG/HR: at 07:56

## 2020-05-30 RX ADMIN — HYDRALAZINE HYDROCHLORIDE 10 MG: 20 INJECTION INTRAMUSCULAR; INTRAVENOUS at 16:33

## 2020-05-30 RX ADMIN — CHLORHEXIDINE GLUCONATE 15 ML: 1.2 RINSE ORAL at 07:57

## 2020-05-30 RX ADMIN — CHLORHEXIDINE GLUCONATE 15 ML: 1.2 RINSE ORAL at 20:30

## 2020-05-30 RX ADMIN — MEROPENEM 500 MG: 500 INJECTION, POWDER, FOR SOLUTION INTRAVENOUS at 16:32

## 2020-05-30 RX ADMIN — DEXMEDETOMIDINE HYDROCHLORIDE 1.4 MCG/KG/HR: 4 INJECTION, SOLUTION INTRAVENOUS at 10:50

## 2020-05-30 RX ADMIN — CASTOR OIL AND BALSAM, PERU: 788; 87 OINTMENT TOPICAL at 22:05

## 2020-05-30 RX ADMIN — Medication 100 MCG/HR: at 17:38

## 2020-05-30 RX ADMIN — SODIUM CHLORIDE, PRESERVATIVE FREE 10 ML: 5 INJECTION INTRAVENOUS at 08:05

## 2020-05-30 RX ADMIN — DEXMEDETOMIDINE HYDROCHLORIDE 1.4 MCG/KG/HR: 4 INJECTION, SOLUTION INTRAVENOUS at 03:45

## 2020-05-30 RX ADMIN — MINERAL SUPPLEMENT IRON 300 MG / 5 ML STRENGTH LIQUID 100 PER BOX UNFLAVORED 300 MG: at 20:26

## 2020-05-30 RX ADMIN — Medication 100 MCG/HR: at 03:25

## 2020-05-30 RX ADMIN — LEVETIRACETAM 500 MG: 100 INJECTION, SOLUTION INTRAVENOUS at 02:45

## 2020-05-30 RX ADMIN — HALOPERIDOL LACTATE 5 MG: 5 INJECTION, SOLUTION INTRAMUSCULAR at 06:35

## 2020-05-30 RX ADMIN — Medication 10 ML: at 06:37

## 2020-05-30 ASSESSMENT — PULMONARY FUNCTION TESTS
PIF_VALUE: 15
PIF_VALUE: 15
PIF_VALUE: 17
PIF_VALUE: 18
PIF_VALUE: 17
PIF_VALUE: 15
PIF_VALUE: 16
PIF_VALUE: 11
PIF_VALUE: 16
PIF_VALUE: 17
PIF_VALUE: 22
PIF_VALUE: 17
PIF_VALUE: 16
PIF_VALUE: 20
PIF_VALUE: 12
PEFR_L/MIN: 38
PIF_VALUE: 13
PIF_VALUE: 17
PIF_VALUE: 19
PIF_VALUE: 18
PIF_VALUE: 17
PIF_VALUE: 15
PIF_VALUE: 16
PIF_VALUE: 16
PIF_VALUE: 17
PIF_VALUE: 12
PIF_VALUE: 18
PIF_VALUE: 17
PIF_VALUE: 17
PIF_VALUE: 14
PIF_VALUE: 15
PIF_VALUE: 18

## 2020-05-30 ASSESSMENT — PAIN SCALES - GENERAL
PAINLEVEL_OUTOF10: 0
PAINLEVEL_OUTOF10: 6
PAINLEVEL_OUTOF10: 0

## 2020-05-30 NOTE — PROGRESS NOTES
Pt had a uneventful night with no complications. Pt was evaluated for sedation requirements using the RASS score and given meds to meet the score requirements. The pt was hemodynamically stable throughout the shift. The pt did have a temp early in the shift and the cooling blanket was used to help with this. See charting.

## 2020-05-30 NOTE — PROGRESS NOTES
MG 1.90 1.80 1.80     Cardiac Enzymes: No results for input(s): CKTOTAL, CKMB, CKMBINDEX, TROPONINI in the last 72 hours. PT/INR:   Recent Labs     05/28/20  0420 05/29/20  0450 05/30/20  0442   PROTIME 14.4* 14.9* 15.3*   INR 1.24* 1.28* 1.32*     APTT:   Recent Labs     05/28/20  0420 05/29/20  0450 05/30/20  0442   APTT 30.9 32.5 27.8     CXR  5/28/2020    Left-sided pleural effusion with adjacent atelectasis.  Superimposed   pneumonia on the left is suspected in the setting of fever.       Bilateral faint pulmonary disease elsewhere may be related to infection or   mild edema.        Assessment/Plan:  No acute changes patient continue with vent management, on Precedex, tube feeds on goal, will try over the next several days to transition to oral antipsychotic medications to wean the Precedex off once we reached that stage we will start working on displacement position possible to [facility], MD  5/30/2020  10:24 AM

## 2020-05-30 NOTE — CARE COORDINATION
SHARIW reviewed chart. LSW spoke wit bedside RN who reports pt is in restraints and IV sedation. LSW informed her that per conversation with girlfriend, Pedro, he is legally  to someone else. Pedro also reports he does NOT have a DPOA. LSW called for sonOpal [phone]; left non emergency message asking for returned call to discuss DC plan.   Yuridia [place], [place]     Case Management   [phone]    5/30/2020  9:22 AM

## 2020-05-30 NOTE — PLAN OF CARE
Problem: Falls - Risk of:  Goal: Will remain free from falls  Description: Will remain free from falls  Outcome: Met This Shift     Problem: Falls - Risk of:  Goal: Absence of physical injury  Description: Absence of physical injury  Outcome: Met This Shift     Problem: Restraint Use - Nonviolent/Non-Self-Destructive Behavior:  Goal: Absence of restraint-related injury  Description: Absence of restraint-related injury  Outcome: Met This Shift     Problem: Restraint Use - Nonviolent/Non-Self-Destructive Behavior:  Goal: Absence of restraint indications  Description: Absence of restraint indications  Outcome: Ongoing

## 2020-05-31 LAB
ANION GAP SERPL CALCULATED.3IONS-SCNC: 10 MMOL/L (ref 3–16)
APTT: 31.5 SEC (ref 24.2–36.2)
BASE EXCESS ARTERIAL: 0.2 MMOL/L (ref -3–3)
BUN BLDV-MCNC: 14 MG/DL (ref 7–20)
CALCIUM SERPL-MCNC: 8.2 MG/DL (ref 8.3–10.6)
CARBOXYHEMOGLOBIN ARTERIAL: 1.2 % (ref 0–1.5)
CHLORIDE BLD-SCNC: 105 MMOL/L (ref 99–110)
CO2: 24 MMOL/L (ref 21–32)
CREAT SERPL-MCNC: <0.5 MG/DL (ref 0.8–1.3)
GFR AFRICAN AMERICAN: >60
GFR NON-AFRICAN AMERICAN: >60
GLUCOSE BLD-MCNC: 101 MG/DL (ref 70–99)
GLUCOSE BLD-MCNC: 139 MG/DL (ref 70–99)
GLUCOSE BLD-MCNC: 143 MG/DL (ref 70–99)
GLUCOSE BLD-MCNC: 147 MG/DL (ref 70–99)
GLUCOSE BLD-MCNC: 166 MG/DL (ref 70–99)
GLUCOSE BLD-MCNC: 93 MG/DL (ref 70–99)
HCO3 ARTERIAL: 25.1 MMOL/L (ref 21–29)
HCT VFR BLD CALC: 26.4 % (ref 40.5–52.5)
HEMOGLOBIN, ART, EXTENDED: 9.1 G/DL (ref 13.5–17.5)
HEMOGLOBIN: 8.5 G/DL (ref 13.5–17.5)
INR BLD: 1.21 (ref 0.86–1.14)
MAGNESIUM: 1.8 MG/DL (ref 1.8–2.4)
MCH RBC QN AUTO: 27.6 PG (ref 26–34)
MCHC RBC AUTO-ENTMCNC: 32.3 G/DL (ref 31–36)
MCV RBC AUTO: 85.5 FL (ref 80–100)
METHEMOGLOBIN ARTERIAL: 0.6 %
O2 CONTENT ARTERIAL: 13 ML/DL
O2 SAT, ARTERIAL: 99.3 %
O2 THERAPY: ABNORMAL
PCO2 ARTERIAL: 40.5 MMHG (ref 35–45)
PDW BLD-RTO: 16.1 % (ref 12.4–15.4)
PERFORMED ON: ABNORMAL
PERFORMED ON: NORMAL
PH ARTERIAL: 7.4 (ref 7.35–7.45)
PLATELET # BLD: 287 K/UL (ref 135–450)
PMV BLD AUTO: 7.7 FL (ref 5–10.5)
PO2 ARTERIAL: 110 MMHG (ref 75–108)
POTASSIUM SERPL-SCNC: 3.6 MMOL/L (ref 3.5–5.1)
PROTHROMBIN TIME: 14.1 SEC (ref 10–13.2)
RBC # BLD: 3.08 M/UL (ref 4.2–5.9)
SODIUM BLD-SCNC: 139 MMOL/L (ref 136–145)
TCO2 ARTERIAL: 26.3 MMOL/L
WBC # BLD: 5.4 K/UL (ref 4–11)

## 2020-05-31 PROCEDURE — 85610 PROTHROMBIN TIME: CPT

## 2020-05-31 PROCEDURE — 82803 BLOOD GASES ANY COMBINATION: CPT

## 2020-05-31 PROCEDURE — 6360000002 HC RX W HCPCS: Performed by: THORACIC SURGERY (CARDIOTHORACIC VASCULAR SURGERY)

## 2020-05-31 PROCEDURE — 85730 THROMBOPLASTIN TIME PARTIAL: CPT

## 2020-05-31 PROCEDURE — 6360000002 HC RX W HCPCS: Performed by: INTERNAL MEDICINE

## 2020-05-31 PROCEDURE — 2100000000 HC CCU R&B

## 2020-05-31 PROCEDURE — 2580000003 HC RX 258: Performed by: THORACIC SURGERY (CARDIOTHORACIC VASCULAR SURGERY)

## 2020-05-31 PROCEDURE — 6370000000 HC RX 637 (ALT 250 FOR IP): Performed by: NURSE PRACTITIONER

## 2020-05-31 PROCEDURE — 6360000002 HC RX W HCPCS: Performed by: NURSE PRACTITIONER

## 2020-05-31 PROCEDURE — 83735 ASSAY OF MAGNESIUM: CPT

## 2020-05-31 PROCEDURE — 94761 N-INVAS EAR/PLS OXIMETRY MLT: CPT

## 2020-05-31 PROCEDURE — 94003 VENT MGMT INPAT SUBQ DAY: CPT

## 2020-05-31 PROCEDURE — 36592 COLLECT BLOOD FROM PICC: CPT

## 2020-05-31 PROCEDURE — 2580000003 HC RX 258: Performed by: INTERNAL MEDICINE

## 2020-05-31 PROCEDURE — 6370000000 HC RX 637 (ALT 250 FOR IP): Performed by: THORACIC SURGERY (CARDIOTHORACIC VASCULAR SURGERY)

## 2020-05-31 PROCEDURE — 94640 AIRWAY INHALATION TREATMENT: CPT

## 2020-05-31 PROCEDURE — C9113 INJ PANTOPRAZOLE SODIUM, VIA: HCPCS | Performed by: THORACIC SURGERY (CARDIOTHORACIC VASCULAR SURGERY)

## 2020-05-31 PROCEDURE — 2700000000 HC OXYGEN THERAPY PER DAY

## 2020-05-31 PROCEDURE — 80048 BASIC METABOLIC PNL TOTAL CA: CPT

## 2020-05-31 PROCEDURE — 85027 COMPLETE CBC AUTOMATED: CPT

## 2020-05-31 PROCEDURE — 2500000003 HC RX 250 WO HCPCS: Performed by: THORACIC SURGERY (CARDIOTHORACIC VASCULAR SURGERY)

## 2020-05-31 PROCEDURE — 99024 POSTOP FOLLOW-UP VISIT: CPT | Performed by: THORACIC SURGERY (CARDIOTHORACIC VASCULAR SURGERY)

## 2020-05-31 PROCEDURE — 6360000002 HC RX W HCPCS: Performed by: PSYCHIATRY & NEUROLOGY

## 2020-05-31 PROCEDURE — 2580000003 HC RX 258: Performed by: PSYCHIATRY & NEUROLOGY

## 2020-05-31 RX ORDER — HYDROCODONE BITARTRATE AND ACETAMINOPHEN 5; 325 MG/1; MG/1
1 TABLET ORAL EVERY 4 HOURS PRN
Status: DISCONTINUED | OUTPATIENT
Start: 2020-05-31 | End: 2020-06-01

## 2020-05-31 RX ORDER — QUETIAPINE FUMARATE 25 MG/1
25 TABLET, FILM COATED ORAL 2 TIMES DAILY
Status: DISCONTINUED | OUTPATIENT
Start: 2020-05-31 | End: 2020-06-02

## 2020-05-31 RX ADMIN — ATORVASTATIN CALCIUM 20 MG: 20 TABLET, FILM COATED ORAL at 20:35

## 2020-05-31 RX ADMIN — CHLORHEXIDINE GLUCONATE 15 ML: 1.2 RINSE ORAL at 20:36

## 2020-05-31 RX ADMIN — PROPOFOL 5 MCG/KG/MIN: 10 INJECTION, EMULSION INTRAVENOUS at 09:09

## 2020-05-31 RX ADMIN — HYDROCODONE BITARTRATE AND ACETAMINOPHEN 1 TABLET: 5; 325 TABLET ORAL at 16:50

## 2020-05-31 RX ADMIN — MINERAL SUPPLEMENT IRON 300 MG / 5 ML STRENGTH LIQUID 100 PER BOX UNFLAVORED 300 MG: at 20:35

## 2020-05-31 RX ADMIN — DEXMEDETOMIDINE HYDROCHLORIDE 1.4 MCG/KG/HR: 4 INJECTION, SOLUTION INTRAVENOUS at 19:30

## 2020-05-31 RX ADMIN — PANTOPRAZOLE SODIUM 40 MG: 40 INJECTION, POWDER, FOR SOLUTION INTRAVENOUS at 08:30

## 2020-05-31 RX ADMIN — ALBUTEROL SULFATE 2.5 MG: 2.5 SOLUTION RESPIRATORY (INHALATION) at 11:31

## 2020-05-31 RX ADMIN — CASTOR OIL AND BALSAM, PERU: 788; 87 OINTMENT TOPICAL at 20:36

## 2020-05-31 RX ADMIN — DEXMEDETOMIDINE HYDROCHLORIDE 1.4 MCG/KG/HR: 4 INJECTION, SOLUTION INTRAVENOUS at 08:43

## 2020-05-31 RX ADMIN — HALOPERIDOL LACTATE 5 MG: 5 INJECTION, SOLUTION INTRAMUSCULAR at 20:41

## 2020-05-31 RX ADMIN — LORAZEPAM 1 MG: 2 INJECTION INTRAMUSCULAR; INTRAVENOUS at 21:50

## 2020-05-31 RX ADMIN — HEPARIN SODIUM 5000 UNITS: 5000 INJECTION INTRAVENOUS; SUBCUTANEOUS at 22:43

## 2020-05-31 RX ADMIN — FENTANYL CITRATE 25 MCG: 50 INJECTION INTRAMUSCULAR; INTRAVENOUS at 13:50

## 2020-05-31 RX ADMIN — LORAZEPAM 1 MG: 2 INJECTION INTRAMUSCULAR; INTRAVENOUS at 01:14

## 2020-05-31 RX ADMIN — Medication 100 MCG/HR: at 08:18

## 2020-05-31 RX ADMIN — Medication 100 MCG/HR: at 03:45

## 2020-05-31 RX ADMIN — MEROPENEM 500 MG: 500 INJECTION, POWDER, FOR SOLUTION INTRAVENOUS at 10:08

## 2020-05-31 RX ADMIN — LORAZEPAM 1 MG: 2 INJECTION INTRAMUSCULAR; INTRAVENOUS at 05:16

## 2020-05-31 RX ADMIN — INSULIN LISPRO 3 UNITS: 100 INJECTION, SOLUTION INTRAVENOUS; SUBCUTANEOUS at 20:08

## 2020-05-31 RX ADMIN — DEXMEDETOMIDINE HYDROCHLORIDE 1.4 MCG/KG/HR: 4 INJECTION, SOLUTION INTRAVENOUS at 12:22

## 2020-05-31 RX ADMIN — ACETAMINOPHEN 650 MG: 325 TABLET ORAL at 21:20

## 2020-05-31 RX ADMIN — LEVETIRACETAM 500 MG: 100 INJECTION, SOLUTION INTRAVENOUS at 13:18

## 2020-05-31 RX ADMIN — HYDROCODONE BITARTRATE AND ACETAMINOPHEN 1 TABLET: 5; 325 TABLET ORAL at 12:43

## 2020-05-31 RX ADMIN — METOPROLOL TARTRATE 75 MG: 25 TABLET, FILM COATED ORAL at 08:57

## 2020-05-31 RX ADMIN — ASPIRIN 81 MG 324 MG: 81 TABLET ORAL at 08:30

## 2020-05-31 RX ADMIN — MINERAL SUPPLEMENT IRON 300 MG / 5 ML STRENGTH LIQUID 100 PER BOX UNFLAVORED 300 MG: at 08:30

## 2020-05-31 RX ADMIN — MEROPENEM 500 MG: 500 INJECTION, POWDER, FOR SOLUTION INTRAVENOUS at 22:44

## 2020-05-31 RX ADMIN — ACETAMINOPHEN 650 MG: 325 TABLET ORAL at 17:17

## 2020-05-31 RX ADMIN — Medication 10 ML: at 09:11

## 2020-05-31 RX ADMIN — ALBUTEROL SULFATE 2.5 MG: 2.5 SOLUTION RESPIRATORY (INHALATION) at 19:45

## 2020-05-31 RX ADMIN — SODIUM CHLORIDE, PRESERVATIVE FREE 10 ML: 5 INJECTION INTRAVENOUS at 20:35

## 2020-05-31 RX ADMIN — DEXMEDETOMIDINE HYDROCHLORIDE 1.4 MCG/KG/HR: 4 INJECTION, SOLUTION INTRAVENOUS at 16:36

## 2020-05-31 RX ADMIN — LORAZEPAM 1 MG: 2 INJECTION INTRAMUSCULAR; INTRAVENOUS at 13:18

## 2020-05-31 RX ADMIN — FENTANYL CITRATE 25 MCG: 50 INJECTION INTRAMUSCULAR; INTRAVENOUS at 17:44

## 2020-05-31 RX ADMIN — HEPARIN SODIUM 5000 UNITS: 5000 INJECTION INTRAVENOUS; SUBCUTANEOUS at 13:50

## 2020-05-31 RX ADMIN — HEPARIN SODIUM 5000 UNITS: 5000 INJECTION INTRAVENOUS; SUBCUTANEOUS at 05:17

## 2020-05-31 RX ADMIN — ACETAMINOPHEN 650 MG: 325 TABLET ORAL at 08:58

## 2020-05-31 RX ADMIN — CHLORHEXIDINE GLUCONATE 15 ML: 1.2 RINSE ORAL at 08:30

## 2020-05-31 RX ADMIN — QUETIAPINE FUMARATE 25 MG: 25 TABLET ORAL at 20:35

## 2020-05-31 RX ADMIN — DEXMEDETOMIDINE HYDROCHLORIDE 1.4 MCG/KG/HR: 4 INJECTION, SOLUTION INTRAVENOUS at 22:55

## 2020-05-31 RX ADMIN — METOPROLOL TARTRATE 75 MG: 25 TABLET, FILM COATED ORAL at 20:35

## 2020-05-31 RX ADMIN — HALOPERIDOL LACTATE 5 MG: 5 INJECTION, SOLUTION INTRAMUSCULAR at 16:07

## 2020-05-31 RX ADMIN — DEXMEDETOMIDINE HYDROCHLORIDE 1.4 MCG/KG/HR: 4 INJECTION, SOLUTION INTRAVENOUS at 01:22

## 2020-05-31 RX ADMIN — FENTANYL CITRATE 25 MCG: 50 INJECTION INTRAMUSCULAR; INTRAVENOUS at 19:57

## 2020-05-31 RX ADMIN — DEXMEDETOMIDINE HYDROCHLORIDE 1.4 MCG/KG/HR: 4 INJECTION, SOLUTION INTRAVENOUS at 04:57

## 2020-05-31 RX ADMIN — HYDROCODONE BITARTRATE AND ACETAMINOPHEN 1 TABLET: 5; 325 TABLET ORAL at 20:57

## 2020-05-31 RX ADMIN — MEROPENEM 500 MG: 500 INJECTION, POWDER, FOR SOLUTION INTRAVENOUS at 16:07

## 2020-05-31 RX ADMIN — CASTOR OIL AND BALSAM, PERU: 788; 87 OINTMENT TOPICAL at 09:08

## 2020-05-31 RX ADMIN — INSULIN LISPRO 3 UNITS: 100 INJECTION, SOLUTION INTRAVENOUS; SUBCUTANEOUS at 12:23

## 2020-05-31 RX ADMIN — ALBUTEROL SULFATE 2.5 MG: 2.5 SOLUTION RESPIRATORY (INHALATION) at 15:53

## 2020-05-31 RX ADMIN — LORAZEPAM 1 MG: 2 INJECTION INTRAMUSCULAR; INTRAVENOUS at 17:19

## 2020-05-31 RX ADMIN — QUETIAPINE FUMARATE 25 MG: 25 TABLET ORAL at 12:44

## 2020-05-31 RX ADMIN — FENTANYL CITRATE 25 MCG: 50 INJECTION INTRAMUSCULAR; INTRAVENOUS at 15:18

## 2020-05-31 RX ADMIN — LEVETIRACETAM 500 MG: 100 INJECTION, SOLUTION INTRAVENOUS at 01:11

## 2020-05-31 RX ADMIN — LORAZEPAM 1 MG: 2 INJECTION INTRAMUSCULAR; INTRAVENOUS at 09:10

## 2020-05-31 RX ADMIN — MEROPENEM 500 MG: 500 INJECTION, POWDER, FOR SOLUTION INTRAVENOUS at 04:33

## 2020-05-31 RX ADMIN — ALBUTEROL SULFATE 2.5 MG: 2.5 SOLUTION RESPIRATORY (INHALATION) at 08:12

## 2020-05-31 ASSESSMENT — PAIN SCALES - GENERAL
PAINLEVEL_OUTOF10: 0
PAINLEVEL_OUTOF10: 4
PAINLEVEL_OUTOF10: 3
PAINLEVEL_OUTOF10: 0
PAINLEVEL_OUTOF10: 3
PAINLEVEL_OUTOF10: 0
PAINLEVEL_OUTOF10: 0
PAINLEVEL_OUTOF10: 7
PAINLEVEL_OUTOF10: 0
PAINLEVEL_OUTOF10: 4
PAINLEVEL_OUTOF10: 5
PAINLEVEL_OUTOF10: 0
PAINLEVEL_OUTOF10: 4
PAINLEVEL_OUTOF10: 3
PAINLEVEL_OUTOF10: 7
PAINLEVEL_OUTOF10: 0
PAINLEVEL_OUTOF10: 4
PAINLEVEL_OUTOF10: 4

## 2020-05-31 ASSESSMENT — PULMONARY FUNCTION TESTS
PIF_VALUE: 15
PIF_VALUE: 23
PIF_VALUE: 19
PIF_VALUE: 16
PIF_VALUE: 17
PIF_VALUE: 12
PIF_VALUE: 19
PIF_VALUE: 17
PIF_VALUE: 15
PIF_VALUE: 20
PIF_VALUE: 16
PIF_VALUE: 12
PIF_VALUE: 20
PIF_VALUE: 18
PIF_VALUE: 20
PIF_VALUE: 19
PIF_VALUE: 16
PIF_VALUE: 19
PIF_VALUE: 14
PIF_VALUE: 16
PIF_VALUE: 18
PIF_VALUE: 19
PIF_VALUE: 16
PIF_VALUE: 49
PIF_VALUE: 15
PIF_VALUE: 17
PIF_VALUE: 20

## 2020-05-31 ASSESSMENT — PAIN DESCRIPTION - PAIN TYPE
TYPE: ACUTE PAIN
TYPE: ACUTE PAIN

## 2020-05-31 NOTE — PLAN OF CARE
Problem: Falls - Risk of:  Goal: Will remain free from falls  Description: Will remain free from falls  5/31/2020 1546 by Jaswinder Guillaume RN  Outcome: Ongoing  5/31/2020 0751 by Delmis Gurrola RN  Outcome: Met This Shift     Problem: Pain:  Goal: Pain level will decrease  Description: Pain level will decrease  5/31/2020 1546 by Jaswinder Guillaume RN  Outcome: Ongoing  5/31/2020 0751 by Delmis Gurrola RN  Outcome: Ongoing  Goal: Control of acute pain  Description: Control of acute pain  5/31/2020 1546 by Jaswinder Guillaume RN  Outcome: Ongoing  5/31/2020 0751 by Delmis Gurrola RN  Outcome: Ongoing  Goal: Control of chronic pain  Description: Control of chronic pain  5/31/2020 1546 by Jaswinder Guillaume RN  Outcome: Ongoing  5/31/2020 0751 by Delmis Gurrola RN  Outcome: Ongoing     Problem: Discharge Planning:  Goal: Discharged to appropriate level of care  Description: Discharged to appropriate level of care  Outcome: Ongoing     Problem: Fluid Volume - Imbalance:  Goal: Ability to achieve a balanced intake and output will improve  Description: Ability to achieve a balanced intake and output will improve  Outcome: Ongoing     Problem: Anxiety:  Goal: Level of anxiety will decrease  Description: Level of anxiety will decrease  Outcome: Ongoing     Problem: Tissue Perfusion - Renal, Altered:  Goal: Ability to achieve a balanced intake and output will improve  Description: Ability to achieve a balanced intake and output will improve  Outcome: Ongoing  Goal: Electrolytes within specified parameters  Description: Electrolytes within specified parameters  Outcome: Ongoing     Problem: Restraint Use - Nonviolent/Non-Self-Destructive Behavior:  Goal: Absence of restraint indications  Description: Absence of restraint indications  5/31/2020 1546 by Jaswinder Guillaume RN  Outcome: Ongoing  5/31/2020 0751 by Delmis Gurrola RN  Outcome: Ongoing  Goal: Absence of restraint-related injury  Description: Absence of

## 2020-05-31 NOTE — PLAN OF CARE
Problem: Falls - Risk of:  Goal: Will remain free from falls  Description: Will remain free from falls  Outcome: Met This Shift     Problem: Falls - Risk of:  Goal: Absence of physical injury  Description: Absence of physical injury  Outcome: Met This Shift     Problem: Pain:  Goal: Pain level will decrease  Description: Pain level will decrease  5/31/2020 0751 by Young Jj RN  Outcome: Ongoing    Problem: Pain:  Goal: Control of acute pain  Description: Control of acute pain  5/31/2020 0751 by Young Jj RN  Outcome: Ongoing      Problem: Pain:  Goal: Control of chronic pain  Description: Control of chronic pain  5/31/2020 0751 by Young Jj RN  Outcome: Ongoing

## 2020-06-01 LAB
AMMONIA: 23 UMOL/L (ref 16–60)
ANION GAP SERPL CALCULATED.3IONS-SCNC: 11 MMOL/L (ref 3–16)
APTT: 29.6 SEC (ref 24.2–36.2)
BASE EXCESS ARTERIAL: 2.3 MMOL/L (ref -3–3)
BLOOD CULTURE, ROUTINE: NORMAL
BODY FLUID CULTURE, STERILE: NORMAL
BUN BLDV-MCNC: 15 MG/DL (ref 7–20)
CALCIUM SERPL-MCNC: 8.2 MG/DL (ref 8.3–10.6)
CALCIUM SERPL-MCNC: 8.5 MG/DL (ref 8.3–10.6)
CARBOXYHEMOGLOBIN ARTERIAL: 0.8 % (ref 0–1.5)
CHLORIDE BLD-SCNC: 106 MMOL/L (ref 99–110)
CO2: 24 MMOL/L (ref 21–32)
CREAT SERPL-MCNC: 0.6 MG/DL (ref 0.8–1.3)
CULTURE, BLOOD 2: NORMAL
FOLATE: 11.08 NG/ML (ref 4.78–24.2)
GFR AFRICAN AMERICAN: >60
GFR NON-AFRICAN AMERICAN: >60
GLUCOSE BLD-MCNC: 107 MG/DL (ref 70–99)
GLUCOSE BLD-MCNC: 112 MG/DL (ref 70–99)
GLUCOSE BLD-MCNC: 116 MG/DL (ref 70–99)
GLUCOSE BLD-MCNC: 120 MG/DL (ref 70–99)
GLUCOSE BLD-MCNC: 122 MG/DL (ref 70–99)
GLUCOSE BLD-MCNC: 129 MG/DL (ref 70–99)
GLUCOSE BLD-MCNC: 140 MG/DL (ref 70–99)
GRAM STAIN RESULT: NORMAL
HCO3 ARTERIAL: 25.9 MMOL/L (ref 21–29)
HCT VFR BLD CALC: 26.5 % (ref 40.5–52.5)
HEMOGLOBIN, ART, EXTENDED: 11.2 G/DL (ref 13.5–17.5)
HEMOGLOBIN: 8.4 G/DL (ref 13.5–17.5)
INR BLD: 1.2 (ref 0.86–1.14)
MAGNESIUM: 1.7 MG/DL (ref 1.8–2.4)
MAGNESIUM: 2 MG/DL (ref 1.8–2.4)
MCH RBC QN AUTO: 26.8 PG (ref 26–34)
MCHC RBC AUTO-ENTMCNC: 31.9 G/DL (ref 31–36)
MCV RBC AUTO: 83.9 FL (ref 80–100)
METHEMOGLOBIN ARTERIAL: 0.1 %
O2 CONTENT ARTERIAL: 15 ML/DL
O2 SAT, ARTERIAL: 96.8 %
O2 THERAPY: ABNORMAL
PCO2 ARTERIAL: 35.7 MMHG (ref 35–45)
PDW BLD-RTO: 15.7 % (ref 12.4–15.4)
PERFORMED ON: ABNORMAL
PH ARTERIAL: 7.47 (ref 7.35–7.45)
PLATELET # BLD: 278 K/UL (ref 135–450)
PMV BLD AUTO: 7.7 FL (ref 5–10.5)
PO2 ARTERIAL: 80.5 MMHG (ref 75–108)
POTASSIUM SERPL-SCNC: 4 MMOL/L (ref 3.5–5.1)
PROTHROMBIN TIME: 14 SEC (ref 10–13.2)
RBC # BLD: 3.16 M/UL (ref 4.2–5.9)
SODIUM BLD-SCNC: 141 MMOL/L (ref 136–145)
TCO2 ARTERIAL: 27 MMOL/L
VITAMIN B-12: 871 PG/ML (ref 211–911)
WBC # BLD: 6 K/UL (ref 4–11)

## 2020-06-01 PROCEDURE — 83735 ASSAY OF MAGNESIUM: CPT

## 2020-06-01 PROCEDURE — 2100000000 HC CCU R&B

## 2020-06-01 PROCEDURE — 99232 SBSQ HOSP IP/OBS MODERATE 35: CPT | Performed by: INTERNAL MEDICINE

## 2020-06-01 PROCEDURE — 82310 ASSAY OF CALCIUM: CPT

## 2020-06-01 PROCEDURE — 94761 N-INVAS EAR/PLS OXIMETRY MLT: CPT

## 2020-06-01 PROCEDURE — 2580000003 HC RX 258: Performed by: PSYCHIATRY & NEUROLOGY

## 2020-06-01 PROCEDURE — 99024 POSTOP FOLLOW-UP VISIT: CPT | Performed by: THORACIC SURGERY (CARDIOTHORACIC VASCULAR SURGERY)

## 2020-06-01 PROCEDURE — 6370000000 HC RX 637 (ALT 250 FOR IP): Performed by: NURSE PRACTITIONER

## 2020-06-01 PROCEDURE — C9113 INJ PANTOPRAZOLE SODIUM, VIA: HCPCS | Performed by: THORACIC SURGERY (CARDIOTHORACIC VASCULAR SURGERY)

## 2020-06-01 PROCEDURE — 85610 PROTHROMBIN TIME: CPT

## 2020-06-01 PROCEDURE — 82607 VITAMIN B-12: CPT

## 2020-06-01 PROCEDURE — 94640 AIRWAY INHALATION TREATMENT: CPT

## 2020-06-01 PROCEDURE — 6370000000 HC RX 637 (ALT 250 FOR IP): Performed by: THORACIC SURGERY (CARDIOTHORACIC VASCULAR SURGERY)

## 2020-06-01 PROCEDURE — 80048 BASIC METABOLIC PNL TOTAL CA: CPT

## 2020-06-01 PROCEDURE — 2580000003 HC RX 258: Performed by: INTERNAL MEDICINE

## 2020-06-01 PROCEDURE — 2500000003 HC RX 250 WO HCPCS: Performed by: NURSE PRACTITIONER

## 2020-06-01 PROCEDURE — 6360000002 HC RX W HCPCS: Performed by: THORACIC SURGERY (CARDIOTHORACIC VASCULAR SURGERY)

## 2020-06-01 PROCEDURE — 6360000002 HC RX W HCPCS: Performed by: PSYCHIATRY & NEUROLOGY

## 2020-06-01 PROCEDURE — 36600 WITHDRAWAL OF ARTERIAL BLOOD: CPT

## 2020-06-01 PROCEDURE — 2700000000 HC OXYGEN THERAPY PER DAY

## 2020-06-01 PROCEDURE — 85027 COMPLETE CBC AUTOMATED: CPT

## 2020-06-01 PROCEDURE — 2580000003 HC RX 258: Performed by: THORACIC SURGERY (CARDIOTHORACIC VASCULAR SURGERY)

## 2020-06-01 PROCEDURE — 82140 ASSAY OF AMMONIA: CPT

## 2020-06-01 PROCEDURE — 36592 COLLECT BLOOD FROM PICC: CPT

## 2020-06-01 PROCEDURE — 85730 THROMBOPLASTIN TIME PARTIAL: CPT

## 2020-06-01 PROCEDURE — 82803 BLOOD GASES ANY COMBINATION: CPT

## 2020-06-01 PROCEDURE — 6360000002 HC RX W HCPCS: Performed by: NURSE PRACTITIONER

## 2020-06-01 PROCEDURE — 2580000003 HC RX 258: Performed by: NURSE PRACTITIONER

## 2020-06-01 PROCEDURE — 6360000002 HC RX W HCPCS: Performed by: INTERNAL MEDICINE

## 2020-06-01 PROCEDURE — 82746 ASSAY OF FOLIC ACID SERUM: CPT

## 2020-06-01 PROCEDURE — 2500000003 HC RX 250 WO HCPCS: Performed by: THORACIC SURGERY (CARDIOTHORACIC VASCULAR SURGERY)

## 2020-06-01 PROCEDURE — 94750 HC PULMONARY COMPLIANCE STUDY: CPT

## 2020-06-01 PROCEDURE — 94003 VENT MGMT INPAT SUBQ DAY: CPT

## 2020-06-01 RX ORDER — OXYCODONE HYDROCHLORIDE 5 MG/1
5 TABLET ORAL EVERY 4 HOURS PRN
Status: DISCONTINUED | OUTPATIENT
Start: 2020-06-01 | End: 2020-06-05 | Stop reason: HOSPADM

## 2020-06-01 RX ORDER — METOPROLOL TARTRATE 50 MG/1
50 TABLET, FILM COATED ORAL 2 TIMES DAILY
Status: DISCONTINUED | OUTPATIENT
Start: 2020-06-01 | End: 2020-06-03

## 2020-06-01 RX ADMIN — CHLORHEXIDINE GLUCONATE 15 ML: 1.2 RINSE ORAL at 08:13

## 2020-06-01 RX ADMIN — MINERAL SUPPLEMENT IRON 300 MG / 5 ML STRENGTH LIQUID 100 PER BOX UNFLAVORED 300 MG: at 08:02

## 2020-06-01 RX ADMIN — FENTANYL CITRATE 25 MCG: 50 INJECTION INTRAMUSCULAR; INTRAVENOUS at 20:31

## 2020-06-01 RX ADMIN — LORAZEPAM 1 MG: 2 INJECTION INTRAMUSCULAR; INTRAVENOUS at 03:40

## 2020-06-01 RX ADMIN — LORAZEPAM 1 MG: 2 INJECTION INTRAMUSCULAR; INTRAVENOUS at 19:25

## 2020-06-01 RX ADMIN — LORAZEPAM 1 MG: 2 INJECTION INTRAMUSCULAR; INTRAVENOUS at 10:38

## 2020-06-01 RX ADMIN — QUETIAPINE FUMARATE 25 MG: 25 TABLET ORAL at 08:01

## 2020-06-01 RX ADMIN — ACETAMINOPHEN 650 MG: 325 TABLET ORAL at 16:09

## 2020-06-01 RX ADMIN — METOPROLOL TARTRATE 50 MG: 50 TABLET, FILM COATED ORAL at 20:32

## 2020-06-01 RX ADMIN — ACETAMINOPHEN 650 MG: 325 TABLET ORAL at 05:01

## 2020-06-01 RX ADMIN — DEXMEDETOMIDINE HYDROCHLORIDE 1.4 MCG/KG/HR: 4 INJECTION, SOLUTION INTRAVENOUS at 17:13

## 2020-06-01 RX ADMIN — Medication 10 ML: at 06:54

## 2020-06-01 RX ADMIN — ACETAMINOPHEN 650 MG: 325 TABLET ORAL at 20:32

## 2020-06-01 RX ADMIN — PANTOPRAZOLE SODIUM 40 MG: 40 INJECTION, POWDER, FOR SOLUTION INTRAVENOUS at 06:54

## 2020-06-01 RX ADMIN — SODIUM CHLORIDE, PRESERVATIVE FREE 10 ML: 5 INJECTION INTRAVENOUS at 08:02

## 2020-06-01 RX ADMIN — DEXMEDETOMIDINE HYDROCHLORIDE 1.4 MCG/KG/HR: 4 INJECTION, SOLUTION INTRAVENOUS at 06:05

## 2020-06-01 RX ADMIN — OXYCODONE 5 MG: 5 TABLET ORAL at 21:58

## 2020-06-01 RX ADMIN — MEROPENEM 500 MG: 500 INJECTION, POWDER, FOR SOLUTION INTRAVENOUS at 10:19

## 2020-06-01 RX ADMIN — DEXMEDETOMIDINE HYDROCHLORIDE 1.3 MCG/KG/HR: 4 INJECTION, SOLUTION INTRAVENOUS at 09:44

## 2020-06-01 RX ADMIN — CALCIUM CHLORIDE 1 G: 100 INJECTION, SOLUTION INTRAVENOUS at 08:43

## 2020-06-01 RX ADMIN — INSULIN LISPRO 3 UNITS: 100 INJECTION, SOLUTION INTRAVENOUS; SUBCUTANEOUS at 08:03

## 2020-06-01 RX ADMIN — FENTANYL CITRATE 25 MCG: 50 INJECTION INTRAMUSCULAR; INTRAVENOUS at 15:05

## 2020-06-01 RX ADMIN — LEVETIRACETAM 500 MG: 100 INJECTION, SOLUTION INTRAVENOUS at 13:17

## 2020-06-01 RX ADMIN — MEROPENEM 500 MG: 500 INJECTION, POWDER, FOR SOLUTION INTRAVENOUS at 21:58

## 2020-06-01 RX ADMIN — MINERAL SUPPLEMENT IRON 300 MG / 5 ML STRENGTH LIQUID 100 PER BOX UNFLAVORED 300 MG: at 21:58

## 2020-06-01 RX ADMIN — FENTANYL CITRATE 25 MCG: 50 INJECTION INTRAMUSCULAR; INTRAVENOUS at 11:35

## 2020-06-01 RX ADMIN — OXYCODONE 5 MG: 5 TABLET ORAL at 13:30

## 2020-06-01 RX ADMIN — MAGNESIUM SULFATE HEPTAHYDRATE 2 G: 40 INJECTION, SOLUTION INTRAVENOUS at 06:37

## 2020-06-01 RX ADMIN — SODIUM CHLORIDE, PRESERVATIVE FREE 10 ML: 5 INJECTION INTRAVENOUS at 21:58

## 2020-06-01 RX ADMIN — HEPARIN SODIUM 5000 UNITS: 5000 INJECTION INTRAVENOUS; SUBCUTANEOUS at 21:58

## 2020-06-01 RX ADMIN — FENTANYL CITRATE 25 MCG: 50 INJECTION INTRAMUSCULAR; INTRAVENOUS at 02:24

## 2020-06-01 RX ADMIN — HYDRALAZINE HYDROCHLORIDE 10 MG: 20 INJECTION INTRAMUSCULAR; INTRAVENOUS at 14:10

## 2020-06-01 RX ADMIN — DEXMEDETOMIDINE HYDROCHLORIDE 1.4 MCG/KG/HR: 4 INJECTION, SOLUTION INTRAVENOUS at 13:31

## 2020-06-01 RX ADMIN — MEROPENEM 500 MG: 500 INJECTION, POWDER, FOR SOLUTION INTRAVENOUS at 05:02

## 2020-06-01 RX ADMIN — ACETAMINOPHEN 650 MG: 325 TABLET ORAL at 09:05

## 2020-06-01 RX ADMIN — DEXMEDETOMIDINE HYDROCHLORIDE 1.4 MCG/KG/HR: 4 INJECTION, SOLUTION INTRAVENOUS at 23:54

## 2020-06-01 RX ADMIN — CASTOR OIL AND BALSAM, PERU: 788; 87 OINTMENT TOPICAL at 20:35

## 2020-06-01 RX ADMIN — LEVETIRACETAM 500 MG: 100 INJECTION, SOLUTION INTRAVENOUS at 00:32

## 2020-06-01 RX ADMIN — ALBUTEROL SULFATE 2.5 MG: 2.5 SOLUTION RESPIRATORY (INHALATION) at 08:19

## 2020-06-01 RX ADMIN — HEPARIN SODIUM 5000 UNITS: 5000 INJECTION INTRAVENOUS; SUBCUTANEOUS at 14:19

## 2020-06-01 RX ADMIN — ALBUTEROL SULFATE 2.5 MG: 2.5 SOLUTION RESPIRATORY (INHALATION) at 12:01

## 2020-06-01 RX ADMIN — DEXMEDETOMIDINE HYDROCHLORIDE 1.4 MCG/KG/HR: 4 INJECTION, SOLUTION INTRAVENOUS at 02:23

## 2020-06-01 RX ADMIN — ASPIRIN 81 MG 324 MG: 81 TABLET ORAL at 08:01

## 2020-06-01 RX ADMIN — FENTANYL CITRATE 25 MCG: 50 INJECTION INTRAMUSCULAR; INTRAVENOUS at 08:48

## 2020-06-01 RX ADMIN — CHLORHEXIDINE GLUCONATE 15 ML: 1.2 RINSE ORAL at 21:45

## 2020-06-01 RX ADMIN — FENTANYL CITRATE 25 MCG: 50 INJECTION INTRAMUSCULAR; INTRAVENOUS at 04:00

## 2020-06-01 RX ADMIN — MEROPENEM 500 MG: 500 INJECTION, POWDER, FOR SOLUTION INTRAVENOUS at 15:58

## 2020-06-01 RX ADMIN — SODIUM CHLORIDE, PRESERVATIVE FREE 10 ML: 5 INJECTION INTRAVENOUS at 21:00

## 2020-06-01 RX ADMIN — FENTANYL CITRATE 25 MCG: 50 INJECTION INTRAMUSCULAR; INTRAVENOUS at 17:14

## 2020-06-01 RX ADMIN — FENTANYL CITRATE 25 MCG: 50 INJECTION INTRAMUSCULAR; INTRAVENOUS at 16:10

## 2020-06-01 RX ADMIN — LORAZEPAM 1 MG: 2 INJECTION INTRAMUSCULAR; INTRAVENOUS at 23:29

## 2020-06-01 RX ADMIN — ALBUTEROL SULFATE 2.5 MG: 2.5 SOLUTION RESPIRATORY (INHALATION) at 19:44

## 2020-06-01 RX ADMIN — ALBUTEROL SULFATE 2.5 MG: 2.5 SOLUTION RESPIRATORY (INHALATION) at 15:46

## 2020-06-01 RX ADMIN — FENTANYL CITRATE 25 MCG: 50 INJECTION INTRAMUSCULAR; INTRAVENOUS at 00:32

## 2020-06-01 RX ADMIN — CASTOR OIL AND BALSAM, PERU: 788; 87 OINTMENT TOPICAL at 08:02

## 2020-06-01 RX ADMIN — ATORVASTATIN CALCIUM 20 MG: 20 TABLET, FILM COATED ORAL at 20:32

## 2020-06-01 RX ADMIN — QUETIAPINE FUMARATE 25 MG: 25 TABLET ORAL at 20:32

## 2020-06-01 RX ADMIN — HEPARIN SODIUM 5000 UNITS: 5000 INJECTION INTRAVENOUS; SUBCUTANEOUS at 05:25

## 2020-06-01 RX ADMIN — LORAZEPAM 1 MG: 2 INJECTION INTRAMUSCULAR; INTRAVENOUS at 15:04

## 2020-06-01 RX ADMIN — DEXMEDETOMIDINE HYDROCHLORIDE 1.4 MCG/KG/HR: 4 INJECTION, SOLUTION INTRAVENOUS at 20:34

## 2020-06-01 RX ADMIN — METOPROLOL TARTRATE 50 MG: 50 TABLET, FILM COATED ORAL at 08:48

## 2020-06-01 RX ADMIN — FENTANYL CITRATE 25 MCG: 50 INJECTION INTRAMUSCULAR; INTRAVENOUS at 23:57

## 2020-06-01 ASSESSMENT — PULMONARY FUNCTION TESTS
PIF_VALUE: 15
PIF_VALUE: 17
PIF_VALUE: 21
PIF_VALUE: 18
PIF_VALUE: 20
PIF_VALUE: 18
PIF_VALUE: 23
PIF_VALUE: 17
PIF_VALUE: 18
PIF_VALUE: 14
PIF_VALUE: 15
PIF_VALUE: 18
PIF_VALUE: 15
PIF_VALUE: 23
PIF_VALUE: 20
PIF_VALUE: 16
PIF_VALUE: 20
PIF_VALUE: 32
PIF_VALUE: 40
PIF_VALUE: 27

## 2020-06-01 ASSESSMENT — PAIN SCALES - GENERAL
PAINLEVEL_OUTOF10: 2
PAINLEVEL_OUTOF10: 4
PAINLEVEL_OUTOF10: 0
PAINLEVEL_OUTOF10: 5
PAINLEVEL_OUTOF10: 0
PAINLEVEL_OUTOF10: 0
PAINLEVEL_OUTOF10: 4
PAINLEVEL_OUTOF10: 0
PAINLEVEL_OUTOF10: 4
PAINLEVEL_OUTOF10: 0
PAINLEVEL_OUTOF10: 0
PAINLEVEL_OUTOF10: 5
PAINLEVEL_OUTOF10: 2
PAINLEVEL_OUTOF10: 7
PAINLEVEL_OUTOF10: 4
PAINLEVEL_OUTOF10: 4
PAINLEVEL_OUTOF10: 1
PAINLEVEL_OUTOF10: 7
PAINLEVEL_OUTOF10: 6
PAINLEVEL_OUTOF10: 5
PAINLEVEL_OUTOF10: 1
PAINLEVEL_OUTOF10: 6
PAINLEVEL_OUTOF10: 5
PAINLEVEL_OUTOF10: 0
PAINLEVEL_OUTOF10: 7
PAINLEVEL_OUTOF10: 4
PAINLEVEL_OUTOF10: 4

## 2020-06-01 NOTE — CARE COORDINATION
SARAH reviewed chart   SHARIW spoke with bedside RN who reports pt continues with restraints and IV sedation. No response from wife , Carlos Ashton at this time. SHARIW informed bedside RN that pt is legally  at this time however they are  and have been for years per son. DUE TO MARITAL STATUS, SHE CONTINUES TO BE THE DECISION MAKER UNLESS SHE REFUSES TO BE INVOLVED. In the event she refuses to be involved, she will need to communicate this to two persons who will chart they have understood her decisions. Today, LSW attempted to reach her but her voice message was filled up. I was able to send a SMS message of my phone number in hopes she will return the call.   [place], [place]     Case Management   [phone]    6/1/2020  1:29 PM

## 2020-06-01 NOTE — PROGRESS NOTES
Infectious Disease Follow up Notes  Admit Date: 5/7/2020  Hospital Day: 26    Antibiotics :   IV Meropenem     CHIEF COMPLAINT:     NSTEMI  CABG Emergent   Fevers   Resp failure  ?dts  CVA   S/p Trach  S/p Left thoracentesis on 5/29     Subjective interval History :  60 y.o. man admitted with chest pain on going for some time with acute relief from Nitro per HPI and noted to have EKG changes with elevated cardiac enzymes underwent emergent cardiac cath and subsequently taken for CABG on 5/8 by  and post op now in CVICU on the ventilator and WBC elevation post op up to 24 k AND Hb low at 6.7 post op and has been resuscitated now having fevers from 5/10 increasing T max slowly now up to 102.3 and WBC has trended down but noted to have Lft lELEVATION and there was some concern for DTS based on the history started on MVI and Lorazepam and Blood cx , sputum cx from 5/12 NGTD and we are consulted for recommendations given the fevers . Remains on the vent through Trach and using 40% Fio2, Remains on low dose sedation and in restraints as pulling the tubes and omalley in place loose stools fevers low grade and not able to follow command off sedation per RN and WBC trend down     Past Medical History:    Past Medical History:   Diagnosis Date    Blood clot in vein 2018    right leg. no previous scans to know whether supf or dvt. no coumadin. put on plavix.  Bronchitis     doesn't remember when.  outpt abx    Carotid stenosis 05/07/2020    bilat 50-79% stenosis    Hepatitis C antibody positive in blood 05/16/2020    Hypertension     Kidney disease     s/p nephrectomy age 17, pt not sure why     NSTEMI (non-ST elevated myocardial infarction) ([facility] [address] 05/07/2020    3VD    PAD (peripheral artery disease) ([facility] [address]     Respiratory compromise 05/2020    chemical exposure at work, seen at [facility], covid neg, given steroids    Tattoos Past Surgical History:    Past Surgical History:   Procedure Laterality Date    CORONARY ARTERY BYPASS GRAFT  2020    cabgx4 (LIMA-LAD, SVG-D1, SVG-OM, SVG-PDA)    CORONARY ARTERY BYPASS GRAFT N/A 2020    CABG CORONARY ARTERY BYPASS GRAFT X4,  TRANSESOPHAGEAL ECHOCARDIOGRAM, TOTAL CARDIOPULMONARY BYPASS performed by Latonia Yarbrough MD at [address] N/A 2020    PERCUTANEOUS ENDOSCOPIC GASTROSTOMY TUBE PLACEMENT performed by Su Garner MD at [facility] Left     17 yo - pt doesn't know why       Current Medications:    Outpatient Medications Marked as Taking for the 20 encounter [facility] Encounter)   Medication Sig Dispense Refill    clopidogrel (PLAVIX) 75 MG tablet Take 75 mg by mouth daily      lisinopril (PRINIVIL;ZESTRIL) 40 MG tablet Take 40 mg by mouth daily      amLODIPine (NORVASC) 5 MG tablet Take 5 mg by mouth daily      albuterol sulfate HFA (VENTOLIN HFA) 108 (90 Base) MCG/ACT inhaler Inhale 2 puffs into the lungs every 6 hours as needed for Wheezing         Allergies:  Patient has no known allergies. Immunizations : There is no immunization history on file for this patient. Social History:    Social History     Tobacco Use    Smoking status: Former Smoker     Types: Cigarettes     Last attempt to quit: 2019     Years since quittin.5    Smokeless tobacco: Never Used    Tobacco comment: started age 27   Substance Use Topics    Alcohol use: Yes     Frequency: Monthly or less     Comment: once a month, shot once a week. depends on mood.     Drug use: Never     Social History     Tobacco Use   Smoking Status Former Smoker    Types: Cigarettes    Last attempt to quit: 2019    Years since quittin.5   Smokeless Tobacco Never Used   Tobacco Comment    started age 27      Family History   Problem Relation Age of Onset    Heart Disease Mother          of MI age 36    Heart Disease Brother MI mid 46s    Heart Disease Father          of MI age 68    Heart Disease Paternal Aunt         MI in her 46s         REVIEW OF SYSTEMS:    No fever / chills / sweats. No weight loss. No visual change, eye pain, eye discharge. No oral lesion, sore throat, dysphagia. Denies cough / sputum/Sob   Denies chest pain, palpitations/ dizziness  Denies nausea/ vomiting/abdominal pain/diarrhea. Denies dysuria or change in urinary function. Denies joint swelling or pain. No myalgia, arthralgia. No rashes, skin lesions   Denies focal weakness, sensory change or other neurologic symptoms  No lymph node swelling or tenderness. ROS not possible    PHYSICAL EXAM:      Vitals:  T max  101.3     /60   Pulse 71   Temp 100.1 °F (37.8 °C) (Rectal)   Resp 18   Ht 6' (1.829 m)   Wt 171 lb 8.3 oz (77.8 kg)   SpO2 100%   BMI 23.26 kg/m²   General Appearance: intubated via Trach ++  on the vent and , + pallor, + icterus     Skin: warm and dry, no rash or erythema  Head: normocephalic and atraumatic  Eyes: pupils equal, round, and reactive to light, conjunctivae normal  ENT: tympanic membrane, external ear and ear canal normal bilaterally, nose without deformity, nasal mucosa and turbinates normal without polyps  Neck: supple and non-tender without mass, no thyromegaly  no cervical lymphadenopathy  Pulmonary/Chest: coarsel crepts+ upper airway ronchi+   wheezes, rales or rhonchi, normal air movement, no respiratory distress  Cardiovascular:   S1 and S2, no murmurs, rubs, clicks, or gallops, no carotid bruits sternal incision clean binder +  Abdomen: soft, non-tender, non-distended, normal bowel sounds, no masses or organomegaly  Extremities: no cyanosis, clubbing or edema  Musculoskeletal: normal range of motion, no joint swelling, deformity or tenderness  Neurologic less responsive on the vent    Lines:   Hoang  PICC  Rectal tube+   S/p Trach site clean      Data Review:    Lab Results   Component <=2 yrs old please draw pediatric bottle. ~Blood Culture #2  Performed at:  [facility]  [address]  [place] [address]   Phone [phone]   Culture, Urine [[id number]] Collected: 05/12/20 0830   Order Status: Completed Specimen: Urine, clean catch Updated: 05/13/20 0750    Urine Culture, Routine No growth at 18-36 hours   Narrative:     ORDER#: [id number]                          ORDERED BY: Addie Route  SOURCE: Urine Clean Catch                  COLLECTED:  05/12/20 08:30  ANTIBIOTICS AT NITISH. :                      RECEIVED :  05/12/20 09:55  Performed at:  [facility]  [address]  [place] [address]   Phone [phone]   MRSA DNA Probe, Nasal [[id number]] Collected: 05/07/20 1312   Order Status: Completed Specimen: Nasal from Nares Updated: 05/08/20 0711    MRSA SCREEN RT-PCR --    Negative - MRSA DNA not detected. Normal Range: Not detected    Narrative:     ORDER#: [id number]                          ORDERED BY: Addie Route  SOURCE: Nares                              COLLECTED:  05/07/20 13:12  ANTIBIOTICS AT NITISH. :                      RECEIVED :  05/07/20 13:18  Performed at:  [facility]  [address]  [place] [address]   Phone [phone]   Culture, Blood 1 [[id number]] Collected: 05/07/20 0743   Order Status: Canceled Specimen: Blood    Culture, Blood 2 [[id number]]    Order Status: Canceled Specimen: Blood        Date/Time       Culture, Respiratory [[id number]] Collected: 05/15/20 1045   Order Status: Completed Specimen: Sputum, Suctioned Updated: 05/17/20 0956    CULTURE, RESPIRATORY No growth 36-48 hours    Gram Stain Result 1+ WBC's (Polymorphonuclear)   1+ Epithelial Cells   1+ Gram positive cocci    Narrative:     ORDER#: [id number]                          ORDERED BY: Addie Route  SOURCE: Sputum Suctioned                   COLLECTED:  05/15/20 infection or   mild edema. The overall appearance is very similar to May 15, 2020         VL Extremity Venous Left   Final Result      CT Head WO Contrast   Final Result   Unchanged appearance of the brain notable for subcortical white matter   changes. No acute intracranial hemorrhage identified. CT HEAD WO CONTRAST   Final Result   Focal right parietal white matter hypodensity, suggestive of evolved ischemia   as compared to prior exam dated 05/11/2020. MR could be performed for   further evaluation. Paranasal sinus mucosal thickening. Several small foci of air are seen within the left temporal scalp,   non-specific, which may be intravascular related to IV injection. Suggest   correlation with physical examination if laceration or soft tissue infection   are of concern. MRI BRAIN W CONTRAST   Final Result   Markedly limited exam due to patient motion. Within that limitation, there   is no abnormal enhancement identified. MRA HEAD WO CONTRAST   Final Result   Essentially nondiagnostic exam due to patient motion demonstrating no major   branch occlusion. MRA NECK W WO CONTRAST   Final Result   Significantly limited exam due to patient motion rendering the study largely   nondiagnostic. There is antegrade flow within the carotid and vertebral   arteries. Assessment for the presence or absence of vascular stenosis is not   possible due to the limitations of the exam.         MRI BRAIN WO CONTRAST   Final Result   1. Focal area of restricted diffusion involving the posterior right frontal   lobe, near the right precentral gyrus and corona radiata, most compatible   with an acute/early subacute infarct. However, there is a focal area of   rounded low T2 signal noted centrally in this lesion (series 6, image 19),   raising the possibility of an underlying abscess. Postcontrast imaging may   be of benefit for further evaluation.    2. Mild chronic microvascular white flush, potassium chloride, magnesium sulfate, calcium chloride IVPB, calcium chloride IVPB, fentanNYL, ondansetron, albuterol sulfate HFA, metoclopramide, albuterol, [DISCONTINUED] acetaminophen **OR** acetaminophen, polyethylene glycol, promethazine **OR** [DISCONTINUED] ondansetron, magnesium hydroxide      Assessment:     Patient Active Problem List   Diagnosis    Chest pain    [facility]STEM[facility]non-ST elevated [facility] infarction) [facility] [address]    Coronary artery disease involving native coronary artery of native heart with unstable angina pectoris (HCC)    S/P CABG x 4     Admitted with chest pain   NSTEMI  Strong family history per HPI  S/P Urgent CABG -  on 5/8  Resp failure   On the ventilator  Post op fevers   Anemia   WBC elevation post op now trend down  ? DTS with Lft elevation   MRI brain with CVA+      Given the intubated stated and elevated Procal and on going fevers will start IV abx and see his response-   Surgical incision is clean and lines are clean and Blood cx and Sputum cx from 5/12 NGTD  DTS can cause fevers would like to see the curve improve- will watch closely .     Fevers trend down Procal elevated and will follow and once fevers better able to d/c his IV abx and resp new cx sent and in process      Hep C+ve noted will check PCR and Cryoglobulins     HIV screen -ve and resp cx negative trending Procal and once down will be able to d/c soon      MRI brain reported to be abnormal and CVA possible infection  With abscess reported but I feel this more in favor for stroke given CAD    MRA -ve and  WBC trend down and EEG with Encephalopathy noted      CT head evolving infarct as noted on the previous MRi - CRP trend down and fevers noted and will trend    Follow up CT brain noted -  Resp cx in process - C diff -ve noted    S/p Trach and on the vent and PEG tube placed on 5/27    New fevers  and pancultures sent   CXR from 5/28 with large effusion and underwent CT chest/abd/pelvis    s/pbed side thoracentesis by IR and Fluid cx  Negative    Fevers low grade and will trend Procal and CRP and Neurology following and MRI brain follow up recommended due to continued encephalopathy       Labs, Microbiology, Radiology and all the pertinent results from current hospitalization and  care every where were reviewed  by me as a part of the evaluation   Plan:   1. Check  Procal  And CRP in AM  2. MRI brain per Neurology and will guide therapy   3. Resp cx from 5/28 NGTD  4. Cont IV Meropenem x 500 mG X q 6 hrs will cover anaerobes and gram -ves  Given the concern that was raised on MRI of possible brain abscess  Follow up imaging pending    5. Unable to wean from the vent  S/p Trach    6. Hep C+ve ,SREE-ve But ESR elevated  7. HIV -ve   8. MRI BRAIN abnormal Rt Precentral gyrus and corona radiata  infarct    9. C diff -ve  10. High fever on 5/28 and new cx Negative     11. CXR with Large Left effusion s/p thoracentesis on 5/29 and pleural fluid cultures negative Post CXR lung inflation better         Discussed with patient/Family and Nursing staff   Risk of Complications/Morbidity: High      · Illness(es)/ Infection present that pose threat to bodily function. · There is potential for severe exacerbation of infection/side effects of treatment. Therapy requires intensive monitoring for antimicrobial agent toxicity. Thanks for allowing me to participate in your patient's care and please call me with any questions or concerns.     Allison Pereira MD  Infectious Disease  [facility] [place] [facility] Physician  Phone: [phone]   Fax : [phone]

## 2020-06-01 NOTE — PLAN OF CARE
Problem: Falls - Risk of:  Goal: Will remain free from falls  Description: Will remain free from falls  Outcome: Met This Shift     Problem: Falls - Risk of:  Goal: Absence of physical injury  Description: Absence of physical injury  Outcome: Met This Shift     Problem: Restraint Use - Nonviolent/Non-Self-Destructive Behavior:  Goal: Absence of restraint-related injury  Description: Absence of restraint-related injury  Outcome: Met This Shift     Problem: Pain:  Goal: Pain level will decrease  Description: Pain level will decrease  Outcome: Ongoing     Problem: Pain:  Goal: Control of acute pain  Description: Control of acute pain  Outcome: Ongoing     Problem: Pain:  Goal: Control of chronic pain  Description: Control of chronic pain  Outcome: Ongoing     Problem: Restraint Use - Nonviolent/Non-Self-Destructive Behavior:  Goal: Absence of restraint indications  Description: Absence of restraint indications  Outcome: Ongoing

## 2020-06-01 NOTE — PLAN OF CARE
Problem: Falls - Risk of:  Goal: Will remain free from falls  Description: Will remain free from falls  6/1/2020 1700 by Elayne Tovar RN  Outcome: Met This Shift     Problem: Falls - Risk of:  Goal: Absence of physical injury  Description: Absence of physical injury  6/1/2020 1700 by Elayne Tovar RN  Outcome: Met This Shift     Problem: Pain:  Goal: Control of acute pain  Description: Control of acute pain  6/1/2020 1700 by Elayne Tovar RN  Outcome: Ongoing     Problem: Cardiac:  Goal: Ability to maintain vital signs within normal range will improve  Description: Ability to maintain vital signs within normal range will improve  Outcome: Ongoing     Problem: Discharge Planning:  Goal: Discharged to appropriate level of care  Description: Discharged to appropriate level of care  Outcome: Not Met This Shift

## 2020-06-01 NOTE — PROGRESS NOTES
Nutrition Assessment (Enteral Nutrition)    Type and Reason for Visit: Reassess    Nutrition Recommendations:   Increase goal of TF (Jevity 1.2)   to 70 ml per hour + 2 bottles of Proteinex per feeding tube per day (do not mix directly with TF)  Monitor propofol dose for need to adjust TF regimen    Nutrition Assessment: Pt remains intubated per trach. Propofol currently off but was at 2.5 ml per hour, adding 66 kcals per day. Jevity 1.2 formula tolerated at goal rate of 60 ml per hour + 2 bottles of proteinex per feeding tube per day. Current flush per MD is 100 ml every 4 hours. With propofol dose reduced will increase TF goal rate to 70 ml per hour & continue the Proteinex bid, to help ensure needs. Malnutrition Assessment:  · Malnutrition Status: Insufficient data  Due to current CDC guidelines recommending 6-ft distancing for social isolation for COVID19 prevention, NFPE/malnutrition assessment was deferred at this time. Nutrition Risk Level: High    Nutrition Needs:  · Estimated Daily Total Kcal: 1922-0017 (25-30 x ABW 82 kg)  · Estimated Daily Protein (g):  (1.2-1.5 x ABW)  · Estimated Daily Fluid (ml/day): 1500 per MD    Nutrition Diagnosis:   · Problem: Increased nutrient needs  · Etiology: related to Increased demand for energy/nutrients     Signs and symptoms:  as evidenced by Presence of wounds    Objective Information:  · Nutrition-Focused Physical Findings: loose BMs per rectal tube. Noted +1 edema to upper & lower extremities  · Wound Type: (Noted no issues with SI sites, but pt noted with multiple DTI to BLE, sacrum.  Blisters noted to BLE as well.)  · Current Nutrition Therapies:  · Oral Diet Orders: NPO   · Oral Diet intake: NPO  · Oral Nutrition Supplement (ONS) Orders: None  · ONS intake: NPO  · Tube Feeding (TF) Orders:   · Feeding Route: Gastrostomy  · Formula: Standard w/Fiber  · Rate (ml/hr):70 (rate adjusted for routine Nursing care (~20 hour run) & propofol use · Volume (ml/day): 1400  · Duration: Continuous  · Additives/Modulars: Protein(2 bottles of Proteinex per feeding tube per day. Do not mix directly with TF)  · Water Flushes: per provider  · Current TF & Flush Orders Provides: 1400 ml TV / 1888 kcals (1680 (TF) + 208 (Proteinex) / 130 gms pro (78 (TF) + 52 (Proteinex) / 1130 ml free water per day. With current water flush of 100 ml every 4 hours, total free water offered per day is 1730 ml (1130 (TF) + 600 (flush).   · Additional Calories: Propofol down to 2.5 ml per hour, adding 66 kcals per day  · Anthropometric Measures:  · Ht: 6' (182.9 cm)   · Current Body Wt: 172 lb (78 kg)  · Admission Body Wt: (preop wt 180 lbs)  · Weight Change: Noted 6% loss this adm, which could be a combination of fluid shifts & periods of limited nutrition   · Ideal Body Wt: 178 lb (80.7 kg), % Ideal Body    · BMI Classification: BMI 18.5 - 24.9 Normal Weight    Nutrition Interventions:   Modify current Tube Feeding  Continued Inpatient Monitoring    Nutrition Evaluation:   · Evaluation: Goal achieved   · Goals: tolerate most appropriate form of nutrition   · Monitoring: TF Intake, TF Tolerance, Wound Healing, Weight, Pertinent Labs, Mental Status/Confusion, Nausea or Vomiting, Diarrhea, Constipation      Electronically signed by Segundo Zhao RD, LD on 6/1/20 at 9:08 AM EDT    Contact Number: [phone]

## 2020-06-01 NOTE — PROGRESS NOTES
Progress Note    S/P cabgx4 5/8/20  covid neg 5/7, 5/26  Trach, PEG 5/27/20    Vital Signs:                                                 /63   Pulse 78   Temp 100.1 °F (37.8 °C) (Rectal)   Resp 19   Ht 6' (1.829 m)   Wt 171 lb 8.3 oz (77.8 kg)   SpO2 95%   BMI 23.26 kg/m²  O2 Flow Rate (L/min): 60 L/min   Tmax  100SR  Admission Weight: 188 lb (85.3 kg)      Vent Settings:  Vent Information  $Ventilation: $Subsequent Day  Skin Assessment: Clean, dry, & intact  Suction Catheter Diameter: 14  Equipment ID: [id number]  Equipment Changed: Humidification  Vent Type: [id number]  Vent Mode: AC/VC+  Vt Ordered: 450 mL  Rate Set: 18 bmp  Peak Flow: 0 L/min  Pressure Support: 0 cmH20  FiO2 : 40 %  SpO2: 95 %  SpO2/FiO2 ratio: 237.5  Sensitivity: 3  PEEP/CPAP: 5  I Time/ I Time %: 0.85 s  Humidification Source: Heated wire  Humidification Temp: 37  Humidification Temp Measured: 37.4  Circuit Condensation: Drained     Drips:  precedex  TF    I/O:      Intake/Output Summary (Last 24 hours) at 6/1/2020 0623  Last data filed at 6/1/2020 0600  Gross per 24 hour   Intake 2822. 45 ml   Output 1390 ml   Net 1432.45 ml     CV: reg, wound c/d/i  Pulm: trached  Abd: soft, + BS, PEG site looks good  Ext: warm, no edema. Multiple pressure scabs bilat LE. Neuro: eyes closed, no movt    Data Review:  CBC:   Recent Labs     05/30/20 0442 05/31/20  0438 06/01/20  0421   WBC 6.0 5.4 6.0   HGB 8.3* 8.5* 8.4*   HCT 26.1* 26.4* 26.5*   MCV 85.2 85.5 83.9    287 278     BMP:   Recent Labs     05/30/20 0442 05/31/20  0438 06/01/20  0421    139 141   K 3.6 3.6 4.0   * 105 106   CO2 25 24 24   BUN 15 14 15   CREATININE 0.6* <0.5* 0.6*   CALCIUM 8.3 8.2* 8.2*   MG 1.80 1.80 1.70*     Cardiac Enzymes: No results for input(s): CKTOTAL, CKMB, CKMBINDEX, TROPONINI in the last 72 hours.   PT/INR:   Recent Labs     05/30/20  0442 05/31/20  0438 06/01/20  0421   PROTIME 15.3* 14.1* 14.0*   INR 1.32* 1.21* 1.20* APTT:   Recent Labs     05/30/20  0442 05/31/20  0438 06/01/20  0421   APTT 27.8 31.5 29.6       Assessment/Plan:  CV - stable in SR.    - htn: BB  pulm - trached. Wean vent settings. ID - febrile since 5/12. All cx neg.   - 5/25 C diff neg   - flagyl for possible brain abscess  GI - TF at goal  Renal - Cr nl. back to preop weight. - try external catheter  Heme - acute blood loss anemia. Cont Fe.   - dvt prophylaxis scds, sc hep  Neuro - EtOH withdrawal/delirium. MRI brain 5/20 poss R frontal abscess/infarct. EEG 5/20 mild to mod enceph. MRA head/neck 5/21 limited by pt motion. keppra added 5/24. CT head 5/25 same.     - need guidance from neurology re: keppra dosing/duration and whether cEEG or CSF sampling required      Ciara Hidalgo MD  6/1/2020  6:23 AM

## 2020-06-01 NOTE — PLAN OF CARE
Nutrition Problem: Increased nutrient needs  Intervention: Food and/or Nutrient Delivery: Modify current Tube Feeding  Nutritional Goals: tolerate most appropriate form of nutrition

## 2020-06-01 NOTE — PROGRESS NOTES
KATHY Gardner CNP, 10 mL at 05/22/20 0828    insulin lispro (HUMALOG) injection vial 0-18 Units, 0-18 Units, Subcutaneous, Q4H, KATHY Gardner CNP, 3 Units at 06/01/20 0803    potassium chloride 20 mEq/50 mL IVPB (Central Line), 20 mEq, Intravenous, PRN, KATHY Gardner CNP, Stopped at 05/20/20 0745    magnesium sulfate 2 g in 50 mL IVPB premix, 2 g, Intravenous, PRN, Grupo Collier MD, Stopped at 06/01/20 4555    calcium chloride 1 g in sodium chloride 0.9 % 100 mL IVPB, 1 g, Intravenous, PRN, Grupo Collier MD    calcium chloride 2 g in sodium chloride 0.9 % 100 mL IVPB, 2 g, Intravenous, PRN, Grupo Collier MD    fentaNYL (SUBLIMAZE) injection 25 mcg, 25 mcg, Intravenous, Q1H PRN, Grupo Collier MD, 25 mcg at 06/01/20 0848    ondansetron (ZOFRAN) injection 4 mg, 4 mg, Intravenous, Q8H PRN, Grupo Collier MD    pantoprazole (PROTONIX) injection 40 mg, 40 mg, Intravenous, Daily, 40 mg at 06/01/20 0654 **AND** sodium chloride (PF) 0.9 % injection 10 mL, 10 mL, Intravenous, Daily, Grupo Collier MD, 10 mL at 06/01/20 0654    chlorhexidine (PERIDEX) 0.12 % solution 15 mL, 15 mL, Mouth/Throat, BID, Grupo Collier MD, 15 mL at 06/01/20 0813    albuterol (PROVENTIL) nebulizer solution 2.5 mg, 2.5 mg, Nebulization, Q4H WA, Grupo Collier MD, 2.5 mg at 06/01/20 0819    albuterol sulfate  (90 Base) MCG/ACT inhaler 2 puff, 2 puff, Inhalation, Q6H PRN, Grupo Collier MD    metoclopramide (REGLAN) injection 5 mg, 5 mg, Intravenous, Q6H PRN, KATHY Gardner CNP    dexmedetomidine (PRECEDEX) 400 mcg in sodium chloride 0.9 % 100 mL infusion, 0.4 mcg/kg/hr, Intravenous, Continuous, Celia Urena MD, Last Rate: 28.7 mL/hr at 06/01/20 0605, 1.4 mcg/kg/hr at 06/01/20 0605    albuterol (PROVENTIL) nebulizer solution 2.5 mg, 2.5 mg, Nebulization, Q4H PRN, Grupo Collier MD    [DISCONTINUED] acetaminophen (TYLENOL) tablet 650 mg, 650 mg, Oral, Q6H PRN **OR**

## 2020-06-02 ENCOUNTER — APPOINTMENT (OUTPATIENT)
Dept: MRI IMAGING | Age: 65
DRG: 003 | End: 2020-06-02
Payer: COMMERCIAL

## 2020-06-02 LAB
ANION GAP SERPL CALCULATED.3IONS-SCNC: 8 MMOL/L (ref 3–16)
APTT: 30.9 SEC (ref 24.2–36.2)
BASE EXCESS ARTERIAL: 3.2 MMOL/L (ref -3–3)
BUN BLDV-MCNC: 17 MG/DL (ref 7–20)
C-REACTIVE PROTEIN: 100.5 MG/L (ref 0–5.1)
CALCIUM SERPL-MCNC: 8 MG/DL (ref 8.3–10.6)
CARBOXYHEMOGLOBIN ARTERIAL: 0.7 % (ref 0–1.5)
CHLORIDE BLD-SCNC: 104 MMOL/L (ref 99–110)
CO2: 25 MMOL/L (ref 21–32)
CREAT SERPL-MCNC: <0.5 MG/DL (ref 0.8–1.3)
GFR AFRICAN AMERICAN: >60
GFR NON-AFRICAN AMERICAN: >60
GLUCOSE BLD-MCNC: 113 MG/DL (ref 70–99)
GLUCOSE BLD-MCNC: 119 MG/DL (ref 70–99)
GLUCOSE BLD-MCNC: 120 MG/DL (ref 70–99)
GLUCOSE BLD-MCNC: 122 MG/DL (ref 70–99)
GLUCOSE BLD-MCNC: 125 MG/DL (ref 70–99)
GLUCOSE BLD-MCNC: 137 MG/DL (ref 70–99)
GLUCOSE BLD-MCNC: 144 MG/DL (ref 70–99)
HCO3 ARTERIAL: 26.6 MMOL/L (ref 21–29)
HCT VFR BLD CALC: 25.9 % (ref 40.5–52.5)
HEMOGLOBIN, ART, EXTENDED: 8.5 G/DL (ref 13.5–17.5)
HEMOGLOBIN: 8.5 G/DL (ref 13.5–17.5)
INR BLD: 1.13 (ref 0.86–1.14)
MAGNESIUM: 1.8 MG/DL (ref 1.8–2.4)
MCH RBC QN AUTO: 27.3 PG (ref 26–34)
MCHC RBC AUTO-ENTMCNC: 32.7 G/DL (ref 31–36)
MCV RBC AUTO: 83.5 FL (ref 80–100)
METHEMOGLOBIN ARTERIAL: 0.4 %
O2 CONTENT ARTERIAL: 12 ML/DL
O2 SAT, ARTERIAL: 99.5 %
O2 THERAPY: ABNORMAL
PCO2 ARTERIAL: 34.7 MMHG (ref 35–45)
PDW BLD-RTO: 15.7 % (ref 12.4–15.4)
PERFORMED ON: ABNORMAL
PH ARTERIAL: 7.49 (ref 7.35–7.45)
PLATELET # BLD: 272 K/UL (ref 135–450)
PMV BLD AUTO: 8 FL (ref 5–10.5)
PO2 ARTERIAL: 133 MMHG (ref 75–108)
POTASSIUM SERPL-SCNC: 4 MMOL/L (ref 3.5–5.1)
PROCALCITONIN: 0.18 NG/ML (ref 0–0.15)
PROTHROMBIN TIME: 13.1 SEC (ref 10–13.2)
RBC # BLD: 3.11 M/UL (ref 4.2–5.9)
SODIUM BLD-SCNC: 137 MMOL/L (ref 136–145)
TCO2 ARTERIAL: 27.7 MMOL/L
WBC # BLD: 6.8 K/UL (ref 4–11)

## 2020-06-02 PROCEDURE — 2100000000 HC CCU R&B

## 2020-06-02 PROCEDURE — 86140 C-REACTIVE PROTEIN: CPT

## 2020-06-02 PROCEDURE — 99024 POSTOP FOLLOW-UP VISIT: CPT | Performed by: THORACIC SURGERY (CARDIOTHORACIC VASCULAR SURGERY)

## 2020-06-02 PROCEDURE — 80048 BASIC METABOLIC PNL TOTAL CA: CPT

## 2020-06-02 PROCEDURE — 83735 ASSAY OF MAGNESIUM: CPT

## 2020-06-02 PROCEDURE — 6360000002 HC RX W HCPCS: Performed by: INTERNAL MEDICINE

## 2020-06-02 PROCEDURE — 95819 EEG AWAKE AND ASLEEP: CPT

## 2020-06-02 PROCEDURE — 2500000003 HC RX 250 WO HCPCS: Performed by: THORACIC SURGERY (CARDIOTHORACIC VASCULAR SURGERY)

## 2020-06-02 PROCEDURE — 85610 PROTHROMBIN TIME: CPT

## 2020-06-02 PROCEDURE — 2580000003 HC RX 258: Performed by: THORACIC SURGERY (CARDIOTHORACIC VASCULAR SURGERY)

## 2020-06-02 PROCEDURE — 94640 AIRWAY INHALATION TREATMENT: CPT

## 2020-06-02 PROCEDURE — 2580000003 HC RX 258: Performed by: PSYCHIATRY & NEUROLOGY

## 2020-06-02 PROCEDURE — 82803 BLOOD GASES ANY COMBINATION: CPT

## 2020-06-02 PROCEDURE — 36600 WITHDRAWAL OF ARTERIAL BLOOD: CPT

## 2020-06-02 PROCEDURE — 94761 N-INVAS EAR/PLS OXIMETRY MLT: CPT

## 2020-06-02 PROCEDURE — 87533 HHV-6 DNA QUANT: CPT

## 2020-06-02 PROCEDURE — 85730 THROMBOPLASTIN TIME PARTIAL: CPT

## 2020-06-02 PROCEDURE — 2580000003 HC RX 258: Performed by: INTERNAL MEDICINE

## 2020-06-02 PROCEDURE — 87798 DETECT AGENT NOS DNA AMP: CPT

## 2020-06-02 PROCEDURE — 6370000000 HC RX 637 (ALT 250 FOR IP): Performed by: THORACIC SURGERY (CARDIOTHORACIC VASCULAR SURGERY)

## 2020-06-02 PROCEDURE — 2500000003 HC RX 250 WO HCPCS: Performed by: NURSE PRACTITIONER

## 2020-06-02 PROCEDURE — 99232 SBSQ HOSP IP/OBS MODERATE 35: CPT | Performed by: INTERNAL MEDICINE

## 2020-06-02 PROCEDURE — 2580000003 HC RX 258: Performed by: NURSE PRACTITIONER

## 2020-06-02 PROCEDURE — 6360000002 HC RX W HCPCS: Performed by: THORACIC SURGERY (CARDIOTHORACIC VASCULAR SURGERY)

## 2020-06-02 PROCEDURE — 6370000000 HC RX 637 (ALT 250 FOR IP): Performed by: NURSE PRACTITIONER

## 2020-06-02 PROCEDURE — 87529 HSV DNA AMP PROBE: CPT

## 2020-06-02 PROCEDURE — 89050 BODY FLUID CELL COUNT: CPT

## 2020-06-02 PROCEDURE — 85027 COMPLETE CBC AUTOMATED: CPT

## 2020-06-02 PROCEDURE — 94003 VENT MGMT INPAT SUBQ DAY: CPT

## 2020-06-02 PROCEDURE — 6360000002 HC RX W HCPCS: Performed by: PSYCHIATRY & NEUROLOGY

## 2020-06-02 PROCEDURE — 6360000002 HC RX W HCPCS: Performed by: NURSE PRACTITIONER

## 2020-06-02 PROCEDURE — 86592 SYPHILIS TEST NON-TREP QUAL: CPT

## 2020-06-02 PROCEDURE — 87496 CYTOMEG DNA AMP PROBE: CPT

## 2020-06-02 PROCEDURE — C9113 INJ PANTOPRAZOLE SODIUM, VIA: HCPCS | Performed by: THORACIC SURGERY (CARDIOTHORACIC VASCULAR SURGERY)

## 2020-06-02 PROCEDURE — 84145 PROCALCITONIN (PCT): CPT

## 2020-06-02 PROCEDURE — 2700000000 HC OXYGEN THERAPY PER DAY

## 2020-06-02 RX ORDER — QUETIAPINE FUMARATE 25 MG/1
25 TABLET, FILM COATED ORAL 3 TIMES DAILY
Status: DISCONTINUED | OUTPATIENT
Start: 2020-06-02 | End: 2020-06-03

## 2020-06-02 RX ORDER — FENTANYL CITRATE 50 UG/ML
12.5 INJECTION, SOLUTION INTRAMUSCULAR; INTRAVENOUS
Status: DISCONTINUED | OUTPATIENT
Start: 2020-06-02 | End: 2020-06-05 | Stop reason: HOSPADM

## 2020-06-02 RX ORDER — CALCIUM CARBONATE 200(500)MG
500 TABLET,CHEWABLE ORAL 3 TIMES DAILY
Status: DISCONTINUED | OUTPATIENT
Start: 2020-06-02 | End: 2020-06-05 | Stop reason: HOSPADM

## 2020-06-02 RX ADMIN — LEVETIRACETAM 500 MG: 100 INJECTION, SOLUTION INTRAVENOUS at 00:53

## 2020-06-02 RX ADMIN — MEROPENEM 500 MG: 500 INJECTION, POWDER, FOR SOLUTION INTRAVENOUS at 16:26

## 2020-06-02 RX ADMIN — MEROPENEM 500 MG: 500 INJECTION, POWDER, FOR SOLUTION INTRAVENOUS at 21:53

## 2020-06-02 RX ADMIN — QUETIAPINE FUMARATE 25 MG: 25 TABLET ORAL at 20:48

## 2020-06-02 RX ADMIN — DEXMEDETOMIDINE HYDROCHLORIDE 0.8 MCG/KG/HR: 4 INJECTION, SOLUTION INTRAVENOUS at 13:24

## 2020-06-02 RX ADMIN — MEROPENEM 500 MG: 500 INJECTION, POWDER, FOR SOLUTION INTRAVENOUS at 10:48

## 2020-06-02 RX ADMIN — ALBUTEROL SULFATE 2.5 MG: 2.5 SOLUTION RESPIRATORY (INHALATION) at 19:39

## 2020-06-02 RX ADMIN — QUETIAPINE FUMARATE 25 MG: 25 TABLET ORAL at 13:31

## 2020-06-02 RX ADMIN — DEXMEDETOMIDINE HYDROCHLORIDE 1.4 MCG/KG/HR: 4 INJECTION, SOLUTION INTRAVENOUS at 03:42

## 2020-06-02 RX ADMIN — Medication 500 MG: at 08:05

## 2020-06-02 RX ADMIN — OXYCODONE 5 MG: 5 TABLET ORAL at 20:48

## 2020-06-02 RX ADMIN — Medication 10 ML: at 06:00

## 2020-06-02 RX ADMIN — HEPARIN SODIUM 5000 UNITS: 5000 INJECTION INTRAVENOUS; SUBCUTANEOUS at 05:59

## 2020-06-02 RX ADMIN — SODIUM CHLORIDE, PRESERVATIVE FREE 10 ML: 5 INJECTION INTRAVENOUS at 20:48

## 2020-06-02 RX ADMIN — ALBUTEROL SULFATE 2.5 MG: 2.5 SOLUTION RESPIRATORY (INHALATION) at 16:17

## 2020-06-02 RX ADMIN — LORAZEPAM 1 MG: 2 INJECTION INTRAMUSCULAR; INTRAVENOUS at 16:21

## 2020-06-02 RX ADMIN — ALBUTEROL SULFATE 2.5 MG: 2.5 SOLUTION RESPIRATORY (INHALATION) at 07:56

## 2020-06-02 RX ADMIN — OXYCODONE 5 MG: 5 TABLET ORAL at 04:04

## 2020-06-02 RX ADMIN — Medication 500 MG: at 20:48

## 2020-06-02 RX ADMIN — OXYCODONE 5 MG: 5 TABLET ORAL at 12:07

## 2020-06-02 RX ADMIN — CASTOR OIL AND BALSAM, PERU: 788; 87 OINTMENT TOPICAL at 20:49

## 2020-06-02 RX ADMIN — HEPARIN SODIUM 5000 UNITS: 5000 INJECTION INTRAVENOUS; SUBCUTANEOUS at 13:31

## 2020-06-02 RX ADMIN — CALCIUM CHLORIDE 1 G: 100 INJECTION, SOLUTION INTRAVENOUS at 09:05

## 2020-06-02 RX ADMIN — Medication 500 MG: at 13:31

## 2020-06-02 RX ADMIN — SODIUM CHLORIDE, PRESERVATIVE FREE 10 ML: 5 INJECTION INTRAVENOUS at 20:49

## 2020-06-02 RX ADMIN — ASPIRIN 81 MG 324 MG: 81 TABLET ORAL at 08:06

## 2020-06-02 RX ADMIN — PANTOPRAZOLE SODIUM 40 MG: 40 INJECTION, POWDER, FOR SOLUTION INTRAVENOUS at 06:00

## 2020-06-02 RX ADMIN — METOPROLOL TARTRATE 50 MG: 50 TABLET, FILM COATED ORAL at 20:48

## 2020-06-02 RX ADMIN — METOPROLOL TARTRATE 50 MG: 50 TABLET, FILM COATED ORAL at 08:05

## 2020-06-02 RX ADMIN — DEXMEDETOMIDINE HYDROCHLORIDE 1 MCG/KG/HR: 4 INJECTION, SOLUTION INTRAVENOUS at 07:41

## 2020-06-02 RX ADMIN — MINERAL SUPPLEMENT IRON 300 MG / 5 ML STRENGTH LIQUID 100 PER BOX UNFLAVORED 300 MG: at 08:08

## 2020-06-02 RX ADMIN — CASTOR OIL AND BALSAM, PERU: 788; 87 OINTMENT TOPICAL at 08:07

## 2020-06-02 RX ADMIN — OXYCODONE 5 MG: 5 TABLET ORAL at 08:05

## 2020-06-02 RX ADMIN — ACETAMINOPHEN 650 MG: 325 TABLET ORAL at 08:10

## 2020-06-02 RX ADMIN — CHLORHEXIDINE GLUCONATE 15 ML: 1.2 RINSE ORAL at 20:49

## 2020-06-02 RX ADMIN — ACETAMINOPHEN 650 MG: 325 TABLET ORAL at 00:54

## 2020-06-02 RX ADMIN — OXYCODONE 5 MG: 5 TABLET ORAL at 16:21

## 2020-06-02 RX ADMIN — ACETAMINOPHEN 650 MG: 325 TABLET ORAL at 19:25

## 2020-06-02 RX ADMIN — ATORVASTATIN CALCIUM 20 MG: 20 TABLET, FILM COATED ORAL at 20:48

## 2020-06-02 RX ADMIN — MINERAL SUPPLEMENT IRON 300 MG / 5 ML STRENGTH LIQUID 100 PER BOX UNFLAVORED 300 MG: at 20:48

## 2020-06-02 RX ADMIN — FENTANYL CITRATE 25 MCG: 50 INJECTION INTRAMUSCULAR; INTRAVENOUS at 01:01

## 2020-06-02 RX ADMIN — LEVETIRACETAM 500 MG: 100 INJECTION, SOLUTION INTRAVENOUS at 12:07

## 2020-06-02 RX ADMIN — ALBUTEROL SULFATE 2.5 MG: 2.5 SOLUTION RESPIRATORY (INHALATION) at 12:43

## 2020-06-02 RX ADMIN — LORAZEPAM 1 MG: 2 INJECTION INTRAMUSCULAR; INTRAVENOUS at 21:53

## 2020-06-02 RX ADMIN — CHLORHEXIDINE GLUCONATE 15 ML: 1.2 RINSE ORAL at 09:05

## 2020-06-02 RX ADMIN — QUETIAPINE FUMARATE 25 MG: 25 TABLET ORAL at 08:05

## 2020-06-02 RX ADMIN — ACETAMINOPHEN 650 MG: 325 TABLET ORAL at 12:07

## 2020-06-02 RX ADMIN — MEROPENEM 500 MG: 500 INJECTION, POWDER, FOR SOLUTION INTRAVENOUS at 04:04

## 2020-06-02 RX ADMIN — DEXMEDETOMIDINE HYDROCHLORIDE 0.6 MCG/KG/HR: 4 INJECTION, SOLUTION INTRAVENOUS at 16:26

## 2020-06-02 ASSESSMENT — PULMONARY FUNCTION TESTS
PIF_VALUE: 12
PIF_VALUE: 12
PIF_VALUE: 20
PIF_VALUE: 15
PIF_VALUE: 24
PIF_VALUE: 14
PIF_VALUE: 17
PIF_VALUE: 19
PIF_VALUE: 18
PIF_VALUE: 17
PIF_VALUE: 9
PIF_VALUE: 19
PIF_VALUE: 18
PIF_VALUE: 26
PIF_VALUE: 37
PIF_VALUE: 20
PIF_VALUE: 12
PIF_VALUE: 16
PIF_VALUE: 16
PIF_VALUE: 15
PIF_VALUE: 23
PIF_VALUE: 20
PIF_VALUE: 20
PIF_VALUE: 17

## 2020-06-02 ASSESSMENT — PAIN SCALES - GENERAL
PAINLEVEL_OUTOF10: 3
PAINLEVEL_OUTOF10: 6
PAINLEVEL_OUTOF10: 0
PAINLEVEL_OUTOF10: 6
PAINLEVEL_OUTOF10: 4
PAINLEVEL_OUTOF10: 3
PAINLEVEL_OUTOF10: 3
PAINLEVEL_OUTOF10: 1
PAINLEVEL_OUTOF10: 4
PAINLEVEL_OUTOF10: 2
PAINLEVEL_OUTOF10: 4
PAINLEVEL_OUTOF10: 4
PAINLEVEL_OUTOF10: 6
PAINLEVEL_OUTOF10: 5

## 2020-06-02 NOTE — PROGRESS NOTES
Progress Note    Updates  No significant events overnight. Not much improvement clinically.       Past Medical History:   Diagnosis Date    Blood clot in vein 2018    Bronchitis     Carotid stenosis 05/07/2020    Hepatitis C antibody positive in blood 05/16/2020    Hypertension     Kidney disease     NSTEMI (non-ST elevated myocardial infarction) ([facility] [address] 05/07/2020    PAD (peripheral artery disease) ([facility])     Respiratory compromise 05/2020    Tattoos      Current Facility-Administered Medications:     calcium chloride 1 g in sodium chloride 0.9 % 100 mL IVPB, 1 g, Intravenous, Once, Peggye Fonder, APRN - CNP, Last Rate: 100 mL/hr at 06/02/20 0905, 1 g at 06/02/20 0905    calcium carbonate (TUMS) chewable tablet 500 mg, 500 mg, Oral, TID, Peggye Fonder, APRN - CNP, 500 mg at 06/02/20 0805    fentaNYL (SUBLIMAZE) injection 12.5 mcg, 12.5 mcg, Intravenous, Q2H PRN, Peggye Fonder, APRN - CNP    QUEtiapine (SEROQUEL) tablet 25 mg, 25 mg, Oral, TID, Peggye Fonder, APRN - CNP, 25 mg at 06/02/20 0805    metoprolol tartrate (LOPRESSOR) tablet 50 mg, 50 mg, Oral, BID, Peggye Fonder, APRN - CNP, 50 mg at 06/02/20 0805    oxyCODONE (ROXICODONE) immediate release tablet 5 mg, 5 mg, PEG Tube, Q4H PRN, Peggye Fonder, APRN - CNP, 5 mg at 06/02/20 0805    acetaminophen (TYLENOL) tablet 650 mg, 650 mg, Oral, Q4H PRN, Mynor You MD, 650 mg at 06/02/20 0810    hydrALAZINE (APRESOLINE) injection 10 mg, 10 mg, Intravenous, Q4H PRN, Peggye Fonder, APRN - CNP, 10 mg at 06/01/20 1410    aspirin chewable tablet 324 mg, 324 mg, Oral, Daily, Peggye Fonder, APRN - CNP, 324 mg at 06/02/20 0806    LORazepam (ATIVAN) injection 1 mg, 1 mg, Intravenous, Q4H PRN, Salma Petersen MD, 1 mg at 06/01/20 2329    lidocaine PF 1 % injection 5 mL, 5 mL, Intradermal, Once, Salma Petersen MD    sodium chloride flush 0.9 % injection 10 mL, 10 mL, Intravenous, 2 times per day, Salma Petersen MD, 10 mL at 06/01/20 2158    sodium chloride flush 0.9 % injection 10 mL, 10 mL, Intravenous, PRN, Kendra Gaffney MD    meropenem [facility]) 500 mg in sterile water 10 mL IV syringe, 500 mg, Intravenous, Q6H, Samra Devi MD, 500 mg at 06/02/20 0404    levETIRAcetam (KEPPRA) 500 mg in sodium chloride 0.9 % 100 mL IVPB, 500 mg, Intravenous, Q12H, Sagar Haskins MD, Stopped at 06/02/20 0108    atorvastatin (LIPITOR) tablet 20 mg, 20 mg, Oral, Nightly, KATHY Mccarty - CNP, 20 mg at 06/01/20 2032    docusate (COLACE) 50 MG/5ML liquid 100 mg, 100 mg, Oral, BID PRN, KATHY Mccarty - CNP    Venelex ointment, , Topical, BID, KATHY Mccarty - CNP    sennosides-docusate sodium (SENOKOT-S) 8.6-50 MG tablet 1 tablet, 1 tablet, Oral, Daily PRN, KATHY Mccarty - CNP    lidocaine PF 1 % injection 5 mL, 5 mL, Intradermal, Once, KATHY Mccarty - CNP    metoprolol (LOPRESSOR) injection 2.5 mg, 2.5 mg, Intravenous, Q10 Min PRN, KATHY Mccarty - CNP, 2.5 mg at 05/29/20 0448    ferrous sulfate 300 (60 Fe) MG/5ML syrup 300 mg, 300 mg, Oral, BID, KATHY Mccarty - CNP, 300 mg at 06/02/20 0808    heparin (porcine) injection 5,000 Units, 5,000 Units, Subcutaneous, 3 times per day, KATHY Mccarty - CNP, 5,000 Units at 06/02/20 0559    glucose (GLUTOSE) 40 % oral gel 15 g, 15 g, Oral, PRN, KATHY Mccarty - CNP    dextrose 50 % IV solution, 12.5 g, Intravenous, PRN, KATHY Mccarty - CNP, 12.5 g at 05/15/20 0836    glucagon (rDNA) injection 1 mg, 1 mg, Intramuscular, PRN, KATHY Mccarty CNP    dextrose 5 % solution, 100 mL/hr, Intravenous, PRN, KATHY Mccarty CNP    sodium chloride flush 0.9 % injection 10 mL, 10 mL, Intravenous, 2 times per day, KATHY Mccarty CNP, 10 mL at 06/01/20 2100    sodium chloride flush 0.9 % injection 10 mL, 10 mL, Intravenous, PRN, KATHY Mccarty CNP, 10 mL at 05/22/20 0828    insulin lispro (HUMALOG) encephalopathy. ROS - fevers. Unable to obtain remainder from patient at this time due to encephalopathy. Chart reviewed. Labs  Glucose 120  Na 137  K 4.0  BUN 17  Creatinine < 0.5  Mg 1.80    .5  Folate 11.08  B12 - 871    WBC 6.8K  Hg 8.5  Platelets 513    Procalcitonin 0.18    Sed Rate > 120  SREE negative     Ammonia 51     Hep C Ab reactive  HIV non reactive  Blood cultures NG  Urine culture NG     COVID negative.  Repeat negative. Studies  EEG 5/21/20  Background slowing and disorganization. Beta activity. MRI brain w/o 5/20/20  Posterior R frontal diffusion restriction, likely acute/subacute infarct. Appears to be focal area of low T2 signal, ? abscess. CT head 5/29/20  Stable, suspected subacute infarct. No significant changes. CT C/A/P 5/29/20  Very large left pleural effusion with large amount of left lower lobe atelectasis. 7 mm spiculated right apical pulmonary nodule.  Malignancy possible. Mild right perinephric stranding which could reflect acute bacterial nephritis or could be a chronic finding. Bilateral direct inguinal hernias larger on the left containing an unobstructed loop of the sigmoid colon with the right-sided hernia containing a small portion of the urinary bladder. Very small right pleural effusion. Carotid US 5/7/20  High end 50-79% R ICA stenosis. 50-79% L ICA stenosis. TTE 5/7/20  EF 60%  Normal L atrium. Impression  1. Encephalopathy/delirium s/p CABG, ongoing.  S/p tracheostomy/PEG. Continues on Precedex.       MRI brain w/ R frontal lobe diffusion restriction, possibly an acute/subacute infarct.  Radiology did suggest the possibility of underlying abscess.  No abnormal enhancement was noted on contrasted study, though both the repeat brain imaging and vascular studies were very poor and essentially non-diagnostic.   Recent CT head was stable.       EEG w/ slowing and beta activity, non-specific.  Had some reported posturing last weekend, though no recent signs or behavior concerning for seizure.       Has been intermittently febrile. ID following.       Recent thoracentesis for large L pleural effusion. Pulmonary nodule noted.       Hx of ETOH abuse. Recommendations  1. Repeat EEG done. Results pending. 2.  MRI brain w/wo. Apparently this has been put on hold due to COVID testing for possible transfer to another facility. He has already had 2 negative tests. ??  3.  Will check CSF to r/o CNS infectious/inflammatory process. 4.  Continue Keppra. 5.  Stroke prevention measures. 6.  ID following. 7.  Was asked about recommendations regarding management of agitation in an attempt to wean Precedex. Will discuss w/ attending. Psychiatry input may be helpful.       Shelton Matos NP  [address] [facility] [address] Neurology    A copy of this note was provided for Dr Amelia Pardo MD

## 2020-06-02 NOTE — PLAN OF CARE
Problem: Falls - Risk of:  Goal: Will remain free from falls  Description: Will remain free from falls  Outcome: Met This Shift  Goal: Absence of physical injury  Description: Absence of physical injury  Outcome: Met This Shift     Problem: Fluid Volume - Imbalance:  Goal: Will show no signs and symptoms of excessive bleeding  Description: Will show no signs and symptoms of excessive bleeding  Outcome: Met This Shift  Goal: Absence of imbalanced fluid volume signs and symptoms  Description: Absence of imbalanced fluid volume signs and symptoms  Outcome: Met This Shift  Goal: Ability to achieve a balanced intake and output will improve  Description: Ability to achieve a balanced intake and output will improve  Outcome: Met This Shift     Problem: Tissue Perfusion - Renal, Altered:  Goal: Ability to achieve a balanced intake and output will improve  Description: Ability to achieve a balanced intake and output will improve  Outcome: Met This Shift     Problem: Pain:  Goal: Pain level will decrease  Description: Pain level will decrease  Outcome: Ongoing  Goal: Control of acute pain  Description: Control of acute pain  Outcome: Ongoing  Goal: Control of chronic pain  Description: Control of chronic pain  Outcome: Ongoing     Problem: Cardiac:  Goal: Ability to maintain vital signs within normal range will improve  Description: Ability to maintain vital signs within normal range will improve  Outcome: Ongoing     Problem: Discharge Planning:  Goal: Discharged to appropriate level of care  Description: Discharged to appropriate level of care  Outcome: Ongoing     Problem: Anxiety:  Goal: Level of anxiety will decrease  Description: Level of anxiety will decrease  Outcome: Ongoing   Electronically signed by Tc Zazueta RN on 6/2/2020 at 6:46 PM

## 2020-06-02 NOTE — PLAN OF CARE
Unable to successfully perform MRI on Mr. Garcia Drilling today. He became very agitated and appeared to be choking when he was obie in the position for the scan. He could not safely be restrained. We attempted to scan him for an hour.

## 2020-06-02 NOTE — PROGRESS NOTES
@1900: Handoff completed with Yanira Montaño RN. Pt currently on precedex @ 0.6. Somewhat agitated. Pt turned and rectal probe replaced. @2855: Tylenol given for Temp 101.3    @2000: Assessment completed. New TF hung. Pt hypertensive and tachy, fighting ventilator. @2485: All evening medications given with oxycodone as well. Temp down to 98.9.    @2101: Will attempt to continue weaning precedex. Decreased to 0.5mcg    @2200: Decreased precedex to 0.4mcg. Pt restless in bed, but not agitated    @ 2300: Decreased precedex to 0.3mcg. Pt able to shake head yes/no appropriately when asked if cold or hot. Pt indicated he was hot, and cooling blanket turned on. He is attempting to sit up and pull against restraints, but will lay back when told to.    @0000: Assessment complete. Rectal temp reading 98.0, but pt face flushed and feels warm to touch. Temporal temp 100.2 and axillary 100.3. Tylenol given per MAR. Pt is restless in bed, intermittently following some commands. @0200: Pt hair washed and bathed. Increased precedex to 0.6 d/t agitation with bath    @0245: Small amount of liquid stool noted rectal tube. Rectal temp probe not in place. Replaced and secured with hydrocolloid and tegarderm. Temp reading 100.8. Cooling blanket turned on. Decreased precedex back down to 0.3mcg    @0400: Assessment complete. Pt HTN. 10mg hydralazine given per order. Pt intermittently following commands, shaking head yes/or no to questions. Rectal temp 99.2. Cooling blanket turned off.    @0452: Pt tachy 110's and restless in bed. Medicated with 5mg oxycodone. Precedex remains at 0.3mcg    @0630: Precedex increased to 0.5mcg d/t agitation.     @56: Handoff completed with Fab Ramos RN

## 2020-06-02 NOTE — PROGRESS NOTES
Progress Note    S/P cabgx4 5/8/20  covid neg 5/7, 5/26  Trach, PEG 5/27/20    Vital Signs:                                                 BP (!) 114/57   Pulse 70   Temp 99.7 °F (37.6 °C) (Rectal)   Resp 24   Ht 6' (1.829 m)   Wt 167 lb 8.8 oz (76 kg)   SpO2 97%   BMI 22.72 kg/m²  O2 Flow Rate (L/min): 60 L/min   Tmax  100.9  SR  Admission Weight: 188 lb (85.3 kg)      Vent Settings:  Vent Information  $Ventilation: $Subsequent Day  Skin Assessment: Clean, dry, & intact  Suction Catheter Diameter: 14  Equipment ID: [id number]  Equipment Changed: Suction catheter  Vent Type: [id number]  Vent Mode: AC/VC+  Vt Ordered: 450 mL  Rate Set: 18 bmp  Peak Flow: 0 L/min  Pressure Support: 0 cmH20  FiO2 : 40 %  SpO2: 97 %  SpO2/FiO2 ratio: 242.5  Sensitivity: 3  PEEP/CPAP: 5  I Time/ I Time %: 0.85 s  Humidification Source: Heated wire  Humidification Temp: 37  Humidification Temp Measured: 36.9  Circuit Condensation: Drained     Drips:  precedex  TF    I/O:      Intake/Output Summary (Last 24 hours) at 6/2/2020 0628  Last data filed at 6/2/2020 0600  Gross per 24 hour   Intake 3276.4 ml   Output 3410 ml   Net -133.6 ml     CV: reg, wound c/d/i  Pulm: trached  Abd: soft, + BS, PEG site looks good  Ext: warm, no edema. Multiple pressure scabs bilat LE improving. Neuro: eyes opened, spont movt    Data Review:  CBC:   Recent Labs     05/31/20 0438 06/01/20  0421 06/02/20  0453   WBC 5.4 6.0 6.8   HGB 8.5* 8.4* 8.5*   HCT 26.4* 26.5* 25.9*   MCV 85.5 83.9 83.5    278 272     BMP:   Recent Labs     05/31/20 0438 06/01/20  0421 06/01/20  1132 06/02/20  0453    141  --  137   K 3.6 4.0  --  4.0    106  --  104   CO2 24 24  --  25   BUN 14 15  --  17   CREATININE <0.5* 0.6*  --  <0.5*   CALCIUM 8.2* 8.2* 8.5 8.0*   MG 1.80 1.70* 2.00 1.80     Cardiac Enzymes: No results for input(s): CKTOTAL, CKMB, CKMBINDEX, TROPONINI in the last 72 hours.   PT/INR:   Recent Labs     05/31/20  0438 06/01/20  0421

## 2020-06-02 NOTE — PROGRESS NOTES
of MI age 70    Heart Disease Paternal Aunt         MI in her 46s         REVIEW OF SYSTEMS:    No fever / chills / sweats. No weight loss. No visual change, eye pain, eye discharge. No oral lesion, sore throat, dysphagia. Denies cough / sputum/Sob   Denies chest pain, palpitations/ dizziness  Denies nausea/ vomiting/abdominal pain/diarrhea. Denies dysuria or change in urinary function. Denies joint swelling or pain. No myalgia, arthralgia. No rashes, skin lesions   Denies focal weakness, sensory change or other neurologic symptoms  No lymph node swelling or tenderness. ROS not possible    PHYSICAL EXAM:      Vitals:  T max  101.3     BP (!) 147/73   Pulse 88   Temp 100.3 °F (37.9 °C) (Rectal)   Resp 16   Ht 6' (1.829 m)   Wt 167 lb 8.8 oz (76 kg)   SpO2 (!) 89%   BMI 22.72 kg/m²   General Appearance: intubated via Trach ++  on the vent and , + pallor, + icterus     Skin: warm and dry, no rash or erythema  Head: normocephalic and atraumatic  Eyes: pupils equal, round, and reactive to light, conjunctivae normal  ENT: tympanic membrane, external ear and ear canal normal bilaterally, nose without deformity, nasal mucosa and turbinates normal without polyps  Neck: supple and non-tender without mass, no thyromegaly  no cervical lymphadenopathy  Pulmonary/Chest: coarsel crepts+ upper airway ronchi+   wheezes, rales or rhonchi, normal air movement, no respiratory distress  Cardiovascular:   S1 and S2, no murmurs, rubs, clicks, or gallops, no carotid bruits sternal incision clean binder +  Abdomen: soft, non-tender, non-distended, normal bowel sounds, no masses or organomegaly  Extremities: no cyanosis, clubbing or edema  Musculoskeletal: normal range of motion, no joint swelling, deformity or tenderness  Neurologic less responsive on the vent    Lines:   Hoang  PICC  Rectal tube+   S/p Trach site clean      Data Review:    Lab Results   Component Value Date    WBC 6.8 06/02/2020    HGB 8.5 (L) #2  Performed at:  [facility]  [address] [employer] [address]   Phone [phone]   Culture, Urine [[id number]] Collected: 05/12/20 0830   Order Status: Completed Specimen: Urine, clean catch Updated: 05/13/20 0750    Urine Culture, Routine No growth at 18-36 hours   Narrative:     ORDER#: [id number]                          ORDERED BY: Aleida Lewis  SOURCE: Urine Clean Catch                  COLLECTED:  05/12/20 08:30  ANTIBIOTICS AT NITISH. :                      RECEIVED :  05/12/20 09:55  Performed at:  [facility]  [address] [employer] [address]   Phone [phone]   MRSA DNA Probe, Nasal [[id number]] Collected: 05/07/20 1312   Order Status: Completed Specimen: Nasal from Nares Updated: 05/08/20 0711    MRSA SCREEN RT-PCR --    Negative - MRSA DNA not detected. Normal Range: Not detected    Narrative:     ORDER#: [id number]                          ORDERED BY: Aleida Lewis  SOURCE: Nares                              COLLECTED:  05/07/20 13:12  ANTIBIOTICS AT NITISH. :                      RECEIVED :  05/07/20 13:18  Performed at:  [facility]  [address] [address]   Phone [phone]   Culture, Blood 1 [[id number]] Collected: 05/07/20 0743   Order Status: Canceled Specimen: Blood    Culture, Blood 2 [[id number]]    Order Status: Canceled Specimen: Blood        Date/Time       Culture, Respiratory [[id number]] Collected: 05/15/20 1045   Order Status: Completed Specimen: Sputum, Suctioned Updated: 05/17/20 0956    CULTURE, RESPIRATORY No growth 36-48 hours    Gram Stain Result 1+ WBC's (Polymorphonuclear)   1+ Epithelial Cells   1+ Gram positive cocci    Narrative:     ORDER#: [id number]                          ORDERED BY: Aleida Lewis  SOURCE: Sputum Suctioned                   COLLECTED:  05/15/20 10:45  ANTIBIOTICS AT NITISH. :                      RECEIVED :  05/15/20 10:52 /18/2020 11:49 AM - Emma Magdaleno Incoming Lab Results From Soft (Epic Adt)     Component Value Ref Range & Units Status Collected Lab   HIV Ag/Ab Non-Reactive  Non-reactive Final 05/16/2020  2:40 PM [address] Lab   HIV-1 Antibody Non-Reactive  Non-reactive Final 05/16/2020  2:40 PM [address] Lab   HIV ANTIGEN Non-Reactive  Non-reactive Final 05/16/2020  2:40 PM [address] Lab   HIV-2 Ab Non-Reactive  Non-reactive Final 05/16/2020  2:40 PM [address]             Culture, Body Fluid [[id number]] Collected: 05/29/20 1330   Order Status: Completed Specimen: Body Fluid from Pleural Updated: 05/31/20 0902    Body Fluid Culture, Sterile --    No growth to date   No growth 36-48 hours     Gram Stain Result 4+ WBC's (Polymorphonuclear)   No organisms seen    Narrative:     ORDER#: [id number]                          ORDERED BY: Alden Garcia  SOURCE: Pleural                            COLLECTED:  05/29/20 13:30  ANTIBIOTICS AT NITISH. :                      RECEIVED :  05/29/20 13:51  Performed at:  [facility]  [address] [place] [facility] [address]   Phone [phone]   Culture, Respiratory [[id number]] Collected: 05/28/20 1215   Order Status: Completed Specimen: Sputum from Tracheal Aspirate Updated: 05/30/20 1124    CULTURE, RESPIRATORY No growth 36-48 hours    Gram Stain Result 1+ Epithelial Cells   1+ WBC's (Mononuclear)   No organisms seen    Narrative:     ORDER#: [id number]                          ORDERED BY: Gloria Pro  SOURCE: Tracheal Aspirate                  COLLECTED:  05/28/20 12:15  ANTIBIOTICS AT NITISH. :                      RECEIVED :  05/28/20 12:27       IMAGING:    XR CHEST PORTABLE   Final Result   No pneumothorax immediately following left complete thoracentesis. Minimal left basilar atelectasis. US THORACENTESIS   Final Result   Successful ultrasound guided left-sided thoracentesis.       A sample was sent similar to May 15, 2020         VL Extremity Venous Left   Final Result      CT Head WO Contrast   Final Result   Unchanged appearance of the brain notable for subcortical white matter   changes. No acute intracranial hemorrhage identified. CT HEAD WO CONTRAST   Final Result   Focal right parietal white matter hypodensity, suggestive of evolved ischemia   as compared to prior exam dated 05/11/2020. MR could be performed for   further evaluation. Paranasal sinus mucosal thickening. Several small foci of air are seen within the left temporal scalp,   non-specific, which may be intravascular related to IV injection. Suggest   correlation with physical examination if laceration or soft tissue infection   are of concern. MRI BRAIN W CONTRAST   Final Result   Markedly limited exam due to patient motion. Within that limitation, there   is no abnormal enhancement identified. MRA HEAD WO CONTRAST   Final Result   Essentially nondiagnostic exam due to patient motion demonstrating no major   branch occlusion. MRA NECK W WO CONTRAST   Final Result   Significantly limited exam due to patient motion rendering the study largely   nondiagnostic. There is antegrade flow within the carotid and vertebral   arteries. Assessment for the presence or absence of vascular stenosis is not   possible due to the limitations of the exam.         MRI BRAIN WO CONTRAST   Final Result   1. Focal area of restricted diffusion involving the posterior right frontal   lobe, near the right precentral gyrus and corona radiata, most compatible   with an acute/early subacute infarct. However, there is a focal area of   rounded low T2 signal noted centrally in this lesion (series 6, image 19),   raising the possibility of an underlying abscess. Postcontrast imaging may   be of benefit for further evaluation. 2. Mild chronic microvascular white matter ischemic disease.          XR CHEST PORTABLE   Final Result   1. Left upper extremity PICC terminates over the SVC. Additional support   hardware, as detailed above. 2. Persistent findings of pulmonary interstitial edema and small left pleural   effusion. 3. Unchanged left basilar pulmonary opacity may represent atelectasis,   pneumonia, and/or aspiration. RECOMMENDATION:   Continued radiographic follow-up. XR CHEST PORTABLE   Final Result   Stable chest.         XR CHEST PORTABLE   Final Result   Interval removal of chest drains. Otherwise supportive tubing is stable. No pneumothorax. Increased left basilar opacity, likely combination of atelectasis and pleural   effusion. Vascular congestion. VL Extremity Venous Bilateral   Final Result      XR CHEST PORTABLE   Final Result   [place] catheter has been removed. Enteric feeding tube has been added. Supportive tubing is otherwise stable. Left perihilar/basilar atelectasis. Pneumonia is a differential possibility. CT HEAD WO CONTRAST   Final Result   No hemorrhage or mass      Mild periventricular white matter disease, likely due to small-vessel   ischemic change      Mild paranasal sinus disease         XR CHEST PORTABLE   Final Result   Dependent left lower lobe opacification and effusion. Mild right perihilar   opacification. No pneumothorax. Satisfactory position of endotracheal tube. Enteric catheter tip in the gastric body. XR CHEST PORTABLE   Final Result   Relatively stable appearance of the chest.  Perhaps slight increasing   ground-glass opacification centrally. No residual pneumothorax detected. XR CHEST PORTABLE   Final Result   1. Mild pulmonary vascular congestion with a small left pleural effusion and   associated left basilar atelectasis. 2. Tiny left apical pneumothorax. Attention on follow-up imaging is   recommended. 3. Suboptimal visualization of the orogastric tube.   Abdominal radiograph is

## 2020-06-03 ENCOUNTER — APPOINTMENT (OUTPATIENT)
Dept: INTERVENTIONAL RADIOLOGY/VASCULAR | Age: 65
DRG: 003 | End: 2020-06-03
Payer: COMMERCIAL

## 2020-06-03 LAB
ANION GAP SERPL CALCULATED.3IONS-SCNC: 10 MMOL/L (ref 3–16)
APPEARANCE CSF: CLEAR
APPEARANCE CSF: CLEAR
APTT: 30.5 SEC (ref 24.2–36.2)
BASE EXCESS ARTERIAL: 4.7 MMOL/L (ref -3–3)
BUN BLDV-MCNC: 18 MG/DL (ref 7–20)
CALCIUM SERPL-MCNC: 8.5 MG/DL (ref 8.3–10.6)
CARBOXYHEMOGLOBIN ARTERIAL: 1.2 % (ref 0–1.5)
CHLORIDE BLD-SCNC: 102 MMOL/L (ref 99–110)
CLOT EVALUATION CSF: ABNORMAL
CLOT EVALUATION CSF: ABNORMAL
CO2: 26 MMOL/L (ref 21–32)
COLOR CSF: COLORLESS
COLOR CSF: COLORLESS
CREAT SERPL-MCNC: 0.6 MG/DL (ref 0.8–1.3)
CRYPTOCOCCAL ANTIGEN CSF: NEGATIVE
GFR AFRICAN AMERICAN: >60
GFR NON-AFRICAN AMERICAN: >60
GLUCOSE BLD-MCNC: 117 MG/DL (ref 70–99)
GLUCOSE BLD-MCNC: 121 MG/DL (ref 70–99)
GLUCOSE BLD-MCNC: 124 MG/DL (ref 70–99)
GLUCOSE BLD-MCNC: 124 MG/DL (ref 70–99)
GLUCOSE BLD-MCNC: 128 MG/DL (ref 70–99)
GLUCOSE BLD-MCNC: 137 MG/DL (ref 70–99)
GLUCOSE, CSF: 81 MG/DL (ref 40–80)
HCO3 ARTERIAL: 27.7 MMOL/L (ref 21–29)
HCT VFR BLD CALC: 29.1 % (ref 40.5–52.5)
HEMOGLOBIN, ART, EXTENDED: 9.4 G/DL (ref 13.5–17.5)
HEMOGLOBIN: 9.4 G/DL (ref 13.5–17.5)
MAGNESIUM: 1.9 MG/DL (ref 1.8–2.4)
MCH RBC QN AUTO: 26.8 PG (ref 26–34)
MCHC RBC AUTO-ENTMCNC: 32.4 G/DL (ref 31–36)
MCV RBC AUTO: 83 FL (ref 80–100)
MENINGITIS ENCEPHALITIS PANEL: NORMAL
METHEMOGLOBIN ARTERIAL: 0.6 %
NO DIFFERENTIAL CSF: ABNORMAL
NO DIFFERENTIAL CSF: ABNORMAL
O2 CONTENT ARTERIAL: 12 ML/DL
O2 SAT, ARTERIAL: 93.7 %
O2 THERAPY: ABNORMAL
PCO2 ARTERIAL: 33.9 MMHG (ref 35–45)
PDW BLD-RTO: 16.1 % (ref 12.4–15.4)
PERFORMED ON: ABNORMAL
PH ARTERIAL: 7.52 (ref 7.35–7.45)
PLATELET # BLD: 331 K/UL (ref 135–450)
PMV BLD AUTO: 8.3 FL (ref 5–10.5)
PO2 ARTERIAL: 60.6 MMHG (ref 75–108)
POTASSIUM SERPL-SCNC: 4 MMOL/L (ref 3.5–5.1)
PROTEIN CSF: 26 MG/DL (ref 15–45)
RBC # BLD: 3.5 M/UL (ref 4.2–5.9)
RBC CSF: 117 /CUMM
RBC CSF: 57 /CUMM
REPORT: NORMAL
SODIUM BLD-SCNC: 138 MMOL/L (ref 136–145)
TCO2 ARTERIAL: 28.7 MMOL/L
TUBE NUMBER CSF: ABNORMAL
WBC # BLD: 9.3 K/UL (ref 4–11)
WBC CSF: 0 /CUMM (ref 0–5)
WBC CSF: 0 /CUMM (ref 0–5)

## 2020-06-03 PROCEDURE — 82784 ASSAY IGA/IGD/IGG/IGM EACH: CPT

## 2020-06-03 PROCEDURE — 6360000002 HC RX W HCPCS: Performed by: PSYCHIATRY & NEUROLOGY

## 2020-06-03 PROCEDURE — 6360000002 HC RX W HCPCS: Performed by: NURSE PRACTITIONER

## 2020-06-03 PROCEDURE — 99024 POSTOP FOLLOW-UP VISIT: CPT | Performed by: THORACIC SURGERY (CARDIOTHORACIC VASCULAR SURGERY)

## 2020-06-03 PROCEDURE — 87205 SMEAR GRAM STAIN: CPT

## 2020-06-03 PROCEDURE — 86341 ISLET CELL ANTIBODY: CPT

## 2020-06-03 PROCEDURE — 94640 AIRWAY INHALATION TREATMENT: CPT

## 2020-06-03 PROCEDURE — 85730 THROMBOPLASTIN TIME PARTIAL: CPT

## 2020-06-03 PROCEDURE — 83516 IMMUNOASSAY NONANTIBODY: CPT

## 2020-06-03 PROCEDURE — 82040 ASSAY OF SERUM ALBUMIN: CPT

## 2020-06-03 PROCEDURE — 86255 FLUORESCENT ANTIBODY SCREEN: CPT

## 2020-06-03 PROCEDURE — 62328 DX LMBR SPI PNXR W/FLUOR/CT: CPT

## 2020-06-03 PROCEDURE — 6360000002 HC RX W HCPCS: Performed by: THORACIC SURGERY (CARDIOTHORACIC VASCULAR SURGERY)

## 2020-06-03 PROCEDURE — 2100000000 HC CCU R&B

## 2020-06-03 PROCEDURE — 2580000003 HC RX 258: Performed by: THORACIC SURGERY (CARDIOTHORACIC VASCULAR SURGERY)

## 2020-06-03 PROCEDURE — 80048 BASIC METABOLIC PNL TOTAL CA: CPT

## 2020-06-03 PROCEDURE — 82803 BLOOD GASES ANY COMBINATION: CPT

## 2020-06-03 PROCEDURE — 88112 CYTOPATH CELL ENHANCE TECH: CPT

## 2020-06-03 PROCEDURE — 2580000003 HC RX 258: Performed by: NURSE PRACTITIONER

## 2020-06-03 PROCEDURE — 99232 SBSQ HOSP IP/OBS MODERATE 35: CPT | Performed by: INTERNAL MEDICINE

## 2020-06-03 PROCEDURE — 83735 ASSAY OF MAGNESIUM: CPT

## 2020-06-03 PROCEDURE — 84157 ASSAY OF PROTEIN OTHER: CPT

## 2020-06-03 PROCEDURE — C9113 INJ PANTOPRAZOLE SODIUM, VIA: HCPCS | Performed by: THORACIC SURGERY (CARDIOTHORACIC VASCULAR SURGERY)

## 2020-06-03 PROCEDURE — 6370000000 HC RX 637 (ALT 250 FOR IP): Performed by: THORACIC SURGERY (CARDIOTHORACIC VASCULAR SURGERY)

## 2020-06-03 PROCEDURE — 2709999900 IR LUMBAR PUNCTURE FOR DIAGNOSIS

## 2020-06-03 PROCEDURE — 2580000003 HC RX 258: Performed by: PSYCHIATRY & NEUROLOGY

## 2020-06-03 PROCEDURE — 94761 N-INVAS EAR/PLS OXIMETRY MLT: CPT

## 2020-06-03 PROCEDURE — 2580000003 HC RX 258: Performed by: INTERNAL MEDICINE

## 2020-06-03 PROCEDURE — 87483 CNS DNA AMP PROBE TYPE 12-25: CPT

## 2020-06-03 PROCEDURE — 009U3ZX DRAINAGE OF SPINAL CANAL, PERCUTANEOUS APPROACH, DIAGNOSTIC: ICD-10-PCS | Performed by: RADIOLOGY

## 2020-06-03 PROCEDURE — 2500000003 HC RX 250 WO HCPCS: Performed by: NURSE PRACTITIONER

## 2020-06-03 PROCEDURE — 85027 COMPLETE CBC AUTOMATED: CPT

## 2020-06-03 PROCEDURE — 83916 OLIGOCLONAL BANDS: CPT

## 2020-06-03 PROCEDURE — 6360000002 HC RX W HCPCS: Performed by: INTERNAL MEDICINE

## 2020-06-03 PROCEDURE — 89050 BODY FLUID CELL COUNT: CPT

## 2020-06-03 PROCEDURE — 6370000000 HC RX 637 (ALT 250 FOR IP): Performed by: NURSE PRACTITIONER

## 2020-06-03 PROCEDURE — 87102 FUNGUS ISOLATION CULTURE: CPT

## 2020-06-03 PROCEDURE — 82945 GLUCOSE OTHER FLUID: CPT

## 2020-06-03 PROCEDURE — 2700000000 HC OXYGEN THERAPY PER DAY

## 2020-06-03 PROCEDURE — 82042 OTHER SOURCE ALBUMIN QUAN EA: CPT

## 2020-06-03 PROCEDURE — 87899 AGENT NOS ASSAY W/OPTIC: CPT

## 2020-06-03 PROCEDURE — 87070 CULTURE OTHR SPECIMN AEROBIC: CPT

## 2020-06-03 PROCEDURE — 36600 WITHDRAWAL OF ARTERIAL BLOOD: CPT

## 2020-06-03 RX ORDER — LORAZEPAM 2 MG/ML
2 INJECTION INTRAMUSCULAR ONCE
Status: COMPLETED | OUTPATIENT
Start: 2020-06-03 | End: 2020-06-03

## 2020-06-03 RX ORDER — QUETIAPINE FUMARATE 25 MG/1
50 TABLET, FILM COATED ORAL 3 TIMES DAILY
Status: DISCONTINUED | OUTPATIENT
Start: 2020-06-03 | End: 2020-06-04

## 2020-06-03 RX ORDER — MIDAZOLAM HYDROCHLORIDE 1 MG/ML
2 INJECTION INTRAMUSCULAR; INTRAVENOUS ONCE
Status: COMPLETED | OUTPATIENT
Start: 2020-06-03 | End: 2020-06-03

## 2020-06-03 RX ADMIN — SODIUM CHLORIDE, PRESERVATIVE FREE 10 ML: 5 INJECTION INTRAVENOUS at 20:11

## 2020-06-03 RX ADMIN — MEROPENEM 500 MG: 500 INJECTION, POWDER, FOR SOLUTION INTRAVENOUS at 21:31

## 2020-06-03 RX ADMIN — ACETAMINOPHEN 650 MG: 325 TABLET ORAL at 00:18

## 2020-06-03 RX ADMIN — LORAZEPAM 1 MG: 2 INJECTION INTRAMUSCULAR; INTRAVENOUS at 20:09

## 2020-06-03 RX ADMIN — ALBUTEROL SULFATE 2.5 MG: 2.5 SOLUTION RESPIRATORY (INHALATION) at 15:54

## 2020-06-03 RX ADMIN — MEROPENEM 500 MG: 500 INJECTION, POWDER, FOR SOLUTION INTRAVENOUS at 16:03

## 2020-06-03 RX ADMIN — ALBUTEROL SULFATE 2.5 MG: 2.5 SOLUTION RESPIRATORY (INHALATION) at 20:22

## 2020-06-03 RX ADMIN — Medication 500 MG: at 07:51

## 2020-06-03 RX ADMIN — Medication 500 MG: at 20:08

## 2020-06-03 RX ADMIN — OXYCODONE 5 MG: 5 TABLET ORAL at 00:52

## 2020-06-03 RX ADMIN — Medication 500 MG: at 13:55

## 2020-06-03 RX ADMIN — MEROPENEM 500 MG: 500 INJECTION, POWDER, FOR SOLUTION INTRAVENOUS at 10:58

## 2020-06-03 RX ADMIN — ATORVASTATIN CALCIUM 20 MG: 20 TABLET, FILM COATED ORAL at 20:09

## 2020-06-03 RX ADMIN — METOPROLOL TARTRATE 2.5 MG: 5 INJECTION INTRAVENOUS at 18:38

## 2020-06-03 RX ADMIN — LORAZEPAM 1 MG: 2 INJECTION INTRAMUSCULAR; INTRAVENOUS at 02:00

## 2020-06-03 RX ADMIN — OXYCODONE 5 MG: 5 TABLET ORAL at 13:58

## 2020-06-03 RX ADMIN — LEVETIRACETAM 500 MG: 100 INJECTION, SOLUTION INTRAVENOUS at 00:52

## 2020-06-03 RX ADMIN — HYDRALAZINE HYDROCHLORIDE 10 MG: 20 INJECTION INTRAMUSCULAR; INTRAVENOUS at 04:04

## 2020-06-03 RX ADMIN — HYDRALAZINE HYDROCHLORIDE 10 MG: 20 INJECTION INTRAMUSCULAR; INTRAVENOUS at 16:03

## 2020-06-03 RX ADMIN — QUETIAPINE FUMARATE 50 MG: 25 TABLET ORAL at 07:51

## 2020-06-03 RX ADMIN — ALBUTEROL SULFATE 2.5 MG: 2.5 SOLUTION RESPIRATORY (INHALATION) at 12:57

## 2020-06-03 RX ADMIN — QUETIAPINE FUMARATE 50 MG: 25 TABLET ORAL at 13:55

## 2020-06-03 RX ADMIN — LORAZEPAM 2 MG: 2 INJECTION INTRAMUSCULAR; INTRAVENOUS at 08:55

## 2020-06-03 RX ADMIN — LORAZEPAM 1 MG: 2 INJECTION INTRAMUSCULAR; INTRAVENOUS at 06:00

## 2020-06-03 RX ADMIN — OXYCODONE 5 MG: 5 TABLET ORAL at 08:47

## 2020-06-03 RX ADMIN — METOPROLOL TARTRATE 75 MG: 25 TABLET, FILM COATED ORAL at 07:51

## 2020-06-03 RX ADMIN — SODIUM CHLORIDE, PRESERVATIVE FREE 10 ML: 5 INJECTION INTRAVENOUS at 08:17

## 2020-06-03 RX ADMIN — MINERAL SUPPLEMENT IRON 300 MG / 5 ML STRENGTH LIQUID 100 PER BOX UNFLAVORED 300 MG: at 07:51

## 2020-06-03 RX ADMIN — CASTOR OIL AND BALSAM, PERU: 788; 87 OINTMENT TOPICAL at 20:10

## 2020-06-03 RX ADMIN — FENTANYL CITRATE 12.5 MCG: 50 INJECTION INTRAMUSCULAR; INTRAVENOUS at 07:52

## 2020-06-03 RX ADMIN — CHLORHEXIDINE GLUCONATE 15 ML: 1.2 RINSE ORAL at 20:12

## 2020-06-03 RX ADMIN — DEXMEDETOMIDINE HYDROCHLORIDE 0.2 MCG/KG/HR: 4 INJECTION, SOLUTION INTRAVENOUS at 02:01

## 2020-06-03 RX ADMIN — MIDAZOLAM 2 MG: 1 INJECTION INTRAMUSCULAR; INTRAVENOUS at 09:21

## 2020-06-03 RX ADMIN — Medication 10 ML: at 06:00

## 2020-06-03 RX ADMIN — HEPARIN SODIUM 5000 UNITS: 5000 INJECTION INTRAVENOUS; SUBCUTANEOUS at 21:31

## 2020-06-03 RX ADMIN — MEROPENEM 500 MG: 500 INJECTION, POWDER, FOR SOLUTION INTRAVENOUS at 04:04

## 2020-06-03 RX ADMIN — ALBUTEROL SULFATE 2.5 MG: 2.5 SOLUTION RESPIRATORY (INHALATION) at 08:51

## 2020-06-03 RX ADMIN — HYDRALAZINE HYDROCHLORIDE 10 MG: 20 INJECTION INTRAMUSCULAR; INTRAVENOUS at 21:29

## 2020-06-03 RX ADMIN — METOPROLOL TARTRATE 75 MG: 25 TABLET, FILM COATED ORAL at 20:09

## 2020-06-03 RX ADMIN — OXYCODONE 5 MG: 5 TABLET ORAL at 04:52

## 2020-06-03 RX ADMIN — ASPIRIN 81 MG 324 MG: 81 TABLET ORAL at 10:58

## 2020-06-03 RX ADMIN — LORAZEPAM 1 MG: 2 INJECTION INTRAMUSCULAR; INTRAVENOUS at 16:40

## 2020-06-03 RX ADMIN — HEPARIN SODIUM 5000 UNITS: 5000 INJECTION INTRAVENOUS; SUBCUTANEOUS at 13:54

## 2020-06-03 RX ADMIN — CHLORHEXIDINE GLUCONATE 15 ML: 1.2 RINSE ORAL at 08:17

## 2020-06-03 RX ADMIN — FENTANYL CITRATE 12.5 MCG: 50 INJECTION INTRAMUSCULAR; INTRAVENOUS at 18:04

## 2020-06-03 RX ADMIN — LEVETIRACETAM 500 MG: 100 INJECTION, SOLUTION INTRAVENOUS at 12:56

## 2020-06-03 RX ADMIN — MINERAL SUPPLEMENT IRON 300 MG / 5 ML STRENGTH LIQUID 100 PER BOX UNFLAVORED 300 MG: at 20:08

## 2020-06-03 RX ADMIN — CASTOR OIL AND BALSAM, PERU: 788; 87 OINTMENT TOPICAL at 08:17

## 2020-06-03 RX ADMIN — FENTANYL CITRATE 12.5 MCG: 50 INJECTION INTRAMUSCULAR; INTRAVENOUS at 09:50

## 2020-06-03 RX ADMIN — DEXMEDETOMIDINE HYDROCHLORIDE 0.4 MCG/KG/HR: 4 INJECTION, SOLUTION INTRAVENOUS at 11:24

## 2020-06-03 RX ADMIN — PANTOPRAZOLE SODIUM 40 MG: 40 INJECTION, POWDER, FOR SOLUTION INTRAVENOUS at 06:00

## 2020-06-03 RX ADMIN — LORAZEPAM 1 MG: 2 INJECTION INTRAMUSCULAR; INTRAVENOUS at 10:05

## 2020-06-03 RX ADMIN — QUETIAPINE FUMARATE 50 MG: 25 TABLET ORAL at 20:09

## 2020-06-03 ASSESSMENT — PULMONARY FUNCTION TESTS
PIF_VALUE: 17
PIF_VALUE: 17
PIF_VALUE: 14
PIF_VALUE: 15
PIF_VALUE: 14
PIF_VALUE: 13
PIF_VALUE: 19
PIF_VALUE: 21
PIF_VALUE: 14
PIF_VALUE: 16
PIF_VALUE: 18
PIF_VALUE: 15
PIF_VALUE: 15
PIF_VALUE: 12
PIF_VALUE: 43
PIF_VALUE: 16
PIF_VALUE: 14
PIF_VALUE: 17
PIF_VALUE: 13
PIF_VALUE: 12
PIF_VALUE: 0
PIF_VALUE: 27
PIF_VALUE: 21
PIF_VALUE: 23
PIF_VALUE: 12
PIF_VALUE: 13
PIF_VALUE: 34

## 2020-06-03 ASSESSMENT — PAIN SCALES - GENERAL
PAINLEVEL_OUTOF10: 4
PAINLEVEL_OUTOF10: 0
PAINLEVEL_OUTOF10: 1
PAINLEVEL_OUTOF10: 10
PAINLEVEL_OUTOF10: 0
PAINLEVEL_OUTOF10: 0
PAINLEVEL_OUTOF10: 4
PAINLEVEL_OUTOF10: 5
PAINLEVEL_OUTOF10: 3

## 2020-06-03 NOTE — PLAN OF CARE
JUAN Qiu  Outcome: Not Met This Shift   Pt attempting to pull at restraints and reaching toward lines

## 2020-06-03 NOTE — PROGRESS NOTES
0700: Handoff with Martir Lentz RN, pt in room agitated, coughing on the vent. 0750: shift assessment complete. Pt follows commands but not redirectable to stay calm. Blood glucose 137, no coverage needed. Med pass complete. 0800: tube feeding stopped. 4878: Neuro paged for sedation orders for lumbar puncture today. 0831: Spoke with Carlos NAPIER from Neuro in regards to sedation. He is not managing the patient and leaving sedation up to CTS. Informed him this RN spoke to Eliezer NAPIER and that since he was the ordering the test to have suggestions from him. States whatever we have on for PRN is fine to give, whatever to get it done. Spoke also about risk and benefits. 1101Ivin Willa with Eliezer NAPIER, okay to give oxycodone early. Ativan ordered which will give before leaving. 0845: Phone consent obtain from Maria Esther Morales (the son) to proceed with lumbar puncture. Eliezer NP to verify consent given over phone. 2405: 2mg of versed given, transport at bedside to go to IR for lumbar puncture. 1020: pt returned from IR to room. Needs to lay flat for 2 hours. 1100: reassessment complete and remains unchanged. 1545: precedex turned off    1600: reassessment complete. Blood glucose 126, no coverage needed. PRN hydralazine given for SBP > 160.     1640: Pt very agitated and not tolerating the vent. PRN ativan given. 1840: HR in the 130's. PRN metoprolol given.      1900: Handoff with Barb MARTINEZ

## 2020-06-03 NOTE — PROGRESS NOTES
Intravenous, Q6H, Jared Lopez MD, 500 mg at 06/03/20 1058    levETIRAcetam (KEPPRA) 500 mg in sodium chloride 0.9 % 100 mL IVPB, 500 mg, Intravenous, Q12H, Katja Lopez MD, Stopped at 06/03/20 1316    atorvastatin (LIPITOR) tablet 20 mg, 20 mg, Oral, Nightly, Morrie Nearing, APRN - CNP, 20 mg at 06/02/20 2048    docusate (COLACE) 50 MG/5ML liquid 100 mg, 100 mg, Oral, BID PRN, Morrie Nearing, APRN - CNP    Venelex ointment, , Topical, BID, Morrie Nearing, APRN - CNP    sennosides-docusate sodium (SENOKOT-S) 8.6-50 MG tablet 1 tablet, 1 tablet, Oral, Daily PRN, Morrie Nearing, APRN - CNP    lidocaine PF 1 % injection 5 mL, 5 mL, Intradermal, Once, Morrie Nearing, APRN - CNP    metoprolol (LOPRESSOR) injection 2.5 mg, 2.5 mg, Intravenous, Q10 Min PRN, Morrie Nearing, APRN - CNP, 2.5 mg at 05/29/20 0448    ferrous sulfate 300 (60 Fe) MG/5ML syrup 300 mg, 300 mg, Oral, BID, Morrie Nearing, APRN - CNP, 300 mg at 06/03/20 0751    heparin (porcine) injection 5,000 Units, 5,000 Units, Subcutaneous, 3 times per day, Morrie Nearing, APRN - CNP, 5,000 Units at 06/03/20 1354    glucose (GLUTOSE) 40 % oral gel 15 g, 15 g, Oral, PRN, Morrie Nearing, APRN - CNP    dextrose 50 % IV solution, 12.5 g, Intravenous, PRN, Morrie Nearing, APRN - CNP, 12.5 g at 05/15/20 0836    glucagon (rDNA) injection 1 mg, 1 mg, Intramuscular, PRN, Morrie Nearing, APRN - CNP    dextrose 5 % solution, 100 mL/hr, Intravenous, PRN, Morrie Nearing, APRN - CNP    sodium chloride flush 0.9 % injection 10 mL, 10 mL, Intravenous, 2 times per day, [facility] [place] APRN - CNP, 10 mL at 06/02/20 2049    sodium chloride flush 0.9 % injection 10 mL, 10 mL, Intravenous, PRN, [facility], [facility] - CNP, 10 mL at 05/22/20 0828    insulin lispro (HUMALOG) injection vial 0-18 Units, 0-18 Units, Subcutaneous, Q4H, [facility] [place] APRN - CNP, 3 Units at 06/01/20 0803    potassium chloride 20 mEq/50 mL IVPB (Central Line), 20 mEq, MD    magnesium hydroxide (MILK OF MAGNESIA) 400 MG/5ML suspension 30 mL, 30 mL, Oral, Daily PRN, Mike River MD    [COMPLETED] pantoprazole (PROTONIX) injection 40 mg, 40 mg, Intravenous, Once, 40 mg at 05/08/20 0521 **AND** sodium chloride (PF) 0.9 % injection 10 mL, 10 mL, Intravenous, Once, Delta Sandifer, APRN - CNP    Exam  Blood pressure 135/64, pulse 103, temperature 99.5 °F (37.5 °C), temperature source Rectal, resp. rate 18, height 6' (1.829 m), weight 166 lb 7.2 oz (75.5 kg), SpO2 100 %. Constitutional                          Vital signs: BP, HR, and RR reviewed            General able to be aroused.   Eyes: unable to visualize the fundi  Cardiovascular: pulses symmetric in all 4 extremities.    Psychiatric: uncooperative w/ exam.    Neurologic  Mental status:   orientation kaylee due to encephalopathy.                Attention  poor              Language unable to verbalize.  Has trach.                Comprehension not following commands consistently.    Cranial nerves:   CN2: technically difficult exam today. CN 3,4,6: no forced gaze deviation.  Pupils appear similar, round. CN7: face grossly symmetric   CN8: hearing grossly intact  CN11: trap strength kaylee.    CN12: did protrude tongue on command. Strength: very difficult to assess.  Inconsistently grasps w/ his R hand.  Less movement w/ LUE. Actively resisting wrist restraints at times. Sensory: not much reaction to noxious pain stimulus at this time  Cerebellar/coordination: finger nose finger kaylee.    Tone: no increased tone or rigidity.    Gait: deferred at this time given encephalopathy.     ROS - fevers.  Unable to obtain remainder from patient at this time due to encephalopathy. Chart reviewed.     Labs  Glucose 121  Na 138  K 4.0  BUN 18  Creatinine 0.6  Mg 1.90    WBC 9.3K  Hg 9.4  Platelets 432    Procalcitonin 0.18     Sed Rate > 120  SREE negative     Ammonia 51  Folate 11.08  B12 - 871     Hep C Ab reactive  HIV non reactive  Blood cultures NG  Urine culture NG     COVID negative.  Repeat negative.     CSF  WBC 0 -0  RBC 57 - 117  Glucose 81  Protein 26    Crypto Ag negative    Opening pressure 21 cm of water        Studies  EEG 5/21/20  Background slowing and disorganization. Beta activity.       MRI brain w/o 5/20/20  Posterior R frontal diffusion restriction, likely acute/subacute infarct. Appears to be focal area of low T2 signal, ? abscess.         CT head 5/29/20  Stable, suspected subacute infarct. No significant changes. Carotid US 5/7/20  High end 50-79% R ICA stenosis. 50-79% L ICA stenosis.       TTE 5/7/20  EF 60%  Normal L atrium. Impression  1. Encephalopathy/delirium s/p CABG, ongoing.  S/p tracheostomy/PEG.  Weaning off Precedex.       MRI brain w/ R frontal lobe diffusion restriction, possibly an acute/subacute infarct. No abnormal enhancement was noted on contrasted study, though both the repeat brain imaging and vascular studies were very poor and essentially non-diagnostic.  Recent CT head was stable.       EEG w/ slowing and beta activity, non-specific.  Had some reported posturing last weekend, though no recent signs or behavior concerning for seizure. Initial CSF is unrevealing.        Hx of ETOH abuse. Recommendations  1. Repeat MRI brain w/wo pending. 2.  Continue Keppra. 3.  Will follow CSF studies as they become available. 4.  Stroke prevention measures. Consider vascular surgery evaluation when appropriate for ICA stenosis. 5.  ID following. If MRI brain is stable, will follow from periphery. Please call back w/ any additional questions or concerns. Thank you.       Natali Avilez NP  [address] [place] [address] [address] Neurology    A copy of this note was provided for Dr Tanja Miguel MD

## 2020-06-03 NOTE — PROGRESS NOTES
83.0 06/03/2020     06/03/2020     Lab Results   Component Value Date    CREATININE 0.6 (L) 06/03/2020    BUN 18 06/03/2020     06/03/2020    K 4.0 06/03/2020     06/03/2020    CO2 26 06/03/2020       Hepatic Function Panel:   Lab Results   Component Value Date    ALKPHOS 121 05/25/2020    ALT 55 05/25/2020    AST 33 05/25/2020    PROT 5.9 05/25/2020    BILITOT <0.2 05/25/2020    BILIDIR <0.2 05/25/2020    IBILI see below 05/25/2020    LABALBU 2.4 05/25/2020       UA:  Lab Results   Component Value Date    COLORU YELLOW 05/28/2020    CLARITYU Clear 05/28/2020    GLUCOSEU Negative 05/28/2020    BILIRUBINUR Negative 05/28/2020    KETUA 15 05/28/2020    SPECGRAV 1.019 05/28/2020    BLOODU Negative 05/28/2020    PHUR 5.5 05/28/2020    PROTEINU TRACE 05/28/2020    UROBILINOGEN 0.2 05/28/2020    NITRU Negative 05/28/2020    LEUKOCYTESUR Negative 05/28/2020    LABMICR YES 05/28/2020    URINETYPE NotGiven 05/28/2020      Urine Microscopic:   Lab Results   Component Value Date    COMU see below 05/12/2020    HYALCAST 2 05/28/2020    WBCUA 2 05/28/2020    RBCUA 10 05/28/2020    EPIU 1 05/28/2020     Procal  0.58       CRP 60.9     Results for Vanessa eZng (MRN [id number]) as of 6/3/2020 15:30   Ref.  Range 6/3/2020 10:10 6/3/2020 10:10   Appearance, CSF Latest Ref Range: Clear  Clear Clear   Clot Evaluation CSF Unknown see below see below   Glucose, CSF Latest Ref Range: 40 - 80 mg/dL 81 (H)    IgG, CSF Latest Ref Range: 0.00 - 6.00 mg/dL 2.80    No differential CSF Unknown see below see below   OLIGOCLONAL BANDING Unknown Rpt    Protein, CSF Latest Ref Range: 15 - 45 mg/dL 26    RBC, CSF Latest Ref Range: 0 /cumm 57 (A) 117 (A)   WBC, CSF Latest Ref Range: 0 - 5 /cumm 0 0   Color, CSF Latest Ref Range: Colorless  Colorless Colorless   Tube Number + CELL CT + DIFF-CSF Unknown Tube 1 Tube 4      Ref Range & Units 05/16/20 1500   HCV QNT by NAAT IU/ML IU/mL Not Detected    HCV Qnt by NAAT log IU/ml log IU/mL Status: Canceled Specimen: Blood    Culture, Blood 2 [[id number]]    Order Status: Canceled Specimen: Blood        Date/Time       Culture, Respiratory [[id number]] Collected: 05/15/20 1045   Order Status: Completed Specimen: Sputum, Suctioned Updated: 05/17/20 0956    CULTURE, RESPIRATORY No growth 36-48 hours    Gram Stain Result 1+ WBC's (Polymorphonuclear)   1+ Epithelial Cells   1+ Gram positive cocci    Narrative:     ORDER#: [id number]                          ORDERED BY: Estefania Jones  SOURCE: Sputum Suctioned                   COLLECTED:  05/15/20 10:45  ANTIBIOTICS AT [facility]. :                      RECEIVED :  05/15/20 10:52 /18/2020 11:49 AM - [place] [facility] Incoming Lab Results From Soft (Epic Adt)     Component Value Ref Range & Units Status Collected Lab   HIV Ag/Ab Non-Reactive  Non-reactive Final 05/16/2020  2:40 PM [facility]   HIV-1 Antibody Non-Reactive  Non-reactive Final 05/16/2020  2:40 PM 15 [facility]   HIV ANTIGEN Non-Reactive  Non-reactive Final 05/16/2020  2:40 PM 15 [facility]   HIV-2 Ab Non-Reactive  Non-reactive Final 05/16/2020  2:40 PM [facility]             Culture, Body Fluid [[id number]] Collected: 05/29/20 1330   Order Status: Completed Specimen: Body Fluid from Pleural Updated: 05/31/20 0902    Body Fluid Culture, Sterile --    No growth to date   No growth 36-48 hours     Gram Stain Result 4+ WBC's (Polymorphonuclear)   No organisms seen    Narrative:     ORDER#: [id number]                          ORDERED BY: Estefania Jones  SOURCE: Pleural                            COLLECTED:  05/29/20 13:30  ANTIBIOTICS AT [facility]. :                      RECEIVED :  05/29/20 13:51  Performed at:  [facility]  [address] [facility] [address]   Phone [phone]   Culture, Respiratory [[id number]] Collected: 05/28/20 1215   Order Status: Completed Specimen: Sputum from Tracheal Aspirate Updated: 05/30/20 1124    CULTURE, stenosis is not   possible due to the limitations of the exam.         MRI BRAIN WO CONTRAST   Final Result   1. Focal area of restricted diffusion involving the posterior right frontal   lobe, near the right precentral gyrus and corona radiata, most compatible   with an acute/early subacute infarct. However, there is a focal area of   rounded low T2 signal noted centrally in this lesion (series 6, image 19),   raising the possibility of an underlying abscess. Postcontrast imaging may   be of benefit for further evaluation. 2. Mild chronic microvascular white matter ischemic disease. XR CHEST PORTABLE   Final Result   1. Left upper extremity PICC terminates over the SVC. Additional support   hardware, as detailed above. 2. Persistent findings of pulmonary interstitial edema and small left pleural   effusion. 3. Unchanged left basilar pulmonary opacity may represent atelectasis,   pneumonia, and/or aspiration. RECOMMENDATION:   Continued radiographic follow-up. XR CHEST PORTABLE   Final Result   Stable chest.         XR CHEST PORTABLE   Final Result   Interval removal of chest drains. Otherwise supportive tubing is stable. No pneumothorax. Increased left basilar opacity, likely combination of atelectasis and pleural   effusion. Vascular congestion. VL Extremity Venous Bilateral   Final Result      XR CHEST PORTABLE   Final Result   Hailey-Lencho catheter has been removed. Enteric feeding tube has been added. Supportive tubing is otherwise stable. Left perihilar/basilar atelectasis. Pneumonia is a differential possibility. CT HEAD WO CONTRAST   Final Result   No hemorrhage or mass      Mild periventricular white matter disease, likely due to small-vessel   ischemic change      Mild paranasal sinus disease         XR CHEST PORTABLE   Final Result   Dependent left lower lobe opacification and effusion. Mild right perihilar   opacification.   No pneumothorax. Satisfactory position of endotracheal tube. Enteric catheter tip in the gastric body. XR CHEST PORTABLE   Final Result   Relatively stable appearance of the chest.  Perhaps slight increasing   ground-glass opacification centrally. No residual pneumothorax detected. XR CHEST PORTABLE   Final Result   1. Mild pulmonary vascular congestion with a small left pleural effusion and   associated left basilar atelectasis. 2. Tiny left apical pneumothorax. Attention on follow-up imaging is   recommended. 3. Suboptimal visualization of the orogastric tube. Abdominal radiograph is   recommended for further evaluation. VL DUP CAROTID BILATERAL   Final Result      VL PRE OP VEIN MAPPING   Final Result      XR CHEST PORTABLE   Final Result   Or bronchial wall thickening suggests inflammation, such as that seen with   asthma, bronchitis or smoking. Consolidative airspace disease. Rounded opacity in the right hilar region likely represents a vessel en face,   however cannot exclude a prominent lymph node or nodule.   Comparison imaging   would be helpful if available, otherwise recommend CT chest.         MRI BRAIN W WO CONTRAST    (Results Pending)   IR LUMBAR PUNCTURE FOR DIAGNOSIS    (Results Pending)         All the pertinent images and reports for the current Hospitalization were reviewed by me     Scheduled Meds:   QUEtiapine  50 mg Oral TID    metoprolol tartrate  75 mg Oral BID    calcium carbonate  500 mg Oral TID    aspirin  324 mg Oral Daily    lidocaine 1 % injection  5 mL Intradermal Once    sodium chloride flush  10 mL Intravenous 2 times per day    meropenem  500 mg Intravenous Q6H    levetiracetam  500 mg Intravenous Q12H    atorvastatin  20 mg Oral Nightly    Venelex   Topical BID    lidocaine 1 % injection  5 mL Intradermal Once    ferrous sulfate  300 mg Oral BID    heparin (porcine)  5,000 Units Subcutaneous 3 times per day    sodium chloride flush  10 mL Intravenous 2 times per day    insulin lispro  0-18 Units Subcutaneous Q4H    pantoprazole  40 mg Intravenous Daily    And    sodium chloride (PF)  10 mL Intravenous Daily    chlorhexidine  15 mL Mouth/Throat BID    albuterol  2.5 mg Nebulization Q4H WA    sodium chloride (PF)  10 mL Intravenous Once       Continuous Infusions:   dextrose      dexmedetomidine 0.5 mcg/kg/hr (06/03/20 0630)       PRN Meds:  fentanNYL, oxyCODONE, acetaminophen, hydrALAZINE, LORazepam, sodium chloride flush, docusate, sennosides-docusate sodium, metoprolol, glucose, dextrose, glucagon (rDNA), dextrose, sodium chloride flush, potassium chloride, magnesium sulfate, calcium chloride IVPB, calcium chloride IVPB, ondansetron, albuterol sulfate HFA, metoclopramide, albuterol, [DISCONTINUED] acetaminophen **OR** acetaminophen, polyethylene glycol, promethazine **OR** [DISCONTINUED] ondansetron, magnesium hydroxide      Assessment:     Patient Active Problem List   Diagnosis    Chest pain    [facility]STEMI (non-ST elevated myocardial infarction) [facility] [address]    Coronary artery disease involving native coronary artery of native heart with unstable angina pectoris ([facility] [address]    S/P CABG x 4     Admitted with chest pain   NSTEMI  Strong family history per HPI  S/P Urgent CABG -  on 5/8  Resp failure   On the ventilator  Post op fevers   Anemia   WBC elevation post op now trend down  ?  DTS with Lft elevation   MRI brain with CVA+      Given the intubated stated and elevated Procal and on going fevers will start IV abx and see his response-   Surgical incision is clean and lines are clean and Blood cx and Sputum cx from 5/12 NGTD        Fevers trend down Procal elevated and will follow and once fevers better able to d/c his IV abx     Hep C+ve noted  and Cryoglobulins  -ve     HIV screen -ve and resp cx negative trending Procal and once down will be able to d/c soon      MRI brain reported to be abnormal and CVA possible infection  With abscess reported but I feel this more in favor for stroke given CAD    MRA -ve and  WBC trend down and EEG with Encephalopathy noted      CT head evolving infarct as noted on the previous MRi - CRP trend down and fevers noted and will trend    Follow up CT brain noted -  Resp cx in process - C diff -ve noted    S/p Trach and on the vent and PEG tube placed on 5/27    New fevers  and pancultures sent   CXR from 5/28 with large effusion and underwent CT chest/abd/pelvis    s/pbed side thoracentesis by IR and Fluid cx  Negative    Fevers low grade and will trend Procal and CRP improving and repeat MRI brain pending and if no infection concern will be able to d/c IV abx and will likely need placement        Labs, Microbiology, Radiology and all the pertinent results from current hospitalization and  care every where were reviewed  by me as a part of the evaluation   Plan:   1. Procal  And CRP trending down   2. MRI brain repeat pending if no new concerns will be able to d/c the IV abx   3. Resp cx from 5/28 NGTD  4. Will be able to d/c IV Meropenem x 500 mG X q 6 hrs will cover anaerobes and gram -ves  If MRI brain no infection concern  5. Unable to wean from the vent  S/p Trach    6. Hep C+ve ,SREE-ve But ESR elevated  7. HIV -ve   8. MRI BRAIN abnormal Rt Precentral gyrus and corona radiata  infarct    9. C diff -ve  10. High fever on 5/28 and new cx Negative     11. CXR with Large Left effusion s/p thoracentesis on 5/29 and pleural fluid cultures negative Post CXR lung inflation better  12. Will need placement   13. CSF WBC normal and Meningoencephalitis panel -ve          Discussed with patient/Family and Nursing staff     . Thanks for allowing me to participate in your patient's care and please call me with any questions or concerns.     Gwen Lopez MD  Infectious Disease  [facility] ([facility]) Physician  Phone: [phone]   Fax : [phone]

## 2020-06-04 ENCOUNTER — APPOINTMENT (OUTPATIENT)
Dept: MRI IMAGING | Age: 65
DRG: 003 | End: 2020-06-04
Payer: COMMERCIAL

## 2020-06-04 LAB
ALBUMIN CSF: 11.1 MG/DL (ref 10–30)
ALBUMIN SERPL-MCNC: 1929 MG/DL (ref 3400–5000)
ANION GAP SERPL CALCULATED.3IONS-SCNC: 10 MMOL/L (ref 3–16)
APTT: 27.3 SEC (ref 24.2–36.2)
BASE EXCESS ARTERIAL: 5.8 MMOL/L (ref -3–3)
BUN BLDV-MCNC: 14 MG/DL (ref 7–20)
CALCIUM SERPL-MCNC: 8.6 MG/DL (ref 8.3–10.6)
CARBOXYHEMOGLOBIN ARTERIAL: 0.7 % (ref 0–1.5)
CHLORIDE BLD-SCNC: 102 MMOL/L (ref 99–110)
CO2: 26 MMOL/L (ref 21–32)
CREAT SERPL-MCNC: 0.6 MG/DL (ref 0.8–1.3)
CSF INTERPRETATION: NORMAL
GFR AFRICAN AMERICAN: >60
GFR NON-AFRICAN AMERICAN: >60
GLUCOSE BLD-MCNC: 123 MG/DL (ref 70–99)
GLUCOSE BLD-MCNC: 126 MG/DL (ref 70–99)
GLUCOSE BLD-MCNC: 131 MG/DL (ref 70–99)
GLUCOSE BLD-MCNC: 135 MG/DL (ref 70–99)
GLUCOSE BLD-MCNC: 138 MG/DL (ref 70–99)
GLUCOSE BLD-MCNC: 145 MG/DL (ref 70–99)
GLUCOSE BLD-MCNC: 150 MG/DL (ref 70–99)
HCO3 ARTERIAL: 28.9 MMOL/L (ref 21–29)
HCT VFR BLD CALC: 30.1 % (ref 40.5–52.5)
HEMOGLOBIN, ART, EXTENDED: 9.5 G/DL (ref 13.5–17.5)
HEMOGLOBIN: 9.7 G/DL (ref 13.5–17.5)
IGG CSF: 2.8 MG/DL (ref 0–6)
IGG INDEX: 0.48 (ref 0.2–0.7)
IGG SYNTHESIS RATE CSF: -2.7 MG/DAY (ref -9.9–3.3)
IGG: 1006 MG/DL (ref 700–1600)
MAGNESIUM: 2.1 MG/DL (ref 1.8–2.4)
MCH RBC QN AUTO: 26.6 PG (ref 26–34)
MCHC RBC AUTO-ENTMCNC: 32.3 G/DL (ref 31–36)
MCV RBC AUTO: 82.4 FL (ref 80–100)
METHEMOGLOBIN ARTERIAL: 0.1 %
O2 CONTENT ARTERIAL: 13 ML/DL
O2 SAT, ARTERIAL: 95.4 %
O2 THERAPY: ABNORMAL
PCO2 ARTERIAL: 35.2 MMHG (ref 35–45)
PDW BLD-RTO: 16.4 % (ref 12.4–15.4)
PERFORMED ON: ABNORMAL
PH ARTERIAL: 7.52 (ref 7.35–7.45)
PLATELET # BLD: 341 K/UL (ref 135–450)
PMV BLD AUTO: 8.4 FL (ref 5–10.5)
PO2 ARTERIAL: 68.9 MMHG (ref 75–108)
POTASSIUM SERPL-SCNC: 3.9 MMOL/L (ref 3.5–5.1)
PROTEIN CSF: 27 MG/DL (ref 15–45)
RBC # BLD: 3.66 M/UL (ref 4.2–5.9)
SARS-COV-2, NAAT: NOT DETECTED
SODIUM BLD-SCNC: 138 MMOL/L (ref 136–145)
TCO2 ARTERIAL: 30 MMOL/L
WBC # BLD: 9.2 K/UL (ref 4–11)

## 2020-06-04 PROCEDURE — 94760 N-INVAS EAR/PLS OXIMETRY 1: CPT

## 2020-06-04 PROCEDURE — 99232 SBSQ HOSP IP/OBS MODERATE 35: CPT | Performed by: INTERNAL MEDICINE

## 2020-06-04 PROCEDURE — 6360000002 HC RX W HCPCS: Performed by: INTERNAL MEDICINE

## 2020-06-04 PROCEDURE — 80048 BASIC METABOLIC PNL TOTAL CA: CPT

## 2020-06-04 PROCEDURE — 85730 THROMBOPLASTIN TIME PARTIAL: CPT

## 2020-06-04 PROCEDURE — 6360000002 HC RX W HCPCS: Performed by: THORACIC SURGERY (CARDIOTHORACIC VASCULAR SURGERY)

## 2020-06-04 PROCEDURE — 82803 BLOOD GASES ANY COMBINATION: CPT

## 2020-06-04 PROCEDURE — U0002 COVID-19 LAB TEST NON-CDC: HCPCS

## 2020-06-04 PROCEDURE — C9113 INJ PANTOPRAZOLE SODIUM, VIA: HCPCS | Performed by: THORACIC SURGERY (CARDIOTHORACIC VASCULAR SURGERY)

## 2020-06-04 PROCEDURE — 6370000000 HC RX 637 (ALT 250 FOR IP): Performed by: NURSE PRACTITIONER

## 2020-06-04 PROCEDURE — 6370000000 HC RX 637 (ALT 250 FOR IP): Performed by: THORACIC SURGERY (CARDIOTHORACIC VASCULAR SURGERY)

## 2020-06-04 PROCEDURE — 6360000002 HC RX W HCPCS: Performed by: PSYCHIATRY & NEUROLOGY

## 2020-06-04 PROCEDURE — 70553 MRI BRAIN STEM W/O & W/DYE: CPT

## 2020-06-04 PROCEDURE — 99024 POSTOP FOLLOW-UP VISIT: CPT | Performed by: THORACIC SURGERY (CARDIOTHORACIC VASCULAR SURGERY)

## 2020-06-04 PROCEDURE — 2700000000 HC OXYGEN THERAPY PER DAY

## 2020-06-04 PROCEDURE — 6360000002 HC RX W HCPCS: Performed by: NURSE PRACTITIONER

## 2020-06-04 PROCEDURE — 85027 COMPLETE CBC AUTOMATED: CPT

## 2020-06-04 PROCEDURE — 83735 ASSAY OF MAGNESIUM: CPT

## 2020-06-04 PROCEDURE — 2580000003 HC RX 258: Performed by: THORACIC SURGERY (CARDIOTHORACIC VASCULAR SURGERY)

## 2020-06-04 PROCEDURE — 6360000004 HC RX CONTRAST MEDICATION: Performed by: NURSE PRACTITIONER

## 2020-06-04 PROCEDURE — 2580000003 HC RX 258: Performed by: INTERNAL MEDICINE

## 2020-06-04 PROCEDURE — 94640 AIRWAY INHALATION TREATMENT: CPT

## 2020-06-04 PROCEDURE — 36600 WITHDRAWAL OF ARTERIAL BLOOD: CPT

## 2020-06-04 PROCEDURE — A9577 INJ MULTIHANCE: HCPCS | Performed by: NURSE PRACTITIONER

## 2020-06-04 PROCEDURE — 2580000003 HC RX 258: Performed by: NURSE PRACTITIONER

## 2020-06-04 PROCEDURE — 94003 VENT MGMT INPAT SUBQ DAY: CPT

## 2020-06-04 PROCEDURE — 2100000000 HC CCU R&B

## 2020-06-04 PROCEDURE — 2580000003 HC RX 258: Performed by: PSYCHIATRY & NEUROLOGY

## 2020-06-04 RX ORDER — LORAZEPAM 2 MG/ML
2 INJECTION INTRAMUSCULAR ONCE
Status: COMPLETED | OUTPATIENT
Start: 2020-06-04 | End: 2020-06-04

## 2020-06-04 RX ORDER — QUETIAPINE FUMARATE 25 MG/1
75 TABLET, FILM COATED ORAL 3 TIMES DAILY
Status: DISCONTINUED | OUTPATIENT
Start: 2020-06-04 | End: 2020-06-05

## 2020-06-04 RX ORDER — MIDAZOLAM HYDROCHLORIDE 1 MG/ML
2 INJECTION INTRAMUSCULAR; INTRAVENOUS ONCE
Status: COMPLETED | OUTPATIENT
Start: 2020-06-04 | End: 2020-06-04

## 2020-06-04 RX ADMIN — ASPIRIN 81 MG 324 MG: 81 TABLET ORAL at 09:09

## 2020-06-04 RX ADMIN — ALBUTEROL SULFATE 2.5 MG: 2.5 SOLUTION RESPIRATORY (INHALATION) at 15:41

## 2020-06-04 RX ADMIN — ALBUTEROL SULFATE 2.5 MG: 2.5 SOLUTION RESPIRATORY (INHALATION) at 08:10

## 2020-06-04 RX ADMIN — CASTOR OIL AND BALSAM, PERU: 788; 87 OINTMENT TOPICAL at 20:35

## 2020-06-04 RX ADMIN — LEVETIRACETAM 500 MG: 100 INJECTION, SOLUTION INTRAVENOUS at 13:21

## 2020-06-04 RX ADMIN — METOPROLOL TARTRATE 75 MG: 25 TABLET, FILM COATED ORAL at 20:35

## 2020-06-04 RX ADMIN — HEPARIN SODIUM 5000 UNITS: 5000 INJECTION INTRAVENOUS; SUBCUTANEOUS at 14:00

## 2020-06-04 RX ADMIN — MINERAL SUPPLEMENT IRON 300 MG / 5 ML STRENGTH LIQUID 100 PER BOX UNFLAVORED 300 MG: at 09:10

## 2020-06-04 RX ADMIN — ALBUTEROL SULFATE 2.5 MG: 2.5 SOLUTION RESPIRATORY (INHALATION) at 12:17

## 2020-06-04 RX ADMIN — MEROPENEM 500 MG: 500 INJECTION, POWDER, FOR SOLUTION INTRAVENOUS at 05:17

## 2020-06-04 RX ADMIN — INSULIN LISPRO 3 UNITS: 100 INJECTION, SOLUTION INTRAVENOUS; SUBCUTANEOUS at 17:02

## 2020-06-04 RX ADMIN — Medication 500 MG: at 14:00

## 2020-06-04 RX ADMIN — MINERAL SUPPLEMENT IRON 300 MG / 5 ML STRENGTH LIQUID 100 PER BOX UNFLAVORED 300 MG: at 20:35

## 2020-06-04 RX ADMIN — HEPARIN SODIUM 5000 UNITS: 5000 INJECTION INTRAVENOUS; SUBCUTANEOUS at 21:46

## 2020-06-04 RX ADMIN — METOPROLOL TARTRATE 75 MG: 25 TABLET, FILM COATED ORAL at 09:10

## 2020-06-04 RX ADMIN — Medication 10 ML: at 06:44

## 2020-06-04 RX ADMIN — QUETIAPINE FUMARATE 75 MG: 25 TABLET ORAL at 20:35

## 2020-06-04 RX ADMIN — QUETIAPINE FUMARATE 50 MG: 25 TABLET ORAL at 09:00

## 2020-06-04 RX ADMIN — HYDRALAZINE HYDROCHLORIDE 10 MG: 20 INJECTION INTRAMUSCULAR; INTRAVENOUS at 05:18

## 2020-06-04 RX ADMIN — ALBUTEROL SULFATE 2.5 MG: 2.5 SOLUTION RESPIRATORY (INHALATION) at 20:15

## 2020-06-04 RX ADMIN — LORAZEPAM 1 MG: 2 INJECTION INTRAMUSCULAR; INTRAVENOUS at 01:11

## 2020-06-04 RX ADMIN — CHLORHEXIDINE GLUCONATE 15 ML: 1.2 RINSE ORAL at 09:13

## 2020-06-04 RX ADMIN — Medication 500 MG: at 09:09

## 2020-06-04 RX ADMIN — SODIUM CHLORIDE, PRESERVATIVE FREE 10 ML: 5 INJECTION INTRAVENOUS at 20:35

## 2020-06-04 RX ADMIN — OXYCODONE 5 MG: 5 TABLET ORAL at 21:46

## 2020-06-04 RX ADMIN — GADOBENATE DIMEGLUMINE 15 ML: 529 INJECTION, SOLUTION INTRAVENOUS at 15:34

## 2020-06-04 RX ADMIN — LORAZEPAM 2 MG: 2 INJECTION INTRAMUSCULAR; INTRAVENOUS at 14:30

## 2020-06-04 RX ADMIN — ATORVASTATIN CALCIUM 20 MG: 20 TABLET, FILM COATED ORAL at 20:35

## 2020-06-04 RX ADMIN — PANTOPRAZOLE SODIUM 40 MG: 40 INJECTION, POWDER, FOR SOLUTION INTRAVENOUS at 06:44

## 2020-06-04 RX ADMIN — LEVETIRACETAM 500 MG: 100 INJECTION, SOLUTION INTRAVENOUS at 00:42

## 2020-06-04 RX ADMIN — CASTOR OIL AND BALSAM, PERU: 788; 87 OINTMENT TOPICAL at 09:12

## 2020-06-04 RX ADMIN — OXYCODONE 5 MG: 5 TABLET ORAL at 09:10

## 2020-06-04 RX ADMIN — ACETAMINOPHEN 650 MG: 325 TABLET ORAL at 20:35

## 2020-06-04 RX ADMIN — HEPARIN SODIUM 5000 UNITS: 5000 INJECTION INTRAVENOUS; SUBCUTANEOUS at 06:44

## 2020-06-04 RX ADMIN — OXYCODONE 5 MG: 5 TABLET ORAL at 14:00

## 2020-06-04 RX ADMIN — CHLORHEXIDINE GLUCONATE 15 ML: 1.2 RINSE ORAL at 20:35

## 2020-06-04 RX ADMIN — MIDAZOLAM 2 MG: 1 INJECTION INTRAMUSCULAR; INTRAVENOUS at 15:00

## 2020-06-04 RX ADMIN — LORAZEPAM 1 MG: 2 INJECTION INTRAMUSCULAR; INTRAVENOUS at 05:18

## 2020-06-04 RX ADMIN — Medication 500 MG: at 20:35

## 2020-06-04 RX ADMIN — QUETIAPINE FUMARATE 75 MG: 25 TABLET ORAL at 14:00

## 2020-06-04 ASSESSMENT — PULMONARY FUNCTION TESTS
PIF_VALUE: 17
PIF_VALUE: 15
PIF_VALUE: 20
PIF_VALUE: 15
PIF_VALUE: 18
PIF_VALUE: 12
PIF_VALUE: 13
PIF_VALUE: 16
PIF_VALUE: 14
PIF_VALUE: 18
PIF_VALUE: 16
PIF_VALUE: 24
PIF_VALUE: 16
PIF_VALUE: 18
PIF_VALUE: 18
PIF_VALUE: 20
PIF_VALUE: 14
PIF_VALUE: 16
PIF_VALUE: 17
PIF_VALUE: 15
PIF_VALUE: 12
PIF_VALUE: 16
PIF_VALUE: 14
PIF_VALUE: 16
PIF_VALUE: 24
PIF_VALUE: 21
PIF_VALUE: 17

## 2020-06-04 ASSESSMENT — PAIN SCALES - GENERAL
PAINLEVEL_OUTOF10: 0
PAINLEVEL_OUTOF10: 6
PAINLEVEL_OUTOF10: 2

## 2020-06-04 NOTE — CARE COORDINATION
PATIENT IS NOT TO DISCHARGE TODAY. [facility] wants him to be off the drip for 24 hours prior to transfer.   A peer to peer to be done today with Dr Wicho Mason prior to [place]     Case Management   [phone]    6/4/2020  11:39 AM

## 2020-06-04 NOTE — PROGRESS NOTES
Pt transported to MRI on transport vent, tolerated well, and set-up in MRI suite with transport vent. Another RT relieved this RT, and is currently in MRI with Pt.

## 2020-06-04 NOTE — PROGRESS NOTES
0800: Report received from [facility]. CTS at the bedside rounding see new orders. Hopeful for discharge to [facility] today. 0900: Shift assessment complete. Afebrile at this time without cooling blanket. Patient trached and pegged. No longer on sedation gtt, given prn sedation meds for agitation. Restraints in place for safety    0930: MRI called, time for scheduled MRI at 1430    1000: COVID test negative. 1200: Reassessment complete. VSS, remains afebrile without cooling blanket. New plan for discharge tomorrow to [facility]     [id number]: Patient to MRI, tolerated fairly well. Sedation medications given per orders and returned to 1304.    1600: Reassessment complete. VSS, afebrile per temporal. Rectal probe and cooling blanket discontinued.      1900: Report given to [facility]

## 2020-06-04 NOTE — PROGRESS NOTES
Infectious Disease Follow up Notes  Admit Date: 5/7/2020  Hospital Day: 29    Antibiotics :   IV Meropenem stopped    CHIEF COMPLAINT:     NSTEMI  CABG Emergent   Fevers   Resp failure  ?dts  CVA   S/p Trach  S/p Left thoracentesis on 5/29     Subjective interval History :  60 y.o. man admitted with chest pain on going for some time with acute relief from Nitro per HPI and noted to have EKG changes with elevated cardiac enzymes underwent emergent cardiac cath and subsequently taken for CABG on 5/8 by  and post op now in CVICU on the ventilator and WBC elevation post op up to 24 k AND Hb low at 6.7 post op and has been resuscitated now having fevers from 5/10 increasing T max slowly now up to 102.3 and WBC has trended down but noted to have Lft lELEVATION and there was some concern for DTS based on the history started on MVI and Lorazepam and Blood cx , sputum cx from 5/12 NGTD and we are consulted for recommendations given the fevers . MRI brain no new changes and CSF has been clean, remains on vent - Trach+ and secretions are less and tolerating Feeds ok      Past Medical History:    Past Medical History:   Diagnosis Date    Blood clot in vein 2018    right leg. no previous scans to know whether supf or dvt. no coumadin. put on plavix.  Bronchitis     doesn't remember when.  outpt abx    Carotid stenosis 05/07/2020    bilat 50-79% stenosis    Hepatitis C antibody positive in blood 05/16/2020    Hypertension     Kidney disease     s/p nephrectomy age 17, pt not sure why     NSTEMI (non-ST elevated myocardial infarction) ([address] 05/07/2020    3VD    PAD (peripheral artery disease) ([facility]     Respiratory compromise 05/2020    chemical exposure at work, seen at [facility], covid neg, given steroids    Tattoos        Past Surgical History:    Past Surgical History:   Procedure Laterality Date    CORONARY ARTERY BYPASS GRAFT 2020    cabgx4 (LIMA-LAD, SVG-D1, SVG-OM, SVG-PDA)    CORONARY ARTERY BYPASS GRAFT N/A 2020    CABG CORONARY ARTERY BYPASS GRAFT X4,  TRANSESOPHAGEAL ECHOCARDIOGRAM, TOTAL CARDIOPULMONARY BYPASS performed by Pradeep Delgado MD at [facility] N/A 2020    PERCUTANEOUS ENDOSCOPIC GASTROSTOMY TUBE PLACEMENT performed by Felisha Adamson MD at [facility] Left     17 yo - pt doesn't know why       Current Medications:    Outpatient Medications Marked as Taking for the 20 encounter [facility] Encounter)   Medication Sig Dispense Refill    clopidogrel (PLAVIX) 75 MG tablet Take 75 mg by mouth daily      lisinopril (PRINIVIL;ZESTRIL) 40 MG tablet Take 40 mg by mouth daily      amLODIPine (NORVASC) 5 MG tablet Take 5 mg by mouth daily      albuterol sulfate HFA (VENTOLIN HFA) 108 (90 Base) MCG/ACT inhaler Inhale 2 puffs into the lungs every 6 hours as needed for Wheezing         Allergies:  Patient has no known allergies. Immunizations : There is no immunization history on file for this patient. Social History:    Social History     Tobacco Use    Smoking status: Former Smoker     Types: Cigarettes     Last attempt to quit: 2019     Years since quittin.5    Smokeless tobacco: Never Used    Tobacco comment: started age 27   Substance Use Topics    Alcohol use: Yes     Frequency: Monthly or less     Comment: once a month, shot once a week. depends on mood.     Drug use: Never     Social History     Tobacco Use   Smoking Status Former Smoker    Types: Cigarettes    Last attempt to quit: 2019    Years since quittin.5   Smokeless Tobacco Never Used   Tobacco Comment    started age 27      Family History   Problem Relation Age of Onset    Heart Disease Mother          of MI age 36    Heart Disease Brother         MI mid 46s    Heart Disease Father          of MI age 68    Heart Disease Paternal Aunt         MI in Specimen: Blood    Culture, Blood 2 [[id number]]    Order Status: Canceled Specimen: Blood        Date/Time       Culture, Respiratory [[id number]] Collected: 05/15/20 1045   Order Status: Completed Specimen: Sputum, Suctioned Updated: 05/17/20 0956    CULTURE, RESPIRATORY No growth 36-48 hours    Gram Stain Result 1+ WBC's (Polymorphonuclear)   1+ Epithelial Cells   1+ Gram positive cocci    Narrative:     ORDER#: [id number]                          ORDERED BY: Jerelene Landau  SOURCE: Sputum Suctioned                   COLLECTED:  05/15/20 10:45  ANTIBIOTICS AT NITISH. :                      RECEIVED :  05/15/20 10:52 /18/2020 11:49 AM - Radha Houston Incoming Lab Results From Soft (Epic Adt)     Component Value Ref Range & Units Status Collected Lab   HIV Ag/Ab Non-Reactive  Non-reactive Final 05/16/2020  2:40 PM [id number]   HIV-1 Antibody Non-Reactive  Non-reactive Final 05/16/2020  2:40 PM [facility]   HIV ANTIGEN Non-Reactive  Non-reactive Final 05/16/2020  2:40 PM [id number]   HIV-2 Ab Non-Reactive  Non-reactive Final 05/16/2020  2:40 PM [id number]             Culture, Body Fluid [[id number]] Collected: 05/29/20 1330   Order Status: Completed Specimen: Body Fluid from Pleural Updated: 05/31/20 0902    Body Fluid Culture, Sterile --    No growth to date   No growth 36-48 hours     Gram Stain Result 4+ WBC's (Polymorphonuclear)   No organisms seen    Narrative:     ORDER#: [id number]                          ORDERED BY: Jerelene Landau  SOURCE: Pleural                            COLLECTED:  05/29/20 13:30  ANTIBIOTICS AT NITISH. :                      RECEIVED :  05/29/20 13:51  Performed at:  [facility]  [address] [place], [place] [address]   Phone [phone]   Culture, Respiratory [[id number]] Collected: 05/28/20 1217   Order Status: Completed Specimen: Sputum from Tracheal Aspirate Updated: 05/30/20 1124    CULTURE, RESPIRATORY No growth 36-48 hours    Gram Stain Result 1+ Epithelial Cells   1+ WBC's (Mononuclear)   No organisms seen    Narrative:     ORDER#: [id number]                          ORDERED BY: Carolyn Swanson  SOURCE: Tracheal Aspirate                  COLLECTED:  05/28/20 12:15  ANTIBIOTICS AT NITISH. :                      RECEIVED :  05/28/20 12:27       IMAGING:    IR LUMBAR PUNCTURE FOR DIAGNOSIS   Final Result   1. Fluoroscopic guided lumbar puncture. XR CHEST PORTABLE   Final Result   No pneumothorax immediately following left complete thoracentesis. Minimal left basilar atelectasis. US THORACENTESIS   Final Result   Successful ultrasound guided left-sided thoracentesis. A sample was sent for analysis at the request of the ordering clinician. CT CHEST ABDOMEN PELVIS WO CONTRAST   Final Result   Very large left pleural effusion with large amount of left lower lobe   atelectasis. 7 mm spiculated right apical pulmonary nodule. Malignancy possible. Mild right perinephric stranding which could reflect acute bacterial   nephritis or could be a chronic finding. No other source of infection seen in the abdomen or pelvis. No finding worrisome for metastatic disease in the chest, abdomen, or pelvis. Bilateral direct inguinal hernias larger on the left containing an   unobstructed loop of the sigmoid colon with the right-sided hernia containing   a small portion of the urinary bladder. Very small right pleural effusion. Patient may benefit from ultrasound-guided left chest thoracentesis. RECOMMENDATIONS:   Repeat chest CT in 6 months recommended for further evaluation of the right   apical nodule. Fleischner Society guidelines for follow-up and management of incidentally   detected pulmonary nodules:      Single Solid Nodule:      Nodule size equals 6-8 mm   In a low-risk patient, CT at 6-12 months, then consider CT at 18-24 months.    In a high-risk patient, CT at opacification and effusion. Mild right perihilar   opacification. No pneumothorax. Satisfactory position of endotracheal tube. Enteric catheter tip in the gastric body. XR CHEST PORTABLE   Final Result   Relatively stable appearance of the chest.  Perhaps slight increasing   ground-glass opacification centrally. No residual pneumothorax detected. XR CHEST PORTABLE   Final Result   1. Mild pulmonary vascular congestion with a small left pleural effusion and   associated left basilar atelectasis. 2. Tiny left apical pneumothorax. Attention on follow-up imaging is   recommended. 3. Suboptimal visualization of the orogastric tube. Abdominal radiograph is   recommended for further evaluation. VL DUP CAROTID BILATERAL   Final Result      VL PRE OP VEIN MAPPING   Final Result      XR CHEST PORTABLE   Final Result   Or bronchial wall thickening suggests inflammation, such as that seen with   asthma, bronchitis or smoking. Consolidative airspace disease. Rounded opacity in the right hilar region likely represents a vessel en face,   however cannot exclude a prominent lymph node or nodule.   Comparison imaging   would be helpful if available, otherwise recommend CT chest.         MRI BRAIN W WO CONTRAST    (Results Pending)         All the pertinent images and reports for the current Hospitalization were reviewed by me     Scheduled Meds:   QUEtiapine  50 mg Oral TID    metoprolol tartrate  75 mg Oral BID    calcium carbonate  500 mg Oral TID    aspirin  324 mg Oral Daily    lidocaine 1 % injection  5 mL Intradermal Once    sodium chloride flush  10 mL Intravenous 2 times per day    meropenem  500 mg Intravenous Q6H    levetiracetam  500 mg Intravenous Q12H    atorvastatin  20 mg Oral Nightly    Venelex   Topical BID    lidocaine 1 % injection  5 mL Intradermal Once    ferrous sulfate  300 mg Oral BID    heparin (porcine)  5,000 Units Subcutaneous 3 times per day CT head evolving infarct as noted on the previous MRi - CRP trend down and fevers noted and will trend    Follow up CT brain noted -  Resp cx in process - C diff -ve noted    S/p Trach and on the vent and PEG tube placed on 5/27    New fevers  and pancultures sent   CXR from 5/28 with large effusion and underwent CT chest/abd/pelvis    s/pbed side thoracentesis by IR and Fluid cx  Negative    Fevers improved and WBC normal MRI brain no concern for abscess - changes sequale from stroke     Labs, Microbiology, Radiology and all the pertinent results from current hospitalization and  care every where were reviewed  by me as a part of the evaluation   Plan:   1. Procal  And CRP trending down   2. MRI brain repeat noted no new  concerns will  d/c the IV abx   3. Resp cx from 5/28 NGTD  4.  d/c IV Meropenem  Today  5. Unable to wean from the vent  S/p Trach    6. Hep C+ve ,SREE-ve But ESR elevated  7. HIV -ve   8. MRI BRAIN abnormal Rt Precentral gyrus and corona radiata  infarct    9. C diff -ve  10. High fever on 5/28 and new cx Negative   Now fevers resolved  11. CXR with Large Left effusion s/p thoracentesis on 5/29 and pleural fluid cultures negative Post CXR lung inflation better  12. Will need placement   13. CSF WBC normal and Meningoencephalitis panel -ve          Will sign off     Discussed with patient/Family and Nursing staff     . Thanks for allowing me to participate in your patient's care and please call me with any questions or concerns.     Mukul Dao MD  Infectious Disease  [facility]) Physician  Phone: [phone]   Fax : [phone]

## 2020-06-04 NOTE — PROGRESS NOTES
feeding tube per day. Do not mix directly with TF)  · Water Flushes: per provider  · Current TF & Flush Orders Provides: 1400 ml TV / 1888 kcals (1680 (TF) + 208 (Proteinex) / 130 gms pro (78 (TF) + 52 (Proteinex) / 1130 ml free water per day. With current water flush of 100 ml every 4 hours, total free water offered per day is 1730 ml (1130 (TF) + 600 (flush).   · Additional Calories: none  · Anthropometric Measures:  · Ht: 6' (182.9 cm)   · Current Body Wt: 174 lb (78.9 kg)  · Admission Body Wt: (preop wt 180 lbs)  · Weight Change: Noted 6% loss this adm, which could be a combination of fluid shifts & periods of limited nutrition   · Ideal Body Wt: 178 lb (80.7 kg), % Ideal Body    · BMI Classification: BMI 18.5 - 24.9 Normal Weight    Nutrition Interventions:   Continue current diet  Continued Inpatient Monitoring    Nutrition Evaluation:   · Evaluation: Goal achieved   · Goals: tolerate most appropriate form of nutrition   · Monitoring: TF Intake, TF Tolerance, Wound Healing, Mental Status/Confusion, Weight, Pertinent Labs, Nausea or Vomiting, Diarrhea, Constipation      Electronically signed by Jacques Saba RD, LD on 6/4/20 at 8:26 AM EDT    Contact Number: [phone]

## 2020-06-05 VITALS
TEMPERATURE: 99 F | HEART RATE: 91 BPM | OXYGEN SATURATION: 99 % | RESPIRATION RATE: 22 BRPM | DIASTOLIC BLOOD PRESSURE: 71 MMHG | SYSTOLIC BLOOD PRESSURE: 129 MMHG | HEIGHT: 72 IN | WEIGHT: 160.94 LBS | BODY MASS INDEX: 21.8 KG/M2

## 2020-06-05 LAB
ANION GAP SERPL CALCULATED.3IONS-SCNC: 9 MMOL/L (ref 3–16)
BUN BLDV-MCNC: 18 MG/DL (ref 7–20)
CALCIUM SERPL-MCNC: 8.4 MG/DL (ref 8.3–10.6)
CHLORIDE BLD-SCNC: 104 MMOL/L (ref 99–110)
CO2: 27 MMOL/L (ref 21–32)
CREAT SERPL-MCNC: 0.6 MG/DL (ref 0.8–1.3)
GFR AFRICAN AMERICAN: >60
GFR NON-AFRICAN AMERICAN: >60
GLUCOSE BLD-MCNC: 106 MG/DL (ref 70–99)
GLUCOSE BLD-MCNC: 121 MG/DL (ref 70–99)
GLUCOSE BLD-MCNC: 123 MG/DL (ref 70–99)
GLUCOSE BLD-MCNC: 133 MG/DL (ref 70–99)
HCT VFR BLD CALC: 26 % (ref 40.5–52.5)
HCT VFR BLD CALC: 26.4 % (ref 40.5–52.5)
HEMOGLOBIN: 8.3 G/DL (ref 13.5–17.5)
HEMOGLOBIN: 8.5 G/DL (ref 13.5–17.5)
MAGNESIUM: 2.1 MG/DL (ref 1.8–2.4)
MCH RBC QN AUTO: 26.3 PG (ref 26–34)
MCHC RBC AUTO-ENTMCNC: 32 G/DL (ref 31–36)
MCV RBC AUTO: 82.4 FL (ref 80–100)
OLIGOCLONAL BANDS CSF: 0
OLIGOCLONAL BANDS: 0
PDW BLD-RTO: 16.4 % (ref 12.4–15.4)
PERFORMED ON: ABNORMAL
PLATELET # BLD: 305 K/UL (ref 135–450)
PMV BLD AUTO: 7.8 FL (ref 5–10.5)
POTASSIUM SERPL-SCNC: 3.8 MMOL/L (ref 3.5–5.1)
RBC # BLD: 3.15 M/UL (ref 4.2–5.9)
SODIUM BLD-SCNC: 140 MMOL/L (ref 136–145)
WBC # BLD: 6.1 K/UL (ref 4–11)

## 2020-06-05 PROCEDURE — 2580000003 HC RX 258: Performed by: THORACIC SURGERY (CARDIOTHORACIC VASCULAR SURGERY)

## 2020-06-05 PROCEDURE — 6360000002 HC RX W HCPCS: Performed by: THORACIC SURGERY (CARDIOTHORACIC VASCULAR SURGERY)

## 2020-06-05 PROCEDURE — 6370000000 HC RX 637 (ALT 250 FOR IP): Performed by: THORACIC SURGERY (CARDIOTHORACIC VASCULAR SURGERY)

## 2020-06-05 PROCEDURE — 36592 COLLECT BLOOD FROM PICC: CPT

## 2020-06-05 PROCEDURE — 6370000000 HC RX 637 (ALT 250 FOR IP): Performed by: NURSE PRACTITIONER

## 2020-06-05 PROCEDURE — 85027 COMPLETE CBC AUTOMATED: CPT

## 2020-06-05 PROCEDURE — 94750 HC PULMONARY COMPLIANCE STUDY: CPT

## 2020-06-05 PROCEDURE — 2700000000 HC OXYGEN THERAPY PER DAY

## 2020-06-05 PROCEDURE — 94761 N-INVAS EAR/PLS OXIMETRY MLT: CPT

## 2020-06-05 PROCEDURE — 2580000003 HC RX 258: Performed by: PSYCHIATRY & NEUROLOGY

## 2020-06-05 PROCEDURE — C9113 INJ PANTOPRAZOLE SODIUM, VIA: HCPCS | Performed by: THORACIC SURGERY (CARDIOTHORACIC VASCULAR SURGERY)

## 2020-06-05 PROCEDURE — 94640 AIRWAY INHALATION TREATMENT: CPT

## 2020-06-05 PROCEDURE — 85014 HEMATOCRIT: CPT

## 2020-06-05 PROCEDURE — 6360000002 HC RX W HCPCS: Performed by: PSYCHIATRY & NEUROLOGY

## 2020-06-05 PROCEDURE — 85018 HEMOGLOBIN: CPT

## 2020-06-05 PROCEDURE — 99024 POSTOP FOLLOW-UP VISIT: CPT | Performed by: THORACIC SURGERY (CARDIOTHORACIC VASCULAR SURGERY)

## 2020-06-05 PROCEDURE — 83735 ASSAY OF MAGNESIUM: CPT

## 2020-06-05 PROCEDURE — 6360000002 HC RX W HCPCS: Performed by: NURSE PRACTITIONER

## 2020-06-05 PROCEDURE — 80048 BASIC METABOLIC PNL TOTAL CA: CPT

## 2020-06-05 PROCEDURE — 94003 VENT MGMT INPAT SUBQ DAY: CPT

## 2020-06-05 RX ORDER — OXYCODONE HYDROCHLORIDE 5 MG/1
5 TABLET ORAL EVERY 6 HOURS PRN
Qty: 12 TABLET | Refills: 0 | Status: SHIPPED | OUTPATIENT
Start: 2020-06-05 | End: 2020-06-08

## 2020-06-05 RX ORDER — ASPIRIN 81 MG/1
324 TABLET, CHEWABLE ORAL DAILY
Qty: 30 TABLET | Refills: 3 | Status: SHIPPED | OUTPATIENT
Start: 2020-06-05 | End: 2021-02-04 | Stop reason: SDUPTHER

## 2020-06-05 RX ORDER — CALCIUM CARBONATE 200(500)MG
500 TABLET,CHEWABLE ORAL 3 TIMES DAILY
Qty: 21 TABLET | Refills: 0 | Status: SHIPPED | OUTPATIENT
Start: 2020-06-05 | End: 2020-06-12

## 2020-06-05 RX ORDER — LEVETIRACETAM 100 MG/ML
500 SOLUTION ORAL 2 TIMES DAILY
Qty: 1 BOTTLE | Refills: 0 | Status: SHIPPED | OUTPATIENT
Start: 2020-06-05 | End: 2020-10-02

## 2020-06-05 RX ORDER — ATORVASTATIN CALCIUM 20 MG/1
20 TABLET, FILM COATED ORAL NIGHTLY
Qty: 30 TABLET | Refills: 3 | Status: SHIPPED | OUTPATIENT
Start: 2020-06-05 | End: 2020-11-11 | Stop reason: SDUPTHER

## 2020-06-05 RX ORDER — LEVETIRACETAM 100 MG/ML
500 SOLUTION ORAL 2 TIMES DAILY
Status: DISCONTINUED | OUTPATIENT
Start: 2020-06-05 | End: 2020-06-05

## 2020-06-05 RX ORDER — LEVETIRACETAM 500 MG/1
500 TABLET ORAL 2 TIMES DAILY
Qty: 60 TABLET | Refills: 0 | Status: SHIPPED | OUTPATIENT
Start: 2020-06-05 | End: 2020-06-05 | Stop reason: HOSPADM

## 2020-06-05 RX ORDER — QUETIAPINE FUMARATE 25 MG/1
25 TABLET, FILM COATED ORAL 3 TIMES DAILY
Status: DISCONTINUED | OUTPATIENT
Start: 2020-06-05 | End: 2020-06-05 | Stop reason: HOSPADM

## 2020-06-05 RX ORDER — LEVETIRACETAM 100 MG/ML
500 SOLUTION ORAL 2 TIMES DAILY
Status: DISCONTINUED | OUTPATIENT
Start: 2020-06-05 | End: 2020-06-05 | Stop reason: HOSPADM

## 2020-06-05 RX ORDER — LEVETIRACETAM 500 MG/1
500 TABLET ORAL 2 TIMES DAILY
Status: DISCONTINUED | OUTPATIENT
Start: 2020-06-05 | End: 2020-06-05

## 2020-06-05 RX ORDER — METOPROLOL TARTRATE 75 MG/1
75 TABLET, FILM COATED ORAL 2 TIMES DAILY
Qty: 60 TABLET | Refills: 3 | Status: SHIPPED | OUTPATIENT
Start: 2020-06-05 | End: 2020-10-02 | Stop reason: DRUGHIGH

## 2020-06-05 RX ADMIN — Medication 500 MG: at 09:10

## 2020-06-05 RX ADMIN — ASPIRIN 81 MG 324 MG: 81 TABLET ORAL at 09:10

## 2020-06-05 RX ADMIN — CHLORHEXIDINE GLUCONATE 15 ML: 1.2 RINSE ORAL at 09:21

## 2020-06-05 RX ADMIN — LORAZEPAM 1 MG: 2 INJECTION INTRAMUSCULAR; INTRAVENOUS at 05:43

## 2020-06-05 RX ADMIN — OXYCODONE 5 MG: 5 TABLET ORAL at 03:08

## 2020-06-05 RX ADMIN — ALBUTEROL SULFATE 2.5 MG: 2.5 SOLUTION RESPIRATORY (INHALATION) at 11:43

## 2020-06-05 RX ADMIN — LEVETIRACETAM 500 MG: 500 SOLUTION ORAL at 10:00

## 2020-06-05 RX ADMIN — METOPROLOL TARTRATE 75 MG: 25 TABLET, FILM COATED ORAL at 09:10

## 2020-06-05 RX ADMIN — LORAZEPAM 1 MG: 2 INJECTION INTRAMUSCULAR; INTRAVENOUS at 09:53

## 2020-06-05 RX ADMIN — PANTOPRAZOLE SODIUM 40 MG: 40 INJECTION, POWDER, FOR SOLUTION INTRAVENOUS at 06:28

## 2020-06-05 RX ADMIN — Medication 20 MEQ: at 05:26

## 2020-06-05 RX ADMIN — LORAZEPAM 1 MG: 2 INJECTION INTRAMUSCULAR; INTRAVENOUS at 00:53

## 2020-06-05 RX ADMIN — OXYCODONE 5 MG: 5 TABLET ORAL at 09:10

## 2020-06-05 RX ADMIN — HYDRALAZINE HYDROCHLORIDE 10 MG: 20 INJECTION INTRAMUSCULAR; INTRAVENOUS at 03:08

## 2020-06-05 RX ADMIN — Medication 10 ML: at 06:28

## 2020-06-05 RX ADMIN — MINERAL SUPPLEMENT IRON 300 MG / 5 ML STRENGTH LIQUID 100 PER BOX UNFLAVORED 300 MG: at 09:12

## 2020-06-05 RX ADMIN — SODIUM CHLORIDE, PRESERVATIVE FREE 10 ML: 5 INJECTION INTRAVENOUS at 09:12

## 2020-06-05 RX ADMIN — LEVETIRACETAM 500 MG: 100 INJECTION, SOLUTION INTRAVENOUS at 00:51

## 2020-06-05 RX ADMIN — HEPARIN SODIUM 5000 UNITS: 5000 INJECTION INTRAVENOUS; SUBCUTANEOUS at 05:26

## 2020-06-05 RX ADMIN — ALBUTEROL SULFATE 2.5 MG: 2.5 SOLUTION RESPIRATORY (INHALATION) at 07:53

## 2020-06-05 ASSESSMENT — PULMONARY FUNCTION TESTS
PIF_VALUE: 17
PIF_VALUE: 16
PIF_VALUE: 17
PIF_VALUE: 14
PIF_VALUE: 14
PIF_VALUE: 15
PIF_VALUE: 20
PIF_VALUE: 18
PIF_VALUE: 14
PIF_VALUE: 14
PIF_VALUE: 18
PIF_VALUE: 17
PIF_VALUE: 12
PIF_VALUE: 30
PIF_VALUE: 16
PIF_VALUE: 19
PIF_VALUE: 14

## 2020-06-05 ASSESSMENT — PAIN SCALES - GENERAL
PAINLEVEL_OUTOF10: 1
PAINLEVEL_OUTOF10: 0
PAINLEVEL_OUTOF10: 5
PAINLEVEL_OUTOF10: 6
PAINLEVEL_OUTOF10: 6

## 2020-06-05 NOTE — PROGRESS NOTES
0730--I received handoff report from Celio Littlejohn RN. Pt  in bed. Bilateral soft wrist restraints in place. Pt calm. 0808--Critical care rounding. Per Alise Abraham, potassium lab recheck not needed. 1141--Report called to Carlos at [facility]. 1245--Pt transported to [facility].      Electronically signed by Elder Chapman RN on 6/5/2020 at 12:48 PM

## 2020-06-05 NOTE — PROGRESS NOTES
6/4/20  @1900-Shift/bedside handoff completed with Asuncion Ramirez RN. Pt resting comfortably and quietly in bed. VSS. No s/s of distress or pain. Bilateral soft wrist restraints in place. @2000-Initial shift assessment completed, see flow sheet. VSS. Afebrile. CPOT score 2. Trach, PEG, Hoang, Dignishield all intact and in place. Residual volume assessed, 3 mL noted. Flushed with 100 mL after per order. Blood glucose-123, no Insulin coverage needed per order parameters. Turned and repositioned. Bilateral heels elevated off of bed. BLE compression devices in place. All care applied to bilateral heels. Trach and oral suctioning completed. Oral care performed. @2015-Protein Modular supplement administered. Flushed with 30 mL water before and after administration. @2035-Scheduled PM medications administered via PEG tube. PRN Tylenol administered for CPOT score 2. Administered with 50 mL water and flushed with 100 mL after administration. Will monitor. @2145-Pt visibly restless. CPOT score 6. PRN 5 mg Oxycodone administered. See MAR.    @2245-Pt noted to have large soft formed BM which caused Dignishield balloon to be pushed out. Clinical decision made to leave Dignishield out d/t stool no longer being of right consistency to place new one.    6/5/20  @0000-Assessment completed, see flow sheet. VSS. Afebrile. CPOT 1, occasionally coughing but tolerates ventilator. Output noted. 0 mL residual volume noted. Blood glucose-123, no Insulin coverage need. Pt turned and repositioned. Oral care performed. Trach suctioned. Restraints remain in place. Will continue to monitor. @0053-RASS score +2.  1mg PRN IV Ativan administered for agitation. See MAR.    @0308-CPOT 8. PRN Oxycodone administered. SBP>130, 10 mg PRN Hydralazine administered. See MAR.    @0400-Assessment completed, unchanged from previous. VSS. Remains Afebrile. CPOT 0. Intake/output noted.   Oral care performed. Pt bathed and omalley care completed. Gown and bed pad changed. Turned and repositioned. Will continue to monitor. @0526-K 3.8.  20 mEq KCl administered per replacement protocol. See MAR.    @0543-RASS score +3.  1mg PRN IV Ativan administered. See MAR.    @0600-Intake/output noted. Pumps cleared and omalley bag emptied. Turned and repositioned. Daily weight obtained. @0730-Shift/bedside handoff completed with Beka Owens RN to assume care. Pt resting in bed with eyes closed. No s/s of distress or pain. Bilateral soft wrist restraints in place.       Electronically signed by Shanel Barbosa RN on 6/5/2020 at 7:42 AM

## 2020-06-05 NOTE — DISCHARGE SUMMARY
catheterization on 05/07/2020 revealing  severe three-vessel coronary artery disease including significant left  main stenosis. Intra-aortic balloon pump was placed for ongoing chest  pain. The patient presented for urgent coronary artery bypass  grafting. Hospital Course: The patient underwent:  5/8/2020 JENN, left greater saphenous endoscopic vein harvesting, urgent coronary artery bypass grafting X4 (LIMA-LAD, SVG-D1, SVG-OM, SVG-PDA).  5/27/2020: Tracheostomy, PEG placement     CV       - stable in SR.               - htn: BB  pulm    - trached. low vent settings. ID         - febrile 5/12-5/31. Wbc remained nl. All cx neg. 5/25 C diff neg, 5/29 L pleural fluid cx neg, 6/3 CSF neg. abx stopped 6/4. GI        - TF at goal  Renal   - Cr nl. below preop weight.               - Hoang since external catheter failed  Heme   - acute blood loss anemia. Cont Fe.              - dvt prophylaxis scds, sc hep  Neuro  - EtOH withdrawal/delirium. MRI brain 5/20 poss R frontal abscess/infarct. EEG 5/20 mild to mod enceph. MRA head/neck 5/21 limited by pt motion. keppra added 5/24. CT head 5/25 same. EEG 6/2 mild encephalopathy. CSF 6/3 GS no org. MRI 6/4 R frontal infarct same.              - Seroquel, oxy prn, Ativan prn  dispo    - d/c to [facility] today      Weight:   Admission Weight: 188 lb (85.3 kg)  Day of surgery 180  Day of discharge Weight: 160 lb 15 oz (73 kg)    Recent Labs     06/03/20  0456 06/04/20  0707 06/05/20  0447   WBC 9.3 9.2 6.1   HGB 9.4* 9.7* 8.3*   HCT 29.1* 30.1* 26.0*   MCV 83.0 82.4 82.4    341 305     Recent Labs     06/03/20  0456 06/04/20  0706 06/05/20  0447    138 140   K 4.0 3.9 3.8    102 104   CO2 26 26 27   BUN 18 14 18   CREATININE 0.6* 0.6* 0.6*   CALCIUM 8.5 8.6 8.4   MG 1.90 2.10 2.10     No results for input(s): PROTIME, INR in the last 72 hours.       Disposition: stable    Patient Instructions:   Felisha Kumar   Home Medication Instructions GUF:[id number] Printed on:06/05/20 5595   Medication Information                      albuterol sulfate HFA (VENTOLIN HFA) 108 (90 Base) MCG/ACT inhaler  Inhale 2 puffs into the lungs every 6 hours as needed for Wheezing             aspirin 81 MG chewable tablet  Take 4 tablets by mouth daily             atorvastatin (LIPITOR) 20 MG tablet  Take 1 tablet by mouth nightly             calcium carbonate (TUMS) 500 MG chewable tablet  Take 1 tablet by mouth 3 times daily for 7 days             levETIRAcetam (KEPPRA) 500 MG tablet  Take 1 tablet by mouth 2 times daily             metoprolol tartrate 75 MG TABS  Take 75 mg by mouth 2 times daily             oxyCODONE (ROXICODONE) 5 MG immediate release tablet  1 tablet by PEG Tube route every 6 hours as needed for Pain for up to 3 days. Activity:  No heavy lifting (over 5 lbs) or driving until seen in the office  Diet: cardiac  Wound Care: none    Follow up with Dr. George Min in the office. Please call the office at [phone] to make an appointment. Cardiac rehab exceptions -   Health care system reason - patient is discharged to a nursing home or long term care facility or the patient lacks medical coverage.      KATHY Ortega - CNP  6/5/2020  7:35 AM     Patricia Zeng MD  6/5/2020  8:14 AM

## 2020-06-05 NOTE — PLAN OF CARE
Hemodynamic stability will improve  Outcome: Ongoing  Goal: Circulatory function within specified parameters  Description: Circulatory function within specified parameters  Outcome: Ongoing  Goal: Absence of angina  Description: Absence of angina  Outcome: Ongoing  Goal: Will show no evidence of cardiac arrhythmias  Description: Will show no evidence of cardiac arrhythmias  Outcome: Ongoing     Problem: Tissue Perfusion - Peripheral, Altered:  Goal: Circulatory function of lower extremities is within specified parameters  Description: Circulatory function of lower extremities is within specified parameters  Outcome: Ongoing     Problem: Tissue Perfusion - Renal, Altered:  Goal: Ability to achieve a balanced intake and output will improve  Description: Ability to achieve a balanced intake and output will improve  Outcome: Ongoing  Goal: Electrolytes within specified parameters  Description: Electrolytes within specified parameters  Outcome: Ongoing  Goal: Urine creatinine clearance will be within specified parameters  Description: Urine creatinine clearance will be within specified parameters  Outcome: Ongoing  Goal: Serum creatinine will be within specified parameters  Description: Serum creatinine will be within specified parameters  Outcome: Ongoing     Problem: Restraint Use - Nonviolent/Non-Self-Destructive Behavior:  Goal: Absence of restraint indications  Description: Absence of restraint indications  Outcome: Ongoing  Goal: Absence of restraint-related injury  Description: Absence of restraint-related injury  Outcome: Ongoing     Problem: Nutrition  Goal: Optimal nutrition therapy  Outcome: Ongoing     Problem:  Activity Intolerance:  Goal: Able to perform prescribed physical activity  Description: Able to perform prescribed physical activity  Outcome: Ongoing  Goal: Ability to tolerate increased activity will improve  Description: Ability to tolerate increased activity will improve  Outcome: Ongoing Problem: Gas Exchange - Impaired:  Goal: Levels of oxygenation will improve  Description: Levels of oxygenation will improve  Outcome: Ongoing  Goal: Ability to maintain adequate ventilation will improve  Description: Ability to maintain adequate ventilation will improve  Outcome: Ongoing     Problem: Infection - Central Venous Catheter-Associated Bloodstream Infection:  Goal: Will show no infection signs and symptoms  Description: Will show no infection signs and symptoms  Outcome: Ongoing     Problem: Urinary Elimination:  Goal: Signs and symptoms of infection will decrease  Description: Signs and symptoms of infection will decrease  Outcome: Ongoing  Goal: Complications related to the disease process, condition or treatment will be avoided or minimized  Description: Complications related to the disease process, condition or treatment will be avoided or minimized  Outcome: Ongoing     Problem: Infection - Ventilator-Associated Pneumonia:  Goal: Prevent a ventilator associated event (VAE)  Description: Prevent a ventilator associated event (VAE)  Outcome: Ongoing  Goal: Absence of pulmonary infection  Description: Absence of pulmonary infection  Outcome: Ongoing     Problem: Venous Thromboembolism:  Goal: Will show no signs or symptoms of venous thromboembolism  Description: Will show no signs or symptoms of venous thromboembolism  Outcome: Ongoing  Goal: Absence of signs or symptoms of impaired coagulation  Description: Absence of signs or symptoms of impaired coagulation  Outcome: Ongoing     Problem: Tobacco Use:  Goal: Will participate in inpatient tobacco-use cessation counseling  Description: Will participate in inpatient tobacco-use cessation counseling  Outcome: Ongoing     Problem: HEMODYNAMIC STATUS  Goal: Patient has stable vital signs and fluid balance  Outcome: Ongoing

## 2020-06-05 NOTE — CARE COORDINATION
SOCIAL WORK DISCHARGE SUMMARY:      DISCHARGE DATE:                 FRIDAY, 6-5-2020      DISCHARGE PLACE:                [facility]                REPORT NUMBER:       [phone]              FAX NUMBER:                [phone]      TRANSPORTATION:                [facility]   WITH [address]             TIME:                                 12 NOON        INSURANCE PRECERT OBTAINED:       COMPLETED.     [place], [place]     Case Management   [phone]    6/5/2020  9:08 AM

## 2020-06-06 LAB — VDRL CSF SCREEN: NON REACTIVE

## 2020-06-07 LAB
HERPES SIMPLEX VIRUS BY PCR: NOT DETECTED
HHV 6 QUANT COPY/ML: <1000 CPY/ML
HHV 6 QUANT LOG COPY/ML: <3 LOG
HHV 6 SOURCE: NORMAL
HHV 6 TYPE: NORMAL
HSV SOURCE: NORMAL
HUMAN HERPES VIRUS 6 DNA DET.: NOT DETECTED

## 2020-06-08 PROBLEM — I65.23 BILATERAL CAROTID ARTERY STENOSIS: Status: ACTIVE | Noted: 2020-05-07

## 2020-06-08 PROBLEM — F10.939 ALCOHOL WITHDRAWAL (HCC): Status: ACTIVE | Noted: 2020-05-01

## 2020-06-08 PROBLEM — R76.8 HEPATITIS C ANTIBODY POSITIVE IN BLOOD: Status: ACTIVE | Noted: 2020-05-16

## 2020-06-08 PROBLEM — Z86.718 HISTORY OF DVT OF LOWER EXTREMITY: Status: ACTIVE | Noted: 2018-01-01

## 2020-06-08 PROBLEM — I63.9 ACUTE CEREBRAL INFARCTION (HCC): Status: ACTIVE | Noted: 2020-05-01

## 2020-06-08 LAB
CYTOMEGALOVIRUS PCR DETECTION: NOT DETECTED
CYTOMEGALOVIRUS SOURCE: NORMAL

## 2020-06-09 LAB
EBV SOURCE: NORMAL
EPSTEIN BARR VIRUS BY PCR (BLOOD): NOT DETECTED

## 2020-06-10 LAB
CSF CULTURE: NORMAL
GRAM STAIN RESULT: NORMAL

## 2020-06-18 LAB
Lab: NORMAL
REPORT: NORMAL
THIS TEST SENT TO: NORMAL

## 2020-06-22 ENCOUNTER — TELEPHONE (OUTPATIENT)
Dept: CARDIOTHORACIC SURGERY | Age: 65
End: 2020-06-22

## 2020-06-23 ENCOUNTER — TELEPHONE (OUTPATIENT)
Dept: CARDIOTHORACIC SURGERY | Age: 65
End: 2020-06-23

## 2020-07-01 ENCOUNTER — TELEPHONE (OUTPATIENT)
Dept: CARDIOTHORACIC SURGERY | Age: 65
End: 2020-07-01

## 2020-07-01 NOTE — TELEPHONE ENCOUNTER
Spoke to [employer] at [facility] (P: [phone]). They will get a COVID test on patient on Friday when he leaves the facility. He will go straight home and quarantine until the procedure. She will fax results of test to our office on Monday 7/6.

## 2020-07-01 NOTE — TELEPHONE ENCOUNTER
Received call from Uziel Tavares at [facility]. Patient is scheduled to go home this Friday. No longer using his PEG. Would like to schedule removal. Dr. Jarvis Goes agreeable to do this at [address] [place] [address] will schedule. I attempted to call patient's significant other Pedro, no answer. Left a message to call the office.

## 2020-07-06 ENCOUNTER — TELEPHONE (OUTPATIENT)
Dept: CARDIOTHORACIC SURGERY | Age: 65
End: 2020-07-06

## 2020-07-06 LAB
FUNGUS (MYCOLOGY) CULTURE: NORMAL
FUNGUS STAIN: NORMAL

## 2020-07-10 ENCOUNTER — HOSPITAL ENCOUNTER (OUTPATIENT)
Age: 65
Setting detail: OUTPATIENT SURGERY
Discharge: HOME | End: 2020-07-10
Attending: THORACIC SURGERY (CARDIOTHORACIC VASCULAR SURGERY) | Admitting: THORACIC SURGERY (CARDIOTHORACIC VASCULAR SURGERY)
Payer: COMMERCIAL

## 2020-07-10 VITALS
WEIGHT: 160.94 LBS | SYSTOLIC BLOOD PRESSURE: 150 MMHG | OXYGEN SATURATION: 99 % | RESPIRATION RATE: 14 BRPM | DIASTOLIC BLOOD PRESSURE: 86 MMHG | BODY MASS INDEX: 21.8 KG/M2 | HEART RATE: 80 BPM | HEIGHT: 72 IN | TEMPERATURE: 98 F

## 2020-07-10 PROCEDURE — 6360000002 HC RX W HCPCS: Performed by: THORACIC SURGERY (CARDIOTHORACIC VASCULAR SURGERY)

## 2020-07-10 PROCEDURE — 99152 MOD SED SAME PHYS/QHP 5/>YRS: CPT | Performed by: THORACIC SURGERY (CARDIOTHORACIC VASCULAR SURGERY)

## 2020-07-10 PROCEDURE — 3609038000 HC PEG TUBE REMOVAL: Performed by: THORACIC SURGERY (CARDIOTHORACIC VASCULAR SURGERY)

## 2020-07-10 PROCEDURE — 7100000011 HC PHASE II RECOVERY - ADDTL 15 MIN: Performed by: THORACIC SURGERY (CARDIOTHORACIC VASCULAR SURGERY)

## 2020-07-10 PROCEDURE — 99213 OFFICE O/P EST LOW 20 MIN: CPT | Performed by: THORACIC SURGERY (CARDIOTHORACIC VASCULAR SURGERY)

## 2020-07-10 PROCEDURE — 7100000010 HC PHASE II RECOVERY - FIRST 15 MIN: Performed by: THORACIC SURGERY (CARDIOTHORACIC VASCULAR SURGERY)

## 2020-07-10 PROCEDURE — 99999 PR OFFICE/OUTPT VISIT,PROCEDURE ONLY: CPT | Performed by: THORACIC SURGERY (CARDIOTHORACIC VASCULAR SURGERY)

## 2020-07-10 RX ORDER — FENTANYL CITRATE 50 UG/ML
INJECTION, SOLUTION INTRAMUSCULAR; INTRAVENOUS
Status: COMPLETED | OUTPATIENT
Start: 2020-07-10 | End: 2020-07-10

## 2020-07-10 RX ORDER — MIDAZOLAM HYDROCHLORIDE 1 MG/ML
INJECTION INTRAMUSCULAR; INTRAVENOUS
Status: COMPLETED | OUTPATIENT
Start: 2020-07-10 | End: 2020-07-10

## 2020-07-10 RX ORDER — FENTANYL CITRATE 50 UG/ML
100 INJECTION, SOLUTION INTRAMUSCULAR; INTRAVENOUS ONCE
Status: DISCONTINUED | OUTPATIENT
Start: 2020-07-10 | End: 2020-07-10 | Stop reason: HOSPADM

## 2020-07-10 RX ORDER — FENTANYL CITRATE 50 UG/ML
50 INJECTION, SOLUTION INTRAMUSCULAR; INTRAVENOUS ONCE
Status: DISCONTINUED | OUTPATIENT
Start: 2020-07-10 | End: 2020-07-10

## 2020-07-10 RX ORDER — MIDAZOLAM HYDROCHLORIDE 1 MG/ML
2 INJECTION INTRAMUSCULAR; INTRAVENOUS ONCE
Status: DISCONTINUED | OUTPATIENT
Start: 2020-07-10 | End: 2020-07-10 | Stop reason: HOSPADM

## 2020-07-10 ASSESSMENT — PAIN - FUNCTIONAL ASSESSMENT
PAIN_FUNCTIONAL_ASSESSMENT: 0-10
PAIN_FUNCTIONAL_ASSESSMENT: PREVENTS OR INTERFERES SOME ACTIVE ACTIVITIES AND ADLS
PAIN_FUNCTIONAL_ASSESSMENT: 0-10

## 2020-07-10 ASSESSMENT — PAIN DESCRIPTION - DESCRIPTORS: DESCRIPTORS: ACHING;CONSTANT

## 2020-07-10 NOTE — H&P
S/P cabgx4 5/8/20  covid neg 5/7, 5/26, 6/4  Trach, PEG 5/27/20     Vital Signs:                                                 BP (!) 140/88   Pulse 112   Temp 98.7 °F (37.1 °C) (Temporal)   Resp 26   Ht 6' (1.829 m)   Wt 160 lb 15 oz (73 kg)   SpO2 99%   BMI 21.83 kg/m²  O2 Flow Rate (L/min): 60 L/min   Tmax  99.3  SR  Admission Weight: 188 lb (85.3 kg)       Vent Settings:  Vent Information  $Ventilation: $Subsequent Day  Skin Assessment: Clean, dry, & intact  Suction Catheter Diameter: 14  Equipment ID: [id number]  Equipment Changed: Humidification  Vent Type: 840  Vent Mode: AC/VC+  Vt Ordered: 450 mL  Rate Set: 18 bmp  Peak Flow: 0 L/min  Pressure Support: 0 cmH20  FiO2 : 40 %  SpO2: 99 %  SpO2/FiO2 ratio: 247.5  Sensitivity: 3  PEEP/CPAP: 5  I Time/ I Time %: 0.85 s  Humidification Source: Heated wire  Humidification Temp: 37  Humidification Temp Measured: 36.7  Circuit Condensation: Drained      Drips:  -  TF at goal     I/O:       Intake/Output Summary (Last 24 hours) at 6/5/2020 0805  Last data filed at 6/5/2020 0700      Gross per 24 hour   Intake 2795.3 ml   Output 1925 ml   Net 870.3 ml      CV: reg, wound c/d/i  Pulm: trached  Abd: soft, + BS, PEG  Ext: warm, no edema. Multiple pressure scabs bilat LE improving. Neuro: opened eyes to command, mouthing words, squeezed hands and wiggled toes to command     Data Review:  CBC:         Recent Labs     06/03/20  0456 06/04/20  0707 06/05/20  0447   WBC 9.3 9.2 6.1   HGB 9.4* 9.7* 8.3*   HCT 29.1* 30.1* 26.0*   MCV 83.0 82.4 82.4    341 305      BMP:         Recent Labs     06/03/20  0456 06/04/20  0706 06/05/20  0447    138 140   K 4.0 3.9 3.8    102 104   CO2 26 26 27   BUN 18 14 18   CREATININE 0.6* 0.6* 0.6*   CALCIUM 8.5 8.6 8.4   MG 1.90 2.10 2.10      Cardiac Enzymes: No results for input(s): CKTOTAL, CKMB, CKMBINDEX, TROPONINI in the last 72 hours.   PT/INR:   No results for input(s): PROTIME, INR in the last 72 hours.  APTT:        Recent Labs     06/03/20  0456 06/04/20  0706   APTT 30.5 27.3         Assessment/Plan:  D/c PEG tube

## 2020-08-03 NOTE — OP NOTE
[address] [facility]                                OPERATIVE REPORT    PATIENT NAME: Maite Harkins                      :        1955  MED REC NO:   [id number]                          ROOM:  ACCOUNT NO:   [de-identified]                           ADMIT DATE: 07/10/2020  PROVIDER:     Karmen Hobson MD    DATE OF PROCEDURE:  07/10/2020    PREOPERATIVE DIAGNOSIS: Gastrostomy feeding tube for malnutrition, post  cardiac surgery. POSTOPERATIVE DIAGNOSIS: Gastrostomy feeding tube for malnutrition,  post cardiac surgery. OPERATION PERFORMED:  Removal of gastrostomy feeding tube. SURGEON:  Karmen Hobson MD    ANESTHESIA:  Mild sedation. COMPLICATIONS:  None immediate. ESTIMATED BLOOD LOSS:  0.    OPERATIVE PROCEDURE:  The patient was lying on the monitor bed at Same  Day. Received 2 of Versed and 50 of fentanyl. Next, gentle traction on  the gastrostomy feeding tube was performed. Gastrostomy feeding tube  came in one piece. An occlusive dressing was applied. The patient was  left in the same day bed and hemodynamically stable with sat of 100%.         Mildred Zhang MD    D: 2020 9:20:12       T: 2020 10:58:23     [id number]  Job#: [id number]     Doc#: [id number]    CC:

## 2020-08-17 NOTE — PROGRESS NOTES
Covid testing to be done day of prodcedure, PAT manager, [employer] notified.  Electronically signed by Kosta Valentin RN on 7/9/20 at 11:38 AM EDT
Noted Dr Froylan Beaver office arranged for the patient to have covid testing done on 7/3/20 at [facility] before being discharged. See 7/1/20 encounter note.  Electronically signed by Jeanne Daily RN on 7/2/20 at 9:37 AM EDT
Pts vital signs are stable. No pain to belly, abdominal dressing dry and intact.  Discharge instructions given to girlfriend, verbalized understanding
Detail Level: Detailed
Detail Level: Zone

## 2020-10-02 ENCOUNTER — OFFICE VISIT (OUTPATIENT)
Dept: CARDIOTHORACIC SURGERY | Age: 65
End: 2020-10-02

## 2020-10-02 VITALS
BODY MASS INDEX: 21.23 KG/M2 | HEART RATE: 72 BPM | TEMPERATURE: 98 F | HEIGHT: 73 IN | DIASTOLIC BLOOD PRESSURE: 58 MMHG | OXYGEN SATURATION: 97 % | SYSTOLIC BLOOD PRESSURE: 110 MMHG

## 2020-10-02 PROCEDURE — 99024 POSTOP FOLLOW-UP VISIT: CPT | Performed by: THORACIC SURGERY (CARDIOTHORACIC VASCULAR SURGERY)

## 2020-10-02 RX ORDER — AZITHROMYCIN 250 MG/1
250 TABLET, FILM COATED ORAL DAILY
COMMUNITY
End: 2020-11-11 | Stop reason: ALTCHOICE

## 2020-10-02 RX ORDER — ACETAMINOPHEN 500 MG
1000 TABLET ORAL EVERY 6 HOURS PRN
COMMUNITY

## 2020-10-02 RX ORDER — TIZANIDINE 4 MG/1
4 TABLET ORAL EVERY 8 HOURS PRN
COMMUNITY
End: 2020-11-11

## 2020-10-02 RX ORDER — PANTOPRAZOLE SODIUM 40 MG/1
40 TABLET, DELAYED RELEASE ORAL DAILY
COMMUNITY
End: 2020-11-11 | Stop reason: ALTCHOICE

## 2020-10-02 RX ORDER — QUETIAPINE FUMARATE 25 MG/1
25 TABLET, FILM COATED ORAL 3 TIMES DAILY
COMMUNITY
End: 2021-06-01

## 2020-10-02 RX ORDER — LEVETIRACETAM 250 MG/1
250 TABLET ORAL 2 TIMES DAILY
COMMUNITY
End: 2022-06-07 | Stop reason: ALTCHOICE

## 2020-10-02 RX ORDER — DOXYCYCLINE HYCLATE 50 MG/1
324 CAPSULE, GELATIN COATED ORAL
COMMUNITY

## 2020-10-02 RX ORDER — GABAPENTIN 800 MG/1
800 TABLET ORAL 3 TIMES DAILY
COMMUNITY

## 2020-10-02 RX ORDER — OXYCODONE HYDROCHLORIDE 5 MG/1
5 TABLET ORAL EVERY 6 HOURS PRN
COMMUNITY
End: 2021-06-01

## 2020-10-02 NOTE — PROGRESS NOTES
Historical Provider, MD   aspirin 81 MG chewable tablet Take 4 tablets by mouth daily 6/5/20  Yes KATHY Odonnell - CNP   atorvastatin (LIPITOR) 20 MG tablet Take 1 tablet by mouth nightly 6/5/20  Yes KATHY Odonnell - CNP   albuterol sulfate HFA (VENTOLIN HFA) 108 (90 Base) MCG/ACT inhaler Inhale 2 puffs into the lungs every 6 hours as needed for Wheezing    Historical Provider, MD        Data Review:  CBC:   Lab Results   Component Value Date    WBC 6.1 06/05/2020    HGB 8.5 06/05/2020    HCT 26.4 06/05/2020    MCV 82.4 06/05/2020     06/05/2020     BMP:   Lab Results   Component Value Date     06/05/2020    K 3.8 06/05/2020    K 4.5 05/24/2020     06/05/2020    CO2 27 06/05/2020    PHOS 4.0 05/11/2020    BUN 18 06/05/2020    CREATININE 0.6 06/05/2020    CALCIUM 8.4 06/05/2020    MG 2.10 06/05/2020     PT/INR:   Lab Results   Component Value Date    PROTIME 13.1 06/02/2020    INR 1.13 06/02/2020       Assessment/Plan:  - activity as comfort allows  - cont ASA, statin  - cont BB.  Goal sbp 120s  - protonix was for stress ulcer prophylaxis, no need to refill  - following with  pain clinic (Dr. Nahun Scanlon)  - will follow up [id number] (Dr. Natalia Jarvis) re:length of course of keppra and seroquel started while at [facility]  - seeing podiatrist (Dr. Florencia Fothergill at [facility]) re:foot wounds  - given information re:cardiology follow up, either at [facility], [facility] or [facility]  - follow up as needed    Lizy Castro MD  10/2/2020  9:18 AM Medical Necessity Clause: This procedure was medically necessary because the lesions that were treated were: Number Of Freeze-Thaw Cycles: 4 freeze-thaw cycles Medical Necessity Information: It is in your best interest to select a reason for this procedure from the list below. All of these items fulfill various CMS LCD requirements except the new and changing color options. Consent: Verbal. Render Post-Care Instructions In Note?: no Post-Care Instructions: Pt is instructed to avoid sun while areas are healing. Gentle cleansing was recommended and SPF daily. Pt was also instructed to avoid any irritants such as retinol to the areas of treatment. Detail Level: Simple

## 2020-10-02 NOTE — LETTER
10/02/20        [facility] ([facility] Cardiovascular and Thoracic Surgeons  [id number] [address]        RE:    Quyen Hanna,   1955    Dear Dr. Mamta Damon,    It was my pleasure to see your patient, Quyen Hanna, in the office today in follow up of cabgx4 5/8/20 at [facility] [place].    I have attached a copy of the office evaluation. Thank you for allowing me to participate in the care of this patient.       Sincerely,     Izabella Rodríguez MD    CC: Lashawn Yoder, APRN - CNP  [address]: [address] [place] [address], [address] [place] [address]  [address] [place] [address]

## 2020-11-06 ENCOUNTER — TELEPHONE (OUTPATIENT)
Dept: CARDIOLOGY CLINIC | Age: 65
End: 2020-11-06

## 2020-11-06 NOTE — TELEPHONE ENCOUNTER
This is a patient of Dr. Mimi Helm and is being sent back for vascular issues per Dr. Johan Pretty. Jaime Drew and was scheduled with Dr. Zunilda Enciso. Please today, before going into the weekend, reschedule with Dr. Leonora Contreras here at [facility] and he has availability on Monday, Tuesday and Wednesday --- per Dr. Jenaro Clark note, the patient has been seeing podiatrist (Dr. Vivianne Spatz at [facility]) re:foot wounds. He has also never follow up with Dr. Leonora Contreras since he is s/p CABGx4 5/8/20. Any questions, please let me know. Thanks!

## 2020-11-06 NOTE — TELEPHONE ENCOUNTER
Called and LVM for pt to call back and reschedule with Dr. Verena Stearns instead of Dr. Linda Fraga.  (Mon 11/9, Tues 11/10, Or Weds 11/11)

## 2020-11-11 ENCOUNTER — OFFICE VISIT (OUTPATIENT)
Dept: CARDIOLOGY CLINIC | Age: 65
End: 2020-11-11
Payer: COMMERCIAL

## 2020-11-11 VITALS
HEIGHT: 72 IN | HEART RATE: 102 BPM | SYSTOLIC BLOOD PRESSURE: 110 MMHG | TEMPERATURE: 98.2 F | WEIGHT: 160 LBS | DIASTOLIC BLOOD PRESSURE: 70 MMHG | OXYGEN SATURATION: 96 % | BODY MASS INDEX: 21.67 KG/M2

## 2020-11-11 PROCEDURE — 99214 OFFICE O/P EST MOD 30 MIN: CPT | Performed by: INTERNAL MEDICINE

## 2020-11-11 RX ORDER — METHOCARBAMOL 500 MG/1
500 TABLET, FILM COATED ORAL 4 TIMES DAILY
COMMUNITY
End: 2021-11-15

## 2020-11-11 RX ORDER — ATORVASTATIN CALCIUM 40 MG/1
40 TABLET, FILM COATED ORAL NIGHTLY
Qty: 90 TABLET | Refills: 3 | Status: SHIPPED | OUTPATIENT
Start: 2020-11-11 | End: 2021-10-19

## 2020-11-11 RX ORDER — POLYETHYLENE GLYCOL 3350 17 G/17G
17 POWDER, FOR SOLUTION ORAL DAILY PRN
COMMUNITY
End: 2021-06-01

## 2020-11-11 RX ORDER — GABAPENTIN 400 MG/1
400 CAPSULE ORAL 3 TIMES DAILY
COMMUNITY
End: 2021-02-04

## 2020-11-11 RX ORDER — METRONIDAZOLE 500 MG/1
500 TABLET ORAL 3 TIMES DAILY
COMMUNITY
End: 2021-02-04

## 2020-11-11 NOTE — PROGRESS NOTES
Cardiology Consultation     Charlotte Mensah  1955 November 11, 2020    Referring Physician: Dr. Marielena Barron  Reason for Referral: PAD   Chief Complaint:   Chief Complaint   Patient presents with    Follow-up     seen in ER;m Dr. Marielena Barron did last heart surgery; had left BKA d/t MRSA infection       Subjective:     History of Present Illness: The patient is 63 y.o. male with a past medical history significant for CAD with prior CABG, hyperlipidemia, hypertension, carotid disease, prior DVT and alcohol abuse. He presented to [facility] with severe chest pain and found to have NSTEMI. He underwent heart catheterization revealing severe triple-vessel disease and subsequently underwent CABG x 4 on 5/8/20 with Dr. Marielena Barron. He did suffer from encephalopathy/delirium following bypass. He did undergo tracheostomy and PEG placement. His PEG tube was removed 7/10/20. He was discharged to [facility] for skilled nursing care. He was seen in office with Dr. Marielena Barron for follow up where it was noted that he was admitted to [facility] for bilateral foot wounds. He underwent peripheral angiograms with intervention to both legs while inpatient at [facility]. He underwent left BKA on 10/22/20. Today, he presents today for follow up. He continues to follow with podiatry at [facility] and has a wound on his right heel. He states the wound is improving and is healing some. This was debrided last week and it did bleed. He does have home health nurses that come to his home during the week and will dress his wound. Prior cardiac testing:    EKG:     Echo 5/7/20  Normal LV size, wall thickness and wall motion: EF is 60%. Grade I diastolic dysfunction. Left atrium is of normal size. Normal right ventricular size and function. Trivial tricuspid regurgitation. Echo limited 6/9/20 ([facility])   Pericardium, extracardiac: A small pericardial effusion is  identified posterior to the heart.  Features were not consistent  with tamponade physiology. Carotid duplex 5/7/20  Right Impression    The right internal carotid artery appears to have a high end 50-79% diameter    (could be higher by NAVIX criteria) reducing stenosis based on velocity    criteria. Elevated velocities noted in the right external carotid artery. The right vertebral artery demonstrates normal antegrade flow. Left Impression    The left internal carotid artery appears to have a 50-79% diameter reducing    stenosis based on velocity criteria. The left vertebral artery demonstrates normal antegrade flow. Cath 5/7/20  ANGIOGRA/CORONARY ARTERIOGRAM:        The left main coronary artery is severely calcified with 80% stenosis .     The left anterior descending artery is severely calcified with 80% proximal stenosis      The left circumflex artery is 99% occluded at its ostium .     The right coronary artery is dominant artery with diffuse disease, mid 60%, rPDA 90% .     LEFT VENTRICULOGRAM:  Left ventricular angiogram was done in the 30° HAYDEN projection and revealed normal left ventricular wall motion and systolic function with an estimated ejection fraction of 55%. CABG 5/8/20  Urgent coronary artery bypass grafting x4 (LIMA to LAD, SVG to D1,  SVG to OM, SVG to PDA). Peripheral angiogram 7/22/20 ([facility])   PROCEDURES PERFORMED:    1. Left lower extremity angiogram with recanalization of the distal   superficial femoral, popliteal, and peroneal artery. 2. Angioplasty of the peroneal and popliteal artery with a 3 x 220   Basil balloon. 3. Angioplasty of the left superficial femoral and popliteal artery with a   5 x 200  balloon. 4. Completion angiogram.  5. Right lower extremity runoff. Peripheral angiogram 7/24/20 ([facility])   PROCEDURES PERFORMED:    1. Ultrasound-guided left common femoral access   2. Pelvic angiogram and right lower extremity angiogram   3.  Recanalization of the superficial femoral artery and popliteal artery   with angioplasty. 4. Angioplasty of the popliteal artery with 3 x 220-mm Basil balloon. 5. Angioplasty of the popliteal and superficial femoral artery with a 5 x   200  balloon and completion angiogram.     Lower extremity arterial duplex 10/16/20 ([facility])   Right: Right SYBIL of 0.64 at both the right PTA and DPA with monophasic spectral Doppler signals.  Right Toe pressure of 0.53. Left: Left SYBIL of 0.65 at the PTA.  The left DPA was not obtainable due to ulceration.  Left toe pressure of 0.35 with a monophasic PPG tracing. Previous: previous exam 6-6-20 right SYBIL 0.48 left SYBIL 0.56. Conclusions: Moderate disease in the bilateral legs without worsening from previous exam.    Past Medical History:   Diagnosis Date    Acute cerebral infarction ([address] 05/2020    R posterior frontal lobe    Alcohol withdrawal ([facility] 05/2020    Bilateral carotid artery stenosis 05/07/2020    bilat 50-79% stenosis    Hepatitis C antibody positive in blood 05/16/2020    History of bronchitis     History of DVT of lower extremity 2018    RLL, no previous scans to know whether supf or dvt. no coumadin. put on plavix.  Hypertension     NSTEMI (non-ST elevated myocardial infarction) ([address] 05/07/2020    3VD    PAD (peripheral artery disease) ([facility]     Respiratory compromise 05/2020    chemical exposure at work, seen at [facility], covid neg, given steroids    S/p nephrectomy 5    age 17, pt not sure why     Tattoos      Past Surgical History:   Procedure Laterality Date    CARDIAC CATHETERIZATION  05/07/2020    Dr. Smith Or - w/placement of IABP    CORONARY ARTERY BYPASS GRAFT N/A 5/8/2020    Dr. Brown Graft. Robles - urgent x4 (LIMA-LAD, L SVG-D1, SVG-OM, SVG-PDA)    GASTROSTOMY TUBE PLACEMENT N/A 5/27/2020    PERCUTANEOUS ENDOSCOPIC GASTROSTOMY TUBE PLACEMENT performed by Guerline Houston MD at [address] N/A 7/10/2020    REMOVAL  GASTROSTOMY FEEDING TUBE  ([address]) performed by Miguel Angel Sales MD at [address] Left 2020    [facility] 1300 ml drained    TOTAL NEPHRECTOMY Left     17 yo - pt doesn't know why   Rachel Mike  2020    Dr. Ara Guillen - w/bronchoscopy    TRANSESOPHAGEAL ECHOCARDIOGRAM  2020    during CABG     Family History   Problem Relation Age of Onset    Heart Disease Mother          of MI age 43    Heart Disease Brother         MI mid 46s    Heart Disease Father          of MI age 68    Heart Disease Paternal Aunt         MI in her 46s     Social History     Tobacco Use    Smoking status: Former Smoker     Types: Cigarettes     Last attempt to quit: 2019     Years since quittin.0    Smokeless tobacco: Never Used    Tobacco comment: started age 27   Substance Use Topics    Alcohol use: Yes     Frequency: Monthly or less     Comment: once a month, shot once a week. depends on mood.  Drug use: Never       No Known Allergies  Current Outpatient Medications   Medication Sig Dispense Refill    gabapentin (NEURONTIN) 400 MG capsule Take 400 mg by mouth 3 times daily.  methocarbamol (ROBAXIN) 500 MG tablet Take 500 mg by mouth 4 times daily      metroNIDAZOLE (FLAGYL) 500 MG tablet Take 500 mg by mouth 3 times daily      polyethylene glycol (GLYCOLAX) 17 g packet Take 17 g by mouth daily as needed for Constipation      Sennosides (SENEXON PO) Take 8.6 mg by mouth      oxyCODONE (ROXICODONE) 5 MG immediate release tablet Take 5 mg by mouth every 6 hours as needed for Pain.  acetaminophen (TYLENOL) 500 MG tablet Take 1,000 mg by mouth every 6 hours as needed for Pain      ferrous gluconate (FERGON) 324 (38 Fe) MG tablet Take 324 mg by mouth daily (with breakfast)      gabapentin (NEURONTIN) 800 MG tablet Take 800 mg by mouth 4 times daily.       levETIRAcetam (KEPPRA) 500 MG tablet Take 500 mg by mouth 2 times daily      metoprolol tartrate (LOPRESSOR) 25 MG tablet Take 25 mg by mouth 2 times daily      QUEtiapine (SEROQUEL) 25 MG tablet Take 25 mg by mouth 3 times daily      aspirin 81 MG chewable tablet Take 4 tablets by mouth daily 30 tablet 3    atorvastatin (LIPITOR) 20 MG tablet Take 1 tablet by mouth nightly 30 tablet 3     No current facility-administered medications for this visit. Review of Systems:  · Constitutional: No unanticipated weight loss. There's been no change in energy level, sleep pattern, or activity level. No fevers, chills. · Eyes: No visual changes or diplopia. No scleral icterus. · ENT: No Headaches, hearing loss or vertigo. No mouth sores or sore throat. · Cardiovascular: as reviewed in HPI  · Respiratory: No cough or wheezing, no sputum production. No hemoptysis. · Gastrointestinal: No abdominal pain, appetite loss, blood in stools. No change in bowel or bladder habits. · Genitourinary: No dysuria, trouble voiding, or hematuria. · Musculoskeletal:  No gait disturbance, no joint complaints. · Integumentary: No rash or pruritis. · Neurological: No headache, diplopia, change in muscle strength, numbness or tingling. · Psychiatric: No anxiety or depression. · Endocrine: No temperature intolerance. No excessive thirst, fluid intake, or urination. No tremor. · Hematologic/Lymphatic: No abnormal bruising or bleeding, blood clots or swollen lymph nodes. · Allergic/Immunologic: No nasal congestion or hives. Physical Exam:   /70   Pulse 102   Temp 98.2 °F (36.8 °C)   Ht 6' (1.829 m)   Wt 160 lb (72.6 kg)   SpO2 96%   BMI 21.70 kg/m²   Wt Readings from Last 3 Encounters:   11/11/20 160 lb (72.6 kg)   07/10/20 160 lb 15 oz (73 kg)   06/05/20 160 lb 15 oz (73 kg)     Constitutional: He is oriented to person, place, and time. He appears well-developed and well-nourished. In no acute distress. Head: Normocephalic and atraumatic. Pupils equal and round. Neck: Neck supple. No JVP or carotid bruit appreciated.  No mass and no RN.    Physician Attestation:  The scribes documentation has been prepared under my direction and personally reviewed by me in its entirety. I confirm the note above accurately reflects all work, treatment, procedures, and medical decision making performed by me.     Electronically signed by Merlinda Bass, MD on 11/15/2020 at 10:32 PM

## 2021-01-18 ENCOUNTER — HOSPITAL ENCOUNTER (OUTPATIENT)
Dept: VASCULAR LAB | Age: 66
Discharge: HOME | End: 2021-01-18
Payer: COMMERCIAL

## 2021-01-18 DIAGNOSIS — I73.9 PAD (PERIPHERAL ARTERY DISEASE) (HCC): ICD-10-CM

## 2021-01-18 PROCEDURE — 93926 LOWER EXTREMITY STUDY: CPT

## 2021-01-19 ENCOUNTER — TELEPHONE (OUTPATIENT)
Dept: CARDIOLOGY CLINIC | Age: 66
End: 2021-01-19

## 2021-01-19 DIAGNOSIS — I73.9 PAD (PERIPHERAL ARTERY DISEASE) (HCC): Primary | ICD-10-CM

## 2021-01-19 NOTE — RESULT ENCOUNTER NOTE
Please notify patient that their doppler shows severe lower extremity artery blockages. Recommend peripheral angiogram asap.

## 2021-01-19 NOTE — TELEPHONE ENCOUNTER
Per result note. Patient needs peripheral angiogram asap. [facility], reviewed results and recommendations per Dr. Arianna Bell. Lucero Harris is agreeable to proceed but would like his girlfriend to call back to confirm the date and time but okay to schedule for next week but not \"too early. \"      Scheduled 1/25/21 at 10:00 patient to arrive at 8:30. The morning of your procedure you will park in the hospital parking lot and report directly to the cath lab to check in.     Pre-Procedure Instructions   1. You will need to fast for at least 8 hours prior to procedure. No caffeine the morning of. .   2. All other medications can be taken in the morning with sips of water. 3. You will need to take 325 mg aspirin the morning of. If you are currently taking 81 mg please take 4 tablets that morning. 4. Do not use any lotions, creams or perfume the morning of procedure. 5. Pre-procedure lab work will need to be completed 5-7 days prior to procedure. 6. Please have a responsible adult to drive you home after procedure. We advise you have someone to stay with you for 24 hours following procedure for precautionary measures. Depending on procedure you may require an overnight stay. 7. Cath lab will provide you with all post procedure instructions. If you have any questions regarding the procedure itself or medications, please call [phone] and ask to speak with a nurse. Published on [employer] and e-mail to [place]. Will await call from girlfriend to confirm and review instructions.

## 2021-01-20 NOTE — TELEPHONE ENCOUNTER
Called Jan, procedure changed to 1/26/21 1:00 arrive at 11:30. Reviewed pre procedure instructions and will complete lab work in [place]. Instructed to call with any questions or concerns. She v/u. Updated form, sent to [facility].

## 2021-01-20 NOTE — TELEPHONE ENCOUNTER
Pedro returned Janett's call. Would like her to call her back asap. Not sure if Monday is going to work. Has some questions.     Pedro # [phone]

## 2021-01-20 NOTE — TELEPHONE ENCOUNTER
Left Pedro a message to return call to see if 1/26/21 1 pm and arrive at 11:30 will work due to scheduling conflict.

## 2021-01-21 DIAGNOSIS — I73.9 PAD (PERIPHERAL ARTERY DISEASE) (HCC): ICD-10-CM

## 2021-01-21 LAB
ANION GAP SERPL CALCULATED.3IONS-SCNC: 12 MMOL/L (ref 3–16)
BUN BLDV-MCNC: 20 MG/DL (ref 7–20)
CALCIUM SERPL-MCNC: 9.3 MG/DL (ref 8.3–10.6)
CHLORIDE BLD-SCNC: 103 MMOL/L (ref 99–110)
CO2: 25 MMOL/L (ref 21–32)
CREAT SERPL-MCNC: 1.2 MG/DL (ref 0.8–1.3)
GFR AFRICAN AMERICAN: >60
GFR NON-AFRICAN AMERICAN: >60
GLUCOSE BLD-MCNC: 97 MG/DL (ref 70–99)
HCT VFR BLD CALC: 44.4 % (ref 40.5–52.5)
HEMOGLOBIN: 14.4 G/DL (ref 13.5–17.5)
MCH RBC QN AUTO: 26.7 PG (ref 26–34)
MCHC RBC AUTO-ENTMCNC: 32.6 G/DL (ref 31–36)
MCV RBC AUTO: 81.9 FL (ref 80–100)
PDW BLD-RTO: 18 % (ref 12.4–15.4)
PLATELET # BLD: 277 K/UL (ref 135–450)
PMV BLD AUTO: 8 FL (ref 5–10.5)
POTASSIUM SERPL-SCNC: 4.9 MMOL/L (ref 3.5–5.1)
RBC # BLD: 5.42 M/UL (ref 4.2–5.9)
SODIUM BLD-SCNC: 140 MMOL/L (ref 136–145)
WBC # BLD: 6.8 K/UL (ref 4–11)

## 2021-01-26 ENCOUNTER — HOSPITAL ENCOUNTER (OUTPATIENT)
Dept: CARDIAC CATH/INVASIVE PROCEDURES | Age: 66
Discharge: HOME | End: 2021-01-26
Payer: COMMERCIAL

## 2021-01-26 ENCOUNTER — TELEPHONE (OUTPATIENT)
Dept: CARDIOLOGY CLINIC | Age: 66
End: 2021-01-26

## 2021-01-26 VITALS
OXYGEN SATURATION: 98 % | HEIGHT: 73 IN | BODY MASS INDEX: 23.86 KG/M2 | DIASTOLIC BLOOD PRESSURE: 81 MMHG | SYSTOLIC BLOOD PRESSURE: 128 MMHG | WEIGHT: 180 LBS | RESPIRATION RATE: 16 BRPM | HEART RATE: 79 BPM

## 2021-01-26 DIAGNOSIS — I73.9 PAD (PERIPHERAL ARTERY DISEASE) (HCC): Primary | ICD-10-CM

## 2021-01-26 LAB — POC ACT LR: 333 SEC

## 2021-01-26 PROCEDURE — C1760 CLOSURE DEV, VASC: HCPCS

## 2021-01-26 PROCEDURE — C1887 CATHETER, GUIDING: HCPCS

## 2021-01-26 PROCEDURE — 36140 INTRO NDL ICATH UPR/LXTR ART: CPT | Performed by: INTERNAL MEDICINE

## 2021-01-26 PROCEDURE — 75774 ARTERY X-RAY EACH VESSEL: CPT

## 2021-01-26 PROCEDURE — 6370000000 HC RX 637 (ALT 250 FOR IP)

## 2021-01-26 PROCEDURE — 99152 MOD SED SAME PHYS/QHP 5/>YRS: CPT

## 2021-01-26 PROCEDURE — 75625 CONTRAST EXAM ABDOMINL AORTA: CPT

## 2021-01-26 PROCEDURE — 85347 COAGULATION TIME ACTIVATED: CPT

## 2021-01-26 PROCEDURE — 2709999900 HC NON-CHARGEABLE SUPPLY

## 2021-01-26 PROCEDURE — 99153 MOD SED SAME PHYS/QHP EA: CPT

## 2021-01-26 PROCEDURE — 2580000003 HC RX 258

## 2021-01-26 PROCEDURE — C1894 INTRO/SHEATH, NON-LASER: HCPCS

## 2021-01-26 PROCEDURE — 6360000002 HC RX W HCPCS

## 2021-01-26 PROCEDURE — 75716 ARTERY X-RAYS ARMS/LEGS: CPT | Performed by: INTERNAL MEDICINE

## 2021-01-26 PROCEDURE — 75716 ARTERY X-RAYS ARMS/LEGS: CPT

## 2021-01-26 PROCEDURE — 2500000003 HC RX 250 WO HCPCS

## 2021-01-26 PROCEDURE — C1769 GUIDE WIRE: HCPCS

## 2021-01-26 PROCEDURE — 6360000004 HC RX CONTRAST MEDICATION: Performed by: INTERNAL MEDICINE

## 2021-01-26 PROCEDURE — 36246 INS CATH ABD/L-EXT ART 2ND: CPT | Performed by: INTERNAL MEDICINE

## 2021-01-26 PROCEDURE — 36247 INS CATH ABD/L-EXT ART 3RD: CPT

## 2021-01-26 RX ORDER — ACETAMINOPHEN 325 MG/1
650 TABLET ORAL EVERY 4 HOURS PRN
Status: DISCONTINUED | OUTPATIENT
Start: 2021-01-26 | End: 2021-01-27 | Stop reason: HOSPADM

## 2021-01-26 RX ORDER — SODIUM CHLORIDE 0.9 % (FLUSH) 0.9 %
10 SYRINGE (ML) INJECTION PRN
Status: DISCONTINUED | OUTPATIENT
Start: 2021-01-26 | End: 2021-01-27 | Stop reason: HOSPADM

## 2021-01-26 RX ORDER — SODIUM CHLORIDE 9 MG/ML
INJECTION, SOLUTION INTRAVENOUS CONTINUOUS
Status: DISCONTINUED | OUTPATIENT
Start: 2021-01-26 | End: 2021-01-27 | Stop reason: HOSPADM

## 2021-01-26 RX ORDER — IODIXANOL 320 MG/ML
40 INJECTION, SOLUTION INTRAVASCULAR
Status: COMPLETED | OUTPATIENT
Start: 2021-01-26 | End: 2021-01-26

## 2021-01-26 RX ORDER — SODIUM CHLORIDE 0.9 % (FLUSH) 0.9 %
10 SYRINGE (ML) INJECTION EVERY 12 HOURS SCHEDULED
Status: DISCONTINUED | OUTPATIENT
Start: 2021-01-26 | End: 2021-01-27 | Stop reason: HOSPADM

## 2021-01-26 RX ADMIN — IODIXANOL 40 ML: 320 INJECTION, SOLUTION INTRAVASCULAR at 14:52

## 2021-01-26 NOTE — TELEPHONE ENCOUNTER
Notified per Dr. Dickson Reyez that patient will need rescheduled for peripheral angiogram with [employer] and the rep will need notified. Will call Darren Coe and Pedro to reschedule the procedure for next week.

## 2021-01-27 NOTE — TELEPHONE ENCOUNTER
Pedro called in this afternoon asking that we call her to reschedule the procedure.      You can reach Pedro at #[phone]

## 2021-01-28 NOTE — TELEPHONE ENCOUNTER
Spoke with Pedro and procedure scheduled for 2/4/21 at 8:00 patient to arrive at 6:30. Pre procedure instructions provided, she v/u. Will complete lab work in [place]. Published on [employer] and e-mail to [place]. Notified Dr. Silva Le since the Rep will need contacted also.

## 2021-01-31 NOTE — OP NOTE
[address]   Procedure Note  Patient Name: Mateo Nath  YOB: 1955  Date of Operation: 1/26/2021    Pre-operative Diagnosis:   1. Peripheral arterial disease with CLI RC5     Post-operative Diagnosis:   1. Same     Procedures Performed:  - Left femoral artery access under fluroscopic and ultrasound guidance  - Left iliofemoral arteriogram.  - Aortoiliac arteriogram.  - Right lower extremity arteriogram      Surgeon:  Amanda Anderson MD.    Assistant:  None     Anesthesia:  IV sedation and local anesthesia. Estimated Blood Loss:  10 mL. Indications for Procedure:  Mateo Nath is a 63 y.o. male who was evaluated as an outpatient with chronic arterial insufficiency with ulceration of right foot and was deemed an appropriate candidate for an angiogram and possible intervention. Informed consent was obtained after discussion of potential benefits, alternatives and risks of the procedure, including but not limited to bleeding, infection, worsening of arterial insufficiency, and kidney injury due to contrast administration. Details of Procedure: The patient was taken to the cardiac cath lab and placed in supine position. IV sedation anesthesia was administered by the anesthesia team. The operative area was clipped, prepared and draped in sterile fashion. A brief time out was performed per hospital protocol. The left femoral artery was accessed under fluroscopic and ultrasound guidance with a micropuncture needle, wire, then sheath. A 4-Venezuelan sheath was inserted over a wire. An iliofemoral angiogram was performed. An aortoiliac arteriogram was performed. A right lower extremity arteriogram was performed. Renal arteries: patent  Abdominal Aorta:  mild calcification.        Right Common Iliac:  0%  Stenosis  Right Internal Iliac:  is  patent  Right External Iliac:  is  patent  Left Common Iliac:  0% stenosis  Left Internal Iliac:  is  patent  Left External Iliac:  is patent    The catheter was then pulled back to the level of the bifurcation and a bilateral lower extremity runoff was performed:    Right Leg  Common Femoral:   Mild disease with severe calcification   Profunda: patent with significant disease, 90% ostium   Superficial femoral: severe disease, mid 100%     Popliteal: severe disease, 100% occluded     Anterior Tibial: is  patent with moderate disease  Posterior Tibial:  is  patent with severe disease  Peroneal: is  patent with severe disease      Left Leg  Common Femoral:   Patent without significant disease   Profunda: patent without significant disease  Superficial femoral: proximally without disease      Left BKA     A 7 Panamanian sheath was inserted over the wire into the femoral artery and  up-and-over into the common femoral artery   and after 5000 units of intravenous heparin was administered and three minutes allowed to elapse. The right superficial femoral artery  was attempted to cross with wire escalation technique, command, , victory wires without success    Anterior tibial access was then obtained with US/micro technique. Retrograde  access was also attempted with wire escalation technique. A completion arteriogram was performed. The sheath was withdrawn over a wire then exchanged for a short sheath. A Mynx closure device was used to close the arteriotomy. The patient tolerated the procedure well, was awakened and was taken to the post-operative care unit in stable condition.      Impression/Plan:   Failed SFA  with both antegrade/retrograde technique   Return in one week for attempt with [employer] OCT  catheter from antegrade     Zoie Lerner MD [address] [facility], Interventional Cardiology, and Peripheral Vascular [address] [facility]   (C): [phone]  (O): [phone]

## 2021-02-01 DIAGNOSIS — I73.9 PAD (PERIPHERAL ARTERY DISEASE) (HCC): ICD-10-CM

## 2021-02-01 LAB
ANION GAP SERPL CALCULATED.3IONS-SCNC: 9 MMOL/L (ref 3–16)
BUN BLDV-MCNC: 16 MG/DL (ref 7–20)
CALCIUM SERPL-MCNC: 9.5 MG/DL (ref 8.3–10.6)
CHLORIDE BLD-SCNC: 108 MMOL/L (ref 99–110)
CO2: 27 MMOL/L (ref 21–32)
CREAT SERPL-MCNC: 1.1 MG/DL (ref 0.8–1.3)
GFR AFRICAN AMERICAN: >60
GFR NON-AFRICAN AMERICAN: >60
GLUCOSE BLD-MCNC: 93 MG/DL (ref 70–99)
HCT VFR BLD CALC: 43.4 % (ref 40.5–52.5)
HEMOGLOBIN: 13.9 G/DL (ref 13.5–17.5)
MCH RBC QN AUTO: 26.3 PG (ref 26–34)
MCHC RBC AUTO-ENTMCNC: 32.1 G/DL (ref 31–36)
MCV RBC AUTO: 82 FL (ref 80–100)
PDW BLD-RTO: 16.6 % (ref 12.4–15.4)
PLATELET # BLD: 275 K/UL (ref 135–450)
PMV BLD AUTO: 8.1 FL (ref 5–10.5)
POTASSIUM SERPL-SCNC: 5.4 MMOL/L (ref 3.5–5.1)
RBC # BLD: 5.3 M/UL (ref 4.2–5.9)
SODIUM BLD-SCNC: 144 MMOL/L (ref 136–145)
WBC # BLD: 6 K/UL (ref 4–11)

## 2021-02-01 NOTE — H&P
Interventional Cardiology        Referring Physician: Dr. Lisa Tovar  Reason for Referral: PAD   Chief Complaint:        Chief Complaint   Patient presents with    Follow-up       seen in ER;m Dr. Lisa Tovar did last heart surgery; had left BKA d/t MRSA infection         Subjective:      History of Present Illness: The patient is 63 y.o. male with a past medical history significant for CAD with prior CABG, hyperlipidemia, hypertension, carotid disease, prior DVT and alcohol abuse. He presented to [facility] with severe chest pain and found to have NSTEMI. He underwent heart catheterization revealing severe triple-vessel disease and subsequently underwent CABG x 4 on 5/8/20 with Dr. Lisa Tovar. He did suffer from encephalopathy/delirium following bypass. He did undergo tracheostomy and PEG placement. His PEG tube was removed 7/10/20. He was discharged to [facility] for skilled nursing care. He was seen in office with Dr. Lisa Tovar for follow up where it was noted that he was admitted to [facility] for bilateral foot wounds. He underwent peripheral angiograms with intervention to both legs while inpatient at [facility]. He underwent left BKA on 10/22/20.      Today, he presents today for follow up. He continues to follow with podiatry at [facility] and has a wound on his right heel. He states the wound is improving and is healing some. This was debrided last week and it did bleed. He does have home health nurses that come to his home during the week and will dress his wound.      Prior cardiac testing:     EKG:      Echo 5/7/20  Normal LV size, wall thickness and wall motion: EF is 60%. Grade I diastolic dysfunction. Left atrium is of normal size. Normal right ventricular size and function. Trivial tricuspid regurgitation.     Echo limited 6/9/20 ([facility])   Pericardium, extracardiac: A small pericardial effusion is  identified posterior to the heart.  Features were not consistent  with tamponade physiology.     Carotid duplex 5/7/20  Right Impression    The right internal carotid artery appears to have a high end 50-79% diameter    (could be higher by NAVIX criteria) reducing stenosis based on velocity    criteria. Elevated velocities noted in the right external carotid artery. The right vertebral artery demonstrates normal antegrade flow. Left Impression    The left internal carotid artery appears to have a 50-79% diameter reducing    stenosis based on velocity criteria. The left vertebral artery demonstrates normal antegrade flow.       Cath 5/7/20  ANGIOGRA/CORONARY ARTERIOGRAM:        The left main coronary artery is severely calcified with 80% stenosis .     The left anterior descending artery is severely calcified with 80% proximal stenosis      The left circumflex artery is 99% occluded at its ostium .     The right coronary artery is dominant artery with diffuse disease, mid 60%, rPDA 90% .     LEFT VENTRICULOGRAM:  Left ventricular angiogram was done in the 30° HAYDEN projection and revealed normal left ventricular wall motion and systolic function with an estimated ejection fraction of 55%.      CABG 5/8/20  Urgent coronary artery bypass grafting x4 (LIMA to LAD, SVG to D1,  SVG to OM, SVG to PDA).    Peripheral angiogram 7/22/20 ([facility])   PROCEDURES PERFORMED:    1. Left lower extremity angiogram with recanalization of the distal   superficial femoral, popliteal, and peroneal artery. 2. Angioplasty of the peroneal and popliteal artery with a 3 x 220   Basil balloon. 3. Angioplasty of the left superficial femoral and popliteal artery with a   5 x 200  balloon. 4. Completion angiogram.  5. Right lower extremity runoff.     Peripheral angiogram 7/24/20 ([facility])   PROCEDURES PERFORMED:    1. Ultrasound-guided left common femoral access   2. Pelvic angiogram and right lower extremity angiogram   3. Recanalization of the superficial femoral artery and popliteal artery   with angioplasty.   4. Angioplasty MG tablet Take 800 mg by mouth 4 times daily.        levETIRAcetam (KEPPRA) 500 MG tablet Take 500 mg by mouth 2 times daily        metoprolol tartrate (LOPRESSOR) 25 MG tablet Take 25 mg by mouth 2 times daily        QUEtiapine (SEROQUEL) 25 MG tablet Take 25 mg by mouth 3 times daily        aspirin 81 MG chewable tablet Take 4 tablets by mouth daily 30 tablet 3    atorvastatin (LIPITOR) 20 MG tablet Take 1 tablet by mouth nightly 30 tablet 3      No current facility-administered medications for this visit.             Review of Systems:  · Constitutional: No unanticipated weight loss. There's been no change in energy level, sleep pattern, or activity level. No fevers, chills. · Eyes: No visual changes or diplopia. No scleral icterus. · ENT: No Headaches, hearing loss or vertigo. No mouth sores or sore throat. · Cardiovascular: as reviewed in HPI  · Respiratory: No cough or wheezing, no sputum production. No hemoptysis. · Gastrointestinal: No abdominal pain, appetite loss, blood in stools. No change in bowel or bladder habits. · Genitourinary: No dysuria, trouble voiding, or hematuria. · Musculoskeletal:  No gait disturbance, no joint complaints. · Integumentary: No rash or pruritis. · Neurological: No headache, diplopia, change in muscle strength, numbness or tingling. · Psychiatric: No anxiety or depression. · Endocrine: No temperature intolerance. No excessive thirst, fluid intake, or urination. No tremor. · Hematologic/Lymphatic: No abnormal bruising or bleeding, blood clots or swollen lymph nodes. · Allergic/Immunologic: No nasal congestion or hives.     Physical Exam:   /70   Pulse 102   Temp 98.2 °F (36.8 °C)   Ht 6' (1.829 m)   Wt 160 lb (72.6 kg)   SpO2 96%   BMI 21.70 kg/m²       Wt Readings from Last 3 Encounters:   11/11/20 160 lb (72.6 kg)   07/10/20 160 lb 15 oz (73 kg)   06/05/20 160 lb 15 oz (73 kg)      Constitutional: He is oriented to person, place, and time.  He appears well-developed and well-nourished. In no acute distress. Head: Normocephalic and atraumatic. Pupils equal and round. Neck: Neck supple. No JVP or carotid bruit appreciated. No mass and no thyromegaly present. No lymphadenopathy present. Cardiovascular: Normal rate. Normal heart sounds. Exam reveals no gallop and no friction rub. No murmur heard. Pulmonary/Chest: Effort normal and breath sounds normal. No respiratory distress. He has no wheezes, rhonchi or rales. Abdominal: Soft, non-tender. Bowel sounds are normal. He exhibits no organomegaly, mass or bruit. Extremities: No edema. No cyanosis or clubbing. Pulses are 2+ radial/carotid bilaterally. Neurological: No gross cranial nerve deficit. Coordination normal.   Skin: Skin is warm and dry. There is no rash or diaphoresis. Psychiatric: He has a normal mood and affect.  His speech is normal and behavior is normal.      Lab Review:   FLP:          Lab Results   Component Value Date     TRIG 111 05/25/2020     HDL 47 05/07/2020     LDLCALC 103 05/07/2020     LABVLDL 13 05/07/2020      BUN/Creatinine:          Lab Results   Component Value Date     BUN 18 06/05/2020     CREATININE 0.6 06/05/2020      PT/INR, TNI, HGB A1C:         Lab Results   Component Value Date/Time     TROPONINI 0.11 (H) 05/07/2020 01:17 PM     LABA1C 5.9 05/07/2020 12:00 PM      No results found for: CBCAUTODIF        Assessment and Plan     1) PAD   S/p left BKA   Right heel wound   CLI RC5  Peripheral angiogram today         Rickie Zamorano MD [address], Interventional Cardiology, and Peripheral Vascular Disease   [employer]   (C): [phone]  (O): [phone]

## 2021-02-04 ENCOUNTER — HOSPITAL ENCOUNTER (OUTPATIENT)
Dept: CARDIAC CATH/INVASIVE PROCEDURES | Age: 66
Discharge: HOME | End: 2021-02-04
Payer: MEDICAID

## 2021-02-04 VITALS
HEIGHT: 73 IN | SYSTOLIC BLOOD PRESSURE: 121 MMHG | WEIGHT: 185 LBS | DIASTOLIC BLOOD PRESSURE: 76 MMHG | HEART RATE: 70 BPM | BODY MASS INDEX: 24.52 KG/M2 | TEMPERATURE: 98.1 F | OXYGEN SATURATION: 97 %

## 2021-02-04 DIAGNOSIS — I73.9 PAD (PERIPHERAL ARTERY DISEASE) (HCC): Primary | ICD-10-CM

## 2021-02-04 LAB
POC ACT LR: 251 SEC
POC ACT LR: 289 SEC

## 2021-02-04 PROCEDURE — 37228 HC TIB PER TERRITORY PLASTY: CPT

## 2021-02-04 PROCEDURE — 85347 COAGULATION TIME ACTIVATED: CPT

## 2021-02-04 PROCEDURE — C1876 STENT, NON-COA/NON-COV W/DEL: HCPCS

## 2021-02-04 PROCEDURE — 6360000002 HC RX W HCPCS: Performed by: INTERNAL MEDICINE

## 2021-02-04 PROCEDURE — 2500000003 HC RX 250 WO HCPCS

## 2021-02-04 PROCEDURE — C2628 CATHETER, OCCLUSION: HCPCS

## 2021-02-04 PROCEDURE — 6370000000 HC RX 637 (ALT 250 FOR IP): Performed by: INTERNAL MEDICINE

## 2021-02-04 PROCEDURE — 6370000000 HC RX 637 (ALT 250 FOR IP)

## 2021-02-04 PROCEDURE — C1725 CATH, TRANSLUMIN NON-LASER: HCPCS

## 2021-02-04 PROCEDURE — C1894 INTRO/SHEATH, NON-LASER: HCPCS

## 2021-02-04 PROCEDURE — C1753 CATH, INTRAVAS ULTRASOUND: HCPCS

## 2021-02-04 PROCEDURE — C1769 GUIDE WIRE: HCPCS

## 2021-02-04 PROCEDURE — 6360000002 HC RX W HCPCS

## 2021-02-04 PROCEDURE — 37252 INTRVASC US NONCORONARY 1ST: CPT

## 2021-02-04 PROCEDURE — 75774 ARTERY X-RAY EACH VESSEL: CPT

## 2021-02-04 PROCEDURE — 37228 PR REVSC OPN/PRQ TIB/PERO W/ANGIOPLASTY UNI: CPT | Performed by: INTERNAL MEDICINE

## 2021-02-04 PROCEDURE — 37226 HC FEM POP TERR PLASTY AND STENT: CPT

## 2021-02-04 PROCEDURE — 6360000004 HC RX CONTRAST MEDICATION: Performed by: INTERNAL MEDICINE

## 2021-02-04 PROCEDURE — 2709999900 HC NON-CHARGEABLE SUPPLY

## 2021-02-04 PROCEDURE — C1887 CATHETER, GUIDING: HCPCS

## 2021-02-04 PROCEDURE — 75710 ARTERY X-RAYS ARM/LEG: CPT | Performed by: INTERNAL MEDICINE

## 2021-02-04 PROCEDURE — 37253 INTRVASC US NONCORONARY ADDL: CPT

## 2021-02-04 PROCEDURE — 37226 PR REVSC OPN/PRQ FEM/POP W/STNT/ANGIOP SM VSL: CPT | Performed by: INTERNAL MEDICINE

## 2021-02-04 PROCEDURE — 75710 ARTERY X-RAYS ARM/LEG: CPT

## 2021-02-04 PROCEDURE — 99152 MOD SED SAME PHYS/QHP 5/>YRS: CPT

## 2021-02-04 PROCEDURE — 99153 MOD SED SAME PHYS/QHP EA: CPT

## 2021-02-04 RX ORDER — SODIUM CHLORIDE 0.9 % (FLUSH) 0.9 %
10 SYRINGE (ML) INJECTION EVERY 12 HOURS SCHEDULED
Status: DISCONTINUED | OUTPATIENT
Start: 2021-02-04 | End: 2021-02-05 | Stop reason: HOSPADM

## 2021-02-04 RX ORDER — TIZANIDINE 4 MG/1
4 TABLET ORAL NIGHTLY
COMMUNITY
End: 2022-07-29

## 2021-02-04 RX ORDER — QUETIAPINE FUMARATE 100 MG/1
100 TABLET, FILM COATED ORAL NIGHTLY
COMMUNITY

## 2021-02-04 RX ORDER — PANTOPRAZOLE SODIUM 40 MG/1
40 TABLET, DELAYED RELEASE ORAL DAILY
COMMUNITY
End: 2022-07-29

## 2021-02-04 RX ORDER — SODIUM CHLORIDE 9 MG/ML
INJECTION, SOLUTION INTRAVENOUS CONTINUOUS
Status: DISCONTINUED | OUTPATIENT
Start: 2021-02-04 | End: 2021-02-05 | Stop reason: HOSPADM

## 2021-02-04 RX ORDER — ACETAMINOPHEN 325 MG/1
650 TABLET ORAL EVERY 4 HOURS PRN
Status: DISCONTINUED | OUTPATIENT
Start: 2021-02-04 | End: 2021-02-05 | Stop reason: HOSPADM

## 2021-02-04 RX ORDER — QUETIAPINE FUMARATE 25 MG/1
25 TABLET, FILM COATED ORAL 3 TIMES DAILY
COMMUNITY
End: 2021-06-01

## 2021-02-04 RX ORDER — AMOXICILLIN 250 MG
1 CAPSULE ORAL NIGHTLY
COMMUNITY
End: 2022-07-29

## 2021-02-04 RX ORDER — MORPHINE SULFATE 2 MG/ML
2 INJECTION, SOLUTION INTRAMUSCULAR; INTRAVENOUS ONCE
Status: COMPLETED | OUTPATIENT
Start: 2021-02-04 | End: 2021-02-04

## 2021-02-04 RX ORDER — SODIUM CHLORIDE 0.9 % (FLUSH) 0.9 %
10 SYRINGE (ML) INJECTION PRN
Status: DISCONTINUED | OUTPATIENT
Start: 2021-02-04 | End: 2021-02-05 | Stop reason: HOSPADM

## 2021-02-04 RX ORDER — ASPIRIN 81 MG/1
81 TABLET, CHEWABLE ORAL DAILY
Qty: 30 TABLET | Refills: 3 | Status: SHIPPED | OUTPATIENT
Start: 2021-02-04 | End: 2021-06-01

## 2021-02-04 RX ORDER — SODIUM CHLORIDE 9 MG/ML
INJECTION, SOLUTION INTRAVENOUS CONTINUOUS
Status: ACTIVE | OUTPATIENT
Start: 2021-02-04 | End: 2021-02-04

## 2021-02-04 RX ADMIN — RIVAROXABAN 2.5 MG: 2.5 TABLET, FILM COATED ORAL at 12:20

## 2021-02-04 RX ADMIN — MORPHINE SULFATE 2 MG: 2 INJECTION, SOLUTION INTRAMUSCULAR; INTRAVENOUS at 11:32

## 2021-02-04 RX ADMIN — IOPAMIDOL 155 ML: 755 INJECTION, SOLUTION INTRAVENOUS at 10:16

## 2021-02-04 ASSESSMENT — PAIN SCALES - GENERAL: PAINLEVEL_OUTOF10: 6

## 2021-02-05 NOTE — TELEPHONE ENCOUNTER
Patient had procedure in cath lab yesterday. On discharge Dr. Aidee Morris ordered Xarelto 2.5 mg twice daily. Patient received medication from retail pharmacy at discharge but needs script with refills sent to [facility] pharmacy in [address] [place] [address].   Thanks

## 2021-02-21 NOTE — H&P
[facility]         Referring Physician: Dr. Melanie Cotto  Reason for Referral: PAD   Chief Complaint:           Chief Complaint   Patient presents with    Follow-up       seen in ER;m Dr. Melanie Cotto did last heart surgery; had left BKA d/t MRSA infection         Subjective:      History of Present Illness: The patient is 58 y. o. male with a past medical history significant for CAD with prior CABG, hyperlipidemia, hypertension, carotid disease, prior DVT and alcohol abuse. He presented to [facility] with severe chest pain and found to have NSTEMI. He underwent heart catheterization revealing severe triple-vessel disease and subsequently underwent CABG x 4 on 5/8/20 with Dr. Melanie Cotto. He did suffer from encephalopathy/delirium following bypass. He did undergo tracheostomy and PEG placement. His PEG tube was removed 7/10/20. He was discharged to [facility] for skilled nursing care. He was seen in office with Dr. Melanie Cotto for follow up where it was noted that he was admitted to [facility] for bilateral foot wounds. He underwent peripheral angiograms with intervention to both legs while inpatient at [facility]. He underwent left BKA on 10/22/20.      Today, he presents today for follow up. He continues to follow with podiatry at [facility] and has a wound on his right heel. He states the wound is improving and is healing some. This was debrided last week and it did bleed. He does have home health nurses that come to his home during the week and will dress his wound.      Prior cardiac testing:     EKG:      Echo 5/7/20  Normal LV size, wall thickness and wall motion: EF is 60%. Grade I diastolic dysfunction. Left atrium is of normal size. Normal right ventricular size and function. Trivial tricuspid regurgitation.     Echo limited 6/9/20 ([facility])   Pericardium, extracardiac: A small pericardial effusion is  identified posterior to the heart.  Features were not consistent  with tamponade physiology.     Carotid duplex 5/7/20  Right Impression    The right internal carotid artery appears to have a high end 50-79% diameter    (could be higher by NAVIX criteria) reducing stenosis based on velocity    criteria. Elevated velocities noted in the right external carotid artery. The right vertebral artery demonstrates normal antegrade flow. Left Impression    The left internal carotid artery appears to have a 50-79% diameter reducing    stenosis based on velocity criteria. The left vertebral artery demonstrates normal antegrade flow.       Cath 5/7/20  ANGIOGRA/CORONARY ARTERIOGRAM:        The left main coronary artery is severely calcified with 80% stenosis .     The left anterior descending artery is severely calcified with 80% proximal stenosis      The left circumflex artery is 99% occluded at its ostium .     The right coronary artery is dominant artery with diffuse disease, mid 60%, rPDA 90% .     LEFT VENTRICULOGRAM:  Left ventricular angiogram was done in the 30° HAYDEN projection and revealed normal left ventricular wall motion and systolic function with an estimated ejection fraction of 55%.       CABG 5/8/20  Urgent coronary artery bypass grafting x4 (LIMA to LAD, SVG to D1,  SVG to OM, SVG to PDA).    Peripheral angiogram 7/22/20 ([facility])   PROCEDURES PERFORMED:    1. Left lower extremity angiogram with recanalization of the distal   superficial femoral, popliteal, and peroneal artery. 2. Angioplasty of the peroneal and popliteal artery with a 3 x 220   Basil balloon. 3. Angioplasty of the left superficial femoral and popliteal artery with a   5 x 200  balloon. 4. Completion angiogram.  5. Right lower extremity runoff.     Peripheral angiogram 7/24/20 ([facility])   PROCEDURES PERFORMED:    1. Ultrasound-guided left common femoral access   2. Pelvic angiogram and right lower extremity angiogram   3. Recanalization of the superficial femoral artery and popliteal artery   with angioplasty.   4. Angioplasty of the popliteal artery with 3 x 220-mm Basil balloon. 5. Angioplasty of the popliteal and superficial femoral artery with a 5 x   200  balloon and completion angiogram.      Lower extremity arterial duplex 10/16/20 ([facility])   Right: Right SYBIL of 0.64 at both the right PTA and DPA with monophasic spectral Doppler signals.  Right Toe pressure of 0.53. Left: Left SYBIL of 0.65 at the PTA.  The left DPA was not obtainable due to ulceration.  Left toe pressure of 0.35 with a monophasic PPG tracing. Previous: previous exam 6-6-20 right SYBIL 0.48 left SYBIL 0.56. Conclusions: Moderate disease in the bilateral legs without worsening from previous exam.     Past Medical History           Past Medical History:   Diagnosis Date    Acute cerebral infarction ([address] 05/2020     R posterior frontal lobe    Alcohol withdrawal ([facility] 05/2020    Bilateral carotid artery stenosis 05/07/2020     bilat 50-79% stenosis    Hepatitis C antibody positive in blood 05/16/2020    History of bronchitis      History of DVT of lower extremity 2018     RLL, no previous scans to know whether supf or dvt. no coumadin. put on plavix.  Hypertension      NSTEMI (non-ST elevated myocardial infarction) ([address] 05/07/2020     3VD    PAD (peripheral artery disease) ([address]      Respiratory compromise 05/2020     chemical exposure at work, seen at [facility], covid neg, given steroids    S/p nephrectomy 5     age 17, pt not sure why     Tattoos           Past Surgical History             Past Surgical History:   Procedure Laterality Date    CARDIAC CATHETERIZATION   05/07/2020     Dr. Hung Ariza - w/placement of IABP    CORONARY ARTERY BYPASS GRAFT N/A 5/8/2020     Dr. Burnett Course. Robles - urgent x4 (LIMA-LAD, L SVG-D1, SVG-OM, SVG-PDA)    GASTROSTOMY TUBE PLACEMENT N/A 5/27/2020     PERCUTANEOUS ENDOSCOPIC GASTROSTOMY TUBE PLACEMENT performed by Yue Jenkins MD at [facility] N/A 7/10/2020   REMOVAL  GASTROSTOMY FEEDING TUBE  (01555) performed by Benjamin Cardenas MD at [address] [place] Left 2020     Dr. José Harrison, IR - US guided, 1300 ml drained    TOTAL NEPHRECTOMY Left       17 yo - pt doesn't know why   [facility]   2020     Dr. Dee Dee Caal - w/bronchoscopy    TRANSESOPHAGEAL ECHOCARDIOGRAM   2020     during CABG         Family History             Family History   Problem Relation Age of Onset    Heart Disease Mother            of MI age 36    Heart Disease Brother           MI mid 46s    Heart Disease Father            of MI age 68    Heart Disease Paternal Aunt           MI in her 46s         Social History                Tobacco Use    Smoking status: Former Smoker       Types: Cigarettes       Last attempt to quit: 2019       Years since quittin.0    Smokeless tobacco: Never Used    Tobacco comment: started age 27   Substance Use Topics    Alcohol use: Yes       Frequency: Monthly or less       Comment: once a month, shot once a week. depends on mood.     Drug use: Never         No Known Allergies  Current Facility-Administered Medications               Current Outpatient Medications   Medication Sig Dispense Refill    gabapentin (NEURONTIN) 400 MG capsule Take 400 mg by mouth 3 times daily.        methocarbamol (ROBAXIN) 500 MG tablet Take 500 mg by mouth 4 times daily        metroNIDAZOLE (FLAGYL) 500 MG tablet Take 500 mg by mouth 3 times daily        polyethylene glycol (GLYCOLAX) 17 g packet Take 17 g by mouth daily as needed for Constipation        Sennosides (SENEXON PO) Take 8.6 mg by mouth        oxyCODONE (ROXICODONE) 5 MG immediate release tablet Take 5 mg by mouth every 6 hours as needed for Pain.        acetaminophen (TYLENOL) 500 MG tablet Take 1,000 mg by mouth every 6 hours as needed for Pain        ferrous gluconate (FERGON) 324 (38 Fe) MG tablet Take 324 mg by mouth daily (with breakfast)        gabapentin (NEURONTIN) 800 MG tablet Take 800 mg by mouth 4 times daily.        levETIRAcetam (KEPPRA) 500 MG tablet Take 500 mg by mouth 2 times daily        metoprolol tartrate (LOPRESSOR) 25 MG tablet Take 25 mg by mouth 2 times daily        QUEtiapine (SEROQUEL) 25 MG tablet Take 25 mg by mouth 3 times daily        aspirin 81 MG chewable tablet Take 4 tablets by mouth daily 30 tablet 3    atorvastatin (LIPITOR) 20 MG tablet Take 1 tablet by mouth nightly 30 tablet 3      No current facility-administered medications for this visit.             Review of Systems:  · Constitutional: No unanticipated weight loss. There's been no change in energy level, sleep pattern, or activity level. No fevers, chills. · Eyes: No visual changes or diplopia. No scleral icterus. · ENT: No Headaches, hearing loss or vertigo. No mouth sores or sore throat. · Cardiovascular: as reviewed in HPI  · Respiratory: No cough or wheezing, no sputum production. No hemoptysis.     · Gastrointestinal: No abdominal pain, appetite loss, blood in stools. No change in bowel or bladder habits. · Genitourinary: No dysuria, trouble voiding, or hematuria. · Musculoskeletal:  No gait disturbance, no joint complaints. · Integumentary: No rash or pruritis. · Neurological: No headache, diplopia, change in muscle strength, numbness or tingling. · Psychiatric: No anxiety or depression. · Endocrine: No temperature intolerance. No excessive thirst, fluid intake, or urination. No tremor. · Hematologic/Lymphatic: No abnormal bruising or bleeding, blood clots or swollen lymph nodes.   · Allergic/Immunologic: No nasal congestion or hives.     Physical Exam:   /70   Pulse 102   Temp 98.2 °F (36.8 °C)   Ht 6' (1.829 m)   Wt 160 lb (72.6 kg)   SpO2 96%   BMI 21.70 kg/m²         Wt Readings from Last 3 Encounters:   11/11/20 160 lb (72.6 kg)   07/10/20 160 lb 15 oz (73 kg)   06/05/20 160 lb 15 oz (73 kg)      Constitutional: He is oriented to person, place, and time. He appears well-developed and well-nourished. In no acute distress. Head: Normocephalic and atraumatic. Pupils equal and round. Neck: Neck supple. No JVP or carotid bruit appreciated. No mass and no thyromegaly present. No lymphadenopathy present. Cardiovascular: Normal rate. Normal heart sounds. Exam reveals no gallop and no friction rub. No murmur heard. Pulmonary/Chest: Effort normal and breath sounds normal. No respiratory distress. He has no wheezes, rhonchi or rales. Abdominal: Soft, non-tender. Bowel sounds are normal. He exhibits no organomegaly, mass or bruit. Extremities: No edema. No cyanosis or clubbing. Pulses are 2+ radial/carotid bilaterally. Neurological: No gross cranial nerve deficit. Coordination normal.   Skin: Skin is warm and dry. There is no rash or diaphoresis. Psychiatric: He has a normal mood and affect.  His speech is normal and behavior is normal.      Lab Review:   FLP:              Lab Results   Component Value Date     TRIG 111 05/25/2020     HDL 47 05/07/2020     LDLCALC 103 05/07/2020     LABVLDL 13 05/07/2020      BUN/Creatinine:              Lab Results   Component Value Date     BUN 18 06/05/2020     CREATININE 0.6 06/05/2020      PT/INR, TNI, HGB A1C:             Lab Results   Component Value Date/Time     TROPONINI 0.11 (H) 05/07/2020 01:17 PM     LABA1C 5.9 05/07/2020 12:00 PM      No results found for: CBCAUTODIF        Assessment and Plan      1) PAD   S/p left BKA   Right heel wound   CLI RC5  Failed previous endovascular attempt  Repeat angio today with [place] re-entry device         Yaima Darling MD [address] [facility], Interventional Cardiology, and Peripheral Vascular Disease   [address]   (C): [phone]  (O): [phone]

## 2021-02-21 NOTE — OP NOTE
[address]   Procedure Note  Patient Name: Ad Manjarrez  YOB: 1955  Date of Operation:02/04/2021    Pre-operative Diagnosis:   1. Peripheral arterial disease with CLI RC 5     Post-operative Diagnosis:   1. Same     Procedures Performed:  - Left femoral artery access under fluroscopic and ultrasound guidance  - Left iliofemoral arteriogram.  - Right anterior tibial access under ultrasound guidance   - Right lower extremity arteriogram  - Selective catheterization of right common femoral artery. - IVUS SFA/popliteal artery   - Percutaneous balloon angioplasty of right anterior tibial artery using a 3.0 mm x 120 mm balloon . - Percutaneous balloon angioplasty of right popliteal artery using a 5.0 mm x 120 mm balloon. - Percutaneous balloon angioplasty of right superficial femoral artery using a 5.5 mm x 120 mm balloon. - Percutaneous insertion of self-expanding Supera stent  (5.5 mm x 120 mm) into right superficial femoral artery. - Completion arteriogram.      Surgeon:  Cheryl Rodriguez MD.    Assistant:  None . Anesthesia:  IV sedation and local anesthesia. Estimated Blood Loss:  50 mL. Indications for Procedure:  Ad Manjarrez is a 63 y.o. male who was evaluated as an outpatient with chronic arterial insufficiency with ulceration of right heel  and was deemed an appropriate candidate for an angiogram and possible intervention. Informed consent was obtained after discussion of potential benefits, alternatives and risks of the procedure, including but not limited to bleeding, infection, worsening of arterial insufficiency, and kidney injury due to contrast administration. Details of Procedure: The patient was taken to the cardiac cath lab and placed in supine position. IV sedation anesthesia was administered by the anesthesia team. The operative area was clipped, prepared and draped in sterile fashion. A brief time out was performed per hospital protocol.     The left femoral artery was accessed under fluroscopic and ultrasound guidance with a micropuncture needle, wire, then sheath. A 4-Solomon Islander sheath was inserted over a wire. An iliofemoral angiogram was performed. Using a JR diagnostic catheter and an 0.035 advantage, the wire was placed up an over into the right common femoral artery. A right lower extremity arteriogram was performed. Right Leg  Common Femoral:   Patent   Profunda: patent without significant disease  Superficial femoral: severe disease, distal 100%    Popliteal: severe disease, 100%      Anterior Tibial: is  patent with severe disease  Posterior Tibial:  is  patent with severe disease  Peroneal: is  patent with severe disease        A 7 Solomon Islander sheath was inserted over the wire and up-and-over  into the common femoral artery and after 5000 units of intravenous heparin was administered and three minutes allowed to elapse. The right anterior tibial artery was then accessed under US/fluroscopic guidance with a 4-5 slender sheath. The right common iliac artery, external iliac artery, superficial femoral artery, popliteal artery, tibioperoneal trunk, anterior tibial artery, posterior tibial artery and peroneal artery was recanalized using a 0.014 command wire and Ocelot OCT re-entry catheter. IVUS was then performed of the right SFA and popliteal artery to confirm sizing of the SFA . Percutaneous balloon angioplasty of right anterior tibial artery using a 3.0 mm x 120 mm balloon . Percutaneous balloon angioplasty of right popliteal artery using a 5.0 mm x 120 mm balloon. Percutaneous balloon angioplasty of right superficial femoral artery using a 5.5 mm x 120 mm balloon. Percutaneous insertion of self-expanding Supera stent  (5.5 mm x 120 mm) into right superficial femoral artery. A completion arteriogram was performed. The sheath was withdrawn over a wire then exchanged for a short sheath.   A Proglide closure device was used to close the arteriotomy. The patient tolerated the procedure well, was awakened and was taken to the post-operative care unit in stable condition.      Pre:          Post:         Impression/Plan:   Severe right distal SFA/popliteal and tibial disease  S/p successful revascularization and SFA stenting   3V runoff to the foot  Recommend podiatry follow up for wound care     Jose Avelar MD [address] [facility], Interventional Cardiology, and Peripheral Vascular [address] [facility] [address]   (C): [phone]  (O): [phone]

## 2021-03-11 ENCOUNTER — HOSPITAL ENCOUNTER (OUTPATIENT)
Dept: VASCULAR LAB | Age: 66
Discharge: HOME | End: 2021-03-11
Payer: MEDICAID

## 2021-03-11 DIAGNOSIS — I73.9 PAD (PERIPHERAL ARTERY DISEASE) (HCC): ICD-10-CM

## 2021-03-11 PROCEDURE — 93925 LOWER EXTREMITY STUDY: CPT

## 2021-03-13 NOTE — RESULT ENCOUNTER NOTE
Please notify patient that their doppler is significantly improved and the stent is patent.  Will repeat a doppler in 6 months

## 2021-05-26 NOTE — PATIENT INSTRUCTIONS
Patient Education        Learning About Coronary Artery Disease (CAD)  What is coronary artery disease? Coronary artery disease is a condition that occurs when plaque builds up in the arteries that bring oxygen-rich blood to your heart. Plaque is a fatty substance made of cholesterol, calcium, and other substances in the blood. This process is called hardening of the arteries, or atherosclerosis. What happens when you have coronary artery disease? · Plaque may narrow the coronary arteries. Narrowed arteries cause poor blood flow. This can lead to angina symptoms such as chest pain or discomfort. If blood flow is completely blocked, you could have a heart attack. · You can slow and reduce the risk of future problems by making changes in your lifestyle. These include quitting smoking and eating heart-healthy foods. · Treatment, along with changes in your lifestyle, can help you live a longer and healthier life. How can you prevent coronary artery disease? · Do not smoke. It may be the best thing you can do to prevent coronary artery disease. If you need help quitting, talk to your doctor about stop-smoking programs and medicines. These can increase your chances of quitting for good. · Be active. Try to do moderate activity at least 2½ hours a week. Or try to do vigorous activity at least 1¼ hours a week. You may want to walk or try other activities, such as running, swimming, cycling, or playing tennis or team sports. · Eat heart-healthy foods. Eat more fruits and vegetables and less food that contains saturated and trans fats. Limit alcohol, sodium, and sweets. · Stay at a healthy weight. Lose weight if you need to. · Manage other health problems such as diabetes, high blood pressure, and high cholesterol. How is coronary artery disease treated? · Your doctor will suggest that you make lifestyle changes.  For example, your doctor may ask you to eat healthy foods, quit smoking, lose extra weight, and be more active. · You will take medicines that help prevent a heart attack. · Your doctor may suggest a procedure to open narrowed or blocked arteries. This is called angioplasty. Or your doctor may suggest using healthy blood vessels to create detours around narrowed or blocked arteries. This is called bypass surgery. Follow-up care is a key part of your treatment and safety. Be sure to make and go to all appointments, and call your doctor if you are having problems. It's also a good idea to know your test results and keep a list of the medicines you take. Where can you learn more? Go to https://[employer]. org and sign in to your [employer] account. Enter [phone] in the [employer] box to learn more about \"Learning About Coronary Artery Disease (CAD). \"     If you do not have an account, please click on the \"Sign Up Now\" link. Current as of: August 31, 2020               Content Version: 12.8  © 2006-2021 Healthwise, [employer]. Care instructions adapted under license by [facility] ([facility]). If you have questions about a medical condition or this instruction, always ask your healthcare professional. [place] [address] any warranty or liability for your use of this information.        Carotid doppler and lipid panel today   Lower extremity dopplers in 6 months     Foot doctor   Dr Lindsey Santana: [phone]   [place]. [place]in[place] [address] [place] [address]

## 2021-05-26 NOTE — PROGRESS NOTES
Cardiology Consultation     Maria Esther Andrews  1955 June 1, 2021    Referring Physician: Dr. Charlie Hernandez  Reason for Referral: PAD   Chief Complaint:   Chief Complaint   Patient presents with    6 Month Follow-Up     no cc       Subjective:     History of Present Illness: The patient is 63 y.o. male with a past medical history significant for CAD with prior CABG, hyperlipidemia, hypertension, carotid disease, prior DVT, PAD s/p SFA stent, left BKA, and alcohol abuse. He presented to [facility] with severe chest pain and found to have NSTEMI. He underwent heart catheterization revealing severe triple-vessel disease and subsequently underwent CABG x 4 on 5/8/20 with Dr. Charlie Hernandez. He did suffer from encephalopathy/delirium following bypass. He did undergo tracheostomy and PEG placement. His PEG tube was removed 7/10/20. He was discharged to [facility] for skilled nursing care. He was seen in office with Dr. Charlie Hernandez for follow up where it was noted that he was admitted to [facility] for bilateral foot wounds. He underwent peripheral angiograms with intervention to both legs while inpatient at [facility]. He underwent left BKA on 10/22/20. Lower extremity dopplers showed significant stenosis and underwent angiography 2/21/21 that showed severe right distal SFA/popliteal and tibial disease. S/p successful revascularization and SFA stenting     Today, he states overall he is doing well. He denies any new cardiac complaints. He presents in a wheelchair accompanied by his wife. He denies any chest pains or worsening shortness of breath. He reports compliance with his medications and tolerating. He denies any abnormal bleeding or bruising. Patient denies exertional chest pain/pressure, dyspnea at rest, worsening KOVACS, PND, orthopnea, palpitations, lightheadedness, weight changes, changes in LE edema, and syncope. He continues to follow with podiatry at [facility] and the wound on the right heel had healed.  He reports intermittent numbness but denies any pain. Prior cardiac testing:    EK21 Sinus rhythm. Echo 20  Normal LV size, wall thickness and wall motion: EF is 60%. Grade I diastolic dysfunction. Left atrium is of normal size. Normal right ventricular size and function. Trivial tricuspid regurgitation. Echo limited 20 ([facility])   Pericardium, extracardiac: A small pericardial effusion is  identified posterior to the heart. Features were not consistent  with tamponade physiology. Carotid duplex 20  Right Impression    The right internal carotid artery appears to have a high end 50-79% diameter    (could be higher by NAVIX criteria) reducing stenosis based on velocity    criteria. Elevated velocities noted in the right external carotid artery. The right vertebral artery demonstrates normal antegrade flow. Left Impression    The left internal carotid artery appears to have a 50-79% diameter reducing    stenosis based on velocity criteria. The left vertebral artery demonstrates normal antegrade flow. Cath 20  ANGIOGRA/CORONARY ARTERIOGRAM:     The left main coronary artery is severely calcified with 80% stenosis . The left anterior descending artery is severely calcified with 80% proximal stenosis   The left circumflex artery is 99% occluded at its ostium . The right coronary artery is dominant artery with diffuse disease, mid 60%, rPDA 90% .     LEFT VENTRICULOGRAM:  Left ventricular angiogram was done in the 30° HAYDEN projection and revealed normal left ventricular wall motion and systolic function with an estimated ejection fraction of 55%. CABG 20  Urgent coronary artery bypass grafting x4 (LIMA to LAD, SVG to D1,  SVG to OM, SVG to PDA). Peripheral angiogram 20 ([facility])   PROCEDURES PERFORMED:    1. Left lower extremity angiogram with recanalization of the distal superficial femoral, popliteal, and peroneal artery.   2. Angioplasty of the peroneal and popliteal artery with a 3 x 220 Basil balloon. 3. Angioplasty of the left superficial femoral and popliteal artery with a 5 x 200  balloon. 4. Completion angiogram.  5. Right lower extremity runoff. Peripheral angiogram 7/24/20 ([facility])   PROCEDURES PERFORMED:    1. Ultrasound-guided left common femoral access   2. Pelvic angiogram and right lower extremity angiogram   3. Recanalization of the superficial femoral artery and popliteal artery with angioplasty. 4. Angioplasty of the popliteal artery with 3 x 220-mm Basil balloon. 5. Angioplasty of the popliteal and superficial femoral artery with a 5 x 200  balloon and completion angiogram.     Lower extremity arterial duplex 10/16/20 ([facility])   Right: Right SYBIL of 0.64 at both the right PTA and DPA with monophasic spectral Doppler signals.  Right Toe pressure of 0.53. Left: Left SYBIL of 0.65 at the PTA.  The left DPA was not obtainable due to ulceration.  Left toe pressure of 0.35 with a monophasic PPG tracing. Previous: previous exam 6-6-20 right SYBIL 0.48 left SYBIL 0.56. Conclusions: Moderate disease in the bilateral legs without worsening from previous exam.    ABIs 1/18/21   SYBIL's are indeterminate in the right lower extremity due to absence of    audible doppler signals. Left lower extremity with below knee amputation     Peripheral 1/26/21   Failed SFA  with both antegrade/retrograde technique   Return in one week for attempt with Soweso OCT  catheter from antegrade     Peripheral 2/4/21   Severe right distal SFA/popliteal and tibial disease  S/p successful revascularization and SFA stenting   3V runoff to the foot    ABIs 3/11/21   Abnormal right SYBIL in the range of mild arterial disease.    Right deep femoral artery stenosis of greater than 50%.    Distal right popliteal artery stenosis measuring greater than 755 by    criteria associated with patent popliteal stent.      Past Medical History:   Diagnosis Date    Acute cerebral infarction age 27   Vaping Use    Vaping Use: Never used   Substance Use Topics    Alcohol use: Yes     Comment: once a month, shot once a week. depends on mood.  Drug use: Never       No Known Allergies  Current Outpatient Medications   Medication Sig Dispense Refill    rivaroxaban (XARELTO) 2.5 MG TABS tablet Take 1 tablet by mouth 2 times daily 60 tablet 5    pantoprazole (PROTONIX) 40 MG tablet Take 40 mg by mouth daily      QUEtiapine (SEROQUEL) 100 MG tablet Take 100 mg by mouth nightly      senna-docusate (PERICOLACE) 8.6-50 MG per tablet Take 1 tablet by mouth nightly      tiZANidine (ZANAFLEX) 4 MG tablet Take 4 mg by mouth every 8 hours as needed (pain/spasm)      aspirin 81 MG chewable tablet Take 1 tablet by mouth daily 30 tablet 3    methocarbamol (ROBAXIN) 500 MG tablet Take 500 mg by mouth 4 times daily      atorvastatin (LIPITOR) 40 MG tablet Take 1 tablet by mouth nightly 90 tablet 3    acetaminophen (TYLENOL) 500 MG tablet Take 1,000 mg by mouth every 6 hours as needed for Pain      ferrous gluconate (FERGON) 324 (38 Fe) MG tablet Take 324 mg by mouth daily (with breakfast)      gabapentin (NEURONTIN) 800 MG tablet Take 800 mg by mouth 4 times daily.  levETIRAcetam (KEPPRA) 500 MG tablet Take 500 mg by mouth 2 times daily      metoprolol tartrate (LOPRESSOR) 25 MG tablet Take 25 mg by mouth 2 times daily       No current facility-administered medications for this visit. Review of Systems:  · Constitutional: No unanticipated weight loss. There's been no change in energy level, sleep pattern, or activity level. No fevers, chills. · Eyes: No visual changes or diplopia. No scleral icterus. · ENT: No Headaches, hearing loss or vertigo. No mouth sores or sore throat. · Cardiovascular: as reviewed in HPI  · Respiratory: No cough or wheezing, no sputum production. No hemoptysis. · Gastrointestinal: No abdominal pain, appetite loss, blood in stools.  No change in bowel or bladder habits. · Genitourinary: No dysuria, trouble voiding, or hematuria. · Musculoskeletal:  No gait disturbance, no joint complaints. · Integumentary: No rash or pruritis. · Neurological: No headache, diplopia, change in muscle strength, numbness or tingling. · Psychiatric: No anxiety or depression. · Endocrine: No temperature intolerance. No excessive thirst, fluid intake, or urination. No tremor. · Hematologic/Lymphatic: No abnormal bruising or bleeding, blood clots or swollen lymph nodes. · Allergic/Immunologic: No nasal congestion or hives. Physical Exam:   /86   Pulse 77   Ht 6' 1\" (1.854 m)   SpO2 100%   BMI 24.41 kg/m²   Wt Readings from Last 3 Encounters:   02/04/21 185 lb (83.9 kg)   01/26/21 180 lb (81.6 kg)   11/11/20 160 lb (72.6 kg)     Constitutional: He is oriented to person, place, and time. He appears well-developed and well-nourished. In no acute distress. In wheelchair. Head: Normocephalic and atraumatic. Pupils equal and round. Neck: Neck supple. No JVP or carotid bruit appreciated. No mass and no thyromegaly present. No lymphadenopathy present. Cardiovascular: Normal rate. Normal heart sounds. Exam reveals no gallop and no friction rub. No murmur heard. Pulmonary/Chest: Effort normal and breath sounds normal. No respiratory distress. He has no wheezes, rhonchi or rales. Abdominal: Soft, non-tender. Bowel sounds are normal. He exhibits no organomegaly, mass or bruit. Extremities: No edema. Left BKA. No cyanosis or clubbing. Pulses are 2+ radial/carotid bilaterally. 2+ right DP. Neurological: No gross cranial nerve deficit. Coordination normal.   Skin: Skin is warm and dry. There is no rash or diaphoresis. Psychiatric: He has a normal mood and affect.  His speech is normal and behavior is normal.     Lab Review:   FLP:    Lab Results   Component Value Date    TRIG 111 05/25/2020    HDL 47 05/07/2020    LDLCALC 103 05/07/2020    LABVLDL 13 05/07/2020 BUN/Creatinine:    Lab Results   Component Value Date    BUN 16 02/01/2021    CREATININE 1.1 02/01/2021     PT/INR, TNI, HGB A1C:   Lab Results   Component Value Date/Time    TROPONINI 0.11 (H) 05/07/2020 01:17 PM    LABA1C 5.9 05/07/2020 12:00 PM      No results found for: CBCAUTODIF      Assessment and Plan   1) CAD s/p CABG  Asymptomatic  Continue with medical management and risk factor modification   Continue aspirin, statin, and B-blocker    2) Hyperlipidemia  Continue statin    Repeat lipid panel     3) PAD   S/p left BKA   S/p successful revascularization and SFA stenting   SYBIL 3/2021 improved, stent patent  Previously followed [facility] podiatry, wounds have healed   Encouraged increase activity as tolerated   Continue statin and Xarelto 2.5 mg bid   Repeat ABIs in 6 months   Will refer to May Blevins MD     4) Carotid stenosis  Right 50-79% 5/7/20   Continue statin therapy   Repeat carotid dopplers     5) Essential hypertension   Well controlled today in the office     Follow up in 6 months     Thank you very much for allowing me to participate in the care of your patient. Please do not hesitate to contact me if you have any questions. Giorgio Fu MD [address] [facility], Interventional Cardiology, and Peripheral Vascular Disease   [facility]   (C): [phone]  Shirlene Hinojosa): [phone]    This note was scribed in the presence of Dr Silvia Leiva MD by Lenard Salazar, RN. Physician Attestation:  The scribes documentation has been prepared under my direction and personally reviewed by me in its entirety. I confirm the note above accurately reflects all work, treatment, procedures, and medical decision making performed by me.     Electronically signed by Valerie Dinh MD on 6/2/2021 at 9:04 AM

## 2021-06-01 ENCOUNTER — OFFICE VISIT (OUTPATIENT)
Dept: CARDIOLOGY CLINIC | Age: 66
End: 2021-06-01
Payer: COMMERCIAL

## 2021-06-01 VITALS
DIASTOLIC BLOOD PRESSURE: 86 MMHG | HEART RATE: 77 BPM | BODY MASS INDEX: 24.41 KG/M2 | SYSTOLIC BLOOD PRESSURE: 128 MMHG | HEIGHT: 73 IN | OXYGEN SATURATION: 100 %

## 2021-06-01 DIAGNOSIS — E78.5 HYPERLIPIDEMIA LDL GOAL <70: ICD-10-CM

## 2021-06-01 DIAGNOSIS — I65.23 BILATERAL CAROTID ARTERY STENOSIS: ICD-10-CM

## 2021-06-01 DIAGNOSIS — I73.9 PAD (PERIPHERAL ARTERY DISEASE) (HCC): ICD-10-CM

## 2021-06-01 DIAGNOSIS — I25.10 CORONARY ARTERY DISEASE INVOLVING NATIVE CORONARY ARTERY OF NATIVE HEART WITHOUT ANGINA PECTORIS: ICD-10-CM

## 2021-06-01 DIAGNOSIS — I10 ESSENTIAL HYPERTENSION: ICD-10-CM

## 2021-06-01 DIAGNOSIS — I25.10 CORONARY ARTERY DISEASE INVOLVING NATIVE CORONARY ARTERY OF NATIVE HEART WITHOUT ANGINA PECTORIS: Primary | ICD-10-CM

## 2021-06-01 PROCEDURE — 99214 OFFICE O/P EST MOD 30 MIN: CPT | Performed by: INTERNAL MEDICINE

## 2021-06-01 PROCEDURE — 3017F COLORECTAL CA SCREEN DOC REV: CPT | Performed by: INTERNAL MEDICINE

## 2021-06-01 PROCEDURE — 93000 ELECTROCARDIOGRAM COMPLETE: CPT | Performed by: INTERNAL MEDICINE

## 2021-06-01 PROCEDURE — 1036F TOBACCO NON-USER: CPT | Performed by: INTERNAL MEDICINE

## 2021-06-01 PROCEDURE — G8427 DOCREV CUR MEDS BY ELIG CLIN: HCPCS | Performed by: INTERNAL MEDICINE

## 2021-06-01 PROCEDURE — G8420 CALC BMI NORM PARAMETERS: HCPCS | Performed by: INTERNAL MEDICINE

## 2021-06-02 LAB
CHOLESTEROL, FASTING: 120 MG/DL (ref 0–199)
HDLC SERPL-MCNC: 33 MG/DL (ref 40–60)
LDL CHOLESTEROL CALCULATED: 59 MG/DL
TRIGLYCERIDE, FASTING: 140 MG/DL (ref 0–150)
VLDLC SERPL CALC-MCNC: 28 MG/DL

## 2021-06-02 RX ORDER — ASPIRIN 81 MG/1
TABLET, CHEWABLE ORAL
Qty: 30 TABLET | Refills: 3 | Status: SHIPPED | OUTPATIENT
Start: 2021-06-02 | End: 2021-09-27

## 2021-09-10 ENCOUNTER — TELEPHONE (OUTPATIENT)
Dept: CARDIOLOGY CLINIC | Age: 66
End: 2021-09-10

## 2021-09-10 NOTE — TELEPHONE ENCOUNTER
Patient switched to Dr. José Miguel Ariza in [place] office due to insurance coverage. Patient is scheduled for carotids and ABIs at [facility]. Called [facility] and left a message to return call to discuss rescheduling his upcoming testing in November possibly at [facility] to ensure the testing is covered.

## 2021-09-13 NOTE — TELEPHONE ENCOUNTER
Please see message below and contact Marita Littlejohn to see if the testing can be scheduled at [facility] (or wherever it would be covered) to ensure the testing is covered unless he has already been notified by his insurance it would be covered if completed at [facility].

## 2021-09-14 NOTE — TELEPHONE ENCOUNTER
Called patient to let him know of message below. Patient states he has not heard anything from his insurance but thinks testing will be covered with us at [facility] since they were last time.

## 2021-09-27 NOTE — TELEPHONE ENCOUNTER
Last OV: 6/1/21  Next OV: 11/11/21  Last refill:6/2/21  Most recent Labs: 6/1/21  Last EKG (if needed):6/1/21

## 2021-09-28 RX ORDER — ASPIRIN 81 MG/1
TABLET, CHEWABLE ORAL
Qty: 60 TABLET | Refills: 0 | Status: SHIPPED | OUTPATIENT
Start: 2021-09-28 | End: 2021-11-29

## 2021-10-19 RX ORDER — ATORVASTATIN CALCIUM 40 MG/1
TABLET, FILM COATED ORAL
Qty: 90 TABLET | Refills: 0 | Status: SHIPPED | OUTPATIENT
Start: 2021-10-19 | End: 2022-01-11

## 2021-10-22 PROBLEM — I10 ESSENTIAL HYPERTENSION: Status: ACTIVE | Noted: 2021-10-22

## 2021-10-22 PROBLEM — I73.9 PAD (PERIPHERAL ARTERY DISEASE) (HCC): Status: ACTIVE | Noted: 2021-10-22

## 2021-10-22 PROBLEM — E78.5 HYPERLIPIDEMIA LDL GOAL <70: Status: ACTIVE | Noted: 2021-10-22

## 2021-10-22 NOTE — PROGRESS NOTES
[facility]      Cardiology Consult    Neisha Vance  1955 November 11, 2021    Primary Physician: Azalea Jeans CNP  Reason for visit: establish care, right foot pain and swelling    CC: \"my foot hurts, its cold, and is swelling \"    HPI:  The patient is 61 y.o. male with a past medical history significant for CAD s/p CABG, HLD, HTN, carotid disease, prior DVT, PAD s/p SFA stent, left BKA, s/p nephrectomy, prior nicotine addiction and alcohol abuse. He presented to [facility] 5/2020 with severe chest pain and found to have NSTEMI. He underwent heart catheterization revealing severe triple-vessel disease and subsequently underwent CABG x 4 on 5/8/20 with Dr. Pushpa Downey. He did suffer from encephalopathy/delirium following bypass. He is s/p tracheostomy and PEG placement. He was discharged to [facility] for skilled nursing care. He was seen in office with Dr. Pushpa Downey for follow up where it was noted that he was admitted to [facility] for bilateral foot wounds. He underwent peripheral angiograms with intervention to both legs while inpatient at [facility]. He underwent left BKA on 10/22/20. Lower extremity dopplers showed significant stenosis and underwent angiography 2/21/21 that showed severe right distal SFA/popliteal and tibial disease. S/p successful revascularization and SFA stenting. Today, he is here to establish care in our [place] office due to insurance coverage. He reports severe right foot pain for the past few weeks. He describes rest pain but denies any open wounds or ulcers. He states that his right lower leg and foot is cold and his foot is discolored red when down and whitish when elevated. He endorses edema at the ankle. Pt is using a wheelchair and his wife accompanies him. Patient denies exertional chest pain/pressure, dyspnea at rest,  PND, orthopnea, palpitations, lightheadedness, weight changes, changes in LE edema, and syncope. He reports compliant with his medications.      Past Medical History: Diagnosis Date    Acute cerebral infarction ([facility] 2020    R posterior frontal lobe    [facility] 2020    Bilateral carotid artery stenosis 2020    bilat 50-79% stenosis    Hepatitis C antibody positive in blood 2020    History of bronchitis     History of DVT of lower extremity 2018    RLL, no previous scans to know whether supf or dvt. no coumadin. put on plavix.  Hypertension     NSTEMI (non-ST elevated myocardial infarction) ([facility] 2020    3VD    PAD (peripheral artery disease) ([facility]     Respiratory compromise 2020    chemical exposure at work, seen at [facility], covid neg, given steroids    S/p nephrectomy 5    age 17, pt not sure why     Tattoos      Past Surgical History:   Procedure Laterality Date    CARDIAC CATHETERIZATION  2020    Dr. Curt Serrato - w/placement of IABP    CORONARY ARTERY BYPASS GRAFT N/A 2020    Dr. Marco Benjamin. Robles - urgent x4 (LIMA-LAD, L SVG-D1, SVG-OM, SVG-PDA)    GASTROSTOMY TUBE PLACEMENT N/A 2020    PERCUTANEOUS ENDOSCOPIC GASTROSTOMY TUBE PLACEMENT performed by Terry Blanca MD at [address] N/A 7/10/2020    REMOVAL  GASTROSTOMY FEEDING TUBE  (66156) performed by Terry Blanca MD at [address] Left 2020    Dr. Jeannine Daniel, [facility] guided, 1300 ml drained    TOTAL NEPHRECTOMY Left     17 yo - pt doesn't know why   Yuko Khoury  2020    Dr. Jaret Lord - w/bronchoscopy    TRANSESOPHAGEAL ECHOCARDIOGRAM  2020    during CABG     Family History   Problem Relation Age of Onset    Heart Disease Mother          of MI age 36    Heart Disease Brother         MI mid 46s    Heart Disease Father          of MI age 68    Heart Disease Paternal Aunt         MI in her 46s     Social History     Tobacco Use    Smoking status: Former Smoker     Types: Cigarettes     Quit date: 2019     Years since quittin.0    Smokeless tobacco: Never Used    Tobacco comment: started age 27   Vaping Use    Vaping Use: Never used   Substance Use Topics    Alcohol use: Yes     Comment: once a month, shot once a week. depends on mood.  Drug use: Never     No Known Allergies  Current Outpatient Medications   Medication Sig Dispense Refill    atorvastatin (LIPITOR) 40 MG tablet TAKE 1 TABLET BY MOUTH EVERY DAY AT NIGHT 90 tablet 0    aspirin 81 MG chewable tablet TAKE 1 TABLET BY MOUTH EVERY DAY 60 tablet 0    rivaroxaban (XARELTO) 2.5 MG TABS tablet Take 1 tablet by mouth 2 times daily 60 tablet 5    pantoprazole (PROTONIX) 40 MG tablet Take 40 mg by mouth daily      QUEtiapine (SEROQUEL) 100 MG tablet Take 100 mg by mouth nightly      senna-docusate (PERICOLACE) 8.6-50 MG per tablet Take 1 tablet by mouth nightly      tiZANidine (ZANAFLEX) 4 MG tablet Take 4 mg by mouth every 8 hours as needed (pain/spasm)      methocarbamol (ROBAXIN) 500 MG tablet Take 500 mg by mouth 4 times daily      acetaminophen (TYLENOL) 500 MG tablet Take 1,000 mg by mouth every 6 hours as needed for Pain      ferrous gluconate (FERGON) 324 (38 Fe) MG tablet Take 324 mg by mouth daily (with breakfast)      gabapentin (NEURONTIN) 800 MG tablet Take 800 mg by mouth 4 times daily.  levETIRAcetam (KEPPRA) 500 MG tablet Take 500 mg by mouth 2 times daily      metoprolol tartrate (LOPRESSOR) 25 MG tablet Take 25 mg by mouth 2 times daily       No current facility-administered medications for this visit. Review of Systems:  · Constitutional: No unanticipated weight loss. There's been no change in energy level, sleep pattern, or activity level. No fevers, chills. · Eyes: No visual changes or diplopia. No scleral icterus. · ENT: No Headaches, hearing loss or vertigo. No mouth sores or sore throat. · Cardiovascular: as reviewed in HPI  · Respiratory: No cough or wheezing, no sputum production. No hemoptysis.      · Gastrointestinal: No abdominal pain, appetite loss, blood in stools. No change in bowel or bladder habits. · Genitourinary: No dysuria, trouble voiding, or hematuria. · Musculoskeletal:  No gait disturbance, no joint complaints. · Integumentary: No rash or pruritis. · Neurological: No headache, diplopia, change in muscle strength, numbness or tingling. · Psychiatric: No anxiety or depression. · Endocrine: No temperature intolerance. No excessive thirst, fluid intake, or urination. No tremor. · Hematologic/Lymphatic: No abnormal bruising or bleeding, blood clots or swollen lymph nodes. · Allergic/Immunologic: No nasal congestion or hives. Physical Exam:   /78   Pulse 70   Ht 6' 1\" (1.854 m)   Wt 217 lb (98.4 kg)   SpO2 98%   BMI 28.63 kg/m²   Wt Readings from Last 3 Encounters:   11/11/21 217 lb (98.4 kg)   02/04/21 185 lb (83.9 kg)   01/26/21 180 lb (81.6 kg)     Constitutional: He is oriented to person, place, and time. He appears well-developed and well-nourished. In no acute respiratory distress but chronically ill appearing. Head: Normocephalic and atraumatic. Pupils equal and round. Neck: Neck supple. No JVP. R-carotid bruit appreciated. No mass and no thyromegaly present. No lymphadenopathy present. Cardiovascular: Normal rate. Normal heart sounds. Exam reveals no gallop and no friction rub. No murmur heard. Pulmonary/Chest: Effort normal and breath sounds normal. No respiratory distress. He has no wheezes, rhonchi or rales. Abdominal: Soft, non-tender. Bowel sounds are normal. He exhibits no organomegaly, mass or bruit. Extremities: 1+ RLE edema at ankle. Dependent rubor. No R-pedal pulses. L-BKA. Neurological: No gross cranial nerve deficit. Coordination normal.   Skin: RLE is cold but dry. There is no rash or diaphoresis. Psychiatric: He has a normal mood and affect.  His speech is normal and behavior is normal.     Lab Review:   FLP:    Lab Results   Component Value Date    TRIG 111 05/25/2020    HDL 33 popliteal artery with angioplasty. 4. Angioplasty of the popliteal artery with 3 x 220-mm Basil balloon. 5. Angioplasty of the popliteal and superficial femoral artery with a 5 x 200  balloon and completion angiogram.     Lower extremity arterial duplex 10/16/20 ([facility])   Right: Right SYBIL of 0.64 at both the right PTA and DPA with monophasic spectral Doppler signals.  Right Toe pressure of 0.53. Left: Left SYBIL of 0.65 at the PTA.  The left DPA was not obtainable due to ulceration.  Left toe pressure of 0.35 with a monophasic PPG tracing. Previous: previous exam 6-6-20 right SYBIL 0.48 left SYBIL 0.56. Conclusions: Moderate disease in the bilateral legs without worsening from previous exam.    Peripheral 2/4/21   Severe right distal SFA/popliteal and tibial disease. S/p successful revascularization and SFA stenting 3V runoff to the foot. ABIs 3/11/21   Abnormal right SYBIL in the range of mild arterial disease.    Right deep femoral artery stenosis of greater than 50%.    Distal right popliteal artery stenosis measuring greater than 755 by    criteria associated with patent popliteal stent. Carotid US 11/2021     The right internal carotid artery appears to have a 80-99% diameter reducing    stenosis based on velocity criteria.    The right vertebral artery demonstrates normal antegrade flow.    The left internal carotid artery appears to have a 50-79% diameter reducing    stenosis based on velocity criteria.    The left vertebral artery demonstrates normal antegrade flow. SYBIL 11/2021    Right SYBIL was not available due to inadequate pulses . Elevated velocity of the deep femoral artery suggests a 50-70% stenosis. Occlusion of the distal superficial femoral and popliteal artery in the   right lower extremity. Critically low velocities in the right posterior tibial and anterior tibial   arteries. Left SYBIL was not available due to below knee amputation.    Occlusion of the left popliteal artery. The majority of the waveforms are monophasic throughout the left lower   extremity. Assessment/Plan:     1) Critical limb ischemia/PAD. Patient with rest pain and a cool extremity. Discussed need for urgent revascularization. Encouraged patient to seek medical care through the emergency department to expedite care. Discussed potential loss of limb without intervention. Continue ASA, statin, and Xarelto 2.5mg bid. 2) Bilateral carotid stenosis. Continue ASA, statin, and Xarelto 2.5mg bid. May need carotid angiography but no recent TIA symptoms. 3) CAD s/p CABG. Seemingly asymptomatic but with a limited functional status. Continue with medical management and risk factor modification including aspirin, statin, Xarelto 2.5mg bid and B-blocker. 3) Hyperlipidemia. LDL goal <70. Continue high intensity statin. 4) Essential hypertension. Controlled. Goal BP <130/80. Continue medical therapy. F/u in office after hospitalization and presumed repeat intervention. Will address with Dr. Sylvia Amador. Thank you very much for allowing me to participate in the care of your patient. Please do not hesitate to contact me if you have any questions. Sincerely,  Nicole Barahona. [employer] [facility], [address] [place] [address] 81, [address] [address]  Ph: [phone]  Fax: [phone]    This note was scribed in the presence of the physician by Maria Esther Larios RN. Physician Attestation: The scribes documentation has been prepared under my direction and personally reviewed by me in its entirety. I confirm that the note above accurately reflects all work, treatment, procedures, and medical decision making performed by me. All portions of the note including but not limited to the chief complaint, history of present illness, physical exam, assessment and plan/medical decision making were personally reviewed, edited, and updated on the day of the visit.

## 2021-11-01 ENCOUNTER — HOSPITAL ENCOUNTER (OUTPATIENT)
Dept: VASCULAR LAB | Age: 66
Discharge: HOME | End: 2021-11-01
Payer: MEDICAID

## 2021-11-01 DIAGNOSIS — I65.23 BILATERAL CAROTID ARTERY STENOSIS: ICD-10-CM

## 2021-11-01 DIAGNOSIS — I73.9 PAD (PERIPHERAL ARTERY DISEASE) (HCC): ICD-10-CM

## 2021-11-01 PROCEDURE — 93880 EXTRACRANIAL BILAT STUDY: CPT

## 2021-11-01 PROCEDURE — 93925 LOWER EXTREMITY STUDY: CPT

## 2021-11-02 NOTE — RESULT ENCOUNTER NOTE
Please notify patient that their doppler results show a 99% carotid artery blockage and a severe blockage in the right leg. Will need both a peripheral angiogram and carotid angiogram. Please schedule carotid with both elsie and myself.

## 2021-11-11 ENCOUNTER — OFFICE VISIT (OUTPATIENT)
Dept: CARDIOLOGY CLINIC | Age: 66
End: 2021-11-11
Payer: MEDICAID

## 2021-11-11 VITALS
SYSTOLIC BLOOD PRESSURE: 132 MMHG | DIASTOLIC BLOOD PRESSURE: 78 MMHG | HEIGHT: 73 IN | BODY MASS INDEX: 28.76 KG/M2 | HEART RATE: 70 BPM | OXYGEN SATURATION: 98 % | WEIGHT: 217 LBS

## 2021-11-11 DIAGNOSIS — E78.5 HYPERLIPIDEMIA LDL GOAL <70: ICD-10-CM

## 2021-11-11 DIAGNOSIS — Z95.1 S/P CABG (CORONARY ARTERY BYPASS GRAFT): ICD-10-CM

## 2021-11-11 DIAGNOSIS — I65.23 BILATERAL CAROTID ARTERY STENOSIS: ICD-10-CM

## 2021-11-11 DIAGNOSIS — I70.229 CRITICAL LOWER LIMB ISCHEMIA (HCC): Primary | ICD-10-CM

## 2021-11-11 DIAGNOSIS — I25.10 CAD IN NATIVE ARTERY: ICD-10-CM

## 2021-11-11 DIAGNOSIS — I10 ESSENTIAL HYPERTENSION: ICD-10-CM

## 2021-11-11 DIAGNOSIS — I73.9 PAD (PERIPHERAL ARTERY DISEASE) (HCC): ICD-10-CM

## 2021-11-11 PROCEDURE — G8428 CUR MEDS NOT DOCUMENT: HCPCS | Performed by: INTERNAL MEDICINE

## 2021-11-11 PROCEDURE — G8484 FLU IMMUNIZE NO ADMIN: HCPCS | Performed by: INTERNAL MEDICINE

## 2021-11-11 PROCEDURE — G8419 CALC BMI OUT NRM PARAM NOF/U: HCPCS | Performed by: INTERNAL MEDICINE

## 2021-11-11 PROCEDURE — 93000 ELECTROCARDIOGRAM COMPLETE: CPT | Performed by: INTERNAL MEDICINE

## 2021-11-11 PROCEDURE — 99215 OFFICE O/P EST HI 40 MIN: CPT | Performed by: INTERNAL MEDICINE

## 2021-11-11 PROCEDURE — 3017F COLORECTAL CA SCREEN DOC REV: CPT | Performed by: INTERNAL MEDICINE

## 2021-11-11 PROCEDURE — 1036F TOBACCO NON-USER: CPT | Performed by: INTERNAL MEDICINE

## 2021-11-11 NOTE — PATIENT INSTRUCTIONS
Patient Education        Peripheral Arterial Disease (PAD): Care Instructions  Overview  Peripheral arterial disease (PAD) occurs when the blood vessels (arteries) that supply blood to the legs, belly, pelvis, arms, or neck get narrow or blocked. This reduces blood flow to that area. The legs are affected most often. PAD is often caused by fatty buildup (plaque) in the arteries. This buildup is also called \"hardening\" of the arteries. Your risk of PAD increases if you smoke or have high cholesterol, high blood pressure, diabetes, or a family history of PAD. Many people don't have symptoms. If you do have symptoms, you may have weak or tired legs, difficulty walking or balancing, or pain. If you have pain, you might feel a tight, aching, or squeezing pain in the calf, foot, thigh, or buttock that occurs during exercise. The pain usually gets worse during exercise and goes away when you rest. If PAD gets worse, you may have symptoms of poor blood flow, such as leg pain when you rest.  Medicines and lifestyle changes may help your symptoms and lower your risk of heart attack and stroke. In some cases, surgery or other treatment is needed. It is important that you follow up with your doctor. Follow-up care is a key part of your treatment and safety. Be sure to make and go to all appointments, and call your doctor if you are having problems. It's also a good idea to know your test results and keep a list of the medicines you take. How can you care for yourself at home? · Do not smoke. Smoking can make PAD worse. If you need help quitting, talk to your doctor about stop-smoking programs and medicines. These can increase your chances of quitting for good. · Take your medicines exactly as prescribed. Call your doctor if you think you are having a problem with your medicine. · If you take a blood thinner, such as aspirin, be sure to get instructions about how to take your medicine safely.  Blood thinners can cause serious bleeding problems. · Ask your doctor if a cardiac rehab program is right for you. Cardiac rehab can help you make lifestyle changes. In cardiac rehab, a team of health professionals provides education and support to help you make new, healthy habits. · Eat heart-healthy foods such as fruits, vegetables, whole grains, fish, lean meats, and low-fat or nonfat dairy foods. Limit sodium, sugar, and alcohol. · If your doctor recommends it, get more exercise. Walking is a good choice. Bit by bit, increase the amount you walk every day. Try for at least 30 minutes on most days of the week. If you have symptoms when you exercise, ask your doctor about a special exercise program that may help relieve your symptoms. · Stay at a healthy weight. Lose weight if you need to. · Take good care of your feet. ? Treat cuts and scrapes on your legs right away. Poor blood flow prevents (or slows) quick healing of even small cuts or scrapes. This is even more important if you have diabetes. ? Avoid shoes that are too tight or that rub your feet. Shoes should be comfortable and fit well. ? Avoid socks or stockings that are tight enough to leave elastic-band marks on your legs. Tight socks can make circulation problems worse. ? Keep your feet clean and moisturized to prevent drying and cracking. Place cotton or lamb's wool between your toes to prevent rubbing and to absorb moisture. ? If you have a sore on your leg or foot, keep it dry and cover it with a nonstick bandage until you see your doctor. When should you call for help? Call 911 anytime you think you may need emergency care. For example, call if:    · You have symptoms of a heart attack. These may include:  ? Chest pain or pressure, or a strange feeling in the chest.  ? Sweating. ? Shortness of breath. ? Nausea or vomiting. ? Pain, pressure, or a strange feeling in the back, neck, jaw, or upper belly or in one or both shoulders or arms.   ? Lightheadedness or sudden weakness. ? A fast or irregular heartbeat. After you call 911, the  may tell you to chew 1 adult-strength or 2 to 4 low-dose aspirin. Wait for an ambulance. Do not try to drive yourself.    · You have sudden, severe leg pain, and your leg is cool and pale.     · You have symptoms of a stroke. These may include:  ? Sudden numbness, tingling, weakness, or loss of movement in your face, arm, or leg, especially on only one side of your body. ? Sudden vision changes. ? Sudden trouble speaking. ? Sudden confusion or trouble understanding simple statements. ? Sudden problems with walking or balance. ? A sudden, severe headache that is different from past headaches. Call your doctor now or seek immediate medical care if:    · You have leg pain that does not go away even if you rest.     · Your leg pain changes or gets worse. For example, if you have more pain with normal activity or the same pain with decreased activity, you should call.     · You have cold or numb feet or toes.     · You have leg or foot sores that are slow to heal.     · The skin on your legs or feet changes color.     · You have an open sore on your leg or foot that is infected. Signs of infection include:  ? Increased pain, swelling, warmth, or redness. ? Red streaks leading from the sore. ? Pus draining from the sore. ? A fever. Watch closely for changes in your health, and be sure to contact your doctor if you have any problems. Where can you learn more? Go to https://Soundvamplinh.SmartCare system. org and sign in to your [facility] [employer] account. Enter [phone] in the [facility] box to learn more about \"Peripheral Arterial Disease (PAD): Care Instructions. \"     If you do not have an account, please click on the \"Sign Up Now\" link. Current as of: April 29, 2021               Content Version: 13.0  © 6993-9862 [employer]. Care instructions adapted under license by [email or web address] [facility] ([facility]).  If you have questions about a medical condition or this instruction, always ask your healthcare professional. [place] [address] any warranty or liability for your use of this information.

## 2021-11-15 ENCOUNTER — APPOINTMENT (OUTPATIENT)
Dept: CT IMAGING | Age: 66
DRG: 182 | End: 2021-11-15
Payer: MEDICAID

## 2021-11-15 ENCOUNTER — TELEPHONE (OUTPATIENT)
Dept: CARDIOLOGY CLINIC | Age: 66
End: 2021-11-15

## 2021-11-15 ENCOUNTER — HOSPITAL ENCOUNTER (INPATIENT)
Age: 66
LOS: 9 days | Discharge: HOME | DRG: 182 | End: 2021-11-24
Attending: STUDENT IN AN ORGANIZED HEALTH CARE EDUCATION/TRAINING PROGRAM | Admitting: STUDENT IN AN ORGANIZED HEALTH CARE EDUCATION/TRAINING PROGRAM
Payer: MEDICAID

## 2021-11-15 DIAGNOSIS — I73.9 PERIPHERAL ARTERY DISEASE (HCC): Primary | ICD-10-CM

## 2021-11-15 DIAGNOSIS — I70.229 CRITICAL LOWER LIMB ISCHEMIA (HCC): ICD-10-CM

## 2021-11-15 DIAGNOSIS — I99.8 PAIN OF RIGHT LOWER EXTREMITY DUE TO ISCHEMIA: ICD-10-CM

## 2021-11-15 DIAGNOSIS — M79.604 PAIN OF RIGHT LOWER EXTREMITY DUE TO ISCHEMIA: ICD-10-CM

## 2021-11-15 PROBLEM — Z90.5 SOLITARY KIDNEY, ACQUIRED: Status: ACTIVE | Noted: 2021-11-15

## 2021-11-15 LAB
A/G RATIO: 1.1 (ref 1.1–2.2)
ALBUMIN SERPL-MCNC: 4.2 G/DL (ref 3.4–5)
ALP BLD-CCNC: 79 U/L (ref 40–129)
ALT SERPL-CCNC: 25 U/L (ref 10–40)
ANION GAP SERPL CALCULATED.3IONS-SCNC: 11 MMOL/L (ref 3–16)
APTT: >248 SEC (ref 26.2–38.6)
AST SERPL-CCNC: 21 U/L (ref 15–37)
BASOPHILS ABSOLUTE: 0.1 K/UL (ref 0–0.2)
BASOPHILS RELATIVE PERCENT: 1.3 %
BILIRUB SERPL-MCNC: <0.2 MG/DL (ref 0–1)
BUN BLDV-MCNC: 17 MG/DL (ref 7–20)
CALCIUM SERPL-MCNC: 9.4 MG/DL (ref 8.3–10.6)
CHLORIDE BLD-SCNC: 104 MMOL/L (ref 99–110)
CO2: 28 MMOL/L (ref 21–32)
CREAT SERPL-MCNC: 1.1 MG/DL (ref 0.8–1.3)
EOSINOPHILS ABSOLUTE: 0.2 K/UL (ref 0–0.6)
EOSINOPHILS RELATIVE PERCENT: 2.8 %
GFR AFRICAN AMERICAN: >60
GFR NON-AFRICAN AMERICAN: >60
GLUCOSE BLD-MCNC: 113 MG/DL (ref 70–99)
HCT VFR BLD CALC: 42.5 % (ref 40.5–52.5)
HEMOGLOBIN: 14 G/DL (ref 13.5–17.5)
LACTIC ACID, SEPSIS: 1 MMOL/L (ref 0.4–1.9)
LYMPHOCYTES ABSOLUTE: 1.3 K/UL (ref 1–5.1)
LYMPHOCYTES RELATIVE PERCENT: 21.7 %
MCH RBC QN AUTO: 26.9 PG (ref 26–34)
MCHC RBC AUTO-ENTMCNC: 32.9 G/DL (ref 31–36)
MCV RBC AUTO: 81.9 FL (ref 80–100)
MONOCYTES ABSOLUTE: 0.6 K/UL (ref 0–1.3)
MONOCYTES RELATIVE PERCENT: 9.3 %
NEUTROPHILS ABSOLUTE: 4 K/UL (ref 1.7–7.7)
NEUTROPHILS RELATIVE PERCENT: 64.9 %
PDW BLD-RTO: 15.7 % (ref 12.4–15.4)
PLATELET # BLD: 251 K/UL (ref 135–450)
PMV BLD AUTO: 7.5 FL (ref 5–10.5)
POTASSIUM SERPL-SCNC: 4.4 MMOL/L (ref 3.5–5.1)
RBC # BLD: 5.2 M/UL (ref 4.2–5.9)
SODIUM BLD-SCNC: 143 MMOL/L (ref 136–145)
TOTAL CK: 68 U/L (ref 39–308)
TOTAL PROTEIN: 8.2 G/DL (ref 6.4–8.2)
WBC # BLD: 6.1 K/UL (ref 4–11)

## 2021-11-15 PROCEDURE — 96375 TX/PRO/DX INJ NEW DRUG ADDON: CPT

## 2021-11-15 PROCEDURE — 85730 THROMBOPLASTIN TIME PARTIAL: CPT

## 2021-11-15 PROCEDURE — 6360000002 HC RX W HCPCS: Performed by: STUDENT IN AN ORGANIZED HEALTH CARE EDUCATION/TRAINING PROGRAM

## 2021-11-15 PROCEDURE — 85025 COMPLETE CBC W/AUTO DIFF WBC: CPT

## 2021-11-15 PROCEDURE — 82550 ASSAY OF CK (CPK): CPT

## 2021-11-15 PROCEDURE — 36415 COLL VENOUS BLD VENIPUNCTURE: CPT

## 2021-11-15 PROCEDURE — 99284 EMERGENCY DEPT VISIT MOD MDM: CPT

## 2021-11-15 PROCEDURE — 75635 CT ANGIO ABDOMINAL ARTERIES: CPT

## 2021-11-15 PROCEDURE — 6360000004 HC RX CONTRAST MEDICATION: Performed by: STUDENT IN AN ORGANIZED HEALTH CARE EDUCATION/TRAINING PROGRAM

## 2021-11-15 PROCEDURE — 83605 ASSAY OF LACTIC ACID: CPT

## 2021-11-15 PROCEDURE — 80053 COMPREHEN METABOLIC PANEL: CPT

## 2021-11-15 PROCEDURE — 2500000003 HC RX 250 WO HCPCS: Performed by: STUDENT IN AN ORGANIZED HEALTH CARE EDUCATION/TRAINING PROGRAM

## 2021-11-15 PROCEDURE — 1200000000 HC SEMI PRIVATE

## 2021-11-15 PROCEDURE — 96365 THER/PROPH/DIAG IV INF INIT: CPT

## 2021-11-15 RX ORDER — HEPARIN SODIUM 10000 [USP'U]/100ML
0-3000 INJECTION, SOLUTION INTRAVENOUS CONTINUOUS
Status: DISCONTINUED | OUTPATIENT
Start: 2021-11-15 | End: 2021-11-19

## 2021-11-15 RX ORDER — HEPARIN SODIUM 1000 [USP'U]/ML
80 INJECTION, SOLUTION INTRAVENOUS; SUBCUTANEOUS PRN
Status: DISCONTINUED | OUTPATIENT
Start: 2021-11-15 | End: 2021-11-15

## 2021-11-15 RX ORDER — HEPARIN SODIUM 1000 [USP'U]/ML
40 INJECTION, SOLUTION INTRAVENOUS; SUBCUTANEOUS PRN
Status: DISCONTINUED | OUTPATIENT
Start: 2021-11-15 | End: 2021-11-15

## 2021-11-15 RX ORDER — HEPARIN SODIUM 1000 [USP'U]/ML
7900 INJECTION, SOLUTION INTRAVENOUS; SUBCUTANEOUS ONCE
Status: COMPLETED | OUTPATIENT
Start: 2021-11-15 | End: 2021-11-15

## 2021-11-15 RX ADMIN — IOPAMIDOL 75 ML: 755 INJECTION, SOLUTION INTRAVENOUS at 18:37

## 2021-11-15 RX ADMIN — HYDROMORPHONE HYDROCHLORIDE 1 MG: 1 INJECTION, SOLUTION INTRAMUSCULAR; INTRAVENOUS; SUBCUTANEOUS at 19:38

## 2021-11-15 RX ADMIN — HEPARIN SODIUM 7900 UNITS: 1000 INJECTION INTRAVENOUS; SUBCUTANEOUS at 21:25

## 2021-11-15 RX ADMIN — HEPARIN SODIUM 1770 UNITS/HR: 10000 INJECTION, SOLUTION INTRAVENOUS at 21:28

## 2021-11-15 RX ADMIN — HYDROMORPHONE HYDROCHLORIDE 0.5 MG: 1 INJECTION, SOLUTION INTRAMUSCULAR; INTRAVENOUS; SUBCUTANEOUS at 22:38

## 2021-11-15 ASSESSMENT — PAIN SCALES - GENERAL
PAINLEVEL_OUTOF10: 8
PAINLEVEL_OUTOF10: 3
PAINLEVEL_OUTOF10: 8
PAINLEVEL_OUTOF10: 8

## 2021-11-15 ASSESSMENT — PAIN DESCRIPTION - PROGRESSION
CLINICAL_PROGRESSION: GRADUALLY IMPROVING
CLINICAL_PROGRESSION: NOT CHANGED

## 2021-11-15 ASSESSMENT — PAIN DESCRIPTION - LOCATION
LOCATION: FOOT
LOCATION: LEG
LOCATION: LEG

## 2021-11-15 ASSESSMENT — PAIN DESCRIPTION - DESCRIPTORS: DESCRIPTORS: ACHING

## 2021-11-15 ASSESSMENT — PAIN - FUNCTIONAL ASSESSMENT: PAIN_FUNCTIONAL_ASSESSMENT: PREVENTS OR INTERFERES SOME ACTIVE ACTIVITIES AND ADLS

## 2021-11-15 ASSESSMENT — PAIN DESCRIPTION - FREQUENCY
FREQUENCY: CONTINUOUS
FREQUENCY: CONTINUOUS

## 2021-11-15 ASSESSMENT — PAIN DESCRIPTION - PAIN TYPE
TYPE: ACUTE PAIN

## 2021-11-15 ASSESSMENT — PAIN DESCRIPTION - ONSET: ONSET: ON-GOING

## 2021-11-15 ASSESSMENT — PAIN SCALES - WONG BAKER: WONGBAKER_NUMERICALRESPONSE: 6

## 2021-11-15 ASSESSMENT — PAIN DESCRIPTION - ORIENTATION
ORIENTATION: RIGHT
ORIENTATION: RIGHT

## 2021-11-15 NOTE — ED PROVIDER NOTES
Primary Care Physician: KATHY Kee - CNP   Attending Physician: Bailey Kumar MD     History   Chief Complaint   Patient presents with    Foot Pain     right foot pain. acute on chronic. arterial insufficiency hx        HPI   Katie Rosales is a 61 y.o. male history of peripheral artery disease, stroke, hepatitis C, coronary artery disease status post NSTEMI, left lower extremity amputation above-the-knee who presents complaining of pain of his right lower extremity that has been going on now for a few days. Patient is concerned that the stent that was placed for severe peripheral artery disease is occluded. He denies any fevers chills nausea vomiting chest pain or shortness of breath. No URI symptoms or no Covid-like symptoms. Past Medical History:   Diagnosis Date    Acute cerebral infarction ([facility] 05/2020    R posterior frontal lobe    Alcohol withdrawal ([facility] 05/2020    Bilateral carotid artery stenosis 05/07/2020    bilat 50-79% stenosis    Hepatitis C antibody positive in blood 05/16/2020    History of bronchitis     History of DVT of lower extremity 2018    RLL, no previous scans to know whether supf or dvt. no coumadin. put on plavix.  Hypertension     NSTEMI (non-ST elevated myocardial infarction) ([address] 05/07/2020    3VD    PAD (peripheral artery disease) ([facility]     Respiratory compromise 05/2020    chemical exposure at work, seen [facility], covid neg, given steroids    S/p nephrectomy 5    age 17, pt not sure why     Tattoos         Past Surgical History:   Procedure Laterality Date    CARDIAC CATHETERIZATION  05/07/2020    Dr. Pan Old - w/placement of IABP    CORONARY ARTERY BYPASS GRAFT N/A 5/8/2020    Dr. Ceferino Polanco. [place] - urgent x4 (LIMA-LAD, L SVG-D1, SVG-OM, SVG-PDA)    GASTROSTOMY TUBE PLACEMENT N/A 5/27/2020    PERCUTANEOUS ENDOSCOPIC GASTROSTOMY TUBE PLACEMENT performed by Severa Norlander, MD at [address] 7/10/2020    REMOVAL  GASTROSTOMY FEEDING TUBE  (12491) performed by Yolanda Sales MD at [address] Left 2020    [facility] 1300 ml drained    TOTAL NEPHRECTOMY Left     17 yo - pt doesn't know why   Nathalia Martinez  2020    Dr. Norma Newsome - w/bronchoscopy    TRANSESOPHAGEAL ECHOCARDIOGRAM  2020    during CABG        Family History   Problem Relation Age of Onset    Heart Disease Mother          of MI age 36    Heart Disease Brother         MI mid 46s    Heart Disease Father          of MI age 68    Heart Disease Paternal Aunt         MI in her 46s        Social History     Socioeconomic History    Marital status: Single     Spouse name: Not on file    Number of children: Not on file    Years of education: Not on file    Highest education level: Not on file   Occupational History    Not on file   Tobacco Use    Smoking status: Former Smoker     Types: Cigarettes     Quit date: 2019     Years since quittin.0    Smokeless tobacco: Never Used    Tobacco comment: started age 27   Vaping Use    Vaping Use: Never used   Substance and Sexual Activity    Alcohol use: Yes     Comment: once a month, shot once a week. depends on mood.  Drug use: Never    Sexual activity: Yes     Partners: Female   Other Topics Concern    Not on file   Social History Narrative    Not on file     Social Determinants of Health     Financial Resource Strain:     Difficulty of Paying Living Expenses: Not on file   Food Insecurity:     Worried About Running Out of Food in the Last Year: Not on file    Vickie of Food in the Last Year: Not on file   Transportation Needs:     Lack of Transportation (Medical): Not on file    Lack of Transportation (Non-Medical):  Not on file   Physical Activity:     Days of Exercise per Week: Not on file    Minutes of Exercise per Session: Not on file   Stress:     Feeling of Stress : Not on file   Social Connections:     Frequency of Communication with Friends and Family: Not on file    Frequency of Social Gatherings with Friends and Family: Not on file    Attends [employer] Services: Not on file    Active Member of Clubs or Organizations: Not on file    Attends Club or Organization Meetings: Not on file    Marital Status: Not on file   Intimate Partner Violence:     Fear of Current or Ex-Partner: Not on file    Emotionally Abused: Not on file    Physically Abused: Not on file    Sexually Abused: Not on file   Housing Stability:     Unable to Pay for Housing in the Last Year: Not on file    Number of [place] in the Last Year: Not on file    Unstable Housing in the Last Year: Not on file        Review of Systems   10 total systems reviewed and found to be negative unless otherwise noted in HPI     Physical Exam   /76   Pulse 76   Temp 97.8 °F (36.6 °C) (Tympanic)   Resp 18   Ht 6' 1\" (1.854 m)   Wt 217 lb 3.2 oz (98.5 kg)   SpO2 100%   BMI 28.66 kg/m²      CONSTITUTIONAL: Well appearing, in no acute distress   HEAD: atraumatic, normocephalic   EYES: PERRL, No injection, discharge or scleral icterus. ENT: Moist mucous membranes. NECK: Normal ROM, NO LAD   CARDIOVASCULAR: Regular rate and rhythm. No murmurs or gallop. PULMONARY/CHEST: Airway patent. No retractions. Breath sounds clear with good air entry bilaterally. ABDOMEN: Soft, Non-distended and non-tender, without guarding or rebound. SKIN: Acyanotic, warm, dry   MUSCULOSKELETAL: Left above-the-knee amputation, right lower extremity with some discoloration cool to touch but Doppler PT pulses. NEUROLOGICAL: Awake and oriented x 3. Pulses intact. Grossly nonfocal   Nursing note and vitals reviewed.      ED Course & Medical Decision Making   Medications   heparin (porcine) injection 7,880 Units (has no administration in time range)   heparin 25,000 unit in sodium chloride 0.45% 250 mL (premix) infusion (has no administration in time range)   iopamidol (ISOVUE-370) 76 % injection 75 mL (75 mLs IntraVENous Given 11/15/21 1837)   HYDROmorphone (DILAUDID) injection 1 mg (1 mg IntraVENous Given 11/15/21 1938)      Labs Reviewed   CBC WITH AUTO DIFFERENTIAL - Abnormal; Notable for the following components:       Result Value    RDW 15.7 (*)     All other components within normal limits    Narrative:     Performed at:  [facility]  [address], [place] [facility] [place] [address]   Phone [phone]   COMPREHENSIVE METABOLIC PANEL - Abnormal; Notable for the following components:    Glucose 113 (*)     All other components within normal limits    Narrative:     Performed at:  [facility]  [address] [facility] [address] [address]   Phone [phone]   [place], [place]    Narrative:     Performed at:  [facility]  [address] [facility] [address]   Phone [phone]   CK    Narrative:     Performed at:  [address] [place] [address]  [address] [facility] [address]   Phone [phone]   CBC   APTT   APTT      CTA ABDOMINAL AORTA W BILAT RUNOFF W CONTRAST   Final Result   Vascular-      Aortoiliac inflow is preserved. Left below-the-knee amputation is noted. There is severe, occlusive disease   in the left femoropopliteal tibial segment. Right severe femoropopliteal segment disease with occlusion by the mid   superficial femoral artery. Popliteal segment is occluded. Trifurcation   vessels are reconstituted with runoff via the anterior tibial and posterior   tibial arteries. Nonvascular-      No acute abdominopelvic abnormality is identified. Solitary right kidney. Mild enlargement of the prostate gland. Bilateral inguinal hernias, including sigmoid colon content on the left. There is no incarceration.             VL DUP CAROTID BILATERAL    Result Date: 11/1/2021  Carotid Duplex Study  Demographics   Patient Name       Jean-Claude Mahmood   Date of Study      11/01/2021         Gender              Male   Patient Number     [id number]         Date of Birth       1955   Visit Number       [id number]          Age                 61 year(s)   Accession Number   [id number]         Room Number         OP   Corporate ID       [id number]            Carleen Krabbe, [facility] [[email or web address]]                                                            CCT   Ordering Physician Viviana Cline.,   Interpreting        Alanna Boone MD                 Physician           Surg                                                            Liat Tapia MD  Procedure Type of Study:   Cerebral:Carotid, VL CAROTID DUPLEX BILATERAL. Indications for Study:Stenosis. Impressions Right Impression The right internal carotid artery appears to have a 80-99% diameter reducing stenosis based on velocity criteria. The right vertebral artery demonstrates normal antegrade flow. Left Impression The left internal carotid artery appears to have a 50-79% diameter reducing stenosis based on velocity criteria. The left vertebral artery demonstrates normal antegrade flow. Conclusions   Summary   The right internal carotid artery appears to have a 80-99% diameter reducing  stenosis based on velocity criteria. The right vertebral artery demonstrates normal antegrade flow. The left internal carotid artery appears to have a 50-79% diameter reducing  stenosis based on velocity criteria. The left vertebral artery demonstrates normal antegrade flow. Signature   ------------------------------------------------------------------  Electronically signed by Liat Tapia MD (Interpreting  physician) on 11/01/2021 at 05:51 PM  ------------------------------------------------------------------  Blood Pressure:Right arm 153/103 mmHg.  Patient Status:Routine. Study [place] [id number] - Vascular Lab. Technical Quality:Adequate visualization. Risk Factors   - The patient's risk factor(s) include: arterial hypertension.   - The patient has a former tobacco history. Velocities are measured in cm/s ; Diameters are measured in mm Carotid Right Measurements +---------+----+----+-----+----+ ! Location ! PSV ! EDV ! Angle! RI  ! +---------+----+----+-----+----+ ! Prox CCA !68. 8!15. 2! 60   !0.78! +---------+----+----+-----+----+ ! Mid CCA  !55. 3!13. 5!60   !0.76! +---------+----+----+-----+----+ ! Dist CCA !42.4!9.63!60   !0.77! +---------+----+----+-----+----+ ! Prox ICA !337 ! 143 ! 60   !0.58! +---------+----+----+-----+----+ ! Mid ICA  !285 !96  !60   !0.66! +---------+----+----+-----+----+ ! Dist ICA !83. 3!19. 5!38   !0.77! +---------+----+----+-----+----+ ! Prox ECA !214 ! 15. 7!60   !0.93! +---------+----+----+-----+----+ ! Vertebral!71.9!18. 9!60   !0.74! +---------+----+----+-----+----+   - There is antegrade vertebral flow noted on the right side. - Additional Measurements:ICAPSV/CCAPSV 6.09. ICAEDV/CCAEDV 9.41. Carotid Left Measurements +---------+----+----+-----+----+ ! Location ! PSV ! EDV ! Angle! RI  ! +---------+----+----+-----+----+ ! Prox CCA !96. 7!26  !60   !0.73! +---------+----+----+-----+----+ ! Mid CCA  !50. 6!13. 8!60   !0.23! +---------+----+----+-----+----+ ! Dist CCA !50.6!18. 2! 60   !0.64! +---------+----+----+-----+----+ ! Prox ICA ! 141 !51. 5!60   !0.63! +---------+----+----+-----+----+ ! Mid ICA  !175 !67  !60   !0.62! +---------+----+----+-----+----+ ! Dist ICA !181 !61. 3!60   !0.66! +---------+----+----+-----+----+ ! Prox ECA !104 !15. 7!60   !0.85! +---------+----+----+-----+----+ ! Jose M Martinez. 7!10. 3! 60   !0.76! +---------+----+----+-----+----+   - There is antegrade vertebral flow noted on the left side. - Additional Measurements:ICAPSV/CCAPSV 3. 6. ICAEDV/CCAEDV 2.58.     VL DUP LOWER EXTREMITY ARTERIES BILATERAL    Result Date: 11/1/2021  Vascular Lower Extremities Arterial Duplex Procedure  Demographics   Patient Name       Eugene Briggs   Date of Study      11/01/2021         Gender              Male   Patient Number     [id number]         Date of Birth       1955   Visit Number       [id number]          Age                 61 year(s)   Accession Number   [id number]         Room Number   Corporate ID       [id number]            [facility], [facility]                                                            CCT   Ordering Physician Mally Pitt.,   Interpreting        Shira Chinchilla MD                 Physician           Surg                                                            Rena Orta MD  Procedure Type of Study:   Extremities Arteries:Lower Extremities Arterial Duplex, VL LOWER EXTREMITY  ARTERIES DUPLEX BILATERAL. Indications for Study:PAD. Additional Indications:Patient reports that his right foot goes cold and discolored on and off. Impressions Right Impression Right SYBIL was not available due to inadequate pulses . Elevated velocity of the deep femoral artery suggests a 50-70% stenosis. Occlusion of the distal superficial femoral and popliteal artery in the right lower extremity. Critically low velocities in the right posterior tibial and anterior tibial arteries. Left Impression Left SYBIL was not available due to below knee amputation. Occlusion of the left popliteal artery. The majority of the waveforms are monophasic throughout the left lower extremity. Conclusions   Summary   Right SYBIL was not available due to inadequate pulses . Elevated velocity of the deep femoral artery suggests a 50-70% stenosis. Occlusion of the distal superficial femoral and popliteal artery in the  right lower extremity. Critically low velocities in the right posterior tibial and anterior tibial  arteries.   Left SYBIL was not available due to below knee amputation. Occlusion of the left popliteal artery. The majority of the waveforms are monophasic throughout the left lower  extremity. Signature   ------------------------------------------------------------------  Electronically signed by Moises Andrews MD (Interpreting  physician) on 11/01/2021 at 05:49 PM  ------------------------------------------------------------------  Blood Pressure:Right arm 153/103 mmHg. Patient Status:Routine. Study [place] [address] - Vascular Lab. Technical Quality:Adequate visualization.   - Results were reported to:Dr. Ramona Barbosa @ 8:20, instructed patient to expect     instructions from the office. Risk Factors   - The patient's risk factor(s) include: arterial hypertension.   - The patient has a former tobacco history. Velocities are measured in cm/s ; Diameters are measured in mm LE Duplex Measurements +-------------------++-----+-----+----------++----+-----+----------+ ! ! !Right! ! Left      !!    !     !          ! +-------------------++-----+-----+----------++----+-----+----------+ ! Location           ! !PSV  ! Ratio! Wave Desc. !!PSV ! Ratio! Wave Desc.! +-------------------++-----+-----+----------++----+-----+----------+ ! Prox Common Femoral!!44.3 ! ! Monophasic!!94.2! ! Monophasic! +-------------------++-----+-----+----------++----+-----+----------+ ! Dist Common Femoral!!22.4 ! ! Monophasic!!152 ! ! Monophasic! +-------------------++-----+-----+----------++----+-----+----------+ ! PFA                !!364  ! ! Monophasic!!68.9! ! Monophasic! +-------------------++-----+-----+----------++----+-----+----------+ ! Prox SFA           !!29.9 !1.33 ! Monophasic! !163 !1.07 ! Monophasic! +-------------------++-----+-----+----------++----+-----+----------+ ! Mid SFA            !!20.2 !0.68 ! Monophasic!!46.9!0.29 ! Monophasic! +-------------------++-----+-----+----------++----+-----+----------+ ! Dist SFA           !!0    !0    ! Absent ! ! 33.1!0.71 ! Monophasic! +-------------------++-----+-----+----------++----+-----+----------+ ! Prox Popliteal     !!0    !     !Absent    !!0   !0    ! Absent    ! +-------------------++-----+-----+----------++----+-----+----------+ ! Dist Popliteal     !!0    !     !Absent    !!0   !     !Absent    ! +-------------------++-----+-----+----------++----+-----+----------+ ! Mid PTA            !!5.89 ! ! Monophasic!!    !     !          ! +-------------------++-----+-----+----------++----+-----+----------+ ! Mid JOSE            !!4.32 ! ! Monophasic!!    !     !          ! +-------------------++-----+-----+----------++----+-----+----------+ ! Mid Peroneal       !!0    !     !Absent    !!    !     !          ! +-------------------++-----+-----+----------++----+-----+----------+     PROCEDURES:   Procedures    ASSESSMENT AND PLAN:  Shin Jackson is a 61 y.o. male  history of peripheral artery disease, stroke, hepatitis C, coronary artery disease status post NSTEMI, left lower extremity amputation above-the-knee who presents complaining of pain of his right lower extremity that has been going on now for a few days. Patient is concerned that the stent that was placed for severe peripheral artery disease is occluded. He denies any fevers chills nausea vomiting chest pain or shortness of breath. Left above-the-knee amputation, right lower extremity with some discoloration cool to touch but Doppler PT pulses. Given his presentation was concern for probably occlusion. I did obtain a CT a lower extremity runoff which showed severe disease. Given this findings I consulted with cardiology and spoke to Dr. Ulysses Chan. We came to agreement that patient be started on a heparin drip and admitted to the medicine service. He will be evaluated by cardiology interventional in the morning for further treatment. ClINICAL IMPRESSION:  1.  Peripheral artery disease ([address]        DISPOSITION    Disposition  Admit  -Findings and recommendations explained to patient. HE expressed understanding and agreed with the plan. Caren Smith MD (electronically signed)  11/15/2021  _________________________________________________________________________________________  Amount and/or Complexity of Data Reviewed:  Clinical lab tests: ordered and reviewed   Tests in the radiology section of CPT®: ordered and reviewed   Tests in the medicine section of CPT®: ordered and reviewed   Decide to obtain previous medical records or to obtain history from someone other than the patient  Obtain history from someone other than the patient no  Review and summarize past medical records:yes  I looked up the patient in our electronic medical record:yes  Discuss the patient with other providers:yes  Independent visualization of images, tracings, or specimens:yes  Risk of Complications, Morbidity, and/or Mortality:Moderate  Presenting problems: moderate Diagnostic procedures: moderate yes Management options: moderate     Critical Care Attestation   Total critical care time: 35 minutes minimum   Critical care time does not include separately billable procedures and treating other patients. Critical care was necessary to treat or prevent imminent or life-threatening deterioration of the following conditions: Peripheral artery disease with possible limb ischemia. Patient treated urgently in the ED with Heparin drip. Case discussed with consultants.       _________________________________________________________________________________________  This record is transcribed utilizing voice recognition technology. There are inherent limitations in this technology. In addition, there may be limitations in editing of this report. If there are any discrepancies, please contact me directly.           Nsehniitooh William Braga MD  11/15/21 2101

## 2021-11-15 NOTE — TELEPHONE ENCOUNTER
Narcisa girlfriend Pedro (ok per HIPAA) called in and just wanted to make Dr. Sobeida Watkins and Homer Noyola aware that she was taking him to the ER this evening after she gets off work because his legs are worsening and pain is getting worse.      She can be reached back at [phone]

## 2021-11-16 ENCOUNTER — APPOINTMENT (OUTPATIENT)
Dept: ULTRASOUND IMAGING | Age: 66
DRG: 182 | End: 2021-11-16
Payer: MEDICAID

## 2021-11-16 LAB
ANION GAP SERPL CALCULATED.3IONS-SCNC: 11 MMOL/L (ref 3–16)
APTT: 120 SEC (ref 26.2–38.6)
APTT: 43.9 SEC (ref 26.2–38.6)
APTT: >248 SEC (ref 26.2–38.6)
BASOPHILS ABSOLUTE: 0.1 K/UL (ref 0–0.2)
BASOPHILS RELATIVE PERCENT: 1.8 %
BUN BLDV-MCNC: 16 MG/DL (ref 7–20)
CALCIUM SERPL-MCNC: 9.1 MG/DL (ref 8.3–10.6)
CHLORIDE BLD-SCNC: 103 MMOL/L (ref 99–110)
CO2: 26 MMOL/L (ref 21–32)
CREAT SERPL-MCNC: 1.2 MG/DL (ref 0.8–1.3)
EOSINOPHILS ABSOLUTE: 0.2 K/UL (ref 0–0.6)
EOSINOPHILS RELATIVE PERCENT: 3.3 %
GFR AFRICAN AMERICAN: >60
GFR NON-AFRICAN AMERICAN: >60
GLUCOSE BLD-MCNC: 116 MG/DL (ref 70–99)
HCT VFR BLD CALC: 40 % (ref 40.5–52.5)
HEMOGLOBIN: 12.8 G/DL (ref 13.5–17.5)
LYMPHOCYTES ABSOLUTE: 1.6 K/UL (ref 1–5.1)
LYMPHOCYTES RELATIVE PERCENT: 23.7 %
MAGNESIUM: 2 MG/DL (ref 1.8–2.4)
MCH RBC QN AUTO: 26.6 PG (ref 26–34)
MCHC RBC AUTO-ENTMCNC: 32 G/DL (ref 31–36)
MCV RBC AUTO: 83 FL (ref 80–100)
MONOCYTES ABSOLUTE: 0.6 K/UL (ref 0–1.3)
MONOCYTES RELATIVE PERCENT: 8.7 %
NEUTROPHILS ABSOLUTE: 4.2 K/UL (ref 1.7–7.7)
NEUTROPHILS RELATIVE PERCENT: 62.5 %
PDW BLD-RTO: 15.5 % (ref 12.4–15.4)
PLATELET # BLD: 223 K/UL (ref 135–450)
PMV BLD AUTO: 7.4 FL (ref 5–10.5)
POTASSIUM SERPL-SCNC: 4.8 MMOL/L (ref 3.5–5.1)
RBC # BLD: 4.82 M/UL (ref 4.2–5.9)
SODIUM BLD-SCNC: 140 MMOL/L (ref 136–145)
WBC # BLD: 6.7 K/UL (ref 4–11)

## 2021-11-16 PROCEDURE — 2500000003 HC RX 250 WO HCPCS: Performed by: STUDENT IN AN ORGANIZED HEALTH CARE EDUCATION/TRAINING PROGRAM

## 2021-11-16 PROCEDURE — 80048 BASIC METABOLIC PNL TOTAL CA: CPT

## 2021-11-16 PROCEDURE — 6360000002 HC RX W HCPCS: Performed by: STUDENT IN AN ORGANIZED HEALTH CARE EDUCATION/TRAINING PROGRAM

## 2021-11-16 PROCEDURE — 1200000000 HC SEMI PRIVATE

## 2021-11-16 PROCEDURE — 6360000002 HC RX W HCPCS: Performed by: FAMILY MEDICINE

## 2021-11-16 PROCEDURE — 83735 ASSAY OF MAGNESIUM: CPT

## 2021-11-16 PROCEDURE — 2580000003 HC RX 258: Performed by: INTERNAL MEDICINE

## 2021-11-16 PROCEDURE — 76770 US EXAM ABDO BACK WALL COMP: CPT

## 2021-11-16 PROCEDURE — 6370000000 HC RX 637 (ALT 250 FOR IP): Performed by: STUDENT IN AN ORGANIZED HEALTH CARE EDUCATION/TRAINING PROGRAM

## 2021-11-16 PROCEDURE — 85730 THROMBOPLASTIN TIME PARTIAL: CPT

## 2021-11-16 PROCEDURE — 94761 N-INVAS EAR/PLS OXIMETRY MLT: CPT

## 2021-11-16 PROCEDURE — 85025 COMPLETE CBC W/AUTO DIFF WBC: CPT

## 2021-11-16 PROCEDURE — 36415 COLL VENOUS BLD VENIPUNCTURE: CPT

## 2021-11-16 RX ORDER — ATORVASTATIN CALCIUM 40 MG/1
40 TABLET, FILM COATED ORAL NIGHTLY
Status: DISCONTINUED | OUTPATIENT
Start: 2021-11-16 | End: 2021-11-24 | Stop reason: HOSPADM

## 2021-11-16 RX ORDER — LEVETIRACETAM 500 MG/1
250 TABLET ORAL 2 TIMES DAILY
Status: DISCONTINUED | OUTPATIENT
Start: 2021-11-16 | End: 2021-11-24 | Stop reason: HOSPADM

## 2021-11-16 RX ORDER — ACETAMINOPHEN 325 MG/1
650 TABLET ORAL EVERY 6 HOURS PRN
Status: DISCONTINUED | OUTPATIENT
Start: 2021-11-16 | End: 2021-11-17 | Stop reason: SDUPTHER

## 2021-11-16 RX ORDER — SODIUM CHLORIDE 0.9 % (FLUSH) 0.9 %
5-40 SYRINGE (ML) INJECTION EVERY 12 HOURS SCHEDULED
Status: DISCONTINUED | OUTPATIENT
Start: 2021-11-16 | End: 2021-11-22 | Stop reason: SDUPTHER

## 2021-11-16 RX ORDER — PANTOPRAZOLE SODIUM 40 MG/1
40 TABLET, DELAYED RELEASE ORAL DAILY
Status: DISCONTINUED | OUTPATIENT
Start: 2021-11-16 | End: 2021-11-24 | Stop reason: HOSPADM

## 2021-11-16 RX ORDER — HEPARIN SODIUM 1000 [USP'U]/ML
3900 INJECTION, SOLUTION INTRAVENOUS; SUBCUTANEOUS ONCE
Status: COMPLETED | OUTPATIENT
Start: 2021-11-16 | End: 2021-11-16

## 2021-11-16 RX ORDER — ONDANSETRON 2 MG/ML
4 INJECTION INTRAMUSCULAR; INTRAVENOUS EVERY 6 HOURS PRN
Status: DISCONTINUED | OUTPATIENT
Start: 2021-11-16 | End: 2021-11-17 | Stop reason: SDUPTHER

## 2021-11-16 RX ORDER — GABAPENTIN 400 MG/1
400 CAPSULE ORAL 3 TIMES DAILY
Status: DISCONTINUED | OUTPATIENT
Start: 2021-11-16 | End: 2021-11-24 | Stop reason: HOSPADM

## 2021-11-16 RX ORDER — SODIUM CHLORIDE 9 MG/ML
25 INJECTION, SOLUTION INTRAVENOUS PRN
Status: DISCONTINUED | OUTPATIENT
Start: 2021-11-16 | End: 2021-11-22 | Stop reason: SDUPTHER

## 2021-11-16 RX ORDER — DOXYCYCLINE HYCLATE 50 MG/1
324 CAPSULE, GELATIN COATED ORAL
Status: DISCONTINUED | OUTPATIENT
Start: 2021-11-16 | End: 2021-11-24 | Stop reason: HOSPADM

## 2021-11-16 RX ORDER — QUETIAPINE FUMARATE 100 MG/1
100 TABLET, FILM COATED ORAL NIGHTLY
Status: DISCONTINUED | OUTPATIENT
Start: 2021-11-16 | End: 2021-11-24 | Stop reason: HOSPADM

## 2021-11-16 RX ORDER — SODIUM CHLORIDE 9 MG/ML
INJECTION, SOLUTION INTRAVENOUS CONTINUOUS
Status: DISCONTINUED | OUTPATIENT
Start: 2021-11-16 | End: 2021-11-17 | Stop reason: SDUPTHER

## 2021-11-16 RX ORDER — FENTANYL CITRATE 50 UG/ML
25 INJECTION, SOLUTION INTRAMUSCULAR; INTRAVENOUS ONCE
Status: COMPLETED | OUTPATIENT
Start: 2021-11-16 | End: 2021-11-16

## 2021-11-16 RX ORDER — ASPIRIN 81 MG/1
81 TABLET, CHEWABLE ORAL DAILY
Status: DISCONTINUED | OUTPATIENT
Start: 2021-11-16 | End: 2021-11-24 | Stop reason: HOSPADM

## 2021-11-16 RX ORDER — SODIUM CHLORIDE 0.9 % (FLUSH) 0.9 %
5-40 SYRINGE (ML) INJECTION PRN
Status: DISCONTINUED | OUTPATIENT
Start: 2021-11-16 | End: 2021-11-22 | Stop reason: SDUPTHER

## 2021-11-16 RX ORDER — ACETAMINOPHEN 650 MG/1
650 SUPPOSITORY RECTAL EVERY 6 HOURS PRN
Status: DISCONTINUED | OUTPATIENT
Start: 2021-11-16 | End: 2021-11-24 | Stop reason: HOSPADM

## 2021-11-16 RX ORDER — ONDANSETRON 4 MG/1
4 TABLET, ORALLY DISINTEGRATING ORAL EVERY 8 HOURS PRN
Status: DISCONTINUED | OUTPATIENT
Start: 2021-11-16 | End: 2021-11-24 | Stop reason: HOSPADM

## 2021-11-16 RX ORDER — NALOXONE HYDROCHLORIDE 0.4 MG/ML
0.4 INJECTION, SOLUTION INTRAMUSCULAR; INTRAVENOUS; SUBCUTANEOUS PRN
Status: DISCONTINUED | OUTPATIENT
Start: 2021-11-16 | End: 2021-11-24 | Stop reason: HOSPADM

## 2021-11-16 RX ADMIN — LEVETIRACETAM 250 MG: 500 TABLET, FILM COATED ORAL at 20:41

## 2021-11-16 RX ADMIN — GABAPENTIN 400 MG: 400 CAPSULE ORAL at 01:51

## 2021-11-16 RX ADMIN — FERROUS GLUCONATE 324 MG: 324 TABLET ORAL at 10:05

## 2021-11-16 RX ADMIN — HYDROMORPHONE HYDROCHLORIDE 0.5 MG: 1 INJECTION, SOLUTION INTRAMUSCULAR; INTRAVENOUS; SUBCUTANEOUS at 06:10

## 2021-11-16 RX ADMIN — FENTANYL CITRATE 25 MCG: 50 INJECTION, SOLUTION INTRAMUSCULAR; INTRAVENOUS at 01:51

## 2021-11-16 RX ADMIN — HYDROMORPHONE HYDROCHLORIDE 0.5 MG: 1 INJECTION, SOLUTION INTRAMUSCULAR; INTRAVENOUS; SUBCUTANEOUS at 23:20

## 2021-11-16 RX ADMIN — METOPROLOL TARTRATE 25 MG: 25 TABLET, FILM COATED ORAL at 01:51

## 2021-11-16 RX ADMIN — HYDROMORPHONE HYDROCHLORIDE 0.5 MG: 1 INJECTION, SOLUTION INTRAMUSCULAR; INTRAVENOUS; SUBCUTANEOUS at 10:13

## 2021-11-16 RX ADMIN — LEVETIRACETAM 250 MG: 500 TABLET, FILM COATED ORAL at 01:50

## 2021-11-16 RX ADMIN — GABAPENTIN 400 MG: 400 CAPSULE ORAL at 10:05

## 2021-11-16 RX ADMIN — ATORVASTATIN CALCIUM 40 MG: 40 TABLET, FILM COATED ORAL at 01:51

## 2021-11-16 RX ADMIN — QUETIAPINE FUMARATE 100 MG: 100 TABLET ORAL at 01:50

## 2021-11-16 RX ADMIN — SODIUM CHLORIDE: 9 INJECTION, SOLUTION INTRAVENOUS at 10:29

## 2021-11-16 RX ADMIN — ASPIRIN 81 MG: 81 TABLET, CHEWABLE ORAL at 10:05

## 2021-11-16 RX ADMIN — SODIUM CHLORIDE: 9 INJECTION, SOLUTION INTRAVENOUS at 23:19

## 2021-11-16 RX ADMIN — GABAPENTIN 400 MG: 400 CAPSULE ORAL at 14:00

## 2021-11-16 RX ADMIN — HEPARIN SODIUM 1170 UNITS/HR: 10000 INJECTION, SOLUTION INTRAVENOUS at 16:52

## 2021-11-16 RX ADMIN — PANTOPRAZOLE SODIUM 40 MG: 40 TABLET, DELAYED RELEASE ORAL at 10:05

## 2021-11-16 RX ADMIN — HEPARIN SODIUM 1370 UNITS/HR: 10000 INJECTION, SOLUTION INTRAVENOUS at 21:13

## 2021-11-16 RX ADMIN — QUETIAPINE FUMARATE 100 MG: 100 TABLET ORAL at 20:41

## 2021-11-16 RX ADMIN — LEVETIRACETAM 250 MG: 500 TABLET, FILM COATED ORAL at 10:05

## 2021-11-16 RX ADMIN — GABAPENTIN 400 MG: 400 CAPSULE ORAL at 20:41

## 2021-11-16 RX ADMIN — ATORVASTATIN CALCIUM 40 MG: 40 TABLET, FILM COATED ORAL at 20:41

## 2021-11-16 RX ADMIN — HEPARIN SODIUM 3900 UNITS: 1000 INJECTION INTRAVENOUS; SUBCUTANEOUS at 21:10

## 2021-11-16 RX ADMIN — HYDROMORPHONE HYDROCHLORIDE 0.5 MG: 1 INJECTION, SOLUTION INTRAMUSCULAR; INTRAVENOUS; SUBCUTANEOUS at 14:00

## 2021-11-16 RX ADMIN — METOPROLOL TARTRATE 25 MG: 25 TABLET, FILM COATED ORAL at 10:05

## 2021-11-16 RX ADMIN — HYDROMORPHONE HYDROCHLORIDE 0.5 MG: 1 INJECTION, SOLUTION INTRAMUSCULAR; INTRAVENOUS; SUBCUTANEOUS at 19:20

## 2021-11-16 RX ADMIN — METOPROLOL TARTRATE 25 MG: 25 TABLET, FILM COATED ORAL at 20:41

## 2021-11-16 ASSESSMENT — PAIN DESCRIPTION - PROGRESSION
CLINICAL_PROGRESSION: NOT CHANGED

## 2021-11-16 ASSESSMENT — PAIN DESCRIPTION - ONSET
ONSET: ON-GOING

## 2021-11-16 ASSESSMENT — PAIN DESCRIPTION - LOCATION
LOCATION: LEG

## 2021-11-16 ASSESSMENT — PAIN DESCRIPTION - FREQUENCY
FREQUENCY: CONTINUOUS

## 2021-11-16 ASSESSMENT — PAIN DESCRIPTION - DESCRIPTORS
DESCRIPTORS: BURNING
DESCRIPTORS: BURNING;THROBBING
DESCRIPTORS: BURNING
DESCRIPTORS: BURNING
DESCRIPTORS: THROBBING
DESCRIPTORS: BURNING;THROBBING

## 2021-11-16 ASSESSMENT — PAIN DESCRIPTION - PAIN TYPE
TYPE: ACUTE PAIN

## 2021-11-16 ASSESSMENT — PAIN - FUNCTIONAL ASSESSMENT
PAIN_FUNCTIONAL_ASSESSMENT: PREVENTS OR INTERFERES SOME ACTIVE ACTIVITIES AND ADLS

## 2021-11-16 ASSESSMENT — PAIN SCALES - WONG BAKER
WONGBAKER_NUMERICALRESPONSE: 0
WONGBAKER_NUMERICALRESPONSE: 6

## 2021-11-16 ASSESSMENT — PAIN SCALES - GENERAL
PAINLEVEL_OUTOF10: 6
PAINLEVEL_OUTOF10: 6
PAINLEVEL_OUTOF10: 8
PAINLEVEL_OUTOF10: 9
PAINLEVEL_OUTOF10: 8
PAINLEVEL_OUTOF10: 6
PAINLEVEL_OUTOF10: 8
PAINLEVEL_OUTOF10: 8
PAINLEVEL_OUTOF10: 0

## 2021-11-16 ASSESSMENT — PAIN DESCRIPTION - ORIENTATION
ORIENTATION: RIGHT

## 2021-11-16 NOTE — CONSULTS
CLI with worsening symptoms  -Limb Ischemia, Peripheral Arterial Disease and Claudication    Will need to assess vascular anatomy  Will need to review SYBIL and Doppler  Aggressive medical therapy with ASA, Statin   Will consider DAPT  Smoking cessatione   Care of foot and wound, consider podiatry consult  Needs invasive diagnostic peripheral angiography to establish anatomy and level of disease  Will consider revascularization after diagnostic angiography

## 2021-11-16 NOTE — PROGRESS NOTES
Call from pharmacy due to PTT  Being >248. Pt on a heparin and I am told to stop the drip for 1 hr and restart back at 6:60am at a rate of 1470 units per hr.

## 2021-11-16 NOTE — H&P
Hospital Medicine History & Physical      PCP: KATHY Hester - CNP    Date of Admission: 11/15/2021    Date of Service: Pt seen/examined on 11/15/2021 and Admitted to Inpatient     Chief Complaint: Right lower extremity pain worsening, erythema, swelling      History Of Present Illness: The patient is a 61 y.o. male with past medical history as below who presents to [facility] with concern for the last week at least he has had worsening pain to his right lower extremity, was initially slightly present on excessive movement but has been worsening even with rest and has been also notably developing some level of discoloration to his right lower extremity and some erythema and swelling. Notes that the pain mainly became more persistent and excruciating for the last 3 to 4 days. He does note that he has a previous stent to that leg that was done previously by interventional cardiology in this facility. Feels as though potentially it may be occluded. Apart from the symptoms of the right lower extremity, patient denies other symptoms of fever, chills, dizziness, syncope, chest pain, shortness of breath, cough or congestion, dysuria, blood in urine/stool/sputum, recent injuries to the area, abdominal pain/nausea/vomiting/diarrhea. Past Medical History:        Diagnosis Date    Acute cerebral infarction ([facility] [address] 05/2020    R posterior frontal lobe    Alcohol withdrawal ([facility] [address] 05/2020    Bilateral carotid artery stenosis 05/07/2020    bilat 50-79% stenosis    Hepatitis C antibody positive in blood 05/16/2020    History of bronchitis     History of DVT of lower extremity 2018    RLL, no previous scans to know whether supf or dvt. no coumadin. put on plavix.     Hypertension     NSTEMI (non-ST elevated myocardial infarction) ([facility] [address] 05/07/2020    3VD    PAD (peripheral artery disease) ([facility] [address]     Respiratory compromise 05/2020    chemical exposure at work, seen at [facility], covid neg, given steroids    S/p nephrectomy 5    age 17, pt not sure why     Tattoos        Past Surgical History:        Procedure Laterality Date    CARDIAC CATHETERIZATION  05/07/2020    Dr. Kristine Shultz - w/placement of IABP    CORONARY ARTERY BYPASS GRAFT N/A 5/8/2020    Dr. Quang Escalante. Robles - urgent x4 (LIMA-LAD, L SVG-D1, SVG-OM, SVG-PDA)    GASTROSTOMY TUBE PLACEMENT N/A 5/27/2020    PERCUTANEOUS ENDOSCOPIC GASTROSTOMY TUBE PLACEMENT performed by Essence Ortega MD at [address] N/A 7/10/2020    REMOVAL  GASTROSTOMY FEEDING TUBE  (75902) performed by Essence Ortega MD at [address] Left 05/29/2020    Dr. Carolina Costa, IR - US guided, 1300 ml drained    TOTAL NEPHRECTOMY Left     17 yo - pt doesn't know why   Hussein Curb  05/27/2020    Dr. Yara Luis - w/bronchoscopy    TRANSESOPHAGEAL ECHOCARDIOGRAM  05/08/2020    during CABG       Medications Prior to Admission:    Prior to Admission medications    Medication Sig Start Date End Date Taking?  Authorizing Provider   atorvastatin (LIPITOR) 40 MG tablet TAKE 1 TABLET BY MOUTH EVERY DAY AT NIGHT 10/19/21  Yes Mila Perla MD   aspirin 81 MG chewable tablet TAKE 1 TABLET BY MOUTH EVERY DAY 9/28/21  Yes Francis Mullen MD   rivaroxaban (XARELTO) 2.5 MG TABS tablet Take 1 tablet by mouth 2 times daily 2/5/21  Yes Francis Mullen MD   pantoprazole (PROTONIX) 40 MG tablet Take 40 mg by mouth daily   Yes Historical Provider, MD   QUEtiapine (SEROQUEL) 100 MG tablet Take 100 mg by mouth nightly   Yes Historical Provider, MD   senna-docusate (Vanetta Macleod) 8.6-50 MG per tablet Take 1 tablet by mouth nightly   Yes Historical Provider, MD   tiZANidine (ZANAFLEX) 4 MG tablet Take 4 mg by mouth nightly    Yes Historical Provider, MD   acetaminophen (TYLENOL) 500 MG tablet Take 1,000 mg by mouth every 6 hours as needed for Pain   Yes Historical Provider, MD   ferrous gluconate (FERGON) 324 (38 Fe) MG tablet Take 324 mg by mouth daily (with breakfast)   Yes Historical Provider, MD   gabapentin (NEURONTIN) 800 MG tablet Take 800 mg by mouth 3 times daily. Yes Historical Provider, MD   levETIRAcetam (KEPPRA) 250 MG tablet Take 250 mg by mouth 2 times daily    Yes Historical Provider, MD   metoprolol tartrate (LOPRESSOR) 25 MG tablet Take 25 mg by mouth 2 times daily   Yes Historical Provider, MD       Allergies:  Patient has no known allergies. Social History:  The patient currently lives home    TOBACCO:   reports that he quit smoking about 2 years ago. His smoking use included cigarettes. He has never used smokeless tobacco.  ETOH:   reports current alcohol use. Family History:  Reviewed in detail and negative for DM, Early CAD, Cancer, CVA. Positive as follows:        Problem Relation Age of Onset    Heart Disease Mother          of MI age 36    Heart Disease Brother         MI mid 46s    Heart Disease Father          of MI age 68    Heart Disease Paternal Aunt         MI in her 46s       REVIEW OF SYSTEMS:   as noted in the HPI. All other systems reviewed and negative. PHYSICAL EXAM:    /76   Pulse 72   Temp 97.6 °F (36.4 °C) (Oral)   Resp 15   Ht 6' 1\" (1.854 m)   Wt 218 lb 0.6 oz (98.9 kg)   SpO2 94%   BMI 28.77 kg/m²     General appearance: Mild distress due to lower leg pain, no acute respiratory distress, conversational, alert and oriented x4  HEENT Normal cephalic, atraumatic without obvious deformity. Pupils equal, round, and reactive to light. Extra ocular muscles intact. Conjunctivae/corneas clear. Dry mucous member  Neck: Supple no JVD  Lungs: Clear to auscultation, bilaterally without Rales/Wheezes/Rhonchi with good respiratory effort.   Heart: Regular rate and rhythm with Normal S1/S2 without murmurs, rubs or gallops, point of maximum impulse non-displaced  Abdomen: Soft, non-tender or non-distended without rigidity or guarding and positive bowel sounds all four quadrants. Extremities: Left lower extremity demonstrates below the knee amputation with a healing wound to that base but is covered with a gauze and is not demonstrating any drainage. Right lower extremity is more concerning and towards the foot upwards long-term of the knee there is erythema and some slight discoloration mainly to the toes but not entirely black or purple in color just seem to have decreased color in general.  Dorsalis pedis and posterior tibial pulses can be palpated at this time although seem to be somewhat diminished. Right lower extremity around this area of discoloration is mild to moderately tender on palpation but there is also noted trace to 1+ lower extremity edema to this area  Skin: Erythema to the right lower extremity has some diminished color to the distal portion of the lower extremity  Neurologic: Grossly intact neurologic  Mental status: Alert, oriented, thought content appropriate. Capillary Refill: Acceptable  < 3 seconds  Peripheral Pulses: Pulses are diminished but are felt to the right lower extremity as noted above      CTA abdominal aorta with bilateral runoff with IV contrast:  Vascular-       Aortoiliac inflow is preserved.       Left below-the-knee amputation is noted. Gweneth Darby is severe, occlusive disease   in the left femoropopliteal tibial segment.       Right severe femoropopliteal segment disease with occlusion by the mid   superficial femoral artery.  Popliteal segment is occluded.  Trifurcation   vessels are reconstituted with runoff via the anterior tibial and posterior   tibial arteries.       Nonvascular-       No acute abdominopelvic abnormality is identified.       Solitary right kidney.       Mild enlargement of the prostate gland.       Bilateral inguinal hernias, including sigmoid colon content on the left. There is no incarceration. CBC   Recent Labs     11/15/21  1707 11/16/21  0340   WBC 6.1 6.7   HGB 14.0 12.8*   HCT 42.5 40.0*    223      RENAL  Recent Labs     11/15/21  1707 11/16/21  0340    140   K 4.4 4.8    103   CO2 28 26   BUN 17 16   CREATININE 1.1 1.2     LFT'S  Recent Labs     11/15/21  1707   AST 21   ALT 25   BILITOT <0.2   ALKPHOS 79     COAG  No results for input(s): INR in the last 72 hours.   CARDIAC ENZYMES  Recent Labs     11/15/21  1707   CKTOTAL 68       U/A:    Lab Results   Component Value Date    COLORU YELLOW 05/28/2020    WBCUA 2 05/28/2020    RBCUA 10 05/28/2020    CLARITYU Clear 05/28/2020    SPECGRAV 1.019 05/28/2020    LEUKOCYTESUR Negative 05/28/2020    BLOODU Negative 05/28/2020    GLUCOSEU Negative 05/28/2020       ABG    Lab Results   Component Value Date    XRI2HWY 28.9 06/04/2020    BEART 5.8 06/04/2020    S8ZZKZMZ 95.4 06/04/2020    PHART 7.522 06/04/2020    EVP0MOG 35.2 06/04/2020    PO2ART 68.9 06/04/2020    RLO6QJD 30.0 06/04/2020           Active Hospital Problems    Diagnosis Date Noted    Acute pain of right lower extremity [M79.604] 11/15/2021    Solitary kidney, acquired [Z90.5] 11/15/2021    Pain of right lower extremity due to ischemia [M79.604, I99.8] 11/15/2021    PAD (peripheral artery disease) ([facility] [address] [I73.9] 10/22/2021    Hyperlipidemia LDL goal <70 [E78.5] 10/22/2021    Essential hypertension [I10] 10/22/2021    CAD in native artery [I25.10]     S/P CABG (coronary artery bypass graft) [Z95.1]          PHYSICIANS CERTIFICATION:    I certify that Pinky Drake is expected to be hospitalized for greater than 2 midnights based on the following assessment and plan:      ASSESSMENT/PLAN:  · Acute pain of right lower extremity  · Pain of right lower extremity due to ischemia  · PAD  · Hypertension  · Hyperlipidemia  · CAD  · Solitary kidney, acquired    Plan:  · CTA findings are noted as above, interventional cardiology was notified and will consider seeing patient in the morning  · Cardiology consult placed  · Patient has known solitary kidney but is uncertain as to why it was removed in the past, apparently was removed in 1970, left kidney is still intact, potentially patient has some component of acute on chronic kidney disease at this time as 1 year ago his creatinine seem to be at baseline around 0.5, was about 1.1 in January and again is 1.1 at this time but he does not see any nephrologist  · Nephrology is consulted for DANY on CKD and solitary kidney with recent receiving of a contrast bolus  · Start patient on heparin IV infusion  · Dilaudid as needed every 4 on a pain scale for leg pain  · Restart patient's home medications, did reduce some of the doses of the gabapentin  · Making patient n.p.o. at midnight  · Repeat labs in the morning  · Per discussion with patient, he wishes for DNR/DNI CODE STATUS but is okay with vasopressors and central line and blood if needed, limited code is in place      DVT Prophylaxis: Heparin IV infusion  Diet: Diet NPO Exceptions are: Ice Chips  Code Status: Limited  PT/OT Eval Status: Ambulatory    Dispo -pending clinical course       Hector Maxwell DO    Thank you KATHY Pack CNP for the opportunity to be involved in this patient's care. If you have any questions or concerns please feel free to contact me at [phone].

## 2021-11-16 NOTE — CONSULTS
[facility]  Cardiology Consult    Huseyin Mckeon  1955 November 16, 2021      Reason for Referral: Limb ischemia    CC: Right leg pain      Subjective:     History of Present Illness:    Huseyin Mckeon is a 61 y.o. patient with a PMH significant for peripheral artery disease, coronary disease presented with complaints of right leg pain. Right leg lower extremity pain increasing erythema of the foot. Pain 5/10 intensity ongoing for 1 week increasing in severity for past 2 to 3 days. No open wounds seen. No fever chills rigors. Past Medical History:   has a past medical history of Acute cerebral infarction ([facility] [address] Alcohol withdrawal ([facility] [address] Bilateral carotid artery stenosis, Hepatitis C antibody positive in blood, History of bronchitis, History of DVT of lower extremity, Hypertension, NSTEMI (non-ST elevated myocardial infarction) ([facility] [address] PAD (peripheral artery disease) ([facility] [address] Respiratory compromise, S/p nephrectomy, and Tattoos. Surgical History:   has a past surgical history that includes total nephrectomy (Left); Coronary artery bypass graft (N/A, 5/8/2020); tracheostomy (05/27/2020); transesophageal echocardiogram (05/08/2020); Cardiac catheterization (05/07/2020); thoracentesis (Left, 05/29/2020); Gastrostomy tube placement (N/A, 5/27/2020); Stomach surgery (N/A, 7/10/2020); and Leg amputation below knee. Social History:   reports that he quit smoking about 2 years ago. His smoking use included cigarettes. He has never used smokeless tobacco. He reports current alcohol use. He reports that he does not use drugs. Family History:  family history includes Heart Disease in his brother, father, mother, and paternal aunt. Home Medications:  Were reviewed and are listed in nursing record and/or below  Prior to Admission medications    Medication Sig Start Date End Date Taking?  Authorizing Provider   atorvastatin (LIPITOR) 40 MG tablet TAKE 1 TABLET BY MOUTH EVERY DAY AT NIGHT 10/19/21  Yes Sherrian Epley, MD   aspirin 81 MG chewable tablet TAKE 1 TABLET BY MOUTH EVERY DAY 9/28/21  Yes Joshua Nieto MD   rivaroxaban (XARELTO) 2.5 MG TABS tablet Take 1 tablet by mouth 2 times daily 2/5/21  Yes Joshua Nieto MD   pantoprazole (PROTONIX) 40 MG tablet Take 40 mg by mouth daily   Yes Historical Provider, MD   QUEtiapine (SEROQUEL) 100 MG tablet Take 100 mg by mouth nightly   Yes Historical Provider, MD   senna-docusate (Willodean Karuna) 8.6-50 MG per tablet Take 1 tablet by mouth nightly   Yes Historical Provider, MD   tiZANidine (ZANAFLEX) 4 MG tablet Take 4 mg by mouth nightly    Yes Historical Provider, MD   acetaminophen (TYLENOL) 500 MG tablet Take 1,000 mg by mouth every 6 hours as needed for Pain   Yes Historical Provider, MD   ferrous gluconate (FERGON) 324 (38 Fe) MG tablet Take 324 mg by mouth daily (with breakfast)   Yes Historical Provider, MD   gabapentin (NEURONTIN) 800 MG tablet Take 800 mg by mouth 3 times daily.     Yes Historical Provider, MD   levETIRAcetam (KEPPRA) 250 MG tablet Take 250 mg by mouth 2 times daily    Yes Historical Provider, MD   metoprolol tartrate (LOPRESSOR) 25 MG tablet Take 25 mg by mouth 2 times daily   Yes Historical Provider, MD        CURRENT Medications:  atorvastatin (LIPITOR) tablet 40 mg, Nightly  aspirin chewable tablet 81 mg, Daily  ferrous gluconate (FERGON) tablet 324 mg, Daily with breakfast  gabapentin (NEURONTIN) capsule 400 mg, TID  levETIRAcetam (KEPPRA) tablet 250 mg, BID  pantoprazole (PROTONIX) tablet 40 mg, Daily  metoprolol tartrate (LOPRESSOR) tablet 25 mg, BID  QUEtiapine (SEROQUEL) tablet 100 mg, Nightly  naloxone (NARCAN) injection 0.4 mg, PRN  sodium chloride flush 0.9 % injection 5-40 mL, 2 times per day  sodium chloride flush 0.9 % injection 5-40 mL, PRN  0.9 % sodium chloride infusion, PRN  ondansetron (ZOFRAN-ODT) disintegrating tablet 4 mg, Q8H PRN   Or  ondansetron (ZOFRAN) injection 4 mg, Q6H PRN  acetaminophen (TYLENOL) tablet 650 mg, Q6H PRN   Or  acetaminophen (TYLENOL) suppository 650 mg, Q6H PRN  0.9 % sodium chloride infusion, Continuous  heparin 25,000 unit in sodium chloride 0.45% 250 mL (premix) infusion, Continuous  HYDROmorphone (DILAUDID) injection 0.25 mg, Q4H PRN   Or  HYDROmorphone (DILAUDID) injection 0.5 mg, Q4H PRN        Allergies:  Patient has no known allergies. Review of Systems: SEE HPI   · Constitutional: no unanticipated weight loss. There's been no change in energy level, sleep pattern, or activity level. No fevers, chills. · Eyes: No visual changes or diplopia. No scleral icterus. · ENT: No Headaches, hearing loss or vertigo. No mouth sores or sore throat. · Cardiovascular: No Chest pain, tightness or discomfort.  No Shortness of breath. No Dyspnea on exertion, Orthopnea, Paroxysmal nocturnal dyspnea or breathlessness at rest.   No Palpitations.  No Syncope ('blackouts', 'faints', 'collapse') or dizziness. · Respiratory: No cough or wheezing, no sputum production. No hematemesis. · Gastrointestinal: No abdominal pain, appetite loss, blood in stools. No change in bowel or bladder habits. · Genitourinary: No dysuria, trouble voiding, or hematuria. · Musculoskeletal:  No gait disturbance, no joint complaints. · Integumentary: No rash or pruritis. · Neurological: No headache, diplopia, change in muscle strength, numbness or tingling. · Psychiatric: No anxiety or depression. · Endocrine: No temperature intolerance. No excessive thirst, fluid intake, or urination. No tremor. · Hematologic/Lymphatic: No abnormal bruising or bleeding, blood clots or swollen lymph nodes. · Allergic/Immunologic: No nasal congestion or hives. Objective:     PHYSICAL EXAM:      Vitals:    11/16/21 0753   BP: 128/89   Pulse: 70   Resp:    Temp: 97.5 °F (36.4 °C)   SpO2: 97%    Weight: 218 lb 0.6 oz (98.9 kg)       General Appearance:  Alert, cooperative, no distress, appears stated age.    Head: Normocephalic, without obvious abnormality, atraumatic. Eyes:  Pupils equal and round. No scleral icterus. Mouth: Moist mucosa, no pharyngeal erythema. Nose: Nares normal. No drainage or sinus tenderness. Neck: Supple, symmetrical, trachea midline. No adenopathy. No tenderness/mass/nodules. No carotid bruit or elevated JVD. Lungs:   Respiratory Effort: Normal   Auscultation: Clear to auscultation bilaterally, respirations unlabored. No wheeze, rales   Chest Wall:  No tenderness or deformity. Cardiovascular:    Pulses  Palpation: normal   Ascultation: Regular rate, S1/ S2 normal. No murmur, rub, or gallop. Right lower extremity pulses nonpalpable   Abdomen and Gastrointestinal:   Soft, non-tender, bowel sounds active. Liver and Spleen  Masses   Musculoskeletal: No muscle wasting  Back  Gait   Extremities: Extremities normal, atraumatic. No cyanosis or edema. No cyanosis clubbing       Skin: Inspection and palpation performed, no rashes or lesions. Pysch: Normal mood and affect.  Alert and oriented to time place person   Neurologic: Normal gross motor and sensory exam.       Labs     All labs have been reviewed    Lab Results   Component Value Date    WBC 6.7 11/16/2021    RBC 4.82 11/16/2021    HGB 12.8 11/16/2021    HCT 40.0 11/16/2021    MCV 83.0 11/16/2021    RDW 15.5 11/16/2021     11/16/2021     Lab Results   Component Value Date     11/16/2021    K 4.8 11/16/2021    K 4.5 05/24/2020     11/16/2021    CO2 26 11/16/2021    BUN 16 11/16/2021    CREATININE 1.2 11/16/2021    GFRAA >60 11/16/2021    AGRATIO 1.1 11/15/2021    LABGLOM >60 11/16/2021    GLUCOSE 116 11/16/2021    PROT 8.2 11/15/2021    CALCIUM 9.1 11/16/2021    BILITOT <0.2 11/15/2021    ALKPHOS 79 11/15/2021    AST 21 11/15/2021    ALT 25 11/15/2021     No results found for: [facility]  Lab Results   Component Value Date    LABA1C 5.9 05/07/2020     Lab Results   Component Value Date    CKTOTAL 68 11/15/2021    TROPONINI 0.11 (H) 05/07/2020       Cardiac, Vascular and Imaging Data all Personally Reviewed in Detail by Myself      EKG:     Echocardiogram:     Stress Test:     Cath:Procedures Performed:  - Left femoral artery access under fluroscopic and ultrasound guidance  - Left iliofemoral arteriogram.  - Right anterior tibial access under ultrasound guidance   - Right lower extremity arteriogram  - Selective catheterization of right common femoral artery. - IVUS SFA/popliteal artery   - Percutaneous balloon angioplasty of right anterior tibial artery using a 3.0 mm x 120 mm balloon . - Percutaneous balloon angioplasty of right popliteal artery using a 5.0 mm x 120 mm balloon. - Percutaneous balloon angioplasty of right superficial femoral artery using a 5.5 mm x 120 mm balloon. - Percutaneous insertion of self-expanding Supera stent  (5.5 mm x 120 mm) into right superficial femoral artery. - Completion arteriogram.    Other imaging:     CTA    Vascular-       Aortoiliac inflow is preserved.       Left below-the-knee amputation is noted.  There is severe, occlusive disease   in the left femoropopliteal tibial segment.       Right severe femoropopliteal segment disease with occlusion by the mid   superficial femoral artery.  Popliteal segment is occluded.  Trifurcation   vessels are reconstituted with runoff via the anterior tibial and posterior   tibial arteries.       Nonvascular-       No acute abdominopelvic abnormality is identified.       Solitary right kidney.       Mild enlargement of the prostate gland.          Assessment and Plan     --Limb Ischemia, Peripheral Arterial Disease and Claudication  Reviewed comprehensive medical hx and physical examination  Decreased pulses in the right lower extremity  Will need to assess vascular anatomy   review SYBIL and Doppler  Aggressive medical therapy with ASA, Statin   Will consider DAPT  Smoking cessation   consider podiatry consult  Needs invasive diagnostic peripheral

## 2021-11-16 NOTE — CONSULTS
[address] [address]                                  CONSULTATION    PATIENT NAME: Da Vicente                      :        1955  MED REC NO:   [id number]                          ROOM:       3115  ACCOUNT NO:   [de-identified]                           ADMIT DATE: 11/15/2021  PROVIDER:     Rianna Stockton MD    CONSULT DATE:  2021    REASON FOR CONSULTATION:  Acute kidney injury. HISTORY OF PRESENTING ILLNESS:  He is a 60-year-old  male with  past medical history significant for hypertension, peripheral vascular  disease, DVT, history of hepatitis C (?) per record, CVA, history of  nephrectomy, came to ER complaining of right lower extremity pain. Limb  ischemia was suspected and the patient underwent CT angiogram.   Cardiology had been consulted for further intervention. The renal  consultation has been called because of acute kidney injury. At the time of consultation, the patient is feeling better, denies any  chest pain, shortness of breath, nausea or vomiting, fever, chills or  rigors. Denies any urinary symptoms and admits improvement in the leg  pain. Apparently, symptoms started three to four days ago. The patient  denies any history of kidney stone or use of NSAIDs. PAST MEDICAL HISTORY:  1. Peripheral vascular disease. 2.  DVT. 3.  History of hepatitis C (?)  4. History of chronic bronchitis. 5.  Hypertension. 6.  CVA. 7.  History of alcohol abuse. PAST SURGICAL HISTORY:  1.  Status post nephrectomy. 2.  Status post gastrostomy tube placement. 3.  Cardiac catheterization. OUTPATIENT MEDICATIONS:  Include Lipitor, aspirin, Xarelto, Protonix,  Seroquel, Zanaflex, Neurontin, Keppra. ALLERGIES:  None. SOCIAL HISTORY:  Does not smoke or drink. Used to smoke, quit a couple  of years ago. FAMILY HISTORY:  Positive for coronary artery disease and diabetes. No  history of kidney disease in the family. REVIEW OF SYSTEMS:  Denies any headache. No hearing problem. No vision  problem. No nausea, vomiting or diarrhea. No chest pain, shortness of  breath, or dyspnea on exertion. Denies any weakness, dizziness, or  syncope. Right lower extremity pain for the last three to four days. Feeling a bit anxious. PHYSICAL EXAMINATION:  GENERAL:  Laying in bed, looks comfortable. VITAL SIGNS:  Blood pressure 128/89, pulse 70, temperature 97.5. HEENT:  Pupils reactive to the light. CHEST:  Decreased breath sounds in both bases. CVS:  S1, S2 audible. Regular rate and rhythm. ABDOMEN:  Soft, nontender. Positive bowel sounds. EXTREMITIES:  Trace edema. DIAGNOSTIC DATA:  CT angiogram showed severe occlusion of the left  fem-pop and tibial segment. LABORATORY DATA:  Sodium 140, potassium 4.8, chloride 103, bicarb 26,  BUN 16, creatinine 1.2. WBC 6.7, hemoglobin 12.8. IMPRESSION:  1. Acute kidney injury, most likely secondary to contrast-induced  nephropathy. 2.  Peripheral vascular disease with limb-threatening ischemia. 3.  History of nephrectomy. PLAN:  1. Daily weight. 2.  Strict I's and O's.  3.  Check the ultrasound of the kidney. 4.  Check the UA. 5.  Gentle hydration. 6.  Further intervention per Cardiology. We will follow the patient from a renal standpoint.         Mariam Velasco MD    D: 11/16/2021 10:26:32       T: 11/16/2021 13:22:29     [email or web address]  Job#: [id number]     Doc#: [id number]    CC:

## 2021-11-16 NOTE — PLAN OF CARE
Problem: Skin Integrity:  Goal: Will show no infection signs and symptoms  Description: Will show no infection signs and symptoms  Outcome: Ongoing  Note: Pt is free of signs and symptoms of infection. Vital signs stable. Will monitor. Problem: Skin Integrity:  Goal: Absence of new skin breakdown  Description: Absence of new skin breakdown  Outcome: Ongoing  Note: Pt assessed for skin break down. Skin was warm and dry to touch. Pt cannot regulate head of bed without assistance. Pt being turned or repositioned at least every 2 hours to prevent skin breakdown. Will continue to monitor and assess. Problem: Falls - Risk of:  Goal: Will remain free from falls  Description: Will remain free from falls  Outcome: Ongoing  Note: Fall risk assessment completed. Fall precautions in place. Bed in lowest position, wheels locked, bed/chair exit alarm in place, call light within reach, and non skid footwear on. Walkway free of clutter. Pt alert and oriented and able to make needs known. Pt educated to use call light when needing to get up, and pt utilizes call light to make needs known. Will continue to monitor. Problem: Falls - Risk of:  Goal: Absence of physical injury  Description: Absence of physical injury  Outcome: Ongoing  Note: Pt is free of injury. No injury noted. Fall precautions in place. Call light within reach. Will monitor.

## 2021-11-16 NOTE — PROGRESS NOTES
Patient is alert & oriented x4, bedrest, 2/4 bed rails up, bed in lowest position, fall precautions in place, call light within reach. Morning medications given without complications. IV fluid infusing. Heparin infusing. PRN medication given for pain in RLE. Will cont to monitor and reassess.  Electronically signed by Alessio Del Rio RN on 11/16/2021 at 10:24 AM

## 2021-11-16 NOTE — PROGRESS NOTES
Hospitalist Progress Note      PCP: KATHY Malcolm - CNP    Date of Admission: 11/15/2021    Chief Complaint: right foot pain    Hospital Course:     Subjective: pain stable. Waiting on cardiac workup and intervention       Medications:  Reviewed    Infusion Medications    sodium chloride      sodium chloride 75 mL/hr at 11/16/21 1029    heparin (PORCINE) Infusion 1,470 Units/hr (11/16/21 0722)     Scheduled Medications    atorvastatin  40 mg Oral Nightly    aspirin  81 mg Oral Daily    ferrous gluconate  324 mg Oral Daily with breakfast    gabapentin  400 mg Oral TID    levETIRAcetam  250 mg Oral BID    pantoprazole  40 mg Oral Daily    metoprolol tartrate  25 mg Oral BID    QUEtiapine  100 mg Oral Nightly    sodium chloride flush  5-40 mL IntraVENous 2 times per day     PRN Meds: naloxone, sodium chloride flush, sodium chloride, ondansetron **OR** ondansetron, acetaminophen **OR** acetaminophen, HYDROmorphone **OR** HYDROmorphone      Intake/Output Summary (Last 24 hours) at 11/16/2021 1031  Last data filed at 11/16/2021 0542  Gross per 24 hour   Intake --   Output 550 ml   Net -550 ml       Exam:    /89   Pulse 70   Temp 97.5 °F (36.4 °C) (Oral)   Resp 15   Ht 6' 1\" (1.854 m)   Wt 218 lb 0.6 oz (98.9 kg)   SpO2 97%   BMI 28.77 kg/m²     General appearance: No apparent distress, appears stated age and cooperative. HEENT: Pupils equal, round, and reactive to light. Conjunctivae/corneas clear. Neck: Supple, with full range of motion. No jugular venous distention. Trachea midline. Respiratory:  Normal respiratory effort. Clear to auscultation, bilaterally without Rales/Wheezes/Rhonchi. Cardiovascular: Regular rate and rhythm with normal S1/S2 without murmurs, rubs or gallops. Abdomen: Soft, non-tender, non-distended with normal bowel sounds. Musculoskeletal: No clubbing, cyanosis or edema bilaterally. Left leg BKA.   Right foot with tenderness and diffuse hyperemia  Skin: Skin color, texture, turgor normal.  No rashes or lesions. Neurologic:  Neurovascularly intact without any focal sensory/motor deficits. Cranial nerves: II-XII intact, grossly non-focal.  Psychiatric: Alert and oriented, thought content appropriate, normal insight  Capillary Refill: Brisk,< 3 seconds   Peripheral Pulses: +2 palpable, equal bilaterally       Labs:   Recent Labs     11/15/21  1707 11/16/21  0340   WBC 6.1 6.7   HGB 14.0 12.8*   HCT 42.5 40.0*    223     Recent Labs     11/15/21  1707 11/16/21  0340    140   K 4.4 4.8    103   CO2 28 26   BUN 17 16   CREATININE 1.1 1.2   CALCIUM 9.4 9.1     Recent Labs     11/15/21  1707   AST 21   ALT 25   BILITOT <0.2   ALKPHOS 79     No results for input(s): INR in the last 72 hours. Recent Labs     11/15/21  1707   CKTOTAL 68       Urinalysis:      Lab Results   Component Value Date    NITRU Negative 05/28/2020    WBCUA 2 05/28/2020    RBCUA 10 05/28/2020    BLOODU Negative 05/28/2020    SPECGRAV 1.019 05/28/2020    GLUCOSEU Negative 05/28/2020       Radiology:  CTA ABDOMINAL AORTA W BILAT RUNOFF W CONTRAST   Final Result   Vascular-      Aortoiliac inflow is preserved. Left below-the-knee amputation is noted. There is severe, occlusive disease   in the left femoropopliteal tibial segment. Right severe femoropopliteal segment disease with occlusion by the mid   superficial femoral artery. Popliteal segment is occluded. Trifurcation   vessels are reconstituted with runoff via the anterior tibial and posterior   tibial arteries. Nonvascular-      No acute abdominopelvic abnormality is identified. Solitary right kidney. Mild enlargement of the prostate gland. Bilateral inguinal hernias, including sigmoid colon content on the left. There is no incarceration.          US RENAL COMPLETE    (Results Pending)           Assessment/Plan:    Active Hospital Problems    Diagnosis Date Noted    Acute pain of right lower extremity [M79.604] 11/15/2021    Solitary kidney, acquired [Z90.5] 11/15/2021    Pain of right lower extremity due to ischemia [M79.604, I99.8] 11/15/2021    PAD (peripheral artery disease) ([facility] [address] [I73.9] 10/22/2021    Hyperlipidemia LDL goal <70 [E78.5] 10/22/2021    Essential hypertension [I10] 10/22/2021    CAD in native artery [I25.10]     S/P CABG (coronary artery bypass graft) [Z95.1]        Critical limb ischemia:  - heparin drip, statin, ASA  - cardiology to do SYBIL, doppler, angio   - anticipate will need revascular stenting    DANY:  - likely due to contrast-induced nephropathy  - nepho consulted:  Getting ultrasound, check UA   - IVF PRN    Tobacco abuse:  - cessation counseling    H/o CVA, HTN:  Cont metoprolol    DVT Prophylaxis: heparin drip  Diet: Diet NPO Exceptions are: Ice Chips  Code Status: Limited    PT/OT Eval Status: pending vascular intervention    Dispo - Abi Das MD

## 2021-11-16 NOTE — PROGRESS NOTES
Clinical Pharmacy Note  Heparin Dosing Consult    Porfirio Neal is a 61 y.o. male ordered heparin per high dose nomogram by Dr. Magy Beyer. Lab Results   Component Value Date    APTT 27.3 06/04/2020     Lab Results   Component Value Date    HGB 14.0 11/15/2021    HCT 42.5 11/15/2021     11/15/2021    INR 1.13 06/02/2020       Ht Readings from Last 1 Encounters:   11/15/21 6' 1\" (1.854 m)        Wt Readings from Last 1 Encounters:   11/15/21 217 lb 3.2 oz (98.5 kg)        Assessment/Plan:  Initial bolus: 7900 units  Initial infusion rate: 1770 units/hr  Next aPTT: 6 hours post drip initiation    Pharmacy will continue to monitor adjust heparin based on aPTT results using nomogram below:     HIGH DOSE HEPARIN PROTOCOL (DVT/PE)     Initial Bolus: 80 units/kg Max Bolus: 10,000 units       Initial Rate: 18 units/kg/hr Max Initial Rate: 2,750 units/hr     aPTT < 45   Heparin 80 units/kg bolus Increase infusion by 4 units/kg/hr       (maximum 10,000 units)   aPTT 45-59.9   Heparin 40 units/kg bolus Increase infusion by 2 units/kg/hr       (maximum 5,000 units)   aPTT 60-90   No bolus   No change   aPTT 90.1-97.5 No bolus   Decrease infusion by 1 units/kg/hr   aPTT 97.6-105  No bolus   Decrease infusion by 2 units/kg/hr   aPTT > 105    Hold heparin for 1 hour Decrease infusion by 3 units/kg/hr    Obtain aPTT 6 hours after initial bolus and 6 hours after any dose change until two consecutive therapeutic aPTTs are achieved - then daily.     Lv Javier, [facility]  11/15/2021 9:19 PM

## 2021-11-16 NOTE — PROGRESS NOTES
Clinical Pharmacy Note  Heparin Dosing       Lab Results   Component Value Date    APTT 120.0 11/16/2021     Lab Results   Component Value Date    HGB 12.8 11/16/2021    HCT 40.0 11/16/2021     11/16/2021    INR 1.13 06/02/2020       Current Infusion Rate: 1470 units/hr    Plan:  HOLD for 1 hour  Rate: Restart at 0800 units/hr  Next aPTT: 2000 11/16/21    Pharmacy will continue to monitor and adjust based on aPTT results.     Conception FABY Foss [facility]  11/16/2021 2:00 PM

## 2021-11-16 NOTE — PROGRESS NOTES
Pt arrived to room 3115 A/o x4, RA, VSS, w/ c/o pain. Pain medication given per MAR. Assessment completed and pt oriented to room and call light within reach. Bed in low locked position with alarm set. Will continue to monitor.

## 2021-11-16 NOTE — CARE COORDINATION
INITIAL CASE MANAGEMENT ASSESSMENT    Met with patient to assess possible discharge needs. Explained Case Management role/services. Living Situation: Lives [place] a ranch home with his girlfriend and her son and his 15year old granddaughter that he is raising. ADLs: Independent with all ADL's. DME: FWW and walker w/o wheels, shower chair, and wheelchair. PT/OT Recs: No recommendations at this time. Active Services: No home healthcare services at this time. Does go to the wound care clinic at [facility] in [place], [place] every Wednesday for wound care to left BKA. Cancelled this Wednesday appointment (11/17/21) due to inpatient status. Transportation: Drives     Medications: [facility] in [place], [place]    PCP: Javier Rivera. Rena CHAVEZ      HD/PD: N/A    PLAN/COMMENTS: Met with patient to discuss discharge planning. Plan is to discharge to home and to continue outpatient wound care at:  [facility], [address] [place], [place], [address], [phone]. Current visits are every Wednesday. Is agreeable to home healthcare services if needed. Has used [facility] in the past and is good with using again. Girlfriend will provide transportation home. Provided contact information for patient or family to call with any questions. Will follow and assist as needed.     Electronically signed by Teagan Perez RN on 11/16/2021 at 1:57 PM

## 2021-11-16 NOTE — PLAN OF CARE
Problem: Skin Integrity:  Goal: Will show no infection signs and symptoms  Description: Will show no infection signs and symptoms  11/16/2021 0751 by Timothy Espinal RN  Outcome: Ongoing  Note: Pt is free of signs and symptoms of infection. Vital signs stable. Will monitor. Problem: Skin Integrity:  Goal: Absence of new skin breakdown  Description: Absence of new skin breakdown  11/16/2021 0751 by Timothy Espinal RN  Outcome: Ongoing  Note: Pt assessed for skin break down. Skin was warm and dry to touch. Pt cannot regulate head of bed without assistance. Pt being turned or repositioned at least every 2 hours to prevent skin breakdown. Will continue to monitor and assess. Problem: Falls - Risk of:  Goal: Will remain free from falls  Description: Will remain free from falls  11/16/2021 0751 by Timothy Espinal RN  Outcome: Ongoing  Note: Fall risk assessment completed. Fall precautions in place. Bed in lowest position, wheels locked, bed/chair exit alarm in place, call light within reach, and non skid footwear on. Walkway free of clutter. Pt alert and oriented and able to make needs known. Pt educated to use call light when needing to get up, and pt utilizes call light to make needs known. Will continue to monitor. Problem: Falls - Risk of:  Goal: Absence of physical injury  Description: Absence of physical injury  11/16/2021 0751 by Timothy Espinal RN  Outcome: Ongoing  Note: Pt is free of injury. No injury noted. Fall precautions in place. Call light within reach. Will monitor.

## 2021-11-16 NOTE — ACP (ADVANCE CARE PLANNING)
Advance Care Planning     Advance Care Planning Activator (Inpatient)  Conversation Note      Date of ACP Conversation: 11/16/2021     Conversation Conducted with: Patient with Decision Making Capacity    ACP Activator: Santhosh Gan Decision Maker:     Current Designated Health Care Decision Maker:     Primary Decision Maker: Pascual Chester - Domestic Partner - [phone]    Primary Decision Maker: Dharmesh Tran - Child - [phone]      Care Preferences    Ventilation: \"If you were in your present state of health and suddenly became very ill and were unable to breathe on your own, what would your preference be about the use of a ventilator (breathing machine) if it were available to you? \"      Would the patient desire the use of ventilator (breathing machine)?: no    \"If your health worsens and it becomes clear that your chance of recovery is unlikely, what would your preference be about the use of a ventilator (breathing machine) if it were available to you? \"     Would the patient desire the use of ventilator (breathing machine)?: No      Resuscitation  \"CPR works best to restart the heart when there is a sudden event, like a heart attack, in someone who is otherwise healthy. Unfortunately, CPR does not typically restart the heart for people who have serious health conditions or who are very sick. \"    \"In the event your heart stopped as a result of an underlying serious health condition, would you want attempts to be made to restart your heart (answer \"yes\" for attempt to resuscitate) or would you prefer a natural death (answer \"no\" for do not attempt to resuscitate)? \" no       [x] Yes   [] No   Educated Patient / Jose Piotr regarding differences between Advance Directives and portable DNR orders.     Length of ACP Conversation in minutes:  5    Conversation Outcomes:  [x] ACP discussion completed  [] Existing advance directive reviewed with patient; no changes to patient's previously recorded wishes  [] New Advance Directive completed  [] Portable Do Not Rescitate prepared for Provider review and signature  [] POLST/POST/MOLST/MOST prepared for Provider review and signature      Follow-up plan:    [] Schedule follow-up conversation to continue planning  [] Referred individual to Provider for additional questions/concerns   [] Advised patient/agent/surrogate to review completed ACP document and update if needed with changes in condition, patient preferences or care setting    [x] This note routed to one or more involved healthcare providers    Electronically signed by Berna Whelan RN on 11/16/21 at 2:57 PM EST

## 2021-11-16 NOTE — ED NOTES
The patient is alert and oriented, updated on POC. He denies any further needs at this time.      Jamin May RN  11/15/21 3102

## 2021-11-16 NOTE — PROGRESS NOTES
Pt has a critical PTT of 120.  notified.  Electronically signed by Dacia Villagomez RN on 11/16/2021 at 1:36 PM

## 2021-11-16 NOTE — PROGRESS NOTES
Clinical Pharmacy Note  Heparin Dosing       Lab Results   Component Value Date    APTT >248.0 11/16/2021     Lab Results   Component Value Date    HGB 12.8 11/16/2021    HCT 40.0 11/16/2021     11/16/2021    INR 1.13 06/02/2020       Current Infusion Rate: 1770 units/hr    Plan:  HOLD for 1 hour  Rate: Restart at 4018 units/hr  Next aPTT: 1300  11/16/21    Pharmacy will continue to monitor and adjust based on aPTT results.     FABY See [facility]  11/16/2021 5:52 AM

## 2021-11-16 NOTE — PROGRESS NOTES
Pharmacy Medication Reconciliation Note     List of medications patient is currently taking is complete. Source of information:   1. Patient and patient's wife at bedside with medication list from home  2. EMR    Notes regarding home medications:   1. Patient received all of his morning and afternoon home medication doses today before presenting to the ER.       [id number] [facility], Pharmacy Intern  11/15/2021 9:38 PM

## 2021-11-16 NOTE — PROGRESS NOTES
Department of Internal Medicine  Nephrology Progress Note    SUBJECTIVE:  We are following this patient for DANY     REVIEW OF SYSTEMS:  No CP/SOB/DO\E     Physical Exam:    VITALS:  /89   Pulse 70   Temp 97.5 °F (36.4 °C) (Oral)   Resp 15   Ht 6' 1\" (1.854 m)   Wt 218 lb 0.6 oz (98.9 kg)   SpO2 97%   BMI 28.77 kg/m²   24HR INTAKE/OUTPUT:    Intake/Output Summary (Last 24 hours) at 11/16/2021 1055  Last data filed at 11/16/2021 0542  Gross per 24 hour   Intake --   Output 550 ml   Net -550 ml       Constitutional:  Doing well  Respiratory:  CTA  Gastrointestinal:  No  tenderness.   Normal Bowel Sounds  Cardiovascular:  S1, S2 RRR   Edema:  + edema  Lt BKA     DATA:    CBC:  Lab Results   Component Value Date    WBC 6.7 11/16/2021    RBC 4.82 11/16/2021    HGB 12.8 11/16/2021    HCT 40.0 11/16/2021    MCV 83.0 11/16/2021    MCH 26.6 11/16/2021    MCHC 32.0 11/16/2021    RDW 15.5 11/16/2021     11/16/2021    MPV 7.4 11/16/2021     CMP:  Lab Results   Component Value Date     11/16/2021    K 4.8 11/16/2021    K 4.5 05/24/2020     11/16/2021    CO2 26 11/16/2021    BUN 16 11/16/2021    CREATININE 1.2 11/16/2021    GFRAA >60 11/16/2021    AGRATIO 1.1 11/15/2021    LABGLOM >60 11/16/2021    GLUCOSE 116 11/16/2021    PROT 8.2 11/15/2021    CALCIUM 9.1 11/16/2021    BILITOT <0.2 11/15/2021    ALKPHOS 79 11/15/2021    AST 21 11/15/2021    ALT 25 11/15/2021      Hepatic Function Panel:   Lab Results   Component Value Date    ALKPHOS 79 11/15/2021    ALT 25 11/15/2021    AST 21 11/15/2021    PROT 8.2 11/15/2021    BILITOT <0.2 11/15/2021    BILIDIR <0.2 05/25/2020    IBILI see below 05/25/2020      Phosphorus:   Lab Results   Component Value Date    PHOS 4.0 05/11/2020       ASSESSMENT:  Active Problems:    CAD in native artery    S/P CABG (coronary artery bypass graft)    Hyperlipidemia LDL goal <70    PAD (peripheral artery disease) (HCC)    Essential hypertension    Acute pain of right lower extremity    Solitary kidney, acquired    Pain of right lower extremity due to ischemia  Resolved Problems:    * No resolved hospital problems. *      PLAN:  1. DANY most likely BHAVIK. 2. Will do w/u . Check US kidney ,UA,CPK  . Add IVF   3. H/o Lt nephrectomy   4. HTN controlled   5.  Rt LE pain /ischemia plan per cards     Wilmar Allen MD, [facility]

## 2021-11-17 ENCOUNTER — APPOINTMENT (OUTPATIENT)
Dept: CARDIAC CATH/INVASIVE PROCEDURES | Age: 66
DRG: 182 | End: 2021-11-17
Payer: MEDICAID

## 2021-11-17 LAB
ALBUMIN SERPL-MCNC: 3.7 G/DL (ref 3.4–5)
ALBUMIN SERPL-MCNC: 4 G/DL (ref 3.4–5)
ALP BLD-CCNC: 56 U/L (ref 40–129)
ALT SERPL-CCNC: 22 U/L (ref 10–40)
ANION GAP SERPL CALCULATED.3IONS-SCNC: 11 MMOL/L (ref 3–16)
ANION GAP SERPL CALCULATED.3IONS-SCNC: 19 MMOL/L (ref 3–16)
APTT: 43.7 SEC (ref 26.2–38.6)
APTT: 97.5 SEC (ref 26.2–38.6)
AST SERPL-CCNC: 21 U/L (ref 15–37)
BASOPHILS ABSOLUTE: 0.1 K/UL (ref 0–0.2)
BASOPHILS RELATIVE PERCENT: 1.9 %
BILIRUB SERPL-MCNC: <0.2 MG/DL (ref 0–1)
BILIRUBIN DIRECT: <0.2 MG/DL (ref 0–0.3)
BILIRUBIN, INDIRECT: NORMAL MG/DL (ref 0–1)
BUN BLDV-MCNC: 15 MG/DL (ref 7–20)
BUN BLDV-MCNC: 16 MG/DL (ref 7–20)
CALCIUM SERPL-MCNC: 8.9 MG/DL (ref 8.3–10.6)
CALCIUM SERPL-MCNC: 8.9 MG/DL (ref 8.3–10.6)
CHLORIDE BLD-SCNC: 102 MMOL/L (ref 99–110)
CHLORIDE BLD-SCNC: 105 MMOL/L (ref 99–110)
CO2: 15 MMOL/L (ref 21–32)
CO2: 24 MMOL/L (ref 21–32)
CREAT SERPL-MCNC: 0.9 MG/DL (ref 0.8–1.3)
CREAT SERPL-MCNC: 1 MG/DL (ref 0.8–1.3)
EOSINOPHILS ABSOLUTE: 0.3 K/UL (ref 0–0.6)
EOSINOPHILS RELATIVE PERCENT: 4 %
FERRITIN: 54.8 NG/ML (ref 30–400)
GFR AFRICAN AMERICAN: >60
GFR AFRICAN AMERICAN: >60
GFR NON-AFRICAN AMERICAN: >60
GFR NON-AFRICAN AMERICAN: >60
GLUCOSE BLD-MCNC: 101 MG/DL (ref 70–99)
GLUCOSE BLD-MCNC: 89 MG/DL (ref 70–99)
HCT VFR BLD CALC: 37.9 % (ref 40.5–52.5)
HEMOGLOBIN: 12.3 G/DL (ref 13.5–17.5)
IRON SATURATION: 20 % (ref 20–50)
IRON: 47 UG/DL (ref 59–158)
LYMPHOCYTES ABSOLUTE: 1.6 K/UL (ref 1–5.1)
LYMPHOCYTES RELATIVE PERCENT: 24.9 %
MAGNESIUM: 2.1 MG/DL (ref 1.8–2.4)
MCH RBC QN AUTO: 26.6 PG (ref 26–34)
MCHC RBC AUTO-ENTMCNC: 32.6 G/DL (ref 31–36)
MCV RBC AUTO: 81.7 FL (ref 80–100)
MONOCYTES ABSOLUTE: 0.6 K/UL (ref 0–1.3)
MONOCYTES RELATIVE PERCENT: 9.4 %
NEUTROPHILS ABSOLUTE: 4 K/UL (ref 1.7–7.7)
NEUTROPHILS RELATIVE PERCENT: 59.8 %
PDW BLD-RTO: 15.5 % (ref 12.4–15.4)
PHOSPHORUS: 3.2 MG/DL (ref 2.5–4.9)
PHOSPHORUS: 3.6 MG/DL (ref 2.5–4.9)
PLATELET # BLD: 219 K/UL (ref 135–450)
PMV BLD AUTO: 7.5 FL (ref 5–10.5)
POC ACT LR: 294 SEC
POC ACT LR: 335 SEC
POTASSIUM SERPL-SCNC: 4.3 MMOL/L (ref 3.5–5.1)
POTASSIUM SERPL-SCNC: 4.7 MMOL/L (ref 3.5–5.1)
PRO-BNP: 219 PG/ML (ref 0–124)
RBC # BLD: 4.64 M/UL (ref 4.2–5.9)
SODIUM BLD-SCNC: 137 MMOL/L (ref 136–145)
SODIUM BLD-SCNC: 139 MMOL/L (ref 136–145)
TOTAL CK: 109 U/L (ref 39–308)
TOTAL IRON BINDING CAPACITY: 240 UG/DL (ref 260–445)
TOTAL PROTEIN: 6.9 G/DL (ref 6.4–8.2)
WBC # BLD: 6.6 K/UL (ref 4–11)

## 2021-11-17 PROCEDURE — 75710 ARTERY X-RAYS ARM/LEG: CPT

## 2021-11-17 PROCEDURE — 83735 ASSAY OF MAGNESIUM: CPT

## 2021-11-17 PROCEDURE — 2580000003 HC RX 258: Performed by: INTERNAL MEDICINE

## 2021-11-17 PROCEDURE — 36415 COLL VENOUS BLD VENIPUNCTURE: CPT

## 2021-11-17 PROCEDURE — 36247 INS CATH ABD/L-EXT ART 3RD: CPT

## 2021-11-17 PROCEDURE — C1894 INTRO/SHEATH, NON-LASER: HCPCS

## 2021-11-17 PROCEDURE — 94760 N-INVAS EAR/PLS OXIMETRY 1: CPT

## 2021-11-17 PROCEDURE — 2500000003 HC RX 250 WO HCPCS

## 2021-11-17 PROCEDURE — 6360000002 HC RX W HCPCS: Performed by: STUDENT IN AN ORGANIZED HEALTH CARE EDUCATION/TRAINING PROGRAM

## 2021-11-17 PROCEDURE — 83550 IRON BINDING TEST: CPT

## 2021-11-17 PROCEDURE — 2709999900 HC NON-CHARGEABLE SUPPLY

## 2021-11-17 PROCEDURE — C1887 CATHETER, GUIDING: HCPCS

## 2021-11-17 PROCEDURE — B41F1ZZ FLUOROSCOPY OF RIGHT LOWER EXTREMITY ARTERIES USING LOW OSMOLAR CONTRAST: ICD-10-PCS | Performed by: INTERNAL MEDICINE

## 2021-11-17 PROCEDURE — 6360000004 HC RX CONTRAST MEDICATION: Performed by: FAMILY MEDICINE

## 2021-11-17 PROCEDURE — 80069 RENAL FUNCTION PANEL: CPT

## 2021-11-17 PROCEDURE — 6370000000 HC RX 637 (ALT 250 FOR IP): Performed by: NURSE PRACTITIONER

## 2021-11-17 PROCEDURE — 6370000000 HC RX 637 (ALT 250 FOR IP): Performed by: STUDENT IN AN ORGANIZED HEALTH CARE EDUCATION/TRAINING PROGRAM

## 2021-11-17 PROCEDURE — 82728 ASSAY OF FERRITIN: CPT

## 2021-11-17 PROCEDURE — 1200000000 HC SEMI PRIVATE

## 2021-11-17 PROCEDURE — 85025 COMPLETE CBC W/AUTO DIFF WBC: CPT

## 2021-11-17 PROCEDURE — 36140 INTRO NDL ICATH UPR/LXTR ART: CPT

## 2021-11-17 PROCEDURE — 85730 THROMBOPLASTIN TIME PARTIAL: CPT

## 2021-11-17 PROCEDURE — 83540 ASSAY OF IRON: CPT

## 2021-11-17 PROCEDURE — 2580000003 HC RX 258: Performed by: STUDENT IN AN ORGANIZED HEALTH CARE EDUCATION/TRAINING PROGRAM

## 2021-11-17 PROCEDURE — 6370000000 HC RX 637 (ALT 250 FOR IP): Performed by: INTERNAL MEDICINE

## 2021-11-17 PROCEDURE — 6360000002 HC RX W HCPCS

## 2021-11-17 PROCEDURE — C1769 GUIDE WIRE: HCPCS

## 2021-11-17 PROCEDURE — 99152 MOD SED SAME PHYS/QHP 5/>YRS: CPT

## 2021-11-17 PROCEDURE — 75774 ARTERY X-RAY EACH VESSEL: CPT

## 2021-11-17 PROCEDURE — 83880 ASSAY OF NATRIURETIC PEPTIDE: CPT

## 2021-11-17 PROCEDURE — 99153 MOD SED SAME PHYS/QHP EA: CPT

## 2021-11-17 PROCEDURE — 85347 COAGULATION TIME ACTIVATED: CPT

## 2021-11-17 PROCEDURE — 2500000003 HC RX 250 WO HCPCS: Performed by: STUDENT IN AN ORGANIZED HEALTH CARE EDUCATION/TRAINING PROGRAM

## 2021-11-17 PROCEDURE — 82550 ASSAY OF CK (CPK): CPT

## 2021-11-17 PROCEDURE — 80076 HEPATIC FUNCTION PANEL: CPT

## 2021-11-17 RX ORDER — ONDANSETRON 2 MG/ML
4 INJECTION INTRAMUSCULAR; INTRAVENOUS EVERY 6 HOURS PRN
Status: DISCONTINUED | OUTPATIENT
Start: 2021-11-17 | End: 2021-11-24 | Stop reason: HOSPADM

## 2021-11-17 RX ORDER — SODIUM CHLORIDE 0.9 % (FLUSH) 0.9 %
5-40 SYRINGE (ML) INJECTION PRN
Status: DISCONTINUED | OUTPATIENT
Start: 2021-11-17 | End: 2021-11-17 | Stop reason: SDUPTHER

## 2021-11-17 RX ORDER — HEPARIN SODIUM 1000 [USP'U]/ML
3000 INJECTION, SOLUTION INTRAVENOUS; SUBCUTANEOUS ONCE
Status: DISCONTINUED | OUTPATIENT
Start: 2021-11-17 | End: 2021-11-21

## 2021-11-17 RX ORDER — OXYCODONE HCL 10 MG/1
10 TABLET, FILM COATED, EXTENDED RELEASE ORAL EVERY 12 HOURS PRN
Status: DISCONTINUED | OUTPATIENT
Start: 2021-11-17 | End: 2021-11-24 | Stop reason: HOSPADM

## 2021-11-17 RX ORDER — SODIUM CHLORIDE 0.9 % (FLUSH) 0.9 %
5-40 SYRINGE (ML) INJECTION EVERY 12 HOURS SCHEDULED
Status: DISCONTINUED | OUTPATIENT
Start: 2021-11-17 | End: 2021-11-17 | Stop reason: SDUPTHER

## 2021-11-17 RX ORDER — ACETAMINOPHEN 325 MG/1
650 TABLET ORAL EVERY 4 HOURS PRN
Status: DISCONTINUED | OUTPATIENT
Start: 2021-11-17 | End: 2021-11-22 | Stop reason: SDUPTHER

## 2021-11-17 RX ORDER — SODIUM CHLORIDE 9 MG/ML
INJECTION, SOLUTION INTRAVENOUS CONTINUOUS
Status: DISCONTINUED | OUTPATIENT
Start: 2021-11-17 | End: 2021-11-18

## 2021-11-17 RX ORDER — SODIUM CHLORIDE 9 MG/ML
25 INJECTION, SOLUTION INTRAVENOUS PRN
Status: DISCONTINUED | OUTPATIENT
Start: 2021-11-17 | End: 2021-11-17 | Stop reason: SDUPTHER

## 2021-11-17 RX ORDER — SODIUM CHLORIDE 9 MG/ML
INJECTION, SOLUTION INTRAVENOUS CONTINUOUS
Status: DISCONTINUED | OUTPATIENT
Start: 2021-11-17 | End: 2021-11-17 | Stop reason: SDUPTHER

## 2021-11-17 RX ADMIN — HYDROMORPHONE HYDROCHLORIDE 0.5 MG: 1 INJECTION, SOLUTION INTRAMUSCULAR; INTRAVENOUS; SUBCUTANEOUS at 07:59

## 2021-11-17 RX ADMIN — HYDROMORPHONE HYDROCHLORIDE 0.5 MG: 1 INJECTION, SOLUTION INTRAMUSCULAR; INTRAVENOUS; SUBCUTANEOUS at 23:03

## 2021-11-17 RX ADMIN — NITROGLYCERIN 1 INCH: 20 OINTMENT TOPICAL at 18:49

## 2021-11-17 RX ADMIN — HYDROMORPHONE HYDROCHLORIDE 0.5 MG: 1 INJECTION, SOLUTION INTRAMUSCULAR; INTRAVENOUS; SUBCUTANEOUS at 12:01

## 2021-11-17 RX ADMIN — HEPARIN SODIUM 1270 UNITS/HR: 10000 INJECTION, SOLUTION INTRAVENOUS at 06:40

## 2021-11-17 RX ADMIN — GABAPENTIN 400 MG: 400 CAPSULE ORAL at 20:33

## 2021-11-17 RX ADMIN — HYDROMORPHONE HYDROCHLORIDE 0.5 MG: 1 INJECTION, SOLUTION INTRAMUSCULAR; INTRAVENOUS; SUBCUTANEOUS at 19:03

## 2021-11-17 RX ADMIN — IOPAMIDOL 60 ML: 755 INJECTION, SOLUTION INTRAVENOUS at 16:15

## 2021-11-17 RX ADMIN — LEVETIRACETAM 250 MG: 500 TABLET, FILM COATED ORAL at 07:59

## 2021-11-17 RX ADMIN — OXYCODONE HYDROCHLORIDE 10 MG: 10 TABLET, FILM COATED, EXTENDED RELEASE ORAL at 21:28

## 2021-11-17 RX ADMIN — ATORVASTATIN CALCIUM 40 MG: 40 TABLET, FILM COATED ORAL at 20:33

## 2021-11-17 RX ADMIN — HYDROMORPHONE HYDROCHLORIDE 0.5 MG: 1 INJECTION, SOLUTION INTRAMUSCULAR; INTRAVENOUS; SUBCUTANEOUS at 03:22

## 2021-11-17 RX ADMIN — METOPROLOL TARTRATE 25 MG: 25 TABLET, FILM COATED ORAL at 20:33

## 2021-11-17 RX ADMIN — SODIUM CHLORIDE, PRESERVATIVE FREE 10 ML: 5 INJECTION INTRAVENOUS at 20:46

## 2021-11-17 RX ADMIN — QUETIAPINE FUMARATE 100 MG: 100 TABLET ORAL at 20:33

## 2021-11-17 RX ADMIN — SODIUM CHLORIDE: 9 INJECTION, SOLUTION INTRAVENOUS at 18:58

## 2021-11-17 RX ADMIN — LEVETIRACETAM 250 MG: 500 TABLET, FILM COATED ORAL at 20:33

## 2021-11-17 RX ADMIN — METOPROLOL TARTRATE 25 MG: 25 TABLET, FILM COATED ORAL at 07:59

## 2021-11-17 ASSESSMENT — PAIN DESCRIPTION - PROGRESSION

## 2021-11-17 ASSESSMENT — PAIN DESCRIPTION - PAIN TYPE
TYPE: ACUTE PAIN

## 2021-11-17 ASSESSMENT — PAIN DESCRIPTION - LOCATION
LOCATION: LEG

## 2021-11-17 ASSESSMENT — PAIN - FUNCTIONAL ASSESSMENT
PAIN_FUNCTIONAL_ASSESSMENT: PREVENTS OR INTERFERES SOME ACTIVE ACTIVITIES AND ADLS

## 2021-11-17 ASSESSMENT — PAIN SCALES - GENERAL
PAINLEVEL_OUTOF10: 0
PAINLEVEL_OUTOF10: 6
PAINLEVEL_OUTOF10: 10
PAINLEVEL_OUTOF10: 10
PAINLEVEL_OUTOF10: 8
PAINLEVEL_OUTOF10: 10
PAINLEVEL_OUTOF10: 8
PAINLEVEL_OUTOF10: 6
PAINLEVEL_OUTOF10: 9
PAINLEVEL_OUTOF10: 10
PAINLEVEL_OUTOF10: 6
PAINLEVEL_OUTOF10: 10
PAINLEVEL_OUTOF10: 9

## 2021-11-17 ASSESSMENT — PAIN DESCRIPTION - FREQUENCY
FREQUENCY: CONTINUOUS

## 2021-11-17 ASSESSMENT — PAIN DESCRIPTION - DIRECTION
RADIATING_TOWARDS: FOOT

## 2021-11-17 ASSESSMENT — PAIN DESCRIPTION - ONSET
ONSET: ON-GOING

## 2021-11-17 ASSESSMENT — PAIN DESCRIPTION - ORIENTATION
ORIENTATION: RIGHT

## 2021-11-17 ASSESSMENT — PAIN DESCRIPTION - DESCRIPTORS
DESCRIPTORS: BURNING

## 2021-11-17 ASSESSMENT — PAIN SCALES - WONG BAKER: WONGBAKER_NUMERICALRESPONSE: 4

## 2021-11-17 NOTE — PROGRESS NOTES
Hospitalist Progress Note      PCP: KATHY Blue - CNP    Date of Admission: 11/15/2021    Chief Complaint: right foot pain    Hospital Course:     Subjective: pain stable. No change in symptoms. Waiting on angiography today      Medications:  Reviewed    Infusion Medications    sodium chloride      sodium chloride 75 mL/hr at 11/16/21 2319    heparin (PORCINE) Infusion 1,270 Units/hr (11/17/21 0640)     Scheduled Medications    atorvastatin  40 mg Oral Nightly    aspirin  81 mg Oral Daily    ferrous gluconate  324 mg Oral Daily with breakfast    gabapentin  400 mg Oral TID    levETIRAcetam  250 mg Oral BID    pantoprazole  40 mg Oral Daily    metoprolol tartrate  25 mg Oral BID    QUEtiapine  100 mg Oral Nightly    sodium chloride flush  5-40 mL IntraVENous 2 times per day     PRN Meds: naloxone, sodium chloride flush, sodium chloride, ondansetron **OR** ondansetron, acetaminophen **OR** acetaminophen, HYDROmorphone **OR** HYDROmorphone      Intake/Output Summary (Last 24 hours) at 11/17/2021 0935  Last data filed at 11/17/2021 0406  Gross per 24 hour   Intake 120 ml   Output 1200 ml   Net -1080 ml       Exam:    BP (!) 152/83   Pulse 78   Temp 97.6 °F (36.4 °C) (Oral)   Resp 18   Ht 6' 1\" (1.854 m)   Wt 218 lb 0.6 oz (98.9 kg)   SpO2 94%   BMI 28.77 kg/m²     General appearance: No apparent distress, appears stated age and cooperative. HEENT: Pupils equal, round, and reactive to light. Conjunctivae/corneas clear. Neck: Supple, with full range of motion. No jugular venous distention. Trachea midline. Respiratory:  Normal respiratory effort. Clear to auscultation, bilaterally without Rales/Wheezes/Rhonchi. Cardiovascular: Regular rate and rhythm with normal S1/S2 without murmurs, rubs or gallops. Abdomen: Soft, non-tender, non-distended with normal bowel sounds. Musculoskeletal: No clubbing, cyanosis or edema bilaterally. Left leg BKA.   Right foot with tenderness and diffuse hyperemia  Skin: Skin color, texture, turgor normal.  No rashes or lesions. Neurologic:  Neurovascularly intact without any focal sensory/motor deficits. Cranial nerves: II-XII intact, grossly non-focal.  Psychiatric: Alert and oriented, thought content appropriate, normal insight  Capillary Refill: Brisk,< 3 seconds   Peripheral Pulses: +2 palpable, equal bilaterally       Labs:   Recent Labs     11/15/21  1707 11/16/21  0340 11/17/21  0444   WBC 6.1 6.7 6.6   HGB 14.0 12.8* 12.3*   HCT 42.5 40.0* 37.9*    223 219     Recent Labs     11/15/21  1707 11/16/21  0340    140   K 4.4 4.8    103   CO2 28 26   BUN 17 16   CREATININE 1.1 1.2   CALCIUM 9.4 9.1     Recent Labs     11/15/21  1707   AST 21   ALT 25   BILITOT <0.2   ALKPHOS 79     No results for input(s): INR in the last 72 hours. Recent Labs     11/15/21  1707   CKTOTAL 68       Urinalysis:      Lab Results   Component Value Date    NITRU Negative 05/28/2020    WBCUA 2 05/28/2020    RBCUA 10 05/28/2020    BLOODU Negative 05/28/2020    SPECGRAV 1.019 05/28/2020    GLUCOSEU Negative 05/28/2020       Radiology:  US RENAL COMPLETE   Final Result   1. Unremarkable sonographic appearance of the right kidney. 2. Patient status post left nephrectomy. 3. Collapsed urinary bladder, limiting its evaluation. CTA ABDOMINAL AORTA W BILAT RUNOFF W CONTRAST   Final Result   Vascular-      Aortoiliac inflow is preserved. Left below-the-knee amputation is noted. There is severe, occlusive disease   in the left femoropopliteal tibial segment. Right severe femoropopliteal segment disease with occlusion by the mid   superficial femoral artery. Popliteal segment is occluded. Trifurcation   vessels are reconstituted with runoff via the anterior tibial and posterior   tibial arteries. Nonvascular-      No acute abdominopelvic abnormality is identified. Solitary right kidney. Mild enlargement of the prostate gland. Bilateral inguinal hernias, including sigmoid colon content on the left. There is no incarceration.                  Assessment/Plan:    Active Hospital Problems    Diagnosis Date Noted    Acute pain of right lower extremity [M79.604] 11/15/2021    Solitary kidney, acquired [Z90.5] 11/15/2021    Pain of right lower extremity due to ischemia [M79.604, I99.8] 11/15/2021    PAD (peripheral artery disease) ([facility] [address] [I73.9] 10/22/2021    Hyperlipidemia LDL goal <70 [E78.5] 10/22/2021    Essential hypertension [I10] 10/22/2021    CAD in native artery [I25.10]     S/P CABG (coronary artery bypass graft) [Z95.1]        Critical limb ischemia:  - heparin drip, statin, ASA  - cardiology to do SYBIL, doppler, angio   - anticipate will need revascular stenting   - angiography at 2pm    DANY:  - likely due to contrast-induced nephropathy  - nepho consulted:  Getting ultrasound, check UA   - IVF PRN    Tobacco abuse:  - cessation counseling    H/o CVA, HTN:  Cont metoprolol    DVT Prophylaxis: heparin drip  Diet: Diet NPO Exceptions are: Ice Chips  Code Status: Limited    PT/OT Eval Status: pending vascular intervention    Dispo - Jamshid Pichardo MD

## 2021-11-17 NOTE — PROGRESS NOTES
Patient is resting in bed. Alert and oriented X4. Complaining of pain in the RLE, PRN pain medication given per order(see MAR). IV in place. Assessment complete. All patient needs are met at this time. Fall precautions are in place. Call light is in reach. Will continue to monitor.    Electronically signed by Sharmaine Laguna RN on 11/16/2021 at 7:54 PM

## 2021-11-17 NOTE — PROGRESS NOTES
1330 Pt transferred to procedure via stretcher.  Electronically signed by Waldo Arndt RN on 11/17/2021 at 3:02 PM

## 2021-11-17 NOTE — PROGRESS NOTES
Department of Internal Medicine  Nephrology Progress Note    SUBJECTIVE:  We are following this patient for DANY       Events noted . basic metabolic panel p  ? REVIEW OF SYSTEMS:  No CP/SOB/KOVACS c/o leg pain . Family at bed side      Physical Exam:    VITALS:  BP (!) 152/83   Pulse 78   Temp 97.6 °F (36.4 °C) (Oral)   Resp 18   Ht 6' 1\" (1.854 m)   Wt 218 lb 0.6 oz (98.9 kg)   SpO2 94%   BMI 28.77 kg/m²   24HR INTAKE/OUTPUT:      Intake/Output Summary (Last 24 hours) at 11/17/2021 1135  Last data filed at 11/17/2021 0406  Gross per 24 hour   Intake 120 ml   Output 1200 ml   Net -1080 ml       Constitutional:  Doing well  Respiratory:  CTA  Gastrointestinal:  No  tenderness.   Normal Bowel Sounds  Cardiovascular:  S1, S2 RRR   Edema:  + edema + erythema   Lt BKA     DATA:    CBC:  Lab Results   Component Value Date    WBC 6.6 11/17/2021    RBC 4.64 11/17/2021    HGB 12.3 11/17/2021    HCT 37.9 11/17/2021    MCV 81.7 11/17/2021    MCH 26.6 11/17/2021    MCHC 32.6 11/17/2021    RDW 15.5 11/17/2021     11/17/2021    MPV 7.5 11/17/2021     CMP:  Lab Results   Component Value Date     11/16/2021    K 4.8 11/16/2021    K 4.5 05/24/2020     11/16/2021    CO2 26 11/16/2021    BUN 16 11/16/2021    CREATININE 1.2 11/16/2021    GFRAA >60 11/16/2021    AGRATIO 1.1 11/15/2021    LABGLOM >60 11/16/2021    GLUCOSE 116 11/16/2021    PROT 8.2 11/15/2021    CALCIUM 9.1 11/16/2021    BILITOT <0.2 11/15/2021    ALKPHOS 79 11/15/2021    AST 21 11/15/2021    ALT 25 11/15/2021      Hepatic Function Panel:   Lab Results   Component Value Date    ALKPHOS 79 11/15/2021    ALT 25 11/15/2021    AST 21 11/15/2021    PROT 8.2 11/15/2021    BILITOT <0.2 11/15/2021    BILIDIR <0.2 05/25/2020    IBILI see below 05/25/2020      Phosphorus:   Lab Results   Component Value Date    PHOS 4.0 05/11/2020       ASSESSMENT:  Active Problems:    CAD in native artery    S/P CABG (coronary artery bypass graft)    Hyperlipidemia LDL goal <70    PAD (peripheral artery disease) (HCC)    Essential hypertension    Acute pain of right lower extremity    Solitary kidney, acquired    Pain of right lower extremity due to ischemia  Resolved Problems:    * No resolved hospital problems. *      PLAN:  1. DANY most likely BHAVIK. 2. Renal function today ? Based on yesterday ,Cr better . On IVF . 3. H/o Lt nephrectomy   4. HTN controlled   5. Rt LE pain /ischemia plan per cards , angio and intervention today . 6. Long dw pt and his family about risk of DANY/BHAVIK,they understand and willing to pursue .     Tristan Smith MD, FACP

## 2021-11-17 NOTE — PROGRESS NOTES
Patient is alert & oriented x4, pt remains on strict bedrest per order, 2/4 bed rails up, bed in lowest position, fall precautions in place, call light within reach. Gave BP medication and seizure medication given but all other medications held for surgery this morning. IV fluid infusing. Heparin infusing. Will cont to monitor and reassess.  Electronically signed by Mirna Madden RN on 11/17/2021 at 8:08 AM

## 2021-11-17 NOTE — PROGRESS NOTES
Clinical Pharmacy Note  Heparin Dosing       Lab Results   Component Value Date    APTT 43.7 11/17/2021     Lab Results   Component Value Date    HGB 12.3 11/17/2021    HCT 37.9 11/17/2021     11/17/2021    INR 1.13 06/02/2020       Current Infusion Rate: 1270 units/hr    Plan:  Bolus 3000 units  Rate: Increase to 1350 units/hr  Next aPTT: 2100 11/17/21    Pharmacy will continue to monitor and adjust based on aPTT results.     Gaila Snellen, [facility]  11/17/2021 2:21 PM

## 2021-11-17 NOTE — PROGRESS NOTES
Clinical Pharmacy Note  Heparin Dosing       Lab Results   Component Value Date    APTT 97.5 11/17/2021     Lab Results   Component Value Date    HGB 12.3 11/17/2021    HCT 37.9 11/17/2021     11/17/2021    INR 1.13 06/02/2020       Current Infusion Rate: 1370 units/hr    Plan:  Rate: Decrease to 1270 units/hr  Next aPTT: 1230  11/17/21    Pharmacy will continue to monitor and adjust based on aPTT results.     Ro Neri, [facility]  11/17/2021 6:15 AM

## 2021-11-17 NOTE — PLAN OF CARE
Problem: Skin Integrity:  Goal: Will show no infection signs and symptoms  Description: Will show no infection signs and symptoms  11/17/2021 0721 by Raghav Montero RN  Outcome: Ongoing  Note: Pt is free of signs and symptoms of infection. Vital signs stable. Will monitor. Problem: Skin Integrity:  Goal: Absence of new skin breakdown  Description: Absence of new skin breakdown  11/17/2021 0721 by Raghav Montero RN  Outcome: Ongoing  Note: Pt assessed for skin break down. Skin was warm and dry to touch. Pt cannot regulate head of bed without assistance. Pt being turned or repositioned at least every 2 hours to prevent skin breakdown. Will continue to monitor and assess. Problem: Falls - Risk of:  Goal: Will remain free from falls  Description: Will remain free from falls  11/17/2021 0721 by Raghav Montero RN  Outcome: Ongoing  Note: Fall risk assessment completed. Fall precautions in place. Bed in lowest position, wheels locked, bed/chair exit alarm in place, call light within reach, and non skid footwear on. Walkway free of clutter. Pt alert and oriented and able to make needs known. Pt educated to use call light when needing to get up, and pt utilizes call light to make needs known. Will continue to monitor. Problem: Falls - Risk of:  Goal: Absence of physical injury  Description: Absence of physical injury  11/17/2021 0721 by Raghav Montero RN  Outcome: Ongoing  Note: Pt is free of injury. No injury noted. Fall precautions in place. Call light within reach. Will monitor. Problem: Pain:  Goal: Pain level will decrease  Description: Pain level will decrease  11/17/2021 0721 by Raghav Montero RN  Outcome: Ongoing  Note: Pt assessed for pain. Pt in pain and assessed with 0-10 pain rating scale. Pt given prescribed analgesic for pain. (See eMar) Pt satisfied with pain relief thus far. Will reassess and continue to monitor.         Problem: Pain:  Goal: Control of acute pain  Description: Control of acute pain  11/17/2021 0721 by Haley Ulloa RN  Outcome: Ongoing  Note: Pt assessed for pain. Pt in pain and assessed with 0-10 pain rating scale. Pt given prescribed analgesic for pain. (See eMar) Pt satisfied with pain relief thus far. Will reassess and continue to monitor. Problem: Pain:  Goal: Control of chronic pain  Description: Control of chronic pain  11/17/2021 0721 by Haley Ulloa RN  Outcome: Ongoing  Note: Pt assessed for pain. Pt in pain and assessed with 0-10 pain rating scale. Pt given prescribed analgesic for pain. (See eMar) Pt satisfied with pain relief thus far. Will reassess and continue to monitor.

## 2021-11-17 NOTE — PROGRESS NOTES
[address]  Cardiology Consult    Blayne Jacques  1955 November 17, 2021      Reason for Referral: Limb ischemia    CC: Right leg pain      Subjective:     History of Present Illness:    Blayne Jacques is a 61 y.o. patient with a PMH significant for peripheral artery disease, coronary disease presented with complaints of right leg pain. Right leg lower extremity pain increasing erythema of the foot. Pain 5/10 intensity ongoing for 1 week increasing in severity for past 2 to 3 days. No open wounds seen. No fever chills rigors. Past Medical History:   has a past medical history of Acute cerebral infarction ([facility] [address] Alcohol withdrawal ([facility] [address] Bilateral carotid artery stenosis, Hepatitis C antibody positive in blood, History of bronchitis, History of DVT of lower extremity, Hypertension, NSTEMI (non-ST elevated myocardial infarction) ([facility] [address] PAD (peripheral artery disease) ([facility] [address] Respiratory compromise, S/p nephrectomy, and Tattoos. Surgical History:   has a past surgical history that includes total nephrectomy (Left); Coronary artery bypass graft (N/A, 5/8/2020); tracheostomy (05/27/2020); transesophageal echocardiogram (05/08/2020); Cardiac catheterization (05/07/2020); thoracentesis (Left, 05/29/2020); Gastrostomy tube placement (N/A, 5/27/2020); Stomach surgery (N/A, 7/10/2020); and Leg amputation below knee. Social History:   reports that he quit smoking about 2 years ago. His smoking use included cigarettes. He has never used smokeless tobacco. He reports current alcohol use. He reports that he does not use drugs. Family History:  family history includes Heart Disease in his brother, father, mother, and paternal aunt. Home Medications:  Were reviewed and are listed in nursing record and/or below  Prior to Admission medications    Medication Sig Start Date End Date Taking?  Authorizing Provider   atorvastatin (LIPITOR) 40 MG tablet TAKE 1 TABLET BY MOUTH EVERY DAY AT NIGHT 10/19/21  Yes Ezra Helton MD   aspirin 81 MG chewable tablet TAKE 1 TABLET BY MOUTH EVERY DAY 9/28/21  Yes Manuel Hinojosa MD   rivaroxaban (XARELTO) 2.5 MG TABS tablet Take 1 tablet by mouth 2 times daily 2/5/21  Yes Manuel Hinojosa MD   pantoprazole (PROTONIX) 40 MG tablet Take 40 mg by mouth daily   Yes Historical Provider, MD   QUEtiapine (SEROQUEL) 100 MG tablet Take 100 mg by mouth nightly   Yes Historical Provider, MD   senna-docusate (Janeal Canny) 8.6-50 MG per tablet Take 1 tablet by mouth nightly   Yes Historical Provider, MD   tiZANidine (ZANAFLEX) 4 MG tablet Take 4 mg by mouth nightly    Yes Historical Provider, MD   acetaminophen (TYLENOL) 500 MG tablet Take 1,000 mg by mouth every 6 hours as needed for Pain   Yes Historical Provider, MD   ferrous gluconate (FERGON) 324 (38 Fe) MG tablet Take 324 mg by mouth daily (with breakfast)   Yes Historical Provider, MD   gabapentin (NEURONTIN) 800 MG tablet Take 800 mg by mouth 3 times daily.     Yes Historical Provider, MD   levETIRAcetam (KEPPRA) 250 MG tablet Take 250 mg by mouth 2 times daily    Yes Historical Provider, MD   metoprolol tartrate (LOPRESSOR) 25 MG tablet Take 25 mg by mouth 2 times daily   Yes Historical Provider, MD        CURRENT Medications:  atorvastatin (LIPITOR) tablet 40 mg, Nightly  aspirin chewable tablet 81 mg, Daily  ferrous gluconate (FERGON) tablet 324 mg, Daily with breakfast  gabapentin (NEURONTIN) capsule 400 mg, TID  levETIRAcetam (KEPPRA) tablet 250 mg, BID  pantoprazole (PROTONIX) tablet 40 mg, Daily  metoprolol tartrate (LOPRESSOR) tablet 25 mg, BID  QUEtiapine (SEROQUEL) tablet 100 mg, Nightly  naloxone (NARCAN) injection 0.4 mg, PRN  sodium chloride flush 0.9 % injection 5-40 mL, 2 times per day  sodium chloride flush 0.9 % injection 5-40 mL, PRN  0.9 % sodium chloride infusion, PRN  ondansetron (ZOFRAN-ODT) disintegrating tablet 4 mg, Q8H PRN   Or  ondansetron (ZOFRAN) injection 4 mg, Q6H PRN  acetaminophen (TYLENOL) tablet 650 mg, Q6H PRN   Or  acetaminophen (TYLENOL) suppository 650 mg, Q6H PRN  0.9 % sodium chloride infusion, Continuous  heparin 25,000 unit in sodium chloride 0.45% 250 mL (premix) infusion, Continuous  HYDROmorphone (DILAUDID) injection 0.25 mg, Q4H PRN   Or  HYDROmorphone (DILAUDID) injection 0.5 mg, Q4H PRN        Allergies:  Patient has no known allergies. Review of Systems: SEE HPI   · Constitutional: no unanticipated weight loss. There's been no change in energy level, sleep pattern, or activity level. No fevers, chills. · Eyes: No visual changes or diplopia. No scleral icterus. · ENT: No Headaches, hearing loss or vertigo. No mouth sores or sore throat. · Cardiovascular: No Chest pain, tightness or discomfort.  No Shortness of breath. No Dyspnea on exertion, Orthopnea, Paroxysmal nocturnal dyspnea or breathlessness at rest.   No Palpitations.  No Syncope ('blackouts', 'faints', 'collapse') or dizziness. · Respiratory: No cough or wheezing, no sputum production. No hematemesis. · Gastrointestinal: No abdominal pain, appetite loss, blood in stools. No change in bowel or bladder habits. · Genitourinary: No dysuria, trouble voiding, or hematuria. · Musculoskeletal:  No gait disturbance, no joint complaints. · Integumentary: No rash or pruritis. · Neurological: No headache, diplopia, change in muscle strength, numbness or tingling. · Psychiatric: No anxiety or depression. · Endocrine: No temperature intolerance. No excessive thirst, fluid intake, or urination. No tremor. · Hematologic/Lymphatic: No abnormal bruising or bleeding, blood clots or swollen lymph nodes. · Allergic/Immunologic: No nasal congestion or hives. Objective:     PHYSICAL EXAM:      Vitals:    11/17/21 0800   BP:    Pulse:    Resp: 18   Temp:    SpO2: 94%    Weight: 218 lb 0.6 oz (98.9 kg)       General Appearance:  Alert, cooperative, no distress, appears stated age.    Head:  Normocephalic, without obvious abnormality, atraumatic. Eyes:  Pupils equal and round. No scleral icterus. Mouth: Moist mucosa, no pharyngeal erythema. Nose: Nares normal. No drainage or sinus tenderness. Neck: Supple, symmetrical, trachea midline. No adenopathy. No tenderness/mass/nodules. No carotid bruit or elevated JVD. Lungs:   Respiratory Effort: Normal   Auscultation: Clear to auscultation bilaterally, respirations unlabored. No wheeze, rales   Chest Wall:  No tenderness or deformity. Cardiovascular:    Pulses  Palpation: normal   Ascultation: Regular rate, S1/ S2 normal. No murmur, rub, or gallop. Right lower extremity pulses nonpalpable   Abdomen and Gastrointestinal:   Soft, non-tender, bowel sounds active. Liver and Spleen  Masses   Musculoskeletal: No muscle wasting  Back  Gait   Extremities: Extremities normal, atraumatic. No cyanosis or edema. No cyanosis clubbing       Skin: Inspection and palpation performed, no rashes or lesions. Pysch: Normal mood and affect.  Alert and oriented to time place person   Neurologic: Normal gross motor and sensory exam.       Labs     All labs have been reviewed    Lab Results   Component Value Date    WBC 6.6 11/17/2021    RBC 4.64 11/17/2021    HGB 12.3 11/17/2021    HCT 37.9 11/17/2021    MCV 81.7 11/17/2021    RDW 15.5 11/17/2021     11/17/2021     Lab Results   Component Value Date     11/16/2021    K 4.8 11/16/2021    K 4.5 05/24/2020     11/16/2021    CO2 26 11/16/2021    BUN 16 11/16/2021    CREATININE 1.2 11/16/2021    GFRAA >60 11/16/2021    AGRATIO 1.1 11/15/2021    LABGLOM >60 11/16/2021    GLUCOSE 116 11/16/2021    PROT 8.2 11/15/2021    CALCIUM 9.1 11/16/2021    BILITOT <0.2 11/15/2021    ALKPHOS 79 11/15/2021    AST 21 11/15/2021    ALT 25 11/15/2021     No results found for: PTINR  Lab Results   Component Value Date    LABA1C 5.9 05/07/2020     Lab Results   Component Value Date    CKTOTAL 68 11/15/2021    TROPONINI 0.11 (H) 05/07/2020 Cardiac, Vascular and Imaging Data all Personally Reviewed in Detail by Myself      EKG:     Echocardiogram:     Stress Test:     Cath:Procedures Performed:  - Left femoral artery access under fluroscopic and ultrasound guidance  - Left iliofemoral arteriogram.  - Right anterior tibial access under ultrasound guidance   - Right lower extremity arteriogram  - Selective catheterization of right common femoral artery. - IVUS SFA/popliteal artery   - Percutaneous balloon angioplasty of right anterior tibial artery using a 3.0 mm x 120 mm balloon . - Percutaneous balloon angioplasty of right popliteal artery using a 5.0 mm x 120 mm balloon. - Percutaneous balloon angioplasty of right superficial femoral artery using a 5.5 mm x 120 mm balloon. - Percutaneous insertion of self-expanding Supera stent  (5.5 mm x 120 mm) into right superficial femoral artery. - Completion arteriogram.    Other imaging:     CTA    Vascular-       Aortoiliac inflow is preserved.       Left below-the-knee amputation is noted.  There is severe, occlusive disease   in the left femoropopliteal tibial segment.       Right severe femoropopliteal segment disease with occlusion by the mid   superficial femoral artery.  Popliteal segment is occluded.  Trifurcation   vessels are reconstituted with runoff via the anterior tibial and posterior   tibial arteries.       Nonvascular-       No acute abdominopelvic abnormality is identified.       Solitary right kidney.       Mild enlargement of the prostate gland.          Assessment and Plan     --Limb Ischemia, Peripheral Arterial Disease and Claudication  Reviewed comprehensive medical hx and physical examination  Decreased pulses in the right lower extremity  Will need to assess vascular anatomy   review SYBIL and Doppler  Aggressive medical therapy with ASA, Statin   Will consider DAPT  Smoking cessation   consider podiatry consult  Needs invasive diagnostic peripheral angiography to establish anatomy and level of disease  Will consider revascularization after diagnostic angiography       Thank you for allowing us to participate in the care of Demario Posadas [place]. Please do not hesitate to contact me if you have any questions.     Taryn Werner MD, MPH    [facility] include [facility] beds at the [facility] [address] [place]  Ph: [phone]  Fax: [phone]    11/17/2021 8:55 AM

## 2021-11-17 NOTE — PROGRESS NOTES
Pt returned from procedure at [address]. Pt is alert and oriented. Will cont to monitor and reassess. Recheck pedal pulse at 7:00 pm and then every hour after that x 3 hours.     Electronically signed by Isabel Brown RN on 11/17/2021 at 6:45 PM

## 2021-11-17 NOTE — PROGRESS NOTES
Clinical Pharmacy Note  Heparin Dosing       Lab Results   Component Value Date    APTT 43.9 11/16/2021     Lab Results   Component Value Date    HGB 12.8 11/16/2021    HCT 40.0 11/16/2021     11/16/2021    INR 1.13 06/02/2020       Current Infusion Rate: 1170 units/hr    Plan:  Bolus: 3900 units  Rate: 1370 units/hr  Next aPTT: 0300  11/17/21    Pharmacy will continue to monitor and adjust based on aPTT results.     Víctor Schmitz, [address]  11/16/2021 9:02 PM

## 2021-11-18 LAB
ALBUMIN SERPL-MCNC: 3.9 G/DL (ref 3.4–5)
ANION GAP SERPL CALCULATED.3IONS-SCNC: 14 MMOL/L (ref 3–16)
BASOPHILS ABSOLUTE: 0 K/UL (ref 0–0.2)
BASOPHILS RELATIVE PERCENT: 0.2 %
BUN BLDV-MCNC: 17 MG/DL (ref 7–20)
CALCIUM SERPL-MCNC: 9.2 MG/DL (ref 8.3–10.6)
CHLORIDE BLD-SCNC: 102 MMOL/L (ref 99–110)
CO2: 21 MMOL/L (ref 21–32)
CREAT SERPL-MCNC: 1 MG/DL (ref 0.8–1.3)
EOSINOPHILS ABSOLUTE: 0 K/UL (ref 0–0.6)
EOSINOPHILS RELATIVE PERCENT: 0 %
GFR AFRICAN AMERICAN: >60
GFR NON-AFRICAN AMERICAN: >60
GLUCOSE BLD-MCNC: 123 MG/DL (ref 70–99)
HCT VFR BLD CALC: 39 % (ref 40.5–52.5)
HEMOGLOBIN: 12.7 G/DL (ref 13.5–17.5)
LYMPHOCYTES ABSOLUTE: 0.9 K/UL (ref 1–5.1)
LYMPHOCYTES RELATIVE PERCENT: 11.8 %
MAGNESIUM: 2 MG/DL (ref 1.8–2.4)
MCH RBC QN AUTO: 26.6 PG (ref 26–34)
MCHC RBC AUTO-ENTMCNC: 32.5 G/DL (ref 31–36)
MCV RBC AUTO: 81.7 FL (ref 80–100)
MONOCYTES ABSOLUTE: 0.2 K/UL (ref 0–1.3)
MONOCYTES RELATIVE PERCENT: 2.4 %
NEUTROPHILS ABSOLUTE: 6.3 K/UL (ref 1.7–7.7)
NEUTROPHILS RELATIVE PERCENT: 85.6 %
PDW BLD-RTO: 15.7 % (ref 12.4–15.4)
PHOSPHORUS: 3 MG/DL (ref 2.5–4.9)
PLATELET # BLD: 267 K/UL (ref 135–450)
PMV BLD AUTO: 7.6 FL (ref 5–10.5)
POTASSIUM SERPL-SCNC: 4.7 MMOL/L (ref 3.5–5.1)
PRO-BNP: 694 PG/ML (ref 0–124)
RBC # BLD: 4.77 M/UL (ref 4.2–5.9)
SODIUM BLD-SCNC: 137 MMOL/L (ref 136–145)
WBC # BLD: 7.3 K/UL (ref 4–11)

## 2021-11-18 PROCEDURE — 2580000003 HC RX 258: Performed by: STUDENT IN AN ORGANIZED HEALTH CARE EDUCATION/TRAINING PROGRAM

## 2021-11-18 PROCEDURE — 6370000000 HC RX 637 (ALT 250 FOR IP): Performed by: STUDENT IN AN ORGANIZED HEALTH CARE EDUCATION/TRAINING PROGRAM

## 2021-11-18 PROCEDURE — 6370000000 HC RX 637 (ALT 250 FOR IP): Performed by: NURSE PRACTITIONER

## 2021-11-18 PROCEDURE — 83880 ASSAY OF NATRIURETIC PEPTIDE: CPT

## 2021-11-18 PROCEDURE — 36415 COLL VENOUS BLD VENIPUNCTURE: CPT

## 2021-11-18 PROCEDURE — 6360000002 HC RX W HCPCS: Performed by: STUDENT IN AN ORGANIZED HEALTH CARE EDUCATION/TRAINING PROGRAM

## 2021-11-18 PROCEDURE — 83735 ASSAY OF MAGNESIUM: CPT

## 2021-11-18 PROCEDURE — 80069 RENAL FUNCTION PANEL: CPT

## 2021-11-18 PROCEDURE — 85025 COMPLETE CBC W/AUTO DIFF WBC: CPT

## 2021-11-18 PROCEDURE — 1200000000 HC SEMI PRIVATE

## 2021-11-18 RX ADMIN — LEVETIRACETAM 250 MG: 500 TABLET, FILM COATED ORAL at 20:53

## 2021-11-18 RX ADMIN — GABAPENTIN 400 MG: 400 CAPSULE ORAL at 09:37

## 2021-11-18 RX ADMIN — HYDROMORPHONE HYDROCHLORIDE 0.5 MG: 1 INJECTION, SOLUTION INTRAMUSCULAR; INTRAVENOUS; SUBCUTANEOUS at 11:59

## 2021-11-18 RX ADMIN — HYDROMORPHONE HYDROCHLORIDE 0.5 MG: 1 INJECTION, SOLUTION INTRAMUSCULAR; INTRAVENOUS; SUBCUTANEOUS at 07:16

## 2021-11-18 RX ADMIN — METOPROLOL TARTRATE 25 MG: 25 TABLET, FILM COATED ORAL at 20:53

## 2021-11-18 RX ADMIN — GABAPENTIN 400 MG: 400 CAPSULE ORAL at 20:53

## 2021-11-18 RX ADMIN — HYDROMORPHONE HYDROCHLORIDE 0.5 MG: 1 INJECTION, SOLUTION INTRAMUSCULAR; INTRAVENOUS; SUBCUTANEOUS at 03:11

## 2021-11-18 RX ADMIN — FERROUS GLUCONATE 324 MG: 324 TABLET ORAL at 09:38

## 2021-11-18 RX ADMIN — QUETIAPINE FUMARATE 100 MG: 100 TABLET ORAL at 20:54

## 2021-11-18 RX ADMIN — GABAPENTIN 400 MG: 400 CAPSULE ORAL at 14:55

## 2021-11-18 RX ADMIN — LEVETIRACETAM 250 MG: 500 TABLET, FILM COATED ORAL at 09:37

## 2021-11-18 RX ADMIN — METOPROLOL TARTRATE 25 MG: 25 TABLET, FILM COATED ORAL at 09:37

## 2021-11-18 RX ADMIN — SODIUM CHLORIDE, PRESERVATIVE FREE 5 ML: 5 INJECTION INTRAVENOUS at 20:55

## 2021-11-18 RX ADMIN — OXYCODONE HYDROCHLORIDE 10 MG: 10 TABLET, FILM COATED, EXTENDED RELEASE ORAL at 09:50

## 2021-11-18 RX ADMIN — HYDROMORPHONE HYDROCHLORIDE 0.5 MG: 1 INJECTION, SOLUTION INTRAMUSCULAR; INTRAVENOUS; SUBCUTANEOUS at 20:53

## 2021-11-18 RX ADMIN — ASPIRIN 81 MG: 81 TABLET, CHEWABLE ORAL at 09:37

## 2021-11-18 RX ADMIN — ATORVASTATIN CALCIUM 40 MG: 40 TABLET, FILM COATED ORAL at 20:53

## 2021-11-18 RX ADMIN — PANTOPRAZOLE SODIUM 40 MG: 40 TABLET, DELAYED RELEASE ORAL at 09:37

## 2021-11-18 ASSESSMENT — PAIN DESCRIPTION - PAIN TYPE
TYPE: ACUTE PAIN

## 2021-11-18 ASSESSMENT — PAIN SCALES - GENERAL
PAINLEVEL_OUTOF10: 7
PAINLEVEL_OUTOF10: 8
PAINLEVEL_OUTOF10: 8
PAINLEVEL_OUTOF10: 7
PAINLEVEL_OUTOF10: 9
PAINLEVEL_OUTOF10: 6
PAINLEVEL_OUTOF10: 9
PAINLEVEL_OUTOF10: 7
PAINLEVEL_OUTOF10: 9
PAINLEVEL_OUTOF10: 0

## 2021-11-18 ASSESSMENT — PAIN DESCRIPTION - ONSET
ONSET: ON-GOING

## 2021-11-18 ASSESSMENT — PAIN DESCRIPTION - PROGRESSION
CLINICAL_PROGRESSION: NOT CHANGED

## 2021-11-18 ASSESSMENT — PAIN DESCRIPTION - DIRECTION
RADIATING_TOWARDS: FOOT

## 2021-11-18 ASSESSMENT — PAIN DESCRIPTION - DESCRIPTORS
DESCRIPTORS: BURNING

## 2021-11-18 ASSESSMENT — PAIN DESCRIPTION - LOCATION
LOCATION: LEG

## 2021-11-18 ASSESSMENT — PAIN DESCRIPTION - ORIENTATION
ORIENTATION: RIGHT

## 2021-11-18 ASSESSMENT — PAIN DESCRIPTION - FREQUENCY
FREQUENCY: CONTINUOUS

## 2021-11-18 NOTE — PROGRESS NOTES
Hospitalist Progress Note      PCP: KATHY Sumner - CNP    Date of Admission: 11/15/2021    Chief Complaint: right foot pain    Hospital Course:     Subjective: unable to do angiography as stenosis made unable to reach foot. Medications:  Reviewed    Infusion Medications    sodium chloride 75 mL/hr at 11/17/21 1858    sodium chloride      heparin (PORCINE) Infusion 1,270 Units/hr (11/17/21 0640)     Scheduled Medications    heparin (porcine)  3,000 Units IntraVENous Once    atorvastatin  40 mg Oral Nightly    aspirin  81 mg Oral Daily    ferrous gluconate  324 mg Oral Daily with breakfast    gabapentin  400 mg Oral TID    levETIRAcetam  250 mg Oral BID    pantoprazole  40 mg Oral Daily    metoprolol tartrate  25 mg Oral BID    QUEtiapine  100 mg Oral Nightly    sodium chloride flush  5-40 mL IntraVENous 2 times per day     PRN Meds: acetaminophen, ondansetron, oxyCODONE, naloxone, sodium chloride flush, sodium chloride, ondansetron **OR** [DISCONTINUED] ondansetron, [DISCONTINUED] acetaminophen **OR** acetaminophen, HYDROmorphone **OR** HYDROmorphone      Intake/Output Summary (Last 24 hours) at 11/18/2021 0909  Last data filed at 11/18/2021 0843  Gross per 24 hour   Intake 360 ml   Output 500 ml   Net -140 ml       Exam:    BP (!) 160/83   Pulse 79   Temp 97.5 °F (36.4 °C) (Oral)   Resp 18   Ht 6' 1\" (1.854 m)   Wt 217 lb 14.4 oz (98.8 kg)   SpO2 98%   BMI 28.75 kg/m²     General appearance: No apparent distress, appears stated age and cooperative. HEENT: Pupils equal, round, and reactive to light. Conjunctivae/corneas clear. Neck: Supple, with full range of motion. No jugular venous distention. Trachea midline. Respiratory:  Normal respiratory effort. Clear to auscultation, bilaterally without Rales/Wheezes/Rhonchi. Cardiovascular: Regular rate and rhythm with normal S1/S2 without murmurs, rubs or gallops.   Abdomen: Soft, non-tender, non-distended with normal bowel sounds. Musculoskeletal: No clubbing, cyanosis or edema bilaterally. Left leg BKA. Right foot with tenderness and diffuse hyperemia  Skin: Skin color, texture, turgor normal.  No rashes or lesions. Neurologic:  Neurovascularly intact without any focal sensory/motor deficits. Cranial nerves: II-XII intact, grossly non-focal.  Psychiatric: Alert and oriented, thought content appropriate, normal insight  Capillary Refill: Brisk,< 3 seconds   Peripheral Pulses: +2 palpable, equal bilaterally       Labs:   Recent Labs     11/16/21  0340 11/17/21  0444 11/18/21  0506   WBC 6.7 6.6 7.3   HGB 12.8* 12.3* 12.7*   HCT 40.0* 37.9* 39.0*    219 267     Recent Labs     11/17/21  0444 11/17/21  1206 11/18/21  0506    137 137   K 4.7 4.3 4.7    102 102   CO2 15* 24 21   BUN 16 15 17   CREATININE 1.0 0.9 1.0   CALCIUM 8.9 8.9 9.2   PHOS 3.6 3.2 3.0     Recent Labs     11/15/21  1707 11/17/21  0444   AST 21 21   ALT 25 22   BILIDIR  --  <0.2   BILITOT <0.2 <0.2   ALKPHOS 79 56     No results for input(s): INR in the last 72 hours. Recent Labs     11/15/21  1707 11/17/21  0444   CKTOTAL 68 109       Urinalysis:      Lab Results   Component Value Date    NITRU Negative 05/28/2020    WBCUA 2 05/28/2020    RBCUA 10 05/28/2020    BLOODU Negative 05/28/2020    SPECGRAV 1.019 05/28/2020    GLUCOSEU Negative 05/28/2020       Radiology:  US RENAL COMPLETE   Final Result   1. Unremarkable sonographic appearance of the right kidney. 2. Patient status post left nephrectomy. 3. Collapsed urinary bladder, limiting its evaluation. CTA ABDOMINAL AORTA W BILAT RUNOFF W CONTRAST   Final Result   Vascular-      Aortoiliac inflow is preserved. Left below-the-knee amputation is noted. There is severe, occlusive disease   in the left femoropopliteal tibial segment. Right severe femoropopliteal segment disease with occlusion by the mid   superficial femoral artery. Popliteal segment is occluded. Trifurcation   vessels are reconstituted with runoff via the anterior tibial and posterior   tibial arteries. Nonvascular-      No acute abdominopelvic abnormality is identified. Solitary right kidney. Mild enlargement of the prostate gland. Bilateral inguinal hernias, including sigmoid colon content on the left. There is no incarceration. Assessment/Plan:    Active Hospital Problems    Diagnosis Date Noted    Acute pain of right lower extremity [M79.604] 11/15/2021    Solitary kidney, acquired [Z90.5] 11/15/2021    Pain of right lower extremity due to ischemia [M79.604, I99.8] 11/15/2021    PAD (peripheral artery disease) ([facility] [address] [I73.9] 10/22/2021    Hyperlipidemia LDL goal <70 [E78.5] 10/22/2021    Essential hypertension [I10] 10/22/2021    CAD in native artery [I25.10]     S/P CABG (coronary artery bypass graft) [Z95.1]        Critical limb ischemia:  - heparin drip, statin, ASA  - cardiology to do SYBIL, doppler, angio   - anticipate will need revascular stenting   - angiography unable to be done due to stenosis proximal to foot. - will need to wait until Dr. Krissy Brunner returns for repeat attempt    DANY:  - likely due to contrast-induced nephropathy  - nepho consulted:  Getting ultrasound, check UA   - IVF PRN    Tobacco abuse:  - cessation counseling    H/o CVA, HTN:  Cont metoprolol    DVT Prophylaxis: heparin drip  Diet: ADULT DIET;  Regular  Code Status: Full Code    PT/OT Eval Status: pending vascular intervention    Nola Hall MD

## 2021-11-18 NOTE — PROGRESS NOTES
Department of Internal Medicine  Nephrology Progress Note    SUBJECTIVE:  We are following this patient for DANY       Events noted . REVIEW OF SYSTEMS:  No CP/SOB/KOVACS c/o leg pain . Family at bed side      Physical Exam:    VITALS:  BP (!) 160/83   Pulse 79   Temp 97.5 °F (36.4 °C) (Oral)   Resp 18   Ht 6' 1\" (1.854 m)   Wt 217 lb 14.4 oz (98.8 kg)   SpO2 98%   BMI 28.75 kg/m²   24HR INTAKE/OUTPUT:      Intake/Output Summary (Last 24 hours) at 11/18/2021 1059  Last data filed at 11/18/2021 1027  Gross per 24 hour   Intake 360 ml   Output 700 ml   Net -340 ml       Constitutional:  Doing well  Respiratory:  CTA  Gastrointestinal:  No  tenderness.   Normal Bowel Sounds  Cardiovascular:  S1, S2 RRR   Edema:  + edema + erythema   Lt BKA     DATA:    CBC:  Lab Results   Component Value Date    WBC 7.3 11/18/2021    RBC 4.77 11/18/2021    HGB 12.7 11/18/2021    HCT 39.0 11/18/2021    MCV 81.7 11/18/2021    MCH 26.6 11/18/2021    MCHC 32.5 11/18/2021    RDW 15.7 11/18/2021     11/18/2021    MPV 7.6 11/18/2021     CMP:  Lab Results   Component Value Date     11/18/2021    K 4.7 11/18/2021    K 4.5 05/24/2020     11/18/2021    CO2 21 11/18/2021    BUN 17 11/18/2021    CREATININE 1.0 11/18/2021    GFRAA >60 11/18/2021    AGRATIO 1.1 11/15/2021    LABGLOM >60 11/18/2021    GLUCOSE 123 11/18/2021    PROT 6.9 11/17/2021    CALCIUM 9.2 11/18/2021    BILITOT <0.2 11/17/2021    ALKPHOS 56 11/17/2021    AST 21 11/17/2021    ALT 22 11/17/2021      Hepatic Function Panel:   Lab Results   Component Value Date    ALKPHOS 56 11/17/2021    ALT 22 11/17/2021    AST 21 11/17/2021    PROT 6.9 11/17/2021    BILITOT <0.2 11/17/2021    BILIDIR <0.2 11/17/2021    IBILI see below 11/17/2021      Phosphorus:   Lab Results   Component Value Date    PHOS 3.0 11/18/2021       ASSESSMENT:  Active Problems:    CAD in native artery    S/P CABG (coronary artery bypass graft)    Hyperlipidemia LDL goal <70    PAD (peripheral artery disease) ([facility] [address]    Essential hypertension    Acute pain of right lower extremity    Solitary kidney, acquired    Pain of right lower extremity due to ischemia  Resolved Problems:    * No resolved hospital problems. *      PLAN:  1. DANY most likely BHAVIK. Resolved . Ok to [employer] IVF   2. H/o Lt nephrectomy   3. HTN controlled   4. Rt LE pain /ischemia S/p intervention .      Daquan Ahmadi MD, FACP

## 2021-11-18 NOTE — PROGRESS NOTES
Pt a/o x4, on bedrest, c/o pain 10 of 10. Bed in low/ locked position, call light within reach. Evening medications given without complications. MD contacted for intermittent pain medication.  Will continue to monitor

## 2021-11-18 NOTE — PROCEDURES
[address] [address]                            CARDIAC CATHETERIZATION    PATIENT NAME: Kiko Coffman                      :        1955  MED REC NO:   [id number]                          ROOM:       3115  ACCOUNT NO:   [de-identified]                           ADMIT DATE: 11/15/2021  PROVIDER:     Lina Nix MD    DATE OF PROCEDURE:  2021    PROCEDURE PERFORMED:  Right lower extremity selective angiography and  attempt to cross the occluded segment of popliteal artery. Informed consent obtained. ASA is II. Mallampati score is II. PROCEDURE IN DETAIL:  The patient was brought to cardiac cath  laboratory. Left groin was prepped and draped in sterile fashion. We  accessed the left common femoral artery and inserted a 6-Chadian  crossover sheath into the right external iliac artery. We advanced a  microcatheter with an 0.014 wire and then a Glidewire Advantage but we  were not able to cross the occluded segment of the right popliteal  artery. We entered the dissection plane. At that time, we also then  accessed the right posterior tibial artery, inserted a slender sheath,  but using multiple wires, we were not able to cross the occluded segment  of the popliteal artery. The procedure was aborted. All catheters,  wires, sheaths were removed. Manual pressure was applied to achieve  hemostasis. No complication. Estimated blood loss was less than 30 mL. ANGIOGRAPHY FINDINGS:  Right superficial femoral artery heavily diseased  with sequential 80% stenosis. Popliteal artery is occluded, in-stent  occlusion present. Faint collateral flow into the right anterior tibial  artery and posterior tibial artery. RECOMMENDATION:  1. Continue postop post cath care. 2.  Site care. 3.  Consult Podiatry. 4.  The patient will be brought back for staged intervention in a few  days.         Christine Smith MD    D: 11/18/2021 12:18:04       T: 11/18/2021 13:40:20     [email or web address]  Job#: [id number]     Doc#: [id number]    CC:

## 2021-11-18 NOTE — PROGRESS NOTES
Patient A&O. Pt wife at bedside. IV fluid infusing. PRN pain medication given. No complaints at this time. Eating dinner now sitting up in bed. R foot elevated up on pillow. Call light within reach, able to make needs knows, fall precautions in place. Will continue to monitor.  Electronically signed by Sacha Heath RN on 11/17/2021 at 7:10 PM

## 2021-11-18 NOTE — PROGRESS NOTES
Pt has palpable pulse in foot at first check.  Pedal pulse at X on foot is +1 Electronically signed by Alessio Del Rio RN on 11/17/2021 at 7:08 PM

## 2021-11-18 NOTE — PLAN OF CARE
Problem: Skin Integrity:  Goal: Will show no infection signs and symptoms  Description: Will show no infection signs and symptoms  Outcome: Ongoing  Note: Pt is free of signs and symptoms of infection. Incision and dressing are clean, dry and intact. Vital signs stable. Will monitor. Goal: Absence of new skin breakdown  Description: Absence of new skin breakdown  Outcome: Ongoing  Note: Patient will remain free from new skin breakdown. Precautions in place. Will monitor and assess. Problem: Falls - Risk of:  Goal: Will remain free from falls  Description: Will remain free from falls  Outcome: Ongoing  Note: Fall risk assessment completed. Fall precautions in place. Call light within reach. Pt educated on calling for assistance before getting up. Walkway free of clutter. Will continue to monitor. Goal: Absence of physical injury  Description: Absence of physical injury  Outcome: Ongoing  Note: Patient will remain free from physical injury. Safety precautions are in place. Will continue to monitor and assess. Problem: Pain:  Goal: Pain level will decrease  Description: Pain level will decrease  Outcome: Ongoing  Note: Pt assessed for pain. Pt in pain and assessed with 0-10 pain rating scale. Pt given prescribed analgesic for pain. (See eMar) Pt satisfied with pain relief thus far. Will reassess and continue to monitor. Goal: Control of acute pain  Description: Control of acute pain  Outcome: Ongoing  Note: Pt assessed for pain. Pt in pain and assessed with 0-10 pain rating scale. Pt given prescribed analgesic for pain. (See eMar) Pt satisfied with pain relief thus far. Will reassess and continue to monitor. Goal: Control of chronic pain  Description: Control of chronic pain  Outcome: Ongoing  Note: Pt assessed for pain. Pt in pain and assessed with 0-10 pain rating scale. Pt given prescribed analgesic for pain. (See eMar) Pt satisfied with pain relief thus far. Will reassess and continue to monitor.

## 2021-11-18 NOTE — PROGRESS NOTES
[facility]  Cardiology Consult    Nhung Osuna  1955 November 18, 2021      Reason for Referral: Limb ischemia    CC: Right leg pain      Subjective:     History of Present Illness:    Nhung Osuna is a 61 y.o. patient with a PMH significant for peripheral artery disease, coronary disease presented with complaints of right leg pain. Right leg lower extremity pain increasing erythema of the foot. Pain 5/10 intensity ongoing for 1 week increasing in severity for past 2 to 3 days. No open wounds seen. No fever chills rigors. Past Medical History:   has a past medical history of Acute cerebral infarction ([facility] [address] Alcohol withdrawal ([facility] [address] Bilateral carotid artery stenosis, Hepatitis C antibody positive in blood, History of bronchitis, History of DVT of lower extremity, Hypertension, NSTEMI (non-ST elevated myocardial infarction) ([facility] [address] PAD (peripheral artery disease) ([facility] [address] Respiratory compromise, S/p nephrectomy, and Tattoos. Surgical History:   has a past surgical history that includes total nephrectomy (Left); Coronary artery bypass graft (N/A, 5/8/2020); tracheostomy (05/27/2020); transesophageal echocardiogram (05/08/2020); Cardiac catheterization (05/07/2020); thoracentesis (Left, 05/29/2020); Gastrostomy tube placement (N/A, 5/27/2020); Stomach surgery (N/A, 7/10/2020); and Leg amputation below knee. Social History:   reports that he quit smoking about 2 years ago. His smoking use included cigarettes. He has never used smokeless tobacco. He reports current alcohol use. He reports that he does not use drugs. Family History:  family history includes Heart Disease in his brother, father, mother, and paternal aunt. Home Medications:  Were reviewed and are listed in nursing record and/or below  Prior to Admission medications    Medication Sig Start Date End Date Taking?  Authorizing Provider   atorvastatin (LIPITOR) 40 MG tablet TAKE 1 TABLET BY MOUTH EVERY DAY AT NIGHT 10/19/21  Yes Tangela Veras MD   aspirin 81 MG chewable tablet TAKE 1 TABLET BY MOUTH EVERY DAY 9/28/21  Yes Rosio Galindo MD   rivaroxaban (XARELTO) 2.5 MG TABS tablet Take 1 tablet by mouth 2 times daily 2/5/21  Yes Rosio Galindo MD   pantoprazole (PROTONIX) 40 MG tablet Take 40 mg by mouth daily   Yes Historical Provider, MD   QUEtiapine (SEROQUEL) 100 MG tablet Take 100 mg by mouth nightly   Yes Historical Provider, MD   senna-docusate (Marlee March) 8.6-50 MG per tablet Take 1 tablet by mouth nightly   Yes Historical Provider, MD   tiZANidine (ZANAFLEX) 4 MG tablet Take 4 mg by mouth nightly    Yes Historical Provider, MD   acetaminophen (TYLENOL) 500 MG tablet Take 1,000 mg by mouth every 6 hours as needed for Pain   Yes Historical Provider, MD   ferrous gluconate (FERGON) 324 (38 Fe) MG tablet Take 324 mg by mouth daily (with breakfast)   Yes Historical Provider, MD   gabapentin (NEURONTIN) 800 MG tablet Take 800 mg by mouth 3 times daily.     Yes Historical Provider, MD   levETIRAcetam (KEPPRA) 250 MG tablet Take 250 mg by mouth 2 times daily    Yes Historical Provider, MD   metoprolol tartrate (LOPRESSOR) 25 MG tablet Take 25 mg by mouth 2 times daily   Yes Historical Provider, MD        CURRENT Medications:  heparin (porcine) injection 3,000 Units, Once  acetaminophen (TYLENOL) tablet 650 mg, Q4H PRN  ondansetron (ZOFRAN) injection 4 mg, Q6H PRN  oxyCODONE (OXYCONTIN) extended release tablet 10 mg, Q12H PRN  atorvastatin (LIPITOR) tablet 40 mg, Nightly  aspirin chewable tablet 81 mg, Daily  ferrous gluconate (FERGON) tablet 324 mg, Daily with breakfast  gabapentin (NEURONTIN) capsule 400 mg, TID  levETIRAcetam (KEPPRA) tablet 250 mg, BID  pantoprazole (PROTONIX) tablet 40 mg, Daily  metoprolol tartrate (LOPRESSOR) tablet 25 mg, BID  QUEtiapine (SEROQUEL) tablet 100 mg, Nightly  naloxone (NARCAN) injection 0.4 mg, PRN  sodium chloride flush 0.9 % injection 5-40 mL, 2 times per day  sodium chloride flush Alert, cooperative, no distress, appears stated age. Head:  Normocephalic, without obvious abnormality, atraumatic. Eyes:  Pupils equal and round. No scleral icterus. Mouth: Moist mucosa, no pharyngeal erythema. Nose: Nares normal. No drainage or sinus tenderness. Neck: Supple, symmetrical, trachea midline. No adenopathy. No tenderness/mass/nodules. No carotid bruit or elevated JVD. Lungs:   Respiratory Effort: Normal   Auscultation: Clear to auscultation bilaterally, respirations unlabored. No wheeze, rales   Chest Wall:  No tenderness or deformity. Cardiovascular:    Pulses  Palpation: normal   Ascultation: Regular rate, S1/ S2 normal. No murmur, rub, or gallop. Right lower extremity pulses nonpalpable   Abdomen and Gastrointestinal:   Soft, non-tender, bowel sounds active. Liver and Spleen  Masses   Musculoskeletal: No muscle wasting  Back  Gait   Extremities: Extremities normal, atraumatic. No cyanosis or edema. No cyanosis clubbing       Skin: Inspection and palpation performed, no rashes or lesions. Pysch: Normal mood and affect.  Alert and oriented to time place person   Neurologic: Normal gross motor and sensory exam.       Labs     All labs have been reviewed    Lab Results   Component Value Date    WBC 7.3 11/18/2021    RBC 4.77 11/18/2021    HGB 12.7 11/18/2021    HCT 39.0 11/18/2021    MCV 81.7 11/18/2021    RDW 15.7 11/18/2021     11/18/2021     Lab Results   Component Value Date     11/18/2021    K 4.7 11/18/2021    K 4.5 05/24/2020     11/18/2021    CO2 21 11/18/2021    BUN 17 11/18/2021    CREATININE 1.0 11/18/2021    GFRAA >60 11/18/2021    AGRATIO 1.1 11/15/2021    LABGLOM >60 11/18/2021    GLUCOSE 123 11/18/2021    PROT 6.9 11/17/2021    CALCIUM 9.2 11/18/2021    BILITOT <0.2 11/17/2021    ALKPHOS 56 11/17/2021    AST 21 11/17/2021    ALT 22 11/17/2021     No results found for: PTINR  Lab Results   Component Value Date    LABA1C 5.9 05/07/2020     Lab Results you have any questions.     Dae Horton MD, MPH    [facility], [address] [address]  Ph: [phone]  Fax: [phone]    11/18/2021 12:15 PM

## 2021-11-19 LAB
ALBUMIN SERPL-MCNC: 3.5 G/DL (ref 3.4–5)
ANION GAP SERPL CALCULATED.3IONS-SCNC: 13 MMOL/L (ref 3–16)
BASOPHILS ABSOLUTE: 0.1 K/UL (ref 0–0.2)
BASOPHILS RELATIVE PERCENT: 0.7 %
BUN BLDV-MCNC: 18 MG/DL (ref 7–20)
CALCIUM SERPL-MCNC: 8.5 MG/DL (ref 8.3–10.6)
CHLORIDE BLD-SCNC: 105 MMOL/L (ref 99–110)
CO2: 20 MMOL/L (ref 21–32)
CREAT SERPL-MCNC: 1 MG/DL (ref 0.8–1.3)
EOSINOPHILS ABSOLUTE: 0.1 K/UL (ref 0–0.6)
EOSINOPHILS RELATIVE PERCENT: 1.2 %
GFR AFRICAN AMERICAN: >60
GFR NON-AFRICAN AMERICAN: >60
GLUCOSE BLD-MCNC: 80 MG/DL (ref 70–99)
HCT VFR BLD CALC: 37.9 % (ref 40.5–52.5)
HEMOGLOBIN: 12.1 G/DL (ref 13.5–17.5)
LYMPHOCYTES ABSOLUTE: 1.4 K/UL (ref 1–5.1)
LYMPHOCYTES RELATIVE PERCENT: 19.5 %
MAGNESIUM: 2.1 MG/DL (ref 1.8–2.4)
MCH RBC QN AUTO: 26.5 PG (ref 26–34)
MCHC RBC AUTO-ENTMCNC: 32.1 G/DL (ref 31–36)
MCV RBC AUTO: 82.6 FL (ref 80–100)
MONOCYTES ABSOLUTE: 0.8 K/UL (ref 0–1.3)
MONOCYTES RELATIVE PERCENT: 11.1 %
NEUTROPHILS ABSOLUTE: 4.7 K/UL (ref 1.7–7.7)
NEUTROPHILS RELATIVE PERCENT: 67.5 %
PDW BLD-RTO: 15.9 % (ref 12.4–15.4)
PHOSPHORUS: 3.2 MG/DL (ref 2.5–4.9)
PLATELET # BLD: 221 K/UL (ref 135–450)
PMV BLD AUTO: 7.4 FL (ref 5–10.5)
POTASSIUM SERPL-SCNC: 4.3 MMOL/L (ref 3.5–5.1)
RBC # BLD: 4.59 M/UL (ref 4.2–5.9)
SODIUM BLD-SCNC: 138 MMOL/L (ref 136–145)
WBC # BLD: 6.9 K/UL (ref 4–11)

## 2021-11-19 PROCEDURE — 94760 N-INVAS EAR/PLS OXIMETRY 1: CPT

## 2021-11-19 PROCEDURE — 99232 SBSQ HOSP IP/OBS MODERATE 35: CPT | Performed by: NURSE PRACTITIONER

## 2021-11-19 PROCEDURE — 1200000000 HC SEMI PRIVATE

## 2021-11-19 PROCEDURE — 85025 COMPLETE CBC W/AUTO DIFF WBC: CPT

## 2021-11-19 PROCEDURE — 2580000003 HC RX 258: Performed by: STUDENT IN AN ORGANIZED HEALTH CARE EDUCATION/TRAINING PROGRAM

## 2021-11-19 PROCEDURE — 83735 ASSAY OF MAGNESIUM: CPT

## 2021-11-19 PROCEDURE — 6370000000 HC RX 637 (ALT 250 FOR IP): Performed by: NURSE PRACTITIONER

## 2021-11-19 PROCEDURE — 6370000000 HC RX 637 (ALT 250 FOR IP): Performed by: INTERNAL MEDICINE

## 2021-11-19 PROCEDURE — 36415 COLL VENOUS BLD VENIPUNCTURE: CPT

## 2021-11-19 PROCEDURE — 80069 RENAL FUNCTION PANEL: CPT

## 2021-11-19 PROCEDURE — 6360000002 HC RX W HCPCS: Performed by: STUDENT IN AN ORGANIZED HEALTH CARE EDUCATION/TRAINING PROGRAM

## 2021-11-19 PROCEDURE — 6370000000 HC RX 637 (ALT 250 FOR IP): Performed by: STUDENT IN AN ORGANIZED HEALTH CARE EDUCATION/TRAINING PROGRAM

## 2021-11-19 RX ORDER — AMMONIUM LACTATE 12 G/100G
LOTION TOPICAL PRN
Status: DISCONTINUED | OUTPATIENT
Start: 2021-11-19 | End: 2021-11-24 | Stop reason: HOSPADM

## 2021-11-19 RX ORDER — AMLODIPINE BESYLATE 5 MG/1
5 TABLET ORAL DAILY
Status: DISCONTINUED | OUTPATIENT
Start: 2021-11-19 | End: 2021-11-24 | Stop reason: HOSPADM

## 2021-11-19 RX ADMIN — HYDROMORPHONE HYDROCHLORIDE 0.5 MG: 1 INJECTION, SOLUTION INTRAMUSCULAR; INTRAVENOUS; SUBCUTANEOUS at 19:57

## 2021-11-19 RX ADMIN — OXYCODONE HYDROCHLORIDE 10 MG: 10 TABLET, FILM COATED, EXTENDED RELEASE ORAL at 08:21

## 2021-11-19 RX ADMIN — SODIUM CHLORIDE, PRESERVATIVE FREE 10 ML: 5 INJECTION INTRAVENOUS at 20:01

## 2021-11-19 RX ADMIN — METOPROLOL TARTRATE 25 MG: 25 TABLET, FILM COATED ORAL at 19:57

## 2021-11-19 RX ADMIN — QUETIAPINE FUMARATE 100 MG: 100 TABLET ORAL at 19:57

## 2021-11-19 RX ADMIN — AMLODIPINE BESYLATE 5 MG: 5 TABLET ORAL at 11:31

## 2021-11-19 RX ADMIN — PANTOPRAZOLE SODIUM 40 MG: 40 TABLET, DELAYED RELEASE ORAL at 08:20

## 2021-11-19 RX ADMIN — OXYCODONE HYDROCHLORIDE 10 MG: 10 TABLET, FILM COATED, EXTENDED RELEASE ORAL at 22:01

## 2021-11-19 RX ADMIN — ATORVASTATIN CALCIUM 40 MG: 40 TABLET, FILM COATED ORAL at 19:57

## 2021-11-19 RX ADMIN — HYDROMORPHONE HYDROCHLORIDE 0.5 MG: 1 INJECTION, SOLUTION INTRAMUSCULAR; INTRAVENOUS; SUBCUTANEOUS at 15:44

## 2021-11-19 RX ADMIN — LEVETIRACETAM 250 MG: 500 TABLET, FILM COATED ORAL at 08:20

## 2021-11-19 RX ADMIN — SODIUM CHLORIDE, PRESERVATIVE FREE 10 ML: 5 INJECTION INTRAVENOUS at 08:21

## 2021-11-19 RX ADMIN — GABAPENTIN 400 MG: 400 CAPSULE ORAL at 14:00

## 2021-11-19 RX ADMIN — HYDROMORPHONE HYDROCHLORIDE 0.5 MG: 1 INJECTION, SOLUTION INTRAMUSCULAR; INTRAVENOUS; SUBCUTANEOUS at 06:20

## 2021-11-19 RX ADMIN — HYDROMORPHONE HYDROCHLORIDE 0.5 MG: 1 INJECTION, SOLUTION INTRAMUSCULAR; INTRAVENOUS; SUBCUTANEOUS at 02:01

## 2021-11-19 RX ADMIN — METOPROLOL TARTRATE 25 MG: 25 TABLET, FILM COATED ORAL at 08:21

## 2021-11-19 RX ADMIN — HYDROMORPHONE HYDROCHLORIDE 0.5 MG: 1 INJECTION, SOLUTION INTRAMUSCULAR; INTRAVENOUS; SUBCUTANEOUS at 11:31

## 2021-11-19 RX ADMIN — GABAPENTIN 400 MG: 400 CAPSULE ORAL at 19:57

## 2021-11-19 RX ADMIN — GABAPENTIN 400 MG: 400 CAPSULE ORAL at 08:21

## 2021-11-19 RX ADMIN — LEVETIRACETAM 250 MG: 500 TABLET, FILM COATED ORAL at 19:57

## 2021-11-19 RX ADMIN — FERROUS GLUCONATE 324 MG: 324 TABLET ORAL at 08:20

## 2021-11-19 RX ADMIN — ASPIRIN 81 MG: 81 TABLET, CHEWABLE ORAL at 08:20

## 2021-11-19 ASSESSMENT — PAIN DESCRIPTION - PROGRESSION

## 2021-11-19 ASSESSMENT — PAIN SCALES - GENERAL
PAINLEVEL_OUTOF10: 8
PAINLEVEL_OUTOF10: 5
PAINLEVEL_OUTOF10: 9
PAINLEVEL_OUTOF10: 8
PAINLEVEL_OUTOF10: 8
PAINLEVEL_OUTOF10: 7
PAINLEVEL_OUTOF10: 8
PAINLEVEL_OUTOF10: 7
PAINLEVEL_OUTOF10: 8

## 2021-11-19 ASSESSMENT — PAIN DESCRIPTION - DIRECTION
RADIATING_TOWARDS: FOOT

## 2021-11-19 ASSESSMENT — PAIN DESCRIPTION - PAIN TYPE
TYPE: ACUTE PAIN

## 2021-11-19 ASSESSMENT — PAIN DESCRIPTION - ONSET
ONSET: ON-GOING

## 2021-11-19 ASSESSMENT — PAIN DESCRIPTION - FREQUENCY
FREQUENCY: CONTINUOUS

## 2021-11-19 ASSESSMENT — PAIN DESCRIPTION - ORIENTATION
ORIENTATION: RIGHT

## 2021-11-19 ASSESSMENT — PAIN DESCRIPTION - LOCATION
LOCATION: LEG

## 2021-11-19 ASSESSMENT — PAIN DESCRIPTION - DESCRIPTORS
DESCRIPTORS: BURNING

## 2021-11-19 ASSESSMENT — PAIN - FUNCTIONAL ASSESSMENT
PAIN_FUNCTIONAL_ASSESSMENT: PREVENTS OR INTERFERES SOME ACTIVE ACTIVITIES AND ADLS

## 2021-11-19 NOTE — PROGRESS NOTES
Department of Internal Medicine  Nephrology Progress Note    SUBJECTIVE:  We are following this patient for DANY       Events noted . REVIEW OF SYSTEMS:  No CP/SOB/KOVACS c/o leg pain . Family at bed side      Physical Exam:    VITALS:  BP (!) 182/78   Pulse 81   Temp 97.4 °F (36.3 °C) (Axillary)   Resp 16   Ht 6' 1\" (1.854 m)   Wt 221 lb 5.5 oz (100.4 kg)   SpO2 97%   BMI 29.20 kg/m²   24HR INTAKE/OUTPUT:      Intake/Output Summary (Last 24 hours) at 11/19/2021 1104  Last data filed at 11/19/2021 0834  Gross per 24 hour   Intake 360 ml   Output 2650 ml   Net -2290 ml       Constitutional:  Doing well  Respiratory:  CTA  Gastrointestinal:  No  tenderness.   Normal Bowel Sounds  Cardiovascular:  S1, S2 RRR   Edema:  + edema + erythema   Lt BKA     DATA:    CBC:  Lab Results   Component Value Date    WBC 6.9 11/19/2021    RBC 4.59 11/19/2021    HGB 12.1 11/19/2021    HCT 37.9 11/19/2021    MCV 82.6 11/19/2021    MCH 26.5 11/19/2021    MCHC 32.1 11/19/2021    RDW 15.9 11/19/2021     11/19/2021    MPV 7.4 11/19/2021     CMP:  Lab Results   Component Value Date     11/19/2021    K 4.3 11/19/2021    K 4.5 05/24/2020     11/19/2021    CO2 20 11/19/2021    BUN 18 11/19/2021    CREATININE 1.0 11/19/2021    GFRAA >60 11/19/2021    AGRATIO 1.1 11/15/2021    LABGLOM >60 11/19/2021    GLUCOSE 80 11/19/2021    PROT 6.9 11/17/2021    CALCIUM 8.5 11/19/2021    BILITOT <0.2 11/17/2021    ALKPHOS 56 11/17/2021    AST 21 11/17/2021    ALT 22 11/17/2021      Hepatic Function Panel:   Lab Results   Component Value Date    ALKPHOS 56 11/17/2021    ALT 22 11/17/2021    AST 21 11/17/2021    PROT 6.9 11/17/2021    BILITOT <0.2 11/17/2021    BILIDIR <0.2 11/17/2021    IBILI see below 11/17/2021      Phosphorus:   Lab Results   Component Value Date    PHOS 3.2 11/19/2021       ASSESSMENT:  Active Problems:    CAD in native artery    S/P CABG (coronary artery bypass graft)    Hyperlipidemia LDL goal <70    PAD (peripheral artery disease) (H[facility])    Essential hypertension    Acute pain of right lower extremity    Solitary kidney, acquired    Pain of right lower extremity due to ischemia  Resolved Problems:    * No resolved hospital problems. *      PLAN:  1. DANY most likely BHAVIK. Resolved . Off IVF   2. H/o Lt nephrectomy   3. HTN Uncontrolled , meds adjusted   4. Rt LE pain /ischemia S/p intervention . 5. Will sign off.      Wilmar Allen MD, FACP

## 2021-11-19 NOTE — PROGRESS NOTES
[facility] 83   Daily Progress Note      Admit Date:  11/15/2021    Reason for follow up visit: Limb ischemia    CC:  Right leg pain    62 y/o male with PMH significant for PAD, CAD, carotid stenosis, HTN and H/O DVT admitted to [facility] with c/o R leg pain. Has noted increasing pain and erythema of the foot with worsening severity. Noted to have occluded popliteal and tibioperoneal trunk and underwent angiogram with attempted revascularization on 11/18/2021. Interval History:  Pt. seen and examined; records reviewed  Afebrile. BP reviewed. R leg pain  Denies chest pain or SOB  Net diuresis -4.1 L since admit    Subjective:  Pt with no acute overnight cardiac events. Denies chest pain, SOB, cough, palpitations or dizziness    Review of Systems:   · Constitutional: no unanticipated weight loss. There's been no change in energy level, sleep pattern, or activity level. No fevers, chills. · Eyes: No visual changes or diplopia. No scleral icterus. · ENT: No Headaches, hearing loss or vertigo. No mouth sores or sore throat. · Cardiovascular: as reviewed in HPI  · Respiratory: No cough or wheezing, no sputum production. No hematemesis. · Gastrointestinal: No abdominal pain, appetite loss, blood in stools. No change in bowel or bladder habits. · Genitourinary: No dysuria, trouble voiding, or hematuria. · Musculoskeletal:  No gait disturbance, no joint complaints. · Integumentary: No rash or pruritis. · Neurological: No headache, diplopia, change in muscle strength, numbness or tingling. · Psychiatric: No anxiety or depression. · Endocrine: No temperature intolerance. No excessive thirst, fluid intake, or urination. No tremor. · Hematologic/Lymphatic: No abnormal bruising or bleeding, blood clots or swollen lymph nodes. · Allergic/Immunologic: No nasal congestion or hives.     Objective:   BP (!) 182/78   Pulse 81   Temp 97.4 °F (36.3 °C) (Axillary)   Resp 16   Ht 6' 1\" (1.854 m)   Wt 221 lb 5.5 oz (100.4 kg)   SpO2 97%   BMI 29.20 kg/m²     Intake/Output Summary (Last 24 hours) at 11/19/2021 0935  Last data filed at 11/19/2021 0834  Gross per 24 hour   Intake 360 ml   Output 2850 ml   Net -2490 ml     Wt Readings from Last 3 Encounters:   11/19/21 221 lb 5.5 oz (100.4 kg)   11/11/21 217 lb (98.4 kg)   02/04/21 185 lb (83.9 kg)       Physical Exam:  General: In no acute distress. Oriented x4. Resting comfortably in bed  Skin:  Warm and dry. No new appearing rashes or lesions. Neck:  Supple. No JVD  appreciated. Chest:Lungs clear to auscultation. No wheezes/rhonchi/rales  Cardiovascular:  RRR. Normal S1 and S2. No murmur/gallop or rub  Abdomen:  soft, nontender, +bowel sounds. Extremities:  1+ right lower leg/ankle edema. No cyanosis    Medications:    heparin (porcine)  3,000 Units IntraVENous Once    atorvastatin  40 mg Oral Nightly    aspirin  81 mg Oral Daily    ferrous gluconate  324 mg Oral Daily with breakfast    gabapentin  400 mg Oral TID    levETIRAcetam  250 mg Oral BID    pantoprazole  40 mg Oral Daily    metoprolol tartrate  25 mg Oral BID    QUEtiapine  100 mg Oral Nightly    sodium chloride flush  5-40 mL IntraVENous 2 times per day      sodium chloride      heparin (PORCINE) Infusion 1,270 Units/hr (11/17/21 0640)     acetaminophen, ondansetron, oxyCODONE, naloxone, sodium chloride flush, sodium chloride, ondansetron **OR** [DISCONTINUED] ondansetron, [DISCONTINUED] acetaminophen **OR** acetaminophen, HYDROmorphone **OR** HYDROmorphone    Lab Data:  CBC:   Recent Labs     11/17/21  0444 11/18/21  0506 11/19/21  0518   WBC 6.6 7.3 6.9   HGB 12.3* 12.7* 12.1*    267 221     BMP:    Recent Labs     11/17/21  1206 11/18/21  0506 11/19/21  0518    137 138   K 4.3 4.7 4.3   CO2 24 21 20*   BUN 15 17 18   CREATININE 0.9 1.0 1.0     LIVR:   Recent Labs     11/17/21  0444   AST 21   ALT 22     INR:  No results for input(s): INR in the last 72 hours.   APTT:   Recent Labs     11/16/21  1929 11/17/21  0444 11/17/21  1206   APTT 43.9* 97.5* 43.7*     Results for Alyse Morris (MRN [id number]) as of 11/19/2021 09:39   Ref. Range 11/17/2021 04:44 11/18/2021 05:06   Pro-BNP Latest Ref Range: 0 - 124 pg/mL 219 (H) 694 (H)     11/18/2021 Peripheral cath:  ANGIOGRAPHY FINDINGS:  Right superficial femoral artery heavily diseased  with sequential 80% stenosis. Popliteal artery is occluded, in-stent  occlusion present. Faint collateral flow into the right anterior tibial  artery and posterior tibial artery.       11/15/2021 CTA abdomen:  Vascular-       Aortoiliac inflow is preserved.       Left below-the-knee amputation is noted. Roxi Pod is severe, occlusive disease   in the left femoropopliteal tibial segment.       Right severe femoropopliteal segment disease with occlusion by the mid   superficial femoral artery.  Popliteal segment is occluded.  Trifurcation   vessels are reconstituted with runoff via the anterior tibial and posterior   tibial arteries.       Nonvascular-       No acute abdominopelvic abnormality is identified.       Solitary right kidney.       Mild enlargement of the prostate gland.       Bilateral inguinal hernias, including sigmoid colon content on the left. There is no incarceration. 11/1/2021 LE doppler:  Right SYBIL was not available due to inadequate pulses . Elevated velocity of the deep femoral artery suggests a 50-70% stenosis. Occlusion of the distal superficial femoral and popliteal artery in the right lower extremity. Critically low velocities in the right posterior tibial and anterior tibial arteries. Left SYBIL was not available due to below knee amputation. Occlusion of the left popliteal artery. The majority of the waveforms are monophasic throughout the left lower extremity. 11/1/2021 Carotid US:  Summary  The right internal carotid artery appears to have a 80-99% diameter reducing stenosis based on velocity criteria.   The right

## 2021-11-19 NOTE — PLAN OF CARE
Problem: Skin Integrity:  Goal: Will show no infection signs and symptoms  Description: Will show no infection signs and symptoms  11/19/2021 0725 by Leonora Friedman RN  Outcome: Ongoing  Note: Pt is free of signs and symptoms of infection. Incision and dressing are clean, dry and intact. Vital signs stable. Will monitor. 11/18/2021 2153 by Althea Roman RN  Outcome: Ongoing  Note: Will monitor skin and mucous members. Will turn patient every 2 hours, monitor for friction and sheering, and change dressings as needed. Will preform skin assessment every shift. Goal: Absence of new skin breakdown  Description: Absence of new skin breakdown  11/19/2021 0725 by Leonora Friedman RN  Outcome: Ongoing  Note: Patient will remain free from new skin breakdown. Precautions in place. Will monitor and assess. 11/18/2021 2153 by Althea Roman RN  Outcome: Ongoing     Problem: Falls - Risk of:  Goal: Will remain free from falls  Description: Will remain free from falls  11/19/2021 0725 by Leonora Friedman RN  Outcome: Ongoing  Note: Fall risk assessment completed. Fall precautions in place. Call light within reach. Pt educated on calling for assistance before getting up. Walkway free of clutter. Will continue to monitor. 11/18/2021 2153 by Althea Roman RN  Outcome: Ongoing  Note: Patient educated on fall prevention. Call light is within reach, bed locked in lowest position, personal items within reach, and bed alarm is on. Will round on patient per unit guidelines. Goal: Absence of physical injury  Description: Absence of physical injury  11/19/2021 0725 by Leonora Friedman RN  Outcome: Ongoing  Note: Patient will remain free from physical injury. Safety precautions are in place. Will continue to monitor and assess.   11/18/2021 2153 by Althea Roman RN  Outcome: Ongoing     Problem: Pain:  Goal: Pain level will decrease  Description: Pain level will decrease  11/19/2021 0725 by Leonora Friedman RN  Outcome: Ongoing  Note: Pt assessed for pain. Pt in pain and assessed with 0-10 pain rating scale. Pt given prescribed analgesic for pain. (See eMar) Pt satisfied with pain relief thus far. Will reassess and continue to monitor. 2021 by Lillian Yu RN  Outcome: Ongoing  Note: Educated patient on pain management. Will assess patients pain level per unit protocol, and provide pain management measures as needed. Goal: Control of acute pain  Description: Control of acute pain  2021 by Aida Nolan RN  Outcome: Ongoing  Note: Pt assessed for pain. Pt in pain and assessed with 0-10 pain rating scale. Pt given prescribed analgesic for pain. (See eMar) Pt satisfied with pain relief thus far. Will reassess and continue to monitor. 2021 by Lillian Yu RN  Outcome: Ongoing  Goal: Control of chronic pain  Description: Control of chronic pain  2021 by Aida Nolan RN  Outcome: Ongoing  Note: Pt assessed for pain. Pt in pain and assessed with 0-10 pain rating scale. Pt given prescribed analgesic for pain. (See eMar) Pt satisfied with pain relief thus far. Will reassess and continue to monitor.    2021 by Lillian Yu RN  Outcome: Ongoing

## 2021-11-19 NOTE — PLAN OF CARE
Problem: Skin Integrity:  Goal: Will show no infection signs and symptoms  Description: Will show no infection signs and symptoms  Outcome: Ongoing  Note: Will monitor skin and mucous members. Will turn patient every 2 hours, monitor for friction and sheering, and change dressings as needed. Will preform skin assessment every shift. Goal: Absence of new skin breakdown  Description: Absence of new skin breakdown  Outcome: Ongoing     Problem: Falls - Risk of:  Goal: Will remain free from falls  Description: Will remain free from falls  Outcome: Ongoing  Note: Patient educated on fall prevention. Call light is within reach, bed locked in lowest position, personal items within reach, and bed alarm is on. Will round on patient per unit guidelines. Goal: Absence of physical injury  Description: Absence of physical injury  Outcome: Ongoing     Problem: Pain:  Goal: Pain level will decrease  Description: Pain level will decrease  Outcome: Ongoing  Note: Educated patient on pain management. Will assess patients pain level per unit protocol, and provide pain management measures as needed.      Goal: Control of acute pain  Description: Control of acute pain  Outcome: Ongoing  Goal: Control of chronic pain  Description: Control of chronic pain  Outcome: Ongoing

## 2021-11-19 NOTE — PROGRESS NOTES
Pt resting in bed, A/O x4, RA, VSS stable, c/o pain 9 of 10. PRN medication given per [facility] order. Evening medications given with no complications. Bed alarm on, bed in lowest locked position, call light within reach, white board updated. Will continue to monitor.

## 2021-11-19 NOTE — PROGRESS NOTES
Patient is resting in bed and is alert and oriented x4. Patient is complaining of right leg pain radiating down to his foot that is a 8/10. 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 Right leg remains red and swollen. Pedal pulse found with doppler. Angiogram attempt site dressings are clean, dry, and intact. Wound to left BKA from prosthetic is covered with mepilex. Patient sees wound care outpatient for it. Patient uses urinal but wheelchair in his room to use the restroom when needed. Morning medication given and head to toe assessment is complete. All needs are met and safety precautions are in place. Will continue to monitor and assess.   Electronically signed by Donald Beavers RN on 11/19/2021 at 8:33 AM

## 2021-11-19 NOTE — PROGRESS NOTES
Hospitalist Progress Note      PCP: KATHY Baez - CNP    Date of Admission: 11/15/2021    Chief Complaint: right foot pain    Hospital Course:     Subjective: no complaints      Medications:  Reviewed    Infusion Medications    sodium chloride      heparin (PORCINE) Infusion 1,270 Units/hr (11/17/21 0640)     Scheduled Medications    heparin (porcine)  3,000 Units IntraVENous Once    atorvastatin  40 mg Oral Nightly    aspirin  81 mg Oral Daily    ferrous gluconate  324 mg Oral Daily with breakfast    gabapentin  400 mg Oral TID    levETIRAcetam  250 mg Oral BID    pantoprazole  40 mg Oral Daily    metoprolol tartrate  25 mg Oral BID    QUEtiapine  100 mg Oral Nightly    sodium chloride flush  5-40 mL IntraVENous 2 times per day     PRN Meds: acetaminophen, ondansetron, oxyCODONE, naloxone, sodium chloride flush, sodium chloride, ondansetron **OR** [DISCONTINUED] ondansetron, [DISCONTINUED] acetaminophen **OR** acetaminophen, HYDROmorphone **OR** HYDROmorphone      Intake/Output Summary (Last 24 hours) at 11/19/2021 0746  Last data filed at 11/18/2021 2350  Gross per 24 hour   Intake 480 ml   Output 2950 ml   Net -2470 ml       Exam:    BP (!) 182/78   Pulse 81   Temp 97.4 °F (36.3 °C) (Axillary)   Resp 17   Ht 6' 1\" (1.854 m)   Wt 221 lb 5.5 oz (100.4 kg)   SpO2 96%   BMI 29.20 kg/m²     General appearance: No apparent distress, appears stated age and cooperative. HEENT: Pupils equal, round, and reactive to light. Conjunctivae/corneas clear. Neck: Supple, with full range of motion. No jugular venous distention. Trachea midline. Respiratory:  Normal respiratory effort. Clear to auscultation, bilaterally without Rales/Wheezes/Rhonchi. Cardiovascular: Regular rate and rhythm with normal S1/S2 without murmurs, rubs or gallops. Abdomen: Soft, non-tender, non-distended with normal bowel sounds. Musculoskeletal: No clubbing, cyanosis or edema bilaterally. Left leg BKA.   Right foot with tenderness and diffuse hyperemia  Skin: Skin color, texture, turgor normal.  No rashes or lesions. Neurologic:  Neurovascularly intact without any focal sensory/motor deficits. Cranial nerves: II-XII intact, grossly non-focal.  Psychiatric: Alert and oriented, thought content appropriate, normal insight  Capillary Refill: Brisk,< 3 seconds   Peripheral Pulses: +2 palpable, equal bilaterally       Labs:   Recent Labs     11/17/21  0444 11/18/21  0506 11/19/21  0518   WBC 6.6 7.3 6.9   HGB 12.3* 12.7* 12.1*   HCT 37.9* 39.0* 37.9*    267 221     Recent Labs     11/17/21  1206 11/18/21  0506 11/19/21  0518    137 138   K 4.3 4.7 4.3    102 105   CO2 24 21 20*   BUN 15 17 18   CREATININE 0.9 1.0 1.0   CALCIUM 8.9 9.2 8.5   PHOS 3.2 3.0 3.2     Recent Labs     11/17/21  0444   AST 21   ALT 22   BILIDIR <0.2   BILITOT <0.2   ALKPHOS 56     No results for input(s): INR in the last 72 hours. Recent Labs     11/17/21  0444   CKTOTAL 109       Urinalysis:      Lab Results   Component Value Date    NITRU Negative 05/28/2020    WBCUA 2 05/28/2020    RBCUA 10 05/28/2020    BLOODU Negative 05/28/2020    SPECGRAV 1.019 05/28/2020    GLUCOSEU Negative 05/28/2020       Radiology:  US RENAL COMPLETE   Final Result   1. Unremarkable sonographic appearance of the right kidney. 2. Patient status post left nephrectomy. 3. Collapsed urinary bladder, limiting its evaluation. CTA ABDOMINAL AORTA W BILAT RUNOFF W CONTRAST   Final Result   Vascular-      Aortoiliac inflow is preserved. Left below-the-knee amputation is noted. There is severe, occlusive disease   in the left femoropopliteal tibial segment. Right severe femoropopliteal segment disease with occlusion by the mid   superficial femoral artery. Popliteal segment is occluded. Trifurcation   vessels are reconstituted with runoff via the anterior tibial and posterior   tibial arteries.       Nonvascular-      No acute abdominopelvic abnormality is identified. Solitary right kidney. Mild enlargement of the prostate gland. Bilateral inguinal hernias, including sigmoid colon content on the left. There is no incarceration. Assessment/Plan:    Active Hospital Problems    Diagnosis Date Noted    Acute pain of right lower extremity [M79.604] 11/15/2021    Solitary kidney, acquired [Z90.5] 11/15/2021    Pain of right lower extremity due to ischemia [M79.604, I99.8] 11/15/2021    PAD (peripheral artery disease) ([facility] [address] [I73.9] 10/22/2021    Hyperlipidemia LDL goal <70 [E78.5] 10/22/2021    Essential hypertension [I10] 10/22/2021    CAD in native artery [I25.10]     S/P CABG (coronary artery bypass graft) [Z95.1]        Critical limb ischemia:  - heparin drip, statin, ASA  - cardiology to do SYBIL, doppler, angio   - anticipate will need revascular stenting   - angiography unable to be done due to stenosis proximal to foot. - will need to wait until Dr. China Song returns for repeat attempt    DANY:  - likely due to contrast-induced nephropathy  - nepho consulted:  Getting ultrasound, check UA   - IVF PRN    Tobacco abuse:  - cessation counseling    H/o CVA, HTN:  Cont metoprolol    DVT Prophylaxis: heparin drip  Diet: ADULT DIET;  Regular  Code Status: Limited    PT/OT Eval Status: pending vascular intervention    Ra Kramer MD

## 2021-11-19 NOTE — CONSULTS
Department of Podiatric Surgery  History and Physical        CHIEF COMPLAINT:    Chief Complaint   Patient presents with    Foot Pain     right foot pain. acute on chronic. arterial insufficiency hx        Reason for Consultation: Ischemia of the right lower extremity    History Obtained From:  patient, electronic medical record    HISTORY OF PRESENT ILLNESS:      The patient is a 61 y.o. male who presents with right lower extremity ischemia. Patient has history of a left below-knee amputation for infection. He is here for right foot ischemia. The patient had endovascular intervention earlier today and have a second procedure on Monday. Patient has some mild pain in the foot but notes it is not too bad. He is most concerned about the dry skin on the foot today. Past Medical History:        Diagnosis Date    Acute cerebral infarction ([facility] [address] 05/2020    R posterior frontal lobe    Alcohol withdrawal ([facility] [address] 05/2020    Bilateral carotid artery stenosis 05/07/2020    bilat 50-79% stenosis    Hepatitis C antibody positive in blood 05/16/2020    History of bronchitis     History of DVT of lower extremity 2018    RLL, no previous scans to know whether supf or dvt. no coumadin. put on plavix.  Hypertension     NSTEMI (non-ST elevated myocardial infarction) ([address] 05/07/2020    3VD    PAD (peripheral artery disease) ([facility] [address]     Respiratory compromise 05/2020    chemical exposure at work, seen at [facility], covid neg, given steroids    S/p nephrectomy 5    age 17, pt not sure why     Tattoos      Past Surgical History:        Procedure Laterality Date    CARDIAC CATHETERIZATION  05/07/2020    Dr. Mac Nurse - w/placement of IABP    CORONARY ARTERY BYPASS GRAFT N/A 5/8/2020    Dr. Clarene Kehr. Robles - urgent x4 (LIMA-LAD, L SVG-D1, SVG-OM, SVG-PDA)    GASTROSTOMY TUBE PLACEMENT N/A 5/27/2020    PERCUTANEOUS ENDOSCOPIC GASTROSTOMY TUBE PLACEMENT performed by Manuel Greco MD at [address] [place] [address]  STOMACH SURGERY N/A 7/10/2020    REMOVAL  GASTROSTOMY FEEDING TUBE  (47421) performed by Lala Swanson MD at [address] Left 2020    [facility]    TOTAL NEPHRECTOMY Left     17 yo - pt doesn't know why   Abelardoeileen Rodney  2020    Dr. Kendell Perez - w/bronchoscopy    TRANSESOPHAGEAL ECHOCARDIOGRAM  2020    during CABG                 FAMILY HISTORY    Family History   Problem Relation Age of Onset    Heart Disease Mother          of MI age 36    Heart Disease Brother         MI mid 46s    Heart Disease Father          of MI age 68    Heart Disease Paternal Aunt         MI in her 46s       SOCIAL HISTORY    Social History     Tobacco Use    Smoking status: Former Smoker     Types: Cigarettes     Quit date: 2019     Years since quittin.0    Smokeless tobacco: Never Used    Tobacco comment: started age 27   Vaping Use    Vaping Use: Never used   Substance Use Topics    Alcohol use: Yes     Comment: once a month, shot once a week. depends on mood.  Drug use: Never       ALLERGIES    No Known Allergies    MEDICATIONS    No current facility-administered medications on file prior to encounter.      Current Outpatient Medications on File Prior to Encounter   Medication Sig Dispense Refill    atorvastatin (LIPITOR) 40 MG tablet TAKE 1 TABLET BY MOUTH EVERY DAY AT NIGHT 90 tablet 0    aspirin 81 MG chewable tablet TAKE 1 TABLET BY MOUTH EVERY DAY 60 tablet 0    rivaroxaban (XARELTO) 2.5 MG TABS tablet Take 1 tablet by mouth 2 times daily 60 tablet 5    pantoprazole (PROTONIX) 40 MG tablet Take 40 mg by mouth daily      QUEtiapine (SEROQUEL) 100 MG tablet Take 100 mg by mouth nightly      senna-docusate (PERICOLACE) 8.6-50 MG per tablet Take 1 tablet by mouth nightly      tiZANidine (ZANAFLEX) 4 MG tablet Take 4 mg by mouth nightly       acetaminophen (TYLENOL) 500 MG tablet Take 1,000 mg by mouth every 6 hours as needed for Pain      ferrous gluconate (FERGON) 324 (38 Fe) MG tablet Take 324 mg by mouth daily (with breakfast)      gabapentin (NEURONTIN) 800 MG tablet Take 800 mg by mouth 3 times daily.  levETIRAcetam (KEPPRA) 250 MG tablet Take 250 mg by mouth 2 times daily       metoprolol tartrate (LOPRESSOR) 25 MG tablet Take 25 mg by mouth 2 times daily         Allergies:  Patient has no known allergies. REVIEW OF SYSTEMS:  CONSTITUTIONAL:  negative  EYES:  negative  HEENT:  negative  RESPIRATORY:  negative  CARDIOVASCULAR:  negative  GASTROINTESTINAL:  negative  ENDOCRINE:  positive for diabetic symptoms including skin dryness  MUSCULOSKELETAL:  negative  NEUROLOGICAL:  negative    PHYSICAL EXAM:  VITALS:  /69   Pulse 76   Temp 98.7 °F (37.1 °C) (Oral)   Resp 16   Ht 6' 1\" (1.854 m)   Wt 221 lb 5.5 oz (100.4 kg)   SpO2 97%   BMI 29.20 kg/m²   CONSTITUTIONAL:  awake, alert, cooperative, no apparent distress, and appears stated age    LOWER EXTREMITY:  VASCULAR: Pedal Pulses right extremity are not palpable. Left patient's foot is cool to the touch but does not display any signs of gross ischemia. MUSCULOSKELETAL:  positive gross deformity. Patient has left below-knee amputation with a stump wound. The stump wound is not uncovered today by me. Patient does have digital contractures all digits on the right foot. Diffuse xerotic skin noted without any evidence of open lesions. NEUROLOGIC:  Epicritic sensation, light touch, joint position sense diminished  SKIN: The patient's foot is cool to the touch with a diffuse erythema.   No open wounds         I/O:    Intake/Output Summary (Last 24 hours) at 11/19/2021 1305  Last data filed at 11/19/2021 0834  Gross per 24 hour   Intake 240 ml   Output 2650 ml   Net -2410 ml              Wt Readings from Last 3 Encounters:   11/19/21 221 lb 5.5 oz (100.4 kg)   11/11/21 217 lb (98.4 kg)   02/04/21 185 lb (83.9 kg)       LABS:    Recent Labs 11/18/21  0506 11/19/21  0518   WBC 7.3 6.9   HGB 12.7* 12.1*   HCT 39.0* 37.9*    221        Recent Labs     11/19/21  0518      K 4.3      CO2 20*   PHOS 3.2   BUN 18   CREATININE 1.0        Recent Labs     11/17/21  0444 11/17/21  1206   PROT 6.9  --    APTT 97.5* 43.7*       IMAGING:       Summary        Right SYBIL was not available due to inadequate pulses .    Elevated velocity of the deep femoral artery suggests a 50-70% stenosis.    Occlusion of the distal superficial femoral and popliteal artery in the    right lower extremity.    Critically low velocities in the right posterior tibial and anterior tibial    arteries.    Left SYBIL was not available due to below knee amputation.    Occlusion of the left popliteal artery.    The majority of the waveforms are monophasic throughout the left lower    extremity.        Signature        ------------------------------------------------------------------    Electronically signed by Lizette Bowman MD (Interpreting    physician) on 11/01/2021 at 05:49 PM       MICRO:   No results    ASSESSMENT   Peripheral arterial disease with right lower extremity ischemia    PLAN:  Evaluation and Management x 30 minutes with greater than 50% of the time spent with the patient discussing the etiology and treatment options of the chief complaint. 1.  Right lower extremity ischemia  We will follow for close monitoring. Patient does not need any immediate podiatric intervention. Lac-Hydrin ordered for the patient's dry skin    DISPO: We will follow  Thanks for the opportunity to participate in this patient's care.      Morenita Topete DPM DPM  [facility]  Pager: [phone]  Office: [phone]  Fax: [phone]

## 2021-11-20 LAB
ANION GAP SERPL CALCULATED.3IONS-SCNC: 11 MMOL/L (ref 3–16)
BASOPHILS ABSOLUTE: 0.1 K/UL (ref 0–0.2)
BASOPHILS RELATIVE PERCENT: 1.3 %
BILIRUBIN URINE: NEGATIVE
BLOOD, URINE: NEGATIVE
BUN BLDV-MCNC: 17 MG/DL (ref 7–20)
CALCIUM SERPL-MCNC: 8.8 MG/DL (ref 8.3–10.6)
CHLORIDE BLD-SCNC: 105 MMOL/L (ref 99–110)
CLARITY: CLEAR
CO2: 24 MMOL/L (ref 21–32)
COLOR: YELLOW
CREAT SERPL-MCNC: 1.1 MG/DL (ref 0.8–1.3)
EOSINOPHILS ABSOLUTE: 0.3 K/UL (ref 0–0.6)
EOSINOPHILS RELATIVE PERCENT: 5.2 %
GFR AFRICAN AMERICAN: >60
GFR NON-AFRICAN AMERICAN: >60
GLUCOSE BLD-MCNC: 103 MG/DL (ref 70–99)
GLUCOSE URINE: NEGATIVE MG/DL
HCT VFR BLD CALC: 37.7 % (ref 40.5–52.5)
HEMOGLOBIN: 12.3 G/DL (ref 13.5–17.5)
KETONES, URINE: NEGATIVE MG/DL
LEUKOCYTE ESTERASE, URINE: NEGATIVE
LYMPHOCYTES ABSOLUTE: 1.4 K/UL (ref 1–5.1)
LYMPHOCYTES RELATIVE PERCENT: 25.5 %
MAGNESIUM: 1.9 MG/DL (ref 1.8–2.4)
MCH RBC QN AUTO: 26.8 PG (ref 26–34)
MCHC RBC AUTO-ENTMCNC: 32.5 G/DL (ref 31–36)
MCV RBC AUTO: 82.5 FL (ref 80–100)
MICROSCOPIC EXAMINATION: NORMAL
MONOCYTES ABSOLUTE: 0.6 K/UL (ref 0–1.3)
MONOCYTES RELATIVE PERCENT: 10.5 %
NEUTROPHILS ABSOLUTE: 3.2 K/UL (ref 1.7–7.7)
NEUTROPHILS RELATIVE PERCENT: 57.5 %
NITRITE, URINE: NEGATIVE
PDW BLD-RTO: 15.7 % (ref 12.4–15.4)
PH UA: 5.5 (ref 5–8)
PLATELET # BLD: 208 K/UL (ref 135–450)
PMV BLD AUTO: 7.4 FL (ref 5–10.5)
POTASSIUM SERPL-SCNC: 4.5 MMOL/L (ref 3.5–5.1)
PROTEIN UA: NEGATIVE MG/DL
RBC # BLD: 4.57 M/UL (ref 4.2–5.9)
SODIUM BLD-SCNC: 140 MMOL/L (ref 136–145)
SPECIFIC GRAVITY UA: 1.01 (ref 1–1.03)
URINE TYPE: NORMAL
UROBILINOGEN, URINE: 0.2 E.U./DL
WBC # BLD: 5.6 K/UL (ref 4–11)

## 2021-11-20 PROCEDURE — 94760 N-INVAS EAR/PLS OXIMETRY 1: CPT

## 2021-11-20 PROCEDURE — 2580000003 HC RX 258: Performed by: STUDENT IN AN ORGANIZED HEALTH CARE EDUCATION/TRAINING PROGRAM

## 2021-11-20 PROCEDURE — 80048 BASIC METABOLIC PNL TOTAL CA: CPT

## 2021-11-20 PROCEDURE — 81003 URINALYSIS AUTO W/O SCOPE: CPT

## 2021-11-20 PROCEDURE — 6370000000 HC RX 637 (ALT 250 FOR IP): Performed by: INTERNAL MEDICINE

## 2021-11-20 PROCEDURE — 6370000000 HC RX 637 (ALT 250 FOR IP): Performed by: STUDENT IN AN ORGANIZED HEALTH CARE EDUCATION/TRAINING PROGRAM

## 2021-11-20 PROCEDURE — 99233 SBSQ HOSP IP/OBS HIGH 50: CPT | Performed by: INTERNAL MEDICINE

## 2021-11-20 PROCEDURE — 6370000000 HC RX 637 (ALT 250 FOR IP): Performed by: NURSE PRACTITIONER

## 2021-11-20 PROCEDURE — 36415 COLL VENOUS BLD VENIPUNCTURE: CPT

## 2021-11-20 PROCEDURE — 83735 ASSAY OF MAGNESIUM: CPT

## 2021-11-20 PROCEDURE — 85025 COMPLETE CBC W/AUTO DIFF WBC: CPT

## 2021-11-20 PROCEDURE — 1200000000 HC SEMI PRIVATE

## 2021-11-20 PROCEDURE — 6360000002 HC RX W HCPCS: Performed by: STUDENT IN AN ORGANIZED HEALTH CARE EDUCATION/TRAINING PROGRAM

## 2021-11-20 RX ADMIN — LEVETIRACETAM 250 MG: 500 TABLET, FILM COATED ORAL at 08:20

## 2021-11-20 RX ADMIN — GABAPENTIN 400 MG: 400 CAPSULE ORAL at 20:31

## 2021-11-20 RX ADMIN — HYDROMORPHONE HYDROCHLORIDE 0.5 MG: 1 INJECTION, SOLUTION INTRAMUSCULAR; INTRAVENOUS; SUBCUTANEOUS at 04:19

## 2021-11-20 RX ADMIN — METOPROLOL TARTRATE 25 MG: 25 TABLET, FILM COATED ORAL at 20:31

## 2021-11-20 RX ADMIN — HYDROMORPHONE HYDROCHLORIDE 0.5 MG: 1 INJECTION, SOLUTION INTRAMUSCULAR; INTRAVENOUS; SUBCUTANEOUS at 00:09

## 2021-11-20 RX ADMIN — FERROUS GLUCONATE 324 MG: 324 TABLET ORAL at 08:20

## 2021-11-20 RX ADMIN — ASPIRIN 81 MG: 81 TABLET, CHEWABLE ORAL at 08:20

## 2021-11-20 RX ADMIN — ATORVASTATIN CALCIUM 40 MG: 40 TABLET, FILM COATED ORAL at 20:31

## 2021-11-20 RX ADMIN — HYDROMORPHONE HYDROCHLORIDE 0.5 MG: 1 INJECTION, SOLUTION INTRAMUSCULAR; INTRAVENOUS; SUBCUTANEOUS at 12:17

## 2021-11-20 RX ADMIN — OXYCODONE HYDROCHLORIDE 10 MG: 10 TABLET, FILM COATED, EXTENDED RELEASE ORAL at 10:04

## 2021-11-20 RX ADMIN — OXYCODONE HYDROCHLORIDE 10 MG: 10 TABLET, FILM COATED, EXTENDED RELEASE ORAL at 22:22

## 2021-11-20 RX ADMIN — LEVETIRACETAM 250 MG: 500 TABLET, FILM COATED ORAL at 20:31

## 2021-11-20 RX ADMIN — SODIUM CHLORIDE, PRESERVATIVE FREE 5 ML: 5 INJECTION INTRAVENOUS at 10:04

## 2021-11-20 RX ADMIN — GABAPENTIN 400 MG: 400 CAPSULE ORAL at 08:20

## 2021-11-20 RX ADMIN — SODIUM CHLORIDE, PRESERVATIVE FREE 10 ML: 5 INJECTION INTRAVENOUS at 20:31

## 2021-11-20 RX ADMIN — AMLODIPINE BESYLATE 5 MG: 5 TABLET ORAL at 08:20

## 2021-11-20 RX ADMIN — METOPROLOL TARTRATE 25 MG: 25 TABLET, FILM COATED ORAL at 08:21

## 2021-11-20 RX ADMIN — HYDROMORPHONE HYDROCHLORIDE 0.5 MG: 1 INJECTION, SOLUTION INTRAMUSCULAR; INTRAVENOUS; SUBCUTANEOUS at 16:30

## 2021-11-20 RX ADMIN — QUETIAPINE FUMARATE 100 MG: 100 TABLET ORAL at 20:31

## 2021-11-20 RX ADMIN — GABAPENTIN 400 MG: 400 CAPSULE ORAL at 14:30

## 2021-11-20 RX ADMIN — HYDROMORPHONE HYDROCHLORIDE 0.5 MG: 1 INJECTION, SOLUTION INTRAMUSCULAR; INTRAVENOUS; SUBCUTANEOUS at 20:30

## 2021-11-20 RX ADMIN — HYDROMORPHONE HYDROCHLORIDE 0.5 MG: 1 INJECTION, SOLUTION INTRAMUSCULAR; INTRAVENOUS; SUBCUTANEOUS at 08:19

## 2021-11-20 RX ADMIN — PANTOPRAZOLE SODIUM 40 MG: 40 TABLET, DELAYED RELEASE ORAL at 08:21

## 2021-11-20 ASSESSMENT — PAIN DESCRIPTION - DESCRIPTORS
DESCRIPTORS: BURNING

## 2021-11-20 ASSESSMENT — PAIN DESCRIPTION - ONSET
ONSET: ON-GOING

## 2021-11-20 ASSESSMENT — PAIN DESCRIPTION - PAIN TYPE
TYPE: ACUTE PAIN

## 2021-11-20 ASSESSMENT — PAIN DESCRIPTION - PROGRESSION
CLINICAL_PROGRESSION: NOT CHANGED

## 2021-11-20 ASSESSMENT — PAIN DESCRIPTION - FREQUENCY
FREQUENCY: CONTINUOUS

## 2021-11-20 ASSESSMENT — PAIN SCALES - GENERAL
PAINLEVEL_OUTOF10: 7
PAINLEVEL_OUTOF10: 6
PAINLEVEL_OUTOF10: 8
PAINLEVEL_OUTOF10: 6
PAINLEVEL_OUTOF10: 7
PAINLEVEL_OUTOF10: 7
PAINLEVEL_OUTOF10: 6
PAINLEVEL_OUTOF10: 8
PAINLEVEL_OUTOF10: 0
PAINLEVEL_OUTOF10: 0
PAINLEVEL_OUTOF10: 8
PAINLEVEL_OUTOF10: 6
PAINLEVEL_OUTOF10: 6
PAINLEVEL_OUTOF10: 0

## 2021-11-20 ASSESSMENT — PAIN DESCRIPTION - DIRECTION
RADIATING_TOWARDS: FOOT

## 2021-11-20 ASSESSMENT — PAIN DESCRIPTION - ORIENTATION
ORIENTATION: RIGHT

## 2021-11-20 ASSESSMENT — PAIN DESCRIPTION - LOCATION
LOCATION: LEG

## 2021-11-20 NOTE — PLAN OF CARE
Problem: Skin Integrity:  Goal: Will show no infection signs and symptoms  Description: Will show no infection signs and symptoms  Outcome: Ongoing  Note: Pt assessed for skin break down. Skin was warm and dry to touch. Will continue to monitor and assess. Goal: Absence of new skin breakdown  Description: Absence of new skin breakdown  Outcome: Ongoing     Problem: Falls - Risk of:  Goal: Will remain free from falls  Description: Will remain free from falls  Outcome: Ongoing  Note: Fall risk assessment completed. Fall precautions in place. Bed in lowest position, wheels locked, bed/chair exit alarm in place, call light within reach, and non skid footwear on. Walkway free of clutter. Pt alert and oriented and able to make needs known. Pt educated to use call light when needing to get up, and pt utilizes call light to make needs known. Will continue to monitor. Goal: Absence of physical injury  Description: Absence of physical injury  Outcome: Ongoing     Problem: Pain:  Goal: Pain level will decrease  Description: Pain level will decrease  Outcome: Ongoing  Note: Assessing and monitoring pt's pain. PRN pain medication being given if ordered. Educating pt on nonpharmacologic interventions for pain control. Will continue to monitor and reassess.    Goal: Control of acute pain  Description: Control of acute pain  Outcome: Ongoing  Goal: Control of chronic pain  Description: Control of chronic pain  Outcome: Ongoing

## 2021-11-20 NOTE — PROGRESS NOTES
Pt is alert and oriented x4, resting quietly in bed. Nightly medications and intake tolerated well. No complaints of nausea or vomiting at this time. Pt complaining of some phantom pain and pain in his right lower extremity - pain medications given as prescribed. No other needs made known at this time. Fall precautions in place. Call light within reach. Will continue to monitor.     Electronically signed by Nina De Jesus RN on 11/20/2021 at 3:40 AM

## 2021-11-20 NOTE — PROGRESS NOTES
Hospitalist Progress Note      PCP: Primitivo Chen, APRN - CNP    Date of Admission: 11/15/2021    Chief Complaint: right foot pain    Hospital Course:     Subjective: no new issues      Medications:  Reviewed    Infusion Medications    sodium chloride       Scheduled Medications    amLODIPine  5 mg Oral Daily    heparin (porcine)  3,000 Units IntraVENous Once    atorvastatin  40 mg Oral Nightly    aspirin  81 mg Oral Daily    ferrous gluconate  324 mg Oral Daily with breakfast    gabapentin  400 mg Oral TID    levETIRAcetam  250 mg Oral BID    pantoprazole  40 mg Oral Daily    metoprolol tartrate  25 mg Oral BID    QUEtiapine  100 mg Oral Nightly    sodium chloride flush  5-40 mL IntraVENous 2 times per day     PRN Meds: ammonium lactate, acetaminophen, ondansetron, oxyCODONE, naloxone, sodium chloride flush, sodium chloride, ondansetron **OR** [DISCONTINUED] ondansetron, [DISCONTINUED] acetaminophen **OR** acetaminophen, HYDROmorphone **OR** HYDROmorphone      Intake/Output Summary (Last 24 hours) at 11/20/2021 1009  Last data filed at 11/20/2021 0916  Gross per 24 hour   Intake 480 ml   Output 2625 ml   Net -2145 ml       Exam:    /63   Pulse 65   Temp 97.4 °F (36.3 °C) (Oral)   Resp 16   Ht 6' 1\" (1.854 m)   Wt 221 lb 9 oz (100.5 kg)   SpO2 96%   BMI 29.23 kg/m²     General appearance: No apparent distress, appears stated age and cooperative. HEENT: Pupils equal, round, and reactive to light. Conjunctivae/corneas clear. Neck: Supple, with full range of motion. No jugular venous distention. Trachea midline. Respiratory:  Normal respiratory effort. Clear to auscultation, bilaterally without Rales/Wheezes/Rhonchi. Cardiovascular: Regular rate and rhythm with normal S1/S2 without murmurs, rubs or gallops. Abdomen: Soft, non-tender, non-distended with normal bowel sounds. Musculoskeletal: No clubbing, cyanosis or edema bilaterally. Left leg BKA.   Right foot with tenderness and diffuse hyperemia  Skin: Skin color, texture, turgor normal.  No rashes or lesions. Neurologic:  Neurovascularly intact without any focal sensory/motor deficits. Cranial nerves: II-XII intact, grossly non-focal.  Psychiatric: Alert and oriented, thought content appropriate, normal insight  Capillary Refill: Brisk,< 3 seconds   Peripheral Pulses: +2 palpable, equal bilaterally       Labs:   Recent Labs     11/18/21  0506 11/19/21  0518 11/20/21  0505   WBC 7.3 6.9 5.6   HGB 12.7* 12.1* 12.3*   HCT 39.0* 37.9* 37.7*    221 208     Recent Labs     11/17/21  1206 11/17/21  1206 11/18/21  0506 11/19/21  0518 11/20/21  0505      < > 137 138 140   K 4.3   < > 4.7 4.3 4.5      < > 102 105 105   CO2 24   < > 21 20* 24   BUN 15   < > 17 18 17   CREATININE 0.9   < > 1.0 1.0 1.1   CALCIUM 8.9   < > 9.2 8.5 8.8   PHOS 3.2  --  3.0 3.2  --     < > = values in this interval not displayed. No results for input(s): AST, ALT, BILIDIR, BILITOT, ALKPHOS in the last 72 hours. No results for input(s): INR in the last 72 hours. No results for input(s): Cherre Gash in the last 72 hours. Urinalysis:      Lab Results   Component Value Date    NITRU Negative 05/28/2020    WBCUA 2 05/28/2020    RBCUA 10 05/28/2020    BLOODU Negative 05/28/2020    SPECGRAV 1.019 05/28/2020    GLUCOSEU Negative 05/28/2020       Radiology:  US RENAL COMPLETE   Final Result   1. Unremarkable sonographic appearance of the right kidney. 2. Patient status post left nephrectomy. 3. Collapsed urinary bladder, limiting its evaluation. CTA ABDOMINAL AORTA W BILAT RUNOFF W CONTRAST   Final Result   Vascular-      Aortoiliac inflow is preserved. Left below-the-knee amputation is noted. There is severe, occlusive disease   in the left femoropopliteal tibial segment. Right severe femoropopliteal segment disease with occlusion by the mid   superficial femoral artery. Popliteal segment is occluded.   Trifurcation vessels are reconstituted with runoff via the anterior tibial and posterior   tibial arteries. Nonvascular-      No acute abdominopelvic abnormality is identified. Solitary right kidney. Mild enlargement of the prostate gland. Bilateral inguinal hernias, including sigmoid colon content on the left. There is no incarceration. Assessment/Plan:    Active Hospital Problems    Diagnosis Date Noted    Acute pain of right lower extremity [M79.604] 11/15/2021    Solitary kidney, acquired [Z90.5] 11/15/2021    Pain of right lower extremity due to ischemia [M79.604, I99.8] 11/15/2021    PAD (peripheral artery disease) ([facility] [address] [I73.9] 10/22/2021    Hyperlipidemia LDL goal <70 [E78.5] 10/22/2021    Essential hypertension [I10] 10/22/2021    CAD in native artery [I25.10]     S/P CABG (coronary artery bypass graft) [Z95.1]        Critical limb ischemia:  - s/p heparin drip, cont statin, ASA  - cardiology to do SYBIL, doppler, angio   - anticipate will need revascular stenting   - angiography unable to be done due to stenosis proximal to foot. - will need to wait until Dr. Hannah Granados returns for repeat attempt    DANY:  - likely due to contrast-induced nephropathy  - nepho consulted:  Getting ultrasound, check UA   - IVF PRN    Tobacco abuse:  - cessation counseling    H/o CVA, HTN:  Cont metoprolol    DVT Prophylaxis: heparin drip  Diet: ADULT DIET;  Regular  Code Status: Limited    PT/OT Eval Status: pending vascular intervention    Ivett Joiner MD

## 2021-11-20 NOTE — PROGRESS NOTES
Department of Podiatric Surgery  Progress Note  11/20/2021  Blayne Connellys        HISTORY OF PRESENT ILLNESS:      The patient is a 61 y.o. male who presents with right lower extremity ischemia. Patient has history of a left below-knee amputation for infection. He is here for right foot ischemia. The patient had endovascular intervention earlier today and have a second procedure on Monday. Patient has some mild pain in the foot but notes it is not too bad. He is most concerned about the dry skin on the foot today. 11/20/21 patient was seen sitting in his wheelchair wheeling around his room. Patient notes he need to get some exercise. REVIEW OF SYSTEMS:  CONSTITUTIONAL:  negative  EYES:  negative  HEENT:  negative  RESPIRATORY:  negative  CARDIOVASCULAR:  negative  GASTROINTESTINAL:  negative  ENDOCRINE:  positive for diabetic symptoms including skin dryness  MUSCULOSKELETAL:  negative  NEUROLOGICAL:  negative    PHYSICAL EXAM:  VITALS:  /77   Pulse 69   Temp 98.1 °F (36.7 °C) (Oral)   Resp 16   Ht 6' 1\" (1.854 m)   Wt 221 lb 9 oz (100.5 kg)   SpO2 97%   BMI 29.23 kg/m²   CONSTITUTIONAL:  awake, alert, cooperative, no apparent distress, and appears stated age    LOWER EXTREMITY:  VASCULAR: Pedal Pulses right extremity are not palpable. Left patient's foot is cool to the touch but does not display any signs of gross ischemia. MUSCULOSKELETAL:  positive gross deformity. Patient has left below-knee amputation with a stump wound. The stump wound is not uncovered today by me. Patient does have digital contractures all digits on the right foot. NEUROLOGIC:  Epicritic sensation, light touch, joint position sense diminished  SKIN: The patient's foot is cool to the touch with a diffuse erythema. No open wounds.   The dry skin has resolved today with the use of the topical cream         I/O:    Intake/Output Summary (Last 24 hours) at 11/20/2021 1132  Last data filed at 11/20/2021 0619  Gross per 24 hour   Intake 480 ml   Output 2625 ml   Net -2145 ml              Wt Readings from Last 3 Encounters:   11/20/21 221 lb 9 oz (100.5 kg)   11/11/21 217 lb (98.4 kg)   02/04/21 185 lb (83.9 kg)       LABS:    Recent Labs     11/19/21  0518 11/20/21  0505   WBC 6.9 5.6   HGB 12.1* 12.3*   HCT 37.9* 37.7*    208        Recent Labs     11/19/21  0518 11/19/21  0518 11/20/21  0505      < > 140   K 4.3   < > 4.5      < > 105   CO2 20*   < > 24   PHOS 3.2  --   --    BUN 18   < > 17   CREATININE 1.0   < > 1.1    < > = values in this interval not displayed. Recent Labs     11/17/21  1206   APTT 43.7*       IMAGING:       Summary        Right SYBIL was not available due to inadequate pulses .    Elevated velocity of the deep femoral artery suggests a 50-70% stenosis.    Occlusion of the distal superficial femoral and popliteal artery in the    right lower extremity.    Critically low velocities in the right posterior tibial and anterior tibial    arteries.    Left SYBIL was not available due to below knee amputation.    Occlusion of the left popliteal artery.    The majority of the waveforms are monophasic throughout the left lower    extremity.        Signature        ------------------------------------------------------------------    Electronically signed by Sarah Mcfarland MD (Interpreting    physician) on 11/01/2021 at 05:49 PM       MICRO:   No results    ASSESSMENT   Peripheral arterial disease with right lower extremity ischemia    PLAN:  Evaluation and Management x 30 minutes with greater than 50% of the time spent with the patient discussing the etiology and treatment options of the chief complaint. 1.  Right lower extremity ischemia  We will follow for close monitoring. Patient does not need any immediate podiatric intervention. DISPO: We will follow  Thanks for the opportunity to participate in this patient's care.      Mandi Toney DPM DPM  [facility]  Pager: [phone]  Office: [phone]  Fax: [phone]

## 2021-11-20 NOTE — PROGRESS NOTES
LIVR:   No results for input(s): AST, ALT in the last 72 hours. INR:  No results for input(s): INR in the last 72 hours. APTT:   No results for input(s): APTT in the last 72 hours. Results for Trevon Sanders (MRN [id number]) as of 11/19/2021 09:39   Ref. Range 11/17/2021 04:44 11/18/2021 05:06   Pro-BNP Latest Ref Range: 0 - 124 pg/mL 219 (H) 694 (H)     11/18/2021 Peripheral cath:  ANGIOGRAPHY FINDINGS:  Right superficial femoral artery heavily diseased  with sequential 80% stenosis. Popliteal artery is occluded, in-stent  occlusion present. Faint collateral flow into the right anterior tibial  artery and posterior tibial artery.       11/15/2021 CTA abdomen:  Vascular-       Aortoiliac inflow is preserved.       Left below-the-knee amputation is noted. Dontae [place] is severe, occlusive disease   in the left femoropopliteal tibial segment.       Right severe femoropopliteal segment disease with occlusion by the mid   superficial femoral artery.  Popliteal segment is occluded.  Trifurcation   vessels are reconstituted with runoff via the anterior tibial and posterior   tibial arteries.       Nonvascular-       No acute abdominopelvic abnormality is identified.       Solitary right kidney.       Mild enlargement of the prostate gland.       Bilateral inguinal hernias, including sigmoid colon content on the left. There is no incarceration. 11/1/2021 LE doppler:  Right SYBIL was not available due to inadequate pulses . Elevated velocity of the deep femoral artery suggests a 50-70% stenosis. Occlusion of the distal superficial femoral and popliteal artery in the right lower extremity. Critically low velocities in the right posterior tibial and anterior tibial arteries. Left SYBIL was not available due to below knee amputation. Occlusion of the left popliteal artery. The majority of the waveforms are monophasic throughout the left lower extremity.     11/1/2021 Carotid US:  Summary  The right internal carotid artery appears to have a 80-99% diameter reducing stenosis based on velocity criteria. The right vertebral artery demonstrates normal antegrade flow. The left internal carotid artery appears to have a 50-79% diameter reducing stenosis based on velocity criteria. The left vertebral artery demonstrates normal antegrade flow. 2/4/2021 Peripheral angiogram/intervention:  Impression/Plan:   Severe right distal SFA/popliteal and tibial disease  S/p successful revascularization and SFA stenting   3V runoff to the foot  Recommend podiatry follow up for wound care   - Percutaneous balloon angioplasty of right anterior tibial artery using a 3.0 mm x 120 mm balloon . - Percutaneous balloon angioplasty of right popliteal artery using a 5.0 mm x 120 mm balloon. - Percutaneous balloon angioplasty of right superficial femoral artery using a 5.5 mm x 120 mm balloon. - Percutaneous insertion of self-expanding Supera stent  (5.5 mm x 120 mm) into right superficial femoral artery. Echo 7/2020 [facility]):  - Left ventricle: The cavity size is mildly dilated. Wall thickness is normal. Systolic function was     normal. The estimated ejection fraction was in the range of 50% to 55%. Wall motion was normal;     there were no regional wall motion abnormalities. Left ventricular diastolic function parameters     were normal.   - Right ventricle: Systolic function was normal by visual assessment. TAPSE: 1.9cm. Impressions: [address] study date 6/9/20 - previous study technically   challenging, however, does not appear significantly changed        Telemetry:Sinus bradycardia-sinus rhythm    Assessment/Plan:    1. Peripheral artery disease/Limb ischemia with leg pain  -occluded popliteal and tibioperoneal trunk; failed revascularization  -staged intervention with Dr. Ramona Barbosa on 11/22/2021  -continue ASA and statin    2.  Essential HTN  -BP stable  -continue medical management  -goal BP < 130/80    Electronically signed by Lucille Dakin, MD on 11/20/2021 at 1:51 PM

## 2021-11-21 LAB
ANION GAP SERPL CALCULATED.3IONS-SCNC: 13 MMOL/L (ref 3–16)
APTT: 26.5 SEC (ref 26.2–38.6)
APTT: 41 SEC (ref 26.2–38.6)
BASOPHILS ABSOLUTE: 0.1 K/UL (ref 0–0.2)
BASOPHILS RELATIVE PERCENT: 1.4 %
BUN BLDV-MCNC: 18 MG/DL (ref 7–20)
CALCIUM SERPL-MCNC: 9 MG/DL (ref 8.3–10.6)
CHLORIDE BLD-SCNC: 102 MMOL/L (ref 99–110)
CO2: 23 MMOL/L (ref 21–32)
CREAT SERPL-MCNC: 1.2 MG/DL (ref 0.8–1.3)
EOSINOPHILS ABSOLUTE: 0.3 K/UL (ref 0–0.6)
EOSINOPHILS RELATIVE PERCENT: 5.4 %
GFR AFRICAN AMERICAN: >60
GFR NON-AFRICAN AMERICAN: >60
GLUCOSE BLD-MCNC: 96 MG/DL (ref 70–99)
HCT VFR BLD CALC: 39.1 % (ref 40.5–52.5)
HEMOGLOBIN: 12.7 G/DL (ref 13.5–17.5)
LYMPHOCYTES ABSOLUTE: 1.5 K/UL (ref 1–5.1)
LYMPHOCYTES RELATIVE PERCENT: 23.4 %
MAGNESIUM: 1.8 MG/DL (ref 1.8–2.4)
MCH RBC QN AUTO: 26.4 PG (ref 26–34)
MCHC RBC AUTO-ENTMCNC: 32.4 G/DL (ref 31–36)
MCV RBC AUTO: 81.4 FL (ref 80–100)
MONOCYTES ABSOLUTE: 0.6 K/UL (ref 0–1.3)
MONOCYTES RELATIVE PERCENT: 10.2 %
NEUTROPHILS ABSOLUTE: 3.7 K/UL (ref 1.7–7.7)
NEUTROPHILS RELATIVE PERCENT: 59.6 %
PDW BLD-RTO: 15.7 % (ref 12.4–15.4)
PLATELET # BLD: 227 K/UL (ref 135–450)
PMV BLD AUTO: 7.5 FL (ref 5–10.5)
POTASSIUM SERPL-SCNC: 3.9 MMOL/L (ref 3.5–5.1)
RBC # BLD: 4.8 M/UL (ref 4.2–5.9)
SODIUM BLD-SCNC: 138 MMOL/L (ref 136–145)
WBC # BLD: 6.2 K/UL (ref 4–11)

## 2021-11-21 PROCEDURE — 99232 SBSQ HOSP IP/OBS MODERATE 35: CPT | Performed by: INTERNAL MEDICINE

## 2021-11-21 PROCEDURE — 6370000000 HC RX 637 (ALT 250 FOR IP): Performed by: INTERNAL MEDICINE

## 2021-11-21 PROCEDURE — 6370000000 HC RX 637 (ALT 250 FOR IP): Performed by: NURSE PRACTITIONER

## 2021-11-21 PROCEDURE — 85025 COMPLETE CBC W/AUTO DIFF WBC: CPT

## 2021-11-21 PROCEDURE — 85730 THROMBOPLASTIN TIME PARTIAL: CPT

## 2021-11-21 PROCEDURE — 80048 BASIC METABOLIC PNL TOTAL CA: CPT

## 2021-11-21 PROCEDURE — 2500000003 HC RX 250 WO HCPCS: Performed by: INTERNAL MEDICINE

## 2021-11-21 PROCEDURE — 6360000002 HC RX W HCPCS: Performed by: STUDENT IN AN ORGANIZED HEALTH CARE EDUCATION/TRAINING PROGRAM

## 2021-11-21 PROCEDURE — 94761 N-INVAS EAR/PLS OXIMETRY MLT: CPT

## 2021-11-21 PROCEDURE — 83735 ASSAY OF MAGNESIUM: CPT

## 2021-11-21 PROCEDURE — 6370000000 HC RX 637 (ALT 250 FOR IP): Performed by: STUDENT IN AN ORGANIZED HEALTH CARE EDUCATION/TRAINING PROGRAM

## 2021-11-21 PROCEDURE — 1200000000 HC SEMI PRIVATE

## 2021-11-21 PROCEDURE — 36415 COLL VENOUS BLD VENIPUNCTURE: CPT

## 2021-11-21 PROCEDURE — 6360000002 HC RX W HCPCS: Performed by: INTERNAL MEDICINE

## 2021-11-21 RX ORDER — HEPARIN SODIUM 1000 [USP'U]/ML
2000 INJECTION, SOLUTION INTRAVENOUS; SUBCUTANEOUS ONCE
Status: COMPLETED | OUTPATIENT
Start: 2021-11-21 | End: 2021-11-21

## 2021-11-21 RX ORDER — HEPARIN SODIUM 10000 [USP'U]/100ML
0-3000 INJECTION, SOLUTION INTRAVENOUS CONTINUOUS
Status: DISCONTINUED | OUTPATIENT
Start: 2021-11-21 | End: 2021-11-22

## 2021-11-21 RX ADMIN — GABAPENTIN 400 MG: 400 CAPSULE ORAL at 09:48

## 2021-11-21 RX ADMIN — HYDROMORPHONE HYDROCHLORIDE 0.5 MG: 1 INJECTION, SOLUTION INTRAMUSCULAR; INTRAVENOUS; SUBCUTANEOUS at 14:31

## 2021-11-21 RX ADMIN — METOPROLOL TARTRATE 25 MG: 25 TABLET, FILM COATED ORAL at 21:15

## 2021-11-21 RX ADMIN — PANTOPRAZOLE SODIUM 40 MG: 40 TABLET, DELAYED RELEASE ORAL at 09:48

## 2021-11-21 RX ADMIN — AMLODIPINE BESYLATE 5 MG: 5 TABLET ORAL at 09:48

## 2021-11-21 RX ADMIN — LEVETIRACETAM 250 MG: 500 TABLET, FILM COATED ORAL at 21:15

## 2021-11-21 RX ADMIN — HYDROMORPHONE HYDROCHLORIDE 0.5 MG: 1 INJECTION, SOLUTION INTRAMUSCULAR; INTRAVENOUS; SUBCUTANEOUS at 18:26

## 2021-11-21 RX ADMIN — HYDROMORPHONE HYDROCHLORIDE 0.5 MG: 1 INJECTION, SOLUTION INTRAMUSCULAR; INTRAVENOUS; SUBCUTANEOUS at 02:04

## 2021-11-21 RX ADMIN — ATORVASTATIN CALCIUM 40 MG: 40 TABLET, FILM COATED ORAL at 21:15

## 2021-11-21 RX ADMIN — METOPROLOL TARTRATE 25 MG: 25 TABLET, FILM COATED ORAL at 09:48

## 2021-11-21 RX ADMIN — QUETIAPINE FUMARATE 100 MG: 100 TABLET ORAL at 21:15

## 2021-11-21 RX ADMIN — HYDROMORPHONE HYDROCHLORIDE 0.5 MG: 1 INJECTION, SOLUTION INTRAMUSCULAR; INTRAVENOUS; SUBCUTANEOUS at 22:46

## 2021-11-21 RX ADMIN — HYDROMORPHONE HYDROCHLORIDE 0.5 MG: 1 INJECTION, SOLUTION INTRAMUSCULAR; INTRAVENOUS; SUBCUTANEOUS at 05:59

## 2021-11-21 RX ADMIN — HYDROMORPHONE HYDROCHLORIDE 0.5 MG: 1 INJECTION, SOLUTION INTRAMUSCULAR; INTRAVENOUS; SUBCUTANEOUS at 09:49

## 2021-11-21 RX ADMIN — HEPARIN SODIUM 1000 UNITS/HR: 10000 INJECTION, SOLUTION INTRAVENOUS at 11:50

## 2021-11-21 RX ADMIN — FERROUS GLUCONATE 324 MG: 324 TABLET ORAL at 09:48

## 2021-11-21 RX ADMIN — GABAPENTIN 400 MG: 400 CAPSULE ORAL at 21:15

## 2021-11-21 RX ADMIN — OXYCODONE HYDROCHLORIDE 10 MG: 10 TABLET, FILM COATED, EXTENDED RELEASE ORAL at 12:48

## 2021-11-21 RX ADMIN — LEVETIRACETAM 250 MG: 500 TABLET, FILM COATED ORAL at 09:49

## 2021-11-21 RX ADMIN — HEPARIN SODIUM 2000 UNITS: 1000 INJECTION INTRAVENOUS; SUBCUTANEOUS at 19:09

## 2021-11-21 RX ADMIN — GABAPENTIN 400 MG: 400 CAPSULE ORAL at 14:31

## 2021-11-21 RX ADMIN — ASPIRIN 81 MG: 81 TABLET, CHEWABLE ORAL at 09:48

## 2021-11-21 ASSESSMENT — PAIN SCALES - GENERAL
PAINLEVEL_OUTOF10: 0
PAINLEVEL_OUTOF10: 8
PAINLEVEL_OUTOF10: 7
PAINLEVEL_OUTOF10: 6
PAINLEVEL_OUTOF10: 8
PAINLEVEL_OUTOF10: 8
PAINLEVEL_OUTOF10: 0
PAINLEVEL_OUTOF10: 6
PAINLEVEL_OUTOF10: 0
PAINLEVEL_OUTOF10: 8
PAINLEVEL_OUTOF10: 6

## 2021-11-21 ASSESSMENT — PAIN DESCRIPTION - DIRECTION
RADIATING_TOWARDS: FOOT

## 2021-11-21 ASSESSMENT — PAIN DESCRIPTION - PROGRESSION
CLINICAL_PROGRESSION: NOT CHANGED

## 2021-11-21 ASSESSMENT — PAIN DESCRIPTION - ONSET
ONSET: ON-GOING

## 2021-11-21 ASSESSMENT — PAIN - FUNCTIONAL ASSESSMENT
PAIN_FUNCTIONAL_ASSESSMENT: PREVENTS OR INTERFERES SOME ACTIVE ACTIVITIES AND ADLS

## 2021-11-21 ASSESSMENT — PAIN DESCRIPTION - FREQUENCY
FREQUENCY: CONTINUOUS

## 2021-11-21 ASSESSMENT — PAIN DESCRIPTION - DESCRIPTORS
DESCRIPTORS: BURNING

## 2021-11-21 ASSESSMENT — PAIN DESCRIPTION - ORIENTATION
ORIENTATION: RIGHT

## 2021-11-21 ASSESSMENT — PAIN DESCRIPTION - LOCATION
LOCATION: LEG

## 2021-11-21 ASSESSMENT — PAIN DESCRIPTION - PAIN TYPE
TYPE: ACUTE PAIN

## 2021-11-21 NOTE — PLAN OF CARE
Problem: Skin Integrity:  Goal: Will show no infection signs and symptoms  Description: Will show no infection signs and symptoms  11/20/2021 2242 by Nina De Jesus RN  Outcome: Ongoing  Note: Pt assessed for skin break down. Skin was warm and dry to touch. Will continue to monitor and assess. 11/20/2021 1300 by Tatiana Darling RN  Outcome: Ongoing     Problem: Skin Integrity:  Goal: Absence of new skin breakdown  Description: Absence of new skin breakdown  11/20/2021 2242 by Nina De Jesus RN  Outcome: Ongoing     Problem: Falls - Risk of:  Goal: Will remain free from falls  Description: Will remain free from falls  11/20/2021 2242 by Nina De Jesus RN  Outcome: Ongoing  Note: Fall risk assessment completed. Fall precautions in place. Bed in lowest position, wheels locked, bed/chair exit alarm in place, call light within reach, and non skid footwear on. Walkway free of clutter. Pt alert and oriented and able to make needs known. Pt educated to use call light when needing to get up, and pt utilizes call light to make needs known. Will continue to monitor. Problem: Falls - Risk of:  Goal: Absence of physical injury  Description: Absence of physical injury  11/20/2021 2242 by Nina De Jesus RN  Outcome: Ongoing     Problem: Pain:  Goal: Pain level will decrease  Description: Pain level will decrease  11/20/2021 2242 by Nina De Jesus RN  Outcome: Ongoing  Note: Assessing and monitoring pt's pain. PRN pain medication being given if ordered. Educating pt on nonpharmacologic interventions for pain control. Will continue to monitor and reassess.       Problem: Pain:  Goal: Control of acute pain  Description: Control of acute pain  11/20/2021 2242 by Nina De Jesus RN  Outcome: Ongoing     Problem: Pain:  Goal: Control of chronic pain  Description: Control of chronic pain  11/20/2021 2242 by Nina De Jesus RN  Outcome: Ongoing

## 2021-11-21 NOTE — CONSULTS
Clinical Pharmacy Note  Heparin Dosing Consult    Erwin Patiño is a 61 y.o. male ordered heparin per low dose nomogram by Dr. Jennifer Lainez. Lab Results   Component Value Date    APTT 43.7 11/17/2021     Lab Results   Component Value Date    HGB 12.7 11/21/2021    HCT 39.1 11/21/2021     11/21/2021    INR 1.13 06/02/2020       Ht Readings from Last 1 Encounters:   11/15/21 6' 1\" (1.854 m)        Wt Readings from Last 1 Encounters:   11/21/21 225 lb 12 oz (102.4 kg)        Assessment/Plan:  No initial bolus  Initial infusion rate: 1000 units/hr  Next aPTT: 1800    Pharmacy will continue to monitor adjust heparin based on aPTT results using nomogram below:     LOW DOSE HEPARIN PROTOCOL (ACS/STEMI/A FIB)     Initial Bolus: 60 units/kg Max Bolus: 4,000 units       Initial Rate: 12 units/kg/hr Max Initial Rate: 1,000 units/hr     aPTT < 45   Heparin 60 units/kg bolus Increase infusion by 4 units/kg/hr       (maximum 4,000 units)   aPTT 45-59.9   Heparin 30 units/kg bolus Increase infusion by 2 units/kg/hr       (maximum 2,000 units)   aPTT 60-90   No bolus   No change   aPTT 90.1-97.5 No bolus   Decrease infusion by 1 units/kg/hr   aPTT 97.6-105  No bolus     Decrease infusion by 2 units/kg/hr   aPTT > 105   Hold heparin for 1 hour Decrease infusion by 3 units/kg/hr     Obtain aPTT 6 hours after initial bolus and 6 hours after any dose change until two consecutive therapeutic aPTTs are achieved - then daily.   James Childers, [address] [place] [address], 11/21/2021 10:58 AM

## 2021-11-21 NOTE — PROGRESS NOTES
Hospitalist Progress Note      PCP: KATHY Blue - CNP    Date of Admission: 11/15/2021    Chief Complaint: right foot pain    Hospital Course:     Subjective: no new complaints      Medications:  Reviewed    Infusion Medications    heparin (PORCINE) Infusion 1,000 Units/hr (11/21/21 1150)    sodium chloride       Scheduled Medications    amLODIPine  5 mg Oral Daily    atorvastatin  40 mg Oral Nightly    aspirin  81 mg Oral Daily    ferrous gluconate  324 mg Oral Daily with breakfast    gabapentin  400 mg Oral TID    levETIRAcetam  250 mg Oral BID    pantoprazole  40 mg Oral Daily    metoprolol tartrate  25 mg Oral BID    QUEtiapine  100 mg Oral Nightly    sodium chloride flush  5-40 mL IntraVENous 2 times per day     PRN Meds: ammonium lactate, acetaminophen, ondansetron, oxyCODONE, naloxone, sodium chloride flush, sodium chloride, ondansetron **OR** [DISCONTINUED] ondansetron, [DISCONTINUED] acetaminophen **OR** acetaminophen, HYDROmorphone **OR** HYDROmorphone      Intake/Output Summary (Last 24 hours) at 11/21/2021 1404  Last data filed at 11/21/2021 1147  Gross per 24 hour   Intake 720 ml   Output 3050 ml   Net -2330 ml       Exam:    BP (!) 152/77   Pulse 76   Temp 97.7 °F (36.5 °C) (Oral)   Resp 15   Ht 6' 1\" (1.854 m)   Wt 225 lb 12 oz (102.4 kg)   SpO2 98%   BMI 29.78 kg/m²     General appearance: No apparent distress, appears stated age and cooperative. HEENT: Pupils equal, round, and reactive to light. Conjunctivae/corneas clear. Neck: Supple, with full range of motion. No jugular venous distention. Trachea midline. Respiratory:  Normal respiratory effort. Clear to auscultation, bilaterally without Rales/Wheezes/Rhonchi. Cardiovascular: Regular rate and rhythm with normal S1/S2 without murmurs, rubs or gallops. Abdomen: Soft, non-tender, non-distended with normal bowel sounds. Musculoskeletal: No clubbing, cyanosis or edema bilaterally. Left leg BKA.   Right foot with tenderness and diffuse hyperemia  Skin: Skin color, texture, turgor normal.  No rashes or lesions. Neurologic:  Neurovascularly intact without any focal sensory/motor deficits. Cranial nerves: II-XII intact, grossly non-focal.  Psychiatric: Alert and oriented, thought content appropriate, normal insight  Capillary Refill: Brisk,< 3 seconds   Peripheral Pulses: +2 palpable, equal bilaterally       Labs:   Recent Labs     11/19/21  0518 11/20/21  0505 11/21/21  0531   WBC 6.9 5.6 6.2   HGB 12.1* 12.3* 12.7*   HCT 37.9* 37.7* 39.1*    208 227     Recent Labs     11/19/21  0518 11/20/21  0505 11/21/21  0531    140 138   K 4.3 4.5 3.9    105 102   CO2 20* 24 23   BUN 18 17 18   CREATININE 1.0 1.1 1.2   CALCIUM 8.5 8.8 9.0   PHOS 3.2  --   --      No results for input(s): AST, ALT, BILIDIR, BILITOT, ALKPHOS in the last 72 hours. No results for input(s): INR in the last 72 hours. No results for input(s): Magdalene Mcarthur in the last 72 hours. Urinalysis:      Lab Results   Component Value Date    NITRU Negative 11/20/2021    WBCUA 2 05/28/2020    RBCUA 10 05/28/2020    BLOODU Negative 11/20/2021    SPECGRAV 1.008 11/20/2021    GLUCOSEU Negative 11/20/2021       Radiology:  US RENAL COMPLETE   Final Result   1. Unremarkable sonographic appearance of the right kidney. 2. Patient status post left nephrectomy. 3. Collapsed urinary bladder, limiting its evaluation. CTA ABDOMINAL AORTA W BILAT RUNOFF W CONTRAST   Final Result   Vascular-      Aortoiliac inflow is preserved. Left below-the-knee amputation is noted. There is severe, occlusive disease   in the left femoropopliteal tibial segment. Right severe femoropopliteal segment disease with occlusion by the mid   superficial femoral artery. Popliteal segment is occluded. Trifurcation   vessels are reconstituted with runoff via the anterior tibial and posterior   tibial arteries.       Nonvascular-      No acute abdominopelvic abnormality is identified. Solitary right kidney. Mild enlargement of the prostate gland. Bilateral inguinal hernias, including sigmoid colon content on the left. There is no incarceration. Assessment/Plan:    Active Hospital Problems    Diagnosis Date Noted    Acute pain of right lower extremity [M79.604] 11/15/2021    Solitary kidney, acquired [Z90.5] 11/15/2021    Pain of right lower extremity due to ischemia [M79.604, I99.8] 11/15/2021    Peripheral artery disease ([address] [I73.9] 10/22/2021    Hyperlipidemia LDL goal <70 [E78.5] 10/22/2021    Essential hypertension [I10] 10/22/2021    CAD in native artery [I25.10]     S/P CABG (coronary artery bypass graft) [Z95.1]        Critical limb ischemia:  - s/p heparin drip, cont statin, ASA   - heparin drip restarted 11/21  - cardiology to do SYBIL, doppler, angio   - anticipate will need revascular stenting   - angiography unable to be done due to stenosis proximal to foot. - will need to wait until Dr. Hannah Granados returns for repeat attempt    DANY:  - likely due to contrast-induced nephropathy  - nepho consulted:  Getting ultrasound, check UA   - IVF PRN    Tobacco abuse:  - cessation counseling    H/o CVA, HTN:  Cont metoprolol    DVT Prophylaxis: heparin drip  Diet: Diet NPO Exceptions are: Sips of Water with Meds  ADULT DIET;  Regular  Code Status: Limited    PT/OT Eval Status: pending vascular intervention    Ivett Joiner MD

## 2021-11-21 NOTE — PROGRESS NOTES
Clinical Pharmacy Note  Heparin Dosing       Lab Results   Component Value Date    APTT 41.0 11/21/2021     Lab Results   Component Value Date    HGB 12.7 11/21/2021    HCT 39.1 11/21/2021     11/21/2021    INR 1.13 06/02/2020       Current Infusion Rate: 1000 units/hr    Plan:  Bolus: 2000 units  Rate: 1200 units/hr  Next aPTT: 0100  11/22/21    Pharmacy will continue to monitor and adjust based on aPTT results.

## 2021-11-21 NOTE — PROGRESS NOTES
Pt is alert and oriented x4, resting quietly in bed. Nightly medications and intake tolerated well. No complaints of nausea or vomiting at this time. Pt complaining of some pain - pain medication given as prescribed. Fall precautions in place. Call light within reach. No other needs made known at this time. Will continue to monitor.     Electronically signed by Reji Mcneil RN on 11/20/2021 at 10:42 PM

## 2021-11-21 NOTE — PROGRESS NOTES
[facility]   Daily Progress Note      Admit Date:  11/15/2021    Reason for follow up visit: Limb ischemia    CC:  Right leg pain    60 y/o male with PMH significant for PAD, CAD, carotid stenosis, HTN and H/O DVT admitted to [facility] with c/o R leg pain. Has noted increasing pain and erythema of the foot with worsening severity. Noted to have occluded popliteal and tibioperoneal trunk and underwent angiogram with attempted revascularization on 11/18/2021. Interval History:  Pt. seen and examined; records reviewed  Afebrile. BP reviewed. R leg pain  Denies chest pain or SOB  Net diuresis -4.1 L since admit    Interval history 11/20/2021  -No acute events or cardiac complaints but c/o Rt leg pain.  -Patient to undergo angiography by Dr. Keisha Waldron on 11/22/2021    Interval history 11/21/21  - Leg pain is  slighly better. No o  Pt with no acute overnight cardiac events. Denies chest pain, SOB, cough, palpitations or dizziness    Review of Systems:   · Constitutional: no unanticipated weight loss. There's been no change in energy level, sleep pattern, or activity level. No fevers, chills. · Eyes: No visual changes or diplopia. No scleral icterus. · ENT: No Headaches, hearing loss or vertigo. No mouth sores or sore throat. · Cardiovascular: as reviewed in HPI  · Respiratory: No cough or wheezing, no sputum production. No hematemesis. · Gastrointestinal: No abdominal pain, appetite loss, blood in stools. No change in bowel or bladder habits. · Genitourinary: No dysuria, trouble voiding, or hematuria. · Musculoskeletal:  No gait disturbance, no joint complaints. · Integumentary: No rash or pruritis. · Neurological: No headache, diplopia, change in muscle strength, numbness or tingling. · Psychiatric: No anxiety or depression. · Endocrine: No temperature intolerance. No excessive thirst, fluid intake, or urination. No tremor.   · Hematologic/Lymphatic: No abnormal bruising or bleeding, blood clots or swollen lymph nodes. · Allergic/Immunologic: No nasal congestion or hives. Objective:   /82   Pulse 82   Temp 98 °F (36.7 °C) (Oral)   Resp 16   Ht 6' 1\" (1.854 m)   Wt 225 lb 12 oz (102.4 kg)   SpO2 97%   BMI 29.78 kg/m²       Intake/Output Summary (Last 24 hours) at 11/21/2021 1027  Last data filed at 11/21/2021 6084  Gross per 24 hour   Intake 720 ml   Output 2550 ml   Net -1830 ml     Wt Readings from Last 3 Encounters:   11/21/21 225 lb 12 oz (102.4 kg)   11/11/21 217 lb (98.4 kg)   02/04/21 185 lb (83.9 kg)       Physical Exam:  General: In no acute distress. Oriented x4. Resting comfortably in bed  Skin:  Warm and dry. No new appearing rashes or lesions. Neck:  Supple. No JVD  appreciated. Chest:Lungs clear to auscultation. No wheezes/rhonchi/rales  Cardiovascular:  RRR. Normal S1 and S2. No murmur/gallop or rub  Abdomen:  soft, nontender, +bowel sounds. Extremities:  1+ right lower leg/ankle edema.  No cyanosis    Medications:    amLODIPine  5 mg Oral Daily    heparin (porcine)  3,000 Units IntraVENous Once    atorvastatin  40 mg Oral Nightly    aspirin  81 mg Oral Daily    ferrous gluconate  324 mg Oral Daily with breakfast    gabapentin  400 mg Oral TID    levETIRAcetam  250 mg Oral BID    pantoprazole  40 mg Oral Daily    metoprolol tartrate  25 mg Oral BID    QUEtiapine  100 mg Oral Nightly    sodium chloride flush  5-40 mL IntraVENous 2 times per day      sodium chloride       ammonium lactate, acetaminophen, ondansetron, oxyCODONE, naloxone, sodium chloride flush, sodium chloride, ondansetron **OR** [DISCONTINUED] ondansetron, [DISCONTINUED] acetaminophen **OR** acetaminophen, HYDROmorphone **OR** HYDROmorphone    Lab Data:  CBC:   Recent Labs     11/19/21  0518 11/20/21  0505 11/21/21  0531   WBC 6.9 5.6 6.2   HGB 12.1* 12.3* 12.7*    208 227     BMP:    Recent Labs     11/19/21  0518 11/20/21  0505 11/21/21  0531    140 138   K 4.3 4.5 3.9   CO2 20* 24 23   BUN 18 17 18   CREATININE 1.0 1.1 1.2     LIVR:   No results for input(s): AST, ALT in the last 72 hours. INR:  No results for input(s): INR in the last 72 hours. APTT:   No results for input(s): APTT in the last 72 hours. Results for Katie Argueta (MRN [id number]) as of 11/19/2021 09:39   Ref. Range 11/17/2021 04:44 11/18/2021 05:06   Pro-BNP Latest Ref Range: 0 - 124 pg/mL 219 (H) 694 (H)     11/18/2021 Peripheral cath:  ANGIOGRAPHY FINDINGS:  Right superficial femoral artery heavily diseased  with sequential 80% stenosis. Popliteal artery is occluded, in-stent  occlusion present. Faint collateral flow into the right anterior tibial  artery and posterior tibial artery.       11/15/2021 CTA abdomen:  Vascular-       Aortoiliac inflow is preserved.       Left below-the-knee amputation is noted. Omelia Sovereign is severe, occlusive disease   in the left femoropopliteal tibial segment.       Right severe femoropopliteal segment disease with occlusion by the mid   superficial femoral artery.  Popliteal segment is occluded.  Trifurcation   vessels are reconstituted with runoff via the anterior tibial and posterior   tibial arteries.       Nonvascular-       No acute abdominopelvic abnormality is identified.       Solitary right kidney.       Mild enlargement of the prostate gland.       Bilateral inguinal hernias, including sigmoid colon content on the left. There is no incarceration. 11/1/2021 LE doppler:  Right SYBIL was not available due to inadequate pulses . Elevated velocity of the deep femoral artery suggests a 50-70% stenosis. Occlusion of the distal superficial femoral and popliteal artery in the right lower extremity. Critically low velocities in the right posterior tibial and anterior tibial arteries. Left SYBIL was not available due to below knee amputation. Occlusion of the left popliteal artery. The majority of the waveforms are monophasic throughout the left lower extremity.     11/1/2021 Carotid US:  Summary  The right internal carotid artery appears to have a 80-99% diameter reducing stenosis based on velocity criteria. The right vertebral artery demonstrates normal antegrade flow. The left internal carotid artery appears to have a 50-79% diameter reducing stenosis based on velocity criteria. The left vertebral artery demonstrates normal antegrade flow. 2/4/2021 Peripheral angiogram/intervention:  Impression/Plan:   Severe right distal SFA/popliteal and tibial disease  S/p successful revascularization and SFA stenting   3V runoff to the foot  Recommend podiatry follow up for wound care   - Percutaneous balloon angioplasty of right anterior tibial artery using a 3.0 mm x 120 mm balloon . - Percutaneous balloon angioplasty of right popliteal artery using a 5.0 mm x 120 mm balloon. - Percutaneous balloon angioplasty of right superficial femoral artery using a 5.5 mm x 120 mm balloon. - Percutaneous insertion of self-expanding Supera stent  (5.5 mm x 120 mm) into right superficial femoral artery. Echo 7/2020 [facility]):  - Left ventricle: The cavity size is mildly dilated. Wall thickness is normal. Systolic function was     normal. The estimated ejection fraction was in the range of 50% to 55%. Wall motion was normal;     there were no regional wall motion abnormalities. Left ventricular diastolic function parameters     were normal.   - Right ventricle: Systolic function was normal by visual assessment. TAPSE: 1.9cm. Impressions: Sita Pearl study date 6/9/20 - previous study technically   challenging, however, does not appear significantly changed        Telemetry:Sinus bradycardia-sinus rhythm    Assessment/Plan:    1. Peripheral artery disease/Limb ischemia with leg pain  -occluded popliteal and tibioperoneal trunk; failed revascularization  -staged intervention with Dr. Vergara Rule on 11/22/2021  -continue ASA and statin. Will start heparin drip  W/O bolus  -will keep him NPO after midnight    2. Essential HTN  -BP stable  -continue medical management  -goal BP < 130/80    Electronically signed by Val Mccann MD on 11/21/2021 at 10:27 AM

## 2021-11-21 NOTE — PROGRESS NOTES
hours.    IMAGING:       Summary        Right SYBIL was not available due to inadequate pulses .    Elevated velocity of the deep femoral artery suggests a 50-70% stenosis.    Occlusion of the distal superficial femoral and popliteal artery in the    right lower extremity.    Critically low velocities in the right posterior tibial and anterior tibial    arteries.    Left SYBIL was not available due to below knee amputation.    Occlusion of the left popliteal artery.    The majority of the waveforms are monophasic throughout the left lower    extremity.        Signature        ------------------------------------------------------------------    Electronically signed by Liat Tapia MD (Interpreting    physician) on 11/01/2021 at 05:49 PM       MICRO:   No results    ASSESSMENT   Peripheral arterial disease with right lower extremity ischemia    PLAN:  Evaluation and Management x 10 minutes with greater than 50% of the time spent with the patient discussing the etiology and treatment options of the chief complaint. 1.  Right lower extremity ischemia  We will follow for close monitoring. Patient does not need any immediate podiatric intervention. DISPO: We will follow  Thanks for the opportunity to participate in this patient's care.      Erica Aguila DPM DPM  [facility]  Pager: [phone]  Office: [phone]  Fax: [phone]

## 2021-11-22 ENCOUNTER — APPOINTMENT (OUTPATIENT)
Dept: CARDIAC CATH/INVASIVE PROCEDURES | Age: 66
DRG: 182 | End: 2021-11-22
Payer: MEDICAID

## 2021-11-22 LAB
ANION GAP SERPL CALCULATED.3IONS-SCNC: 12 MMOL/L (ref 3–16)
APTT: 60.1 SEC (ref 26.2–38.6)
BASOPHILS ABSOLUTE: 0.1 K/UL (ref 0–0.2)
BASOPHILS RELATIVE PERCENT: 1.4 %
BUN BLDV-MCNC: 17 MG/DL (ref 7–20)
CALCIUM SERPL-MCNC: 8.9 MG/DL (ref 8.3–10.6)
CHLORIDE BLD-SCNC: 102 MMOL/L (ref 99–110)
CO2: 23 MMOL/L (ref 21–32)
CREAT SERPL-MCNC: 1.1 MG/DL (ref 0.8–1.3)
EOSINOPHILS ABSOLUTE: 0.4 K/UL (ref 0–0.6)
EOSINOPHILS RELATIVE PERCENT: 6.3 %
FIBRINOGEN: 154 MG/DL (ref 200–397)
FIBRINOGEN: 323 MG/DL (ref 200–397)
FIBRINOGEN: 333 MG/DL (ref 200–397)
FIBRINOGEN: 371 MG/DL (ref 200–397)
GFR AFRICAN AMERICAN: >60
GFR NON-AFRICAN AMERICAN: >60
GLUCOSE BLD-MCNC: 97 MG/DL (ref 70–99)
HCT VFR BLD CALC: 35.5 % (ref 40.5–52.5)
HCT VFR BLD CALC: 37.8 % (ref 40.5–52.5)
HCT VFR BLD CALC: 38.1 % (ref 40.5–52.5)
HCT VFR BLD CALC: 38.9 % (ref 40.5–52.5)
HEMOGLOBIN: 11.5 G/DL (ref 13.5–17.5)
HEMOGLOBIN: 12.3 G/DL (ref 13.5–17.5)
HEMOGLOBIN: 12.4 G/DL (ref 13.5–17.5)
HEMOGLOBIN: 12.5 G/DL (ref 13.5–17.5)
INR BLD: 1.02 (ref 0.88–1.12)
INR BLD: 1.06 (ref 0.88–1.12)
INR BLD: 1.19 (ref 0.88–1.12)
LYMPHOCYTES ABSOLUTE: 1.6 K/UL (ref 1–5.1)
LYMPHOCYTES RELATIVE PERCENT: 24.9 %
MAGNESIUM: 2 MG/DL (ref 1.8–2.4)
MCH RBC QN AUTO: 26.2 PG (ref 26–34)
MCH RBC QN AUTO: 26.3 PG (ref 26–34)
MCH RBC QN AUTO: 26.4 PG (ref 26–34)
MCH RBC QN AUTO: 26.5 PG (ref 26–34)
MCHC RBC AUTO-ENTMCNC: 32.2 G/DL (ref 31–36)
MCHC RBC AUTO-ENTMCNC: 32.4 G/DL (ref 31–36)
MCHC RBC AUTO-ENTMCNC: 32.4 G/DL (ref 31–36)
MCHC RBC AUTO-ENTMCNC: 32.8 G/DL (ref 31–36)
MCV RBC AUTO: 80.5 FL (ref 80–100)
MCV RBC AUTO: 80.8 FL (ref 80–100)
MCV RBC AUTO: 81.7 FL (ref 80–100)
MCV RBC AUTO: 81.7 FL (ref 80–100)
MONOCYTES ABSOLUTE: 0.6 K/UL (ref 0–1.3)
MONOCYTES RELATIVE PERCENT: 9.6 %
NEUTROPHILS ABSOLUTE: 3.7 K/UL (ref 1.7–7.7)
NEUTROPHILS RELATIVE PERCENT: 57.8 %
PDW BLD-RTO: 15.5 % (ref 12.4–15.4)
PDW BLD-RTO: 15.6 % (ref 12.4–15.4)
PDW BLD-RTO: 15.6 % (ref 12.4–15.4)
PDW BLD-RTO: 15.8 % (ref 12.4–15.4)
PLATELET # BLD: 188 K/UL (ref 135–450)
PLATELET # BLD: 216 K/UL (ref 135–450)
PLATELET # BLD: 225 K/UL (ref 135–450)
PLATELET # BLD: 239 K/UL (ref 135–450)
PMV BLD AUTO: 7.4 FL (ref 5–10.5)
PMV BLD AUTO: 7.4 FL (ref 5–10.5)
PMV BLD AUTO: 7.6 FL (ref 5–10.5)
PMV BLD AUTO: 7.7 FL (ref 5–10.5)
POC ACT LR: 290 SEC
POTASSIUM SERPL-SCNC: 4.1 MMOL/L (ref 3.5–5.1)
PROTHROMBIN TIME: 11.5 SEC (ref 9.9–12.7)
PROTHROMBIN TIME: 12 SEC (ref 9.9–12.7)
PROTHROMBIN TIME: 13.6 SEC (ref 9.9–12.7)
RBC # BLD: 4.34 M/UL (ref 4.2–5.9)
RBC # BLD: 4.7 M/UL (ref 4.2–5.9)
RBC # BLD: 4.72 M/UL (ref 4.2–5.9)
RBC # BLD: 4.75 M/UL (ref 4.2–5.9)
SODIUM BLD-SCNC: 137 MMOL/L (ref 136–145)
WBC # BLD: 6.4 K/UL (ref 4–11)
WBC # BLD: 6.7 K/UL (ref 4–11)
WBC # BLD: 8.8 K/UL (ref 4–11)
WBC # BLD: 8.9 K/UL (ref 4–11)

## 2021-11-22 PROCEDURE — 80048 BASIC METABOLIC PNL TOTAL CA: CPT

## 2021-11-22 PROCEDURE — 37211 THROMBOLYTIC ART THERAPY: CPT | Performed by: INTERNAL MEDICINE

## 2021-11-22 PROCEDURE — C1725 CATH, TRANSLUMIN NON-LASER: HCPCS

## 2021-11-22 PROCEDURE — 2500000003 HC RX 250 WO HCPCS: Performed by: FAMILY MEDICINE

## 2021-11-22 PROCEDURE — 2720000010 HC SURG SUPPLY STERILE

## 2021-11-22 PROCEDURE — 6370000000 HC RX 637 (ALT 250 FOR IP): Performed by: NURSE PRACTITIONER

## 2021-11-22 PROCEDURE — 6360000004 HC RX CONTRAST MEDICATION: Performed by: FAMILY MEDICINE

## 2021-11-22 PROCEDURE — 85027 COMPLETE CBC AUTOMATED: CPT

## 2021-11-22 PROCEDURE — 2500000003 HC RX 250 WO HCPCS

## 2021-11-22 PROCEDURE — 047K3ZZ DILATION OF RIGHT FEMORAL ARTERY, PERCUTANEOUS APPROACH: ICD-10-PCS | Performed by: INTERNAL MEDICINE

## 2021-11-22 PROCEDURE — 94761 N-INVAS EAR/PLS OXIMETRY MLT: CPT

## 2021-11-22 PROCEDURE — 37224 PR REVSC OPN/PRG FEM/POP W/ANGIOPLASTY UNI: CPT | Performed by: INTERNAL MEDICINE

## 2021-11-22 PROCEDURE — 37211 THROMBOLYTIC ART THERAPY: CPT

## 2021-11-22 PROCEDURE — 2100000000 HC CCU R&B

## 2021-11-22 PROCEDURE — 99153 MOD SED SAME PHYS/QHP EA: CPT

## 2021-11-22 PROCEDURE — 37232 HC TIB PER TERR ADDL PLASTY: CPT

## 2021-11-22 PROCEDURE — 85610 PROTHROMBIN TIME: CPT

## 2021-11-22 PROCEDURE — 85025 COMPLETE CBC W/AUTO DIFF WBC: CPT

## 2021-11-22 PROCEDURE — C1751 CATH, INF, PER/CENT/MIDLINE: HCPCS

## 2021-11-22 PROCEDURE — 37228 PR REVSC OPN/PRQ TIB/PERO W/ANGIOPLASTY UNI: CPT | Performed by: INTERNAL MEDICINE

## 2021-11-22 PROCEDURE — 2580000003 HC RX 258: Performed by: INTERNAL MEDICINE

## 2021-11-22 PROCEDURE — 36415 COLL VENOUS BLD VENIPUNCTURE: CPT

## 2021-11-22 PROCEDURE — 85730 THROMBOPLASTIN TIME PARTIAL: CPT

## 2021-11-22 PROCEDURE — 6370000000 HC RX 637 (ALT 250 FOR IP): Performed by: INTERNAL MEDICINE

## 2021-11-22 PROCEDURE — 85347 COAGULATION TIME ACTIVATED: CPT

## 2021-11-22 PROCEDURE — 047P3ZZ DILATION OF RIGHT ANTERIOR TIBIAL ARTERY, PERCUTANEOUS APPROACH: ICD-10-PCS | Performed by: INTERNAL MEDICINE

## 2021-11-22 PROCEDURE — C1894 INTRO/SHEATH, NON-LASER: HCPCS

## 2021-11-22 PROCEDURE — 6360000002 HC RX W HCPCS: Performed by: STUDENT IN AN ORGANIZED HEALTH CARE EDUCATION/TRAINING PROGRAM

## 2021-11-22 PROCEDURE — 6360000002 HC RX W HCPCS

## 2021-11-22 PROCEDURE — 99152 MOD SED SAME PHYS/QHP 5/>YRS: CPT

## 2021-11-22 PROCEDURE — 2500000003 HC RX 250 WO HCPCS: Performed by: INTERNAL MEDICINE

## 2021-11-22 PROCEDURE — 6370000000 HC RX 637 (ALT 250 FOR IP): Performed by: STUDENT IN AN ORGANIZED HEALTH CARE EDUCATION/TRAINING PROGRAM

## 2021-11-22 PROCEDURE — 75710 ARTERY X-RAYS ARM/LEG: CPT | Performed by: INTERNAL MEDICINE

## 2021-11-22 PROCEDURE — 37224 HC FEM POP TERRITORY PLASTY: CPT

## 2021-11-22 PROCEDURE — 6360000002 HC RX W HCPCS: Performed by: INTERNAL MEDICINE

## 2021-11-22 PROCEDURE — 2709999900 HC NON-CHARGEABLE SUPPLY

## 2021-11-22 PROCEDURE — 83735 ASSAY OF MAGNESIUM: CPT

## 2021-11-22 PROCEDURE — 85384 FIBRINOGEN ACTIVITY: CPT

## 2021-11-22 PROCEDURE — 37228 HC TIB PER TERRITORY PLASTY: CPT

## 2021-11-22 PROCEDURE — C1769 GUIDE WIRE: HCPCS

## 2021-11-22 PROCEDURE — C1887 CATHETER, GUIDING: HCPCS

## 2021-11-22 RX ORDER — SODIUM CHLORIDE 0.9 % (FLUSH) 0.9 %
5-40 SYRINGE (ML) INJECTION EVERY 12 HOURS SCHEDULED
Status: DISCONTINUED | OUTPATIENT
Start: 2021-11-22 | End: 2021-11-24 | Stop reason: HOSPADM

## 2021-11-22 RX ORDER — MORPHINE SULFATE 2 MG/ML
2 INJECTION, SOLUTION INTRAMUSCULAR; INTRAVENOUS EVERY 6 HOURS PRN
Status: DISCONTINUED | OUTPATIENT
Start: 2021-11-22 | End: 2021-11-24 | Stop reason: HOSPADM

## 2021-11-22 RX ORDER — NITROGLYCERIN 20 MG/100ML
5-200 INJECTION INTRAVENOUS CONTINUOUS
Status: DISCONTINUED | OUTPATIENT
Start: 2021-11-22 | End: 2021-11-24 | Stop reason: HOSPADM

## 2021-11-22 RX ORDER — SODIUM CHLORIDE 9 MG/ML
25 INJECTION, SOLUTION INTRAVENOUS PRN
Status: DISCONTINUED | OUTPATIENT
Start: 2021-11-22 | End: 2021-11-24 | Stop reason: HOSPADM

## 2021-11-22 RX ORDER — DEXMEDETOMIDINE HYDROCHLORIDE 4 UG/ML
.2-1.4 INJECTION, SOLUTION INTRAVENOUS CONTINUOUS
Status: DISCONTINUED | OUTPATIENT
Start: 2021-11-22 | End: 2021-11-24 | Stop reason: HOSPADM

## 2021-11-22 RX ORDER — ACETAMINOPHEN 325 MG/1
650 TABLET ORAL EVERY 4 HOURS PRN
Status: DISCONTINUED | OUTPATIENT
Start: 2021-11-22 | End: 2021-11-24 | Stop reason: HOSPADM

## 2021-11-22 RX ORDER — SODIUM CHLORIDE 0.9 % (FLUSH) 0.9 %
5-40 SYRINGE (ML) INJECTION PRN
Status: DISCONTINUED | OUTPATIENT
Start: 2021-11-22 | End: 2021-11-24 | Stop reason: HOSPADM

## 2021-11-22 RX ORDER — HEPARIN SODIUM 10000 [USP'U]/100ML
500 INJECTION, SOLUTION INTRAVENOUS CONTINUOUS
Status: DISCONTINUED | OUTPATIENT
Start: 2021-11-22 | End: 2021-11-23

## 2021-11-22 RX ORDER — NITROGLYCERIN 20 MG/100ML
INJECTION INTRAVENOUS
Status: COMPLETED
Start: 2021-11-22 | End: 2021-11-22

## 2021-11-22 RX ORDER — SODIUM CHLORIDE 9 MG/ML
INJECTION, SOLUTION INTRAVENOUS CONTINUOUS
Status: DISCONTINUED | OUTPATIENT
Start: 2021-11-22 | End: 2021-11-23

## 2021-11-22 RX ADMIN — HYDROMORPHONE HYDROCHLORIDE 0.5 MG: 1 INJECTION, SOLUTION INTRAMUSCULAR; INTRAVENOUS; SUBCUTANEOUS at 12:58

## 2021-11-22 RX ADMIN — IOPAMIDOL 35 ML: 755 INJECTION, SOLUTION INTRAVENOUS at 09:09

## 2021-11-22 RX ADMIN — MORPHINE SULFATE 2 MG: 2 INJECTION, SOLUTION INTRAMUSCULAR; INTRAVENOUS at 19:18

## 2021-11-22 RX ADMIN — ATORVASTATIN CALCIUM 40 MG: 40 TABLET, FILM COATED ORAL at 20:12

## 2021-11-22 RX ADMIN — HYDROMORPHONE HYDROCHLORIDE 1 MG: 1 INJECTION, SOLUTION INTRAMUSCULAR; INTRAVENOUS; SUBCUTANEOUS at 16:25

## 2021-11-22 RX ADMIN — GABAPENTIN 400 MG: 400 CAPSULE ORAL at 20:12

## 2021-11-22 RX ADMIN — SODIUM CHLORIDE, PRESERVATIVE FREE 10 ML: 5 INJECTION INTRAVENOUS at 11:55

## 2021-11-22 RX ADMIN — LEVETIRACETAM 250 MG: 500 TABLET, FILM COATED ORAL at 20:12

## 2021-11-22 RX ADMIN — METOPROLOL TARTRATE 25 MG: 25 TABLET, FILM COATED ORAL at 20:13

## 2021-11-22 RX ADMIN — MORPHINE SULFATE 2 MG: 2 INJECTION, SOLUTION INTRAMUSCULAR; INTRAVENOUS at 14:25

## 2021-11-22 RX ADMIN — GABAPENTIN 400 MG: 400 CAPSULE ORAL at 14:25

## 2021-11-22 RX ADMIN — NITROGLYCERIN 10 MCG/MIN: 20 INJECTION INTRAVENOUS at 15:51

## 2021-11-22 RX ADMIN — DEXMEDETOMIDINE HYDROCHLORIDE 0.1 MCG/KG/HR: 4 INJECTION, SOLUTION INTRAVENOUS at 18:10

## 2021-11-22 RX ADMIN — HYDROMORPHONE HYDROCHLORIDE 0.5 MG: 1 INJECTION, SOLUTION INTRAMUSCULAR; INTRAVENOUS; SUBCUTANEOUS at 02:40

## 2021-11-22 RX ADMIN — HYDROMORPHONE HYDROCHLORIDE 1 MG: 1 INJECTION, SOLUTION INTRAMUSCULAR; INTRAVENOUS; SUBCUTANEOUS at 15:26

## 2021-11-22 RX ADMIN — ALTEPLASE 1 MG/HR: 2.2 INJECTION, POWDER, LYOPHILIZED, FOR SOLUTION INTRAVENOUS at 17:34

## 2021-11-22 RX ADMIN — HYDROMORPHONE HYDROCHLORIDE 0.5 MG: 1 INJECTION, SOLUTION INTRAMUSCULAR; INTRAVENOUS; SUBCUTANEOUS at 11:47

## 2021-11-22 RX ADMIN — SODIUM CHLORIDE: 9 INJECTION, SOLUTION INTRAVENOUS at 15:42

## 2021-11-22 RX ADMIN — HYDROMORPHONE HYDROCHLORIDE 1 MG: 1 INJECTION, SOLUTION INTRAMUSCULAR; INTRAVENOUS; SUBCUTANEOUS at 20:13

## 2021-11-22 RX ADMIN — DEXMEDETOMIDINE HYDROCHLORIDE 0.7 MCG/KG/HR: 4 INJECTION, SOLUTION INTRAVENOUS at 22:42

## 2021-11-22 RX ADMIN — OXYCODONE HYDROCHLORIDE 10 MG: 10 TABLET, FILM COATED, EXTENDED RELEASE ORAL at 12:24

## 2021-11-22 RX ADMIN — QUETIAPINE FUMARATE 100 MG: 100 TABLET ORAL at 20:13

## 2021-11-22 RX ADMIN — HYDROMORPHONE HYDROCHLORIDE 0.5 MG: 1 INJECTION, SOLUTION INTRAMUSCULAR; INTRAVENOUS; SUBCUTANEOUS at 06:55

## 2021-11-22 RX ADMIN — ACETAMINOPHEN 650 MG: 325 TABLET ORAL at 14:49

## 2021-11-22 ASSESSMENT — PAIN DESCRIPTION - ONSET
ONSET: ON-GOING

## 2021-11-22 ASSESSMENT — PAIN SCALES - GENERAL
PAINLEVEL_OUTOF10: 8
PAINLEVEL_OUTOF10: 10
PAINLEVEL_OUTOF10: 9
PAINLEVEL_OUTOF10: 7
PAINLEVEL_OUTOF10: 9
PAINLEVEL_OUTOF10: 10
PAINLEVEL_OUTOF10: 9
PAINLEVEL_OUTOF10: 10
PAINLEVEL_OUTOF10: 10
PAINLEVEL_OUTOF10: 7
PAINLEVEL_OUTOF10: 9
PAINLEVEL_OUTOF10: 0
PAINLEVEL_OUTOF10: 8

## 2021-11-22 ASSESSMENT — PAIN DESCRIPTION - FREQUENCY
FREQUENCY: CONTINUOUS

## 2021-11-22 ASSESSMENT — PAIN DESCRIPTION - DIRECTION
RADIATING_TOWARDS: FOOT
RADIATING_TOWARDS: FOOT

## 2021-11-22 ASSESSMENT — PAIN DESCRIPTION - LOCATION
LOCATION: LEG

## 2021-11-22 ASSESSMENT — PAIN DESCRIPTION - DESCRIPTORS
DESCRIPTORS: BURNING;CONSTANT
DESCRIPTORS: BURNING
DESCRIPTORS: BURNING;CONSTANT
DESCRIPTORS: CRAMPING
DESCRIPTORS: BURNING

## 2021-11-22 ASSESSMENT — PAIN DESCRIPTION - ORIENTATION
ORIENTATION: RIGHT

## 2021-11-22 ASSESSMENT — PAIN DESCRIPTION - PAIN TYPE
TYPE: ACUTE PAIN

## 2021-11-22 ASSESSMENT — PAIN DESCRIPTION - PROGRESSION
CLINICAL_PROGRESSION: NOT CHANGED

## 2021-11-22 ASSESSMENT — PAIN - FUNCTIONAL ASSESSMENT
PAIN_FUNCTIONAL_ASSESSMENT: PREVENTS OR INTERFERES SOME ACTIVE ACTIVITIES AND ADLS

## 2021-11-22 NOTE — PROGRESS NOTES
While receiving report from night shift nurse transport informed us they were here to  pt for surgery. Pt departed floor for surgery at [address] [place] [address] via hospital bed.  Electronically signed by Kong Brooks RN on 11/22/2021 at 7:24 AM

## 2021-11-22 NOTE — PROGRESS NOTES
Department of Podiatric Surgery  Progress Note  11/22/2021  Pinky Se        HISTORY OF PRESENT ILLNESS:      The patient is a 61 y.o. male who presents with right lower extremity ischemia. He has no pain in the foot. Reports that it feels numb. No other complaints at this time. PHYSICAL EXAM:  VITALS:  /82   Pulse 99   Temp 97.9 °F (36.6 °C) (Oral)   Resp 14   Ht 6' 1\" (1.854 m)   Wt 224 lb 6.9 oz (101.8 kg)   SpO2 100%   BMI 29.61 kg/m²      CONSTITUTIONAL:  awake, alert, cooperative, no apparent distress, and appears stated age    LOWER EXTREMITY:  VASCULAR: Pedal Pulses right extremity are not palpable. Left patient's foot is cool to the touch but does not display any signs of gross ischemia. MUSCULOSKELETAL:  positive gross deformity. Patient has left below-knee amputation with a stump wound. The stump wound is not uncovered today by me. Patient does have digital contractures all digits on the right foot. NEUROLOGIC:  Epicritic sensation, light touch, joint position sense diminished  SKIN: The patient's foot is cool to the touch with a diffuse erythema. No open wounds.   The dry skin has resolved       I/O:    Intake/Output Summary (Last 24 hours) at 11/22/2021 1443  Last data filed at 11/22/2021 1354  Gross per 24 hour   Intake 830 ml   Output 2475 ml   Net -1645 ml              Wt Readings from Last 3 Encounters:   11/22/21 224 lb 6.9 oz (101.8 kg)   11/11/21 217 lb (98.4 kg)   02/04/21 185 lb (83.9 kg)       LABS:    Recent Labs     11/22/21  0526 11/22/21  1149   WBC 6.4 6.7   HGB 12.5* 12.3*   HCT 38.9* 38.1*    239        Recent Labs     11/22/21  0526      K 4.1      CO2 23   BUN 17   CREATININE 1.1        Recent Labs     11/21/21  1752 11/22/21  0027 11/22/21  1149   INR  --   --  1.02   APTT 41.0* 60.1*  --        IMAGING:       Summary        Right SYBIL was not available due to inadequate pulses .    Elevated velocity of the deep femoral artery suggests a 50-70% stenosis.    Occlusion of the distal superficial femoral and popliteal artery in the    right lower extremity.    Critically low velocities in the right posterior tibial and anterior tibial    arteries.    Left SYBIL was not available due to below knee amputation.    Occlusion of the left popliteal artery.    The majority of the waveforms are monophasic throughout the left lower    extremity.        Signature        ------------------------------------------------------------------    Electronically signed by Sarah Mcfarland MD (Interpreting    physician) on 11/01/2021 at 05:49 PM       MICRO:   No results    ASSESSMENT   Peripheral arterial disease with right lower extremity ischemia    PLAN:  Evaluation and Management x 10 minutes with greater than 50% of the time spent with the patient discussing the etiology and treatment options of the chief complaint. 1.  Right lower extremity ischemia, post intervention of today  We will follow for close monitoring. Patient does not need any immediate podiatric intervention. DISPO: We will follow  Thanks for the opportunity to participate in this patient's care.      Tiana Gibson DPM DPM  [facility]  Cell [phone]  Office: [phone]  Fax: [phone]

## 2021-11-22 NOTE — PROGRESS NOTES
Clinical Pharmacy Note  Heparin Dosing       Lab Results   Component Value Date    APTT 60.1 11/22/2021     Lab Results   Component Value Date    HGB 12.7 11/21/2021    HCT 39.1 11/21/2021     11/21/2021    INR 1.13 06/02/2020       Current Infusion Rate: 1200 units/hr    Plan:  Rate: continue at 1200 units/hr  Next aPTT: 0900 11/22/21    Pharmacy will continue to monitor and adjust based on aPTT results.   Samra Verde, MichaelD

## 2021-11-22 NOTE — PROGRESS NOTES
Patient having unbearable pain. Spoke with Dr. Alexa Elizalde and pain medications adjusted, Nitroglycerin drip started per orders. Pt not wanting to sit still and sitting up in bed. Instructed to lay flat. 1638 pt finally appears comfortable and relaxing. 2L NC applied.  Will allow pt to rest

## 2021-11-22 NOTE — PROGRESS NOTES
Nutrition Assessment     Type and Reason for Visit: Initial    Nutrition Recommendations/Plan:   Regular diet when po resumes   Will monitor nutritional adequacy, nutrition-related labs, weights, BMs, and clinical progress     Nutrition Assessment:  LOS. Pt being treated for critical limb ischemia, DANY. Currently out of room for procedure during visit. NPO, was previously on Regular diet. Tolerating and eating well. Per nsg screen, pt denied any nutrition-related issues. Did not note any wt loss per EMR.     Malnutrition Assessment:  Malnutrition Status: No malnutrition    Nutrition Related Findings: LBM 11/21      Current Nutrition Therapies:    Diet NPO Exceptions are: Sips of Water with Meds    Anthropometric Measures:  · Height: 6' 1\" (185.4 cm)  · Current Body Wt: 224 lb (101.6 kg)   · BMI: 29.6    Nutrition Diagnosis:   No nutrition diagnosis at this time     Nutrition Interventions:   Food and/or Nutrient Delivery:  Start Oral Diet (after procedure)  Nutrition Education/Counseling:  No recommendation at this time   Coordination of Nutrition Care:  Continue to monitor while inpatient    Goals:  Consume greater than 50% of meals this admission       Nutrition Monitoring and Evaluation:   Behavioral-Environmental Outcomes:  None Identified   Food/Nutrient Intake Outcomes:  Food and Nutrient Intake, Supplement Intake  Physical Signs/Symptoms Outcomes:  Biochemical Data, Nutrition Focused Physical Findings, Skin, Weight     Discharge Planning:    No discharge needs at this time     Electronically signed by Jeb Hyman RD, CATIA on 11/22/21 at 10:24 AM EST    Contact: [phone]

## 2021-11-22 NOTE — PRE SEDATION
Sedation Pre-Procedure Note    Patient Name: Alissa Izquierdo   YOB: 1955  Room/Bed: CATH/NONE  Medical Record Number: [id number]  Date: 11/22/2021   Time: 10:04 AM       Indication:  CLI     Consent: I have discussed with the patient and/or the patient representative the indication, alternatives, and the possible risks and/or complications of the planned procedure and the anesthesia methods. The patient and/or patient representative appear to understand and agree to proceed. Vital Signs:   Vitals:    11/22/21 0708   BP: (!) 146/85   Pulse: 74   Resp: 16   Temp: 97.6 °F (36.4 °C)   SpO2: 96%       Past Medical History:   has a past medical history of Acute cerebral infarction ([address] Alcohol withdrawal ([address] Bilateral carotid artery stenosis, Hepatitis C antibody positive in blood, History of bronchitis, History of DVT of lower extremity, Hypertension, NSTEMI (non-ST elevated myocardial infarction) ([address] PAD (peripheral artery disease) ([address] Respiratory compromise, S/p nephrectomy, and Tattoos. Past Surgical History:   has a past surgical history that includes total nephrectomy (Left); Coronary artery bypass graft (N/A, 5/8/2020); tracheostomy (05/27/2020); transesophageal echocardiogram (05/08/2020); Cardiac catheterization (05/07/2020); thoracentesis (Left, 05/29/2020); Gastrostomy tube placement (N/A, 5/27/2020); Stomach surgery (N/A, 7/10/2020); and Leg amputation below knee.     Medications:   Scheduled Meds:    amLODIPine  5 mg Oral Daily    atorvastatin  40 mg Oral Nightly    aspirin  81 mg Oral Daily    ferrous gluconate  324 mg Oral Daily with breakfast    gabapentin  400 mg Oral TID    levETIRAcetam  250 mg Oral BID    pantoprazole  40 mg Oral Daily    metoprolol tartrate  25 mg Oral BID    QUEtiapine  100 mg Oral Nightly    sodium chloride flush  5-40 mL IntraVENous 2 times per day     Continuous Infusions:    heparin (Porcine)      sodium chloride       PRN Meds: ammonium lactate, acetaminophen, ondansetron, oxyCODONE, naloxone, sodium chloride flush, sodium chloride, ondansetron **OR** [DISCONTINUED] ondansetron, [DISCONTINUED] acetaminophen **OR** acetaminophen, HYDROmorphone **OR** HYDROmorphone  Home Meds:   Prior to Admission medications    Medication Sig Start Date End Date Taking? Authorizing Provider   atorvastatin (LIPITOR) 40 MG tablet TAKE 1 TABLET BY MOUTH EVERY DAY AT NIGHT 10/19/21  Yes Connor Bae MD   aspirin 81 MG chewable tablet TAKE 1 TABLET BY MOUTH EVERY DAY 9/28/21  Yes Juany Wright MD   rivaroxaban (XARELTO) 2.5 MG TABS tablet Take 1 tablet by mouth 2 times daily 2/5/21  Yes Juany Wright MD   pantoprazole (PROTONIX) 40 MG tablet Take 40 mg by mouth daily   Yes Historical Provider, MD   QUEtiapine (SEROQUEL) 100 MG tablet Take 100 mg by mouth nightly   Yes Historical Provider, MD   senna-docusate (Gertrude Eveline) 8.6-50 MG per tablet Take 1 tablet by mouth nightly   Yes Historical Provider, MD   tiZANidine (ZANAFLEX) 4 MG tablet Take 4 mg by mouth nightly    Yes Historical Provider, MD   acetaminophen (TYLENOL) 500 MG tablet Take 1,000 mg by mouth every 6 hours as needed for Pain   Yes Historical Provider, MD   ferrous gluconate (FERGON) 324 (38 Fe) MG tablet Take 324 mg by mouth daily (with breakfast)   Yes Historical Provider, MD   gabapentin (NEURONTIN) 800 MG tablet Take 800 mg by mouth 3 times daily.     Yes Historical Provider, MD   levETIRAcetam (KEPPRA) 250 MG tablet Take 250 mg by mouth 2 times daily    Yes Historical Provider, MD   metoprolol tartrate (LOPRESSOR) 25 MG tablet Take 25 mg by mouth 2 times daily   Yes Historical Provider, MD     Coumadin Use Last 7 Days:  no  Antiplatelet drug therapy use last 7 days: yes   Other anticoagulant use last 7 days: yes  Additional Medication Information:  N/a      Pre-Sedation Documentation and Exam:   I have personally completed a history, physical exam & review of systems for this patient (see notes).     Mallampati Airway Assessment:  Mallampati Class I - (soft palate, fauces, uvula & anterior/posterior tonsillar pillars are visible)    Prior History of Anesthesia Complications:   none    ASA Classification:  Class 2 - A normal healthy patient with mild systemic disease    Sedation/ Anesthesia Plan:   intravenous sedation    Medications Planned:   midazolam (Versed) intravenously    Patient is an appropriate candidate for plan of sedation: yes    Electronically signed by Aminah Orona MD on 11/22/2021 at 10:04 AM

## 2021-11-22 NOTE — PROGRESS NOTES
Pt is alert and oriented x4, resting quietly in bed. Nightly medications and intake tolerated well. No complaints of nausea or vomiting at this time. Pt complaining of some pain in his leg - pain medications given as prescribed. Heparin drip remains at 12mL per hour. Fall precautions in place. Call light within reach. No other needs made known at this time. Will continue to monitor.     Electronically signed by Earlene Hwang RN on 11/22/2021 at 6:05 AM

## 2021-11-22 NOTE — PROGRESS NOTES
1057- report obtained from [facility].  Patient transferred from cath lab to room 1308, EKOs to left femoral artery with fem stop in place upon arrival

## 2021-11-22 NOTE — PROGRESS NOTES
Called Dr. Nemo Mccullough to see about other suggestions to help control patient from pain and wanting to get out of bed. Patient on strict bedrest while Ekos is in and staff continues to educated patient on importance of bedrest while Ekos in place, but patient keeps sitting up against advice. Pt currently having muscle spasms and spouse is massaging legs.  Orders obtained from Dr. Nemo Mccullough and will get precedex drip started

## 2021-11-22 NOTE — PROGRESS NOTES
Hospitalist Progress Note      PCP: KATHY Barboza - CNP    Date of Admission: 11/15/2021    Chief Complaint: right foot pain    Hospital Course:     Subjective: EKOS placed. Complainin of significant pain post op. Having difficulty staying still in bed. Precedex drip started. Medications:  Reviewed    Infusion Medications    heparin (Porcine)      sodium chloride       Scheduled Medications    amLODIPine  5 mg Oral Daily    atorvastatin  40 mg Oral Nightly    aspirin  81 mg Oral Daily    ferrous gluconate  324 mg Oral Daily with breakfast    gabapentin  400 mg Oral TID    levETIRAcetam  250 mg Oral BID    pantoprazole  40 mg Oral Daily    metoprolol tartrate  25 mg Oral BID    QUEtiapine  100 mg Oral Nightly    sodium chloride flush  5-40 mL IntraVENous 2 times per day     PRN Meds: morphine, ammonium lactate, acetaminophen, ondansetron, oxyCODONE, naloxone, sodium chloride flush, sodium chloride, ondansetron **OR** [DISCONTINUED] ondansetron, [DISCONTINUED] acetaminophen **OR** acetaminophen, HYDROmorphone **OR** HYDROmorphone      Intake/Output Summary (Last 24 hours) at 11/22/2021 1055  Last data filed at 11/22/2021 0350  Gross per 24 hour   Intake 590 ml   Output 3225 ml   Net -2635 ml       Exam:    BP (!) 146/85   Pulse 74   Temp 97.6 °F (36.4 °C) (Oral)   Resp 16   Ht 6' 1\" (1.854 m)   Wt 224 lb 6.9 oz (101.8 kg)   SpO2 96%   BMI 29.61 kg/m²     General appearance: No apparent distress, appears stated age and cooperative. HEENT: Pupils equal, round, and reactive to light. Conjunctivae/corneas clear. Neck: Supple, with full range of motion. No jugular venous distention. Trachea midline. Respiratory:  Normal respiratory effort. Clear to auscultation, bilaterally without Rales/Wheezes/Rhonchi. Cardiovascular: Regular rate and rhythm with normal S1/S2 without murmurs, rubs or gallops. Abdomen: Soft, non-tender, non-distended with normal bowel sounds.   Musculoskeletal: No clubbing, cyanosis or edema bilaterally. Left leg BKA. Right foot with tenderness and diffuse hyperemia  Skin: Skin color, texture, turgor normal.  No rashes or lesions. Neurologic:  Neurovascularly intact without any focal sensory/motor deficits. Cranial nerves: II-XII intact, grossly non-focal.  Psychiatric: Alert and oriented, thought content appropriate, normal insight  Capillary Refill: Brisk,< 3 seconds   Peripheral Pulses: +2 palpable, equal bilaterally       Labs:   Recent Labs     11/20/21  0505 11/21/21  0531 11/22/21  0526   WBC 5.6 6.2 6.4   HGB 12.3* 12.7* 12.5*   HCT 37.7* 39.1* 38.9*    227 216     Recent Labs     11/20/21  0505 11/21/21  0531 11/22/21  0526    138 137   K 4.5 3.9 4.1    102 102   CO2 24 23 23   BUN 17 18 17   CREATININE 1.1 1.2 1.1   CALCIUM 8.8 9.0 8.9     No results for input(s): AST, ALT, BILIDIR, BILITOT, ALKPHOS in the last 72 hours. No results for input(s): INR in the last 72 hours. No results for input(s): [place] in the last 72 hours. Urinalysis:      Lab Results   Component Value Date    NITRU Negative 11/20/2021    WBCUA 2 05/28/2020    RBCUA 10 05/28/2020    BLOODU Negative 11/20/2021    SPECGRAV 1.008 11/20/2021    GLUCOSEU Negative 11/20/2021       Radiology:  US RENAL COMPLETE   Final Result   1. Unremarkable sonographic appearance of the right kidney. 2. Patient status post left nephrectomy. 3. Collapsed urinary bladder, limiting its evaluation. CTA ABDOMINAL AORTA W BILAT RUNOFF W CONTRAST   Final Result   Vascular-      Aortoiliac inflow is preserved. Left below-the-knee amputation is noted. There is severe, occlusive disease   in the left femoropopliteal tibial segment. Right severe femoropopliteal segment disease with occlusion by the mid   superficial femoral artery. Popliteal segment is occluded.   Trifurcation   vessels are reconstituted with runoff via the anterior tibial and posterior   tibial arteries. Nonvascular-      No acute abdominopelvic abnormality is identified. Solitary right kidney. Mild enlargement of the prostate gland. Bilateral inguinal hernias, including sigmoid colon content on the left. There is no incarceration.                  Assessment/Plan:    Active Hospital Problems    Diagnosis Date Noted    Acute pain of right lower extremity [M79.604] 11/15/2021    Solitary kidney, acquired [Z90.5] 11/15/2021    Pain of right lower extremity due to ischemia [M79.604, I99.8] 11/15/2021    Peripheral artery disease ([address] [I73.9] 10/22/2021    Hyperlipidemia LDL goal <70 [E78.5] 10/22/2021    Essential hypertension [I10] 10/22/2021    CAD in native artery [I25.10]     S/P CABG (coronary artery bypass graft) [Z95.1]        Critical limb ischemia:  - s/p heparin drip, cont statin, ASA   - heparin drip restarted 11/21  - cardiology to do SYBIL, doppler, angio   - anticipate will need revascular stenting   - angiography unable to be done due to stenosis proximal   - Dr. Faye El placed EKOS 11/22  - precedex drip started for pain and inability to stay still    DANY:  resolved  - likely due to contrast-induced nephropathy  - nepho consulted:   - IVF PRN    Tobacco abuse:  - cessation counseling    H/o CVA, HTN:  Cont metoprolol    DVT Prophylaxis: heparin drip  Diet: Diet NPO Exceptions are: Sips of Water with Meds  Code Status: Limited    PT/OT Eval Status: pending     Robbin Salinas MD

## 2021-11-22 NOTE — OP NOTE
placement   Plan for EKOS removal tomorrow followed by laser atherectomy   NPO MN     Edilson Morton MD [address] [facility], Interventional Cardiology, and Peripheral Vascular [address] [facility]   (C): [phone]  (O): [phone]

## 2021-11-23 LAB
ANION GAP SERPL CALCULATED.3IONS-SCNC: 13 MMOL/L (ref 3–16)
BASOPHILS ABSOLUTE: 0.1 K/UL (ref 0–0.2)
BASOPHILS RELATIVE PERCENT: 0.8 %
BUN BLDV-MCNC: 15 MG/DL (ref 7–20)
CALCIUM SERPL-MCNC: 8.6 MG/DL (ref 8.3–10.6)
CHLORIDE BLD-SCNC: 104 MMOL/L (ref 99–110)
CO2: 20 MMOL/L (ref 21–32)
CREAT SERPL-MCNC: 0.9 MG/DL (ref 0.8–1.3)
EOSINOPHILS ABSOLUTE: 0.2 K/UL (ref 0–0.6)
EOSINOPHILS RELATIVE PERCENT: 2.1 %
FIBRINOGEN: 205 MG/DL (ref 200–397)
GFR AFRICAN AMERICAN: >60
GFR NON-AFRICAN AMERICAN: >60
GLUCOSE BLD-MCNC: 112 MG/DL (ref 70–99)
HCT VFR BLD CALC: 35.8 % (ref 40.5–52.5)
HEMOGLOBIN: 11.6 G/DL (ref 13.5–17.5)
INR BLD: 1.15 (ref 0.88–1.12)
LYMPHOCYTES ABSOLUTE: 1.2 K/UL (ref 1–5.1)
LYMPHOCYTES RELATIVE PERCENT: 15 %
MAGNESIUM: 1.7 MG/DL (ref 1.8–2.4)
MCH RBC QN AUTO: 26.4 PG (ref 26–34)
MCHC RBC AUTO-ENTMCNC: 32.4 G/DL (ref 31–36)
MCV RBC AUTO: 81.4 FL (ref 80–100)
MONOCYTES ABSOLUTE: 0.9 K/UL (ref 0–1.3)
MONOCYTES RELATIVE PERCENT: 11.1 %
NEUTROPHILS ABSOLUTE: 5.9 K/UL (ref 1.7–7.7)
NEUTROPHILS RELATIVE PERCENT: 71 %
PDW BLD-RTO: 15.5 % (ref 12.4–15.4)
PLATELET # BLD: 200 K/UL (ref 135–450)
PMV BLD AUTO: 7.6 FL (ref 5–10.5)
POC ACT LR: 158 SEC
POC ACT LR: 173 SEC
POC ACT LR: 176 SEC
POC ACT LR: 211 SEC
POC ACT LR: 268 SEC
POC ACT LR: 277 SEC
POTASSIUM SERPL-SCNC: 3.9 MMOL/L (ref 3.5–5.1)
PROTHROMBIN TIME: 13.1 SEC (ref 9.9–12.7)
RBC # BLD: 4.39 M/UL (ref 4.2–5.9)
SODIUM BLD-SCNC: 137 MMOL/L (ref 136–145)
WBC # BLD: 8.3 K/UL (ref 4–11)

## 2021-11-23 PROCEDURE — 04CM3ZZ EXTIRPATION OF MATTER FROM RIGHT POPLITEAL ARTERY, PERCUTANEOUS APPROACH: ICD-10-PCS | Performed by: INTERNAL MEDICINE

## 2021-11-23 PROCEDURE — C1725 CATH, TRANSLUMIN NON-LASER: HCPCS

## 2021-11-23 PROCEDURE — 37225 HC FEM POP TERRITORY ATHERECTOMY: CPT

## 2021-11-23 PROCEDURE — 2580000003 HC RX 258: Performed by: INTERNAL MEDICINE

## 2021-11-23 PROCEDURE — 6360000002 HC RX W HCPCS

## 2021-11-23 PROCEDURE — C1885 CATH, TRANSLUMIN ANGIO LASER: HCPCS

## 2021-11-23 PROCEDURE — C2623 CATH, TRANSLUMIN, DRUG-COAT: HCPCS

## 2021-11-23 PROCEDURE — 2500000003 HC RX 250 WO HCPCS

## 2021-11-23 PROCEDURE — 6370000000 HC RX 637 (ALT 250 FOR IP): Performed by: STUDENT IN AN ORGANIZED HEALTH CARE EDUCATION/TRAINING PROGRAM

## 2021-11-23 PROCEDURE — C1887 CATHETER, GUIDING: HCPCS

## 2021-11-23 PROCEDURE — 85384 FIBRINOGEN ACTIVITY: CPT

## 2021-11-23 PROCEDURE — 80048 BASIC METABOLIC PNL TOTAL CA: CPT

## 2021-11-23 PROCEDURE — C1769 GUIDE WIRE: HCPCS

## 2021-11-23 PROCEDURE — 83735 ASSAY OF MAGNESIUM: CPT

## 2021-11-23 PROCEDURE — 6360000002 HC RX W HCPCS: Performed by: INTERNAL MEDICINE

## 2021-11-23 PROCEDURE — C1884 EMBOLIZATION PROTECT SYST: HCPCS

## 2021-11-23 PROCEDURE — 99152 MOD SED SAME PHYS/QHP 5/>YRS: CPT

## 2021-11-23 PROCEDURE — C1894 INTRO/SHEATH, NON-LASER: HCPCS

## 2021-11-23 PROCEDURE — 99153 MOD SED SAME PHYS/QHP EA: CPT

## 2021-11-23 PROCEDURE — 85610 PROTHROMBIN TIME: CPT

## 2021-11-23 PROCEDURE — 2100000000 HC CCU R&B

## 2021-11-23 PROCEDURE — 85025 COMPLETE CBC W/AUTO DIFF WBC: CPT

## 2021-11-23 PROCEDURE — 047M3ZZ DILATION OF RIGHT POPLITEAL ARTERY, PERCUTANEOUS APPROACH: ICD-10-PCS | Performed by: INTERNAL MEDICINE

## 2021-11-23 PROCEDURE — 6370000000 HC RX 637 (ALT 250 FOR IP): Performed by: INTERNAL MEDICINE

## 2021-11-23 PROCEDURE — 6360000004 HC RX CONTRAST MEDICATION: Performed by: INTERNAL MEDICINE

## 2021-11-23 PROCEDURE — 94761 N-INVAS EAR/PLS OXIMETRY MLT: CPT

## 2021-11-23 PROCEDURE — 37214 CESSJ THERAPY CATH REMOVAL: CPT | Performed by: INTERNAL MEDICINE

## 2021-11-23 PROCEDURE — 37225 PR REVSC OPN/PRQ FEM/POP W/ATHRC/ANGIOP SM VSL: CPT | Performed by: INTERNAL MEDICINE

## 2021-11-23 PROCEDURE — 37214 CESSJ THERAPY CATH REMOVAL: CPT

## 2021-11-23 PROCEDURE — 85347 COAGULATION TIME ACTIVATED: CPT

## 2021-11-23 PROCEDURE — 2500000003 HC RX 250 WO HCPCS: Performed by: INTERNAL MEDICINE

## 2021-11-23 PROCEDURE — 6370000000 HC RX 637 (ALT 250 FOR IP): Performed by: NURSE PRACTITIONER

## 2021-11-23 RX ORDER — SODIUM CHLORIDE 0.9 % (FLUSH) 0.9 %
5-40 SYRINGE (ML) INJECTION PRN
Status: DISCONTINUED | OUTPATIENT
Start: 2021-11-23 | End: 2021-11-24 | Stop reason: HOSPADM

## 2021-11-23 RX ORDER — SODIUM CHLORIDE 0.9 % (FLUSH) 0.9 %
5-40 SYRINGE (ML) INJECTION EVERY 12 HOURS SCHEDULED
Status: DISCONTINUED | OUTPATIENT
Start: 2021-11-23 | End: 2021-11-24 | Stop reason: HOSPADM

## 2021-11-23 RX ORDER — SODIUM CHLORIDE 9 MG/ML
INJECTION, SOLUTION INTRAVENOUS CONTINUOUS
Status: DISCONTINUED | OUTPATIENT
Start: 2021-11-23 | End: 2021-11-24 | Stop reason: HOSPADM

## 2021-11-23 RX ORDER — ACETAMINOPHEN 325 MG/1
650 TABLET ORAL EVERY 4 HOURS PRN
Status: DISCONTINUED | OUTPATIENT
Start: 2021-11-23 | End: 2021-11-24 | Stop reason: HOSPADM

## 2021-11-23 RX ORDER — SODIUM CHLORIDE 9 MG/ML
25 INJECTION, SOLUTION INTRAVENOUS PRN
Status: DISCONTINUED | OUTPATIENT
Start: 2021-11-23 | End: 2021-11-24 | Stop reason: HOSPADM

## 2021-11-23 RX ORDER — MAGNESIUM SULFATE 1 G/100ML
1000 INJECTION INTRAVENOUS ONCE
Status: COMPLETED | OUTPATIENT
Start: 2021-11-23 | End: 2021-11-23

## 2021-11-23 RX ORDER — IODIXANOL 320 MG/ML
125 INJECTION, SOLUTION INTRAVASCULAR
Status: COMPLETED | OUTPATIENT
Start: 2021-11-23 | End: 2021-11-23

## 2021-11-23 RX ADMIN — GABAPENTIN 400 MG: 400 CAPSULE ORAL at 14:14

## 2021-11-23 RX ADMIN — FERROUS GLUCONATE 324 MG: 324 TABLET ORAL at 10:05

## 2021-11-23 RX ADMIN — HYDROMORPHONE HYDROCHLORIDE 1 MG: 1 INJECTION, SOLUTION INTRAMUSCULAR; INTRAVENOUS; SUBCUTANEOUS at 14:13

## 2021-11-23 RX ADMIN — PANTOPRAZOLE SODIUM 40 MG: 40 TABLET, DELAYED RELEASE ORAL at 09:52

## 2021-11-23 RX ADMIN — MORPHINE SULFATE 2 MG: 2 INJECTION, SOLUTION INTRAMUSCULAR; INTRAVENOUS at 02:46

## 2021-11-23 RX ADMIN — MAGNESIUM SULFATE HEPTAHYDRATE 1000 MG: 1 INJECTION, SOLUTION INTRAVENOUS at 09:58

## 2021-11-23 RX ADMIN — QUETIAPINE FUMARATE 100 MG: 100 TABLET ORAL at 20:02

## 2021-11-23 RX ADMIN — GABAPENTIN 400 MG: 400 CAPSULE ORAL at 20:01

## 2021-11-23 RX ADMIN — IODIXANOL 125 ML: 320 INJECTION, SOLUTION INTRAVASCULAR at 08:34

## 2021-11-23 RX ADMIN — SODIUM CHLORIDE, PRESERVATIVE FREE 10 ML: 5 INJECTION INTRAVENOUS at 21:19

## 2021-11-23 RX ADMIN — ASPIRIN 81 MG: 81 TABLET, CHEWABLE ORAL at 09:52

## 2021-11-23 RX ADMIN — METOPROLOL TARTRATE 25 MG: 25 TABLET, FILM COATED ORAL at 20:02

## 2021-11-23 RX ADMIN — ATORVASTATIN CALCIUM 40 MG: 40 TABLET, FILM COATED ORAL at 20:01

## 2021-11-23 RX ADMIN — MORPHINE SULFATE 2 MG: 2 INJECTION, SOLUTION INTRAMUSCULAR; INTRAVENOUS at 20:13

## 2021-11-23 RX ADMIN — HYDROMORPHONE HYDROCHLORIDE 1 MG: 1 INJECTION, SOLUTION INTRAMUSCULAR; INTRAVENOUS; SUBCUTANEOUS at 09:53

## 2021-11-23 RX ADMIN — METOPROLOL TARTRATE 25 MG: 25 TABLET, FILM COATED ORAL at 09:52

## 2021-11-23 RX ADMIN — SODIUM CHLORIDE, PRESERVATIVE FREE 10 ML: 5 INJECTION INTRAVENOUS at 21:18

## 2021-11-23 RX ADMIN — OXYCODONE HYDROCHLORIDE 10 MG: 10 TABLET, FILM COATED, EXTENDED RELEASE ORAL at 12:28

## 2021-11-23 RX ADMIN — DEXMEDETOMIDINE HYDROCHLORIDE 0.7 MCG/KG/HR: 4 INJECTION, SOLUTION INTRAVENOUS at 03:27

## 2021-11-23 RX ADMIN — DEXMEDETOMIDINE HYDROCHLORIDE 0.5 MCG/KG/HR: 4 INJECTION, SOLUTION INTRAVENOUS at 16:57

## 2021-11-23 RX ADMIN — GABAPENTIN 400 MG: 400 CAPSULE ORAL at 09:52

## 2021-11-23 RX ADMIN — LEVETIRACETAM 250 MG: 500 TABLET, FILM COATED ORAL at 20:01

## 2021-11-23 RX ADMIN — AMLODIPINE BESYLATE 5 MG: 5 TABLET ORAL at 09:52

## 2021-11-23 RX ADMIN — LEVETIRACETAM 250 MG: 500 TABLET, FILM COATED ORAL at 09:52

## 2021-11-23 ASSESSMENT — PAIN DESCRIPTION - PROGRESSION
CLINICAL_PROGRESSION: NOT CHANGED

## 2021-11-23 ASSESSMENT — PAIN SCALES - GENERAL
PAINLEVEL_OUTOF10: 9
PAINLEVEL_OUTOF10: 8
PAINLEVEL_OUTOF10: 10
PAINLEVEL_OUTOF10: 9
PAINLEVEL_OUTOF10: 0

## 2021-11-23 ASSESSMENT — PAIN - FUNCTIONAL ASSESSMENT: PAIN_FUNCTIONAL_ASSESSMENT: PREVENTS OR INTERFERES SOME ACTIVE ACTIVITIES AND ADLS

## 2021-11-23 ASSESSMENT — PAIN DESCRIPTION - DESCRIPTORS: DESCRIPTORS: BURNING;CONSTANT

## 2021-11-23 ASSESSMENT — PAIN DESCRIPTION - PAIN TYPE: TYPE: ACUTE PAIN

## 2021-11-23 ASSESSMENT — PAIN DESCRIPTION - LOCATION: LOCATION: LEG

## 2021-11-23 ASSESSMENT — PAIN DESCRIPTION - FREQUENCY: FREQUENCY: CONTINUOUS

## 2021-11-23 ASSESSMENT — PAIN DESCRIPTION - ONSET: ONSET: ON-GOING

## 2021-11-23 ASSESSMENT — PAIN DESCRIPTION - ORIENTATION: ORIENTATION: RIGHT

## 2021-11-23 NOTE — PROGRESS NOTES
Hospitalist Progress Note      PCP: KATHY Hoffman - CNP    Date of Admission: 11/15/2021    Chief Complaint: right foot pain    Hospital Course:   Zhanna Robles 61 y.o. male  - pmhx of cva, CAD s/p CABG, bilateral carotid stenosis, PAD, Hep C, s/p nephrectomy, left below-knee amputation  - presented to the hospital with Right leg lower extremity pain increasing erythema of the foot. Pain 5/10 intensity ongoing for 1 week increasing in severity for past 2 to 3 days. - underwent Right lower extremity selective angiography found to have Right superficial femoral artery heavily diseased with sequential 80% stenosis. Popliteal artery is occluded, in-stent occlusion present.      Subjective:   Doing well, doesn't complain of pain,no sob or chest pain      Medications:  Reviewed    Infusion Medications    sodium chloride Stopped (11/23/21 0431)    sodium chloride      heparin (Porcine) 500 Units/hr (11/22/21 1157)    sodium chloride      nitroGLYCERIN Stopped (11/22/21 2014)    alteplase (ACTIVASE) in 0.9% sodium chloride infusion (EKOS catheter) 1 mg/hr (11/22/21 1734)    sodium chloride 35 mL/hr at 11/23/21 0650    dexmedetomidine 0.5 mcg/kg/hr (11/23/21 0650)     Scheduled Medications    sodium chloride flush  5-40 mL IntraVENous 2 times per day    sodium chloride flush  5-40 mL IntraVENous 2 times per day    amLODIPine  5 mg Oral Daily    atorvastatin  40 mg Oral Nightly    aspirin  81 mg Oral Daily    ferrous gluconate  324 mg Oral Daily with breakfast    gabapentin  400 mg Oral TID    levETIRAcetam  250 mg Oral BID    pantoprazole  40 mg Oral Daily    metoprolol tartrate  25 mg Oral BID    QUEtiapine  100 mg Oral Nightly     PRN Meds: sodium chloride flush, sodium chloride, sodium chloride flush, sodium chloride, acetaminophen, morphine, HYDROmorphone **OR** HYDROmorphone, ammonium lactate, ondansetron, oxyCODONE, naloxone, ondansetron **OR** [DISCONTINUED] ondansetron, [DISCONTINUED] acetaminophen **OR** acetaminophen      Intake/Output Summary (Last 24 hours) at 11/23/2021 0814  Last data filed at 11/23/2021 0650  Gross per 24 hour   Intake 1641.53 ml   Output 1650 ml   Net -8.47 ml       Exam:    BP (!) 124/57   Pulse 59   Temp 98 °F (36.7 °C) (Oral)   Resp 14   Ht 6' 1\" (1.854 m)   Wt 226 lb 3.1 oz (102.6 kg)   SpO2 98%   BMI 29.84 kg/m²     General appearance: No apparent distress, appears stated age and cooperative. HEENT: Pupils equal, round, and reactive to light. Conjunctivae/corneas clear. Neck: Supple, with full range of motion. No jugular venous distention. Trachea midline. Respiratory:  Normal respiratory effort. Clear to auscultation, bilaterally without Rales/Wheezes/Rhonchi. Cardiovascular: Regular rate and rhythm with normal S1/S2 without murmurs, rubs or gallops. Abdomen: Soft, non-tender, non-distended with normal bowel sounds. Musculoskeletal: No clubbing, cyanosis or edema bilaterally. Left leg BKA. Right foot with edema +ve  Skin: Skin color, texture, turgor normal.  No rashes or lesions. Neurologic:  Neurovascularly intact without any focal sensory/motor deficits. Cranial nerves: II-XII intact, grossly non-focal.  Psychiatric: Alert and oriented, thought content appropriate, normal insight  Capillary Refill: Brisk,< 3 seconds   Peripheral Pulses: +2 palpable, equal bilaterally       Labs:   Recent Labs     11/22/21  1601 11/22/21  2134 11/23/21  0335   WBC 8.9 8.8 8.3   HGB 12.4* 11.5* 11.6*   HCT 37.8* 35.5* 35.8*    188 200     Recent Labs     11/21/21  0531 11/22/21  0526 11/23/21  0335    137 137   K 3.9 4.1 3.9    102 104   CO2 23 23 20*   BUN 18 17 15   CREATININE 1.2 1.1 0.9   CALCIUM 9.0 8.9 8.6     No results for input(s): AST, ALT, BILIDIR, BILITOT, ALKPHOS in the last 72 hours.   Recent Labs     11/22/21  1601 11/22/21  2134 11/23/21  0335   INR 1.06 1.19* 1.15*     No results for input(s): Roxana Cooley in the last 72 hours.    Urinalysis:      Lab Results   Component Value Date    NITRU Negative 11/20/2021    WBCUA 2 05/28/2020    RBCUA 10 05/28/2020    BLOODU Negative 11/20/2021    SPECGRAV 1.008 11/20/2021    GLUCOSEU Negative 11/20/2021       Radiology:  US RENAL COMPLETE   Final Result   1. Unremarkable sonographic appearance of the right kidney. 2. Patient status post left nephrectomy. 3. Collapsed urinary bladder, limiting its evaluation. CTA ABDOMINAL AORTA W BILAT RUNOFF W CONTRAST   Final Result   Vascular-      Aortoiliac inflow is preserved. Left below-the-knee amputation is noted. There is severe, occlusive disease   in the left femoropopliteal tibial segment. Right severe femoropopliteal segment disease with occlusion by the mid   superficial femoral artery. Popliteal segment is occluded. Trifurcation   vessels are reconstituted with runoff via the anterior tibial and posterior   tibial arteries. Nonvascular-      No acute abdominopelvic abnormality is identified. Solitary right kidney. Mild enlargement of the prostate gland. Bilateral inguinal hernias, including sigmoid colon content on the left. There is no incarceration.                  Assessment/Plan:    Active Hospital Problems    Diagnosis Date Noted    Acute pain of right lower extremity [M79.604] 11/15/2021    Solitary kidney, acquired [Z90.5] 11/15/2021    Pain of right lower extremity due to ischemia [M79.604, I99.8] 11/15/2021    Peripheral artery disease ([facility] [facility] [I73.9] 10/22/2021    Hyperlipidemia LDL goal <70 [E78.5] 10/22/2021    Essential hypertension [I10] 10/22/2021    CAD in native artery [I25.10]     S/P CABG (coronary artery bypass graft) [Z95.1]      #Critical limb ischemia:  - s/p heparin drip, cont statin, ASA  - heparin drip restarted 11/21  - cardiology to do SYBIL, doppler, angio   - anticipate will need revascular stenting   - angiography unable to be done due to stenosis proximal   - Repeat procedure --   Procedures Performed:  - Left femoral artery access under fluroscopic and ultrasound guidance  - Left iliofemoral arteriogram.  - Right lower extremity arteriogram  - Percutaneous balloon angioplasty of right anterior tibial artery using a 4.0 mm x 200 mm balloon  - Percutaneous balloon angioplasty of right popliteal artery using a 4.0 mm x 200 mm balloon  - Percutaneous balloon angioplasty of right superficial femoral  using a 5.0 mm x 250 mm balloon  - Percutaneous insertion of EKOS thrombolytic catheter   - precedex drip on to help with pain    DANY on chronic kidney disease was due to contrast-induced nephropathy  Hx of left nephrectomy  Resolved, nephrology was following, signed off now    HTN:  Cont metoprolol tartrate 25 mg bid, amlodipine 5 mg OD    Tobacco abuse:  - cessation counseling    H/o CVA,- continue ASA, Statin  Hx of Seizures - continue low dose keppra    GI Px: Protonix  DVT Prophylaxis: heparin drip  Diet: ADULT DIET;  Regular  Diet NPO Exceptions are: Sips of Water with Meds  Code Status: Full Code    PT/OT Eval Status: pending     Dispo - continue inpatient care, pending improvement    Gemini Andrea MD   Hospitalist

## 2021-11-23 NOTE — PROGRESS NOTES
4 Eyes Skin Assessment     NAME:  Charlene Rolle  YOB: 1955  MEDICAL RECORD NUMBER:  [id number]    The patient is being assess for  Shift Handoff    I agree that 2 RN's have performed a thorough Head to Toe Skin Assessment on the patient. ALL assessment sites listed below have been assessed. Areas assessed by both nurses:    Head, Face, Ears, Shoulders, Back, Chest, Arms, Elbows, Hands, Sacrum. Buttock, Coccyx, Ischium and Legs. Feet and Heels        Does the Patient have a Wound? Yes wound(s) were present on assessment.  LDA wound assessment was Initiated and completed        Kip Prevention initiated:  Yes   Wound Care Orders initiated:  Yes    Pressure Injury (Stage 3,4, Unstageable, DTI, NWPT, and Complex wounds) if present place consult order under [de-identified] NA    New and Established Ostomies if present place consult order under : NA      Nurse 1 eSignature: Electronically signed by Kaiden Murguia RN on 11/22/21 at 7:30 PM EST    **SHARE this note so that the co-signing nurse is able to place an eSignature**    Nurse 2 eSignature: {Esignature:[id number]}

## 2021-11-23 NOTE — PROGRESS NOTES
. Left groin arterial sheath removed at this time. Pt tolerated well. PO oxycodone given prior to removal. Pressure held for 20 minutes. Dry dressing placed over site. Some bruising noted. Small hematoma noted in between inner groin. Was pressed out. Will monitor.

## 2021-11-23 NOTE — PROGRESS NOTES
Department of Podiatric Surgery  Progress Note  11/23/2021  Demetrius Lottsakshi        HISTORY OF PRESENT ILLNESS:      The patient is a 61 y.o. male who presents with right lower extremity ischemia. He has no pain in the foot. Reports that it feels numb. No other complaints at this time. PHYSICAL EXAM:  VITALS:  BP (!) 101/55   Pulse 51   Temp 98 °F (36.7 °C) (Oral)   Resp 14   Ht 6' 1\" (1.854 m)   Wt 226 lb 3.1 oz (102.6 kg)   SpO2 100%   BMI 29.84 kg/m²      CONSTITUTIONAL:  awake, alert, cooperative, no apparent distress, and appears stated age    LOWER EXTREMITY:    VASCULAR: Pedal Pulses right extremity are not palpable. Dopplerable Anterior Tib and PT, but no dorsal pulse. Improved color of lower extremity post intervention     Left patient's foot is cool to the touch but does not display any signs of gross ischemia. MUSCULOSKELETAL:  positive gross deformity. Patient has left below-knee amputation with a stump wound. The stump wound is not uncovered today by me. Patient does have digital contractures all digits on the right foot. NEUROLOGIC:  Epicritic sensation, light touch, joint position sense diminished  SKIN: The patient's foot is cool to the touch with a diffuse erythema. No open wounds.   The dry skin has resolved       I/O:    Intake/Output Summary (Last 24 hours) at 11/23/2021 1647  Last data filed at 11/23/2021 1014  Gross per 24 hour   Intake 1492.98 ml   Output 1000 ml   Net 492.98 ml              Wt Readings from Last 3 Encounters:   11/23/21 226 lb 3.1 oz (102.6 kg)   11/11/21 217 lb (98.4 kg)   02/04/21 185 lb (83.9 kg)       LABS:    Recent Labs     11/22/21 2134 11/23/21  0335   WBC 8.8 8.3   HGB 11.5* 11.6*   HCT 35.5* 35.8*    200        Recent Labs     11/23/21  0335      K 3.9      CO2 20*   BUN 15   CREATININE 0.9        Recent Labs     11/21/21  1752 11/22/21  0027 11/22/21  1149 11/22/21 2134 11/23/21  0335   INR  --   --    < > 1.19* 1.15*   APTT 41.0* 60.1*  --   --   --     < > = values in this interval not displayed. IMAGING:       Summary        Right SYBIL was not available due to inadequate pulses .    Elevated velocity of the deep femoral artery suggests a 50-70% stenosis.    Occlusion of the distal superficial femoral and popliteal artery in the    right lower extremity.    Critically low velocities in the right posterior tibial and anterior tibial    arteries.    Left SYBIL was not available due to below knee amputation.    Occlusion of the left popliteal artery.    The majority of the waveforms are monophasic throughout the left lower    extremity.        Signature        ------------------------------------------------------------------    Electronically signed by Lela Ramirez MD (Interpreting    physician) on 11/01/2021 at 05:49 PM       MICRO:   No results    ASSESSMENT   Peripheral arterial disease with right lower extremity ischemia, post stenting    PLAN:  Evaluation and Management x 10 minutes with greater than 50% of the time spent with the patient discussing the etiology and treatment options of the chief complaint. 1.  Right lower extremity ischemia, post intervention of 11/22/2021  We will follow for monitoring. Patient does not need any immediate podiatric intervention. DISPO: We will follow  Thanks for the opportunity to participate in this patient's care.      Amy Granger DPM DPM  [facility]  Cell [phone]  Office: [phone]  Fax: [phone]

## 2021-11-23 NOTE — PROGRESS NOTES
Pt returned from cath lab. EKOS catheter removed from left groin. Sheath remains in place. Pt response to voice, drowsy but oriented x4. Denies pain at this time.  To remain on bed rest.

## 2021-11-24 ENCOUNTER — TELEPHONE (OUTPATIENT)
Dept: CARDIOLOGY | Age: 66
End: 2021-11-24

## 2021-11-24 VITALS
HEART RATE: 75 BPM | DIASTOLIC BLOOD PRESSURE: 73 MMHG | RESPIRATION RATE: 16 BRPM | SYSTOLIC BLOOD PRESSURE: 130 MMHG | HEIGHT: 73 IN | OXYGEN SATURATION: 99 % | TEMPERATURE: 99.2 F | WEIGHT: 217.59 LBS | BODY MASS INDEX: 28.84 KG/M2

## 2021-11-24 DIAGNOSIS — I73.9 PAD (PERIPHERAL ARTERY DISEASE) (HCC): Primary | ICD-10-CM

## 2021-11-24 PROBLEM — I70.229 CRITICAL LOWER LIMB ISCHEMIA (HCC): Status: ACTIVE | Noted: 2021-11-24

## 2021-11-24 LAB
ANION GAP SERPL CALCULATED.3IONS-SCNC: 12 MMOL/L (ref 3–16)
BASOPHILS ABSOLUTE: 0.1 K/UL (ref 0–0.2)
BASOPHILS RELATIVE PERCENT: 0.7 %
BUN BLDV-MCNC: 12 MG/DL (ref 7–20)
CALCIUM SERPL-MCNC: 8 MG/DL (ref 8.3–10.6)
CHLORIDE BLD-SCNC: 102 MMOL/L (ref 99–110)
CO2: 21 MMOL/L (ref 21–32)
CREAT SERPL-MCNC: 1 MG/DL (ref 0.8–1.3)
EOSINOPHILS ABSOLUTE: 0.4 K/UL (ref 0–0.6)
EOSINOPHILS RELATIVE PERCENT: 4.9 %
GFR AFRICAN AMERICAN: >60
GFR NON-AFRICAN AMERICAN: >60
GLUCOSE BLD-MCNC: 149 MG/DL (ref 70–99)
HCT VFR BLD CALC: 32.2 % (ref 40.5–52.5)
HEMOGLOBIN: 10.4 G/DL (ref 13.5–17.5)
LYMPHOCYTES ABSOLUTE: 1.1 K/UL (ref 1–5.1)
LYMPHOCYTES RELATIVE PERCENT: 15 %
MAGNESIUM: 1.8 MG/DL (ref 1.8–2.4)
MCH RBC QN AUTO: 26.6 PG (ref 26–34)
MCHC RBC AUTO-ENTMCNC: 32.4 G/DL (ref 31–36)
MCV RBC AUTO: 82.3 FL (ref 80–100)
MONOCYTES ABSOLUTE: 0.6 K/UL (ref 0–1.3)
MONOCYTES RELATIVE PERCENT: 7.9 %
NEUTROPHILS ABSOLUTE: 5.3 K/UL (ref 1.7–7.7)
NEUTROPHILS RELATIVE PERCENT: 71.5 %
PDW BLD-RTO: 15.4 % (ref 12.4–15.4)
PLATELET # BLD: 171 K/UL (ref 135–450)
PMV BLD AUTO: 8.1 FL (ref 5–10.5)
POTASSIUM SERPL-SCNC: 3.8 MMOL/L (ref 3.5–5.1)
RBC # BLD: 3.91 M/UL (ref 4.2–5.9)
SODIUM BLD-SCNC: 135 MMOL/L (ref 136–145)
WBC # BLD: 7.4 K/UL (ref 4–11)

## 2021-11-24 PROCEDURE — 85025 COMPLETE CBC W/AUTO DIFF WBC: CPT

## 2021-11-24 PROCEDURE — 94761 N-INVAS EAR/PLS OXIMETRY MLT: CPT

## 2021-11-24 PROCEDURE — 2500000003 HC RX 250 WO HCPCS: Performed by: INTERNAL MEDICINE

## 2021-11-24 PROCEDURE — 6360000002 HC RX W HCPCS: Performed by: INTERNAL MEDICINE

## 2021-11-24 PROCEDURE — 99232 SBSQ HOSP IP/OBS MODERATE 35: CPT | Performed by: NURSE PRACTITIONER

## 2021-11-24 PROCEDURE — 6370000000 HC RX 637 (ALT 250 FOR IP): Performed by: NURSE PRACTITIONER

## 2021-11-24 PROCEDURE — 6370000000 HC RX 637 (ALT 250 FOR IP): Performed by: INTERNAL MEDICINE

## 2021-11-24 PROCEDURE — 6370000000 HC RX 637 (ALT 250 FOR IP): Performed by: STUDENT IN AN ORGANIZED HEALTH CARE EDUCATION/TRAINING PROGRAM

## 2021-11-24 PROCEDURE — 2580000003 HC RX 258: Performed by: INTERNAL MEDICINE

## 2021-11-24 PROCEDURE — 80048 BASIC METABOLIC PNL TOTAL CA: CPT

## 2021-11-24 PROCEDURE — 83735 ASSAY OF MAGNESIUM: CPT

## 2021-11-24 RX ORDER — AMLODIPINE BESYLATE 5 MG/1
5 TABLET ORAL DAILY
Qty: 30 TABLET | Refills: 3 | Status: SHIPPED | OUTPATIENT
Start: 2021-11-25 | End: 2022-03-22 | Stop reason: SDUPTHER

## 2021-11-24 RX ORDER — OXYCODONE HCL 10 MG/1
10 TABLET, FILM COATED, EXTENDED RELEASE ORAL EVERY 12 HOURS PRN
Qty: 10 TABLET | Refills: 0 | Status: CANCELLED | OUTPATIENT
Start: 2021-11-24 | End: 2021-11-29

## 2021-11-24 RX ORDER — AMLODIPINE BESYLATE 5 MG/1
5 TABLET ORAL DAILY
Qty: 30 TABLET | Refills: 3 | Status: SHIPPED | OUTPATIENT
Start: 2021-11-25 | End: 2021-11-24

## 2021-11-24 RX ADMIN — ASPIRIN 81 MG: 81 TABLET, CHEWABLE ORAL at 09:14

## 2021-11-24 RX ADMIN — GABAPENTIN 400 MG: 400 CAPSULE ORAL at 13:03

## 2021-11-24 RX ADMIN — MORPHINE SULFATE 2 MG: 2 INJECTION, SOLUTION INTRAMUSCULAR; INTRAVENOUS at 06:00

## 2021-11-24 RX ADMIN — SODIUM CHLORIDE, PRESERVATIVE FREE 10 ML: 5 INJECTION INTRAVENOUS at 09:18

## 2021-11-24 RX ADMIN — FERROUS GLUCONATE 324 MG: 324 TABLET ORAL at 09:27

## 2021-11-24 RX ADMIN — AMLODIPINE BESYLATE 5 MG: 5 TABLET ORAL at 09:17

## 2021-11-24 RX ADMIN — OXYCODONE HYDROCHLORIDE 10 MG: 10 TABLET, FILM COATED, EXTENDED RELEASE ORAL at 09:13

## 2021-11-24 RX ADMIN — METOPROLOL TARTRATE 25 MG: 25 TABLET, FILM COATED ORAL at 09:15

## 2021-11-24 RX ADMIN — GABAPENTIN 400 MG: 400 CAPSULE ORAL at 09:18

## 2021-11-24 RX ADMIN — RIVAROXABAN 2.5 MG: 2.5 TABLET, FILM COATED ORAL at 09:26

## 2021-11-24 RX ADMIN — MORPHINE SULFATE 2 MG: 2 INJECTION, SOLUTION INTRAMUSCULAR; INTRAVENOUS at 12:04

## 2021-11-24 RX ADMIN — MORPHINE SULFATE 2 MG: 2 INJECTION, SOLUTION INTRAMUSCULAR; INTRAVENOUS at 00:49

## 2021-11-24 RX ADMIN — LEVETIRACETAM 250 MG: 500 TABLET, FILM COATED ORAL at 09:13

## 2021-11-24 RX ADMIN — DEXMEDETOMIDINE HYDROCHLORIDE 0.5 MCG/KG/HR: 4 INJECTION, SOLUTION INTRAVENOUS at 01:17

## 2021-11-24 RX ADMIN — PANTOPRAZOLE SODIUM 40 MG: 40 TABLET, DELAYED RELEASE ORAL at 09:15

## 2021-11-24 ASSESSMENT — PAIN DESCRIPTION - PAIN TYPE
TYPE: ACUTE PAIN

## 2021-11-24 ASSESSMENT — PAIN DESCRIPTION - DESCRIPTORS: DESCRIPTORS: ACHING

## 2021-11-24 ASSESSMENT — PAIN DESCRIPTION - LOCATION
LOCATION: LEG
LOCATION: LEG

## 2021-11-24 ASSESSMENT — PAIN - FUNCTIONAL ASSESSMENT
PAIN_FUNCTIONAL_ASSESSMENT: PREVENTS OR INTERFERES SOME ACTIVE ACTIVITIES AND ADLS
PAIN_FUNCTIONAL_ASSESSMENT: PREVENTS OR INTERFERES SOME ACTIVE ACTIVITIES AND ADLS

## 2021-11-24 ASSESSMENT — PAIN SCALES - GENERAL
PAINLEVEL_OUTOF10: 8
PAINLEVEL_OUTOF10: 0
PAINLEVEL_OUTOF10: 6
PAINLEVEL_OUTOF10: 0
PAINLEVEL_OUTOF10: 7
PAINLEVEL_OUTOF10: 8
PAINLEVEL_OUTOF10: 8
PAINLEVEL_OUTOF10: 6
PAINLEVEL_OUTOF10: 8
PAINLEVEL_OUTOF10: 9
PAINLEVEL_OUTOF10: 0

## 2021-11-24 ASSESSMENT — PAIN DESCRIPTION - PROGRESSION
CLINICAL_PROGRESSION: NOT CHANGED

## 2021-11-24 ASSESSMENT — PAIN DESCRIPTION - ONSET
ONSET: ON-GOING
ONSET: ON-GOING

## 2021-11-24 ASSESSMENT — PAIN DESCRIPTION - FREQUENCY
FREQUENCY: CONTINUOUS
FREQUENCY: CONTINUOUS

## 2021-11-24 ASSESSMENT — PAIN DESCRIPTION - ORIENTATION: ORIENTATION: RIGHT

## 2021-11-24 NOTE — TELEPHONE ENCOUNTER
Pt is scheduled on 12/8/21 at 12:30. Central scheduling states that they do take his insurance. The authorization team can not submit for authorization until the test is scheduled. If it is not covered they will call to notify pt.

## 2021-11-24 NOTE — TELEPHONE ENCOUNTER
Patient will need arterial dopplers completed in 2 weeks. Please facilitate scheduling. Patient is currently admitted, and will also need to verify if the dopplers can be completed at [facility] with current insurance coverage since he resides in [place]. If unable to be completed at [facility] will need to assist in scheduling per patient's preference at [facility] or [address] [place] [address].

## 2021-11-24 NOTE — PROGRESS NOTES
[facility] [address]   Daily Progress Note      Admit Date:  11/15/2021    CC: RLE pain    HPI:   Dot Valentine is a 61 y.o. male with PMH PAD s/p LLE amputation, CAD, carotid stenosis, HTN, DVT. Admitted to [facility] with critical RLE critical limb ischemia. Underwent X3 staged procedures this admission. Resting in bed this AM. RLE with doppler pulses +. Pink/warm. Patient states the color of his RLE is \"much better. \" His pain is worse this morning and overnight. Describes as throbbing, 9/10. Slightly improved with MSO4- now worn off. Good appetite, eating breakfast without N/V. Denies CP, SOB, LH, dizziness, syncope, palpitations. Review of Systems:   General: Denies fever, chills, fatigue, weakness  Skin: Denies skin changes, rash, itching, lesions.   HEENT: Denies headache, dizziness, vision changes, nosebleeds, sore throat, nasal drainage  RESP: Denies cough, sputum, dyspnea, wheeze, snoring  CARD: Denies palpitations,  murmur  GI:Denies nausea, vomiting, heartburn, loss of appetite, change in bowels  : Denies frequency, pain, incontinence, polyuria  VASC: Denies claudication, leg cramps, clots  MUSC/SKEL: Denies pain, stiffness, arthritis  PSYCH: Denies anxiety, depression, stress  NEURO: Denies numbness, tingling, weakness,change in mood or memory  HEME: Denies abn bruising, bleeding, anemia  ENDO: Denies intolerance to heat, cold, excessive thirst or hunger, hx thyroid disease    Objective:   BP (!) 108/45   Pulse 70   Temp 98.2 °F (36.8 °C) (Oral)   Resp 12   Ht 6' 1\" (1.854 m)   Wt 217 lb 9.5 oz (98.7 kg)   SpO2 98%   BMI 28.71 kg/m²         Intake/Output Summary (Last 24 hours) at 11/24/2021 0823  Last data filed at 11/24/2021 0600  Gross per 24 hour   Intake 1061.05 ml   Output 2100 ml   Net -1038.95 ml       WEIGHT:Admit Weight: 217 lb 3.2 oz (98.5 kg)         Today  Weight: 217 lb 9.5 oz (98.7 kg)   DRY WEIGHT:  Wt Readings from Last 3 Encounters:   11/24/21 217 lb 9.5 oz (98.7 kg)   11/11/21 217 lb (98.4 kg)   02/04/21 185 lb (83.9 kg)       Physical Exam:  GEN: Appears well, no acute distress  SKIN: Pink, warm, dry. Nails without clubbing. HEENT: PERRLA. Normocephalic, atraumatic. Neck supple. No adenopathy. LUNG: AP diameter normal. Clear bilateral. No wheeze, rales, or ronchi. Respiratory effort normal.  HEART: S1S2 A/R. No JVD. No carotid bruit. No murmur, rub or gallop. ABD: Soft, nontender. +BS X 4 quads. No hepatomegaly. EXT: RLE + pedal/DP pulses with doppler. Pink, warm. LGroin stie with ecchymosis CDI. No edema. MUSCSKEL: Good ROM X4 extremities. No deformity. NEURO: A/O X3. Calm and cooperative. Telemetry: NSR    Medications:    sodium chloride flush  5-40 mL IntraVENous 2 times per day    sodium chloride flush  5-40 mL IntraVENous 2 times per day    rivaroxaban  2.5 mg Oral BID    sodium chloride flush  5-40 mL IntraVENous 2 times per day    amLODIPine  5 mg Oral Daily    atorvastatin  40 mg Oral Nightly    aspirin  81 mg Oral Daily    ferrous gluconate  324 mg Oral Daily with breakfast    gabapentin  400 mg Oral TID    levETIRAcetam  250 mg Oral BID    pantoprazole  40 mg Oral Daily    metoprolol tartrate  25 mg Oral BID    QUEtiapine  100 mg Oral Nightly      sodium chloride Stopped (11/23/21 0431)    sodium chloride      sodium chloride      sodium chloride      nitroGLYCERIN Stopped (11/22/21 2014)    dexmedetomidine Stopped (11/24/21 0530)     sodium chloride flush, sodium chloride, sodium chloride flush, sodium chloride, acetaminophen, sodium chloride flush, sodium chloride, acetaminophen, morphine, HYDROmorphone **OR** HYDROmorphone, ammonium lactate, ondansetron, oxyCODONE, naloxone, ondansetron **OR** [DISCONTINUED] ondansetron, [DISCONTINUED] acetaminophen **OR** acetaminophen    Lab Data: I have reviewed all labs below today.    CBC:   Recent Labs     11/22/21  2134 11/23/21  0335 11/24/21  0422   WBC 8.8 8.3 7.4   HGB 11.5* 11.6* 10.4*   HCT 35.5* 35.8* 32.2*   MCV 81.7 81.4 82.3    200 171     BMP:   Recent Labs     11/22/21  0526 11/23/21  0335 11/24/21  0422    137 135*   K 4.1 3.9 3.8    104 102   CO2 23 20* 21   BUN 17 15 12   CREATININE 1.1 0.9 1.0     GLUCOSE:   Recent Labs     11/22/21  0526 11/23/21  0335 11/24/21  0422   GLUCOSE 97 112* 149*     LIVER PROFILE:   Lab Results   Component Value Date    AST 21 11/17/2021    ALT 22 11/17/2021    LIPASE 70.0 05/22/2020    AMYLASE 74 05/22/2020    LABALBU 3.5 11/19/2021    BILIDIR <0.2 11/17/2021    BILITOT <0.2 11/17/2021    ALKPHOS 56 11/17/2021     PT/INR:   Lab Results   Component Value Date    PROTIME 13.1 11/23/2021    INR 1.15 11/23/2021    INR 1.19 11/22/2021    INR 1.06 11/22/2021     APTT:   Lab Results   Component Value Date    APTT 60.1 11/22/2021     Pro-BNP:    Lab Results   Component Value Date    PROBNP 694 11/18/2021    PROBNP 219 11/17/2021    PROBNP 602 05/07/2020     Parameters:   > 450 pg/mL under age 48  > 900 pg/mL ages 54-65  > 1800 pg/mL over age 76    ENZYMES:  Lab Results   Component Value Date    CKTOTAL 109 11/17/2021    CKTOTAL 68 11/15/2021    TROPONINI 0.11 05/07/2020    TROPONINI 0.07 05/07/2020    TROPONINI <0.01 05/07/2020     FASTING LIPID PANEL:  Lab Results   Component Value Date    CHOL 163 05/07/2020    HDL 33 06/01/2021    LDLCALC 59 06/01/2021    TRIG 111 05/25/2020       Diagnostics:    EKG:     Echo 7/2020 ():  - Left ventricle: The cavity size is mildly dilated. Wall thickness is normal. Systolic function was     normal. The estimated ejection fraction was in the range of 50% to 55%. Wall motion was normal;     there were no regional wall motion abnormalities. Left ventricular diastolic function parameters     were normal.   - Right ventricle: Systolic function was normal by visual assessment. TAPSE: 1.9cm.      Impressions: [address] study date 6/9/20 - previous study technically   challenging, however, does not appear significantly changed     11/1/2021 LE doppler:  Right SYBIL was not available due to inadequate pulses . Elevated velocity of the deep femoral artery suggests a 50-70% stenosis. Occlusion of the distal superficial femoral and popliteal artery in the right lower extremity. Critically low velocities in the right posterior tibial and anterior tibial arteries. Left SYBIL was not available due to below knee amputation. Occlusion of the left popliteal artery. The majority of the waveforms are monophasic throughout the left lower extremity      11/15/2021 CTA abdomen:  Vascular-       Aortoiliac inflow is preserved.       Left below-the-knee amputation is noted. Elgie Dowdy is severe, occlusive disease   in the left femoropopliteal tibial segment.       Right severe femoropopliteal segment disease with occlusion by the mid   superficial femoral artery.  Popliteal segment is occluded.  Trifurcation   vessels are reconstituted with runoff via the anterior tibial and posterior   tibial arteries.       Nonvascular-       No acute abdominopelvic abnormality is identified.       Solitary right kidney.       Mild enlargement of the prostate gland.       Bilateral inguinal hernias, including sigmoid colon content on the left. There is no incarceration. Cardiac Angiography:     11/18/2021 RLE angiogram  ANGIOGRAPHY FINDINGS:  Right superficial femoral artery heavily diseased  with sequential 80% stenosis. Popliteal artery is occluded, in-stent  occlusion present. Faint collateral flow into the right anterior tibial  artery and posterior tibial artery. RECOMMENDATION:  1. Continue postop post cath care. 2.  Site care. 3.  Consult Podiatry. 4.  The patient will be brought back for staged intervention in a few  Days.     11/22/2021 EKOS insertion/RLE angiogram  Impression/Plan:   Severe right lower extremity PAD   Occluded SFA/Pop/TP trunk   S/p balloon angioplasty and thrombolytic catheter placement   Plan for EKOS removal tomorrow followed by laser atherectomy     11/23/2021 EKOS removal/ RLE angiogram  Impression/Plan:   S/p successful SFA/Pop  reconstitution with laser atherectomy of prior pop stent   S/p angioplasty and DCB of the right SFA   Single vessel in-line flow to the DP/ anterior plantar arch   Peroneal/PT are filling via collateral circulation   Repeat arterial doppler in 2 weeks   Aggressive medical therapy for PAD   Supervised walking program     Assessment/Plan:  1.) Critical limb ischemia RLE: s/p RLE angiography and interventions as above. + pedal doppler, pink, warm.   -asa, xarelto, statin  -Repeat arterial doppler 2 weeks- [facility] to schedule- instructions on discharge  -FU Dr. Jemma Phelps 12/16/2021  -oxycodone, neurontin for pain    2.) Hypertension:   Goal BP <130/80. Non pharmacologic interventions include:   -weight loss  -heart healthy low sodium and low fat diet that consist of mostly fruits, vegetables and grains (Dash diet)  -limited amount of alcohol (no more than 1 drink/day for women, 2 drinks/day for men)  -regular physical activity  -no smoking  -stress reduction    Patient is stable from cardiovascular standpoint and OK for discharge.        Electronically signed by KATHY Nash CNP on 11/24/2021 at 8:23 AM

## 2021-11-24 NOTE — OP NOTE
[address]   Procedure Note  Patient Name: Corine Stanley  YOB: 1955  Date of Operation: 11/23/21    Pre-operative Diagnosis:   1. Peripheral arterial disease with critical limb ischemia jose category 4    Post-operative Diagnosis:   1. Same     Procedures Performed:  - EKOS catheter removal   - Right lower extremity arteriogram  - laser atherectomy w/ 2.3mm catheter of the right popliteal stent   - Percutaneous balloon angioplasty of right popliteal artery using a 6.0 mm x 80 mm balloon  - Percutaneous balloon angioplasty of right superficial femoral artery using a 6.0 mm x 200 mm balloon  - Percutaneous balloon angioplasty of right superficial femoral artery using a 6.0 mm x 150 mm lutonix drug coated balloon   - Completion arteriogram.      :  Gisselle Bronson MD.    Assistant:  None     Anesthesia:  IV sedation and local anesthesia. Estimated Blood Loss:  10 mL. Indications for Procedure:  Corine Stanley is a 61 y.o. male who was evaluated as an inpatient with chronic arterial insufficiency with rest pain of the right foot and was deemed an appropriate candidate for an angiogram and possible intervention. Informed consent was obtained after discussion of potential benefits, alternatives and risks of the procedure, including but not limited to bleeding, infection, worsening of arterial insufficiency, and kidney injury due to contrast administration. Details of Procedure: The patient was taken to the cardiac cath lab and placed in supine position. IV sedation anesthesia was administered by the anesthesia team. The operative area was clipped, prepared and draped in sterile fashion. A brief time out was performed per hospital protocol. Previously placed EKOS catheter was removed. A selective right lower extremity angiogram was performed.      Right Leg  Common Femoral:   Patent   Profunda: patent with significant diseas, ostial ~ 60%   Superficial femoral: severe diffuse disease. Popliteal: severe disease, prior stent with ~ 50% ISR       Anterior Tibial: is  patent with mild disease  Posterior Tibial:  is  patent with moderate disease  Peroneal: is  patent with moderate disease      A 6 spider filter wire was placed just distal to the right popliteal stent. Laser atherectomy was then performed throughout the entire length of the stent w/ 2.3 mm catheter @ 60/ 40 ; 60/80     - Percutaneous balloon angioplasty of right popliteal artery using a 6.0 mm x 80 mm balloon  - Percutaneous balloon angioplasty of right superficial femoral artery using a 6.0 mm x 200 mm balloon  - Percutaneous balloon angioplasty of right superficial femoral artery using a 6.0 mm x 150 mm lutonix drug coated balloon     A completion arteriogram was performed. The sheath was withdrawn over a wire then exchanged for a short sheath. The sheath was to be removed in the recovery area and manual pressure held. The patient tolerated the procedure well, was awakened and was taken to the post-operative care unit in stable condition.      Impression/Plan:   S/p successful SFA/Pop  reconstitution with laser atherectomy of prior pop stent   S/p angioplasty and DCB of the right SFA   Single vessel in-line flow to the DP/ anterior plantar arch   Peroneal/PT are filling via collateral circulation   Repeat arterial doppler in 2 weeks   Aggressive medical therapy for PAD   Supervised walking program     Alicia Abdi MD [address], Interventional Cardiology, and Peripheral Vascular [address] [facility]   (C): [phone]  (O): [phone]

## 2021-11-24 NOTE — PRE SEDATION
Sedation Pre-Procedure Note    Patient Name: Avni Helm   YOB: 1955  Room/Bed: H7T-6993/1308-01  Medical Record Number: [id number]  Date: 11/23/2021   Time: 8:53 PM       Indication:  CLI     Consent: I have discussed with the patient and/or the patient representative the indication, alternatives, and the possible risks and/or complications of the planned procedure and the anesthesia methods. The patient and/or patient representative appear to understand and agree to proceed. Vital Signs:   Vitals:    11/23/21 2002   BP: 118/65   Pulse: 77   Resp:    Temp:    SpO2:        Past Medical History:   has a past medical history of Acute cerebral infarction ([facility] [address] Alcohol withdrawal ([facility] [address] Bilateral carotid artery stenosis, Hepatitis C antibody positive in blood, History of bronchitis, History of DVT of lower extremity, Hypertension, NSTEMI (non-ST elevated myocardial infarction) ([facility] [address] PAD (peripheral artery disease) ([facility] [address] Respiratory compromise, S/p nephrectomy, and Tattoos. Past Surgical History:   has a past surgical history that includes total nephrectomy (Left); Coronary artery bypass graft (N/A, 5/8/2020); tracheostomy (05/27/2020); transesophageal echocardiogram (05/08/2020); Cardiac catheterization (05/07/2020); thoracentesis (Left, 05/29/2020); Gastrostomy tube placement (N/A, 5/27/2020); Stomach surgery (N/A, 7/10/2020); and Leg amputation below knee.     Medications:   Scheduled Meds:    sodium chloride flush  5-40 mL IntraVENous 2 times per day    sodium chloride flush  5-40 mL IntraVENous 2 times per day    sodium chloride flush  5-40 mL IntraVENous 2 times per day    amLODIPine  5 mg Oral Daily    atorvastatin  40 mg Oral Nightly    aspirin  81 mg Oral Daily    ferrous gluconate  324 mg Oral Daily with breakfast    gabapentin  400 mg Oral TID    levETIRAcetam  250 mg Oral BID    pantoprazole  40 mg Oral Daily    metoprolol tartrate  25 mg Oral BID    QUEtiapine  100 mg Oral Nightly     Continuous Infusions:    sodium chloride Stopped (11/23/21 0431)    sodium chloride      sodium chloride      sodium chloride      nitroGLYCERIN Stopped (11/22/21 2014)    dexmedetomidine 0.5 mcg/kg/hr (11/23/21 1657)     PRN Meds: sodium chloride flush, sodium chloride, sodium chloride flush, sodium chloride, acetaminophen, sodium chloride flush, sodium chloride, acetaminophen, morphine, HYDROmorphone **OR** HYDROmorphone, ammonium lactate, ondansetron, oxyCODONE, naloxone, ondansetron **OR** [DISCONTINUED] ondansetron, [DISCONTINUED] acetaminophen **OR** acetaminophen  Home Meds:   Prior to Admission medications    Medication Sig Start Date End Date Taking? Authorizing Provider   atorvastatin (LIPITOR) 40 MG tablet TAKE 1 TABLET BY MOUTH EVERY DAY AT NIGHT 10/19/21  Yes Bri Lopez MD   aspirin 81 MG chewable tablet TAKE 1 TABLET BY MOUTH EVERY DAY 9/28/21  Yes Tristan Flores MD   rivaroxaban (XARELTO) 2.5 MG TABS tablet Take 1 tablet by mouth 2 times daily 2/5/21  Yes Tristan Flores MD   pantoprazole (PROTONIX) 40 MG tablet Take 40 mg by mouth daily   Yes Historical Provider, MD   QUEtiapine (SEROQUEL) 100 MG tablet Take 100 mg by mouth nightly   Yes Historical Provider, MD   senna-docusate (Darletta Hampton) 8.6-50 MG per tablet Take 1 tablet by mouth nightly   Yes Historical Provider, MD   tiZANidine (ZANAFLEX) 4 MG tablet Take 4 mg by mouth nightly    Yes Historical Provider, MD   acetaminophen (TYLENOL) 500 MG tablet Take 1,000 mg by mouth every 6 hours as needed for Pain   Yes Historical Provider, MD   ferrous gluconate (FERGON) 324 (38 Fe) MG tablet Take 324 mg by mouth daily (with breakfast)   Yes Historical Provider, MD   gabapentin (NEURONTIN) 800 MG tablet Take 800 mg by mouth 3 times daily.     Yes Historical Provider, MD   levETIRAcetam (KEPPRA) 250 MG tablet Take 250 mg by mouth 2 times daily    Yes Historical Provider, MD   metoprolol tartrate (LOPRESSOR) 25 MG tablet Take 25 mg by mouth 2 times daily   Yes Historical Provider, MD     Coumadin Use Last 7 Days:  no  Antiplatelet drug therapy use last 7 days: yes - asa  Other anticoagulant use last 7 days: yes - tPA   Additional Medication Information:  n/a      Pre-Sedation Documentation and Exam:   I have personally completed a history, physical exam & review of systems for this patient (see notes).     Mallampati Airway Assessment:  Mallampati Class I - (soft palate, fauces, uvula & anterior/posterior tonsillar pillars are visible)    Prior History of Anesthesia Complications:   none    ASA Classification:  Class 3 - A patient with severe systemic disease that limits activity but is not incapacitating    Sedation/ Anesthesia Plan:   intravenous sedation    Medications Planned:   midazolam (Versed) intravenously    Patient is an appropriate candidate for plan of sedation: yes    Electronically signed by Meriam Scheuermann, MD on 11/23/2021 at 8:53 PM

## 2021-11-24 NOTE — DISCHARGE SUMMARY
ultrasound guidance  - Left iliofemoral arteriogram.  - Right lower extremity arteriogram  - Percutaneous balloon angioplasty of right anterior tibial artery using a 4.0 mm x 200 mm balloon  - Percutaneous balloon angioplasty of right popliteal artery using a 4.0 mm x 200 mm balloon  - Percutaneous balloon angioplasty of right superficial femoral  using a 5.0 mm x 250 mm balloon  - Percutaneous insertion of EKOS thrombolytic catheter   - precedex drip on to help with pain     DANY on chronic kidney disease was due to contrast-induced nephropathy  Hx of left nephrectomy  Resolved, nephrology was following, signed off now     HTN:  Cont metoprolol tartrate 25 mg bid, amlodipine 5 mg OD     Tobacco abuse hx; he has not smoker x 1.5 yrs     H/o CVA,- continue ASA, Statin  Hx of Seizures - continue low dose keppra    Physical Exam Performed:     /73   Pulse 75   Temp 99.2 °F (37.3 °C) (Oral)   Resp 16   Ht 6' 1\" (1.854 m)   Wt 217 lb 9.5 oz (98.7 kg)   SpO2 99%   BMI 28.71 kg/m²     General appearance: No apparent distress, appears stated age and cooperative. HEENT: Pupils equal, round, and reactive to light. Conjunctivae/corneas clear. Neck: Supple, with full range of motion. No jugular venous distention. Trachea midline. Respiratory:  Normal respiratory effort. Clear to auscultation, bilaterally without Rales/Wheezes/Rhonchi. Cardiovascular: Regular rate and rhythm with normal S1/S2 without murmurs, rubs or gallops. Abdomen: Soft, non-tender, non-distended with normal bowel sounds. Musculoskeletal: No clubbing, cyanosis or edema bilaterally. Left leg BKA. Right foot with edema +ve  Skin: Skin color, texture, turgor normal.  No rashes or lesions. Neurologic:  Neurovascularly intact without any focal sensory/motor deficits.  Cranial nerves: II-XII intact, grossly non-focal.  Psychiatric: Alert and oriented, thought content appropriate, normal insight  Capillary Refill: Brisk,< 3 seconds   Peripheral Pulses: +2 palpable, equal bilaterally      Labs: For convenience and continuity at follow-up the following most recent labs are provided:      CBC:    Lab Results   Component Value Date    WBC 7.4 11/24/2021    HGB 10.4 11/24/2021    HCT 32.2 11/24/2021     11/24/2021       Renal:    Lab Results   Component Value Date     11/24/2021    K 3.8 11/24/2021    K 4.5 05/24/2020     11/24/2021    CO2 21 11/24/2021    BUN 12 11/24/2021    CREATININE 1.0 11/24/2021    CALCIUM 8.0 11/24/2021    PHOS 3.2 11/19/2021         Significant Diagnostic Studies    Radiology:   US RENAL COMPLETE   Final Result   1. Unremarkable sonographic appearance of the right kidney. 2. Patient status post left nephrectomy. 3. Collapsed urinary bladder, limiting its evaluation. CTA ABDOMINAL AORTA W BILAT RUNOFF W CONTRAST   Final Result   Vascular-      Aortoiliac inflow is preserved. Left below-the-knee amputation is noted. There is severe, occlusive disease   in the left femoropopliteal tibial segment. Right severe femoropopliteal segment disease with occlusion by the mid   superficial femoral artery. Popliteal segment is occluded. Trifurcation   vessels are reconstituted with runoff via the anterior tibial and posterior   tibial arteries. Nonvascular-      No acute abdominopelvic abnormality is identified. Solitary right kidney. Mild enlargement of the prostate gland. Bilateral inguinal hernias, including sigmoid colon content on the left. There is no incarceration.                 Consults:     IP CONSULT TO CARDIOLOGY  IP CONSULT TO HOSPITALIST  IP CONSULT TO CARDIOLOGY  IP CONSULT TO NEPHROLOGY  IP CONSULT TO PODIATRY    Disposition:  Home     Condition at Discharge: Stable    Discharge Instructions/Follow-up:    Compliance with medications  Follow-up with cardiology  Follow-up with primary care physician    Code Status: Full code    Activity: activity as tolerated    Diet: cardiac diet      Discharge Medications:     Discharge Medication List as of 11/24/2021 12:44 PM           Details   Naloxone HCl (NALOXONE OPIATE OVERDOSE KIT) 1 each by Nasal route once for 1 dose, Disp-1 kit, R-0Print              Details   oxyCODONE HCl 7.5 MG TABS Take 7.5 mg by mouth every 8 hours as needed (right leg/foot pain) for up to 5 days. , Disp-15 tablet, R-0Reduce doses taken as pain becomes manageableNormal      amLODIPine (NORVASC) 5 MG tablet Take 1 tablet by mouth daily, Disp-30 tablet, R-3Normal              Details   atorvastatin (LIPITOR) 40 MG tablet TAKE 1 TABLET BY MOUTH EVERY DAY AT NIGHT, Disp-90 tablet, R-0Normal      aspirin 81 MG chewable tablet TAKE 1 TABLET BY MOUTH EVERY DAY, Disp-60 tablet, R-0Normal      rivaroxaban (XARELTO) 2.5 MG TABS tablet Take 1 tablet by mouth 2 times daily, Disp-60 tablet, R-5Normal      pantoprazole (PROTONIX) 40 MG tablet Take 40 mg by mouth dailyHistorical Med      QUEtiapine (SEROQUEL) 100 MG tablet Take 100 mg by mouth nightlyHistorical Med      senna-docusate (PERICOLACE) 8.6-50 MG per tablet Take 1 tablet by mouth nightlyHistorical Med      tiZANidine (ZANAFLEX) 4 MG tablet Take 4 mg by mouth nightly Historical Med      acetaminophen (TYLENOL) 500 MG tablet Take 1,000 mg by mouth every 6 hours as needed for PainHistorical Med      ferrous gluconate (FERGON) 324 (38 Fe) MG tablet Take 324 mg by mouth daily (with breakfast)Historical Med      gabapentin (NEURONTIN) 800 MG tablet Take 800 mg by mouth 3 times daily. Historical Med      levETIRAcetam (KEPPRA) 250 MG tablet Take 250 mg by mouth 2 times daily Historical Med      metoprolol tartrate (LOPRESSOR) 25 MG tablet Take 25 mg by mouth 2 times dailyHistorical Med             Time Spent on discharge is more than 30 minutes in the examination, evaluation, counseling and review of medications and discharge plan.       Signed:    Mary Byrd MD   11/27/2021      Thank you Mayur Varghese, APRN - CNP for the opportunity to be involved in this patient's care. If you have any questions or concerns please feel free to contact me at [phone].

## 2021-11-24 NOTE — PROGRESS NOTES
Discharge instructions reviewed with patient. Unable to do meds to beds due to [facility]. Prescriptions send electronically to preferred pharmacy. Patient discharged home with son via wheelchair @ 1035.

## 2021-11-24 NOTE — PROGRESS NOTES
Hospitalist Progress Note      PCP: KATHY Thacker - CNP    Date of Admission: 11/15/2021    Chief Complaint: right foot pain    Hospital Course:   Mily Linker 61 y.o. male  - pmhx of cva, CAD s/p CABG, bilateral carotid stenosis, PAD, Hep C, s/p nephrectomy, left below-knee amputation  - presented to the hospital with Right leg lower extremity pain increasing erythema of the foot. Pain 5/10 intensity ongoing for 1 week increasing in severity for past 2 to 3 days. - underwent Right lower extremity selective angiography found to have Right superficial femoral artery heavily diseased with sequential 80% stenosis. Popliteal artery is occluded, in-stent occlusion present.      Subjective:   Doing better, says still have pain in right LE but stable, wants to go home      Medications:  Reviewed    Infusion Medications    sodium chloride Stopped (11/23/21 0431)    sodium chloride      sodium chloride      sodium chloride      nitroGLYCERIN Stopped (11/22/21 2014)    dexmedetomidine Stopped (11/24/21 0530)     Scheduled Medications    sodium chloride flush  5-40 mL IntraVENous 2 times per day    sodium chloride flush  5-40 mL IntraVENous 2 times per day    rivaroxaban  2.5 mg Oral BID    sodium chloride flush  5-40 mL IntraVENous 2 times per day    amLODIPine  5 mg Oral Daily    atorvastatin  40 mg Oral Nightly    aspirin  81 mg Oral Daily    ferrous gluconate  324 mg Oral Daily with breakfast    gabapentin  400 mg Oral TID    levETIRAcetam  250 mg Oral BID    pantoprazole  40 mg Oral Daily    metoprolol tartrate  25 mg Oral BID    QUEtiapine  100 mg Oral Nightly     PRN Meds: sodium chloride flush, sodium chloride, sodium chloride flush, sodium chloride, acetaminophen, sodium chloride flush, sodium chloride, acetaminophen, morphine, HYDROmorphone **OR** HYDROmorphone, ammonium lactate, ondansetron, oxyCODONE, naloxone, ondansetron **OR** [DISCONTINUED] ondansetron, [DISCONTINUED] acetaminophen **OR** acetaminophen      Intake/Output Summary (Last 24 hours) at 11/24/2021 0831  Last data filed at 11/24/2021 0600  Gross per 24 hour   Intake 1061.05 ml   Output 2100 ml   Net -1038.95 ml       Exam:    BP (!) 108/45   Pulse 70   Temp 98.2 °F (36.8 °C) (Oral)   Resp 12   Ht 6' 1\" (1.854 m)   Wt 217 lb 9.5 oz (98.7 kg)   SpO2 98%   BMI 28.71 kg/m²     General appearance: No apparent distress, appears stated age and cooperative. HEENT: Pupils equal, round, and reactive to light. Conjunctivae/corneas clear. Neck: Supple, with full range of motion. No jugular venous distention. Trachea midline. Respiratory:  Normal respiratory effort. Clear to auscultation, bilaterally without Rales/Wheezes/Rhonchi. Cardiovascular: Regular rate and rhythm with normal S1/S2 without murmurs, rubs or gallops. Abdomen: Soft, non-tender, non-distended with normal bowel sounds. Musculoskeletal: No clubbing, cyanosis or edema bilaterally. Left leg BKA. Right foot with edema +ve  Skin: Skin color, texture, turgor normal.  No rashes or lesions. Neurologic:  Neurovascularly intact without any focal sensory/motor deficits. Cranial nerves: II-XII intact, grossly non-focal.  Psychiatric: Alert and oriented, thought content appropriate, normal insight  Capillary Refill: Brisk,< 3 seconds   Peripheral Pulses: +2 palpable, equal bilaterally       Labs:   Recent Labs     11/22/21  2134 11/23/21  0335 11/24/21  0422   WBC 8.8 8.3 7.4   HGB 11.5* 11.6* 10.4*   HCT 35.5* 35.8* 32.2*    200 171     Recent Labs     11/22/21  0526 11/23/21  0335 11/24/21  0422    137 135*   K 4.1 3.9 3.8    104 102   CO2 23 20* 21   BUN 17 15 12   CREATININE 1.1 0.9 1.0   CALCIUM 8.9 8.6 8.0*     No results for input(s): AST, ALT, BILIDIR, BILITOT, ALKPHOS in the last 72 hours.   Recent Labs     11/22/21  1601 11/22/21  2134 11/23/21  0335   INR 1.06 1.19* 1.15*     No results for input(s): Alondra Nath in the last 72 hours.    Urinalysis:      Lab Results   Component Value Date    NITRU Negative 11/20/2021    WBCUA 2 05/28/2020    RBCUA 10 05/28/2020    BLOODU Negative 11/20/2021    SPECGRAV 1.008 11/20/2021    GLUCOSEU Negative 11/20/2021       Radiology:  US RENAL COMPLETE   Final Result   1. Unremarkable sonographic appearance of the right kidney. 2. Patient status post left nephrectomy. 3. Collapsed urinary bladder, limiting its evaluation. CTA ABDOMINAL AORTA W BILAT RUNOFF W CONTRAST   Final Result   Vascular-      Aortoiliac inflow is preserved. Left below-the-knee amputation is noted. There is severe, occlusive disease   in the left femoropopliteal tibial segment. Right severe femoropopliteal segment disease with occlusion by the mid   superficial femoral artery. Popliteal segment is occluded. Trifurcation   vessels are reconstituted with runoff via the anterior tibial and posterior   tibial arteries. Nonvascular-      No acute abdominopelvic abnormality is identified. Solitary right kidney. Mild enlargement of the prostate gland. Bilateral inguinal hernias, including sigmoid colon content on the left. There is no incarceration.                  Assessment/Plan:    Active Hospital Problems    Diagnosis Date Noted    Acute pain of right lower extremity [M79.604] 11/15/2021    Solitary kidney, acquired [Z90.5] 11/15/2021    Pain of right lower extremity due to ischemia [M79.604, I99.8] 11/15/2021    PAD (peripheral artery disease) ([facility] [address] [I73.9] 10/22/2021    Hyperlipidemia LDL goal <70 [E78.5] 10/22/2021    Essential hypertension [I10] 10/22/2021    CAD in native artery [I25.10]     S/P CABG (coronary artery bypass graft) [Z95.1]      #Critical limb ischemia:  - s/p heparin drip, cont statin, ASA  - heparin drip restarted 11/21  - cardiology to do SYBIL, doppler, angio   - anticipate will need revascular stenting   - angiography unable to be done due to stenosis proximal - Repeat procedure --   Procedures Performed:  - Left femoral artery access under fluroscopic and ultrasound guidance  - Left iliofemoral arteriogram.  - Right lower extremity arteriogram  - Percutaneous balloon angioplasty of right anterior tibial artery using a 4.0 mm x 200 mm balloon  - Percutaneous balloon angioplasty of right popliteal artery using a 4.0 mm x 200 mm balloon  - Percutaneous balloon angioplasty of right superficial femoral  using a 5.0 mm x 250 mm balloon  - Percutaneous insertion of EKOS thrombolytic catheter   - precedex drip on to help with pain    DANY on chronic kidney disease was due to contrast-induced nephropathy  Hx of left nephrectomy  Resolved, nephrology was following, signed off now    HTN:  Cont metoprolol tartrate 25 mg bid, amlodipine 5 mg OD    Tobacco abuse hx; he has not smoker x 1.5 yrs    H/o CVA,- continue ASA, Statin  Hx of Seizures - continue low dose keppra    GI Px: Protonix  DVT Prophylaxis: heparin drip  Diet: ADULT DIET;  Regular  Code Status: Full Code    PT/OT Eval Status: will be wheelchair bound for now, family to help him at home    Dispo - ok to [address] MD   Hospitalist

## 2021-11-26 NOTE — TELEPHONE ENCOUNTER
Please call Michelle Gayle to ensure he is aware of the doppler and follow up that was scheduled prior to his discharge. Dates and time should of printed on his discharge summary but please verify the patient is aware.

## 2021-11-29 NOTE — TELEPHONE ENCOUNTER
Last OV: 11/11/21  Next OV: 12/16/21  Last refill:9/28/21  Most recent Labs: 11/23/21  Last EKG (if needed): 11/11/21

## 2021-11-30 NOTE — TELEPHONE ENCOUNTER
Spoke with Lul Rosales, he states he is aware of the testing that is scheduled and will follow up as scheduled.

## 2021-12-01 RX ORDER — LORATADINE 10 MG
TABLET ORAL
Qty: 30 TABLET | Refills: 0 | Status: SHIPPED | OUTPATIENT
Start: 2021-12-01 | End: 2022-01-03

## 2021-12-08 ENCOUNTER — HOSPITAL ENCOUNTER (OUTPATIENT)
Dept: VASCULAR LAB | Age: 66
Discharge: HOME | End: 2021-12-08
Payer: MEDICAID

## 2021-12-08 DIAGNOSIS — I73.9 PAD (PERIPHERAL ARTERY DISEASE) (HCC): ICD-10-CM

## 2021-12-08 PROCEDURE — 93925 LOWER EXTREMITY STUDY: CPT

## 2021-12-09 NOTE — PROGRESS NOTES
[facility]      Cardiology Consult    Saji Luis  1955 December 23, 2021    Primary Physician: Lynda Estrada CNP  Reason for visit: f/u PAD, CAD    CC: \"my leg feels better \"    HPI:  The patient is 61 y.o. male with a past medical history significant for CAD s/p CABG, HLD, HTN, carotid disease, prior DVT, PAD s/p SFA stent, left BKA, s/p nephrectomy, prior nicotine addiction and alcohol abuse. He presented to [facility] 5/2020 with severe chest pain and found to have NSTEMI. He underwent heart catheterization revealing severe triple-vessel disease and subsequently underwent CABG x 4 on 5/8/20 with Dr. Humera Garibay. He did suffer from encephalopathy/delirium following bypass. He is s/p tracheostomy and PEG placement. He was discharged to [facility] for skilled nursing care. He was seen in office with Dr. Humera Garibay for follow up where it was noted that he was admitted to [facility] for bilateral foot wounds. He underwent peripheral angiograms with intervention to both legs while inpatient at [facility]. He underwent left BKA on 10/22/20. Lower extremity dopplers showed significant stenosis and underwent angiography 2/21/21 that showed severe right distal SFA/popliteal and tibial disease. S/p successful revascularization and SFA stenting. Pt was seen in the office 11/11/21 to establish care in our [place] office due to insurance coverage. He reported severe right foot pain for a few weeks. He described rest pain but denied any open wounds or ulcers. He stated that his right lower leg and foot is cold and his foot is discolored red when down and whitish when elevated. He endorsed edema at the ankle. Pt was admitted to [facility] and underwent peripheral angiography intervention 11/22/21 and 11/23/21 by Dr. Lakshmi Soriano. Today, he states that his RLE pain and coldness have greatly improved post intervention. He states that he can actually feel his pulse in his foot.  He continues to have R ankle and foot swelling that has not worsened. He states that he has a wound on his left LE stump being treated at the wound clinic. He hopes to be able to wear his prothesis in the future. Pt is using a wheelchair. Patient denies exertional chest pain/pressure, dyspnea at rest,  PND, orthopnea, palpitations, lightheadedness, weight changes, changes in LE edema, and syncope. He reports compliant with his medications. Past Medical History:   Diagnosis Date    Acute cerebral infarction ([address] 05/2020    R posterior frontal lobe    Alcohol withdrawal ([address] 05/2020    Bilateral carotid artery stenosis 05/07/2020    bilat 50-79% stenosis    Hepatitis C antibody positive in blood 05/16/2020    History of bronchitis     History of DVT of lower extremity 2018    RLL, no previous scans to know whether supf or dvt. no coumadin. put on plavix.  Hypertension     NSTEMI (non-ST elevated myocardial infarction) ([address] 05/07/2020    3VD    PAD (peripheral artery disease) [facility] [address]     Respiratory compromise 05/2020    chemical exposure at work, seen at [facility], covid neg, given steroids    S/p nephrectomy 5    age 17, pt not sure why     Tattoos      Past Surgical History:   Procedure Laterality Date    CARDIAC CATHETERIZATION  05/07/2020    Dr. Amanda Pantoja - w/placement of IABP    CORONARY ARTERY BYPASS GRAFT N/A 5/8/2020    Dr. Danny Stephens. [place]les - urgent x4 (LIMA-LAD, L SVG-D1, SVG-OM, SVG-PDA)    GASTROSTOMY TUBE PLACEMENT N/A 5/27/2020    PERCUTANEOUS ENDOSCOPIC GASTROSTOMY TUBE PLACEMENT performed by Marquis Jimmy MD at [address] N/A 7/10/2020    REMOVAL  GASTROSTOMY FEEDING TUBE  ([id number]) performed by Marquis Jimmy MD at [address] Left 05/29/2020    [facility] 1300 ml drained    TOTAL NEPHRECTOMY Left     17 yo - pt doesn't know why   Danni Singletary  05/27/2020    Dr. Carmina Buenrostro - w/bronchoscopy    TRANSESOPHAGEAL ECHOCARDIOGRAM  05/08/2020    during CABG Family History   Problem Relation Age of Onset    Heart Disease Mother          of MI age 36    Heart Disease Brother         MI mid 46s    Heart Disease Father          of MI age 68    Heart Disease Paternal Aunt         MI in her 46s     Social History     Tobacco Use    Smoking status: Former Smoker     Types: Cigarettes     Quit date: 2019     Years since quittin.1    Smokeless tobacco: Never Used    Tobacco comment: started age 27   Vaping Use    Vaping Use: Never used   Substance Use Topics    Alcohol use: Yes     Comment: once a month, shot once a week. depends on mood.  Drug use: Never     No Known Allergies  Current Outpatient Medications   Medication Sig Dispense Refill    [facility] ASPIRIN ADULT LOW DOSE 81 MG chewable tablet TAKE 1 TABLET BY MOUTH EVERY DAY 30 tablet 0    amLODIPine (NORVASC) 5 MG tablet Take 1 tablet by mouth daily 30 tablet 3    atorvastatin (LIPITOR) 40 MG tablet TAKE 1 TABLET BY MOUTH EVERY DAY AT NIGHT 90 tablet 0    rivaroxaban (XARELTO) 2.5 MG TABS tablet Take 1 tablet by mouth 2 times daily 60 tablet 5    pantoprazole (PROTONIX) 40 MG tablet Take 40 mg by mouth daily      QUEtiapine (SEROQUEL) 100 MG tablet Take 100 mg by mouth nightly      senna-docusate (PERICOLACE) 8.6-50 MG per tablet Take 1 tablet by mouth nightly      tiZANidine (ZANAFLEX) 4 MG tablet Take 4 mg by mouth nightly       acetaminophen (TYLENOL) 500 MG tablet Take 1,000 mg by mouth every 6 hours as needed for Pain      ferrous gluconate (FERGON) 324 (38 Fe) MG tablet Take 324 mg by mouth daily (with breakfast)      gabapentin (NEURONTIN) 800 MG tablet Take 800 mg by mouth 3 times daily.  levETIRAcetam (KEPPRA) 250 MG tablet Take 250 mg by mouth 2 times daily       metoprolol tartrate (LOPRESSOR) 25 MG tablet Take 25 mg by mouth 2 times daily       No current facility-administered medications for this visit.      Review of Systems:  · Constitutional: No unanticipated weight loss. There's been no change in energy level, sleep pattern, or activity level. No fevers, chills. · Eyes: No visual changes or diplopia. No scleral icterus. · ENT: No Headaches, hearing loss or vertigo. No mouth sores or sore throat. · Cardiovascular: as reviewed in HPI  · Respiratory: No cough or wheezing, no sputum production. No hemoptysis. · Gastrointestinal: No abdominal pain, appetite loss, blood in stools. No change in bowel or bladder habits. · Genitourinary: No dysuria, trouble voiding, or hematuria. · Musculoskeletal:  No gait disturbance, no joint complaints. · Integumentary: No rash or pruritis. · Neurological: No headache, diplopia, change in muscle strength, numbness or tingling. · Psychiatric: No anxiety or depression. · Endocrine: No temperature intolerance. No excessive thirst, fluid intake, or urination. No tremor. · Hematologic/Lymphatic: No abnormal bruising or bleeding, blood clots or swollen lymph nodes. · Allergic/Immunologic: No nasal congestion or hives. Physical Exam:   BP (!) 144/78   Pulse 72   Ht 6' 1\" (1.854 m)   Wt 217 lb (98.4 kg)   SpO2 98%   BMI 28.63 kg/m²   Wt Readings from Last 3 Encounters:   12/23/21 217 lb (98.4 kg)   11/24/21 217 lb 9.5 oz (98.7 kg)   11/11/21 217 lb (98.4 kg)     Constitutional: He is oriented to person, place, and time. He appears well-developed and well-nourished. In no acute respiratory distress but chronically ill appearing. Head: Normocephalic and atraumatic. Pupils equal and round. Neck: Neck supple. No JVP. R-carotid bruit appreciated. No mass and no thyromegaly present. No lymphadenopathy present. Cardiovascular: Normal rate. Normal heart sounds. Exam reveals no gallop and no friction rub. No murmur heard. Pulmonary/Chest: Effort normal and breath sounds normal. No respiratory distress. He has no wheezes, rhonchi or rales. Abdominal: Soft, non-tender.  Bowel sounds are normal. He exhibits no organomegaly, mass segment disease with occlusion by the mid   superficial femoral artery.  Popliteal segment is occluded.  Trifurcation   vessels are reconstituted with runoff via the anterior tibial and posterior   tibial arteries. Assessment/Plan:     1) Critical limb ischemia/PAD s/p peripheral intervention 11/22/21 and 11/23/21 by Dr. Qiana Gonzalez. Continue ASA, statin, and Xarelto 2.5mg bid. Will repeat arterial duplex in 6 months then yearly or sooner if new symptoms. 2) Bilateral carotid stenosis. Continue ASA, statin, and Xarelto 2.5mg bid. May need carotid angiography but no recent TIA symptoms. Will refer to Dr. Lee Simmons. 3) CAD s/p CABG. Seemingly asymptomatic but with a limited functional status. Continue with medical management and risk factor modification including aspirin, statin, Xarelto 2.5mg bid and B-blocker. 3) Hyperlipidemia. LDL goal <70. LDL 59 6/2021. Continue high intensity statin. 4) Essential hypertension. Controlled. Goal BP <130/80. Continue medical therapy. 5) RLE edema. Likely multifactorial. BNP normal for age. Will continue to monitor but diastolic dysfunction is possibly contributing. F/u in office in 6 months    Thank you very much for allowing me to participate in the care of your patient. Please do not hesitate to contact me if you have any questions. Sincerely,  Taylor Merino. Selena Trimble, [address] [place] [address]  Ph: [phone]  Fax: [phone]    This note was scribed in the presence of the physician by Quan Bianchi RN. Physician Attestation: The scribes documentation has been prepared under my direction and personally reviewed by me in its entirety. I confirm that the note above accurately reflects all work, treatment, procedures, and medical decision making performed by me.    All portions of the note including but not limited to the chief complaint, history of present illness, physical exam, assessment and plan/medical decision making were personally reviewed, edited, and updated on the day of the visit.

## 2021-12-23 ENCOUNTER — OFFICE VISIT (OUTPATIENT)
Dept: CARDIOLOGY CLINIC | Age: 66
End: 2021-12-23
Payer: MEDICAID

## 2021-12-23 VITALS
SYSTOLIC BLOOD PRESSURE: 144 MMHG | OXYGEN SATURATION: 98 % | WEIGHT: 217 LBS | HEIGHT: 73 IN | BODY MASS INDEX: 28.76 KG/M2 | DIASTOLIC BLOOD PRESSURE: 78 MMHG | HEART RATE: 72 BPM

## 2021-12-23 DIAGNOSIS — I73.9 PAD (PERIPHERAL ARTERY DISEASE) (HCC): Primary | ICD-10-CM

## 2021-12-23 DIAGNOSIS — E78.5 HYPERLIPIDEMIA LDL GOAL <70: ICD-10-CM

## 2021-12-23 DIAGNOSIS — Z95.1 S/P CABG (CORONARY ARTERY BYPASS GRAFT): ICD-10-CM

## 2021-12-23 DIAGNOSIS — I10 ESSENTIAL HYPERTENSION: ICD-10-CM

## 2021-12-23 DIAGNOSIS — I25.10 CORONARY ARTERY DISEASE INVOLVING NATIVE CORONARY ARTERY OF NATIVE HEART WITHOUT ANGINA PECTORIS: ICD-10-CM

## 2021-12-23 DIAGNOSIS — I65.23 BILATERAL CAROTID ARTERY STENOSIS: ICD-10-CM

## 2021-12-23 PROCEDURE — 93000 ELECTROCARDIOGRAM COMPLETE: CPT | Performed by: INTERNAL MEDICINE

## 2021-12-23 PROCEDURE — 99214 OFFICE O/P EST MOD 30 MIN: CPT | Performed by: INTERNAL MEDICINE

## 2022-01-03 RX ORDER — LORATADINE 10 MG
TABLET ORAL
Qty: 90 TABLET | Refills: 3 | Status: SHIPPED | OUTPATIENT
Start: 2022-01-03

## 2022-01-05 ENCOUNTER — PATIENT MESSAGE (OUTPATIENT)
Dept: CARDIOLOGY CLINIC | Age: 67
End: 2022-01-05

## 2022-01-05 NOTE — TELEPHONE ENCOUNTER
From: Bita Dang  To: Dr. Crook Schools: 1/5/2022 11:47 AM EST  Subject: Medication    I was wondering if I still need to take Amlodipine Besylate 5 mg which was prescribe by Dr Jovanni Sam when I was in the hospital at [facility] in November. I did get it refilled in December by him but not sure if it was just for when I was in the hospital or if it will be a daily medication. Please let me know and if I need to take if who needs to prescribe since he is not my doctor.     Thanks,

## 2022-01-11 RX ORDER — ATORVASTATIN CALCIUM 40 MG/1
TABLET, FILM COATED ORAL
Qty: 90 TABLET | Refills: 1 | Status: SHIPPED | OUTPATIENT
Start: 2022-01-11 | End: 2022-07-08

## 2022-02-15 RX ORDER — RIVAROXABAN 2.5 MG/1
TABLET, FILM COATED ORAL
Qty: 180 TABLET | Refills: 1 | Status: SHIPPED | OUTPATIENT
Start: 2022-02-15 | End: 2022-08-26

## 2022-03-22 RX ORDER — AMLODIPINE BESYLATE 5 MG/1
5 TABLET ORAL DAILY
Qty: 90 TABLET | Refills: 3 | Status: SHIPPED | OUTPATIENT
Start: 2022-03-22

## 2022-03-22 NOTE — TELEPHONE ENCOUNTER
Please have Dr. Omar Cotto Dr. is free with their insurance      Medication Refill    Medication needing refilled:  amLODIPine (NORVASC)       Dosage of the medication:  5 MG tablet       How are you taking this medication (QD, BID, TID, QID, PRN):  Take 1 tablet by mouth daily    30 or 90 day supply called in:30    When will you run out of your medication: now     Which Pharmacy are we sending the medication to?:  [facility]/pharmacy #[id number]- [address] [facility] [address] [facility] [phone]   [facility] [address], [address] [place] [address]   Phone:  [phone]  Fax:  [phone]

## 2022-06-06 ENCOUNTER — TELEPHONE (OUTPATIENT)
Dept: CARDIOLOGY CLINIC | Age: 67
End: 2022-06-06

## 2022-06-06 DIAGNOSIS — I70.229 CRITICAL LOWER LIMB ISCHEMIA (HCC): Primary | ICD-10-CM

## 2022-06-06 DIAGNOSIS — I73.9 PAD (PERIPHERAL ARTERY DISEASE) (HCC): ICD-10-CM

## 2022-06-06 NOTE — TELEPHONE ENCOUNTER
Called and spoke with Sylvester Nj, he reports coolness of his right leg and notes some increased pain over the past few days. Denies any open wounds or ulceration. Discussed with Dr. Jenifer Fajardo, arrange for STAT doppler. Guanako Gage and spoke with his girlfriend who states they will come to [facility] to complete but [facility] and faxed orders.

## 2022-06-06 NOTE — TELEPHONE ENCOUNTER
Called and spoke to Pedro she said that he just completed the test about 30 minutes ago. Let her know we will wait for results and call with further recommendations.

## 2022-06-06 NOTE — TELEPHONE ENCOUNTER
Pedro pts wife called in to say they had the test done at [facility] in [address] [place] [address] # [phone]. Pedro had said Srinivas Morrison has asked her to call and her let her know when completed. Test per Pedro looked good.     Pedro # [phone]

## 2022-06-06 NOTE — TELEPHONE ENCOUNTER
Lestine Cushing from [facility] called stating she is at the [facility] home and he has the following symtpoms:    R lower extremity is cold to the touch, slightly purple and he has a weal pedal pulse. She is wondering if she can get a doppler ordered to check this out.   Pt has no other symptoms

## 2022-06-07 ENCOUNTER — OFFICE VISIT (OUTPATIENT)
Dept: CARDIOLOGY CLINIC | Age: 67
End: 2022-06-07
Payer: MEDICAID

## 2022-06-07 VITALS
OXYGEN SATURATION: 97 % | BODY MASS INDEX: 28.76 KG/M2 | SYSTOLIC BLOOD PRESSURE: 138 MMHG | WEIGHT: 217 LBS | DIASTOLIC BLOOD PRESSURE: 84 MMHG | HEART RATE: 75 BPM | HEIGHT: 73 IN

## 2022-06-07 DIAGNOSIS — I73.9 PAD (PERIPHERAL ARTERY DISEASE) (HCC): Primary | ICD-10-CM

## 2022-06-07 PROCEDURE — 99214 OFFICE O/P EST MOD 30 MIN: CPT | Performed by: INTERNAL MEDICINE

## 2022-06-07 PROCEDURE — 93000 ELECTROCARDIOGRAM COMPLETE: CPT | Performed by: INTERNAL MEDICINE

## 2022-06-07 NOTE — PATIENT INSTRUCTIONS
The morning of your procedure you will park in the hospital parking lot and report directly to the cath lab to check in.     Pre-Procedure Instructions   1. You will need to fast for at least 8 hours prior to procedure. No caffeine, gum or mints the morning of procedure. 2. You will need to hold your anticoagulation, Xarelto for 12 hours prior. 3. All other medications can be taken in the morning with sips of water. 4. You will need to take 325 mg aspirin the morning of. If you are currently taking 81 mg please take 4 tablets that morning. 5. Do not use any lotions, creams or perfume the morning of procedure. 6. Pre-procedure lab work will need to be completed 5-7 days prior to procedure. 7. Please have a responsible adult to drive you home after procedure. We advise you have someone stay with you for the first 24 hours for precautionary measures. Depending on your procedure you may require an overnight stay. 8. Cath lab will provide you with all post procedure instructions. If you have any questions regarding the procedure itself or medications, please call [phone] and ask to speak with a nurse.

## 2022-06-07 NOTE — TELEPHONE ENCOUNTER
Discussed with Dr. Alison Dailey, states to arrange an urgent office visit this morning and will assess patient in office. Claudia Guy and he will check with his sister to see if he can get a ride. He will return call and can see anytime before noon.

## 2022-06-07 NOTE — TELEPHONE ENCOUNTER
Reviewed results, unfortunately the wrong test was completed. A right arterial doppler was ordered but a venous doppler was completed which showed no evidence of a DVT. Called and spoke with scheduling department who contacted the department. Instructed they will further discuss with management and return call with a solution. She states they will complete the correct test and have a copy of the testing that was ordered.

## 2022-06-07 NOTE — TELEPHONE ENCOUNTER
Patient returned call and will be seen today at 6. Called to notify [facility] patient will not need dopplers today.

## 2022-06-07 NOTE — PROGRESS NOTES
Cardiology Consultation     Felisha Rodrigueznoelle  1955    PCP: KATHY Mitchell - CNP  Referring Physician: Dr. Clifford Aleman   Reason for Referral: PAD   Chief Complaint:   Chief Complaint   Patient presents with    Follow-up     cc/concern- stent        Subjective:   History of Present Illness: The patient is 61 y.o. male with a past medical history significant for CAD s/p CABG, HLD, HTN, carotid disease, prior DVT, PAD s/p SFA stent, left BKA, s/p nephrectomy, prior nicotine addiction and alcohol abuse. He presented to [facility] 5/2020 with severe chest pain and found to have NSTEMI. He underwent heart catheterization revealing severe triple-vessel disease and subsequently underwent CABG x 4 on 5/8/20 with Dr. Cipriano Bustos. He did suffer from encephalopathy/delirium following bypass. He is s/p tracheostomy and PEG placement. He was discharged to [facility] for skilled nursing care. He was seen in office with Dr. Cipriano Bustos for follow up where it was noted that he was admitted to [facility] for bilateral foot wounds. He underwent peripheral angiograms with intervention to both legs while inpatient at [facility]. He underwent left BKA on 10/22/20. Lower extremity dopplers showed significant stenosis and underwent angiography 2/21/21 that showed severe right distal SFA/popliteal and tibial disease. S/p successful revascularization and SFA stenting. He presents today regarding pain and coolness in right lower extremity. He presents in a wheelchair today and has not been using a prothesis due to chronic wound on left BKA site. He denies chest pain with rest or exertion. His breathing has been comfortable without shortness of breath. He reports medication compliance and is tolerating. He denies abnormal bruising or bleeding.      Past Medical History:   Diagnosis Date    Acute cerebral infarction ([facility] 05/2020    R posterior frontal lobe    Alcohol withdrawal ([facility] 05/2020    Bilateral carotid artery stenosis 05/07/2020 bilat 50-79% stenosis    Hepatitis C antibody positive in blood 2020    History of bronchitis     History of DVT of lower extremity 2018    RLL, no previous scans to know whether supf or dvt. no coumadin. put on plavix.  Hypertension     NSTEMI (non-ST elevated myocardial infarction) ([facility] 2020    3VD    PAD (peripheral artery disease) ([facility]     Respiratory compromise 2020    chemical exposure at work, seen at [facility], covid neg, given steroids    S/p nephrectomy 5    age 17, pt not sure why     Tattoos      Past Surgical History:   Procedure Laterality Date    CARDIAC CATHETERIZATION  2020    Dr. Chichi Gallegos - w/placement of IABP    CORONARY ARTERY BYPASS GRAFT N/A 2020    Dr. Graceann Dubin. Robles - urgent x4 (LIMA-LAD, L SVG-D1, SVG-OM, SVG-PDA)    GASTROSTOMY TUBE PLACEMENT N/A 2020    PERCUTANEOUS ENDOSCOPIC GASTROSTOMY TUBE PLACEMENT performed by Richard Mcgowan MD at [address] N/A 7/10/2020    REMOVAL  GASTROSTOMY FEEDING TUBE  ([address]4464) performed by Richard Mcgowan MD at [address] [place] Left 2020    [facility], 1300 ml drained    TOTAL NEPHRECTOMY Left     17 yo - pt doesn't know why   Irma Davis  2020    Dr. Karol Barnett - w/bronchoscopy    TRANSESOPHAGEAL ECHOCARDIOGRAM  2020    during CABG     Family History   Problem Relation Age of Onset    Heart Disease Mother          of MI age 36    Heart Disease Brother         MI mid 46s    Heart Disease Father          of MI age 68    Heart Disease Paternal Aunt         MI in her 46s     Social History     Tobacco Use    Smoking status: Former Smoker     Types: Cigarettes     Quit date: 2019     Years since quittin.6    Smokeless tobacco: Never Used    Tobacco comment: started age 27   Vaping Use    Vaping Use: Never used   Substance Use Topics    Alcohol use: Yes     Comment: once a month, shot once a week. depends on mood.  Drug use: Never       No Known Allergies  Current Outpatient Medications   Medication Sig Dispense Refill    amLODIPine (NORVASC) 5 MG tablet Take 1 tablet by mouth daily 90 tablet 3    XARELTO 2.5 MG TABS tablet TAKE 1 TABLET BY MOUTH TWICE A  tablet 1    atorvastatin (LIPITOR) 40 MG tablet TAKE 1 TABLET BY MOUTH EVERY DAY AT NIGHT 90 tablet 1    [facility] ASPIRIN ADULT LOW DOSE 81 MG chewable tablet TAKE 1 TABLET BY MOUTH EVERY DAY 90 tablet 3    pantoprazole (PROTONIX) 40 MG tablet Take 40 mg by mouth daily      QUEtiapine (SEROQUEL) 100 MG tablet Take 100 mg by mouth nightly      senna-docusate (PERICOLACE) 8.6-50 MG per tablet Take 1 tablet by mouth nightly      tiZANidine (ZANAFLEX) 4 MG tablet Take 4 mg by mouth nightly       acetaminophen (TYLENOL) 500 MG tablet Take 1,000 mg by mouth every 6 hours as needed for Pain      ferrous gluconate (FERGON) 324 (38 Fe) MG tablet Take 324 mg by mouth daily (with breakfast)      gabapentin (NEURONTIN) 800 MG tablet Take 800 mg by mouth 3 times daily.  metoprolol tartrate (LOPRESSOR) 25 MG tablet Take 25 mg by mouth 2 times daily       No current facility-administered medications for this visit. Review of Systems:  · Constitutional: No unanticipated weight loss. There's been no change in energy level, sleep pattern, or activity level. No fevers, chills. · Eyes: No visual changes or diplopia. No scleral icterus. · ENT: No Headaches, hearing loss or vertigo. No mouth sores or sore throat. · Cardiovascular: as reviewed in HPI  · Respiratory: No cough or wheezing, no sputum production. No hemoptysis. · Gastrointestinal: No abdominal pain, appetite loss, blood in stools. No change in bowel or bladder habits. · Genitourinary: No dysuria, trouble voiding, or hematuria. · Musculoskeletal:  No gait disturbance, no joint complaints. · Integumentary: No rash or pruritis.   · Neurological: No headache, diplopia, change in muscle strength, numbness or tingling. · Psychiatric: No anxiety or depression. · Endocrine: No temperature intolerance. No excessive thirst, fluid intake, or urination. No tremor. · Hematologic/Lymphatic: No abnormal bruising or bleeding, blood clots or swollen lymph nodes. · Allergic/Immunologic: No nasal congestion or hives. Physical Exam:   /84   Pulse 75   Ht 6' 1\" (1.854 m)   Wt 217 lb (98.4 kg)   SpO2 97%   BMI 28.63 kg/m²   Wt Readings from Last 3 Encounters:   06/07/22 217 lb (98.4 kg)   12/23/21 217 lb (98.4 kg)   11/24/21 217 lb 9.5 oz (98.7 kg)     Constitutional: He is oriented to person, place, and time. He appears well-developed and well-nourished. In no acute distress. Head: Normocephalic and atraumatic. Pupils equal and round. Neck: Neck supple. No JVP or carotid bruit appreciated. No mass and no thyromegaly present. No lymphadenopathy present. Cardiovascular: Normal rate. Normal heart sounds. Exam reveals no gallop and no friction rub. No murmur heard. Pulmonary/Chest: Effort normal and breath sounds normal. No respiratory distress. He has no wheezes, rhonchi or rales. Abdominal: Soft, non-tender. Bowel sounds are normal. He exhibits no organomegaly, mass or bruit. Extremities: No edema. No cyanosis or clubbing. Pulses are 2+ radial/carotid bilaterally. +Doppler R DP -Doppler R PT and arch  Neurological: No gross cranial nerve deficit. Coordination normal.   Skin: Skin is warm and dry. There is no rash or diaphoresis. Psychiatric: He has a normal mood and affect.  His speech is normal and behavior is normal.     Lab Review:   FLP:    Lab Results   Component Value Date    TRIG 111 05/25/2020    HDL 33 06/01/2021    LDLCALC 59 06/01/2021    LABVLDL 28 06/01/2021     BUN/Creatinine:    Lab Results   Component Value Date    BUN 12 11/24/2021    CREATININE 1.0 11/24/2021     PT/INR, TNI, HGB A1C:   Lab Results   Component Value Date/Time    TROPONINI 0.11 (H) 05/07/2020 01:17 PM    LABA1C 5.9 05/07/2020 12:00 PM      No results found for: CBCAUTODIF      Peripheral angiogram 7/22/20 ([facility])   1. Left lower extremity angiogram with recanalization of the distal superficial femoral, popliteal, and peroneal artery. 2. Angioplasty of the peroneal and popliteal artery with a 3 x 220 Basil balloon. 3. Angioplasty of the left superficial femoral and popliteal artery with a 5 x 200  balloon. 4. Completion angiogram.  5. Right lower extremity runoff.     Peripheral angiogram 7/24/20 ([facility])   1. Ultrasound-guided left common femoral access   2. Pelvic angiogram and right lower extremity angiogram   3. Recanalization of the superficial femoral artery and popliteal artery with angioplasty. 4. Angioplasty of the popliteal artery with 3 x 220-mm Basil balloon. 5. Angioplasty of the popliteal and superficial femoral artery with a 5 x 200  balloon and completion angiogram.      Lower extremity arterial duplex 10/16/20 ([facility])   Right: Right SYBIL of 0.64 at both the right PTA and DPA with monophasic spectral Doppler signals.  Right Toe pressure of 0.53. Left: Left SYBIL of 0.65 at the PTA.  The left DPA was not obtainable due to ulceration.  Left toe pressure of 0.35 with a monophasic PPG tracing. Previous: previous exam 6-6-20 right SYBIL 0.48 left SYBIL 0.56. Conclusions: Moderate disease in the bilateral legs without worsening from previous exam.    Peripheral 2/4/21   Severe right distal SFA/popliteal and tibial disease. S/p successful revascularization and SFA stenting 3V runoff to the foot.     Peripheral angiogram  11/15/21   Right superficial femoral artery heavily diseased  with sequential 80% stenosis.  Popliteal artery is occluded, in-stent  occlusion present.  Faint collateral flow into the right anterior tibial  artery and posterior tibial artery.   The patient will be brought back for staged intervention in a few  Days.     Peripheral Intervention

## 2022-06-07 NOTE — TELEPHONE ENCOUNTER
Received a call from Gail Brown in central scheduling. Rescheduled today at 2:30 and will void charges for the venous dopplers. Instructed patient is to return call to see if he can get a ride to the office. He will return call.

## 2022-06-08 ENCOUNTER — TELEPHONE (OUTPATIENT)
Dept: CARDIOLOGY CLINIC | Age: 67
End: 2022-06-08

## 2022-06-08 DIAGNOSIS — I73.9 PAD (PERIPHERAL ARTERY DISEASE) (HCC): Primary | ICD-10-CM

## 2022-06-08 NOTE — TELEPHONE ENCOUNTER
Pedro called and states that she will be out of town 19-25th and would like to schedule before or after that. Let her know  will be calling.

## 2022-06-09 NOTE — TELEPHONE ENCOUNTER
Pt is scheduled for 6/16/22 at 11:30 am. Arrival time is 10 am. She is agreeable to date/time. Went over pre-procedure instructions. She v/u. Published on [employer] and e-mail to Alexandra Scruggs. If pt is having worsening pain they will need to proceed to the ER.

## 2022-06-09 NOTE — TELEPHONE ENCOUNTER
Spoke with Pedro yesterday. Advised her I need to talk to Dr. Maggie Barahona and will give her a call back. She v/u.

## 2022-06-11 DIAGNOSIS — I73.9 PAD (PERIPHERAL ARTERY DISEASE) (HCC): ICD-10-CM

## 2022-06-11 LAB
ANION GAP SERPL CALCULATED.3IONS-SCNC: 14 MMOL/L (ref 3–16)
BUN BLDV-MCNC: 16 MG/DL (ref 7–20)
CALCIUM SERPL-MCNC: 9.4 MG/DL (ref 8.3–10.6)
CHLORIDE BLD-SCNC: 106 MMOL/L (ref 99–110)
CO2: 23 MMOL/L (ref 21–32)
CREAT SERPL-MCNC: 1.1 MG/DL (ref 0.8–1.3)
GFR AFRICAN AMERICAN: >60
GFR NON-AFRICAN AMERICAN: >60
GLUCOSE BLD-MCNC: 119 MG/DL (ref 70–99)
HCT VFR BLD CALC: 42.8 % (ref 40.5–52.5)
HEMOGLOBIN: 13.9 G/DL (ref 13.5–17.5)
MCH RBC QN AUTO: 27 PG (ref 26–34)
MCHC RBC AUTO-ENTMCNC: 32.4 G/DL (ref 31–36)
MCV RBC AUTO: 83.3 FL (ref 80–100)
PDW BLD-RTO: 16 % (ref 12.4–15.4)
PLATELET # BLD: 270 K/UL (ref 135–450)
PMV BLD AUTO: 7.4 FL (ref 5–10.5)
POTASSIUM SERPL-SCNC: 4.6 MMOL/L (ref 3.5–5.1)
RBC # BLD: 5.14 M/UL (ref 4.2–5.9)
SODIUM BLD-SCNC: 143 MMOL/L (ref 136–145)
WBC # BLD: 6.3 K/UL (ref 4–11)

## 2022-06-16 ENCOUNTER — HOSPITAL ENCOUNTER (OUTPATIENT)
Dept: CARDIAC CATH/INVASIVE PROCEDURES | Age: 67
Discharge: HOME | End: 2022-06-16
Payer: MEDICAID

## 2022-06-16 ENCOUNTER — TELEPHONE (OUTPATIENT)
Dept: CARDIOLOGY CLINIC | Age: 67
End: 2022-06-16

## 2022-06-16 VITALS
HEIGHT: 73 IN | DIASTOLIC BLOOD PRESSURE: 89 MMHG | TEMPERATURE: 98 F | WEIGHT: 235 LBS | OXYGEN SATURATION: 98 % | HEART RATE: 68 BPM | BODY MASS INDEX: 31.14 KG/M2 | SYSTOLIC BLOOD PRESSURE: 124 MMHG | RESPIRATION RATE: 18 BRPM

## 2022-06-16 DIAGNOSIS — I73.9 PAD (PERIPHERAL ARTERY DISEASE) (HCC): Primary | ICD-10-CM

## 2022-06-16 LAB — POC ACT LR: 251 SEC

## 2022-06-16 PROCEDURE — C1887 CATHETER, GUIDING: HCPCS

## 2022-06-16 PROCEDURE — 6360000002 HC RX W HCPCS

## 2022-06-16 PROCEDURE — 2709999900 HC NON-CHARGEABLE SUPPLY

## 2022-06-16 PROCEDURE — 75774 ARTERY X-RAY EACH VESSEL: CPT

## 2022-06-16 PROCEDURE — 37224 PR REVSC OPN/PRG FEM/POP W/ANGIOPLASTY UNI: CPT | Performed by: INTERNAL MEDICINE

## 2022-06-16 PROCEDURE — C9764 REVASC INTRAVASC LITHOTRIPSY: HCPCS

## 2022-06-16 PROCEDURE — C1769 GUIDE WIRE: HCPCS

## 2022-06-16 PROCEDURE — 99152 MOD SED SAME PHYS/QHP 5/>YRS: CPT

## 2022-06-16 PROCEDURE — 99153 MOD SED SAME PHYS/QHP EA: CPT

## 2022-06-16 PROCEDURE — 37228 PR REVSC OPN/PRQ TIB/PERO W/ANGIOPLASTY UNI: CPT | Performed by: INTERNAL MEDICINE

## 2022-06-16 PROCEDURE — C1725 CATH, TRANSLUMIN NON-LASER: HCPCS

## 2022-06-16 PROCEDURE — 2500000003 HC RX 250 WO HCPCS

## 2022-06-16 PROCEDURE — 6360000004 HC RX CONTRAST MEDICATION: Performed by: INTERNAL MEDICINE

## 2022-06-16 PROCEDURE — C1894 INTRO/SHEATH, NON-LASER: HCPCS

## 2022-06-16 PROCEDURE — 75710 ARTERY X-RAYS ARM/LEG: CPT | Performed by: INTERNAL MEDICINE

## 2022-06-16 PROCEDURE — 85347 COAGULATION TIME ACTIVATED: CPT

## 2022-06-16 PROCEDURE — C1760 CLOSURE DEV, VASC: HCPCS

## 2022-06-16 PROCEDURE — 37228 HC TIB PER TERRITORY PLASTY: CPT

## 2022-06-16 PROCEDURE — 75710 ARTERY X-RAYS ARM/LEG: CPT

## 2022-06-16 PROCEDURE — 37232 HC TIB PER TERR ADDL PLASTY: CPT

## 2022-06-16 PROCEDURE — 99152 MOD SED SAME PHYS/QHP 5/>YRS: CPT | Performed by: INTERNAL MEDICINE

## 2022-06-16 RX ORDER — DEXMEDETOMIDINE HYDROCHLORIDE 4 UG/ML
.1-1.5 INJECTION, SOLUTION INTRAVENOUS CONTINUOUS
Status: DISCONTINUED | OUTPATIENT
Start: 2022-06-16 | End: 2022-06-17 | Stop reason: HOSPADM

## 2022-06-16 RX ORDER — IODIXANOL 320 MG/ML
160 INJECTION, SOLUTION INTRAVASCULAR
Status: COMPLETED | OUTPATIENT
Start: 2022-06-16 | End: 2022-06-16

## 2022-06-16 RX ORDER — ACETAMINOPHEN 325 MG/1
650 TABLET ORAL EVERY 4 HOURS PRN
Status: DISCONTINUED | OUTPATIENT
Start: 2022-06-16 | End: 2022-06-17 | Stop reason: HOSPADM

## 2022-06-16 RX ORDER — SODIUM CHLORIDE 9 MG/ML
INJECTION, SOLUTION INTRAVENOUS PRN
Status: DISCONTINUED | OUTPATIENT
Start: 2022-06-16 | End: 2022-06-16 | Stop reason: SDUPTHER

## 2022-06-16 RX ORDER — SODIUM CHLORIDE 0.9 % (FLUSH) 0.9 %
5-40 SYRINGE (ML) INJECTION PRN
Status: DISCONTINUED | OUTPATIENT
Start: 2022-06-16 | End: 2022-06-17 | Stop reason: HOSPADM

## 2022-06-16 RX ORDER — SODIUM CHLORIDE 9 MG/ML
INJECTION, SOLUTION INTRAVENOUS PRN
Status: DISCONTINUED | OUTPATIENT
Start: 2022-06-16 | End: 2022-06-17 | Stop reason: HOSPADM

## 2022-06-16 RX ORDER — SODIUM CHLORIDE 0.9 % (FLUSH) 0.9 %
5-40 SYRINGE (ML) INJECTION PRN
Status: DISCONTINUED | OUTPATIENT
Start: 2022-06-16 | End: 2022-06-16 | Stop reason: SDUPTHER

## 2022-06-16 RX ORDER — SODIUM CHLORIDE 0.9 % (FLUSH) 0.9 %
5-40 SYRINGE (ML) INJECTION EVERY 12 HOURS SCHEDULED
Status: DISCONTINUED | OUTPATIENT
Start: 2022-06-16 | End: 2022-06-17 | Stop reason: HOSPADM

## 2022-06-16 RX ORDER — SODIUM CHLORIDE 0.9 % (FLUSH) 0.9 %
5-40 SYRINGE (ML) INJECTION EVERY 12 HOURS SCHEDULED
Status: DISCONTINUED | OUTPATIENT
Start: 2022-06-16 | End: 2022-06-16 | Stop reason: SDUPTHER

## 2022-06-16 RX ADMIN — IODIXANOL 160 ML: 320 INJECTION, SOLUTION INTRAVASCULAR at 13:53

## 2022-06-16 NOTE — H&P
H&P Update - see note from 22    I have reviewed the history and physical and examined the patient and find no relevant changes. I have reviewed with the patient and/or family the risks, benefits, and alternatives to the procedure. Pre-sedation Assessment    Patient:  Teresa Urena   :   1955  Intended Procedure: peripheral angiogram    Vitals:    22 1026   BP: 124/89   Pulse: 68   Resp: 18   Temp: 98 °F (36.7 °C)   SpO2: 98%       Nursing notes reviewed and agreed.   Medications reviewed  Allergies: No Known Allergies      Pre-Procedure Assessment/Plan:  ASA 2 - Patient with mild systemic disease with no functional limitations    Mallampati Airway Assessment:  Mallampati Class I - (soft palate, fauces, uvula & anterior/posterior tonsillar pillars are visible)    Level of Sedation Plan:Mild sedation    Post Procedure plan: Return to same level of care    Violeta Ramirez MD [address] [facility], Interventional Cardiology, and Peripheral Vascular [address] [facility]   (C): [phone]  (O): [phone]

## 2022-06-23 NOTE — PROGRESS NOTES
0940    metoprolol (LOPRESSOR) injection 2.5 mg, 2.5 mg, Intravenous, Q10 Min PRN, Delta Sandifer, APRN - CNP    ferrous sulfate 300 (60 Fe) MG/5ML syrup 300 mg, 300 mg, Oral, BID, Delta Sandifer, APRN - CNP, 300 mg at 05/20/20 0940    heparin (porcine) injection 5,000 Units, 5,000 Units, Subcutaneous, 3 times per day, Delta Sandifer, APRN - CNP, 5,000 Units at 05/20/20 0500    glucose (GLUTOSE) 40 % oral gel 15 g, 15 g, Oral, PRN, Delta Sandifer, APRN - CNP    dextrose 50 % IV solution, 12.5 g, Intravenous, PRN, Delta Sandifer, APRN - CNP, 12.5 g at 05/15/20 0836    glucagon (rDNA) injection 1 mg, 1 mg, Intramuscular, PRN, Delta Sandifer, APRN - CNP    dextrose 5 % solution, 100 mL/hr, Intravenous, PRN, Delta Sandifer, APRN - CNP    sodium chloride flush 0.9 % injection 10 mL, 10 mL, Intravenous, 2 times per day, Delta Sandifer, APRN - CNP, 10 mL at 05/20/20 0940    sodium chloride flush 0.9 % injection 10 mL, 10 mL, Intravenous, PRN, Delta Sandifer, APRN - CNP, 10 mL at 05/16/20 0717    LORazepam (ATIVAN) tablet 1 mg, 1 mg, Oral, Q1H PRN **OR** LORazepam (ATIVAN) injection 1 mg, 1 mg, Intravenous, Q1H PRN, 1 mg at 05/18/20 1215 **OR** LORazepam (ATIVAN) tablet 2 mg, 2 mg, Oral, Q1H PRN, 2 mg at 05/18/20 0253 **OR** LORazepam (ATIVAN) injection 2 mg, 2 mg, Intravenous, Q1H PRN, 2 mg at 05/20/20 0115 **OR** LORazepam (ATIVAN) tablet 3 mg, 3 mg, Oral, Q1H PRN, 3 mg at 05/16/20 2010 **OR** LORazepam (ATIVAN) injection 3 mg, 3 mg, Intravenous, Q1H PRN, 3 mg at 05/18/20 2014 **OR** LORazepam (ATIVAN) tablet 4 mg, 4 mg, Oral, Q1H PRN, 4 mg at 05/17/20 2241 **OR** LORazepam (ATIVAN) injection 4 mg, 4 mg, Intravenous, Q1H PRN, Delta Sandifer, APRN - CNP    insulin lispro (HUMALOG) injection vial 0-18 Units, 0-18 Units, Subcutaneous, Q4H, Delta Sandifer, APRN - CNP, 3 Units at 05/20/20 0500    aspirin chewable tablet 324 mg, 324 mg, Oral, Daily, Radha Evans MD, 324 mg at 05/20/20 3998   metoclopramide (REGLAN) injection 5 mg, 5 mg, Intravenous, Q6H PRN, KATHY Ricci CNP    dexmedetomidine (PRECEDEX) 400 mcg in sodium chloride 0.9 % 100 mL infusion, 0.4 mcg/kg/hr, Intravenous, Continuous, Jorge Ba MD, Last Rate: 20.5 mL/hr at 05/20/20 0908, 1 mcg/kg/hr at 05/20/20 0908    propofol injection, 10 mcg/kg/min, Intravenous, Titrated, KATHY Ricci CNP, Last Rate: 14.8 mL/hr at 05/20/20 1026, 30 mcg/kg/min at 05/20/20 1026    albuterol (PROVENTIL) nebulizer solution 2.5 mg, 2.5 mg, Nebulization, Q4H PRN, Day Cao MD    [DISCONTINUED] acetaminophen (TYLENOL) tablet 650 mg, 650 mg, Oral, Q6H PRN **OR** acetaminophen (TYLENOL) suppository 650 mg, 650 mg, Rectal, Q6H PRN, Candace Campbell MD, 650 mg at 05/10/20 2036    polyethylene glycol (GLYCOLAX) packet 17 g, 17 g, Oral, Daily PRN, Candace Campbell MD    promethazine (PHENERGAN) tablet 12.5 mg, 12.5 mg, Oral, Q6H PRN **OR** [DISCONTINUED] ondansetron (ZOFRAN) injection 4 mg, 4 mg, Intravenous, Q6H PRN, Candace Campbell MD    magnesium hydroxide (MILK OF MAGNESIA) 400 MG/5ML suspension 30 mL, 30 mL, Oral, Daily PRN, Jimmy Ohara MD    [COMPLETED] pantoprazole (PROTONIX) injection 40 mg, 40 mg, Intravenous, Once, 40 mg at 05/08/20 0521 **AND** sodium chloride (PF) 0.9 % injection 10 mL, 10 mL, Intravenous, Once, KATHY Ricci CNP    Exam  Blood pressure 125/66, pulse 72, temperature 99.4 °F (37.4 °C), temperature source Rectal, resp. rate 24, height 6' (1.829 m), weight 184 lb 1.4 oz (83.5 kg), SpO2 (!) 88 %. Constitutional                          Vital signs: BP, HR, and RR reviewed            General depressed mental status  Eyes: unable to visualize the fundi  Cardiovascular: pulses not assessed at this time. Psychiatric: limited exam given encephalopathy but not agitated and no signs of hallucinations  Neurologic  Mental status: limited exam given encephalopathy  orientation kaylee due to encephalopathy. Chart(s)/Patient

## 2022-07-08 RX ORDER — ATORVASTATIN CALCIUM 40 MG/1
TABLET, FILM COATED ORAL
Qty: 90 TABLET | Refills: 1 | Status: SHIPPED | OUTPATIENT
Start: 2022-07-08

## 2022-07-11 ENCOUNTER — HOSPITAL ENCOUNTER (OUTPATIENT)
Dept: CARDIAC CATH/INVASIVE PROCEDURES | Age: 67
Discharge: HOME | End: 2022-07-11
Payer: MEDICARE

## 2022-07-11 VITALS
WEIGHT: 230 LBS | OXYGEN SATURATION: 98 % | BODY MASS INDEX: 30.48 KG/M2 | HEART RATE: 66 BPM | RESPIRATION RATE: 12 BRPM | SYSTOLIC BLOOD PRESSURE: 120 MMHG | TEMPERATURE: 98.2 F | DIASTOLIC BLOOD PRESSURE: 81 MMHG | HEIGHT: 73 IN

## 2022-07-11 LAB
ANION GAP SERPL CALCULATED.3IONS-SCNC: 11 MMOL/L (ref 3–16)
BUN BLDV-MCNC: 12 MG/DL (ref 7–20)
CALCIUM SERPL-MCNC: 9.2 MG/DL (ref 8.3–10.6)
CHLORIDE BLD-SCNC: 104 MMOL/L (ref 99–110)
CO2: 26 MMOL/L (ref 21–32)
CREAT SERPL-MCNC: 1.1 MG/DL (ref 0.8–1.3)
GFR AFRICAN AMERICAN: >60
GFR NON-AFRICAN AMERICAN: >60
GLUCOSE BLD-MCNC: 112 MG/DL (ref 70–99)
HCT VFR BLD CALC: 38.8 % (ref 40.5–52.5)
HEMOGLOBIN: 13 G/DL (ref 13.5–17.5)
MCH RBC QN AUTO: 27.2 PG (ref 26–34)
MCHC RBC AUTO-ENTMCNC: 33.4 G/DL (ref 31–36)
MCV RBC AUTO: 81.4 FL (ref 80–100)
PDW BLD-RTO: 15.3 % (ref 12.4–15.4)
PLATELET # BLD: 261 K/UL (ref 135–450)
PMV BLD AUTO: 7.1 FL (ref 5–10.5)
POC ACT LR: 260 SEC
POTASSIUM SERPL-SCNC: 4.3 MMOL/L (ref 3.5–5.1)
RBC # BLD: 4.76 M/UL (ref 4.2–5.9)
SODIUM BLD-SCNC: 141 MMOL/L (ref 136–145)
WBC # BLD: 6.8 K/UL (ref 4–11)

## 2022-07-11 PROCEDURE — 85347 COAGULATION TIME ACTIVATED: CPT

## 2022-07-11 PROCEDURE — 37224 HC FEM POP TERRITORY PLASTY: CPT

## 2022-07-11 PROCEDURE — 99153 MOD SED SAME PHYS/QHP EA: CPT

## 2022-07-11 PROCEDURE — C1725 CATH, TRANSLUMIN NON-LASER: HCPCS

## 2022-07-11 PROCEDURE — C1894 INTRO/SHEATH, NON-LASER: HCPCS

## 2022-07-11 PROCEDURE — 2709999900 HC NON-CHARGEABLE SUPPLY

## 2022-07-11 PROCEDURE — 75710 ARTERY X-RAYS ARM/LEG: CPT

## 2022-07-11 PROCEDURE — C1753 CATH, INTRAVAS ULTRASOUND: HCPCS

## 2022-07-11 PROCEDURE — 37252 INTRVASC US NONCORONARY 1ST: CPT

## 2022-07-11 PROCEDURE — 85027 COMPLETE CBC AUTOMATED: CPT

## 2022-07-11 PROCEDURE — 2580000003 HC RX 258

## 2022-07-11 PROCEDURE — 6360000004 HC RX CONTRAST MEDICATION: Performed by: INTERNAL MEDICINE

## 2022-07-11 PROCEDURE — 6360000002 HC RX W HCPCS

## 2022-07-11 PROCEDURE — 80048 BASIC METABOLIC PNL TOTAL CA: CPT

## 2022-07-11 PROCEDURE — 75774 ARTERY X-RAY EACH VESSEL: CPT

## 2022-07-11 PROCEDURE — 2500000003 HC RX 250 WO HCPCS

## 2022-07-11 PROCEDURE — 99152 MOD SED SAME PHYS/QHP 5/>YRS: CPT

## 2022-07-11 PROCEDURE — C1887 CATHETER, GUIDING: HCPCS

## 2022-07-11 PROCEDURE — C1769 GUIDE WIRE: HCPCS

## 2022-07-11 RX ORDER — ACETAMINOPHEN 325 MG/1
650 TABLET ORAL EVERY 4 HOURS PRN
Status: DISCONTINUED | OUTPATIENT
Start: 2022-07-11 | End: 2022-07-12 | Stop reason: HOSPADM

## 2022-07-11 RX ORDER — SODIUM CHLORIDE 0.9 % (FLUSH) 0.9 %
5-40 SYRINGE (ML) INJECTION EVERY 12 HOURS SCHEDULED
Status: DISCONTINUED | OUTPATIENT
Start: 2022-07-11 | End: 2022-07-12 | Stop reason: HOSPADM

## 2022-07-11 RX ORDER — SODIUM CHLORIDE 0.9 % (FLUSH) 0.9 %
5-40 SYRINGE (ML) INJECTION PRN
Status: DISCONTINUED | OUTPATIENT
Start: 2022-07-11 | End: 2022-07-12 | Stop reason: HOSPADM

## 2022-07-11 RX ORDER — SODIUM CHLORIDE 9 MG/ML
INJECTION, SOLUTION INTRAVENOUS PRN
Status: DISCONTINUED | OUTPATIENT
Start: 2022-07-11 | End: 2022-07-12 | Stop reason: HOSPADM

## 2022-07-11 RX ADMIN — IOPAMIDOL 85 ML: 612 INJECTION, SOLUTION INTRAVENOUS at 09:06

## 2022-07-11 NOTE — H&P
H&P Update -       Cardiology Consultation     Brenda Nava  1955     PCP: KATHY Marie - CNP  Referring Physician: Dr. Gina Alfonso   Reason for Referral: PAD   Chief Complaint:        Chief Complaint   Patient presents with    Follow-up       cc/concern- stent          Subjective:   History of Present Illness: The patient is 61 y.o. male with a past medical history significant for CAD s/p CABG, HLD, HTN, carotid disease, prior DVT, PAD s/p SFA stent, left BKA, s/p nephrectomy, prior nicotine addiction and alcohol abuse. He presented to [facility] 5/2020 with severe chest pain and found to have NSTEMI. He underwent heart catheterization revealing severe triple-vessel disease and subsequently underwent CABG x 4 on 5/8/20 with Dr. Kim Rodriguez. He did suffer from encephalopathy/delirium following bypass. He is s/p tracheostomy and PEG placement. He was discharged to [facility] for skilled nursing care. He was seen in office with Dr. Kim Rodriguez for follow up where it was noted that he was admitted to [facility] for bilateral foot wounds. He underwent peripheral angiograms with intervention to both legs while inpatient at [facility]. He underwent left BKA on 10/22/20. Lower extremity dopplers showed significant stenosis and underwent angiography 2/21/21 that showed severe right distal SFA/popliteal and tibial disease. S/p successful revascularization and SFA stenting. He presents today regarding pain and coolness in right lower extremity. He presents in a wheelchair today and has not been using a prothesis due to chronic wound on left BKA site. He denies chest pain with rest or exertion. His breathing has been comfortable without shortness of breath. He reports medication compliance and is tolerating. He denies abnormal bruising or bleeding.            Past Medical History:   Diagnosis Date    Acute cerebral infarction ([facility] 05/2020     R posterior frontal lobe    Alcohol withdrawal ([facility] 05/2020    Bilateral carotid artery stenosis 2020     bilat 50-79% stenosis    Hepatitis C antibody positive in blood 2020    History of bronchitis      History of DVT of lower extremity 2018     RLL, no previous scans to know whether supf or dvt. no coumadin. put on plavix. Hypertension      NSTEMI (non-ST elevated myocardial infarction) ([facility] 2020     3VD    PAD (peripheral artery disease) ([facility]      Respiratory compromise 2020     chemical exposure at work, seen at [facility], covid neg, given steroids    S/p nephrectomy 1970     age 17, pt not sure why    Tattoos              Past Surgical History:   Procedure Laterality Date    CARDIAC CATHETERIZATION   2020     Dr. Macrina Petersen - w/placement of IABP    CORONARY ARTERY BYPASS GRAFT N/A 2020     Dr. Nakia Rangel. Robles - urgent x4 (LIMA-LAD, L SVG-D1, SVG-OM, SVG-PDA)    GASTROSTOMY TUBE PLACEMENT N/A 2020     PERCUTANEOUS ENDOSCOPIC GASTROSTOMY TUBE PLACEMENT performed by Yolanda Sales MD at [address] [place] [address] N/A 7/10/2020     REMOVAL  GASTROSTOMY FEEDING TUBE  (40478) performed by Yolanda Sales MD at [facility]     [facility]    TOTAL NEPHRECTOMY Left       17 yo - pt doesn't know why    TRACHEOSTOMY   2020     Dr. Norma Newsome - w/bronchoscopy    TRANSESOPHAGEAL ECHOCARDIOGRAM   2020     during CABG            Family History   Problem Relation Age of Onset    Heart Disease Mother            of MI age 43    Heart Disease Brother           MI mid 46s    Heart Disease Father            of MI age 68    Heart Disease Paternal Aunt           MI in her 46s      Social History            Tobacco Use    Smoking status: Former Smoker       Types: Cigarettes       Quit date: 2019       Years since quittin.6    Smokeless tobacco: Never Used    Tobacco comment: started age 27   Vaping Use    Vaping Use: Never used   Substance Use Topics    Alcohol use: Yes       Comment: once a month, shot once a week. depends on mood. Drug use: Never         No Known Allergies         Current Outpatient Medications   Medication Sig Dispense Refill    amLODIPine (NORVASC) 5 MG tablet Take 1 tablet by mouth daily 90 tablet 3    XARELTO 2.5 MG TABS tablet TAKE 1 TABLET BY MOUTH TWICE A  tablet 1    atorvastatin (LIPITOR) 40 MG tablet TAKE 1 TABLET BY MOUTH EVERY DAY AT NIGHT 90 tablet 1    [employer] ASPIRIN ADULT LOW DOSE 81 MG chewable tablet TAKE 1 TABLET BY MOUTH EVERY DAY 90 tablet 3    pantoprazole (PROTONIX) 40 MG tablet Take 40 mg by mouth daily        QUEtiapine (SEROQUEL) 100 MG tablet Take 100 mg by mouth nightly        senna-docusate (PERICOLACE) 8.6-50 MG per tablet Take 1 tablet by mouth nightly        tiZANidine (ZANAFLEX) 4 MG tablet Take 4 mg by mouth nightly        acetaminophen (TYLENOL) 500 MG tablet Take 1,000 mg by mouth every 6 hours as needed for Pain        ferrous gluconate (FERGON) 324 (38 Fe) MG tablet Take 324 mg by mouth daily (with breakfast)        gabapentin (NEURONTIN) 800 MG tablet Take 800 mg by mouth 3 times daily. metoprolol tartrate (LOPRESSOR) 25 MG tablet Take 25 mg by mouth 2 times daily          No current facility-administered medications for this visit. Review of Systems:  Constitutional: No unanticipated weight loss. There's been no change in energy level, sleep pattern, or activity level. No fevers, chills. Eyes: No visual changes or diplopia. No scleral icterus. ENT: No Headaches, hearing loss or vertigo. No mouth sores or sore throat. Cardiovascular: as reviewed in HPI  Respiratory: No cough or wheezing, no sputum production. No hemoptysis. Gastrointestinal: No abdominal pain, appetite loss, blood in stools. No change in bowel or bladder habits. Genitourinary: No dysuria, trouble voiding, or hematuria. Musculoskeletal:  No gait disturbance, no joint complaints.   Integumentary: No rash or pruritis. Neurological: No headache, diplopia, change in muscle strength, numbness or tingling. Psychiatric: No anxiety or depression. Endocrine: No temperature intolerance. No excessive thirst, fluid intake, or urination. No tremor. Hematologic/Lymphatic: No abnormal bruising or bleeding, blood clots or swollen lymph nodes. Allergic/Immunologic: No nasal congestion or hives. Physical Exam:   /84   Pulse 75   Ht 6' 1\" (1.854 m)   Wt 217 lb (98.4 kg)   SpO2 97%   BMI 28.63 kg/m²       Wt Readings from Last 3 Encounters:   06/07/22 217 lb (98.4 kg)   12/23/21 217 lb (98.4 kg)   11/24/21 217 lb 9.5 oz (98.7 kg)      Constitutional: He is oriented to person, place, and time. He appears well-developed and well-nourished. In no acute distress. Head: Normocephalic and atraumatic. Pupils equal and round. Neck: Neck supple. No JVP or carotid bruit appreciated. No mass and no thyromegaly present. No lymphadenopathy present. Cardiovascular: Normal rate. Normal heart sounds. Exam reveals no gallop and no friction rub. No murmur heard. Pulmonary/Chest: Effort normal and breath sounds normal. No respiratory distress. He has no wheezes, rhonchi or rales. Abdominal: Soft, non-tender. Bowel sounds are normal. He exhibits no organomegaly, mass or bruit. Extremities: No edema. No cyanosis or clubbing. Pulses are 2+ radial/carotid bilaterally. +Doppler R DP -Doppler R PT and arch  Neurological: No gross cranial nerve deficit. Coordination normal.  Skin: Skin is warm and dry. There is no rash or diaphoresis. Psychiatric: He has a normal mood and affect.  His speech is normal and behavior is normal.     Lab Review:   FLP:          Lab Results   Component Value Date     TRIG 111 05/25/2020     HDL 33 06/01/2021     LDLCALC 59 06/01/2021     LABVLDL 28 06/01/2021      BUN/Creatinine:          Lab Results   Component Value Date     BUN 12 11/24/2021     CREATININE 1.0 11/24/2021      PT/INR, TNI, HGB A1C: Lab Results   Component Value Date/Time     TROPONINI 0.11 (H) 05/07/2020 01:17 PM     LABA1C 5.9 05/07/2020 12:00 PM      No results found for: CBCAUTODIF        Peripheral angiogram 7/22/20 ([facility])  1. Left lower extremity angiogram with recanalization of the distal superficial femoral, popliteal, and peroneal artery. 2. Angioplasty of the peroneal and popliteal artery with a 3 x 220 Basil balloon. 3. Angioplasty of the left superficial femoral and popliteal artery with a 5 x 200  balloon. 4. Completion angiogram.  5. Right lower extremity runoff. Peripheral angiogram 7/24/20 ([facility])   1. Ultrasound-guided left common femoral access   2. Pelvic angiogram and right lower extremity angiogram   3. Recanalization of the superficial femoral artery and popliteal artery with angioplasty. 4. Angioplasty of the popliteal artery with 3 x 220-mm Basil balloon. 5. Angioplasty of the popliteal and superficial femoral artery with a 5 x 200  balloon and completion angiogram.      Lower extremity arterial duplex 10/16/20 ([facility])  Right: Right SYBIL of 0.64 at both the right PTA and DPA with monophasic spectral Doppler signals. Right Toe pressure of 0.53. Left: Left SYBIL of 0.65 at the PTA. The left DPA was not obtainable due to ulceration. Left toe pressure of 0.35 with a monophasic PPG tracing. Previous: previous exam 6-6-20 right SYBIL 0.48 left SYBIL 0.56. Conclusions: Moderate disease in the bilateral legs without worsening from previous exam.     Peripheral 2/4/21   Severe right distal SFA/popliteal and tibial disease. S/p successful revascularization and SFA stenting 3V runoff to the foot. Peripheral angiogram MW 11/15/21  Right superficial femoral artery heavily diseased  with sequential 80% stenosis. Popliteal artery is occluded, in-stent  occlusion present. Faint collateral flow into the right anterior tibial  artery and posterior tibial artery.   The patient will be brought back for staged intervention in a few  Days. Peripheral Intervention  11/22/21  Severe right lower extremity PAD  Occluded SFA/Pop/TP trunk  S/p balloon angioplasty and thrombolytic catheter placement  Plan for EKOS removal tomorrow followed by laser atherectomy     Peripheral intervention  11/23/21   S/p successful SFA/Pop  reconstitution with laser atherectomy of prior pop stent  S/p angioplasty and DCB of the right SFA  Single vessel in-line flow to the DP/ anterior plantar arch  Peroneal/PT are filling via collateral circulation      SYBIL 3/11/21   Abnormal right SYBIL in the range of mild arterial disease. Right deep femoral artery stenosis of greater than 50%. Distal right popliteal artery stenosis measuring greater than 755 by   criteria associated with patent popliteal stent. SYBIL 11/2021   Right SYBIL was not available due to inadequate pulses . Elevated velocity of the deep femoral artery suggests a 50-70% stenosis. Occlusion of the distal superficial femoral and popliteal artery in the   right lower extremity. Critically low velocities in the right posterior tibial and anterior tibial   arteries. Left SYBIL was not available due to below knee amputation. Occlusion of the left popliteal artery. The majority of the waveforms are monophasic throughout the left lower   extremity. SYBIL 12/9/21   Right SYBIL was not available due to inadequate pulses. Preocculsive velocities noted in the proximal femoral artery. Findings are suggestive of multi level arterial disease. Left SYBIL not available due to BKA. The left popliteal artery is occluded. Findings are suggestive of inflow and outflow disease.       Bedside SYBIL 6/7/22  Right: PAD         All above diagnostic testing and laboratory data was independently visualized and reviewed by me (not simply review of report)        Assessment and Plan   1) PAD   Bedside SYBIL showed PAD on right LE  Negative Doppler pulses right PT and arch  Complains of pain in his right foot when moving toes  Complete peripheral angiogram in the next week       I have reviewed the history and physical and examined the patient and find no relevant changes. I have reviewed with the patient and/or family the risks, benefits, and alternatives to the procedure. Pre-sedation Assessment    Patient:  Rahul Child   :   1955  Intended Procedure: peripheral angiogram     Vitals:    22 0718   BP: 120/81   Pulse: 66   Resp: 12   Temp: 98.2 °F (36.8 °C)   SpO2: 98%       Nursing notes reviewed and agreed.   Medications reviewed  Allergies: No Known Allergies      Pre-Procedure Assessment/Plan:  ASA 2 - Patient with mild systemic disease with no functional limitations    Mallampati Airway Assessment:  Mallampati Class I - (soft palate, fauces, uvula & anterior/posterior tonsillar pillars are visible)    Level of Sedation Plan:Mild sedation    Post Procedure plan: Return to same level of care    Akash Arshad MD [address] [facility], Interventional Cardiology, and Peripheral Vascular [address] [facility]   (C): [phone]  (O): [phone]

## 2022-07-11 NOTE — OP NOTE
[facility]     Pre-operative Diagnosis:   1. Peripheral arterial disease with claudication; jose category 3    Post-operative Diagnosis:   1. Same     Procedures Performed:  - Left femoral artery access under fluroscopic and ultrasound guidance  - Left iliofemoral arteriogram.  - Aortoiliac arteriogram.  - Right lower extremity arteriogram  - Percutaneous balloon angioplasty of right posterior tibial artery using a 4.0 mm x 40 mm balloon   - Completion arteriogram.      :  Stella Orta MD.    Assistant:  None . Estimated Blood Loss:  10 mL. Indications for Procedure:  Brenda Nava is a 61 y.o. male who was evaluated as an outpatient with chronic arterial insufficiency with claudication of the right foot  and was deemed an appropriate candidate for an angiogram and possible intervention. Informed consent was obtained after discussion of potential benefits, alternatives and risks of the procedure, including but not limited to bleeding, infection, worsening of arterial insufficiency, and kidney injury due to contrast administration. Details of Procedure: The patient was taken to the cardiac cath lab and placed in supine position. IV sedation anesthesia was administered by the anesthesia team. The operative area was clipped, prepared and draped in sterile fashion. A brief time out was performed per hospital protocol. The left femoral artery was accessed under fluroscopic and ultrasound guidance with a micropuncture needle, wire, then sheath. A 4-Citizen of [place] sheath was inserted over a wire. An iliofemoral angiogram was performed. A JR diagnostic used to go up and over with an 035 Glidewire advantage. Selective right lower extremity angiogram was then performed        Right Leg  Common Femoral:   Without  Profunda: patent without significant disease  Superficial femoral: marked disease.    Popliteal: severe disease. ,  Distal 99%, with aneurysmal formation    Anterior Tibial: is  patent with mild disease  Posterior Tibial:  is not patent with severe disease  Peroneal: is not patent with severe disease      A 6 [place] sheath was inserted over the wire into the femoral artery and positioned up-and-over into the contralateral common femoral artery   and after 5000 units of intravenous heparin was administered and three minutes allowed to elapse. The right popliteal artery was recanalized using a 0.014 wire and 0.014 catheter. Balloon angioplasty of the right popliteal artery was performed using a 4.0 mm x 40 mm balloon. A completion arteriogram was performed. The sheath was withdrawn over a wire then exchanged for a short sheath. A Mynx closure device was used to close the arteriotomy. The patient tolerated the procedure well, was awakened and was taken to the post-operative care unit in stable condition.      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

## 2022-07-13 LAB — POC ACT LR: 177 SEC

## 2022-07-14 ENCOUNTER — TELEPHONE (OUTPATIENT)
Dept: CARDIOLOGY CLINIC | Age: 67
End: 2022-07-14

## 2022-07-14 NOTE — TELEPHONE ENCOUNTER
Discussed with Dr Carson Magaña and patient needs to be scheduled for right leg angiogram with pedal access with Solitario jasmine in the next couple of weeks. Dr Carson Magaña states that he will contact the rep.

## 2022-07-14 NOTE — TELEPHONE ENCOUNTER
Peripheral angiogram scheduled for 7/29/2022 at 9:00  Arrive at 7:30      The morning of your procedure you will park in the hospital parking lot and report directly to the cath lab to check in.     Pre-Procedure Instructions   1. You will need to fast for at least 8 hours prior to procedure. No caffeine the morning of.   2. You will need to hold your anticoagulation, Xarelto for 1 days prior. 3. All other medications can be taken in the morning with sips of water. 4. You will need to take 325 mg aspirin the morning of. If you are currently taking 81 mg please take 4 tablets that morning. 5. Do not use any lotions, creams or perfume the morning of procedure. 6. Pre-procedure lab work will need to be completed 5-7 days prior to procedure. 7. Please have a responsible adult to drive you home after procedure. We advise you have someone to stay with you for 24 hours following procedure for precautionary measures. Depending on procedure you may require an overnight stay. 8. Cath lab will provide you with all post procedure instructions. If you have any questions regarding the procedure itself or medications, please call [phone] and ask to speak with a nurse. Published on [employer] and e-mail to [place]. Left message for Pedro to return call.

## 2022-07-28 NOTE — OP NOTE
[facility]     Pre-operative Diagnosis:   1. Peripheral arterial disease with critical limb ischemia jose category 4    Post-operative Diagnosis:   1. Same     Procedures Performed:  - Left femoral artery access under fluroscopic and ultrasound guidance  - Left iliofemoral arteriogram.  - Right lower extremity arteriogram  - Percutaneous balloon angioplasty of right anterior tibial artery using a 3.0 mm x 220 mm balloon   - Percutaneous balloon angioplasty of right popliteal artery using a 4.0 mm x 60 mm balloon   - Percutaneous balloon angioplasty of right superficial femoral using a 6.0 mm x 60 IVL shockwave balloon   - Completion arteriogram.      :  Beatrice Navarro MD.    Assistant:  None . Anesthesia:  IV sedation and local anesthesia. Moderate Sedation:  Start time: 1204  Stop time: 1342  8 mg versed   400 mcg fentanyl   An independent trained observer pushed medications at my direction. We monitored the patient's level of consciousness and vital signs/physiologic status throughout the procedure duration (see start and start times above). Estimated Blood Loss:  10 mL. Indications for Procedure:  Juan Shearer is a 74 y.o. male who was evaluated as an outpatient with chronic arterial insufficiency with rest pain of the right foot and was deemed an appropriate candidate for an angiogram and possible intervention. Informed consent was obtained after discussion of potential benefits, alternatives and risks of the procedure, including but not limited to bleeding, infection, worsening of arterial insufficiency, and kidney injury due to contrast administration. Details of Procedure: The patient was taken to the cardiac cath lab and placed in supine position. IV sedation anesthesia was administered by the anesthesia team. The operative area was clipped, prepared and draped in sterile fashion. A brief time out was performed per hospital protocol.     The left femoral artery was accessed under fluroscopic and ultrasound guidance with a micropuncture needle, wire, then sheath. A 4-[place] sheath was inserted over a wire. An iliofemoral angiogram was performed. Using a jR diagnostic to go up and over and place a 0.035 glide wire, the sheath was then exchanged for a 6F up and over sheath, positioned into the right common femoral artery       Right Leg  Common Femoral:   without disease   Profunda: patent without significant disease  Superficial femoral: severe disease, sequential 90% heavily calcified lesinos   Popliteal: severe disease, 99% ISR of prior stent     TP trunk 99%     Anterior Tibial: is  patent with severe disease, ostial 99%   Posterior Tibial:  is  patent with severe disease, ostial 99%   Peroneal: is  patent with moderate disease      The right anterior tibial artery was recanalized using a 0.014 wire and 0.014 micro catheter. Percutaneous balloon angioplasty of right anterior tibial artery using a 3.0 mm x 220 mm balloon     Percutaneous balloon angioplasty of right popliteal artery using a 4.0 mm x 60 mm balloon     Percutaneous balloon angioplasty of right superficial femoral using a 6.0 mm x 60 IVL shockwave balloon    A completion arteriogram was performed. The sheath was withdrawn over a wire then exchanged for a short sheath. A Mynx closure device was used to close the arteriotomy. The patient tolerated the procedure well, was awakened and was taken to the post-operative care unit in stable condition.      Impression/Plan:   S/p angioplasty of right AT/popliteal  S/p shockwave lithotripsy of right SFA   Recommend repeat angiogram in ~ 2 weeks to determine patency of TP trunk and distal PT stent   Aggressive medical therapy for PAD     Yuko Killian MD [address] [facility], Interventional Cardiology, and Peripheral Vascular [address] [facility]   (C): [phone]  (O): [phone]

## 2022-07-29 ENCOUNTER — HOSPITAL ENCOUNTER (OUTPATIENT)
Dept: CARDIAC CATH/INVASIVE PROCEDURES | Age: 67
Discharge: HOME | End: 2022-07-29
Payer: MEDICARE

## 2022-07-29 VITALS
WEIGHT: 230 LBS | BODY MASS INDEX: 31.15 KG/M2 | HEART RATE: 61 BPM | OXYGEN SATURATION: 100 % | DIASTOLIC BLOOD PRESSURE: 80 MMHG | HEIGHT: 72 IN | TEMPERATURE: 97.2 F | RESPIRATION RATE: 18 BRPM | SYSTOLIC BLOOD PRESSURE: 142 MMHG

## 2022-07-29 LAB
ANION GAP SERPL CALCULATED.3IONS-SCNC: 9 MMOL/L (ref 3–16)
BUN BLDV-MCNC: 28 MG/DL (ref 7–20)
CALCIUM SERPL-MCNC: 9.7 MG/DL (ref 8.3–10.6)
CHLORIDE BLD-SCNC: 101 MMOL/L (ref 99–110)
CO2: 28 MMOL/L (ref 21–32)
CREAT SERPL-MCNC: 1.4 MG/DL (ref 0.8–1.3)
GFR AFRICAN AMERICAN: >60
GFR NON-AFRICAN AMERICAN: 51
GLUCOSE BLD-MCNC: 96 MG/DL (ref 70–99)
HCT VFR BLD CALC: 42.5 % (ref 40.5–52.5)
HEMOGLOBIN: 13.9 G/DL (ref 13.5–17.5)
MCH RBC QN AUTO: 27.4 PG (ref 26–34)
MCHC RBC AUTO-ENTMCNC: 32.8 G/DL (ref 31–36)
MCV RBC AUTO: 83.5 FL (ref 80–100)
PDW BLD-RTO: 16.5 % (ref 12.4–15.4)
PLATELET # BLD: 338 K/UL (ref 135–450)
PMV BLD AUTO: 6.6 FL (ref 5–10.5)
POC ACT LR: 229 SEC
POC ACT LR: 347 SEC
POTASSIUM SERPL-SCNC: 5.1 MMOL/L (ref 3.5–5.1)
RBC # BLD: 5.09 M/UL (ref 4.2–5.9)
SODIUM BLD-SCNC: 138 MMOL/L (ref 136–145)
WBC # BLD: 9.2 K/UL (ref 4–11)

## 2022-07-29 PROCEDURE — 85347 COAGULATION TIME ACTIVATED: CPT

## 2022-07-29 PROCEDURE — 2500000003 HC RX 250 WO HCPCS

## 2022-07-29 PROCEDURE — 36247 INS CATH ABD/L-EXT ART 3RD: CPT | Performed by: INTERNAL MEDICINE

## 2022-07-29 PROCEDURE — 37252 INTRVASC US NONCORONARY 1ST: CPT

## 2022-07-29 PROCEDURE — 6360000004 HC RX CONTRAST MEDICATION: Performed by: INTERNAL MEDICINE

## 2022-07-29 PROCEDURE — 75710 ARTERY X-RAYS ARM/LEG: CPT | Performed by: INTERNAL MEDICINE

## 2022-07-29 PROCEDURE — C1760 CLOSURE DEV, VASC: HCPCS

## 2022-07-29 PROCEDURE — C1769 GUIDE WIRE: HCPCS

## 2022-07-29 PROCEDURE — 80048 BASIC METABOLIC PNL TOTAL CA: CPT

## 2022-07-29 PROCEDURE — C1887 CATHETER, GUIDING: HCPCS

## 2022-07-29 PROCEDURE — 37236 OPEN/PERQ PLACE STENT 1ST: CPT | Performed by: INTERNAL MEDICINE

## 2022-07-29 PROCEDURE — 6370000000 HC RX 637 (ALT 250 FOR IP): Performed by: INTERNAL MEDICINE

## 2022-07-29 PROCEDURE — 6370000000 HC RX 637 (ALT 250 FOR IP)

## 2022-07-29 PROCEDURE — 99152 MOD SED SAME PHYS/QHP 5/>YRS: CPT | Performed by: INTERNAL MEDICINE

## 2022-07-29 PROCEDURE — C1894 INTRO/SHEATH, NON-LASER: HCPCS

## 2022-07-29 PROCEDURE — 2709999900 HC NON-CHARGEABLE SUPPLY

## 2022-07-29 PROCEDURE — 6360000002 HC RX W HCPCS

## 2022-07-29 PROCEDURE — C1874 STENT, COATED/COV W/DEL SYS: HCPCS

## 2022-07-29 PROCEDURE — 37226 HC FEM POP TERR PLASTY AND STENT: CPT

## 2022-07-29 PROCEDURE — 99153 MOD SED SAME PHYS/QHP EA: CPT

## 2022-07-29 PROCEDURE — 85027 COMPLETE CBC AUTOMATED: CPT

## 2022-07-29 PROCEDURE — C1725 CATH, TRANSLUMIN NON-LASER: HCPCS

## 2022-07-29 PROCEDURE — 99152 MOD SED SAME PHYS/QHP 5/>YRS: CPT

## 2022-07-29 PROCEDURE — 75716 ARTERY X-RAYS ARMS/LEGS: CPT

## 2022-07-29 RX ORDER — SODIUM CHLORIDE 0.9 % (FLUSH) 0.9 %
5-40 SYRINGE (ML) INJECTION EVERY 12 HOURS SCHEDULED
Status: DISCONTINUED | OUTPATIENT
Start: 2022-07-29 | End: 2022-07-29 | Stop reason: ALTCHOICE

## 2022-07-29 RX ORDER — ACETAMINOPHEN 325 MG/1
650 TABLET ORAL EVERY 4 HOURS PRN
Status: DISCONTINUED | OUTPATIENT
Start: 2022-07-29 | End: 2022-07-30 | Stop reason: HOSPADM

## 2022-07-29 RX ORDER — CLOPIDOGREL BISULFATE 75 MG/1
75 TABLET ORAL DAILY
Qty: 30 TABLET | Refills: 3 | Status: SHIPPED | OUTPATIENT
Start: 2022-07-29 | End: 2022-08-24

## 2022-07-29 RX ORDER — ASPIRIN 325 MG
325 TABLET ORAL ONCE
Status: DISCONTINUED | OUTPATIENT
Start: 2022-07-29 | End: 2022-07-29 | Stop reason: ALTCHOICE

## 2022-07-29 RX ORDER — SODIUM CHLORIDE 0.9 % (FLUSH) 0.9 %
5-40 SYRINGE (ML) INJECTION PRN
Status: DISCONTINUED | OUTPATIENT
Start: 2022-07-29 | End: 2022-07-30 | Stop reason: HOSPADM

## 2022-07-29 RX ORDER — CLOPIDOGREL BISULFATE 75 MG/1
300 TABLET ORAL ONCE
Status: COMPLETED | OUTPATIENT
Start: 2022-07-29 | End: 2022-07-29

## 2022-07-29 RX ORDER — SODIUM CHLORIDE 0.9 % (FLUSH) 0.9 %
5-40 SYRINGE (ML) INJECTION PRN
Status: DISCONTINUED | OUTPATIENT
Start: 2022-07-29 | End: 2022-07-29 | Stop reason: ALTCHOICE

## 2022-07-29 RX ORDER — SODIUM CHLORIDE 0.9 % (FLUSH) 0.9 %
5-40 SYRINGE (ML) INJECTION EVERY 12 HOURS SCHEDULED
Status: DISCONTINUED | OUTPATIENT
Start: 2022-07-29 | End: 2022-07-30 | Stop reason: HOSPADM

## 2022-07-29 RX ORDER — SODIUM CHLORIDE 9 MG/ML
INJECTION, SOLUTION INTRAVENOUS PRN
Status: DISCONTINUED | OUTPATIENT
Start: 2022-07-29 | End: 2022-07-30 | Stop reason: HOSPADM

## 2022-07-29 RX ORDER — SODIUM CHLORIDE 9 MG/ML
INJECTION, SOLUTION INTRAVENOUS PRN
Status: DISCONTINUED | OUTPATIENT
Start: 2022-07-29 | End: 2022-07-29 | Stop reason: ALTCHOICE

## 2022-07-29 RX ADMIN — IOPAMIDOL 140 ML: 612 INJECTION, SOLUTION INTRAVENOUS at 10:42

## 2022-07-29 RX ADMIN — CLOPIDOGREL BISULFATE 300 MG: 75 TABLET ORAL at 12:28

## 2022-07-29 NOTE — DISCHARGE INSTRUCTIONS
PERIPHERAL ANGIOGRAM    Care of your puncture site:  Remove bandage 24 hours after the procedure. May shower in 24 hours but do not sit in a bathtub/pool of water for 5 days or until the wound is healed. Gently clean groin using soap and water. Dry thoroughly and apply a Band-Aid that covers the entire site. Use Band-Aid until skin heals over in about 3-5 days. Do not apply powder or lotion. Normal Observations:  Soreness or tenderness which may last one week. Mild oozing from the incision site. Possible bruising that could last 2 weeks. Activity:  Do not make important / legal decisions within 24 hours after procedure. You may resume normal activity in 5 days or after the wound heals. Avoid lifting more than 10 pounds for 5 days or until the wound heals. Avoid strenuous exercise or activity for 1 week. Nutrition:  Regular diet. Drink at least 8 to 10 glasses of decaffeinated, non-alcoholic fluid for the next 24 hours to flush the x-ray dye used for your angiogram out of your body. Call your doctor immediately if your condition worsens, for any other concerns, for a follow-up appointment or if you experience any of the following:  Increased swelling on the groin or leg. Unusual pain, numbness, or tingling of the groin or down the leg. Any signs of infection such as: redness, yellow drainage at the site, swelling or pain.     IF GROIN STARTS BLEEDING SIGNIFICANTLY:   LAY FLAT, HOLD FIRM DIRECT PRESSURE, AND CALL 911

## 2022-08-17 LAB — POC ACT LR: 177 SEC

## 2022-08-17 PROCEDURE — 85347 COAGULATION TIME ACTIVATED: CPT

## 2022-08-21 NOTE — OP NOTE
[facility]     Pre-operative Diagnosis:   1. Popliteal artery aneurysm     Post-operative Diagnosis:   1. Same     Procedures Performed:  - Left femoral artery access under fluroscopic and ultrasound guidance  - Right posterior tibial artery access   - Right lower extremity arteriogram  - Percutaneous balloon angioplasty of right superficial femoral artery using a 5.0 mm x 60 mm balloon   - Percutaneous insertion of self-expanding viabahn covered stent (6.0 mm x 50 mm) into right popliteal artery. - Completion arteriogram.      :  Tyler Abbott MD.    Assistant:  None . Anesthesia:  IV sedation and local anesthesia. Moderate Sedation:  Start time: 0903  Stop time: 1028  5 mg versed   250 mcg fentanyl   An independent trained observer pushed medications at my direction. We monitored the patient's level of consciousness and vital signs/physiologic status throughout the procedure duration (see start and start times above). Estimated Blood Loss:  5 mL. Indications for Procedure:  Cathy Pereira is a 74 y.o. male who was evaluated as an outpatient with chronic arterial insufficiency with claudication of the right foot  and was deemed an appropriate candidate for an angiogram and possible intervention. Informed consent was obtained after discussion of potential benefits, alternatives and risks of the procedure, including but not limited to bleeding, infection, worsening of arterial insufficiency, and kidney injury due to contrast administration. Details of Procedure: The patient was taken to the cardiac cath lab and placed in supine position. IV sedation anesthesia was administered by the anesthesia team. The operative area was clipped, prepared and draped in sterile fashion. A brief time out was performed per hospital protocol. The left femoral artery was accessed under fluroscopic and ultrasound guidance with a micropuncture needle, wire, then sheath.  A 4-Romanian sheath was inserted over a wire. An iliofemoral angiogram was performed. Using a JR diagnostic to go up and over and place an 0.035 wire into the contralateral CFA. Then a 6F up and over sheath was placed into the contralateral CFA. A selective RLE angiogram was performed     Right Leg  Common Femoral:   without disease   Profunda: patent without significant disease  Superficial femoral: moderate disease. Popliteal: severe disease, with a distal anuerysm at the edge of previously deployed supera stent     Anterior Tibial: is  patent with mild disease  Posterior Tibial:  is  patent with severe disease  Peroneal: is  patent with severe disease      The right anterior tibial artery was recanalized using a 0.014 wire and 0.035 catheter. Percutaneous balloon angioplasty of right superficial femoral artery using a 5.0 mm x 60 mm balloon     Percutaneous insertion of self-expanding viabahn covered stent (6.0 mm x 50 mm) into right popliteal artery. A completion arteriogram was performed. The sheath was withdrawn over a wire then exchanged for a short sheath. A Mynx closure device was used to close the arteriotomy. The patient tolerated the procedure well, was awakened and was taken to the post-operative care unit in stable condition. Impression/Plan:   Right popliteal a.  Aneursym s/p successful stenting with 6.0 Viabahn  Serial outpatient doppler, 1 month/3month/6 month/yearly   Asa/plavix   Xarelto 2.5 bid     Rachelle Salomon MD [address] [facility], Interventional Cardiology, and Peripheral Vascular [address] [facility]   (C): [phone]  (O): [phone]

## 2022-08-24 RX ORDER — CLOPIDOGREL BISULFATE 75 MG/1
TABLET ORAL
Qty: 30 TABLET | Refills: 5 | Status: SHIPPED | OUTPATIENT
Start: 2022-08-24 | End: 2022-09-23 | Stop reason: SDUPTHER

## 2022-08-26 RX ORDER — RIVAROXABAN 2.5 MG/1
TABLET, FILM COATED ORAL
Qty: 180 TABLET | Refills: 3 | Status: SHIPPED | OUTPATIENT
Start: 2022-08-26

## 2022-09-13 ENCOUNTER — OFFICE VISIT (OUTPATIENT)
Dept: CARDIOLOGY CLINIC | Age: 67
End: 2022-09-13
Payer: MEDICARE

## 2022-09-13 ENCOUNTER — TELEPHONE (OUTPATIENT)
Dept: CARDIOLOGY CLINIC | Age: 67
End: 2022-09-13

## 2022-09-13 VITALS
HEART RATE: 80 BPM | OXYGEN SATURATION: 98 % | SYSTOLIC BLOOD PRESSURE: 100 MMHG | HEIGHT: 72 IN | DIASTOLIC BLOOD PRESSURE: 70 MMHG | BODY MASS INDEX: 31.19 KG/M2

## 2022-09-13 DIAGNOSIS — I73.9 PAD (PERIPHERAL ARTERY DISEASE) (HCC): Primary | ICD-10-CM

## 2022-09-13 DIAGNOSIS — I25.10 CORONARY ARTERY DISEASE INVOLVING NATIVE CORONARY ARTERY OF NATIVE HEART WITHOUT ANGINA PECTORIS: ICD-10-CM

## 2022-09-13 DIAGNOSIS — I65.23 BILATERAL CAROTID ARTERY STENOSIS: ICD-10-CM

## 2022-09-13 DIAGNOSIS — I10 ESSENTIAL HYPERTENSION: ICD-10-CM

## 2022-09-13 DIAGNOSIS — E78.5 HYPERLIPIDEMIA LDL GOAL <70: ICD-10-CM

## 2022-09-13 PROCEDURE — G8417 CALC BMI ABV UP PARAM F/U: HCPCS | Performed by: INTERNAL MEDICINE

## 2022-09-13 PROCEDURE — 1123F ACP DISCUSS/DSCN MKR DOCD: CPT | Performed by: INTERNAL MEDICINE

## 2022-09-13 PROCEDURE — G8427 DOCREV CUR MEDS BY ELIG CLIN: HCPCS | Performed by: INTERNAL MEDICINE

## 2022-09-13 PROCEDURE — 1036F TOBACCO NON-USER: CPT | Performed by: INTERNAL MEDICINE

## 2022-09-13 PROCEDURE — 99214 OFFICE O/P EST MOD 30 MIN: CPT | Performed by: INTERNAL MEDICINE

## 2022-09-13 PROCEDURE — 3017F COLORECTAL CA SCREEN DOC REV: CPT | Performed by: INTERNAL MEDICINE

## 2022-09-13 NOTE — TELEPHONE ENCOUNTER
Patient was seen in office and will need to follow up with Dr. Vel Jaffe in [facility] and follow up with Dr. Chacorta Sands in 6 months. Please facilitate scheduling.

## 2022-09-13 NOTE — PROGRESS NOTES
Cardiology Consultation     Orlando Stein  1955    PCP: Yojana Wyatt, KATHY - CNP  Referring Physician: Dr. Chito Vargas   Reason for Referral: PAD   Chief Complaint:   Chief Complaint   Patient presents with    Follow-up     F/u peripheral     Subjective:   History of Present Illness: The patient is 74 y.o. male with a past medical history significant for CAD s/p CABG, HLD, HTN, carotid disease, prior DVT, PAD s/p SFA stent, left BKA, s/p nephrectomy, prior nicotine addiction and alcohol abuse. He presented to [facility] 5/2020 with severe chest pain and found to have NSTEMI. He underwent heart catheterization revealing severe triple-vessel disease and subsequently underwent CABG x 4 on 5/8/20 with Dr. Filippo Triplett. He did suffer from encephalopathy/delirium following bypass. He is s/p tracheostomy and PEG placement. He was discharged to [facility] for skilled nursing care. He was seen in office with Dr. Filippo Triplett for follow up where it was noted that he was admitted to [facility] for bilateral foot wounds. He underwent peripheral angiograms with intervention to both legs while inpatient at [facility]. He underwent left BKA on 10/22/20. Lower extremity dopplers showed significant stenosis and underwent angiography 2/21/21 that showed severe right distal SFA/popliteal and tibial disease. S/p successful revascularization and SFA stenting. He reported worsening right foot pain and underwent peripheral that resulted in right popliteal aneurysm s/p successful stenting with 6.0 Viabahn. Today, he presents in a wheelchair and states he is doing well. He denies any foot pain and has strong pedal pulses in office today. No open wounds or ulcerations. He continues to follow with wound care at [facility] for management of left BKA. He denies any abnormal bleeding or bruising.  Patient denies exertional chest pain/pressure, dyspnea at rest, KOVACS, PND, orthopnea, palpitations, lightheadedness, weight changes, changes in LE edema, and syncope. Past Medical History:   Diagnosis Date    Acute cerebral infarction ([facility] [address] 2020    R posterior frontal lobe    Alcohol withdrawal ([facility] [address] 2020    Bilateral carotid artery stenosis 2020    bilat 50-79% stenosis    CAD (coronary artery disease)     Hepatitis C antibody positive in blood 2020    History of bronchitis     History of DVT of lower extremity 2018    RLL, no previous scans to know whether supf or dvt. no coumadin. put on plavix. Hypertension     NSTEMI (non-ST elevated myocardial infarction) ([address] 2020    3VD    PAD (peripheral artery disease) ([facility] [address]     Respiratory compromise 2020    chemical exposure at work, seen at [facility], covid neg, given steroids    S/p nephrectomy 1970    age 17, pt not sure why     Tattoos      Past Surgical History:   Procedure Laterality Date    CARDIAC CATHETERIZATION  2020    Dr. Rishi Abraham - w/placement of IABP    CARDIAC CATHETERIZATION      peripheral angiogram, 22 & 22    CORONARY ARTERY BYPASS GRAFT N/A 2020    Dr. Justin Bustamante. [place] - urgent x4 (LIMA-LAD, L SVG-D1, SVG-OM, SVG-PDA)    GASTROSTOMY TUBE PLACEMENT N/A 2020    PERCUTANEOUS ENDOSCOPIC GASTROSTOMY TUBE PLACEMENT performed by Mark Leon MD at [facility] N/A 07/10/2020    REMOVAL  GASTROSTOMY FEEDING TUBE  (09304) performed by Mark Leon MD at [facility] Left 2020    Dr. Velma Jane, [facility] guided, 1300 ml drained    TOTAL NEPHRECTOMY Left     17 yo - pt doesn't know why    TRACHEOSTOMY  2020    Dr. Mason Armas - w/bronchoscopy    TRANSESOPHAGEAL ECHOCARDIOGRAM  2020    during CABG     Family History   Problem Relation Age of Onset    Heart Disease Mother          of MI age 43    Heart Disease Brother         MI mid 46s    Heart Disease Father          of MI age 68    Heart Disease Paternal Aunt         MI in her 46s     Social History     Tobacco Use Smoking status: Former     Types: Cigarettes     Quit date: 2019     Years since quittin.8    Smokeless tobacco: Never    Tobacco comments:     started age 27   Vaping Use    Vaping Use: Never used   Substance Use Topics    Alcohol use: Yes     Comment: once a month, shot once a week. depends on mood. Drug use: Never       No Known Allergies  Current Outpatient Medications   Medication Sig Dispense Refill    XARELTO 2.5 MG TABS tablet TAKE 1 TABLET BY MOUTH TWICE A  tablet 3    clopidogrel (PLAVIX) 75 MG tablet TAKE 1 TABLET BY MOUTH EVERY DAY IN THE MORNING 30 tablet 5    atorvastatin (LIPITOR) 40 MG tablet TAKE 1 TABLET BY MOUTH EVERY DAY AT NIGHT 90 tablet 1    amLODIPine (NORVASC) 5 MG tablet Take 1 tablet by mouth daily 90 tablet 3    [employer] ASPIRIN ADULT LOW DOSE 81 MG chewable tablet TAKE 1 TABLET BY MOUTH EVERY DAY 90 tablet 3    QUEtiapine (SEROQUEL) 100 MG tablet Take 100 mg by mouth nightly      acetaminophen (TYLENOL) 500 MG tablet Take 1,000 mg by mouth every 6 hours as needed for Pain      ferrous gluconate (FERGON) 324 (38 Fe) MG tablet Take 324 mg by mouth daily (with breakfast)      gabapentin (NEURONTIN) 800 MG tablet Take 800 mg by mouth 3 times daily. metoprolol tartrate (LOPRESSOR) 25 MG tablet Take 25 mg by mouth 2 times daily       No current facility-administered medications for this visit. Review of Systems:  Constitutional: No unanticipated weight loss. There's been no change in energy level, sleep pattern, or activity level. No fevers, chills. Eyes: No visual changes or diplopia. No scleral icterus. ENT: No Headaches, hearing loss or vertigo. No mouth sores or sore throat. Cardiovascular: as reviewed in HPI  Respiratory: No cough or wheezing, no sputum production. No hemoptysis. Gastrointestinal: No abdominal pain, appetite loss, blood in stools. No change in bowel or bladder habits.   Genitourinary: No dysuria, trouble voiding, or hematuria. Musculoskeletal:  No gait disturbance, no joint complaints. Integumentary: No rash or pruritis. Neurological: No headache, diplopia, change in muscle strength, numbness or tingling. Psychiatric: No anxiety or depression. Endocrine: No temperature intolerance. No excessive thirst, fluid intake, or urination. No tremor. Hematologic/Lymphatic: No abnormal bruising or bleeding, blood clots or swollen lymph nodes. Allergic/Immunologic: No nasal congestion or hives. Physical Exam:   /70   Pulse 80   Ht 6' (1.829 m)   SpO2 98%   BMI 31.19 kg/m²   Wt Readings from Last 3 Encounters:   07/29/22 230 lb (104.3 kg)   07/11/22 230 lb (104.3 kg)   06/16/22 235 lb (106.6 kg)     Constitutional: He is oriented to person, place, and time. He appears well-developed and well-nourished. In no acute distress. Head: Normocephalic and atraumatic. Pupils equal and round. Neck: Neck supple. No JVP or carotid bruit appreciated. No mass and no thyromegaly present. No lymphadenopathy present. Cardiovascular: Normal rate. Normal heart sounds. Exam reveals no gallop and no friction rub. No murmur heard. Pulmonary/Chest: Effort normal and breath sounds normal. No respiratory distress. He has no wheezes, rhonchi or rales. Abdominal: Soft, non-tender. Bowel sounds are normal. He exhibits no organomegaly, mass or bruit. Extremities: No edema. No cyanosis or clubbing. Pulses are 2+ radial/carotid bilaterally. +Doppler R DP +Doppler R PT and arch  Neurological: No gross cranial nerve deficit. Coordination normal.   Skin: Skin is warm and dry. There is no rash or diaphoresis. Psychiatric: He has a normal mood and affect.  His speech is normal and behavior is normal.     Lab Review:   FLP:    Lab Results   Component Value Date/Time    TRIG 111 05/25/2020 04:05 AM    HDL 33 06/01/2021 01:26 PM    LDLCALC 59 06/01/2021 01:26 PM    LABVLDL 28 06/01/2021 01:26 PM     BUN/Creatinine:    Lab Results   Component Value Date/Time    BUN 28 07/29/2022 07:45 AM    CREATININE 1.4 07/29/2022 07:45 AM     PT/INR, TNI, HGB A1C:   Lab Results   Component Value Date/Time    TROPONINI 0.11 (H) 05/07/2020 01:17 PM    LABA1C 5.9 05/07/2020 12:00 PM      No results found for: CBCAUTODIF      Peripheral angiogram 7/22/20 ([facility])   1. Left lower extremity angiogram with recanalization of the distal superficial femoral, popliteal, and peroneal artery. 2. Angioplasty of the peroneal and popliteal artery with a 3 x 220 Basil balloon. 3. Angioplasty of the left superficial femoral and popliteal artery with a 5 x 200  balloon. 4. Completion angiogram.  5. Right lower extremity runoff. Peripheral angiogram 7/24/20 ([facility])   1. Ultrasound-guided left common femoral access   2. Pelvic angiogram and right lower extremity angiogram   3. Recanalization of the superficial femoral artery and popliteal artery with angioplasty. 4. Angioplasty of the popliteal artery with 3 x 220-mm Basil balloon. 5. Angioplasty of the popliteal and superficial femoral artery with a 5 x 200  balloon and completion angiogram.      Lower extremity arterial duplex 10/16/20 ([facility])   Right: Right SYBIL of 0.64 at both the right PTA and DPA with monophasic spectral Doppler signals. Right Toe pressure of 0.53. Left: Left SYBIL of 0.65 at the PTA. The left DPA was not obtainable due to ulceration. Left toe pressure of 0.35 with a monophasic PPG tracing. Previous: previous exam 6-6-20 right SYBIL 0.48 left SYBIL 0.56. Conclusions: Moderate disease in the bilateral legs without worsening from previous exam.    Peripheral 2/4/21   Severe right distal SFA/popliteal and tibial disease. S/p successful revascularization and SFA stenting 3V runoff to the foot. Peripheral angiogram MW 11/15/21   Right superficial femoral artery heavily diseased  with sequential 80% stenosis. Popliteal artery is occluded, in-stent  occlusion present.   Faint collateral flow into the right anterior tibial  artery and posterior tibial artery. The patient will be brought back for staged intervention in a few  Days. Peripheral Intervention  11/22/21  Severe right lower extremity PAD   Occluded SFA/Pop/TP trunk   S/p balloon angioplasty and thrombolytic catheter placement   Plan for EKOS removal tomorrow followed by laser atherectomy      Peripheral intervention  11/23/21   S/p successful SFA/Pop  reconstitution with laser atherectomy of prior pop stent   S/p angioplasty and DCB of the right SFA   Single vessel in-line flow to the DP/ anterior plantar arch   Peroneal/PT are filling via collateral circulation      SYBIL 3/11/21   Abnormal right SYBIL in the range of mild arterial disease. Right deep femoral artery stenosis of greater than 50%. Distal right popliteal artery stenosis measuring greater than 755 by   criteria associated with patent popliteal stent. SYBIL 11/2021    Right SYBIL was not available due to inadequate pulses . Elevated velocity of the deep femoral artery suggests a 50-70% stenosis. Occlusion of the distal superficial femoral and popliteal artery in the   right lower extremity. Critically low velocities in the right posterior tibial and anterior tibial   arteries. Left SYBIL was not available due to below knee amputation. Occlusion of the left popliteal artery. The majority of the waveforms are monophasic throughout the left lower   extremity. SYBIL 12/9/21   Right SYBIL was not available due to inadequate pulses. Preocculsive velocities noted in the proximal femoral artery. Findings are suggestive of multi level arterial disease. Left SYBIL not available due to BKA. The left popliteal artery is occluded. Findings are suggestive of inflow and outflow disease.      Peripheral 7/29/22   Right popliteal aneurysm s/p successful stenting with 6.0 Viabahn    All above diagnostic testing and laboratory data was independently visualized and reviewed by me (not simply review of report)       Assessment and Plan   1) PAD   S/p left BKA  Right popliteal aneursym s/p successful stenting with 6.0 Viabahn 7/2022  Serial outpatient doppler, 3month/6 month/yearly   Asa/plavix   Xarelto 2.5 bid   Strong pedal pulses   Follows with wound care at [facility] in October    2) CAD   No symptoms of angina   Follows with Dr. Apple Norwood for general cardiology     3) Hypertension   Well controlled in the office   Remain on current medical therapy    4) Hyperlipidemia   LDL goal <70 mg/dL   Continue high intensity statin therapy     5) Carotid stenosis  Management per Dr. Apple Norwood     Follow up in 6 months and follow up with Dr Apple Norwood in 3 months      Thank you very much for allowing me to participate in the care of your patient. Please do not hesitate to contact me if you have any questions. Wilmar Wood MD [facility], Interventional Cardiology, and Peripheral Vascular Disease   [facility]   Ph: [phone]  Fax: [phone]    This note was scribed in the presence of Dr Radha Forbes MD by Mahesh Neff, JUAN. Physician Attestation:  The scribes documentation has been prepared under my direction and personally reviewed by me in its entirety. I confirm the note above accurately reflects all work, treatment, procedures, and medical decision making performed by me.     Electronically signed by Teresa Palacios MD on 10/2/2022 at 2:59 PM

## 2022-09-16 NOTE — TELEPHONE ENCOUNTER
Left message for Adrianne Vance to call the office back to schedule follow up with Dr. Lucia Winter and Dr. Donnell Sanders

## 2022-09-21 NOTE — TELEPHONE ENCOUNTER
Last OV:2022  Last Labs:2022  Last Refills:2022  Next Appt: notes Return in about 6 months (around 3/13/2023).     Last EK2022      clopidogrel (PLAVIX) 75 MG tablet

## 2022-09-22 NOTE — H&P
H&P Update -       Cardiology Consultation     Pinky Se  1955     PCP: KATHY Cotter - CNP  Referring Physician: Dr. Caleb Ortiz   Reason for Referral: PAD   Chief Complaint:           Chief Complaint   Patient presents with    Follow-up       cc/concern- stent          Subjective:   History of Present Illness: The patient is 61 y.o. male with a past medical history significant for CAD s/p CABG, HLD, HTN, carotid disease, prior DVT, PAD s/p SFA stent, left BKA, s/p nephrectomy, prior nicotine addiction and alcohol abuse. He presented to [address] [place] [address] 5/2020 with severe chest pain and found to have NSTEMI. He underwent heart catheterization revealing severe triple-vessel disease and subsequently underwent CABG x 4 on 5/8/20 with Dr. Chris Brito. He did suffer from encephalopathy/delirium following bypass. He is s/p tracheostomy and PEG placement. He was discharged to [facility] for skilled nursing care. He was seen in office with Dr. Chris Brito for follow up where it was noted that he was admitted to [address] [place] [address] for bilateral foot wounds. He underwent peripheral angiograms with intervention to both legs while inpatient at [address] [place] [address]. He underwent left BKA on 10/22/20. Lower extremity dopplers showed significant stenosis and underwent angiography 2/21/21 that showed severe right distal SFA/popliteal and tibial disease. S/p successful revascularization and SFA stenting. He presents today regarding pain and coolness in right lower extremity. He presents in a wheelchair today and has not been using a prothesis due to chronic wound on left BKA site. He denies chest pain with rest or exertion. His breathing has been comfortable without shortness of breath. He reports medication compliance and is tolerating. He denies abnormal bruising or bleeding.               Past Medical History:   Diagnosis Date    Acute cerebral infarction ([facility] 05/2020     R posterior frontal lobe    Alcohol withdrawal ([facility] 05/2020    Bilateral carotid artery Topics    Alcohol use: Yes       Comment: once a month, shot once a week. depends on mood. Drug use: Never         No Known Allergies              Current Outpatient Medications   Medication Sig Dispense Refill    amLODIPine (NORVASC) 5 MG tablet Take 1 tablet by mouth daily 90 tablet 3    XARELTO 2.5 MG TABS tablet TAKE 1 TABLET BY MOUTH TWICE A  tablet 1    atorvastatin (LIPITOR) 40 MG tablet TAKE 1 TABLET BY MOUTH EVERY DAY AT NIGHT 90 tablet 1    [employer] ASPIRIN ADULT LOW DOSE 81 MG chewable tablet TAKE 1 TABLET BY MOUTH EVERY DAY 90 tablet 3    pantoprazole (PROTONIX) 40 MG tablet Take 40 mg by mouth daily        QUEtiapine (SEROQUEL) 100 MG tablet Take 100 mg by mouth nightly        senna-docusate (PERICOLACE) 8.6-50 MG per tablet Take 1 tablet by mouth nightly        tiZANidine (ZANAFLEX) 4 MG tablet Take 4 mg by mouth nightly        acetaminophen (TYLENOL) 500 MG tablet Take 1,000 mg by mouth every 6 hours as needed for Pain        ferrous gluconate (FERGON) 324 (38 Fe) MG tablet Take 324 mg by mouth daily (with breakfast)        gabapentin (NEURONTIN) 800 MG tablet Take 800 mg by mouth 3 times daily. metoprolol tartrate (LOPRESSOR) 25 MG tablet Take 25 mg by mouth 2 times daily          No current facility-administered medications for this visit. Review of Systems:  Constitutional: No unanticipated weight loss. There's been no change in energy level, sleep pattern, or activity level. No fevers, chills. Eyes: No visual changes or diplopia. No scleral icterus. ENT: No Headaches, hearing loss or vertigo. No mouth sores or sore throat. Cardiovascular: as reviewed in HPI  Respiratory: No cough or wheezing, no sputum production. No hemoptysis. Gastrointestinal: No abdominal pain, appetite loss, blood in stools. No change in bowel or bladder habits. Genitourinary: No dysuria, trouble voiding, or hematuria. Musculoskeletal:  No gait disturbance, no joint complaints.   Integumentary: No rash or pruritis. Neurological: No headache, diplopia, change in muscle strength, numbness or tingling. Psychiatric: No anxiety or depression. Endocrine: No temperature intolerance. No excessive thirst, fluid intake, or urination. No tremor. Hematologic/Lymphatic: No abnormal bruising or bleeding, blood clots or swollen lymph nodes. Allergic/Immunologic: No nasal congestion or hives. Physical Exam:   /84   Pulse 75   Ht 6' 1\" (1.854 m)   Wt 217 lb (98.4 kg)   SpO2 97%   BMI 28.63 kg/m²         Wt Readings from Last 3 Encounters:   06/07/22 217 lb (98.4 kg)   12/23/21 217 lb (98.4 kg)   11/24/21 217 lb 9.5 oz (98.7 kg)      Constitutional: He is oriented to person, place, and time. He appears well-developed and well-nourished. In no acute distress. Head: Normocephalic and atraumatic. Pupils equal and round. Neck: Neck supple. No JVP or carotid bruit appreciated. No mass and no thyromegaly present. No lymphadenopathy present. Cardiovascular: Normal rate. Normal heart sounds. Exam reveals no gallop and no friction rub. No murmur heard. Pulmonary/Chest: Effort normal and breath sounds normal. No respiratory distress. He has no wheezes, rhonchi or rales. Abdominal: Soft, non-tender. Bowel sounds are normal. He exhibits no organomegaly, mass or bruit. Extremities: No edema. No cyanosis or clubbing. Pulses are 2+ radial/carotid bilaterally. +Doppler R DP -Doppler R PT and arch  Neurological: No gross cranial nerve deficit. Coordination normal.  Skin: Skin is warm and dry. There is no rash or diaphoresis. Psychiatric: He has a normal mood and affect.  His speech is normal and behavior is normal.     Lab Review:   FLP:              Lab Results   Component Value Date     TRIG 111 05/25/2020     HDL 33 06/01/2021     LDLCALC 59 06/01/2021     LABVLDL 28 06/01/2021      BUN/Creatinine:              Lab Results   Component Value Date     BUN 12 11/24/2021     CREATININE 1.0 11/24/2021      PT/INR, TNI, HGB A1C:             Lab Results   Component Value Date/Time     TROPONINI 0.11 (H) 05/07/2020 01:17 PM     LABA1C 5.9 05/07/2020 12:00 PM      No results found for: CBCAUTODIF        Peripheral angiogram 7/22/20 ([facility])  1. Left lower extremity angiogram with recanalization of the distal superficial femoral, popliteal, and peroneal artery. 2. Angioplasty of the peroneal and popliteal artery with a 3 x 220 Basil balloon. 3. Angioplasty of the left superficial femoral and popliteal artery with a 5 x 200  balloon. 4. Completion angiogram.  5. Right lower extremity runoff. Peripheral angiogram 7/24/20 ([facility])   1. Ultrasound-guided left common femoral access   2. Pelvic angiogram and right lower extremity angiogram   3. Recanalization of the superficial femoral artery and popliteal artery with angioplasty. 4. Angioplasty of the popliteal artery with 3 x 220-mm Basil balloon. 5. Angioplasty of the popliteal and superficial femoral artery with a 5 x 200  balloon and completion angiogram.      Lower extremity arterial duplex 10/16/20 ([facility])  Right: Right SYBIL of 0.64 at both the right PTA and DPA with monophasic spectral Doppler signals. Right Toe pressure of 0.53. Left: Left SYBIL of 0.65 at the PTA. The left DPA was not obtainable due to ulceration. Left toe pressure of 0.35 with a monophasic PPG tracing. Previous: previous exam 6-6-20 right SYBIL 0.48 left SYBIL 0.56. Conclusions: Moderate disease in the bilateral legs without worsening from previous exam.     Peripheral 2/4/21   Severe right distal SFA/popliteal and tibial disease. S/p successful revascularization and SFA stenting 3V runoff to the foot. Peripheral angiogram MW 11/15/21  Right superficial femoral artery heavily diseased  with sequential 80% stenosis. Popliteal artery is occluded, in-stent  occlusion present. Faint collateral flow into the right anterior tibial  artery and posterior tibial artery.   The patient will be brought back for staged intervention in a few  Days. Peripheral Intervention  11/22/21  Severe right lower extremity PAD  Occluded SFA/Pop/TP trunk  S/p balloon angioplasty and thrombolytic catheter placement  Plan for EKOS removal tomorrow followed by laser atherectomy     Peripheral intervention  11/23/21   S/p successful SFA/Pop  reconstitution with laser atherectomy of prior pop stent  S/p angioplasty and DCB of the right SFA  Single vessel in-line flow to the DP/ anterior plantar arch  Peroneal/PT are filling via collateral circulation      SYBIL 3/11/21   Abnormal right SYBIL in the range of mild arterial disease. Right deep femoral artery stenosis of greater than 50%. Distal right popliteal artery stenosis measuring greater than 755 by   criteria associated with patent popliteal stent. SYBIL 11/2021   Right SYBIL was not available due to inadequate pulses . Elevated velocity of the deep femoral artery suggests a 50-70% stenosis. Occlusion of the distal superficial femoral and popliteal artery in the   right lower extremity. Critically low velocities in the right posterior tibial and anterior tibial   arteries. Left SYBIL was not available due to below knee amputation. Occlusion of the left popliteal artery. The majority of the waveforms are monophasic throughout the left lower   extremity. SYBIL 12/9/21   Right SYBIL was not available due to inadequate pulses. Preocculsive velocities noted in the proximal femoral artery. Findings are suggestive of multi level arterial disease. Left SYBIL not available due to BKA. The left popliteal artery is occluded. Findings are suggestive of inflow and outflow disease.       Bedside SYBIL 6/7/22  Right: PAD         All above diagnostic testing and laboratory data was independently visualized and reviewed by me (not simply review of report)        Assessment and Plan   1) PAD   Popliteal aneurysm       I have reviewed the history and physical and examined the patient and find no relevant changes. I have reviewed with the patient and/or family the risks, benefits, and alternatives to the procedure. Pre-sedation Assessment    Patient:  Derek Colby   :   1955  Intended Procedure: peripheral angiogram     Vitals:    22 0800   BP: (!) 142/80   Pulse: 61   Resp: 18   Temp: 97.2 °F (36.2 °C)   SpO2: 100%       Nursing notes reviewed and agreed.   Medications reviewed  Allergies: No Known Allergies      Pre-Procedure Assessment/Plan:  ASA 3 - Patient with moderate systemic disease with functional limitations    Mallampati Airway Assessment:  Mallampati Class I - (soft palate, fauces, uvula & anterior/posterior tonsillar pillars are visible)    Level of Sedation Plan:Mild sedation    Post Procedure plan: Return to same level of care    Samanta Awan MD [address] [facility], Interventional Cardiology, and Peripheral Vascular [address] [facility]   (C): [phone]  (O): [phone]

## 2022-09-23 RX ORDER — CLOPIDOGREL BISULFATE 75 MG/1
TABLET ORAL
Qty: 90 TABLET | Refills: 3 | Status: SHIPPED | OUTPATIENT
Start: 2022-09-23

## 2022-09-27 NOTE — TELEPHONE ENCOUNTER
Left message for Emma Alert to call the office back to schedule follow up with Dr. Vickie Marquez and Dr. Hannah Luna

## 2022-11-11 ENCOUNTER — HOSPITAL ENCOUNTER (OUTPATIENT)
Dept: VASCULAR LAB | Age: 67
Discharge: HOME | End: 2022-11-11
Payer: MEDICARE

## 2022-11-11 ENCOUNTER — TELEPHONE (OUTPATIENT)
Dept: CARDIOLOGY CLINIC | Age: 67
End: 2022-11-11

## 2022-11-11 ENCOUNTER — OFFICE VISIT (OUTPATIENT)
Dept: CARDIOLOGY CLINIC | Age: 67
End: 2022-11-11
Payer: MEDICARE

## 2022-11-11 VITALS
BODY MASS INDEX: 31.19 KG/M2 | HEART RATE: 88 BPM | SYSTOLIC BLOOD PRESSURE: 110 MMHG | HEIGHT: 72 IN | OXYGEN SATURATION: 98 % | DIASTOLIC BLOOD PRESSURE: 72 MMHG

## 2022-11-11 DIAGNOSIS — I73.9 PAD (PERIPHERAL ARTERY DISEASE) (HCC): Primary | ICD-10-CM

## 2022-11-11 DIAGNOSIS — I25.10 CAD IN NATIVE ARTERY: ICD-10-CM

## 2022-11-11 DIAGNOSIS — E78.5 HYPERLIPIDEMIA LDL GOAL <70: ICD-10-CM

## 2022-11-11 DIAGNOSIS — I65.23 BILATERAL CAROTID ARTERY STENOSIS: ICD-10-CM

## 2022-11-11 DIAGNOSIS — I73.9 PAD (PERIPHERAL ARTERY DISEASE) (HCC): ICD-10-CM

## 2022-11-11 DIAGNOSIS — I70.229 CRITICAL LOWER LIMB ISCHEMIA (HCC): ICD-10-CM

## 2022-11-11 DIAGNOSIS — I10 ESSENTIAL HYPERTENSION: ICD-10-CM

## 2022-11-11 PROCEDURE — 1036F TOBACCO NON-USER: CPT | Performed by: INTERNAL MEDICINE

## 2022-11-11 PROCEDURE — 93926 LOWER EXTREMITY STUDY: CPT

## 2022-11-11 PROCEDURE — 3078F DIAST BP <80 MM HG: CPT | Performed by: INTERNAL MEDICINE

## 2022-11-11 PROCEDURE — G8427 DOCREV CUR MEDS BY ELIG CLIN: HCPCS | Performed by: INTERNAL MEDICINE

## 2022-11-11 PROCEDURE — 99214 OFFICE O/P EST MOD 30 MIN: CPT | Performed by: INTERNAL MEDICINE

## 2022-11-11 PROCEDURE — G8484 FLU IMMUNIZE NO ADMIN: HCPCS | Performed by: INTERNAL MEDICINE

## 2022-11-11 PROCEDURE — 3074F SYST BP LT 130 MM HG: CPT | Performed by: INTERNAL MEDICINE

## 2022-11-11 PROCEDURE — G8417 CALC BMI ABV UP PARAM F/U: HCPCS | Performed by: INTERNAL MEDICINE

## 2022-11-11 PROCEDURE — 3017F COLORECTAL CA SCREEN DOC REV: CPT | Performed by: INTERNAL MEDICINE

## 2022-11-11 PROCEDURE — 1123F ACP DISCUSS/DSCN MKR DOCD: CPT | Performed by: INTERNAL MEDICINE

## 2022-11-11 RX ORDER — SENNA AND DOCUSATE SODIUM 50; 8.6 MG/1; MG/1
1 TABLET, FILM COATED ORAL DAILY
COMMUNITY

## 2022-11-11 NOTE — PROGRESS NOTES
[address]      Cardiology Consult    Danielle Ortega  1955 November 11, 2022    Primary Physician: Radha Cunningham CNP  Reason for visit: PAD, CAD    CC: \"I'm doing fine\"    HPI:  The patient is 74 y.o. male with a past medical history significant for CAD s/p CABG, HLD, HTN, carotid disease, prior DVT, PAD s/p SFA stent, left BKA, s/p nephrectomy, prior nicotine addiction and alcohol abuse. He presented to [facility] 5/2020 with severe chest pain and found to have NSTEMI. He underwent heart catheterization revealing severe triple-vessel disease and subsequently underwent CABG x 4 on 5/8/20 with Dr. Prosper Clemens. He did suffer from encephalopathy/delirium following bypass. He is s/p tracheostomy and PEG placement. He was discharged to [facility] for skilled nursing care. He was seen in office with Dr. Prosper Clemens for follow up where it was noted that he was admitted to [facility] for bilateral foot wounds. He underwent peripheral angiograms with intervention to both legs while inpatient at [facility]. He underwent left BKA on 10/22/20. Lower extremity dopplers showed significant stenosis and underwent angiography 2/21/21 that showed severe right distal SFA/popliteal and tibial disease. S/p successful revascularization and SFA stenting. Pt was seen in the office 11/11/21 to establish care in our [place] office due to insurance coverage. He reported severe right foot pain for a few weeks. He described rest pain but denied any open wounds or ulcers. He stated that his right lower leg and foot is cold and his foot is discolored red when down and whitish when elevated. He endorsed edema at the ankle. Pt was admitted to [facility] and underwent peripheral angiography intervention 11/22/21, 11/23/21 and 7/29/22 by Dr. Reji Ferguson. Today, he reports that he is overall doing good. He denies any new cardiac sounding complaints. He states that he has a wound on his left LE stump being treated at the wound clinic.  He hopes to be able to wear his prothesis in the future. Pt is using a wheelchair accompanied by his girlfriend. He reports medication compliance and is tolerating. Patient denies exertional chest pain/pressure, dyspnea at rest,  PND, orthopnea, palpitations, lightheadedness, weight changes, changes in LE edema, and syncope. He reports compliant with his medications. Past Medical History:   Diagnosis Date    Acute cerebral infarction ([facility] 05/2020    R posterior frontal lobe    Alcohol withdrawal ([address] 05/2020    Bilateral carotid artery stenosis 05/07/2020    bilat 50-79% stenosis    CAD (coronary artery disease)     Hepatitis C antibody positive in blood 05/16/2020    History of bronchitis     History of DVT of lower extremity 2018    RLL, no previous scans to know whether supf or dvt. no coumadin. put on plavix. Hypertension     NSTEMI (non-ST elevated myocardial infarction) ([address] 05/07/2020    3VD    PAD (peripheral artery disease) ([facility]     Respiratory compromise 05/2020    chemical exposure at work, seen at [facility], covid neg, given steroids    S/p nephrectomy 1970    age 17, pt not sure why     Tattoos      Past Surgical History:   Procedure Laterality Date    CARDIAC CATHETERIZATION  05/07/2020    Dr. Marita Bowens - w/placement of IABP    CARDIAC CATHETERIZATION      peripheral angiogram, 7/28/22 & 8/21/22    CORONARY ARTERY BYPASS GRAFT N/A 05/08/2020    Dr. Roger Cisneros. [place] - urgent x4 (LIMA-LAD, L SVG-D1, SVG-OM, SVG-PDA)    GASTROSTOMY TUBE PLACEMENT N/A 05/27/2020    PERCUTANEOUS ENDOSCOPIC GASTROSTOMY TUBE PLACEMENT performed by Palomo Nelson MD at [facility] N/A 07/10/2020    REMOVAL  GASTROSTOMY FEEDING TUBE  (53890) performed by Palomo Nelson MD at [facility]29/2020    [facility] ml [facility]    TOTAL NEPHRECTOMY Left     17 yo - pt doesn't know why    TRACHEOSTOMY  05/27/2020    Dr. Gin Mathur - w/bronchoscopy    TRANSESOPHAGEAL ECHOCARDIOGRAM  2020    during CABG     Family History   Problem Relation Age of Onset    Heart Disease Mother          of MI age 43    Heart Disease Brother         MI mid 46s    Heart Disease Father          of MI age 68    Heart Disease Paternal Aunt         MI in her 46s     Social History     Tobacco Use    Smoking status: Former     Types: Cigarettes     Quit date: 2019     Years since quitting: 3.0    Smokeless tobacco: Never    Tobacco comments:     started age 27   Vaping Use    Vaping Use: Never used   Substance Use Topics    Alcohol use: Yes     Comment: once a month, shot once a week. depends on mood. Drug use: Never     No Known Allergies  Current Outpatient Medications   Medication Sig Dispense Refill    sennosides-docusate sodium (SENOKOT-S) 8.6-50 MG tablet Take 1 tablet by mouth daily      clopidogrel (PLAVIX) 75 MG tablet TAKE 1 TABLET BY MOUTH EVERY DAY IN THE MORNING 90 tablet 3    XARELTO 2.5 MG TABS tablet TAKE 1 TABLET BY MOUTH TWICE A  tablet 3    atorvastatin (LIPITOR) 40 MG tablet TAKE 1 TABLET BY MOUTH EVERY DAY AT NIGHT 90 tablet 1    amLODIPine (NORVASC) 5 MG tablet Take 1 tablet by mouth daily 90 tablet 3    [employer] ASPIRIN ADULT LOW DOSE 81 MG chewable tablet TAKE 1 TABLET BY MOUTH EVERY DAY 90 tablet 3    QUEtiapine (SEROQUEL) 100 MG tablet Take 100 mg by mouth nightly      acetaminophen (TYLENOL) 500 MG tablet Take 1,000 mg by mouth every 6 hours as needed for Pain      ferrous gluconate (FERGON) 324 (38 Fe) MG tablet Take 324 mg by mouth daily (with breakfast)      gabapentin (NEURONTIN) 800 MG tablet Take 800 mg by mouth 3 times daily. metoprolol tartrate (LOPRESSOR) 25 MG tablet Take 25 mg by mouth 2 times daily       No current facility-administered medications for this visit. Review of Systems:  Constitutional: No unanticipated weight loss. There's been no change in energy level, sleep pattern, or activity level. No fevers, chills.    Eyes: No visual changes or diplopia. No scleral icterus. ENT: No Headaches, hearing loss or vertigo. No mouth sores or sore throat. Cardiovascular: as reviewed in HPI  Respiratory: No cough or wheezing, no sputum production. No hemoptysis. Gastrointestinal: No abdominal pain, appetite loss, blood in stools. No change in bowel or bladder habits. Genitourinary: No dysuria, trouble voiding, or hematuria. Musculoskeletal:  No gait disturbance, no joint complaints. Integumentary: No rash or pruritis. Neurological: No headache, diplopia, change in muscle strength, numbness or tingling. Psychiatric: No anxiety or depression. Endocrine: No temperature intolerance. No excessive thirst, fluid intake, or urination. No tremor. Hematologic/Lymphatic: No abnormal bruising or bleeding, blood clots or swollen lymph nodes. Allergic/Immunologic: No nasal congestion or hives. Physical Exam:   /72 (Site: Left Upper Arm, Position: Sitting)   Pulse 88   Ht 6' (1.829 m)   SpO2 98%   BMI 31.19 kg/m²   Wt Readings from Last 3 Encounters:   07/29/22 230 lb (104.3 kg)   07/11/22 230 lb (104.3 kg)   06/16/22 235 lb (106.6 kg)     Constitutional: He is oriented to person, place, and time. He appears well-developed and well-nourished. In no acute respiratory distress but chronically ill appearing. Head: Normocephalic and atraumatic. Pupils equal and round. Neck: Neck supple. No JVP. R-carotid bruit appreciated. No mass and no thyromegaly present. No lymphadenopathy present. Cardiovascular: Normal rate. Normal heart sounds. Exam reveals no gallop and no friction rub. No murmur heard. Pulmonary/Chest: Effort normal and breath sounds normal. No respiratory distress. He has no wheezes, rhonchi or rales. Abdominal: Soft, non-tender. Bowel sounds are normal. He exhibits no organomegaly, mass or bruit. Extremities: 1+ RLE edema at ankle. L-BKA. Neurological: No gross cranial nerve deficit.  Coordination normal.   Skin: RLE is cold but dry. There is no rash or diaphoresis. Psychiatric: He has a normal mood and affect. His speech is normal and behavior is normal.     Lab Review:   FLP:    Lab Results   Component Value Date/Time    TRIG 111 05/25/2020 04:05 AM    HDL 33 06/01/2021 01:26 PM    LDLCALC 59 06/01/2021 01:26 PM    LABVLDL 28 06/01/2021 01:26 PM     BUN/Creatinine:    Lab Results   Component Value Date/Time    BUN 28 07/29/2022 07:45 AM    CREATININE 1.4 07/29/2022 07:45 AM   ProBNP 694 11/2021    EKG Interpretation:   6/1/21 Sinus rhythm. 11/11/21 NSR, normal.   12/23/21 Sinus  Rhythm, nonspecific IVCD    Image Review:   Echo 5/7/20  Normal LV size, wall thickness and wall motion: EF is 60%. Grade I diastolic dysfunction. Left atrium is of normal size. Normal right ventricular size and function. Trivial tricuspid regurgitation. Echo limited 6/9/20 ([facility]) Pericardium, extracardiac: A small pericardial effusion is identified posterior to the heart. Features were not consistent with tamponade physiology. Cath 5/7/20  ANGIOGRA/CORONARY ARTERIOGRAM:   The left main coronary artery is severely calcified with 80% stenosis. The left anterior descending artery is severely calcified with 80% proximal stenosis. The left circumflex artery is 99% occluded at its ostium. The right coronary artery is dominant artery with diffuse disease, mid 60%, rPDA 90%. LEFT VENTRICULOGRAM:  Left ventricular angiogram was done in the 30° HAYDEN projection and revealed normal left ventricular wall motion and systolic function with an estimated ejection fraction of 55%. CABG 5/8/20  Urgent coronary artery bypass grafting x4 (LIMA to LAD, SVG to D1, SVG to OM, SVG to PDA). Peripheral angiogram 7/22/20 ([facility])   1. Left lower extremity angiogram with recanalization of the distal superficial femoral, popliteal, and peroneal artery. 2. Angioplasty of the peroneal and popliteal artery with a 3 x 220 Basil balloon.   3. Angioplasty of the left superficial femoral and popliteal artery with a 5 x 200  balloon. 4. Completion angiogram.  5. Right lower extremity runoff. Peripheral angiogram 7/24/20 ([facility])   1. Ultrasound-guided left common femoral access   2. Pelvic angiogram and right lower extremity angiogram   3. Recanalization of the superficial femoral artery and popliteal artery with angioplasty. 4. Angioplasty of the popliteal artery with 3 x 220-mm Basil balloon. 5. Angioplasty of the popliteal and superficial femoral artery with a 5 x 200  balloon and completion angiogram.     Lower extremity arterial duplex 10/16/20 ([facility])   Right: Right SYBIL of 0.64 at both the right PTA and DPA with monophasic spectral Doppler signals. Right Toe pressure of 0.53. Left: Left SYBIL of 0.65 at the PTA. The left DPA was not obtainable due to ulceration. Left toe pressure of 0.35 with a monophasic PPG tracing. Previous: previous exam 6-6-20 right SYBIL 0.48 left SYBIL 0.56. Conclusions: Moderate disease in the bilateral legs without worsening from previous exam.    Peripheral 2/4/21   Severe right distal SFA/popliteal and tibial disease. S/p successful revascularization and SFA stenting 3V runoff to the foot. Peripheral angiogram  11/15/21   Right superficial femoral artery heavily diseased  with sequential 80% stenosis. Popliteal artery is occluded, in-stent  occlusion present. Faint collateral flow into the right anterior tibial  artery and posterior tibial artery. The patient will be brought back for staged intervention in a few  Days.     Peripheral Intervention  11/22/21  Severe right lower extremity PAD   Occluded SFA/Pop/TP trunk   S/p balloon angioplasty and thrombolytic catheter placement   Plan for EKOS removal tomorrow followed by laser atherectomy     Peripheral intervention  11/23/21   S/p successful SFA/Pop  reconstitution with laser atherectomy of prior pop stent   S/p angioplasty and DCB of the right SFA   Single vessel in-line flow to the DP/ anterior plantar arch   Peroneal/PT are filling via collateral circulation     ABIs 3/11/21   Abnormal right SYBIL in the range of mild arterial disease. Right deep femoral artery stenosis of greater than 50%. Distal right popliteal artery stenosis measuring greater than 755 by   criteria associated with patent popliteal stent. SYBIL 11/2021    Right SYBIL was not available due to inadequate pulses . Elevated velocity of the deep femoral artery suggests a 50-70% stenosis. Occlusion of the distal superficial femoral and popliteal artery in the   right lower extremity. Critically low velocities in the right posterior tibial and anterior tibial   arteries. Left SYBIL was not available due to below knee amputation. Occlusion of the left popliteal artery. The majority of the waveforms are monophasic throughout the left lower   extremity. SYBIL 12/9/21   Right SYBIL was not available due to inadequate pulses. Preocculsive velocities noted in the proximal femoral artery. Findings are suggestive of multi level arterial disease. Left SYBIL not available due to BKA. The left popliteal artery is occluded. Findings are suggestive of inflow and outflow disease. Carotid US 11/2021    The right internal carotid artery appears to have a 80-99% diameter reducing   stenosis based on velocity criteria. The right vertebral artery demonstrates normal antegrade flow. The left internal carotid artery appears to have a 50-79% diameter reducing   stenosis based on velocity criteria. The left vertebral artery demonstrates normal antegrade flow. CTA ABD Ao with runoff 11/15/21  Aortoiliac inflow is preserved. Left below-the-knee amputation is noted. There is severe, occlusive disease   in the left femoropopliteal tibial segment. Right severe femoropopliteal segment disease with occlusion by the mid   superficial femoral artery.   Popliteal segment is occluded. Trifurcation   vessels are reconstituted with runoff via the anterior tibial and posterior   tibial arteries. Venous US Lower Extremity 6/6/22  No evidence of deep or superficial venous thrombosis is noted in the right   lower extremity or left common femoral vein. Peripheral 7/29/22  Right popliteal aneurysm s/p successful stenting with 6.0 Viabahn    Assessment/Plan:     1) Critical limb ischemia/PAD s/p peripheral intervention. Follows with Dr. Kt Nj. Continue ASA, statin, and Xarelto 2.5mg bid. Arterial duplex completed today, results still pending. 2) Bilateral carotid stenosis. Continue ASA, statin, and Xarelto 2.5mg bid. No recent TIA symptoms; will repeat carotid duplex. Will refer to Dr. Meraz Fails. 3) CAD s/p CABG. Seemingly asymptomatic but with a limited functional status. Continue with medical management and risk factor modification including aspirin, statin, Xarelto 2.5mg bid and B-blocker. 3) Hyperlipidemia. LDL goal <70. LDL 59 6/2021. Continue high intensity statin with Lipitor 40mg daily. 4) Essential hypertension. Controlled. Goal BP <130/80. Continue medical therapy. 5) RLE edema. Likely multifactorial. BNP normal for age. Will continue to monitor but diastolic dysfunction is possibly contributing. Follow up in 1 year. Thank you very much for allowing me to participate in the care of your patient. Please do not hesitate to contact me if you have any questions. Sincerely,  Marii Zhang. Jose M Barakat MD      [facility], [address] [place] [address]  Ph: [phone]  Fax: [phone]    This note was scribed in the presence of Dr Jose M Barakat MD by Brody Walker, JUAN. Physician Attestation: The scribes documentation has been prepared under my direction and personally reviewed by me in its entirety.    I confirm that the note above accurately reflects all work, treatment, procedures, and medical decision making performed by me. All portions of the note including but not limited to the chief complaint, history of present illness, physical exam, assessment and plan/medical decision making were personally reviewed, edited, and updated on the day of the visit.

## 2022-11-11 NOTE — TELEPHONE ENCOUNTER
Patient was seen in office today and needs annual carotid dopplers. Please call patient and schedule bilateral carotid dopplers at the [facility] office.

## 2022-11-14 ENCOUNTER — TELEPHONE (OUTPATIENT)
Dept: CARDIOLOGY CLINIC | Age: 67
End: 2022-11-14

## 2022-11-14 DIAGNOSIS — I73.9 PAD (PERIPHERAL ARTERY DISEASE) (HCC): Primary | ICD-10-CM

## 2022-11-14 NOTE — TELEPHONE ENCOUNTER
Please schedule Right lower extremity arterial doppler order in Epic to be completed in three months.

## 2022-11-14 NOTE — TELEPHONE ENCOUNTER
Karla Barkley is now scheduled December 9,2022 arriving at 12:45 at [facility]

## 2022-11-15 NOTE — TELEPHONE ENCOUNTER
Shin Serna is now scheduled for February 1, 2023 arriving 10:30 am at [facility] called [facility] with date/location/time/prep

## 2022-12-14 ENCOUNTER — HOSPITAL ENCOUNTER (OUTPATIENT)
Dept: VASCULAR LAB | Age: 67
Discharge: HOME | End: 2022-12-14
Payer: MEDICARE

## 2022-12-14 DIAGNOSIS — I65.23 BILATERAL CAROTID ARTERY STENOSIS: ICD-10-CM

## 2022-12-14 PROCEDURE — 93880 EXTRACRANIAL BILAT STUDY: CPT

## 2022-12-19 RX ORDER — LORATADINE 10 MG
TABLET ORAL
Qty: 90 TABLET | Refills: 3 | Status: SHIPPED | OUTPATIENT
Start: 2022-12-19

## 2022-12-21 RX ORDER — CLOPIDOGREL BISULFATE 75 MG/1
TABLET ORAL
Qty: 90 TABLET | Refills: 1 | OUTPATIENT
Start: 2022-12-21

## 2023-01-03 ENCOUNTER — OFFICE VISIT (OUTPATIENT)
Dept: CARDIOLOGY CLINIC | Age: 68
End: 2023-01-03
Payer: MEDICARE

## 2023-01-03 VITALS
OXYGEN SATURATION: 95 % | HEART RATE: 77 BPM | SYSTOLIC BLOOD PRESSURE: 118 MMHG | DIASTOLIC BLOOD PRESSURE: 60 MMHG | BODY MASS INDEX: 31.19 KG/M2 | HEIGHT: 72 IN

## 2023-01-03 DIAGNOSIS — I73.9 PAD (PERIPHERAL ARTERY DISEASE) (HCC): ICD-10-CM

## 2023-01-03 DIAGNOSIS — I25.10 CAD IN NATIVE ARTERY: ICD-10-CM

## 2023-01-03 DIAGNOSIS — I65.23 BILATERAL CAROTID ARTERY STENOSIS: Primary | ICD-10-CM

## 2023-01-03 DIAGNOSIS — I10 ESSENTIAL HYPERTENSION: ICD-10-CM

## 2023-01-03 DIAGNOSIS — E78.5 HYPERLIPIDEMIA LDL GOAL <70: ICD-10-CM

## 2023-01-03 PROCEDURE — 3074F SYST BP LT 130 MM HG: CPT | Performed by: INTERNAL MEDICINE

## 2023-01-03 PROCEDURE — 3078F DIAST BP <80 MM HG: CPT | Performed by: INTERNAL MEDICINE

## 2023-01-03 PROCEDURE — G8417 CALC BMI ABV UP PARAM F/U: HCPCS | Performed by: INTERNAL MEDICINE

## 2023-01-03 PROCEDURE — 99214 OFFICE O/P EST MOD 30 MIN: CPT | Performed by: INTERNAL MEDICINE

## 2023-01-03 PROCEDURE — G8427 DOCREV CUR MEDS BY ELIG CLIN: HCPCS | Performed by: INTERNAL MEDICINE

## 2023-01-03 PROCEDURE — G8484 FLU IMMUNIZE NO ADMIN: HCPCS | Performed by: INTERNAL MEDICINE

## 2023-01-03 PROCEDURE — 3017F COLORECTAL CA SCREEN DOC REV: CPT | Performed by: INTERNAL MEDICINE

## 2023-01-03 PROCEDURE — 1123F ACP DISCUSS/DSCN MKR DOCD: CPT | Performed by: INTERNAL MEDICINE

## 2023-01-03 PROCEDURE — 1036F TOBACCO NON-USER: CPT | Performed by: INTERNAL MEDICINE

## 2023-01-03 RX ORDER — ATORVASTATIN CALCIUM 80 MG/1
80 TABLET, FILM COATED ORAL DAILY
Qty: 90 TABLET | Refills: 5 | Status: SHIPPED | OUTPATIENT
Start: 2023-01-03

## 2023-01-03 NOTE — PROGRESS NOTES
Cardiology Consultation     Erwin Patiño  1955    PCP: Damián Espinoza APRN - CNP  Referring Physician: Dr. Naty Garcia   Reason for Referral: PAD   Chief Complaint: \"I'm doing pretty good\"     Subjective:   History of Present Illness: The patient is 74 y.o. male with a past medical history significant for CAD s/p CABG, HLD, HTN, carotid disease, prior DVT, CVA, PAD s/p SFA stent, left BKA, s/p nephrectomy, prior nicotine addiction and alcohol abuse. He presented to [facility] 5/2020 with severe chest pain and found to have NSTEMI. He underwent heart catheterization revealing severe triple-vessel disease and subsequently underwent CABG x 4 on 5/8/20 with Dr. Andre Henriquez. He did suffer from encephalopathy/delirium following bypass. He is s/p tracheostomy and PEG placement. He was discharged to [facility] for skilled nursing care. He was seen in office with Dr. Andre Henriquez for follow up where it was noted that he was admitted to [facility] for bilateral foot wounds. He underwent peripheral angiograms with intervention to both legs while inpatient at [facility]. He underwent left BKA on 10/22/20. Lower extremity dopplers showed significant stenosis and underwent angiography 2/21/21 that showed severe right distal SFA/popliteal and tibial disease. S/p successful revascularization and SFA stenting. He reported worsening right foot pain and underwent peripheral that resulted in right popliteal aneurysm s/p successful stenting with 6.0 Viabahn. Today he presents in a wheelchair for follow up and states overall he is doing well. He denies any foot pain and has palpable pedal pulses in office today. He denies any open wounds or ulcerations. He continues to follow with wound care at [facility] for management of left BKA. He denies any abnormal bleeding or bruising.  Patient denies exertional chest pain/pressure, dyspnea at rest, KOVACS, PND, orthopnea, palpitations, lightheadedness, weight changes, changes in LE edema, and syncope. Past Medical History:   Diagnosis Date    Acute cerebral infarction ([facility] 2020    R posterior frontal lobe    Alcohol withdrawal ([facility] 2020    Bilateral carotid artery stenosis 2020    bilat 50-79% stenosis    CAD (coronary artery disease)     Hepatitis C antibody positive in blood 2020    History of bronchitis     History of DVT of lower extremity 2018    RLL, no previous scans to know whether supf or dvt. no coumadin. put on plavix. Hypertension     NSTEMI (non-ST elevated myocardial infarction) ([facility] [address] 2020    3VD    PAD (peripheral artery disease) ([facility]     Respiratory compromise 2020    chemical exposure at work, seen at [facility], covid neg, given steroids    S/p nephrectomy 1970    age 17, pt not sure why     Tattoos      Past Surgical History:   Procedure Laterality Date    CARDIAC CATHETERIZATION  2020    Dr. Albrecht Body - w/placement of IABP    CARDIAC CATHETERIZATION      peripheral angiogram, 22 & 22    CORONARY ARTERY BYPASS GRAFT N/A 2020    Dr. Hedy Argueta. Robles - urgent x4 (LIMA-LAD, L SVG-D1, SVG-OM, SVG-PDA)    GASTROSTOMY TUBE PLACEMENT N/A 2020    PERCUTANEOUS ENDOSCOPIC GASTROSTOMY TUBE PLACEMENT performed by Chyna Mac MD at [facility] N/A 07/10/2020    REMOVAL  GASTROSTOMY FEEDING TUBE  (16401) performed by [facility] 1300 ml drained    TOTAL NEPHRECTOMY Left     17 yo - pt doesn't know why    TRACHEOSTOMY  2020    Dr. Bill Myles - w/bronchoscopy    TRANSESOPHAGEAL ECHOCARDIOGRAM  2020    during CABG     Family History   Problem Relation Age of Onset    Heart Disease Mother          of MI age 43    Heart Disease Brother         MI mid 46s    Heart Disease Father          of MI age 68    Heart Disease Paternal Aunt         MI in her 46s     Social History     Tobacco Use Smoking status: Former     Types: Cigarettes     Quit date: 11/2019     Years since quitting: 3.1    Smokeless tobacco: Never    Tobacco comments:     started age 27   Vaping Use    Vaping Use: Never used   Substance Use Topics    Alcohol use: Yes     Comment: once a month, shot once a week. depends on mood. Drug use: Never       No Known Allergies  Current Outpatient Medications   Medication Sig Dispense Refill    [employer] ASPIRIN ADULT LOW DOSE 81 MG chewable tablet TAKE 1 TABLET BY MOUTH EVERY DAY 90 tablet 3    sennosides-docusate sodium (SENOKOT-S) 8.6-50 MG tablet Take 1 tablet by mouth daily      XARELTO 2.5 MG TABS tablet TAKE 1 TABLET BY MOUTH TWICE A  tablet 3    atorvastatin (LIPITOR) 40 MG tablet TAKE 1 TABLET BY MOUTH EVERY DAY AT NIGHT 90 tablet 1    amLODIPine (NORVASC) 5 MG tablet Take 1 tablet by mouth daily 90 tablet 3    QUEtiapine (SEROQUEL) 100 MG tablet Take 100 mg by mouth nightly      acetaminophen (TYLENOL) 500 MG tablet Take 1,000 mg by mouth every 6 hours as needed for Pain      ferrous gluconate (FERGON) 324 (38 Fe) MG tablet Take 324 mg by mouth daily (with breakfast)      gabapentin (NEURONTIN) 800 MG tablet Take 800 mg by mouth 3 times daily. metoprolol tartrate (LOPRESSOR) 25 MG tablet Take 25 mg by mouth 2 times daily       No current facility-administered medications for this visit. Review of Systems:  Constitutional: No unanticipated weight loss. There's been no change in energy level, sleep pattern, or activity level. No fevers, chills. Eyes: No visual changes or diplopia. No scleral icterus. ENT: No Headaches, hearing loss or vertigo. No mouth sores or sore throat. Cardiovascular: as reviewed in HPI  Respiratory: No cough or wheezing, no sputum production. No hemoptysis. Gastrointestinal: No abdominal pain, appetite loss, blood in stools. No change in bowel or bladder habits. Genitourinary: No dysuria, trouble voiding, or hematuria.   Musculoskeletal:  No gait disturbance, no joint complaints. Integumentary: No rash or pruritis. Neurological: No headache, diplopia, change in muscle strength, numbness or tingling. Psychiatric: No anxiety or depression. Endocrine: No temperature intolerance. No excessive thirst, fluid intake, or urination. No tremor. Hematologic/Lymphatic: No abnormal bruising or bleeding, blood clots or swollen lymph nodes. Allergic/Immunologic: No nasal congestion or hives. Physical Exam:   There were no vitals taken for this visit. Wt Readings from Last 3 Encounters:   07/29/22 230 lb (104.3 kg)   07/11/22 230 lb (104.3 kg)   06/16/22 235 lb (106.6 kg)     Constitutional: He is oriented to person, place, and time. He appears well-developed and well-nourished. In no acute distress. Head: Normocephalic and atraumatic. Pupils equal and round. Neck: Neck supple. No JVP or carotid bruit appreciated. No mass and no thyromegaly present. No lymphadenopathy present. Cardiovascular: Normal rate. Normal heart sounds. Exam reveals no gallop and no friction rub. No murmur heard. Pulmonary/Chest: Effort normal and breath sounds normal. No respiratory distress. He has no wheezes, rhonchi or rales. Abdominal: Soft, non-tender. Bowel sounds are normal. He exhibits no organomegaly, mass or bruit. Extremities: No edema. No cyanosis or clubbing. Pulses are 2+ radial/carotid bilaterally. +Doppler R DP +Doppler R PT and arch  Neurological: No gross cranial nerve deficit. Coordination normal.   Skin: Skin is warm and dry. There is no rash or diaphoresis. Psychiatric: He has a normal mood and affect.  His speech is normal and behavior is normal.     Lab Review:   FLP:    Lab Results   Component Value Date/Time    TRIG 111 05/25/2020 04:05 AM    HDL 33 06/01/2021 01:26 PM    LDLCALC 59 06/01/2021 01:26 PM    LABVLDL 28 06/01/2021 01:26 PM     BUN/Creatinine:    Lab Results   Component Value Date/Time    BUN 28 07/29/2022 07:45 AM    CREATININE 1.4 07/29/2022 07:45 AM     PT/INR, TNI, HGB A1C:   Lab Results   Component Value Date/Time    TROPONINI 0.11 (H) 05/07/2020 01:17 PM    LABA1C 5.9 05/07/2020 12:00 PM      No results found for: CBCAUTODIF    EKG 11/11/21: Sinus rhythm  EKG 12/23/21: Sinus  Rhythm. Nonspecific IVCD    Image Review:     Peripheral angiogram 7/22/20 ([facility])   1. Left lower extremity angiogram with recanalization of the distal superficial femoral, popliteal, and peroneal artery. 2. Angioplasty of the peroneal and popliteal artery with a 3 x 220 Basil balloon. 3. Angioplasty of the left superficial femoral and popliteal artery with a 5 x 200  balloon. 4. Completion angiogram.  5. Right lower extremity runoff. Peripheral angiogram 7/24/20 ([facility])   1. Ultrasound-guided left common femoral access   2. Pelvic angiogram and right lower extremity angiogram   3. Recanalization of the superficial femoral artery and popliteal artery with angioplasty. 4. Angioplasty of the popliteal artery with 3 x 220-mm Basil balloon. 5. Angioplasty of the popliteal and superficial femoral artery with a 5 x 200  balloon and completion angiogram.      Lower extremity arterial duplex 10/16/20 ([facility])   Right: Right SYBIL of 0.64 at both the right PTA and DPA with monophasic spectral Doppler signals. Right Toe pressure of 0.53. Left: Left SYBIL of 0.65 at the PTA. The left DPA was not obtainable due to ulceration. Left toe pressure of 0.35 with a monophasic PPG tracing. Previous: previous exam 6-6-20 right SYBIL 0.48 left SYBIL 0.56. Conclusions: Moderate disease in the bilateral legs without worsening from previous exam.    Peripheral 2/4/21   Severe right distal SFA/popliteal and tibial disease. S/p successful revascularization and SFA stenting 3V runoff to the foot. Peripheral angiogram MW 11/15/21   Right superficial femoral artery heavily diseased  with sequential 80% stenosis.   Popliteal artery is occluded, in-stent  occlusion present. Faint collateral flow into the right anterior tibial  artery and posterior tibial artery. The patient will be brought back for staged intervention in a few  Days. Peripheral Intervention  11/22/21  Severe right lower extremity PAD   Occluded SFA/Pop/TP trunk   S/p balloon angioplasty and thrombolytic catheter placement   Plan for EKOS removal tomorrow followed by laser atherectomy      Peripheral intervention  11/23/21   S/p successful SFA/Pop  reconstitution with laser atherectomy of prior pop stent   S/p angioplasty and DCB of the right SFA   Single vessel in-line flow to the DP/ anterior plantar arch   Peroneal/PT are filling via collateral circulation      SYBIL 3/11/21   Abnormal right SYBIL in the range of mild arterial disease. Right deep femoral artery stenosis of greater than 50%. Distal right popliteal artery stenosis measuring greater than 755 by   criteria associated with patent popliteal stent. SYBIL 11/2021    Right SYBIL was not available due to inadequate pulses . Elevated velocity of the deep femoral artery suggests a 50-70% stenosis. Occlusion of the distal superficial femoral and popliteal artery in the   right lower extremity. Critically low velocities in the right posterior tibial and anterior tibial   arteries. Left SYBIL was not available due to below knee amputation. Occlusion of the left popliteal artery. The majority of the waveforms are monophasic throughout the left lower   extremity. SYBIL 12/9/21   Right SYBIL was not available due to inadequate pulses. Preocculsive velocities noted in the proximal femoral artery. Findings are suggestive of multi level arterial disease. Left SYBIL not available due to BKA. The left popliteal artery is occluded. Findings are suggestive of inflow and outflow disease.      Peripheral 7/29/22   Right popliteal aneurysm s/p successful stenting with 6.0 Viabahn    Right arterial duplex 11/12/22  Abnormal right SYBIL in the range of moderate arterial insufficiency. Right deep femoral stenosis of greater than 50%. Patent right popliteal stent with associated distal popliteal stenosis of    greater than 50% and monophasic sluggish tibial flow suggestive proximal    hemodynamically significant stenoses or occlusions that may not have been    visualized on this study. Bilateral carotid doppler 12/15/22  Right ICA stenosis of 50-79% based on velocity criteria - likely 70-80%    based on ratio. This stenosis appears to have progressed since last study of    6/2021. Left ICA stenosis of 50-79% by velocity criteria. Patent bilateral vertebral arteries with antegrade flow. All above diagnostic testing and laboratory data was independently visualized and reviewed by me (not simply review of report)       Assessment and Plan   1) PAD   -S/p left BKA  -Right popliteal aneursym s/p successful stenting with 6.0 Viabahn 7/2022  -Serial outpatient doppler, 3month/6 month/yearly   -Asa/plavix   -Xarelto 2.5 bid   -palpable pedal pulses   -Follows with wound care at [facility] in October    2) CAD   -No symptoms of angina   -Follows with Dr. Jean Claude Corona for general cardiology     3) Hypertension   -Well controlled in the office   -Remain on current medical therapy    4) Hyperlipidemia   -LDL goal <70 mg/dL   I-ncrease Lipitor to 80 mg daily     5) Carotid stenosis  -management per Dr. Jean Claude Corona   -discussed angiogram in the future, but not a great surgical candidate   -will proceed with carotid CTA first     Follow up in 6 months. Thank you very much for allowing me to participate in the care of your patient. Please do not hesitate to contact me if you have any questions.       Bishop Gallagher  Alicia Martinez, Interventional Cardiology, and Peripheral Vascular Disease   [facility]   Ph: [phone]  Fax: [phone]    This note was scribed in the presence of Dr Liliya Nava, MD by Katie Diallo RN. Physician Attestation:  The scribes documentation has been prepared under my direction and personally reviewed by me in its entirety. I confirm the note above accurately reflects all work, treatment, procedures, and medical decision making performed by me.     Electronically signed by Mimi Spann MD on 1/5/2023 at 9:58 PM

## 2023-01-23 ENCOUNTER — HOSPITAL ENCOUNTER (OUTPATIENT)
Dept: CT IMAGING | Age: 68
Discharge: HOME | End: 2023-01-23
Payer: MEDICARE

## 2023-01-23 DIAGNOSIS — I65.23 BILATERAL CAROTID ARTERY STENOSIS: ICD-10-CM

## 2023-01-23 LAB
GFR SERPL CREATININE-BSD FRML MDRD: 56 ML/MIN/{1.73_M2}
PERFORMED ON: ABNORMAL
POC CREATININE: 1.4 MG/DL (ref 0.8–1.3)
POC SAMPLE TYPE: ABNORMAL

## 2023-01-23 PROCEDURE — 82565 ASSAY OF CREATININE: CPT

## 2023-01-23 PROCEDURE — 70450 CT HEAD/BRAIN W/O DYE: CPT

## 2023-01-23 PROCEDURE — 6360000004 HC RX CONTRAST MEDICATION: Performed by: INTERNAL MEDICINE

## 2023-01-23 PROCEDURE — 70496 CT ANGIOGRAPHY HEAD: CPT

## 2023-01-23 RX ADMIN — IOPAMIDOL 75 ML: 755 INJECTION, SOLUTION INTRAVENOUS at 14:06

## 2023-01-25 ENCOUNTER — TELEPHONE (OUTPATIENT)
Dept: CARDIOLOGY CLINIC | Age: 68
End: 2023-01-25

## 2023-01-25 DIAGNOSIS — I65.23 BILATERAL CAROTID ARTERY STENOSIS: Primary | ICD-10-CM

## 2023-01-25 DIAGNOSIS — R94.39 ABNORMAL RESULT OF OTHER CARDIOVASCULAR FUNCTION STUDY: ICD-10-CM

## 2023-01-25 NOTE — TELEPHONE ENCOUNTER
Per result note patient will need scheduled for a Carotid angiogram/stenting with Dr. Robby Seals at [facility] with Dr. Julianne Humphries at [facility] when it is scheduled. The morning of your procedure you will park in the hospital parking lot and report directly to the cath lab to check in.     Pre-Procedure Instructions   You will need to fast for at least 8 hours prior to procedure. No caffeine the morning of. You will need to hold your anticoagulation, Xarelto for 2 days prior. All other medications can be taken in the morning with sips of water. You will need to take 325 mg aspirin the morning of. If you are currently taking 81 mg please take 4 tablets that morning. Do not use any lotions, creams or perfume the morning of procedure. Pre-procedure lab work will need to be completed 5-7 days prior to procedure. Please have a responsible adult to drive you home after procedure. We advise you have someone to stay with you for 24 hours following procedure for precautionary measures. Depending on procedure you may require an overnight stay. Cath lab will provide you with all post procedure instructions.

## 2023-01-30 NOTE — TELEPHONE ENCOUNTER
Carotid stenting schedule for 3/22/2023 at 1:00  Arrive at 11:30    The morning of your procedure you will park in the hospital parking lot and report directly to the cath lab to check in.      Pre-Procedure Instructions   You will need to fast for at least 8 hours prior to procedure. No caffeine the morning of. You will need to hold your anticoagulation, Xarelto for 2 days prior. All other medications can be taken in the morning with sips of water. You will need to take 325 mg aspirin the morning of. If you are currently taking 81 mg please take 4 tablets that morning. Do not use any lotions, creams or perfume the morning of procedure. Pre-procedure lab work will need to be completed 5-7 days prior to procedure. Please have a responsible adult to drive you home after procedure. We advise you have someone to stay with you for 24 hours following procedure for precautionary measures. Depending on procedure you may require an overnight stay. Cath lab will provide you with all post procedure instructions. Spreadsheet updated and email sent to [place]. Called and spoke to patient and Pedro. They are agreeable to date and time. Reviewed instructions and she verbalized understanding.

## 2023-02-01 ENCOUNTER — HOSPITAL ENCOUNTER (OUTPATIENT)
Dept: VASCULAR LAB | Age: 68
Discharge: HOME | End: 2023-02-01
Payer: MEDICARE

## 2023-02-01 DIAGNOSIS — I73.9 PAD (PERIPHERAL ARTERY DISEASE) (HCC): ICD-10-CM

## 2023-02-01 PROCEDURE — 93926 LOWER EXTREMITY STUDY: CPT

## 2023-02-02 ENCOUNTER — TELEPHONE (OUTPATIENT)
Dept: CARDIOLOGY CLINIC | Age: 68
End: 2023-02-02

## 2023-02-02 NOTE — TELEPHONE ENCOUNTER
Submitted PA for XARELTO  [facility] Key: [id number] STATUS:     The requested medication is a benefit exclusion and is not covered by the plan.     Decision letter scanned under media

## 2023-02-10 NOTE — TELEPHONE ENCOUNTER
Called PT and LM for a return call. Will advise that Xarelto is not covered by his insurance plan. PT must contact member services for further assistance.

## 2023-03-10 RX ORDER — AMLODIPINE BESYLATE 5 MG/1
TABLET ORAL
Qty: 90 TABLET | Refills: 3 | Status: SHIPPED | OUTPATIENT
Start: 2023-03-10

## 2023-03-16 DIAGNOSIS — I65.23 BILATERAL CAROTID ARTERY STENOSIS: ICD-10-CM

## 2023-03-16 LAB
ANION GAP SERPL CALCULATED.3IONS-SCNC: 13 MMOL/L (ref 3–16)
BUN SERPL-MCNC: 15 MG/DL (ref 7–20)
CALCIUM SERPL-MCNC: 9.5 MG/DL (ref 8.3–10.6)
CHLORIDE SERPL-SCNC: 103 MMOL/L (ref 99–110)
CO2 SERPL-SCNC: 24 MMOL/L (ref 21–32)
CREAT SERPL-MCNC: 1.2 MG/DL (ref 0.8–1.3)
DEPRECATED RDW RBC AUTO: 16.8 % (ref 12.4–15.4)
GFR SERPLBLD CREATININE-BSD FMLA CKD-EPI: >60 ML/MIN/{1.73_M2}
GLUCOSE SERPL-MCNC: 94 MG/DL (ref 70–99)
HCT VFR BLD AUTO: 36.8 % (ref 40.5–52.5)
HGB BLD-MCNC: 11.7 G/DL (ref 13.5–17.5)
MCH RBC QN AUTO: 24.6 PG (ref 26–34)
MCHC RBC AUTO-ENTMCNC: 31.9 G/DL (ref 31–36)
MCV RBC AUTO: 77.2 FL (ref 80–100)
PLATELET # BLD AUTO: 408 K/UL (ref 135–450)
PMV BLD AUTO: 8.1 FL (ref 5–10.5)
POTASSIUM SERPL-SCNC: 5.1 MMOL/L (ref 3.5–5.1)
RBC # BLD AUTO: 4.77 M/UL (ref 4.2–5.9)
SODIUM SERPL-SCNC: 140 MMOL/L (ref 136–145)
WBC # BLD AUTO: 8.8 K/UL (ref 4–11)

## 2023-03-22 ENCOUNTER — HOSPITAL ENCOUNTER (INPATIENT)
Dept: CARDIAC CATH/INVASIVE PROCEDURES | Age: 68
LOS: 3 days | Discharge: HOME | DRG: 272 | End: 2023-03-25
Attending: INTERNAL MEDICINE | Admitting: INTERNAL MEDICINE
Payer: MEDICARE

## 2023-03-22 ENCOUNTER — TELEPHONE (OUTPATIENT)
Dept: CARDIOLOGY CLINIC | Age: 68
End: 2023-03-22

## 2023-03-22 PROBLEM — I70.229 CRITICAL ISCHEMIA OF LOWER EXTREMITY (HCC): Status: ACTIVE | Noted: 2023-03-22

## 2023-03-22 LAB
DEPRECATED RDW RBC AUTO: 16.9 % (ref 12.4–15.4)
FIBRINOGEN PPP-MCNC: 331 MG/DL (ref 207–509)
FIBRINOGEN PPP-MCNC: 399 MG/DL (ref 207–509)
HCT VFR BLD AUTO: 37.3 % (ref 40.5–52.5)
HGB BLD-MCNC: 11.6 G/DL (ref 13.5–17.5)
MCH RBC QN AUTO: 24.4 PG (ref 26–34)
MCHC RBC AUTO-ENTMCNC: 31.1 G/DL (ref 31–36)
MCV RBC AUTO: 78.4 FL (ref 80–100)
PLATELET # BLD AUTO: 356 K/UL (ref 135–450)
PMV BLD AUTO: 8 FL (ref 5–10.5)
RBC # BLD AUTO: 4.76 M/UL (ref 4.2–5.9)
WBC # BLD AUTO: 11.3 K/UL (ref 4–11)

## 2023-03-22 PROCEDURE — 85384 FIBRINOGEN ACTIVITY: CPT

## 2023-03-22 PROCEDURE — 047P3ZZ DILATION OF RIGHT ANTERIOR TIBIAL ARTERY, PERCUTANEOUS APPROACH: ICD-10-PCS | Performed by: INTERNAL MEDICINE

## 2023-03-22 PROCEDURE — 2709999900 HC NON-CHARGEABLE SUPPLY

## 2023-03-22 PROCEDURE — 75710 ARTERY X-RAYS ARM/LEG: CPT | Performed by: INTERNAL MEDICINE

## 2023-03-22 PROCEDURE — 047M3ZZ DILATION OF RIGHT POPLITEAL ARTERY, PERCUTANEOUS APPROACH: ICD-10-PCS | Performed by: INTERNAL MEDICINE

## 2023-03-22 PROCEDURE — 2500000003 HC RX 250 WO HCPCS

## 2023-03-22 PROCEDURE — B41F1ZZ FLUOROSCOPY OF RIGHT LOWER EXTREMITY ARTERIES USING LOW OSMOLAR CONTRAST: ICD-10-PCS | Performed by: INTERNAL MEDICINE

## 2023-03-22 PROCEDURE — 2580000003 HC RX 258

## 2023-03-22 PROCEDURE — C1769 GUIDE WIRE: HCPCS

## 2023-03-22 PROCEDURE — 37224 HC FEM POP TERRITORY PLASTY: CPT

## 2023-03-22 PROCEDURE — 36415 COLL VENOUS BLD VENIPUNCTURE: CPT

## 2023-03-22 PROCEDURE — 2100000000 HC CCU R&B

## 2023-03-22 PROCEDURE — 99152 MOD SED SAME PHYS/QHP 5/>YRS: CPT

## 2023-03-22 PROCEDURE — B41G1ZZ FLUOROSCOPY OF LEFT LOWER EXTREMITY ARTERIES USING LOW OSMOLAR CONTRAST: ICD-10-PCS | Performed by: INTERNAL MEDICINE

## 2023-03-22 PROCEDURE — 6370000000 HC RX 637 (ALT 250 FOR IP): Performed by: INTERNAL MEDICINE

## 2023-03-22 PROCEDURE — C1894 INTRO/SHEATH, NON-LASER: HCPCS

## 2023-03-22 PROCEDURE — 6360000002 HC RX W HCPCS: Performed by: INTERNAL MEDICINE

## 2023-03-22 PROCEDURE — B3151ZZ FLUOROSCOPY OF BILATERAL COMMON CAROTID ARTERIES USING LOW OSMOLAR CONTRAST: ICD-10-PCS | Performed by: INTERNAL MEDICINE

## 2023-03-22 PROCEDURE — C1725 CATH, TRANSLUMIN NON-LASER: HCPCS

## 2023-03-22 PROCEDURE — 36222 PLACE CATH CAROTID/INOM ART: CPT

## 2023-03-22 PROCEDURE — 37211 THROMBOLYTIC ART THERAPY: CPT

## 2023-03-22 PROCEDURE — 6360000004 HC RX CONTRAST MEDICATION: Performed by: INTERNAL MEDICINE

## 2023-03-22 PROCEDURE — 75710 ARTERY X-RAYS ARM/LEG: CPT

## 2023-03-22 PROCEDURE — 2580000003 HC RX 258: Performed by: INTERNAL MEDICINE

## 2023-03-22 PROCEDURE — 85027 COMPLETE CBC AUTOMATED: CPT

## 2023-03-22 PROCEDURE — 37228 PR REVSC OPN/PRQ TIB/PERO W/ANGIOPLASTY UNI: CPT | Performed by: INTERNAL MEDICINE

## 2023-03-22 PROCEDURE — 37211 THROMBOLYTIC ART THERAPY: CPT | Performed by: INTERNAL MEDICINE

## 2023-03-22 PROCEDURE — 37224 PR REVSC OPN/PRG FEM/POP W/ANGIOPLASTY UNI: CPT | Performed by: INTERNAL MEDICINE

## 2023-03-22 PROCEDURE — C1751 CATH, INF, PER/CENT/MIDLINE: HCPCS

## 2023-03-22 PROCEDURE — 36223 PLACE CATH CAROTID/INOM ART: CPT | Performed by: INTERNAL MEDICINE

## 2023-03-22 PROCEDURE — 6360000002 HC RX W HCPCS

## 2023-03-22 PROCEDURE — 99153 MOD SED SAME PHYS/QHP EA: CPT

## 2023-03-22 PROCEDURE — 37228 HC TIB PER TERRITORY PLASTY: CPT

## 2023-03-22 PROCEDURE — C1887 CATHETER, GUIDING: HCPCS

## 2023-03-22 RX ORDER — SODIUM CHLORIDE 0.9 % (FLUSH) 0.9 %
5-40 SYRINGE (ML) INJECTION PRN
Status: DISCONTINUED | OUTPATIENT
Start: 2023-03-22 | End: 2023-03-25 | Stop reason: HOSPADM

## 2023-03-22 RX ORDER — ACETAMINOPHEN 500 MG
1000 TABLET ORAL EVERY 6 HOURS PRN
Status: DISCONTINUED | OUTPATIENT
Start: 2023-03-22 | End: 2023-03-25 | Stop reason: HOSPADM

## 2023-03-22 RX ORDER — HEPARIN SODIUM AND DEXTROSE 10000; 5 [USP'U]/100ML; G/100ML
500 INJECTION INTRAVENOUS CONTINUOUS
Status: DISCONTINUED | OUTPATIENT
Start: 2023-03-22 | End: 2023-03-24

## 2023-03-22 RX ORDER — AMLODIPINE BESYLATE 5 MG/1
5 TABLET ORAL DAILY
Status: DISCONTINUED | OUTPATIENT
Start: 2023-03-23 | End: 2023-03-25 | Stop reason: HOSPADM

## 2023-03-22 RX ORDER — ATORVASTATIN CALCIUM 80 MG/1
80 TABLET, FILM COATED ORAL DAILY
Status: DISCONTINUED | OUTPATIENT
Start: 2023-03-23 | End: 2023-03-25 | Stop reason: HOSPADM

## 2023-03-22 RX ORDER — SODIUM CHLORIDE 9 MG/ML
INJECTION, SOLUTION INTRAVENOUS CONTINUOUS
Status: DISCONTINUED | OUTPATIENT
Start: 2023-03-22 | End: 2023-03-24

## 2023-03-22 RX ORDER — SODIUM CHLORIDE 0.9 % (FLUSH) 0.9 %
5-40 SYRINGE (ML) INJECTION EVERY 12 HOURS SCHEDULED
Status: DISCONTINUED | OUTPATIENT
Start: 2023-03-22 | End: 2023-03-25 | Stop reason: HOSPADM

## 2023-03-22 RX ORDER — ACETAMINOPHEN 325 MG/1
650 TABLET ORAL EVERY 4 HOURS PRN
Status: DISCONTINUED | OUTPATIENT
Start: 2023-03-22 | End: 2023-03-22

## 2023-03-22 RX ORDER — ASPIRIN 81 MG/1
81 TABLET, CHEWABLE ORAL DAILY
Status: DISCONTINUED | OUTPATIENT
Start: 2023-03-23 | End: 2023-03-25 | Stop reason: HOSPADM

## 2023-03-22 RX ORDER — DOXYCYCLINE HYCLATE 100 MG
100 TABLET ORAL EVERY 12 HOURS SCHEDULED
Status: DISCONTINUED | OUTPATIENT
Start: 2023-03-22 | End: 2023-03-25 | Stop reason: HOSPADM

## 2023-03-22 RX ORDER — MORPHINE SULFATE 2 MG/ML
2 INJECTION, SOLUTION INTRAMUSCULAR; INTRAVENOUS
Status: DISCONTINUED | OUTPATIENT
Start: 2023-03-22 | End: 2023-03-25 | Stop reason: HOSPADM

## 2023-03-22 RX ORDER — SODIUM CHLORIDE 9 MG/ML
INJECTION, SOLUTION INTRAVENOUS PRN
Status: DISCONTINUED | OUTPATIENT
Start: 2023-03-22 | End: 2023-03-25 | Stop reason: HOSPADM

## 2023-03-22 RX ORDER — DOXYCYCLINE HYCLATE 50 MG/1
324 CAPSULE, GELATIN COATED ORAL
Status: DISCONTINUED | OUTPATIENT
Start: 2023-03-23 | End: 2023-03-25 | Stop reason: HOSPADM

## 2023-03-22 RX ORDER — MORPHINE SULFATE 2 MG/ML
2 INJECTION, SOLUTION INTRAMUSCULAR; INTRAVENOUS EVERY 4 HOURS PRN
Status: DISCONTINUED | OUTPATIENT
Start: 2023-03-22 | End: 2023-03-22

## 2023-03-22 RX ORDER — SENNA AND DOCUSATE SODIUM 50; 8.6 MG/1; MG/1
1 TABLET, FILM COATED ORAL DAILY
Status: DISCONTINUED | OUTPATIENT
Start: 2023-03-22 | End: 2023-03-22

## 2023-03-22 RX ORDER — LORAZEPAM 2 MG/ML
1 INJECTION INTRAMUSCULAR EVERY 6 HOURS PRN
Status: DISCONTINUED | OUTPATIENT
Start: 2023-03-22 | End: 2023-03-25 | Stop reason: HOSPADM

## 2023-03-22 RX ORDER — ASPIRIN 325 MG
325 TABLET ORAL ONCE
Status: DISCONTINUED | OUTPATIENT
Start: 2023-03-22 | End: 2023-03-25 | Stop reason: HOSPADM

## 2023-03-22 RX ORDER — GABAPENTIN 400 MG/1
800 CAPSULE ORAL 3 TIMES DAILY
Status: DISCONTINUED | OUTPATIENT
Start: 2023-03-22 | End: 2023-03-25 | Stop reason: HOSPADM

## 2023-03-22 RX ORDER — SENNA AND DOCUSATE SODIUM 50; 8.6 MG/1; MG/1
1 TABLET, FILM COATED ORAL NIGHTLY
Status: DISCONTINUED | OUTPATIENT
Start: 2023-03-22 | End: 2023-03-25 | Stop reason: HOSPADM

## 2023-03-22 RX ORDER — QUETIAPINE FUMARATE 100 MG/1
100 TABLET, FILM COATED ORAL NIGHTLY
Status: DISCONTINUED | OUTPATIENT
Start: 2023-03-22 | End: 2023-03-25 | Stop reason: HOSPADM

## 2023-03-22 RX ADMIN — ALTEPLASE 1 MG/HR: 2.2 INJECTION, POWDER, LYOPHILIZED, FOR SOLUTION INTRAVENOUS at 23:33

## 2023-03-22 RX ADMIN — MORPHINE SULFATE 2 MG: 2 INJECTION, SOLUTION INTRAMUSCULAR; INTRAVENOUS at 15:02

## 2023-03-22 RX ADMIN — LORAZEPAM 1 MG: 2 INJECTION INTRAMUSCULAR; INTRAVENOUS at 23:35

## 2023-03-22 RX ADMIN — DOXYCYCLINE HYCLATE 100 MG: 100 TABLET, COATED ORAL at 20:55

## 2023-03-22 RX ADMIN — QUETIAPINE FUMARATE 100 MG: 100 TABLET ORAL at 20:54

## 2023-03-22 RX ADMIN — IOPAMIDOL 130 ML: 755 INJECTION, SOLUTION INTRAVENOUS at 13:04

## 2023-03-22 RX ADMIN — METOPROLOL TARTRATE 25 MG: 25 TABLET, FILM COATED ORAL at 20:26

## 2023-03-22 RX ADMIN — MORPHINE SULFATE 2 MG: 2 INJECTION, SOLUTION INTRAMUSCULAR; INTRAVENOUS at 23:35

## 2023-03-22 RX ADMIN — GABAPENTIN 800 MG: 400 CAPSULE ORAL at 15:51

## 2023-03-22 RX ADMIN — MORPHINE SULFATE 2 MG: 2 INJECTION, SOLUTION INTRAMUSCULAR; INTRAVENOUS at 19:21

## 2023-03-22 RX ADMIN — SENNOSIDES AND DOCUSATE SODIUM 1 TABLET: 50; 8.6 TABLET ORAL at 20:54

## 2023-03-22 RX ADMIN — NITROGLYCERIN 1 INCH: 20 OINTMENT TOPICAL at 21:32

## 2023-03-22 RX ADMIN — GABAPENTIN 800 MG: 400 CAPSULE ORAL at 20:54

## 2023-03-22 RX ADMIN — MORPHINE SULFATE 2 MG: 2 INJECTION, SOLUTION INTRAMUSCULAR; INTRAVENOUS at 21:28

## 2023-03-22 RX ADMIN — Medication 10 ML: at 20:55

## 2023-03-22 RX ADMIN — LORAZEPAM 1 MG: 2 INJECTION INTRAMUSCULAR; INTRAVENOUS at 15:01

## 2023-03-22 ASSESSMENT — PAIN DESCRIPTION - PAIN TYPE
TYPE: SURGICAL PAIN
TYPE: ACUTE PAIN
TYPE: SURGICAL PAIN
TYPE: ACUTE PAIN

## 2023-03-22 ASSESSMENT — PAIN DESCRIPTION - LOCATION
LOCATION: LEG
LOCATION: FOOT
LOCATION: FOOT
LOCATION: LEG
LOCATION: LEG

## 2023-03-22 ASSESSMENT — PAIN DESCRIPTION - ORIENTATION
ORIENTATION: RIGHT
ORIENTATION: RIGHT
ORIENTATION: RIGHT;LOWER
ORIENTATION: RIGHT
ORIENTATION: RIGHT;LOWER
ORIENTATION: RIGHT

## 2023-03-22 ASSESSMENT — PAIN - FUNCTIONAL ASSESSMENT
PAIN_FUNCTIONAL_ASSESSMENT: PREVENTS OR INTERFERES SOME ACTIVE ACTIVITIES AND ADLS

## 2023-03-22 ASSESSMENT — PAIN DESCRIPTION - FREQUENCY
FREQUENCY: CONTINUOUS

## 2023-03-22 ASSESSMENT — PAIN DESCRIPTION - DESCRIPTORS
DESCRIPTORS: BURNING
DESCRIPTORS: ACHING
DESCRIPTORS: OTHER (COMMENT)
DESCRIPTORS: ACHING
DESCRIPTORS: POUNDING
DESCRIPTORS: ACHING

## 2023-03-22 ASSESSMENT — PAIN DESCRIPTION - ONSET
ONSET: ON-GOING

## 2023-03-22 ASSESSMENT — PAIN SCALES - GENERAL
PAINLEVEL_OUTOF10: 8
PAINLEVEL_OUTOF10: 7
PAINLEVEL_OUTOF10: 5
PAINLEVEL_OUTOF10: 0
PAINLEVEL_OUTOF10: 4
PAINLEVEL_OUTOF10: 9
PAINLEVEL_OUTOF10: 8
PAINLEVEL_OUTOF10: 8

## 2023-03-22 NOTE — PROGRESS NOTES
Pt to room 1307 with EKOS catheter in place. Pt is AxOx4. VSS on RA. Pt oriented to room and call light. Pt's wife Pedro at bedside.

## 2023-03-22 NOTE — PROGRESS NOTES
4 Eyes Skin Assessment     NAME:  Fela Londono  YOB: 1955  MEDICAL RECORD NUMBER:  [id number]    The patient is being assessed for  Admission    I agree that One RN has performed a thorough Head to Toe Skin Assessment on the patient. ALL assessment sites listed below have been assessed. Areas assessed by both nurses:    Head, Face, Ears, Shoulders, Back, Chest, Arms, Elbows, Hands, Sacrum. Buttock, Coccyx, Ischium, and Legs. Feet and Heels        Does the Patient have a Wound?  No noted wound(s)       Kip Prevention initiated by RN: Yes   Wound Care Orders initiated by RN: NA    Pressure Injury (Stage 3,4, Unstageable, DTI, NWPT, and Complex wounds) if present, place referral order by RN under : NA    New and Established Ostomies, if present place, referral order under : NA      Nurse 1 eSignature: Electronically signed by Keerthi Bhatia RN on 3/22/23 at 7:03 PM EDT    **SHARE this note so that the co-signing nurse can place an eSignature**    Nurse 2 eSignature: Electronically signed by Tiera Moreno RN on 3/22/23 at 7:19 PM EDT

## 2023-03-23 LAB
ANION GAP SERPL CALCULATED.3IONS-SCNC: 13 MMOL/L (ref 3–16)
BUN SERPL-MCNC: 20 MG/DL (ref 7–20)
CALCIUM SERPL-MCNC: 8.6 MG/DL (ref 8.3–10.6)
CHLORIDE SERPL-SCNC: 105 MMOL/L (ref 99–110)
CO2 SERPL-SCNC: 19 MMOL/L (ref 21–32)
CREAT SERPL-MCNC: 1.1 MG/DL (ref 0.8–1.3)
DEPRECATED RDW RBC AUTO: 16.5 % (ref 12.4–15.4)
DEPRECATED RDW RBC AUTO: 16.6 % (ref 12.4–15.4)
DEPRECATED RDW RBC AUTO: 16.8 % (ref 12.4–15.4)
DEPRECATED RDW RBC AUTO: 17 % (ref 12.4–15.4)
FIBRINOGEN PPP-MCNC: 176 MG/DL (ref 207–509)
FIBRINOGEN PPP-MCNC: 225 MG/DL (ref 207–509)
FIBRINOGEN PPP-MCNC: 261 MG/DL (ref 207–509)
FIBRINOGEN PPP-MCNC: 327 MG/DL (ref 207–509)
GFR SERPLBLD CREATININE-BSD FMLA CKD-EPI: >60 ML/MIN/{1.73_M2}
GLUCOSE SERPL-MCNC: 146 MG/DL (ref 70–99)
HCT VFR BLD AUTO: 32.2 % (ref 40.5–52.5)
HCT VFR BLD AUTO: 32.2 % (ref 40.5–52.5)
HCT VFR BLD AUTO: 32.9 % (ref 40.5–52.5)
HCT VFR BLD AUTO: 33.8 % (ref 40.5–52.5)
HGB BLD-MCNC: 10.1 G/DL (ref 13.5–17.5)
HGB BLD-MCNC: 10.1 G/DL (ref 13.5–17.5)
HGB BLD-MCNC: 10.4 G/DL (ref 13.5–17.5)
HGB BLD-MCNC: 10.6 G/DL (ref 13.5–17.5)
MCH RBC QN AUTO: 24 PG (ref 26–34)
MCH RBC QN AUTO: 24 PG (ref 26–34)
MCH RBC QN AUTO: 24.1 PG (ref 26–34)
MCH RBC QN AUTO: 24.2 PG (ref 26–34)
MCHC RBC AUTO-ENTMCNC: 31.4 G/DL (ref 31–36)
MCHC RBC AUTO-ENTMCNC: 31.5 G/DL (ref 31–36)
MCV RBC AUTO: 76.3 FL (ref 80–100)
MCV RBC AUTO: 76.4 FL (ref 80–100)
MCV RBC AUTO: 76.8 FL (ref 80–100)
MCV RBC AUTO: 77.2 FL (ref 80–100)
PLATELET # BLD AUTO: 330 K/UL (ref 135–450)
PLATELET # BLD AUTO: 335 K/UL (ref 135–450)
PLATELET # BLD AUTO: 348 K/UL (ref 135–450)
PLATELET # BLD AUTO: 349 K/UL (ref 135–450)
PMV BLD AUTO: 7.7 FL (ref 5–10.5)
PMV BLD AUTO: 7.7 FL (ref 5–10.5)
PMV BLD AUTO: 8 FL (ref 5–10.5)
PMV BLD AUTO: 8 FL (ref 5–10.5)
POC ACT LR: 154 SEC
POTASSIUM SERPL-SCNC: 4.5 MMOL/L (ref 3.5–5.1)
RBC # BLD AUTO: 4.17 M/UL (ref 4.2–5.9)
RBC # BLD AUTO: 4.19 M/UL (ref 4.2–5.9)
RBC # BLD AUTO: 4.32 M/UL (ref 4.2–5.9)
RBC # BLD AUTO: 4.43 M/UL (ref 4.2–5.9)
SODIUM SERPL-SCNC: 137 MMOL/L (ref 136–145)
WBC # BLD AUTO: 15.2 K/UL (ref 4–11)
WBC # BLD AUTO: 16.5 K/UL (ref 4–11)
WBC # BLD AUTO: 17.1 K/UL (ref 4–11)
WBC # BLD AUTO: 9.7 K/UL (ref 4–11)

## 2023-03-23 PROCEDURE — 37228 PR REVSC OPN/PRQ TIB/PERO W/ANGIOPLASTY UNI: CPT | Performed by: INTERNAL MEDICINE

## 2023-03-23 PROCEDURE — 99152 MOD SED SAME PHYS/QHP 5/>YRS: CPT

## 2023-03-23 PROCEDURE — C1725 CATH, TRANSLUMIN NON-LASER: HCPCS

## 2023-03-23 PROCEDURE — 6360000004 HC RX CONTRAST MEDICATION: Performed by: INTERNAL MEDICINE

## 2023-03-23 PROCEDURE — 99153 MOD SED SAME PHYS/QHP EA: CPT

## 2023-03-23 PROCEDURE — 047P3ZZ DILATION OF RIGHT ANTERIOR TIBIAL ARTERY, PERCUTANEOUS APPROACH: ICD-10-PCS | Performed by: INTERNAL MEDICINE

## 2023-03-23 PROCEDURE — 04FM3Z0 FRAGMENTATION OF RIGHT POPLITEAL ARTERY, PERCUTANEOUS APPROACH, ULTRASONIC: ICD-10-PCS | Performed by: INTERNAL MEDICINE

## 2023-03-23 PROCEDURE — 2580000003 HC RX 258: Performed by: INTERNAL MEDICINE

## 2023-03-23 PROCEDURE — 2500000003 HC RX 250 WO HCPCS

## 2023-03-23 PROCEDURE — 85027 COMPLETE CBC AUTOMATED: CPT

## 2023-03-23 PROCEDURE — 85384 FIBRINOGEN ACTIVITY: CPT

## 2023-03-23 PROCEDURE — C1887 CATHETER, GUIDING: HCPCS

## 2023-03-23 PROCEDURE — 6370000000 HC RX 637 (ALT 250 FOR IP): Performed by: INTERNAL MEDICINE

## 2023-03-23 PROCEDURE — 99152 MOD SED SAME PHYS/QHP 5/>YRS: CPT | Performed by: INTERNAL MEDICINE

## 2023-03-23 PROCEDURE — 6360000002 HC RX W HCPCS

## 2023-03-23 PROCEDURE — 37224 PR REVSC OPN/PRG FEM/POP W/ANGIOPLASTY UNI: CPT | Performed by: INTERNAL MEDICINE

## 2023-03-23 PROCEDURE — 6360000002 HC RX W HCPCS: Performed by: INTERNAL MEDICINE

## 2023-03-23 PROCEDURE — C1769 GUIDE WIRE: HCPCS

## 2023-03-23 PROCEDURE — 80048 BASIC METABOLIC PNL TOTAL CA: CPT

## 2023-03-23 PROCEDURE — 37213 THROMBLYTIC ART/VEN THERAPY: CPT

## 2023-03-23 PROCEDURE — 37224 HC FEM POP TERRITORY PLASTY: CPT

## 2023-03-23 PROCEDURE — 37228 HC TIB PER TERRITORY PLASTY: CPT

## 2023-03-23 PROCEDURE — 37213 THROMBLYTIC ART/VEN THERAPY: CPT | Performed by: INTERNAL MEDICINE

## 2023-03-23 PROCEDURE — B41F1ZZ FLUOROSCOPY OF RIGHT LOWER EXTREMITY ARTERIES USING LOW OSMOLAR CONTRAST: ICD-10-PCS | Performed by: INTERNAL MEDICINE

## 2023-03-23 PROCEDURE — C1751 CATH, INF, PER/CENT/MIDLINE: HCPCS

## 2023-03-23 PROCEDURE — 85347 COAGULATION TIME ACTIVATED: CPT

## 2023-03-23 PROCEDURE — 75710 ARTERY X-RAYS ARM/LEG: CPT | Performed by: INTERNAL MEDICINE

## 2023-03-23 PROCEDURE — 04FP3Z0 FRAGMENTATION OF RIGHT ANTERIOR TIBIAL ARTERY, PERCUTANEOUS APPROACH, ULTRASONIC: ICD-10-PCS | Performed by: INTERNAL MEDICINE

## 2023-03-23 PROCEDURE — 2100000000 HC CCU R&B

## 2023-03-23 PROCEDURE — 37799 UNLISTED PX VASCULAR SURGERY: CPT

## 2023-03-23 PROCEDURE — 047M3ZZ DILATION OF RIGHT POPLITEAL ARTERY, PERCUTANEOUS APPROACH: ICD-10-PCS | Performed by: INTERNAL MEDICINE

## 2023-03-23 RX ORDER — SODIUM CHLORIDE 0.9 % (FLUSH) 0.9 %
5-40 SYRINGE (ML) INJECTION EVERY 12 HOURS SCHEDULED
Status: DISCONTINUED | OUTPATIENT
Start: 2023-03-23 | End: 2023-03-23 | Stop reason: SDUPTHER

## 2023-03-23 RX ORDER — SODIUM CHLORIDE 0.9 % (FLUSH) 0.9 %
5-40 SYRINGE (ML) INJECTION PRN
Status: DISCONTINUED | OUTPATIENT
Start: 2023-03-23 | End: 2023-03-23 | Stop reason: SDUPTHER

## 2023-03-23 RX ORDER — ACETAMINOPHEN 325 MG/1
650 TABLET ORAL EVERY 4 HOURS PRN
Status: DISCONTINUED | OUTPATIENT
Start: 2023-03-23 | End: 2023-03-23 | Stop reason: SDUPTHER

## 2023-03-23 RX ORDER — SODIUM CHLORIDE 9 MG/ML
INJECTION, SOLUTION INTRAVENOUS PRN
Status: DISCONTINUED | OUTPATIENT
Start: 2023-03-23 | End: 2023-03-23 | Stop reason: SDUPTHER

## 2023-03-23 RX ADMIN — GABAPENTIN 800 MG: 400 CAPSULE ORAL at 08:46

## 2023-03-23 RX ADMIN — NITROGLYCERIN 1 INCH: 20 OINTMENT TOPICAL at 08:47

## 2023-03-23 RX ADMIN — Medication 10 ML: at 11:09

## 2023-03-23 RX ADMIN — GABAPENTIN 800 MG: 400 CAPSULE ORAL at 16:28

## 2023-03-23 RX ADMIN — MORPHINE SULFATE 2 MG: 2 INJECTION, SOLUTION INTRAMUSCULAR; INTRAVENOUS at 13:47

## 2023-03-23 RX ADMIN — HYDROMORPHONE HYDROCHLORIDE 0.5 MG: 1 INJECTION, SOLUTION INTRAMUSCULAR; INTRAVENOUS; SUBCUTANEOUS at 20:14

## 2023-03-23 RX ADMIN — Medication 10 ML: at 08:47

## 2023-03-23 RX ADMIN — Medication 10 ML: at 09:59

## 2023-03-23 RX ADMIN — Medication 10 ML: at 04:54

## 2023-03-23 RX ADMIN — DOXYCYCLINE HYCLATE 100 MG: 100 TABLET, COATED ORAL at 08:47

## 2023-03-23 RX ADMIN — MORPHINE SULFATE 2 MG: 2 INJECTION, SOLUTION INTRAMUSCULAR; INTRAVENOUS at 17:22

## 2023-03-23 RX ADMIN — MORPHINE SULFATE 2 MG: 2 INJECTION, SOLUTION INTRAMUSCULAR; INTRAVENOUS at 21:43

## 2023-03-23 RX ADMIN — METOPROLOL TARTRATE 25 MG: 25 TABLET, FILM COATED ORAL at 08:47

## 2023-03-23 RX ADMIN — NITROGLYCERIN 1 INCH: 20 OINTMENT TOPICAL at 20:30

## 2023-03-23 RX ADMIN — QUETIAPINE FUMARATE 100 MG: 100 TABLET ORAL at 20:31

## 2023-03-23 RX ADMIN — GABAPENTIN 800 MG: 400 CAPSULE ORAL at 20:31

## 2023-03-23 RX ADMIN — METOPROLOL TARTRATE 25 MG: 25 TABLET, FILM COATED ORAL at 20:31

## 2023-03-23 RX ADMIN — DOXYCYCLINE HYCLATE 100 MG: 100 TABLET, COATED ORAL at 20:31

## 2023-03-23 RX ADMIN — SENNOSIDES AND DOCUSATE SODIUM 1 TABLET: 50; 8.6 TABLET ORAL at 20:31

## 2023-03-23 RX ADMIN — ATORVASTATIN CALCIUM 80 MG: 80 TABLET, FILM COATED ORAL at 08:47

## 2023-03-23 RX ADMIN — LORAZEPAM 1 MG: 2 INJECTION INTRAMUSCULAR; INTRAVENOUS at 21:43

## 2023-03-23 RX ADMIN — Medication 10 ML: at 21:43

## 2023-03-23 RX ADMIN — MORPHINE SULFATE 2 MG: 2 INJECTION, SOLUTION INTRAMUSCULAR; INTRAVENOUS at 04:53

## 2023-03-23 RX ADMIN — ASPIRIN 81 MG 81 MG: 81 TABLET ORAL at 08:46

## 2023-03-23 RX ADMIN — ALTEPLASE 1 MG/HR: 2.2 INJECTION, POWDER, LYOPHILIZED, FOR SOLUTION INTRAVENOUS at 08:56

## 2023-03-23 RX ADMIN — AMLODIPINE BESYLATE 5 MG: 5 TABLET ORAL at 08:46

## 2023-03-23 RX ADMIN — LORAZEPAM 1 MG: 2 INJECTION INTRAMUSCULAR; INTRAVENOUS at 08:51

## 2023-03-23 RX ADMIN — MORPHINE SULFATE 2 MG: 2 INJECTION, SOLUTION INTRAMUSCULAR; INTRAVENOUS at 07:46

## 2023-03-23 RX ADMIN — HYDROMORPHONE HYDROCHLORIDE 1 MG: 1 INJECTION, SOLUTION INTRAMUSCULAR; INTRAVENOUS; SUBCUTANEOUS at 09:58

## 2023-03-23 RX ADMIN — HEPARIN SODIUM 500 UNITS/HR: 10000 INJECTION, SOLUTION INTRAVENOUS at 21:57

## 2023-03-23 RX ADMIN — Medication 324 MG: at 08:50

## 2023-03-23 RX ADMIN — Medication 10 ML: at 20:15

## 2023-03-23 RX ADMIN — IOPAMIDOL 30 ML: 755 INJECTION, SOLUTION INTRAVENOUS at 15:55

## 2023-03-23 RX ADMIN — MORPHINE SULFATE 2 MG: 2 INJECTION, SOLUTION INTRAMUSCULAR; INTRAVENOUS at 11:08

## 2023-03-23 ASSESSMENT — PAIN DESCRIPTION - PAIN TYPE
TYPE: ACUTE PAIN
TYPE: SURGICAL PAIN
TYPE: ACUTE PAIN
TYPE: ACUTE PAIN

## 2023-03-23 ASSESSMENT — PAIN DESCRIPTION - LOCATION
LOCATION: FOOT

## 2023-03-23 ASSESSMENT — PAIN - FUNCTIONAL ASSESSMENT

## 2023-03-23 ASSESSMENT — PAIN SCALES - GENERAL
PAINLEVEL_OUTOF10: 9
PAINLEVEL_OUTOF10: 8
PAINLEVEL_OUTOF10: 9
PAINLEVEL_OUTOF10: 5
PAINLEVEL_OUTOF10: 9
PAINLEVEL_OUTOF10: 9
PAINLEVEL_OUTOF10: 8
PAINLEVEL_OUTOF10: 0
PAINLEVEL_OUTOF10: 9
PAINLEVEL_OUTOF10: 7
PAINLEVEL_OUTOF10: 9
PAINLEVEL_OUTOF10: 0
PAINLEVEL_OUTOF10: 9
PAINLEVEL_OUTOF10: 9
PAINLEVEL_OUTOF10: 0
PAINLEVEL_OUTOF10: 9

## 2023-03-23 ASSESSMENT — PAIN DESCRIPTION - DESCRIPTORS
DESCRIPTORS: THROBBING
DESCRIPTORS: ACHING;HEAVINESS;SORE
DESCRIPTORS: THROBBING
DESCRIPTORS: HEAVINESS
DESCRIPTORS: ACHING;HEAVINESS
DESCRIPTORS: THROBBING
DESCRIPTORS: ACHING;SORE;HEAVINESS
DESCRIPTORS: HEAVINESS
DESCRIPTORS: ACHING;HEAVINESS;SORE
DESCRIPTORS: THROBBING
DESCRIPTORS: ACHING;HEAVINESS;SORE

## 2023-03-23 ASSESSMENT — PAIN DESCRIPTION - ONSET
ONSET: ON-GOING

## 2023-03-23 ASSESSMENT — PAIN DESCRIPTION - ORIENTATION
ORIENTATION: RIGHT

## 2023-03-23 ASSESSMENT — PAIN DESCRIPTION - FREQUENCY
FREQUENCY: CONTINUOUS

## 2023-03-23 NOTE — PLAN OF CARE
Problem: Discharge Planning  Goal: Discharge to home or other facility with appropriate resources  Outcome: Progressing     Problem: Pain  Goal: Verbalizes/displays adequate comfort level or baseline comfort level  Outcome: Progressing  Flowsheets (Taken 3/22/2023 2004)  Verbalizes/displays adequate comfort level or baseline comfort level:   Encourage patient to monitor pain and request assistance   Assess pain using appropriate pain scale   Implement non-pharmacological measures as appropriate and evaluate response   Consider cultural and social influences on pain and pain management   Notify Licensed Independent Practitioner if interventions unsuccessful or patient reports new pain     Problem: Skin/Tissue Integrity  Goal: Absence of new skin breakdown  Description: 1. Monitor for areas of redness and/or skin breakdown  2. Assess vascular access sites hourly  3. Every 4-6 hours minimum:  Change oxygen saturation probe site  4. Every 4-6 hours:  If on nasal continuous positive airway pressure, respiratory therapy assess nares and determine need for appliance change or resting period.   Outcome: Progressing     Problem: Safety - Adult  Goal: Free from fall injury  Outcome: Progressing  Flowsheets (Taken 3/22/2023 2004)  Free From Fall Injury: Instruct family/caregiver on patient safety

## 2023-03-23 NOTE — PROGRESS NOTES
Pt returned to [place] [address] room 1307 after report received from cath lab. All lines and gtts traced to EKOS and left arterial sheath. Pt alert and oriented. Right PT and anterior pedal pulses verified via Doppler signal. Wife at bedside. Dr. Prem Garsia updated on plan of care.

## 2023-03-23 NOTE — H&P
stenting 3V runoff to the foot. Peripheral angiogram  11/15/21   Right superficial femoral artery heavily diseased  with sequential 80% stenosis. Popliteal artery is occluded, in-stent  occlusion present. Faint collateral flow into the right anterior tibial  artery and posterior tibial artery. The patient will be brought back for staged intervention in a few  Days. Peripheral Intervention  11/22/21  Severe right lower extremity PAD   Occluded SFA/Pop/TP trunk   S/p balloon angioplasty and thrombolytic catheter placement   Plan for EKOS removal tomorrow followed by laser atherectomy      Peripheral intervention  11/23/21   S/p successful SFA/Pop  reconstitution with laser atherectomy of prior pop stent   S/p angioplasty and DCB of the right SFA   Single vessel in-line flow to the DP/ anterior plantar arch   Peroneal/PT are filling via collateral circulation      SYBIL 3/11/21   Abnormal right SYBIL in the range of mild arterial disease. Right deep femoral artery stenosis of greater than 50%. Distal right popliteal artery stenosis measuring greater than 755 by   criteria associated with patent popliteal stent. SYBIL 11/2021    Right SYBIL was not available due to inadequate pulses . Elevated velocity of the deep femoral artery suggests a 50-70% stenosis. Occlusion of the distal superficial femoral and popliteal artery in the   right lower extremity. Critically low velocities in the right posterior tibial and anterior tibial   arteries. Left SYBIL was not available due to below knee amputation. Occlusion of the left popliteal artery. The majority of the waveforms are monophasic throughout the left lower   extremity. SYBIL 12/9/21   Right SYBIL was not available due to inadequate pulses. Preocculsive velocities noted in the proximal femoral artery. Findings are suggestive of multi level arterial disease. Left SYBIL not available due to BKA. The left popliteal artery is occluded. procedure. Pre-sedation Assessment    Patient:  Derek Colby   :   1955  Intended Procedure: carotid angiogram / peripheral angiogram    Vitals:    23   BP:    Pulse:    Resp:    Temp: 98.2 °F (36.8 °C)   SpO2:        Nursing notes reviewed and agreed.   Medications reviewed  Allergies: No Known Allergies      Pre-Procedure Assessment/Plan:  ASA 3 - Patient with moderate systemic disease with functional limitations    Mallampati Airway Assessment:  Mallampati Class I - (soft palate, fauces, uvula & anterior/posterior tonsillar pillars are visible)    Level of Sedation Plan:Mild sedation    Post Procedure plan: Return to same level of care    Samanta Awan MD [facility], Interventional Cardiology, and Peripheral Vascular [address] [facility]   (C): [phone]  (O): [phone]

## 2023-03-23 NOTE — PROGRESS NOTES
4 Eyes Skin Assessment     NAME:  Ramesh Foster  YOB: 1955  MEDICAL RECORD NUMBER:  [id number]    The patient is being assessed for  Shift Handoff    I agree that One RN has performed a thorough Head to Toe Skin Assessment on the patient. ALL assessment sites listed below have been assessed. Areas assessed by both nurses:    Head, Face, Ears, Shoulders, Back, Chest, Arms, Elbows, Hands, Sacrum. Buttock, Coccyx, Ischium, and Legs. Feet and Heels        Does the Patient have a Wound?  No noted wound(s)       Kip Prevention initiated by RN: Yes   Wound Care Orders initiated by RN: NA    Pressure Injury (Stage 3,4, Unstageable, DTI, NWPT, and Complex wounds) if present, place referral order by RN under : NA    New and Established Ostomies, if present place, referral order under : NA      Nurse 1 eSignature: Electronically signed by Teresa Chinchilla RN on 3/23/23 at 7:38 PM EDT    **SHARE this note so that the co-signing nurse can place an eSignature**    Nurse 2 eSignature: Electronically signed by Willis Chen RN on 3/23/23 at 11:18 PM EDT

## 2023-03-23 NOTE — PLAN OF CARE
Problem: Cardiovascular - Adult  Goal: Maintains optimal cardiac output and hemodynamic stability  Outcome: Progressing  Maintains optimal cardiac output and hemodynamic stability:   Monitor blood pressure and heart rate   Monitor urine output and notify Licensed Independent Practitioner for values outside of normal range   Assess for signs of decreased cardiac output     Problem: Respiratory - Adult  Goal: Achieves optimal ventilation and oxygenation  Outcome: Progressing  Achieves optimal ventilation and oxygenation:   Assess for changes in respiratory status   Assess for changes in mentation and behavior   Position to facilitate oxygenation and minimize respiratory effort   Oxygen supplementation based on oxygen saturation or arterial blood gases   Initiate smoking cessation protocol as indicated   Encourage broncho-pulmonary hygiene including cough, deep breathe, incentive spirometry   Assess and instruct to report shortness of breath or any respiratory difficulty     Problem: Hematologic - Adult  Goal: Maintains hematologic stability  Outcome: Progressing  Maintains hematologic stability:   Assess for signs and symptoms of bleeding or hemorrhage   Monitor labs for bleeding or clotting disorders     Problem: Pain  Goal: Verbalizes/displays adequate comfort level or baseline comfort level  Outcome: Progressing  Verbalizes/displays adequate comfort level or baseline comfort level:   Encourage patient to monitor pain and request assistance   Assess pain using appropriate pain scale   Implement non-pharmacological measures as appropriate and evaluate response   Consider cultural and social influences on pain and pain management   Notify Licensed Independent Practitioner if interventions unsuccessful or patient reports new pain     Problem: ABCDS Injury Assessment  Goal: Absence of physical injury  Outcome: Progressing  Absence of Physical Injury: Implement safety measures based on patient

## 2023-03-23 NOTE — PROGRESS NOTES
Late entry due to patient care. He just woke-up a few minutes ago and asked for pain medicine. He said he has a 9/10 R foot pain he described as \"like a rock\"/ \"heavy\". PRN Morphine IV was administered (see MAR). He has a R anterior tibial, but still no R dorsalis pedis doppler signal. He has R femoral, R popliteal, and R posterior tibial doppler signals. His distal R leg is cool and his R foot is cooler. His R distal foot's skin color is reddish. He can move all of his extremities.     Electronically signed by Brittni Tapia RN on 3/23/2023 at 5:56 AM

## 2023-03-23 NOTE — PROGRESS NOTES
Late entry due to patient care. This RN informed Dr. Iban Smith that the pt. has no Right doppler dorsalis pedis (DP) signal, that his R foot is cool, R foot skin color has some redness, and that he has a 10/10 R foot pounding pain especially at the bend (ankle area) that's not being helped by the current Morphine order. Also, this RN told Dr. Prem Garsia that there are doppler R femoral, popliteal, and posterior tibial signals. Dr. Prem Garsia was also informed that at shift change, the R foot was red & warm and that day shift RN said she was able to find a R doppler DP signal. Dr. Prem Garsia ordered scheduled Nitropaste on top of the R foot and increase PRN Morphine IV to every 2 hours (see MAR).     Electronically signed by Marely Jay RN on 3/23/2023 at 1:16 AM

## 2023-03-23 NOTE — OP NOTE
[facility]     Pre-operative Diagnosis:   1. Peripheral arterial disease with critical limb ischemia /  jose category 4  2. Carotid stenosis     Post-operative Diagnosis:   1. Same     Procedures Performed:  - Left femoral artery access under fluroscopic and ultrasound guidance  - Left iliofemoral arteriogram.  - selective right and left carotid angiogram   - Right lower extremity arteriogram  - Percutaneous balloon angioplasty of right anterior tibial artery using a 4.0 X 120 mm balloon   - Percutaneous balloon angioplasty of right popliteal artery using a 4.0 X 120 mm balloon   - EKOS thrombolytic catheter placement       :  Jacob Jaime MD.    Assistant:  None . Anesthesia:  IV sedation and local anesthesia. Estimated Blood Loss:  5 mL. Indications for Procedure:  Tarik Schilling is a 74 y.o. male who was evaluated as an outpatient with severe carotid artery disease and chronic arterial insufficiency with rest pain of the right foot and was deemed an appropriate candidate for an angiogram and possible intervention. Informed consent was obtained after discussion of potential benefits, alternatives and risks of the procedure, including but not limited to bleeding, infection, worsening of arterial insufficiency, and kidney injury due to contrast administration. Details of Procedure: The patient was taken to the cardiac cath lab and placed in supine position. IV sedation anesthesia was administered by the anesthesia team. The operative area was clipped, prepared and draped in sterile fashion. A brief time out was performed per hospital protocol. The left femoral artery was accessed under fluroscopic and ultrasound guidance with a micropuncture needle, wire, then sheath. A 4-Sinhala sheath was inserted over a wire. An iliofemoral angiogram was performed. Using a JR catheter placed into the right and left common carotid arteries.  Selective carotid angiogram were performed. The right common carotid artery is normal.  The right internal carotid artery shows a 90% stenosis. The right anterior and middle cerebral artery are widely patent. The left common carotid artery is normal.  The left internal/external carotid arteries are normal.  The l left middle and anterior cerebral arteries are widely patent      JR diagnostic was then placed in the right common femoral artery and a selective right lower extremity angiogram was performed. Right Leg  Superficial femoral: severe disease, sequential 80% lesions   Popliteal: severe disease, 100% occluded mid stent     Anterior Tibial: is not patent at the ostium with severe disease  Posterior Tibial:  is not patent with severe disease  Peroneal: is not patent with severe disease      A 6 Cypriot sheath was inserted over the wire into the femoral artery and positioned up-and-over into the contralateral common femoral artery   and after 5000 units of intravenous heparin was administered and three minutes allowed to elapse. The right anterior tibial artery was recanalized using a 0.035 wire and 0.035 micro catheter.      Percutaneous balloon angioplasty of right anterior tibial artery using a 4.0 X 120 mm balloon     Percutaneous balloon angioplasty of right popliteal artery using a 4.0 X 120 mm balloon     EKOS thrombolytic catheter placement     Impression/Plan:   Severe right ICA stenosis   Severe right AT/Popliteal and distal SFA disease    Plan for staged outpatient right carotid artery stenting  S/p successful balloon angioplasty of the right AT/Pop  S/p EKOS catheter placement     Plan to return to the [facility] tomorrow afternoon for EKOS removal    Alicia Abdi MD [address] [facility], Interventional Cardiology, and Peripheral Vascular [address] [facility]   (C): [phone]  (O): [phone]

## 2023-03-23 NOTE — PRE SEDATION
Sedation Pre-Procedure Note    Patient Name: Neil Jovel   YOB: 1955  Room/Bed: M7N-4150/1307-01  Medical Record Number: [id number]  Date: 3/23/2023   Time: 5:21 PM       Indication:  critical limb ischemia     Consent: I have discussed with the patient and/or the patient representative the indication, alternatives, and the possible risks and/or complications of the planned procedure and the anesthesia methods. The patient and/or patient representative appear to understand and agree to proceed. Vital Signs:   Vitals:    03/23/23 1400   BP: (!) 106/47   Pulse: 70   Resp: 10   Temp:    SpO2: 96%       Past Medical History:   has a past medical history of Acute cerebral infarction ([facility] [address] Alcohol withdrawal ([facility] [address] Bilateral carotid artery stenosis, CAD (coronary artery disease), Hepatitis C antibody positive in blood, History of bronchitis, History of DVT of lower extremity, Hypertension, NSTEMI (non-ST elevated myocardial infarction) ([facility] [address] PAD (peripheral artery disease) ([facility] [address] Respiratory compromise, S/p nephrectomy, and Tattoos. Past Surgical History:   has a past surgical history that includes total nephrectomy (Left); Coronary artery bypass graft (N/A, 05/08/2020); tracheostomy (05/27/2020); transesophageal echocardiogram (05/08/2020); Cardiac catheterization (05/07/2020); thoracentesis (Left, 05/29/2020); Gastrostomy tube placement (N/A, 05/27/2020); Stomach surgery (N/A, 07/10/2020); Leg amputation below knee; and Cardiac catheterization.     Medications:   Scheduled Meds:    sodium chloride flush  5-40 mL IntraVENous 2 times per day    aspirin  325 mg Oral Once    sodium chloride flush  5-40 mL IntraVENous 2 times per day    amLODIPine  5 mg Oral Daily    atorvastatin  80 mg Oral Daily    aspirin  81 mg Oral Daily    gabapentin  800 mg Oral TID    metoprolol tartrate  25 mg Oral BID    QUEtiapine  100 mg Oral Nightly    ferrous gluconate  324 mg Oral Daily with breakfast sennosides-docusate sodium  1 tablet Oral Nightly    doxycycline hyclate  100 mg Oral 2 times per day    nitroglycerin  1 inch Topical BID     Continuous Infusions:    sodium chloride      alteplase (ACTIVASE) in 0.9% sodium chloride infusion (EKOS catheter) 1 mg/hr (03/23/23 0856)    heparin (porcine) 500 Units/hr (03/23/23 1616)    sodium chloride 35 mL/hr at 03/23/23 1632    sodium chloride       PRN Meds: sodium chloride flush, sodium chloride, sodium chloride flush, sodium chloride, acetaminophen, LORazepam, morphine  Home Meds:   Prior to Admission medications    Medication Sig Start Date End Date Taking? Authorizing Provider   amLODIPine (NORVASC) 5 MG tablet TAKE 1 TABLET BY MOUTH EVERY DAY 3/10/23   Mono Vasquez MD   atorvastatin (LIPITOR) 80 MG tablet Take 1 tablet by mouth daily 1/3/23   Francis Mullen MD   [facility] ASPIRIN ADULT LOW DOSE 81 MG chewable tablet TAKE 1 TABLET BY MOUTH EVERY DAY 12/19/22   Mono Vasquez MD   sennosides-docusate sodium (SENOKOT-S) 8.6-50 MG tablet Take 1 tablet by mouth daily    Historical Provider, MD   XARELTO 2.5 MG TABS tablet TAKE 1 TABLET BY MOUTH TWICE A DAY 8/26/22   Francsi Mullen MD   QUEtiapine (SEROQUEL) 100 MG tablet Take 100 mg by mouth nightly    Historical Provider, MD   acetaminophen (TYLENOL) 500 MG tablet Take 1,000 mg by mouth every 6 hours as needed for Pain    Historical Provider, MD   ferrous gluconate (FERGON) 324 (38 Fe) MG tablet Take 324 mg by mouth daily (with breakfast)    Historical Provider, MD   gabapentin (NEURONTIN) 800 MG tablet Take 800 mg by mouth 3 times daily.      Historical Provider, MD   metoprolol tartrate (LOPRESSOR) 25 MG tablet Take 25 mg by mouth 2 times daily    Historical Provider, MD     Coumadin Use Last 7 Days:  no  Antiplatelet drug therapy use last 7 days: yes   Other anticoagulant use last 7 days: yes   Additional Medication Information:  n/a      Pre-Sedation Documentation and Exam:   I have personally completed a

## 2023-03-23 NOTE — OP NOTE
Anterior Tibial: is not patent with severe disease, occludes just above the DP   Posterior Tibial:  is not patent with severe disease  Peroneal: is not patent with severe disease    The right  anterior plantar artery  was recanalized using a 0.014 wire and 0.014 micro  catheter.      Balloon angioplasty was then performed of the right anterior planta/dorsalis pedis using a 2.5 X 80 mm balloon     Percutaneous balloon angioplasty of right anterior tibial artery using a 3.0 mm x 150 mm balloon     Percutaneous balloon angioplasty of right popliteal artery using a 5.0 mm x 80 mm balloon     Reinsertion of EKOS thrombolytic catheter       Impression/Plan:   S/p successful balloon angioplasty of the right anterior pantar/DP/Anterior tibial and popliteal arteries  Due to significant residual thrombus, a thrombolytic catheter was re-inserted  Return to [address] [place] [address] tomorrow afternoon for definitive intervention     Albina Perez MD [address] [facility], Interventional Cardiology, and Peripheral Vascular [address] [facility]   (C): [phone]  (O): [phone]

## 2023-03-23 NOTE — PROGRESS NOTES
4 Eyes Skin Assessment     NAME:  Lima Jimenez  YOB: 1955  MEDICAL RECORD NUMBER:  [id number]    The patient is being assessed for  Shift Handoff    I agree that One RN has performed a thorough Head to Toe Skin Assessment on the patient. ALL assessment sites listed below have been assessed. Areas assessed by both nurses:    Head, Face, Ears, Shoulders, Back, Chest, Arms, Elbows, Hands, Sacrum. Buttock, Coccyx, Ischium, and Legs. Feet and Heels        Does the Patient have a Wound?  No noted wound(s)       Kip Prevention initiated by RN: Yes   Wound Care Orders initiated by RN: NA    Pressure Injury (Stage 3,4, Unstageable, DTI, NWPT, and Complex wounds) if present, place referral order by RN under : NA    New and Established Ostomies, if present place, referral order under : NA      Nurse 1 eSignature: Electronically signed by Marely Jay RN on 3/23/23 at 7:49 AM EDT    **SHARE this note so that the co-signing nurse can place an eSignature**    Nurse 2 eSignature: Electronically signed by Rachelle Ba RN on 3/23/23 at 4:23 PM EDT

## 2023-03-23 NOTE — PROGRESS NOTES
Bedside shift hand-off done w/ oncoming JUAN Aden, who will assume pt. care. This RN handed-off the pt. in a stable condition.     Electronically signed by Willis Chen RN on 3/23/2023 at 7:51 AM

## 2023-03-24 LAB
ANION GAP SERPL CALCULATED.3IONS-SCNC: 12 MMOL/L (ref 3–16)
BUN SERPL-MCNC: 19 MG/DL (ref 7–20)
CALCIUM SERPL-MCNC: 8 MG/DL (ref 8.3–10.6)
CHLORIDE SERPL-SCNC: 108 MMOL/L (ref 99–110)
CO2 SERPL-SCNC: 20 MMOL/L (ref 21–32)
CREAT SERPL-MCNC: 1.1 MG/DL (ref 0.8–1.3)
DEPRECATED RDW RBC AUTO: 16.7 % (ref 12.4–15.4)
DEPRECATED RDW RBC AUTO: 17 % (ref 12.4–15.4)
DEPRECATED RDW RBC AUTO: 17 % (ref 12.4–15.4)
EKG ATRIAL RATE: 67 BPM
EKG DIAGNOSIS: NORMAL
EKG P AXIS: 64 DEGREES
EKG P-R INTERVAL: 178 MS
EKG Q-T INTERVAL: 402 MS
EKG QRS DURATION: 106 MS
EKG QTC CALCULATION (BAZETT): 424 MS
EKG R AXIS: -13 DEGREES
EKG T AXIS: -2 DEGREES
EKG VENTRICULAR RATE: 67 BPM
FIBRINOGEN PPP-MCNC: 184 MG/DL (ref 207–509)
FIBRINOGEN PPP-MCNC: 188 MG/DL (ref 207–509)
FIBRINOGEN PPP-MCNC: 223 MG/DL (ref 207–509)
GFR SERPLBLD CREATININE-BSD FMLA CKD-EPI: >60 ML/MIN/{1.73_M2}
GLUCOSE SERPL-MCNC: 162 MG/DL (ref 70–99)
HCT VFR BLD AUTO: 29.9 % (ref 40.5–52.5)
HCT VFR BLD AUTO: 30.4 % (ref 40.5–52.5)
HCT VFR BLD AUTO: 31.3 % (ref 40.5–52.5)
HGB BLD-MCNC: 9.5 G/DL (ref 13.5–17.5)
HGB BLD-MCNC: 9.6 G/DL (ref 13.5–17.5)
HGB BLD-MCNC: 9.7 G/DL (ref 13.5–17.5)
MCH RBC QN AUTO: 24 PG (ref 26–34)
MCH RBC QN AUTO: 24.3 PG (ref 26–34)
MCH RBC QN AUTO: 24.4 PG (ref 26–34)
MCHC RBC AUTO-ENTMCNC: 31 G/DL (ref 31–36)
MCHC RBC AUTO-ENTMCNC: 31.4 G/DL (ref 31–36)
MCHC RBC AUTO-ENTMCNC: 31.9 G/DL (ref 31–36)
MCV RBC AUTO: 76.4 FL (ref 80–100)
MCV RBC AUTO: 77.3 FL (ref 80–100)
MCV RBC AUTO: 77.6 FL (ref 80–100)
PLATELET # BLD AUTO: 268 K/UL (ref 135–450)
PLATELET # BLD AUTO: 273 K/UL (ref 135–450)
PLATELET # BLD AUTO: 302 K/UL (ref 135–450)
PMV BLD AUTO: 7.7 FL (ref 5–10.5)
PMV BLD AUTO: 7.8 FL (ref 5–10.5)
PMV BLD AUTO: 7.9 FL (ref 5–10.5)
POC ACT LR: 174 SEC
POTASSIUM SERPL-SCNC: 3.8 MMOL/L (ref 3.5–5.1)
RBC # BLD AUTO: 3.92 M/UL (ref 4.2–5.9)
RBC # BLD AUTO: 3.92 M/UL (ref 4.2–5.9)
RBC # BLD AUTO: 4.05 M/UL (ref 4.2–5.9)
SODIUM SERPL-SCNC: 140 MMOL/L (ref 136–145)
WBC # BLD AUTO: 12 K/UL (ref 4–11)
WBC # BLD AUTO: 8 K/UL (ref 4–11)
WBC # BLD AUTO: 9.5 K/UL (ref 4–11)

## 2023-03-24 PROCEDURE — 99152 MOD SED SAME PHYS/QHP 5/>YRS: CPT

## 2023-03-24 PROCEDURE — C1751 CATH, INF, PER/CENT/MIDLINE: HCPCS

## 2023-03-24 PROCEDURE — 047K3ZZ DILATION OF RIGHT FEMORAL ARTERY, PERCUTANEOUS APPROACH: ICD-10-PCS | Performed by: INTERNAL MEDICINE

## 2023-03-24 PROCEDURE — C1889 IMPLANT/INSERT DEVICE, NOC: HCPCS

## 2023-03-24 PROCEDURE — 85027 COMPLETE CBC AUTOMATED: CPT

## 2023-03-24 PROCEDURE — C1769 GUIDE WIRE: HCPCS

## 2023-03-24 PROCEDURE — B41F1ZZ FLUOROSCOPY OF RIGHT LOWER EXTREMITY ARTERIES USING LOW OSMOLAR CONTRAST: ICD-10-PCS | Performed by: INTERNAL MEDICINE

## 2023-03-24 PROCEDURE — 37184 PRIM ART M-THRMBC 1ST VSL: CPT | Performed by: INTERNAL MEDICINE

## 2023-03-24 PROCEDURE — 6370000000 HC RX 637 (ALT 250 FOR IP): Performed by: INTERNAL MEDICINE

## 2023-03-24 PROCEDURE — 94760 N-INVAS EAR/PLS OXIMETRY 1: CPT

## 2023-03-24 PROCEDURE — 80048 BASIC METABOLIC PNL TOTAL CA: CPT

## 2023-03-24 PROCEDURE — 37799 UNLISTED PX VASCULAR SURGERY: CPT

## 2023-03-24 PROCEDURE — C1887 CATHETER, GUIDING: HCPCS

## 2023-03-24 PROCEDURE — 37225 HC FEM POP TERRITORY ATHERECTOMY: CPT

## 2023-03-24 PROCEDURE — 2720000010 HC SURG SUPPLY STERILE

## 2023-03-24 PROCEDURE — 85384 FIBRINOGEN ACTIVITY: CPT

## 2023-03-24 PROCEDURE — 047M3ZZ DILATION OF RIGHT POPLITEAL ARTERY, PERCUTANEOUS APPROACH: ICD-10-PCS | Performed by: INTERNAL MEDICINE

## 2023-03-24 PROCEDURE — C1894 INTRO/SHEATH, NON-LASER: HCPCS

## 2023-03-24 PROCEDURE — 93005 ELECTROCARDIOGRAM TRACING: CPT | Performed by: INTERNAL MEDICINE

## 2023-03-24 PROCEDURE — 6360000004 HC RX CONTRAST MEDICATION: Performed by: INTERNAL MEDICINE

## 2023-03-24 PROCEDURE — C1760 CLOSURE DEV, VASC: HCPCS

## 2023-03-24 PROCEDURE — C1884 EMBOLIZATION PROTECT SYST: HCPCS

## 2023-03-24 PROCEDURE — 85347 COAGULATION TIME ACTIVATED: CPT

## 2023-03-24 PROCEDURE — C1885 CATH, TRANSLUMIN ANGIO LASER: HCPCS

## 2023-03-24 PROCEDURE — 6360000002 HC RX W HCPCS

## 2023-03-24 PROCEDURE — C1725 CATH, TRANSLUMIN NON-LASER: HCPCS

## 2023-03-24 PROCEDURE — 04CP3ZZ EXTIRPATION OF MATTER FROM RIGHT ANTERIOR TIBIAL ARTERY, PERCUTANEOUS APPROACH: ICD-10-PCS | Performed by: INTERNAL MEDICINE

## 2023-03-24 PROCEDURE — 2580000003 HC RX 258: Performed by: INTERNAL MEDICINE

## 2023-03-24 PROCEDURE — 37229 PR REVSC OPN/PRQ TIB/PERO W/ATHRC/ANGIOP SM VSL: CPT | Performed by: INTERNAL MEDICINE

## 2023-03-24 PROCEDURE — 37229 HC TIB PER TERRITORY ATHER: CPT

## 2023-03-24 PROCEDURE — 99153 MOD SED SAME PHYS/QHP EA: CPT

## 2023-03-24 PROCEDURE — 93010 ELECTROCARDIOGRAM REPORT: CPT | Performed by: INTERNAL MEDICINE

## 2023-03-24 PROCEDURE — 2100000000 HC CCU R&B

## 2023-03-24 PROCEDURE — 04CM3ZZ EXTIRPATION OF MATTER FROM RIGHT POPLITEAL ARTERY, PERCUTANEOUS APPROACH: ICD-10-PCS | Performed by: INTERNAL MEDICINE

## 2023-03-24 PROCEDURE — 37225 PR REVSC OPN/PRQ FEM/POP W/ATHRC/ANGIOP SM VSL: CPT | Performed by: INTERNAL MEDICINE

## 2023-03-24 PROCEDURE — 37214 CESSJ THERAPY CATH REMOVAL: CPT

## 2023-03-24 PROCEDURE — 37214 CESSJ THERAPY CATH REMOVAL: CPT | Performed by: INTERNAL MEDICINE

## 2023-03-24 PROCEDURE — 6360000002 HC RX W HCPCS: Performed by: INTERNAL MEDICINE

## 2023-03-24 RX ORDER — ACETAMINOPHEN 325 MG/1
650 TABLET ORAL EVERY 4 HOURS PRN
Status: DISCONTINUED | OUTPATIENT
Start: 2023-03-24 | End: 2023-03-25 | Stop reason: HOSPADM

## 2023-03-24 RX ORDER — SODIUM CHLORIDE 0.9 % (FLUSH) 0.9 %
5-40 SYRINGE (ML) INJECTION EVERY 12 HOURS SCHEDULED
Status: DISCONTINUED | OUTPATIENT
Start: 2023-03-24 | End: 2023-03-25 | Stop reason: HOSPADM

## 2023-03-24 RX ORDER — SODIUM CHLORIDE 0.9 % (FLUSH) 0.9 %
5-40 SYRINGE (ML) INJECTION PRN
Status: DISCONTINUED | OUTPATIENT
Start: 2023-03-24 | End: 2023-03-25 | Stop reason: HOSPADM

## 2023-03-24 RX ORDER — DOXYCYCLINE HYCLATE 100 MG
100 TABLET ORAL EVERY 12 HOURS SCHEDULED
Qty: 20 TABLET | Refills: 0 | Status: SHIPPED | OUTPATIENT
Start: 2023-03-25 | End: 2023-04-04

## 2023-03-24 RX ORDER — SODIUM CHLORIDE 9 MG/ML
INJECTION, SOLUTION INTRAVENOUS PRN
Status: DISCONTINUED | OUTPATIENT
Start: 2023-03-24 | End: 2023-03-25 | Stop reason: HOSPADM

## 2023-03-24 RX ADMIN — Medication 10 ML: at 04:01

## 2023-03-24 RX ADMIN — Medication 324 MG: at 08:48

## 2023-03-24 RX ADMIN — METOPROLOL TARTRATE 25 MG: 25 TABLET, FILM COATED ORAL at 08:22

## 2023-03-24 RX ADMIN — MORPHINE SULFATE 2 MG: 2 INJECTION, SOLUTION INTRAMUSCULAR; INTRAVENOUS at 10:51

## 2023-03-24 RX ADMIN — ALTEPLASE 1 MG/HR: 2.2 INJECTION, POWDER, LYOPHILIZED, FOR SOLUTION INTRAVENOUS at 02:16

## 2023-03-24 RX ADMIN — HYDROMORPHONE HYDROCHLORIDE 0.5 MG: 1 INJECTION, SOLUTION INTRAMUSCULAR; INTRAVENOUS; SUBCUTANEOUS at 12:47

## 2023-03-24 RX ADMIN — MORPHINE SULFATE 2 MG: 2 INJECTION, SOLUTION INTRAMUSCULAR; INTRAVENOUS at 05:25

## 2023-03-24 RX ADMIN — GABAPENTIN 800 MG: 400 CAPSULE ORAL at 08:48

## 2023-03-24 RX ADMIN — NITROGLYCERIN 1 INCH: 20 OINTMENT TOPICAL at 21:52

## 2023-03-24 RX ADMIN — GABAPENTIN 800 MG: 400 CAPSULE ORAL at 14:47

## 2023-03-24 RX ADMIN — MORPHINE SULFATE 2 MG: 2 INJECTION, SOLUTION INTRAMUSCULAR; INTRAVENOUS at 07:31

## 2023-03-24 RX ADMIN — AMLODIPINE BESYLATE 5 MG: 5 TABLET ORAL at 08:22

## 2023-03-24 RX ADMIN — ASPIRIN 81 MG 81 MG: 81 TABLET ORAL at 08:22

## 2023-03-24 RX ADMIN — NITROGLYCERIN 1 INCH: 20 OINTMENT TOPICAL at 08:21

## 2023-03-24 RX ADMIN — IOPAMIDOL 80 ML: 755 INJECTION, SOLUTION INTRAVENOUS at 16:20

## 2023-03-24 RX ADMIN — QUETIAPINE FUMARATE 100 MG: 100 TABLET ORAL at 21:52

## 2023-03-24 RX ADMIN — Medication 10 ML: at 08:50

## 2023-03-24 RX ADMIN — Medication 10 ML: at 05:25

## 2023-03-24 RX ADMIN — HYDROMORPHONE HYDROCHLORIDE 0.5 MG: 1 INJECTION, SOLUTION INTRAMUSCULAR; INTRAVENOUS; SUBCUTANEOUS at 04:00

## 2023-03-24 RX ADMIN — METOPROLOL TARTRATE 25 MG: 25 TABLET, FILM COATED ORAL at 21:52

## 2023-03-24 RX ADMIN — DOXYCYCLINE HYCLATE 100 MG: 100 TABLET, COATED ORAL at 10:52

## 2023-03-24 RX ADMIN — DOXYCYCLINE HYCLATE 100 MG: 100 TABLET, COATED ORAL at 21:52

## 2023-03-24 RX ADMIN — GABAPENTIN 800 MG: 400 CAPSULE ORAL at 21:50

## 2023-03-24 RX ADMIN — SENNOSIDES AND DOCUSATE SODIUM 1 TABLET: 50; 8.6 TABLET ORAL at 21:52

## 2023-03-24 RX ADMIN — ATORVASTATIN CALCIUM 80 MG: 80 TABLET, FILM COATED ORAL at 08:22

## 2023-03-24 ASSESSMENT — PAIN DESCRIPTION - PAIN TYPE
TYPE: ACUTE PAIN

## 2023-03-24 ASSESSMENT — PAIN - FUNCTIONAL ASSESSMENT
PAIN_FUNCTIONAL_ASSESSMENT: PREVENTS OR INTERFERES SOME ACTIVE ACTIVITIES AND ADLS

## 2023-03-24 ASSESSMENT — PAIN DESCRIPTION - LOCATION
LOCATION: LEG
LOCATION: FOOT
LOCATION: FOOT;LEG
LOCATION: FOOT
LOCATION: FOOT
LOCATION: FOOT;LEG

## 2023-03-24 ASSESSMENT — PAIN DESCRIPTION - DESCRIPTORS
DESCRIPTORS: STABBING;THROBBING
DESCRIPTORS: ACHING;PRESSURE
DESCRIPTORS: THROBBING

## 2023-03-24 ASSESSMENT — PAIN SCALES - GENERAL
PAINLEVEL_OUTOF10: 4
PAINLEVEL_OUTOF10: 5
PAINLEVEL_OUTOF10: 5
PAINLEVEL_OUTOF10: 4
PAINLEVEL_OUTOF10: 5
PAINLEVEL_OUTOF10: 0
PAINLEVEL_OUTOF10: 5
PAINLEVEL_OUTOF10: 4
PAINLEVEL_OUTOF10: 5

## 2023-03-24 ASSESSMENT — PAIN DESCRIPTION - ORIENTATION
ORIENTATION: RIGHT
ORIENTATION: RIGHT;LEFT
ORIENTATION: RIGHT
ORIENTATION: RIGHT

## 2023-03-24 NOTE — PLAN OF CARE
Problem: Pain  Goal: Verbalizes/displays adequate comfort level or baseline comfort level  Outcome: Progressing  Verbalizes/displays adequate comfort level or baseline comfort level:   Encourage patient to monitor pain and request assistance   Assess pain using appropriate pain scale   Administer analgesics based on type and severity of pain and evaluate response   Implement non-pharmacological measures as appropriate and evaluate response   Notify Licensed Independent Practitioner if interventions unsuccessful or patient reports new pain     Problem: Cardiovascular - Adult  Goal: Maintains optimal cardiac output and hemodynamic stability  Outcome: Progressing  Maintains optimal cardiac output and hemodynamic stability:   Monitor blood pressure and heart rate   Monitor urine output and notify Licensed Independent Practitioner for values outside of normal range   Assess for signs of decreased cardiac output     Problem: Respiratory - Adult  Goal: Achieves optimal ventilation and oxygenation  Outcome: Progressing  Achieves optimal ventilation and oxygenation:   Assess for changes in respiratory status   Assess for changes in mentation and behavior   Position to facilitate oxygenation and minimize respiratory effort   Oxygen supplementation based on oxygen saturation or arterial blood gases   Initiate smoking cessation protocol as indicated   Encourage broncho-pulmonary hygiene including cough, deep breathe, incentive spirometry   Assess and instruct to report shortness of breath or any respiratory difficulty     Problem: Hematologic - Adult  Goal: Maintains hematologic stability  Outcome: Progressing  Maintains hematologic stability:   Assess for signs and symptoms of bleeding or hemorrhage   Monitor labs for bleeding or clotting disorders     Problem: ABCDS Injury Assessment  Goal: Absence of physical injury  Outcome: Progressing  Absence of Physical Injury: Implement safety measures based on patient assessment    Electronically signed by Marely Jay RN on 3/24/2023 at 4:29 AM

## 2023-03-24 NOTE — TELEPHONE ENCOUNTER
Carotid stent (right) scheduled for 5/17 at 8:30  Arrive at 7:00      The morning of your procedure you will park in the hospital parking lot and report directly to the cath lab to check in.     Pre-Procedure Instructions   You will need to fast for at least 8 hours prior to procedure. No caffeine the morning of. You will need to hold your anticoagulation, Xarelto for 1 day prior. Hold all diabetic medications including, Metfomin. If you take Lantus/Levemir only take ½ your normal dose the evening before. All other medications can be taken in the morning with sips of water. Do not use any lotions, creams or perfume the morning of procedure. Pre-procedure lab work will need to be completed 5-7 days prior to procedure. Please have a responsible adult to drive you home after procedure. We advise you have someone to stay with you for 24 hours following procedure for precautionary measures. Depending on procedure you may require an overnight stay. Cath lab will provide you with all post procedure instructions. If you have any questions regarding the procedure itself or medications, please call [phone] and ask to speak with a nurse. Solange hurtado. Published on [employer] and e-mail to [place].

## 2023-03-24 NOTE — PROGRESS NOTES
4 Eyes Skin Assessment     NAME:  Neil Jovel  YOB: 1955  MEDICAL RECORD NUMBER:  [id number]    The patient is being assessed for  Shift Handoff    I agree that One RN has performed a thorough Head to Toe Skin Assessment on the patient. ALL assessment sites listed below have been assessed. Areas assessed by both nurses:    Head, Face, Ears, Shoulders, Back, Chest, Arms, Elbows, Hands, Sacrum. Buttock, Coccyx, Ischium, and Legs. Feet and Heels        Does the Patient have a Wound?  No noted wound(s)       Kip Prevention initiated by RN: Yes   Wound Care Orders initiated by RN: NA    Pressure Injury (Stage 3,4, Unstageable, DTI, NWPT, and Complex wounds) if present, place referral order by RN under : NA    New and Established Ostomies, if present place, referral order under : NA      Nurse 1 eSignature: Electronically signed by Iqra Prajapati RN on 3/24/23 at 7:28 AM EDT    **SHARE this note so that the co-signing nurse can place an eSignature**    Nurse 2 eSignature: Electronically signed by Kailey Núñez RN on 3/24/23 at 7:54 AM EDT

## 2023-03-24 NOTE — PROGRESS NOTES
Bedside shift hand-off done w/ oncoming JUAN Her, who will assume pt. care. This RN handed-off the pt. in a stable condition.     Electronically signed by Jeffery Rudd RN on 3/24/2023 at 7:28 AM

## 2023-03-24 NOTE — PLAN OF CARE
signs of decreased cardiac output     Problem: Hematologic - Adult  Goal: Maintains hematologic stability  Outcome: Progressing  Flowsheets (Taken 3/24/2023 0800)  Maintains hematologic stability:   Assess for signs and symptoms of bleeding or hemorrhage   Monitor labs for bleeding or clotting disorders

## 2023-03-24 NOTE — PROGRESS NOTES
1635: Report received from cath lab. Patient returned to [place] [address] room 1307. Patient drowsy, but wakes to voice. Arterial sheath and Ekos catheters removed from L groin in cath lab. Pt's left groin puncture site closed with mynx. No signs of hematoma. Right PT and anterior pedal pulses present via doppler signal. Dr. Lenin Rajput at bedside updating pt's wife. Patient on bedrest for 3 hours.

## 2023-03-24 NOTE — PROGRESS NOTES
4 Eyes Skin Assessment     NAME:  Ashley Licona  YOB: 1955  MEDICAL RECORD NUMBER:  [id number]    The patient is being assessed for  Shift Handoff    I agree that One RN has performed a thorough Head to Toe Skin Assessment on the patient. ALL assessment sites listed below have been assessed. Areas assessed by both nurses:    Head, Face, Ears, Shoulders, Back, Chest, Arms, Elbows, Hands, Sacrum. Buttock, Coccyx, Ischium, and Legs. Feet and Heels        Does the Patient have a Wound?  No noted wound(s)       Kip Prevention initiated by RN: Yes   Wound Care Orders initiated by RN: NA    Pressure Injury (Stage 3,4, Unstageable, DTI, NWPT, and Complex wounds) if present, place referral order by RN under : No    New and Established Ostomies, if present place, referral order under : NA      Nurse 1 eSignature: Electronically signed by Will Kenney RN on 3/24/23 at 6:57 PM EDT    **SHARE this note so that the co-signing nurse can place an eSignature**    Nurse 2 eSignature: {Esignature:[id number]}

## 2023-03-24 NOTE — DISCHARGE INSTRUCTIONS
Carotid stent (right) scheduled for 5/17 at 8:30  Arrive at 7:00        The morning of your procedure you will park in the hospital parking lot and report directly to the cath lab to check in.      Pre-Procedure Instructions   You will need to fast for at least 8 hours prior to procedure. No caffeine the morning of. You will need to hold your anticoagulation, Xarelto for 1 day prior. Hold all diabetic medications including, Metfomin. If you take Lantus/Levemir only take ½ your normal dose the evening before. All other medications can be taken in the morning with sips of water. Do not use any lotions, creams or perfume the morning of procedure. Pre-procedure lab work will need to be completed 5-7 days prior to procedure. Please have a responsible adult to drive you home after procedure. We advise you have someone to stay with you for 24 hours following procedure for precautionary measures. Depending on procedure you may require an overnight stay. Cath lab will provide you with all post procedure instructions. If you have any questions regarding the procedure itself or medications, please call [phone] and ask to speak with a nurse.

## 2023-03-24 NOTE — PROGRESS NOTES
Late entry due to patient care. @ 2000 to 2040 - Shift assessment completed. He's alert and oriented (x4), SR on the monitor, on room air, not in any distress, and w/ EKOS running through the L femoral arterial sheath. He has doppler R dorsalis pedis, anterior tibial, posterior tibial, popliteal, and femoral signals. The R anterior tibial and dorsalis pedis doppler signals are faint. He has warm R thigh, R leg, and proximal R foot, and the rest of his foot is a little cool. The skin color on the distal part of his R foot is reddened. He received his due medications including PRN Dilaudid IV (see MAR) for his 9/10 \"throbbing\" R foot pain. Plan of care for this shift was explained to him and he verbalized understanding. Call light, urinal, and bedside table are within his reach.     Electronically signed by Kam Magallon RN on 3/23/2023 at 11:30 PM

## 2023-03-25 VITALS
TEMPERATURE: 98.8 F | BODY MASS INDEX: 30.28 KG/M2 | WEIGHT: 223.55 LBS | HEIGHT: 72 IN | SYSTOLIC BLOOD PRESSURE: 128 MMHG | DIASTOLIC BLOOD PRESSURE: 66 MMHG | OXYGEN SATURATION: 99 % | HEART RATE: 92 BPM | RESPIRATION RATE: 15 BRPM

## 2023-03-25 LAB
ANION GAP SERPL CALCULATED.3IONS-SCNC: 9 MMOL/L (ref 3–16)
BUN SERPL-MCNC: 16 MG/DL (ref 7–20)
CALCIUM SERPL-MCNC: 8.1 MG/DL (ref 8.3–10.6)
CHLORIDE SERPL-SCNC: 105 MMOL/L (ref 99–110)
CO2 SERPL-SCNC: 21 MMOL/L (ref 21–32)
CREAT SERPL-MCNC: 1.1 MG/DL (ref 0.8–1.3)
DEPRECATED RDW RBC AUTO: 16.6 % (ref 12.4–15.4)
GFR SERPLBLD CREATININE-BSD FMLA CKD-EPI: >60 ML/MIN/{1.73_M2}
GLUCOSE SERPL-MCNC: 99 MG/DL (ref 70–99)
HCT VFR BLD AUTO: 30.1 % (ref 40.5–52.5)
HGB BLD-MCNC: 9.4 G/DL (ref 13.5–17.5)
MCH RBC QN AUTO: 24.3 PG (ref 26–34)
MCHC RBC AUTO-ENTMCNC: 31.3 G/DL (ref 31–36)
MCV RBC AUTO: 77.5 FL (ref 80–100)
PLATELET # BLD AUTO: 260 K/UL (ref 135–450)
PMV BLD AUTO: 8 FL (ref 5–10.5)
POTASSIUM SERPL-SCNC: 3.7 MMOL/L (ref 3.5–5.1)
RBC # BLD AUTO: 3.88 M/UL (ref 4.2–5.9)
SODIUM SERPL-SCNC: 135 MMOL/L (ref 136–145)
WBC # BLD AUTO: 7.8 K/UL (ref 4–11)

## 2023-03-25 PROCEDURE — 6370000000 HC RX 637 (ALT 250 FOR IP): Performed by: INTERNAL MEDICINE

## 2023-03-25 PROCEDURE — 6360000002 HC RX W HCPCS: Performed by: INTERNAL MEDICINE

## 2023-03-25 PROCEDURE — 85027 COMPLETE CBC AUTOMATED: CPT

## 2023-03-25 PROCEDURE — 2580000003 HC RX 258: Performed by: INTERNAL MEDICINE

## 2023-03-25 PROCEDURE — 80048 BASIC METABOLIC PNL TOTAL CA: CPT

## 2023-03-25 PROCEDURE — 36415 COLL VENOUS BLD VENIPUNCTURE: CPT

## 2023-03-25 RX ADMIN — RIVAROXABAN 2.5 MG: 2.5 TABLET, FILM COATED ORAL at 08:35

## 2023-03-25 RX ADMIN — Medication 324 MG: at 08:35

## 2023-03-25 RX ADMIN — ATORVASTATIN CALCIUM 80 MG: 80 TABLET, FILM COATED ORAL at 08:35

## 2023-03-25 RX ADMIN — DOXYCYCLINE HYCLATE 100 MG: 100 TABLET, COATED ORAL at 09:09

## 2023-03-25 RX ADMIN — Medication 10 ML: at 08:05

## 2023-03-25 RX ADMIN — ASPIRIN 81 MG 81 MG: 81 TABLET ORAL at 08:35

## 2023-03-25 RX ADMIN — AMLODIPINE BESYLATE 5 MG: 5 TABLET ORAL at 08:35

## 2023-03-25 RX ADMIN — METOPROLOL TARTRATE 25 MG: 25 TABLET, FILM COATED ORAL at 08:35

## 2023-03-25 RX ADMIN — GABAPENTIN 800 MG: 400 CAPSULE ORAL at 08:35

## 2023-03-25 RX ADMIN — LORAZEPAM 1 MG: 2 INJECTION INTRAMUSCULAR; INTRAVENOUS at 02:07

## 2023-03-25 ASSESSMENT — PAIN DESCRIPTION - ORIENTATION: ORIENTATION: RIGHT;LEFT

## 2023-03-25 ASSESSMENT — PAIN DESCRIPTION - LOCATION: LOCATION: FOOT;LEG

## 2023-03-25 ASSESSMENT — PAIN - FUNCTIONAL ASSESSMENT: PAIN_FUNCTIONAL_ASSESSMENT: ACTIVITIES ARE NOT PREVENTED

## 2023-03-25 ASSESSMENT — PAIN DESCRIPTION - ONSET: ONSET: ON-GOING

## 2023-03-25 ASSESSMENT — PAIN DESCRIPTION - DESCRIPTORS: DESCRIPTORS: ACHING;SORE

## 2023-03-25 ASSESSMENT — PAIN DESCRIPTION - FREQUENCY: FREQUENCY: CONTINUOUS

## 2023-03-25 ASSESSMENT — PAIN SCALES - GENERAL: PAINLEVEL_OUTOF10: 5

## 2023-03-25 ASSESSMENT — PAIN DESCRIPTION - PAIN TYPE: TYPE: CHRONIC PAIN

## 2023-03-25 NOTE — DISCHARGE INSTR - DIET

## 2023-03-25 NOTE — PROGRESS NOTES
Discharge instructions reviewed with patient and family member. Patient and family verbalized understanding. All home medications have been reviewed, questions answered and patient voiced understanding. All medication side effects reviewed and patient and family verbalized understanding. Follow up appointment(s) reviewed with patient. Patient given prescriptions, discharge instructions, and appointment times. Patient discharged to home with family via private car. Taken to lobby via wheelchair.

## 2023-03-25 NOTE — OP NOTE
[facility]     Pre-operative Diagnosis:   1. Peripheral arterial disease with critical limb ischemia / jose category 4    Post-operative Diagnosis:   1. Same     Procedures Performed:  -EKOS thrombolytic catheter removal  -Aspiration thrombectomy using Truvic 5.0 catheter, of the right popliteal and anterior tibial artery  -Laser atherectomy using a 2.0 Spectranetics catheter, of the right popliteal and right anterior tibial artery  -Balloon angioplasty of the right popliteal and superficial femoral artery using a 7.0 angiosclupt balloon  - Right lower extremity arteriogram    :  Licha Mg MD.    Assistant:  None . Anesthesia:  IV sedation and local anesthesia. Estimated Blood Loss:  10 mL. Indications for Procedure:  Amanda Marie is a 74 y.o. male who was evaluated as an outpatient with chronic arterial insufficiency with rest pain of the right foot and was deemed an appropriate candidate for an angiogram and possible intervention. Informed consent was obtained after discussion of potential benefits, alternatives and risks of the procedure, including but not limited to bleeding, infection, worsening of arterial insufficiency, and kidney injury due to contrast administration. Details of Procedure: The patient was taken to the cardiac cath lab and placed in supine position. IV sedation anesthesia was administered by the anesthesia team. The operative area was clipped, prepared and draped in sterile fashion. A brief time out was performed per hospital protocol. The EKOS thrombolytic catheter was previously placed was then removed    A selective right lower extremity angiogram was then performed via the EKOS catheter      Right Leg  Superficial femoral: prominent disease.    Popliteal: severe disease, mid 90% instent stenosis     Anterior Tibial: is  patent with severe disease  Posterior Tibial:  is not patent with severe disease  Peroneal: is not patent with severe disease    Angiogram of the right leg showed residual in-stent restenosis of roughly 90% and possible  proximal AT residual thrombosis    The anterior tibial artery was then wired using an 014 wire, followed by placement of a 5.0 spider filter    Aspiration thrombectomy was performed with a 5.0 Truvic thrombectomy catheter of the distal right popliteal artery and proximal anterior tibial artery    Laser atherectomy was then performed using a 2.0 SpectranetBooktrope catheter through the distal superficial femoral artery mid popliteal artery and proximal anterior tibial artery    Balloon angioplasty was then performed of the popliteal artery using a 7.0 angiosculpt balloon and the distal superficial femoral artery    Repeat right lower extremity angiogram showed a widely patent super distal superficial femoral and popliteal artery stent         The sheath was withdrawn over a wire then exchanged for a short sheath. A Mynx closure device was used to close the arteriotomy. The patient tolerated the procedure well, was awakened and was taken to the post-operative care unit in stable condition. Impression/Plan:   Status post successful EKOS catheter removal, aspiration thrombectomy, and laser atherectomy of the distal superficial femoral artery popliteal artery and proximal anterior tibial artery    Aggressive medical therapy peripheral arterial disease    Cleared for discharge home in the a.m.     Arterial Doppler in 3 months 6 months and then yearly    Mich Walton MD [address] [facility], Interventional Cardiology, and Peripheral Vascular [address] [facility]   (C): [phone]  (O): [phone]

## 2023-03-25 NOTE — PRE SEDATION
Sedation Pre-Procedure Note    Patient Name: Jeanne Saul   YOB: 1955  Room/Bed: X0J-1549/1307-01  Medical Record Number: [id number]  Date: 3/24/2023   Time: 10:54 PM       Indication:  CLI     Consent: I have discussed with the patient and/or the patient representative the indication, alternatives, and the possible risks and/or complications of the planned procedure and the anesthesia methods. The patient and/or patient representative appear to understand and agree to proceed. Vital Signs:   Vitals:    03/24/23 2200   BP: (!) 144/78   Pulse: 90   Resp: 19   Temp:    SpO2: 100%       Past Medical History:   has a past medical history of Acute cerebral infarction ([facility] [address] Alcohol withdrawal ([facility] [address] Bilateral carotid artery stenosis, CAD (coronary artery disease), Hepatitis C antibody positive in blood, History of bronchitis, History of DVT of lower extremity, Hypertension, NSTEMI (non-ST elevated myocardial infarction) ([facility] [address] PAD (peripheral artery disease) ([facility] [address] Respiratory compromise, S/p nephrectomy, and Tattoos. Past Surgical History:   has a past surgical history that includes total nephrectomy (Left); Coronary artery bypass graft (N/A, 05/08/2020); tracheostomy (05/27/2020); transesophageal echocardiogram (05/08/2020); Cardiac catheterization (05/07/2020); thoracentesis (Left, 05/29/2020); Gastrostomy tube placement (N/A, 05/27/2020); Stomach surgery (N/A, 07/10/2020); Leg amputation below knee; and Cardiac catheterization.     Medications:   Scheduled Meds:    sodium chloride flush  5-40 mL IntraVENous 2 times per day    [START ON 3/25/2023] rivaroxaban  2.5 mg Oral BID    sodium chloride flush  5-40 mL IntraVENous 2 times per day    aspirin  325 mg Oral Once    sodium chloride flush  5-40 mL IntraVENous 2 times per day    amLODIPine  5 mg Oral Daily    atorvastatin  80 mg Oral Daily    aspirin  81 mg Oral Daily    gabapentin  800 mg Oral TID    metoprolol tartrate  25 mg Oral BID

## 2023-05-11 DIAGNOSIS — I73.9 PAD (PERIPHERAL ARTERY DISEASE) (HCC): ICD-10-CM

## 2023-05-11 LAB
ANION GAP SERPL CALCULATED.3IONS-SCNC: 9 MMOL/L (ref 3–16)
BUN SERPL-MCNC: 16 MG/DL (ref 7–20)
CALCIUM SERPL-MCNC: 9.2 MG/DL (ref 8.3–10.6)
CHLORIDE SERPL-SCNC: 103 MMOL/L (ref 99–110)
CO2 SERPL-SCNC: 26 MMOL/L (ref 21–32)
CREAT SERPL-MCNC: 1.1 MG/DL (ref 0.8–1.3)
DEPRECATED RDW RBC AUTO: 18.3 % (ref 12.4–15.4)
GFR SERPLBLD CREATININE-BSD FMLA CKD-EPI: >60 ML/MIN/{1.73_M2}
GLUCOSE SERPL-MCNC: 105 MG/DL (ref 70–99)
HCT VFR BLD AUTO: 44.8 % (ref 40.5–52.5)
HGB BLD-MCNC: 14 G/DL (ref 13.5–17.5)
MCH RBC QN AUTO: 25.6 PG (ref 26–34)
MCHC RBC AUTO-ENTMCNC: 31.2 G/DL (ref 31–36)
MCV RBC AUTO: 82.2 FL (ref 80–100)
PLATELET # BLD AUTO: 278 K/UL (ref 135–450)
PMV BLD AUTO: 8.3 FL (ref 5–10.5)
POTASSIUM SERPL-SCNC: 4.7 MMOL/L (ref 3.5–5.1)
RBC # BLD AUTO: 5.45 M/UL (ref 4.2–5.9)
SODIUM SERPL-SCNC: 138 MMOL/L (ref 136–145)
WBC # BLD AUTO: 8.7 K/UL (ref 4–11)

## 2023-05-17 ENCOUNTER — HOSPITAL ENCOUNTER (INPATIENT)
Dept: CARDIAC CATH/INVASIVE PROCEDURES | Age: 68
LOS: 1 days | Discharge: HOME | DRG: 036 | End: 2023-05-18
Attending: INTERNAL MEDICINE | Admitting: INTERNAL MEDICINE
Payer: MEDICARE

## 2023-05-17 PROBLEM — I65.21 CAROTID STENOSIS, RIGHT: Status: ACTIVE | Noted: 2023-05-17

## 2023-05-17 LAB
EKG ATRIAL RATE: 69 BPM
EKG DIAGNOSIS: NORMAL
EKG P AXIS: 35 DEGREES
EKG P-R INTERVAL: 178 MS
EKG Q-T INTERVAL: 416 MS
EKG QRS DURATION: 106 MS
EKG QTC CALCULATION (BAZETT): 445 MS
EKG R AXIS: -18 DEGREES
EKG T AXIS: -10 DEGREES
EKG VENTRICULAR RATE: 69 BPM
POC ACT LR: 246 SEC

## 2023-05-17 PROCEDURE — 2500000003 HC RX 250 WO HCPCS: Performed by: INTERNAL MEDICINE

## 2023-05-17 PROCEDURE — 2500000003 HC RX 250 WO HCPCS

## 2023-05-17 PROCEDURE — 93005 ELECTROCARDIOGRAM TRACING: CPT | Performed by: INTERNAL MEDICINE

## 2023-05-17 PROCEDURE — B31R1ZZ FLUOROSCOPY OF INTRACRANIAL ARTERIES USING LOW OSMOLAR CONTRAST: ICD-10-PCS | Performed by: INTERNAL MEDICINE

## 2023-05-17 PROCEDURE — 037K3DZ DILATION OF RIGHT INTERNAL CAROTID ARTERY WITH INTRALUMINAL DEVICE, PERCUTANEOUS APPROACH: ICD-10-PCS | Performed by: INTERNAL MEDICINE

## 2023-05-17 PROCEDURE — 2709999900 HC NON-CHARGEABLE SUPPLY

## 2023-05-17 PROCEDURE — 94760 N-INVAS EAR/PLS OXIMETRY 1: CPT

## 2023-05-17 PROCEDURE — C1894 INTRO/SHEATH, NON-LASER: HCPCS

## 2023-05-17 PROCEDURE — 85347 COAGULATION TIME ACTIVATED: CPT

## 2023-05-17 PROCEDURE — 6370000000 HC RX 637 (ALT 250 FOR IP): Performed by: INTERNAL MEDICINE

## 2023-05-17 PROCEDURE — C1884 EMBOLIZATION PROTECT SYST: HCPCS

## 2023-05-17 PROCEDURE — C1769 GUIDE WIRE: HCPCS

## 2023-05-17 PROCEDURE — 6360000002 HC RX W HCPCS

## 2023-05-17 PROCEDURE — 6370000000 HC RX 637 (ALT 250 FOR IP)

## 2023-05-17 PROCEDURE — 99153 MOD SED SAME PHYS/QHP EA: CPT

## 2023-05-17 PROCEDURE — 99152 MOD SED SAME PHYS/QHP 5/>YRS: CPT

## 2023-05-17 PROCEDURE — 2580000003 HC RX 258: Performed by: INTERNAL MEDICINE

## 2023-05-17 PROCEDURE — 6360000004 HC RX CONTRAST MEDICATION: Performed by: INTERNAL MEDICINE

## 2023-05-17 PROCEDURE — C1725 CATH, TRANSLUMIN NON-LASER: HCPCS

## 2023-05-17 PROCEDURE — C1876 STENT, NON-COA/NON-COV W/DEL: HCPCS

## 2023-05-17 PROCEDURE — 1200000000 HC SEMI PRIVATE

## 2023-05-17 PROCEDURE — 37215 TRANSCATH STENT CCA W/EPS: CPT

## 2023-05-17 PROCEDURE — 2580000003 HC RX 258

## 2023-05-17 RX ORDER — SODIUM CHLORIDE 0.9 % (FLUSH) 0.9 %
5-40 SYRINGE (ML) INJECTION EVERY 12 HOURS SCHEDULED
Status: DISCONTINUED | OUTPATIENT
Start: 2023-05-17 | End: 2023-05-18 | Stop reason: HOSPADM

## 2023-05-17 RX ORDER — SODIUM CHLORIDE 0.9 % (FLUSH) 0.9 %
5-40 SYRINGE (ML) INJECTION PRN
Status: DISCONTINUED | OUTPATIENT
Start: 2023-05-17 | End: 2023-05-18 | Stop reason: HOSPADM

## 2023-05-17 RX ORDER — QUETIAPINE FUMARATE 100 MG/1
100 TABLET, FILM COATED ORAL NIGHTLY
Status: DISCONTINUED | OUTPATIENT
Start: 2023-05-17 | End: 2023-05-18 | Stop reason: HOSPADM

## 2023-05-17 RX ORDER — CLOPIDOGREL BISULFATE 75 MG/1
75 TABLET ORAL DAILY
Status: DISCONTINUED | OUTPATIENT
Start: 2023-05-18 | End: 2023-05-18 | Stop reason: HOSPADM

## 2023-05-17 RX ORDER — GABAPENTIN 400 MG/1
800 CAPSULE ORAL 3 TIMES DAILY
Status: DISCONTINUED | OUTPATIENT
Start: 2023-05-17 | End: 2023-05-18 | Stop reason: HOSPADM

## 2023-05-17 RX ORDER — AMLODIPINE BESYLATE 5 MG/1
5 TABLET ORAL DAILY
Status: DISCONTINUED | OUTPATIENT
Start: 2023-05-18 | End: 2023-05-18 | Stop reason: HOSPADM

## 2023-05-17 RX ORDER — DOXYCYCLINE HYCLATE 100 MG
100 TABLET ORAL 2 TIMES DAILY
Status: DISCONTINUED | OUTPATIENT
Start: 2023-05-17 | End: 2023-05-18 | Stop reason: HOSPADM

## 2023-05-17 RX ORDER — SODIUM CHLORIDE 9 MG/ML
INJECTION, SOLUTION INTRAVENOUS PRN
Status: DISCONTINUED | OUTPATIENT
Start: 2023-05-17 | End: 2023-05-18 | Stop reason: HOSPADM

## 2023-05-17 RX ORDER — ATORVASTATIN CALCIUM 80 MG/1
80 TABLET, FILM COATED ORAL DAILY
Status: DISCONTINUED | OUTPATIENT
Start: 2023-05-17 | End: 2023-05-18 | Stop reason: HOSPADM

## 2023-05-17 RX ORDER — ASPIRIN 325 MG
325 TABLET ORAL ONCE
Status: DISCONTINUED | OUTPATIENT
Start: 2023-05-17 | End: 2023-05-18 | Stop reason: HOSPADM

## 2023-05-17 RX ORDER — CLOPIDOGREL BISULFATE 75 MG/1
600 TABLET ORAL ONCE
Status: COMPLETED | OUTPATIENT
Start: 2023-05-17 | End: 2023-05-17

## 2023-05-17 RX ORDER — ACETAMINOPHEN 500 MG
1000 TABLET ORAL EVERY 6 HOURS PRN
Status: DISCONTINUED | OUTPATIENT
Start: 2023-05-17 | End: 2023-05-18 | Stop reason: HOSPADM

## 2023-05-17 RX ORDER — DOXYCYCLINE HYCLATE 100 MG/1
100 CAPSULE ORAL 2 TIMES DAILY
COMMUNITY

## 2023-05-17 RX ORDER — ACETAMINOPHEN 325 MG/1
650 TABLET ORAL EVERY 4 HOURS PRN
Status: DISCONTINUED | OUTPATIENT
Start: 2023-05-17 | End: 2023-05-18 | Stop reason: HOSPADM

## 2023-05-17 RX ORDER — ASPIRIN 81 MG/1
81 TABLET, CHEWABLE ORAL DAILY
Status: DISCONTINUED | OUTPATIENT
Start: 2023-05-18 | End: 2023-05-18 | Stop reason: HOSPADM

## 2023-05-17 RX ADMIN — QUETIAPINE FUMARATE 100 MG: 100 TABLET ORAL at 20:30

## 2023-05-17 RX ADMIN — Medication 10 ML: at 20:30

## 2023-05-17 RX ADMIN — GABAPENTIN 800 MG: 400 CAPSULE ORAL at 13:12

## 2023-05-17 RX ADMIN — SODIUM CHLORIDE 2.5 MG/HR: 9 INJECTION, SOLUTION INTRAVENOUS at 10:12

## 2023-05-17 RX ADMIN — IOPAMIDOL 80 ML: 755 INJECTION, SOLUTION INTRAVENOUS at 09:14

## 2023-05-17 RX ADMIN — NICARDIPINE HYDROCHLORIDE 2.5 MG/HR: 0.1 INJECTION, SOLUTION INTRAVENTRICULAR at 09:38

## 2023-05-17 RX ADMIN — Medication 10 ML: at 09:40

## 2023-05-17 RX ADMIN — CLOPIDOGREL BISULFATE 600 MG: 75 TABLET ORAL at 10:04

## 2023-05-17 RX ADMIN — ACETAMINOPHEN 650 MG: 325 TABLET ORAL at 10:07

## 2023-05-17 RX ADMIN — DOXYCYCLINE HYCLATE 100 MG: 100 TABLET, COATED ORAL at 15:12

## 2023-05-17 RX ADMIN — GABAPENTIN 800 MG: 400 CAPSULE ORAL at 20:30

## 2023-05-17 RX ADMIN — ACETAMINOPHEN 650 MG: 325 TABLET ORAL at 18:14

## 2023-05-17 RX ADMIN — DOXYCYCLINE HYCLATE 100 MG: 100 TABLET, COATED ORAL at 20:30

## 2023-05-17 RX ADMIN — METOPROLOL TARTRATE 25 MG: 25 TABLET, FILM COATED ORAL at 20:34

## 2023-05-17 RX ADMIN — Medication 10 ML: at 09:39

## 2023-05-17 ASSESSMENT — PAIN DESCRIPTION - DESCRIPTORS
DESCRIPTORS: SHARP
DESCRIPTORS: THROBBING

## 2023-05-17 ASSESSMENT — PAIN SCALES - GENERAL
PAINLEVEL_OUTOF10: 0
PAINLEVEL_OUTOF10: 4
PAINLEVEL_OUTOF10: 7

## 2023-05-17 ASSESSMENT — PAIN DESCRIPTION - ORIENTATION
ORIENTATION: RIGHT
ORIENTATION: RIGHT

## 2023-05-17 ASSESSMENT — PAIN DESCRIPTION - LOCATION
LOCATION: LEG
LOCATION: LEG

## 2023-05-17 ASSESSMENT — PAIN DESCRIPTION - PAIN TYPE: TYPE: SURGICAL PAIN

## 2023-05-17 NOTE — OP NOTE
[address]   Procedure Note    CLINICAL HISTORY:       Andrea Sarabia is a 74 y.o. male with a history of CVA, CAD, PAD with critical ICA stenosis. Patient Active Problem List   Diagnosis    Chest pain    NSTEMI (non-ST elevated myocardial infarction) ([facility] [address]    Coronary artery disease involving native coronary artery of native heart without angina pectoris    S/P CABG (coronary artery bypass graft)    Disorder of teeth and supporting structures    Paresthesia of left arm    Dental caries    S/p nephrectomy    Hepatitis C antibody positive in blood    Bilateral carotid artery stenosis    History of DVT of lower extremity    Acute cerebral infarction ([facility] [address]    Alcohol withdrawal ([facility] [address]    Hyperlipidemia LDL goal <70    PAD (peripheral artery disease) ([facility] [address]    Essential hypertension    Acute pain of right lower extremity    Solitary kidney, acquired    Pain of right lower extremity due to ischemia    Critical lower limb ischemia (HCC)    Critical ischemia of lower extremity ([facility] [address]       The risks, benefits, and details of the procedure were explained to the patient. The patient verbalized understanding and wanted to proceed. Informed written consent was obtained. INDICATION:  Carotid Stenosis     PROCEDURES PERFORMED:   Carotid Stenting     PROCEDURE TECHNIQUE:  The patient was approached from the right femoral artery using ultrasound and micro access techniques placing a 6 Swedish sheath     CONTRAST:  Total of 80 cc. COMPLICATIONS:  None. EBL: 5 cc    Moderate Sedation:  Start time: 0833  Stop time: 0915  0.5 mg versed   25 mcg fentanyl   An independent trained observer pushed medications at my direction. We monitored the patient's level of consciousness and vital signs/physiologic status throughout the procedure duration (see start and start times above).      INTERVENTION  Using an 035 wire the external carotid artery was wired and then a 6 Western Bri shuttle sheath was positioned into the right

## 2023-05-18 ENCOUNTER — TELEPHONE (OUTPATIENT)
Dept: CARDIOLOGY CLINIC | Age: 68
End: 2023-05-18

## 2023-05-18 VITALS
WEIGHT: 225 LBS | OXYGEN SATURATION: 98 % | DIASTOLIC BLOOD PRESSURE: 60 MMHG | TEMPERATURE: 98.2 F | HEIGHT: 72 IN | RESPIRATION RATE: 20 BRPM | BODY MASS INDEX: 30.48 KG/M2 | SYSTOLIC BLOOD PRESSURE: 126 MMHG | HEART RATE: 73 BPM

## 2023-05-18 LAB
ANION GAP SERPL CALCULATED.3IONS-SCNC: 11 MMOL/L (ref 3–16)
BUN SERPL-MCNC: 21 MG/DL (ref 7–20)
CALCIUM SERPL-MCNC: 8.5 MG/DL (ref 8.3–10.6)
CHLORIDE SERPL-SCNC: 107 MMOL/L (ref 99–110)
CO2 SERPL-SCNC: 23 MMOL/L (ref 21–32)
CREAT SERPL-MCNC: 1.2 MG/DL (ref 0.8–1.3)
DEPRECATED RDW RBC AUTO: 17.9 % (ref 12.4–15.4)
GFR SERPLBLD CREATININE-BSD FMLA CKD-EPI: >60 ML/MIN/{1.73_M2}
GLUCOSE SERPL-MCNC: 118 MG/DL (ref 70–99)
HCT VFR BLD AUTO: 40.2 % (ref 40.5–52.5)
HGB BLD-MCNC: 13 G/DL (ref 13.5–17.5)
MCH RBC QN AUTO: 25.8 PG (ref 26–34)
MCHC RBC AUTO-ENTMCNC: 32.4 G/DL (ref 31–36)
MCV RBC AUTO: 79.7 FL (ref 80–100)
PLATELET # BLD AUTO: 261 K/UL (ref 135–450)
PMV BLD AUTO: 7.8 FL (ref 5–10.5)
POC ACT LR: 134 SEC
POC ACT LR: 182 SEC
POTASSIUM SERPL-SCNC: 4.5 MMOL/L (ref 3.5–5.1)
RBC # BLD AUTO: 5.04 M/UL (ref 4.2–5.9)
SODIUM SERPL-SCNC: 141 MMOL/L (ref 136–145)
WBC # BLD AUTO: 9.4 K/UL (ref 4–11)

## 2023-05-18 PROCEDURE — 2580000003 HC RX 258: Performed by: INTERNAL MEDICINE

## 2023-05-18 PROCEDURE — 85027 COMPLETE CBC AUTOMATED: CPT

## 2023-05-18 PROCEDURE — 36415 COLL VENOUS BLD VENIPUNCTURE: CPT

## 2023-05-18 PROCEDURE — 94760 N-INVAS EAR/PLS OXIMETRY 1: CPT

## 2023-05-18 PROCEDURE — 6370000000 HC RX 637 (ALT 250 FOR IP): Performed by: INTERNAL MEDICINE

## 2023-05-18 PROCEDURE — 80048 BASIC METABOLIC PNL TOTAL CA: CPT

## 2023-05-18 RX ORDER — CLOPIDOGREL BISULFATE 75 MG/1
75 TABLET ORAL DAILY
Qty: 30 TABLET | Refills: 3 | Status: SHIPPED | OUTPATIENT
Start: 2023-05-19

## 2023-05-18 RX ADMIN — AMLODIPINE BESYLATE 5 MG: 5 TABLET ORAL at 08:42

## 2023-05-18 RX ADMIN — Medication 10 ML: at 08:48

## 2023-05-18 RX ADMIN — DOXYCYCLINE HYCLATE 100 MG: 100 TABLET, COATED ORAL at 08:43

## 2023-05-18 RX ADMIN — ASPIRIN 81 MG 81 MG: 81 TABLET ORAL at 08:43

## 2023-05-18 RX ADMIN — GABAPENTIN 800 MG: 400 CAPSULE ORAL at 08:42

## 2023-05-18 RX ADMIN — ATORVASTATIN CALCIUM 80 MG: 80 TABLET, FILM COATED ORAL at 08:42

## 2023-05-18 RX ADMIN — CLOPIDOGREL BISULFATE 75 MG: 75 TABLET ORAL at 10:32

## 2023-05-18 RX ADMIN — METOPROLOL TARTRATE 25 MG: 25 TABLET, FILM COATED ORAL at 08:42

## 2023-05-18 ASSESSMENT — PAIN SCALES - GENERAL: PAINLEVEL_OUTOF10: 0

## 2023-05-18 NOTE — DISCHARGE INSTRUCTIONS
Post  Intervention Discharge Instructions    Activity:  You may drive in [address] unless instructed by your doctor   Resume normal activity in one week  Avoid lifting, pushing, or pulling more than 10 lbs. For one week. (A gallon of milk is 7 lbs)  Limit stair climbing to once a day for two weeks. You may walk at an easy pace on ground level. Plan rest periods during the day. Avoid tension and stress. Learn to manage your stress. Avoid work that increases muscle tension.        (straining with bowel movements, moving furniture)    Wound Care: You may shower, but no bathtubs, pools, or hot tubs for one week. Inspect area daily. Normal observations:  Soreness and tenderness that may last for one week, mild pink to red oozing from incision site for up to 24 hrs after discharge,    bruising that could last up to two weeks. Clean with soap and water. NO lotion or powder. Dry area thoroughly. Apply band    aide for 48 hrs. Nutrition:   Low fat, low salt diet (guidelines for Heart Healthy eating)  Limit caffeine to 1-2 cups per day (coffee, tea, chocolate, soda)  Eat high fiber to avoid constipation and straining during bowel movements (fresh veggies and fruit, whole grain)  Limit alcohol to two servings a day ( 8 oz beer, 1 oz liquor, 4 oz wine)    Depression: It is not unusual to have feelings of anxiety, fear, or depression after your procedure. If you need help with these feelings, call your primary care physician. There are medications to help you and healing usually occurs sooner if you are not depressed. Call your Doctor if you have:   Increased shortness of breath, weakness, or increased fatigue  A fast or a slow heartbeat  Problems or questions with your medications  Bleeding that is not stopped after holding pressure for 10 min  Feeling lightheaded or dizzy  Increased swelling or bruising of the groin or leg  Unusual pain, numbness or tingling at the groin or down the leg  Any signs of infection

## 2023-05-18 NOTE — DISCHARGE INSTR - COC
Continuity of Care Form    Patient Name: Charlet Bence   :  1955  MRN:  [id number]    Admit date:  2023  Discharge date:  ***    Code Status Order: Full Code   Advance Directives:     Admitting Physician:  Donn Henriquez MD  PCP: Toña Lopez, KATHY - CNP    Discharging Nurse: [facility] Unit/Room#: F2L-1200/1737-61  Discharging Unit Phone Number: ***    Emergency Contact:   Extended Emergency Contact Information  Primary Emergency Contact: [address] Phone: [phone]  Work Phone: [phone]  Mobile Phone: [phone]  Relation: Domestic Partner  Secondary Emergency Contact: [address],  [address] Phone: [phone]  Mobile Phone: [phone]  Relation: Child    Past Surgical History:  Past Surgical History:   Procedure Laterality Date    CARDIAC CATHETERIZATION  2020    Dr. Diamond Kilgore - w/placement of IABP    CARDIAC CATHETERIZATION      peripheral angiogram, 22 & 22    CORONARY ARTERY BYPASS GRAFT N/A 2020    Dr. Tonio Hewitt. [place] - urgent x4 (LIMA-LAD, L SVG-D1, SVG-OM, SVG-PDA)    GASTROSTOMY TUBE PLACEMENT N/A 2020    PERCUTANEOUS ENDOSCOPIC GASTROSTOMY TUBE PLACEMENT performed by Lonnie Napier MD at [facility] N/A 07/10/2020    REMOVAL  GASTROSTOMY FEEDING TUBE  (28488) performed by Lonnie Napier MD at [facility] Left 2020    Dr. Otto Zamarripa, IR - US guided, 1300 ml drained    TOTAL NEPHRECTOMY Left     17 yo - pt doesn't know why    TRACHEOSTOMY  2020    Dr. Scot Morgan - w/bronchoscopy    TRANSESOPHAGEAL ECHOCARDIOGRAM  2020    during CABG       Immunization History:   Immunization History   Administered Date(s) Administered    COVID-19, J&J, (age 18y+), IM, 0.5 mL 2021       Active Problems:  Patient Active Problem List   Diagnosis Code    Chest pain R07.9    NSTEMI (non-ST elevated myocardial infarction) ([facility] [address] I21.4    Coronary artery disease involving

## 2023-05-18 NOTE — DISCHARGE SUMMARY
s/p R ICA stent  Denies neuro changes  HR NSR, no bradycardia  BP stable  Cont aspirin, plavix, statin (DC PTA xarelto)     2.) Hypertension:   Goal BP <130/80. Non pharmacologic interventions include:   -weight loss  -heart healthy low sodium and low fat diet that consist of mostly fruits, vegetables and grains (Dash diet)  -limited amount of alcohol (no more than 1 drink/day for women, 2 drinks/day for men)  -regular physical activity  -no smoking  -stress reduction       Consults:  None    Significant Diagnostic Studies:   5/17/2023  INTERVENTION  Using an 035 wire the external carotid artery was wired and then a 6 Western Bri shuttle sheath was positioned into the right common carotid artery. A Abbott filter was positioned into the distal internal carotid artery without complication the internal carotid artery lesion was predilated with a 5.0 x 20 mm balloon  A Abbott 8.0 x 10.0 mm stent was successfully deployed to the internal carotid artery without complication.   Final cerebral angiogram showed a chronically occluded anterior cerebral artery and a widely patent middle cerebral artery that she with was removed without complication and a short sheath was sutured in place and will be removed in the CVICU  SUMMARY:   Status post successful right internal carotid artery stent  RECOMMENDATION:  .  -Monitor overnight in the CVICU  -Cardene infusion to maintain a systolic blood pressure between 120 and 140  -Load with Plavix 600 mg x 1 followed by 75 mg pO daily        Labs:   Lab Results   Component Value Date    CREATININE 1.2 05/18/2023    BUN 21 (H) 05/18/2023     05/18/2023    K 4.5 05/18/2023     05/18/2023    CO2 23 05/18/2023      Lab Results   Component Value Date    WBC 9.4 05/18/2023    HGB 13.0 (L) 05/18/2023    HCT 40.2 (L) 05/18/2023    MCV 79.7 (L) 05/18/2023     05/18/2023      Lab Results   Component Value Date    INR 1.15 (H) 11/23/2021    PROTIME 13.1 (H) 11/23/2021    No results

## 2023-05-18 NOTE — TELEPHONE ENCOUNTER
Need to schedule follow up appointment with Dr. Zuleyka Márquez for next week in the [facility] office. Called patient and LM for him to return call    Patient's wife called hospital nurse phone asking for René Sam to make f/u appointment with Dr. Zuleyka Márquez in  for next week per MD request. She can not do Thursday so appointment was made for [facility] on Wednesday.

## 2023-05-18 NOTE — ACP (ADVANCE CARE PLANNING)
Advance Care Planning     Advance Care Planning Activator (Inpatient)  Conversation Note      Date of ACP Conversation: 5/18/2023     Conversation Conducted with: Patient with Decision Making Capacity    ACP Activator: Humaira Truong, [address] [address] Maker:     Current Designated Health Care Decision Maker:     Primary Decision Maker: Marito Gill Domestic Partner - [phone]    Primary Decision Maker: Alber Go - Child - [phone]    Care Preferences    Ventilation: \"If you were in your present state of health and suddenly became very ill and were unable to breathe on your own, what would your preference be about the use of a ventilator (breathing machine) if it were available to you? \"      Would the patient desire the use of ventilator (breathing machine)?: yes    \"If your health worsens and it becomes clear that your chance of recovery is unlikely, what would your preference be about the use of a ventilator (breathing machine) if it were available to you? \"     Would the patient desire the use of ventilator (breathing machine)?: unsure      Resuscitation  \"CPR works best to restart the heart when there is a sudden event, like a heart attack, in someone who is otherwise healthy. Unfortunately, CPR does not typically restart the heart for people who have serious health conditions or who are very sick. \"    \"In the event your heart stopped as a result of an underlying serious health condition, would you want attempts to be made to restart your heart (answer \"yes\" for attempt to resuscitate) or would you prefer a natural death (answer \"no\" for do not attempt to resuscitate)? \" yes       [] Yes   [] No   Educated Patient / Willye Pair regarding differences between Advance Directives and portable DNR orders.     Length of ACP Conversation in minutes:      Conversation Outcomes:  ACP discussion completed    Follow-up plan:    [] Schedule follow-up conversation to continue planning  [] Referred
Home

## 2023-05-18 NOTE — PROGRESS NOTES
4 Eyes Skin Assessment     NAME:  Orlando Stein  YOB: 1955  MEDICAL RECORD NUMBER:  [id number]    The patient is being assessed for  Shift Handoff    I agree that at least one RN has performed a thorough Head to Toe Skin Assessment on the patient. ALL assessment sites listed below have been assessed. Areas assessed by both nurses:    Head, Face, Ears, Shoulders, Back, Chest, Arms, Elbows, Hands, Sacrum. Buttock, Coccyx, Ischium, Legs. Feet and Heels, and Under Medical Devices         Does the Patient have a Wound?  No noted wound(s)       Kip Prevention initiated by RN: Yes  Wound Care Orders initiated by RN: No    Pressure Injury (Stage 3,4, Unstageable, DTI, NWPT, and Complex wounds) if present, place Wound referral order by RN under : No    New Ostomies, if present place, Ostomy referral order under : No     Nurse 1 eSignature: Electronically signed by Alfosno Carmona RN on 5/17/23 at 7:00 PM EDT    **SHARE this note so that the co-signing nurse can place an eSignature**    Nurse 2 eSignature: {Esignature:[id number]}
4 Eyes Skin Assessment     NAME:  Vern Velez  YOB: 1955  MEDICAL RECORD NUMBER:  [id number]    The patient is being assessed for  Admission    I agree that at least one RN has performed a thorough Head to Toe Skin Assessment on the patient. ALL assessment sites listed below have been assessed. Areas assessed by both nurses:    Head, Face, Ears, Shoulders, Back, Chest, Arms, Elbows, Hands, Sacrum. Buttock, Coccyx, Ischium, Legs. Feet and Heels, and Under Medical Devices         Does the Patient have a Wound?  No noted wound(s)       Kip Prevention initiated by RN: Yes  Wound Care Orders initiated by RN: No    Pressure Injury (Stage 3,4, Unstageable, DTI, NWPT, and Complex wounds) if present, place Wound referral order by RN under : No    New Ostomies, if present place, Ostomy referral order under : No     Nurse 1 eSignature: Electronically signed by Jeffrey Serrano RN on 5/17/23 at 6:57 PM EDT    **SHARE this note so that the co-signing nurse can place an eSignature**    Nurse 2 eSignature: Electronically signed by Mark Gupta RN on 5/17/23 at 6:59 PM EDT
BILITOT <0.2 11/17/2021 04:44 AM    ALKPHOS 56 11/17/2021 04:44 AM     PT/INR:   Lab Results   Component Value Date/Time    PROTIME 13.1 11/23/2021 03:35 AM    INR 1.15 11/23/2021 03:35 AM    INR 1.19 11/22/2021 09:34 PM    INR 1.06 11/22/2021 04:01 PM     APTT:   Lab Results   Component Value Date/Time    APTT 60.1 11/22/2021 12:27 AM     Pro-BNP:    Lab Results   Component Value Date/Time    PROBNP 694 11/18/2021 05:06 AM    PROBNP 219 11/17/2021 04:44 AM    PROBNP 602 05/07/2020 05:48 AM     Parameters:   > 450 pg/mL under age 48  > 900 pg/mL ages 54-65  > 1800 pg/mL over age 76    ENZYMES:  Lab Results   Component Value Date/Time    CKTOTAL 109 11/17/2021 04:44 AM    CKTOTAL 68 11/15/2021 05:07 PM     FASTING LIPID PANEL:  Lab Results   Component Value Date/Time    CHOL 163 05/07/2020 12:00 PM    HDL 33 06/01/2021 01:26 PM    LDLCALC 59 06/01/2021 01:26 PM    TRIG 111 05/25/2020 04:05 AM       Diagnostics:      EKG:   Normal sinus rhythm  Normal ECG  When compared with ECG of 24-MAR-2023 06:00,  No significant change was found  Confirmed by [address] ([id number]) on 5/17/2023 10:50:03 AM    Peripheral angiogram 7/22/20 ([facility])   1. Left lower extremity angiogram with recanalization of the distal superficial femoral, popliteal, and peroneal artery. 2. Angioplasty of the peroneal and popliteal artery with a 3 x 220 Basil balloon. 3. Angioplasty of the left superficial femoral and popliteal artery with a 5 x 200  balloon. 4. Completion angiogram.  5. Right lower extremity runoff. Peripheral angiogram 7/24/20 ([facility])   1. Ultrasound-guided left common femoral access   2. Pelvic angiogram and right lower extremity angiogram   3. Recanalization of the superficial femoral artery and popliteal artery with angioplasty. 4. Angioplasty of the popliteal artery with 3 x 220-mm Basil balloon.   5. Angioplasty of the popliteal and superficial femoral artery with a 5 x 200  balloon and

## 2023-05-24 ENCOUNTER — TELEPHONE (OUTPATIENT)
Dept: CARDIOLOGY CLINIC | Age: 68
End: 2023-05-24

## 2023-05-24 ENCOUNTER — OFFICE VISIT (OUTPATIENT)
Dept: CARDIOLOGY CLINIC | Age: 68
End: 2023-05-24
Payer: MEDICARE

## 2023-05-24 VITALS
WEIGHT: 223 LBS | HEIGHT: 72 IN | HEART RATE: 70 BPM | SYSTOLIC BLOOD PRESSURE: 124 MMHG | DIASTOLIC BLOOD PRESSURE: 72 MMHG | OXYGEN SATURATION: 97 % | BODY MASS INDEX: 30.2 KG/M2

## 2023-05-24 DIAGNOSIS — I65.23 BILATERAL CAROTID ARTERY STENOSIS: ICD-10-CM

## 2023-05-24 DIAGNOSIS — I73.9 PAD (PERIPHERAL ARTERY DISEASE) (HCC): Primary | ICD-10-CM

## 2023-05-24 DIAGNOSIS — I25.10 CAD IN NATIVE ARTERY: ICD-10-CM

## 2023-05-24 DIAGNOSIS — E78.5 HYPERLIPIDEMIA LDL GOAL <70: ICD-10-CM

## 2023-05-24 DIAGNOSIS — I10 ESSENTIAL HYPERTENSION: ICD-10-CM

## 2023-05-24 PROCEDURE — 3017F COLORECTAL CA SCREEN DOC REV: CPT | Performed by: INTERNAL MEDICINE

## 2023-05-24 PROCEDURE — G8427 DOCREV CUR MEDS BY ELIG CLIN: HCPCS | Performed by: INTERNAL MEDICINE

## 2023-05-24 PROCEDURE — 3074F SYST BP LT 130 MM HG: CPT | Performed by: INTERNAL MEDICINE

## 2023-05-24 PROCEDURE — 3078F DIAST BP <80 MM HG: CPT | Performed by: INTERNAL MEDICINE

## 2023-05-24 PROCEDURE — G8417 CALC BMI ABV UP PARAM F/U: HCPCS | Performed by: INTERNAL MEDICINE

## 2023-05-24 PROCEDURE — 1111F DSCHRG MED/CURRENT MED MERGE: CPT | Performed by: INTERNAL MEDICINE

## 2023-05-24 PROCEDURE — 1123F ACP DISCUSS/DSCN MKR DOCD: CPT | Performed by: INTERNAL MEDICINE

## 2023-05-24 PROCEDURE — 99214 OFFICE O/P EST MOD 30 MIN: CPT | Performed by: INTERNAL MEDICINE

## 2023-05-24 PROCEDURE — 1036F TOBACCO NON-USER: CPT | Performed by: INTERNAL MEDICINE

## 2023-05-24 NOTE — TELEPHONE ENCOUNTER
CARDIAC CLEARANCE     What type of procedure are you having? I & D left stump wound    Which physician is performing your procedure? Pete Olvera MD    When is your procedure scheduled for?  5/31/23    Where are you having this procedure? Dr. Kiera Corrales [place], [place]    Are you taking Blood Thinners? If so what? (Name/dose/frequesncy)  Yes. Plavix 75mg. 1 PO QD. Aspirin 81mg. 1 PO QD. Does the surgeon want you to stop your blood thinner? If so for how long? Yes. Hold both 5 days prior to procedure. Phone Number and Contact Name for Physicians office:  [phone]    Fax number to send information:  [phone]    History:   past medical history significant for CAD s/p CABG, HLD, HTN, carotid disease, prior DVT, PAD s/p SFA stent, left BKA, s/p nephrectomy, prior nicotine addiction and alcohol abuse. Scanned cardiac clearance sheet to PT's chart.

## 2023-05-24 NOTE — PROGRESS NOTES
[facility], [place], operated by [facility]      Cardiology Progress Note    Rock Restrepo  1955    May 24, 2023    Primary Physician: Jordan Hutchinson NP  Reason for visit: PAD, CAD    CC: \"I'm doing good since my procedures. \"    HPI:  The patient is 74 y.o. male with a past medical history significant for CAD s/p CABG, HLD, HTN, carotid disease, prior DVT, PAD s/p SFA stent, left BKA, s/p nephrectomy, prior nicotine addiction and alcohol abuse. He presented to [facility] 5/2020 with severe chest pain and found to have NSTEMI. He underwent heart catheterization revealing severe triple-vessel disease and subsequently underwent CABG x 4 on 5/8/20 with Dr. Ron Vázquez. He did suffer from encephalopathy/delirium following bypass. He is s/p tracheostomy and PEG placement. He was discharged to [facility] for skilled nursing care. He was seen in office with Dr. Ron Vázquez for follow up where it was noted that he was admitted to [facility] for bilateral foot wounds. He underwent peripheral angiograms with intervention to both legs while inpatient at [facility]. He underwent left BKA on 10/22/20. Lower extremity dopplers showed significant stenosis and underwent angiography 2/21/21 that showed severe right distal SFA/popliteal and tibial disease. S/p successful revascularization and SFA stenting. Pt was seen in the office 11/11/21 to establish care in our [place] office due to insurance coverage. He reported severe right foot pain for a few weeks. He described rest pain but denied any open wounds or ulcers. He stated that his right lower leg and foot is cold and his foot is discolored red when down and whitish when elevated. He endorsed edema at the ankle. Pt was admitted to [facility] and underwent peripheral angiography intervention 11/22/21, 11/23/21 and 7/29/22 by Dr. Tomas Silvestre. He was also admitted 3/22-3/25/23 with critical limb ischemia s/p EKOS catheter/thrombectomy, laser arterectomy and balloon angioplasty per Dr. Tomas Silvestre.  This month, he was admitted for severe

## 2023-05-25 ENCOUNTER — TELEPHONE (OUTPATIENT)
Dept: CARDIOLOGY CLINIC | Age: 68
End: 2023-05-25

## 2023-05-25 NOTE — TELEPHONE ENCOUNTER
Dr. Nuris Lr please review and advise for a pre-operative risk assessment prior to I&D scheduled 5/31/23, requesting to hold the Plavix and Asa for 5 days prior. Patient is scheduled for follow up with you 6/5/23 for management of carotid stenosis s/p stent placement 5/17/23 and PAD.

## 2023-05-26 NOTE — TELEPHONE ENCOUNTER
Patient is cleared for surgery but CANNOT hold Asprin or Plavix per Dr. Lynda Canada. Please notify and prepare letter if necessary. Thanks.

## 2023-06-05 ENCOUNTER — OFFICE VISIT (OUTPATIENT)
Dept: CARDIOLOGY CLINIC | Age: 68
End: 2023-06-05
Payer: MEDICARE

## 2023-06-05 VITALS
OXYGEN SATURATION: 96 % | SYSTOLIC BLOOD PRESSURE: 123 MMHG | DIASTOLIC BLOOD PRESSURE: 62 MMHG | BODY MASS INDEX: 30.24 KG/M2 | HEART RATE: 77 BPM | WEIGHT: 223 LBS

## 2023-06-05 DIAGNOSIS — I73.9 PAD (PERIPHERAL ARTERY DISEASE) (HCC): Primary | ICD-10-CM

## 2023-06-05 DIAGNOSIS — E78.5 HYPERLIPIDEMIA LDL GOAL <70: ICD-10-CM

## 2023-06-05 DIAGNOSIS — Z95.1 S/P CABG (CORONARY ARTERY BYPASS GRAFT): ICD-10-CM

## 2023-06-05 DIAGNOSIS — I10 ESSENTIAL HYPERTENSION: ICD-10-CM

## 2023-06-05 DIAGNOSIS — I65.23 BILATERAL CAROTID ARTERY STENOSIS: ICD-10-CM

## 2023-06-05 DIAGNOSIS — I25.10 CAD IN NATIVE ARTERY: ICD-10-CM

## 2023-06-05 PROCEDURE — G8427 DOCREV CUR MEDS BY ELIG CLIN: HCPCS | Performed by: INTERNAL MEDICINE

## 2023-06-05 PROCEDURE — 99213 OFFICE O/P EST LOW 20 MIN: CPT | Performed by: INTERNAL MEDICINE

## 2023-06-05 PROCEDURE — 3078F DIAST BP <80 MM HG: CPT | Performed by: INTERNAL MEDICINE

## 2023-06-05 PROCEDURE — 1123F ACP DISCUSS/DSCN MKR DOCD: CPT | Performed by: INTERNAL MEDICINE

## 2023-06-05 PROCEDURE — G8417 CALC BMI ABV UP PARAM F/U: HCPCS | Performed by: INTERNAL MEDICINE

## 2023-06-05 PROCEDURE — 3074F SYST BP LT 130 MM HG: CPT | Performed by: INTERNAL MEDICINE

## 2023-06-05 PROCEDURE — 3017F COLORECTAL CA SCREEN DOC REV: CPT | Performed by: INTERNAL MEDICINE

## 2023-06-05 PROCEDURE — 1111F DSCHRG MED/CURRENT MED MERGE: CPT | Performed by: INTERNAL MEDICINE

## 2023-06-05 PROCEDURE — 1036F TOBACCO NON-USER: CPT | Performed by: INTERNAL MEDICINE

## 2023-06-05 NOTE — PROGRESS NOTES
above accurately reflects all work, treatment, procedures, and medical decision making performed by me.     Electronically signed by Lucia Mclaughlin MD on 6/5/2023 at 9:52 PM

## 2023-07-05 NOTE — PROGRESS NOTES
Progress Note    S/P cabgx4 5/8/20  covid neg 5/7, 5/26  Trach, PEG 5/27/20    Vital Signs:                                                 BP (!) 163/64   Pulse 63   Temp 99.7 °F (37.6 °C) (Rectal)   Resp 18   Ht 6' (1.829 m)   Wt 181 lb 14.1 oz (82.5 kg)   SpO2 97%   BMI 24.67 kg/m²  O2 Flow Rate (L/min): 60 L/min   Tmax 101.3  NSR  Admission Weight: 188 lb (85.3 kg)      Vent Settings:  Vent Information  $Ventilation: $Subsequent Day  Skin Assessment: Clean, dry, & intact  Suction Catheter Diameter: 14  Equipment ID: [id number]  Equipment Changed: Humidification  Vent Type: 840  Vent Mode: AC/VC+  Vt Ordered: 450 mL  Rate Set: 18 bmp  Peak Flow: 0 L/min  Pressure Support: 0 cmH20  FiO2 : 50 %  SpO2: 97 %  SpO2/FiO2 ratio: 194  Sensitivity: 3  PEEP/CPAP: 5  I Time/ I Time %: 0.85 s  Humidification Source: Heated wire  Humidification Temp: 37  Humidification Temp Measured: 36.9  Circuit Condensation: Drained     Drips:  Propofol, precedex, fentanyl  TF on hold     I/O:      Intake/Output Summary (Last 24 hours) at 5/28/2020 0804  Last data filed at 5/28/2020 0600  Gross per 24 hour   Intake 1686.54 ml   Output 1680 ml   Net 6.54 ml     CV: reg, wound c/d/i  Pulm: trach, always tachypneic  Abd: soft, + BS, PEG site looks good  Ext: warm, no edema. Multiple pressure scabs bilat LE. BLE muscles below knees fasciculating  Neuro: eyes closed, spont movt only    Data Review:  CBC:   Recent Labs     05/26/20 0418 05/27/20  0400 05/28/20  0420   WBC 6.2 7.0 9.1   HGB 8.8* 9.0* 9.8*   HCT 27.3* 27.9* 30.3*   MCV 85.2 85.1 84.7   * 502* 519*     BMP:   Recent Labs     05/26/20 0418 05/27/20  0400 05/28/20  0420   * 143 144   K 4.2 4.0 4.4   * 110 110   CO2 25 25 25   BUN 31* 32* 22*   CREATININE 0.7* 0.6* 0.7*   CALCIUM 8.5 8.4 8.6   MG 2.10 2.00 1.90     Cardiac Enzymes: No results for input(s): CKTOTAL, CKMB, CKMBINDEX, TROPONINI in the last 72 hours.   PT/INR:   Recent Labs numerical 0-10

## 2023-09-05 RX ORDER — CLOPIDOGREL BISULFATE 75 MG/1
TABLET ORAL
Qty: 90 TABLET | Refills: 3 | Status: SHIPPED | OUTPATIENT
Start: 2023-09-05

## 2024-01-03 ENCOUNTER — TELEPHONE (OUTPATIENT)
Dept: CARDIOLOGY CLINIC | Age: 69
End: 2024-01-03

## 2024-01-03 NOTE — TELEPHONE ENCOUNTER
CARDIAC CLEARANCE     Procedure: I&D left stump wound  : Dr. Hernandez  Date: 1/31/2024    Medications needing held: Plavix 75 mg     How long?: 5 day prior to procedure    Phone Number and Contact Name for Physicians office: [phone]  Fax number to send information: [phone]    Clinical staff:    Cardiologist: Dr. Richard  Last Appointment: 12/11/2023  Next Appointment: 6/17/2024  Cardiac History:  past medical history significant for CAD s/p CABG, HLD, HTN, carotid disease, prior DVT, CVA, PAD s/p SFA stent, left BKA, s/p nephrectomy, prior nicotine addiction and alcohol abuse.     Scanned CC to PT's chart.

## 2024-01-03 NOTE — TELEPHONE ENCOUNTER
Please advise cardiac clearance for I&D left stump wound   Date: 1/31/24  Requesting to hold Plavix for 5 days prior    Hx. Carotid stenosis s/p stent 5/17/23, PAD s/p right popliteal aneurysm with successful stent 7/2022

## 2024-01-04 NOTE — TELEPHONE ENCOUNTER
Per Dr. Richard, low risk and okay to hold. Must continue ASA through operative period. Please prepare and fax letter. Thanks!

## 2024-01-08 RX ORDER — AMLODIPINE BESYLATE 5 MG/1
TABLET ORAL
Qty: 90 TABLET | Refills: 3 | Status: SHIPPED | OUTPATIENT
Start: 2024-01-08

## 2024-01-19 ENCOUNTER — HOSPITAL ENCOUNTER (EMERGENCY)
Age: 69
Discharge: HOME | End: 2024-01-19
Attending: STUDENT IN AN ORGANIZED HEALTH CARE EDUCATION/TRAINING PROGRAM
Payer: MEDICARE

## 2024-01-19 ENCOUNTER — HOSPITAL ENCOUNTER (OUTPATIENT)
Dept: VASCULAR LAB | Age: 69
Discharge: HOME | End: 2024-01-19
Attending: INTERNAL MEDICINE
Payer: MEDICARE

## 2024-01-19 ENCOUNTER — APPOINTMENT (OUTPATIENT)
Dept: CT IMAGING | Age: 69
End: 2024-01-19
Payer: MEDICARE

## 2024-01-19 VITALS
OXYGEN SATURATION: 98 % | WEIGHT: 223.77 LBS | HEART RATE: 82 BPM | BODY MASS INDEX: 30.35 KG/M2 | SYSTOLIC BLOOD PRESSURE: 134 MMHG | RESPIRATION RATE: 25 BRPM | TEMPERATURE: 97.5 F | DIASTOLIC BLOOD PRESSURE: 77 MMHG

## 2024-01-19 DIAGNOSIS — I73.9 PAD (PERIPHERAL ARTERY DISEASE) (HCC): ICD-10-CM

## 2024-01-19 DIAGNOSIS — I73.9 PERIPHERAL ARTERY DISEASE (HCC): Primary | ICD-10-CM

## 2024-01-19 LAB
ALBUMIN SERPL-MCNC: 4.1 G/DL (ref 3.4–5)
ALBUMIN/GLOB SERPL: 1.4 {RATIO} (ref 1.1–2.2)
ALP SERPL-CCNC: 81 U/L (ref 40–129)
ALT SERPL-CCNC: 25 U/L (ref 10–40)
ANION GAP SERPL CALCULATED.3IONS-SCNC: 9 MMOL/L (ref 3–16)
APTT BLD: 27.4 SEC (ref 22.7–35.9)
AST SERPL-CCNC: 20 U/L (ref 15–37)
BASOPHILS # BLD: 0.1 K/UL (ref 0–0.2)
BASOPHILS NFR BLD: 1.2 %
BILIRUB SERPL-MCNC: 0.3 MG/DL (ref 0–1)
BUN SERPL-MCNC: 13 MG/DL (ref 7–20)
CALCIUM SERPL-MCNC: 9 MG/DL (ref 8.3–10.6)
CHLORIDE SERPL-SCNC: 103 MMOL/L (ref 99–110)
CO2 SERPL-SCNC: 26 MMOL/L (ref 21–32)
CREAT SERPL-MCNC: 1 MG/DL (ref 0.8–1.3)
DEPRECATED RDW RBC AUTO: 16.1 % (ref 12.4–15.4)
EOSINOPHIL # BLD: 0.2 K/UL (ref 0–0.6)
EOSINOPHIL NFR BLD: 2.7 %
GFR SERPLBLD CREATININE-BSD FMLA CKD-EPI: >60 ML/MIN/{1.73_M2}
GLUCOSE SERPL-MCNC: 103 MG/DL (ref 70–99)
HCT VFR BLD AUTO: 42.2 % (ref 40.5–52.5)
HGB BLD-MCNC: 14.1 G/DL (ref 13.5–17.5)
INR PPP: 0.94 (ref 0.84–1.16)
LYMPHOCYTES # BLD: 1.6 K/UL (ref 1–5.1)
LYMPHOCYTES NFR BLD: 18.9 %
MCH RBC QN AUTO: 27.8 PG (ref 26–34)
MCHC RBC AUTO-ENTMCNC: 33.5 G/DL (ref 31–36)
MCV RBC AUTO: 82.9 FL (ref 80–100)
MONOCYTES # BLD: 0.7 K/UL (ref 0–1.3)
MONOCYTES NFR BLD: 8.1 %
NEUTROPHILS # BLD: 6 K/UL (ref 1.7–7.7)
NEUTROPHILS NFR BLD: 69.1 %
PLATELET # BLD AUTO: 292 K/UL (ref 135–450)
PMV BLD AUTO: 7.7 FL (ref 5–10.5)
POTASSIUM SERPL-SCNC: 4.4 MMOL/L (ref 3.5–5.1)
PROT SERPL-MCNC: 7 G/DL (ref 6.4–8.2)
PROTHROMBIN TIME: 12.6 SEC (ref 11.5–14.8)
RBC # BLD AUTO: 5.09 M/UL (ref 4.2–5.9)
SODIUM SERPL-SCNC: 138 MMOL/L (ref 136–145)
WBC # BLD AUTO: 8.7 K/UL (ref 4–11)

## 2024-01-19 PROCEDURE — 85025 COMPLETE CBC W/AUTO DIFF WBC: CPT

## 2024-01-19 PROCEDURE — 6360000002 HC RX W HCPCS: Performed by: STUDENT IN AN ORGANIZED HEALTH CARE EDUCATION/TRAINING PROGRAM

## 2024-01-19 PROCEDURE — 93925 LOWER EXTREMITY STUDY: CPT

## 2024-01-19 PROCEDURE — 96374 THER/PROPH/DIAG INJ IV PUSH: CPT

## 2024-01-19 PROCEDURE — 85730 THROMBOPLASTIN TIME PARTIAL: CPT

## 2024-01-19 PROCEDURE — 85610 PROTHROMBIN TIME: CPT

## 2024-01-19 PROCEDURE — 99284 EMERGENCY DEPT VISIT MOD MDM: CPT

## 2024-01-19 PROCEDURE — 93880 EXTRACRANIAL BILAT STUDY: CPT

## 2024-01-19 PROCEDURE — 80053 COMPREHEN METABOLIC PANEL: CPT

## 2024-01-19 RX ORDER — MORPHINE SULFATE 4 MG/ML
4 INJECTION, SOLUTION INTRAMUSCULAR; INTRAVENOUS ONCE
Status: COMPLETED | OUTPATIENT
Start: 2024-01-19 | End: 2024-01-19

## 2024-01-19 RX ADMIN — MORPHINE SULFATE 4 MG: 4 INJECTION, SOLUTION INTRAMUSCULAR; INTRAVENOUS at 16:46

## 2024-01-19 ASSESSMENT — PAIN DESCRIPTION - ONSET: ONSET: ON-GOING

## 2024-01-19 ASSESSMENT — PAIN DESCRIPTION - LOCATION: LOCATION: LEG;FOOT

## 2024-01-19 ASSESSMENT — PAIN DESCRIPTION - FREQUENCY: FREQUENCY: CONTINUOUS

## 2024-01-19 ASSESSMENT — PAIN DESCRIPTION - ORIENTATION: ORIENTATION: RIGHT;LOWER

## 2024-01-19 ASSESSMENT — PAIN SCALES - GENERAL
PAINLEVEL_OUTOF10: 8
PAINLEVEL_OUTOF10: 8
PAINLEVEL_OUTOF10: 4

## 2024-01-19 ASSESSMENT — PAIN - FUNCTIONAL ASSESSMENT: PAIN_FUNCTIONAL_ASSESSMENT: ACTIVITIES ARE NOT PREVENTED

## 2024-01-19 ASSESSMENT — PAIN DESCRIPTION - PAIN TYPE: TYPE: ACUTE PAIN;CHRONIC PAIN

## 2024-01-19 ASSESSMENT — PAIN DESCRIPTION - DESCRIPTORS: DESCRIPTORS: BURNING

## 2024-01-19 NOTE — DISCHARGE INSTRUCTIONS
Call Dr. Richard's office on Monday to discuss her upcoming angiogram.  Take your medications as prescribed.  Return if you develop any new or worsening symptoms

## 2024-01-19 NOTE — ED PROVIDER NOTES
Height Weight   (!) 155/81 97.5 °F (36.4 °C) Oral 86 16 97 % -- --     Physical Exam  Vitals and nursing note reviewed.   Constitutional:       General: He is not in acute distress.     Appearance: He is not ill-appearing or toxic-appearing.   HENT:      Head: Normocephalic and atraumatic.      Right Ear: External ear normal.      Left Ear: External ear normal.      Nose: Nose normal.   Eyes:      Conjunctiva/sclera: Conjunctivae normal.   Cardiovascular:      Rate and Rhythm: Normal rate and regular rhythm.      Pulses: Normal pulses.      Heart sounds: No murmur heard.  Pulmonary:      Effort: Pulmonary effort is normal. No respiratory distress.   Musculoskeletal:      Comments: Patient has a below-knee amputation on the left lower extremity, he has a right lower extremity that only has a palpable femoral pulses 1+, no palpable PT or DP pulse.   Skin:     General: Skin is warm and dry.      Capillary Refill: Capillary refill takes less than 2 seconds.   Neurological:      Mental Status: He is alert.      Deep Tendon Reflexes: Babinski sign absent on the right side.   Psychiatric:         Mood and Affect: Mood normal.           FORMAL DIAGNOSTIC RESULTS     EKG: All EKG's are interpreted by the Emergency Department Physician who either signs or Co-signs this chart in the absence of a cardiologist.    RADIOLOGY:   Non-plain film images such as CT, Ultrasound and MRI are read by the radiologist. Plainradiographic images are visualized and preliminarily interpreted by the  ED Provider with the belowfindings:    Interpretation per the Radiologist below, if available at the time of this note:    No orders to display     VL DUP CAROTID BILATERAL    Result Date: 1/19/2024  Vascular Carotid Procedure -- PRELIMINARY SONOGRAPHER REPORT --   Demographics   Patient Name       HUNTER EVANS   Date of Study      01/19/2024         Gender              Male   Patient Number     [id number]         Date of Birth       1955

## 2024-01-22 ENCOUNTER — TELEPHONE (OUTPATIENT)
Dept: CARDIOLOGY CLINIC | Age: 69
End: 2024-01-22

## 2024-01-22 DIAGNOSIS — R93.6 ABNORMAL FINDINGS ON DIAGNOSTIC IMAGING OF LIMBS: ICD-10-CM

## 2024-01-22 DIAGNOSIS — I73.9 PAD (PERIPHERAL ARTERY DISEASE) (HCC): Primary | ICD-10-CM

## 2024-01-22 NOTE — TELEPHONE ENCOUNTER
Stacy,   Can we please schedule this patient for peripheral angiogram of the right leg with possible interventions with Dr. Richard?  Thanks!       Please go for blood work one week prior to your procedure.       The morning of your procedure you will park in the hospital parking lot and report directly to the cath lab to check in.     Pre-Procedure Instructions   You will need to fast for at least 8 hours prior to procedure. No caffeine the morning of.   Hold all diabetic medications including, Metfomin.  If you take Lantus/Levemir only take ½ your normal dose the evening before.  All other medications can be taken in the morning with sips of water.   You will need to take 325 mg aspirin the morning of.  If you are currently taking 81 mg please take 4 tablets that morning.   Do not use any lotions, creams or perfume the morning of procedure.   Pre-procedure lab work will need to be completed 5-7 days prior to procedure.   Please have a responsible adult to drive you home after procedure. We advise you have someone to stay with you for 24 hours following procedure for precautionary measures. Depending on procedure you may require an overnight stay.   Cath lab will provide you with all post procedure instructions.     If you have any questions regarding the procedure itself or medications, please call [phone] and ask to speak with a nurse.

## 2024-01-22 NOTE — TELEPHONE ENCOUNTER
Patient was in the ED 1/19/2024 for peripheral artery disease/abnormal arterial doppler, no EKG was obtained. Consult with cardiology is noted in emergency medicine provider's note.   Please clarify what is needed.

## 2024-01-22 NOTE — TELEPHONE ENCOUNTER
Date of Procedure: Tuesday 1/23/24 @ [facility] with Dr. Richard    Time of arrival: 12:00 pm    Procedure time: 1:30 pm    Spoke to Harvey and he is agreeable to date and time. Reviewed instructions and he verbalized understanding. Encouraged to call with any questions or concerns.     Published on [employer] and e-mail to Nakita.

## 2024-01-22 NOTE — TELEPHONE ENCOUNTER
Keri from [facility] in [place] called  Stating she needs a copy of patients EKG and a cardiology note. Harvey was in the hospital Friday.    Harvey's call back: [phone]    Mando fax: [phone]    Mando callback: [phone]

## 2024-01-23 ENCOUNTER — HOSPITAL ENCOUNTER (INPATIENT)
Dept: CARDIAC CATH/INVASIVE PROCEDURES | Age: 69
LOS: 2 days | Discharge: HOME | DRG: 272 | End: 2024-01-25
Attending: INTERNAL MEDICINE | Admitting: INTERNAL MEDICINE
Payer: MEDICARE

## 2024-01-23 LAB — FIBRINOGEN PPP-MCNC: 408 MG/DL (ref 243–550)

## 2024-01-23 PROCEDURE — C1725 CATH, TRANSLUMIN NON-LASER: HCPCS | Performed by: INTERNAL MEDICINE

## 2024-01-23 PROCEDURE — 3E03317 INTRODUCTION OF OTHER THROMBOLYTIC INTO PERIPHERAL VEIN, PERCUTANEOUS APPROACH: ICD-10-PCS | Performed by: INTERNAL MEDICINE

## 2024-01-23 PROCEDURE — 04CM3ZZ EXTIRPATION OF MATTER FROM RIGHT POPLITEAL ARTERY, PERCUTANEOUS APPROACH: ICD-10-PCS | Performed by: INTERNAL MEDICINE

## 2024-01-23 PROCEDURE — 2580000003 HC RX 258: Performed by: INTERNAL MEDICINE

## 2024-01-23 PROCEDURE — 6360000004 HC RX CONTRAST MEDICATION: Performed by: INTERNAL MEDICINE

## 2024-01-23 PROCEDURE — 047M3ZZ DILATION OF RIGHT POPLITEAL ARTERY, PERCUTANEOUS APPROACH: ICD-10-PCS | Performed by: INTERNAL MEDICINE

## 2024-01-23 PROCEDURE — 6360000002 HC RX W HCPCS: Performed by: INTERNAL MEDICINE

## 2024-01-23 PROCEDURE — 2100000000 HC CCU R&B

## 2024-01-23 PROCEDURE — C1884 EMBOLIZATION PROTECT SYST: HCPCS | Performed by: INTERNAL MEDICINE

## 2024-01-23 PROCEDURE — 75710 ARTERY X-RAYS ARM/LEG: CPT | Performed by: INTERNAL MEDICINE

## 2024-01-23 PROCEDURE — 37211 THROMBOLYTIC ART THERAPY: CPT

## 2024-01-23 PROCEDURE — 047K3ZZ DILATION OF RIGHT FEMORAL ARTERY, PERCUTANEOUS APPROACH: ICD-10-PCS | Performed by: INTERNAL MEDICINE

## 2024-01-23 PROCEDURE — 37211 THROMBOLYTIC ART THERAPY: CPT | Performed by: INTERNAL MEDICINE

## 2024-01-23 PROCEDURE — 75774 ARTERY X-RAY EACH VESSEL: CPT

## 2024-01-23 PROCEDURE — 99153 MOD SED SAME PHYS/QHP EA: CPT

## 2024-01-23 PROCEDURE — 2709999900 HC NON-CHARGEABLE SUPPLY: Performed by: INTERNAL MEDICINE

## 2024-01-23 PROCEDURE — 04CP3ZZ EXTIRPATION OF MATTER FROM RIGHT ANTERIOR TIBIAL ARTERY, PERCUTANEOUS APPROACH: ICD-10-PCS | Performed by: INTERNAL MEDICINE

## 2024-01-23 PROCEDURE — 047P3ZZ DILATION OF RIGHT ANTERIOR TIBIAL ARTERY, PERCUTANEOUS APPROACH: ICD-10-PCS | Performed by: INTERNAL MEDICINE

## 2024-01-23 PROCEDURE — 6360000002 HC RX W HCPCS

## 2024-01-23 PROCEDURE — 2720000010 HC SURG SUPPLY STERILE: Performed by: INTERNAL MEDICINE

## 2024-01-23 PROCEDURE — 75710 ARTERY X-RAYS ARM/LEG: CPT

## 2024-01-23 PROCEDURE — B41FYZZ FLUOROSCOPY OF RIGHT LOWER EXTREMITY ARTERIES USING OTHER CONTRAST: ICD-10-PCS | Performed by: INTERNAL MEDICINE

## 2024-01-23 PROCEDURE — 37224 PR REVSC OPN/PRG FEM/POP W/ANGIOPLASTY UNI: CPT | Performed by: INTERNAL MEDICINE

## 2024-01-23 PROCEDURE — C1887 CATHETER, GUIDING: HCPCS | Performed by: INTERNAL MEDICINE

## 2024-01-23 PROCEDURE — 36247 INS CATH ABD/L-EXT ART 3RD: CPT

## 2024-01-23 PROCEDURE — C1757 CATH, THROMBECTOMY/EMBOLECT: HCPCS | Performed by: INTERNAL MEDICINE

## 2024-01-23 PROCEDURE — 2500000003 HC RX 250 WO HCPCS

## 2024-01-23 PROCEDURE — 37184 PRIM ART M-THRMBC 1ST VSL: CPT

## 2024-01-23 PROCEDURE — C1769 GUIDE WIRE: HCPCS | Performed by: INTERNAL MEDICINE

## 2024-01-23 PROCEDURE — 37184 PRIM ART M-THRMBC 1ST VSL: CPT | Performed by: INTERNAL MEDICINE

## 2024-01-23 PROCEDURE — 99152 MOD SED SAME PHYS/QHP 5/>YRS: CPT

## 2024-01-23 PROCEDURE — 37228 PR REVSC OPN/PRQ TIB/PERO W/ANGIOPLASTY UNI: CPT | Performed by: INTERNAL MEDICINE

## 2024-01-23 PROCEDURE — C1894 INTRO/SHEATH, NON-LASER: HCPCS | Performed by: INTERNAL MEDICINE

## 2024-01-23 PROCEDURE — 37185 PRIM ART M-THRMBC SBSQ VSL: CPT

## 2024-01-23 PROCEDURE — 85384 FIBRINOGEN ACTIVITY: CPT

## 2024-01-23 PROCEDURE — 04CK3ZZ EXTIRPATION OF MATTER FROM RIGHT FEMORAL ARTERY, PERCUTANEOUS APPROACH: ICD-10-PCS | Performed by: INTERNAL MEDICINE

## 2024-01-23 PROCEDURE — 94760 N-INVAS EAR/PLS OXIMETRY 1: CPT

## 2024-01-23 RX ORDER — MORPHINE SULFATE 2 MG/ML
2 INJECTION, SOLUTION INTRAMUSCULAR; INTRAVENOUS
Status: DISCONTINUED | OUTPATIENT
Start: 2024-01-23 | End: 2024-01-23

## 2024-01-23 RX ORDER — SODIUM CHLORIDE 9 MG/ML
INJECTION, SOLUTION INTRAVENOUS CONTINUOUS
Status: DISCONTINUED | OUTPATIENT
Start: 2024-01-23 | End: 2024-01-25 | Stop reason: HOSPADM

## 2024-01-23 RX ORDER — HEPARIN SODIUM AND DEXTROSE 10000; 5 [USP'U]/100ML; G/100ML
500 INJECTION INTRAVENOUS CONTINUOUS
Status: DISCONTINUED | OUTPATIENT
Start: 2024-01-23 | End: 2024-01-25 | Stop reason: HOSPADM

## 2024-01-23 RX ORDER — MORPHINE SULFATE 2 MG/ML
2 INJECTION, SOLUTION INTRAMUSCULAR; INTRAVENOUS
Status: DISCONTINUED | OUTPATIENT
Start: 2024-01-23 | End: 2024-01-24

## 2024-01-23 RX ORDER — SODIUM CHLORIDE 9 MG/ML
INJECTION, SOLUTION INTRAVENOUS PRN
Status: DISCONTINUED | OUTPATIENT
Start: 2024-01-23 | End: 2024-01-25 | Stop reason: HOSPADM

## 2024-01-23 RX ORDER — SODIUM CHLORIDE 0.9 % (FLUSH) 0.9 %
5-40 SYRINGE (ML) INJECTION PRN
Status: DISCONTINUED | OUTPATIENT
Start: 2024-01-23 | End: 2024-01-25 | Stop reason: HOSPADM

## 2024-01-23 RX ORDER — ASPIRIN 325 MG
325 TABLET ORAL ONCE
Status: DISCONTINUED | OUTPATIENT
Start: 2024-01-23 | End: 2024-01-25 | Stop reason: HOSPADM

## 2024-01-23 RX ORDER — SODIUM CHLORIDE 0.9 % (FLUSH) 0.9 %
5-40 SYRINGE (ML) INJECTION EVERY 12 HOURS SCHEDULED
Status: DISCONTINUED | OUTPATIENT
Start: 2024-01-23 | End: 2024-01-25 | Stop reason: HOSPADM

## 2024-01-23 RX ADMIN — SODIUM CHLORIDE: 9 INJECTION, SOLUTION INTRAVENOUS at 12:39

## 2024-01-23 RX ADMIN — SODIUM CHLORIDE: 9 INJECTION, SOLUTION INTRAVENOUS at 16:06

## 2024-01-23 RX ADMIN — ALTEPLASE 1 MG/HR: 2.2 INJECTION, POWDER, LYOPHILIZED, FOR SOLUTION INTRAVENOUS at 15:40

## 2024-01-23 RX ADMIN — HYDROMORPHONE HYDROCHLORIDE 1 MG: 1 INJECTION, SOLUTION INTRAMUSCULAR; INTRAVENOUS; SUBCUTANEOUS at 22:41

## 2024-01-23 RX ADMIN — MORPHINE SULFATE 2 MG: 2 INJECTION, SOLUTION INTRAMUSCULAR; INTRAVENOUS at 16:11

## 2024-01-23 RX ADMIN — IOPAMIDOL 100 ML: 755 INJECTION, SOLUTION INTRAVENOUS at 15:25

## 2024-01-23 RX ADMIN — HEPARIN SODIUM 500 UNITS/HR: 10000 INJECTION, SOLUTION INTRAVENOUS at 16:09

## 2024-01-23 RX ADMIN — MORPHINE SULFATE 2 MG: 2 INJECTION, SOLUTION INTRAMUSCULAR; INTRAVENOUS at 20:30

## 2024-01-23 RX ADMIN — HYDROMORPHONE HYDROCHLORIDE 1 MG: 1 INJECTION, SOLUTION INTRAMUSCULAR; INTRAVENOUS; SUBCUTANEOUS at 18:53

## 2024-01-23 RX ADMIN — Medication 10 ML: at 20:30

## 2024-01-23 ASSESSMENT — PAIN - FUNCTIONAL ASSESSMENT
PAIN_FUNCTIONAL_ASSESSMENT: PREVENTS OR INTERFERES SOME ACTIVE ACTIVITIES AND ADLS
PAIN_FUNCTIONAL_ASSESSMENT: ACTIVITIES ARE NOT PREVENTED
PAIN_FUNCTIONAL_ASSESSMENT: PREVENTS OR INTERFERES WITH MANY ACTIVE NOT PASSIVE ACTIVITIES
PAIN_FUNCTIONAL_ASSESSMENT: ACTIVITIES ARE NOT PREVENTED

## 2024-01-23 ASSESSMENT — PAIN DESCRIPTION - PAIN TYPE
TYPE: ACUTE PAIN;CHRONIC PAIN
TYPE: ACUTE PAIN

## 2024-01-23 ASSESSMENT — PAIN DESCRIPTION - LOCATION
LOCATION: FOOT
LOCATION: FOOT;LEG
LOCATION: FOOT

## 2024-01-23 ASSESSMENT — PAIN DESCRIPTION - DESCRIPTORS
DESCRIPTORS: ACHING;BURNING
DESCRIPTORS: ACHING;BURNING
DESCRIPTORS: BURNING;ACHING
DESCRIPTORS: BURNING
DESCRIPTORS: ACHING;SHARP

## 2024-01-23 ASSESSMENT — PAIN DESCRIPTION - ORIENTATION
ORIENTATION: RIGHT

## 2024-01-23 ASSESSMENT — PAIN DESCRIPTION - FREQUENCY
FREQUENCY: CONTINUOUS
FREQUENCY: CONTINUOUS

## 2024-01-23 ASSESSMENT — PAIN SCALES - GENERAL
PAINLEVEL_OUTOF10: 10
PAINLEVEL_OUTOF10: 6
PAINLEVEL_OUTOF10: 9
PAINLEVEL_OUTOF10: 8
PAINLEVEL_OUTOF10: 5
PAINLEVEL_OUTOF10: 10
PAINLEVEL_OUTOF10: 8
PAINLEVEL_OUTOF10: 9
PAINLEVEL_OUTOF10: 10
PAINLEVEL_OUTOF10: 10

## 2024-01-23 ASSESSMENT — PAIN DESCRIPTION - ONSET
ONSET: ON-GOING
ONSET: ON-GOING

## 2024-01-23 ASSESSMENT — PAIN DESCRIPTION - DIRECTION: RADIATING_TOWARDS: FOOT

## 2024-01-23 NOTE — PLAN OF CARE
Problem: Discharge Planning  Goal: Discharge to home or other facility with appropriate resources  Outcome: Progressing  Flowsheets (Taken 1/23/2024 1545)  Discharge to home or other facility with appropriate resources:   Identify barriers to discharge with patient and caregiver   Arrange for needed discharge resources and transportation as appropriate   Identify discharge learning needs (meds, wound care, etc)   Arrange for interpreters to assist at discharge as needed   Refer to discharge planning if patient needs post-hospital services based on physician order or complex needs related to functional status, cognitive ability or social support system     Problem: Pain  Goal: Verbalizes/displays adequate comfort level or baseline comfort level  Outcome: Progressing  Flowsheets (Taken 1/23/2024 1545)  Verbalizes/displays adequate comfort level or baseline comfort level:   Encourage patient to monitor pain and request assistance   Assess pain using appropriate pain scale   Administer analgesics based on type and severity of pain and evaluate response   Implement non-pharmacological measures as appropriate and evaluate response   Notify Licensed Independent Practitioner if interventions unsuccessful or patient reports new pain   Consider cultural and social influences on pain and pain management     Problem: Skin/Tissue Integrity  Goal: Absence of new skin breakdown  Description: 1.  Monitor for areas of redness and/or skin breakdown  2.  Assess vascular access sites hourly  3.  Every 4-6 hours minimum:  Change oxygen saturation probe site  4.  Every 4-6 hours:  If on nasal continuous positive airway pressure, respiratory therapy assess nares and determine need for appliance change or resting period.  Outcome: Progressing     Problem: Safety - Adult  Goal: Free from fall injury  Outcome: Progressing     Problem: ABCDS Injury Assessment  Goal: Absence of physical injury  Outcome: Progressing

## 2024-01-23 NOTE — DISCHARGE INSTRUCTIONS
PERIPHERAL ANGIOGRAM    Care of your puncture site:  Remove bandage 24 hours after the procedure.  May shower in 24 hours but do not sit in a bathtub/pool of water for 5 days or until the wound is healed.  Gently clean groin using soap and water.  Dry thoroughly and apply a Band-Aid that covers the entire site. Use Band-Aid until skin heals over in about 3-5 days.  Do not apply powder or lotion.      Normal Observations:  Soreness or tenderness which may last one week.  Mild oozing from the incision site.  Possible bruising that could last 2 weeks.    Activity:  You may resume driving 24 hours following the procedure.  Do not make important / legal decisions within 24 hours after procedure.  You may resume normal activity in 5 days or after the wound heals.  Avoid lifting more than 10 pounds for 5 days or until the wound heals.  Avoid strenuous exercise or activity for 1 week.      Nutrition:  Regular diet or previous diet.  Drink at least 8 to 10 glasses of decaffeinated, non-alcoholic fluid for the next 24 hours to flush the x-ray dye used for your angiogram out of your body.    Call your doctor immediately if your condition worsens, for any other concerns, for a follow-up appointment or if you experience any of the [phone]  Increased swelling on the groin or leg.  Unusual pain, numbness, or tingling of the groin or down the leg.  Any signs of infection such as: redness, yellow drainage at the site, swelling or pain.    IF GROIN STARTS BLEEDING SIGNIFICANTLY:   LAY FLAT, HOLD FIRM DIRECT PRESSURE, AND CALL 911    Other Instructions:  Hold Metformin or Metformin containing drugs for 48 hours after procedure.

## 2024-01-23 NOTE — PROGRESS NOTES
PRE-PROCEDURE      DATE: 1/23/2024 ARRIVAL TO CATH LAB 1157 am    ADMIT SOURCE: Outpatient    ID & ALLERGY BAND: On    CONSENT: Yes    NPO SINCE: Midnight    PULSES - see flowsheet    BLEEDING PROBLEMS: No    LAST DOSE (if applicable):  ASA: 1/23/24    P2Y12 (Plavix, Effient, Brilinta): 01/23/24    OTHER MEDICATIONS TAKEN AT HOME:  See home meds list      MEDICATION COMPLIANCE: Yes

## 2024-01-24 ENCOUNTER — TELEPHONE (OUTPATIENT)
Dept: CARDIOLOGY CLINIC | Age: 69
End: 2024-01-24

## 2024-01-24 LAB
ANION GAP SERPL CALCULATED.3IONS-SCNC: 11 MMOL/L (ref 3–16)
APTT BLD: 38.8 SEC (ref 22.7–35.9)
BASOPHILS # BLD: 0.1 K/UL (ref 0–0.2)
BASOPHILS NFR BLD: 0.7 %
BUN SERPL-MCNC: 15 MG/DL (ref 7–20)
CALCIUM SERPL-MCNC: 8.6 MG/DL (ref 8.3–10.6)
CHLORIDE SERPL-SCNC: 105 MMOL/L (ref 99–110)
CO2 SERPL-SCNC: 24 MMOL/L (ref 21–32)
CREAT SERPL-MCNC: 1.1 MG/DL (ref 0.8–1.3)
DEPRECATED RDW RBC AUTO: 15.9 % (ref 12.4–15.4)
EOSINOPHIL # BLD: 0.1 K/UL (ref 0–0.6)
EOSINOPHIL NFR BLD: 0.7 %
FIBRINOGEN PPP-MCNC: 367 MG/DL (ref 243–550)
GFR SERPLBLD CREATININE-BSD FMLA CKD-EPI: >60 ML/MIN/{1.73_M2}
GLUCOSE SERPL-MCNC: 168 MG/DL (ref 70–99)
HCT VFR BLD AUTO: 37.4 % (ref 40.5–52.5)
HGB BLD-MCNC: 12.9 G/DL (ref 13.5–17.5)
LYMPHOCYTES # BLD: 1 K/UL (ref 1–5.1)
LYMPHOCYTES NFR BLD: 8.5 %
MCH RBC QN AUTO: 27.9 PG (ref 26–34)
MCHC RBC AUTO-ENTMCNC: 34.4 G/DL (ref 31–36)
MCV RBC AUTO: 81.1 FL (ref 80–100)
MONOCYTES # BLD: 0.8 K/UL (ref 0–1.3)
MONOCYTES NFR BLD: 6.9 %
NEUTROPHILS # BLD: 10.2 K/UL (ref 1.7–7.7)
NEUTROPHILS NFR BLD: 83.2 %
PLATELET # BLD AUTO: 266 K/UL (ref 135–450)
PMV BLD AUTO: 7.5 FL (ref 5–10.5)
POTASSIUM SERPL-SCNC: 4.5 MMOL/L (ref 3.5–5.1)
RBC # BLD AUTO: 4.61 M/UL (ref 4.2–5.9)
SODIUM SERPL-SCNC: 140 MMOL/L (ref 136–145)
WBC # BLD AUTO: 12.2 K/UL (ref 4–11)

## 2024-01-24 PROCEDURE — 2100000000 HC CCU R&B

## 2024-01-24 PROCEDURE — 80048 BASIC METABOLIC PNL TOTAL CA: CPT

## 2024-01-24 PROCEDURE — 2580000003 HC RX 258: Performed by: INTERNAL MEDICINE

## 2024-01-24 PROCEDURE — 85384 FIBRINOGEN ACTIVITY: CPT

## 2024-01-24 PROCEDURE — 6360000002 HC RX W HCPCS: Performed by: NURSE PRACTITIONER

## 2024-01-24 PROCEDURE — 85730 THROMBOPLASTIN TIME PARTIAL: CPT

## 2024-01-24 PROCEDURE — 94760 N-INVAS EAR/PLS OXIMETRY 1: CPT

## 2024-01-24 PROCEDURE — 6360000002 HC RX W HCPCS: Performed by: INTERNAL MEDICINE

## 2024-01-24 PROCEDURE — 85025 COMPLETE CBC W/AUTO DIFF WBC: CPT

## 2024-01-24 PROCEDURE — 6370000000 HC RX 637 (ALT 250 FOR IP): Performed by: INTERNAL MEDICINE

## 2024-01-24 RX ORDER — SENNOSIDES A AND B 8.6 MG/1
1 TABLET, FILM COATED ORAL 2 TIMES DAILY
Status: DISCONTINUED | OUTPATIENT
Start: 2024-01-24 | End: 2024-01-25 | Stop reason: HOSPADM

## 2024-01-24 RX ORDER — ONDANSETRON 2 MG/ML
4 INJECTION INTRAMUSCULAR; INTRAVENOUS EVERY 6 HOURS PRN
Status: DISCONTINUED | OUTPATIENT
Start: 2024-01-24 | End: 2024-01-25 | Stop reason: HOSPADM

## 2024-01-24 RX ORDER — POLYETHYLENE GLYCOL 3350 17 G/17G
17 POWDER, FOR SOLUTION ORAL DAILY
Status: DISCONTINUED | OUTPATIENT
Start: 2024-01-24 | End: 2024-01-25 | Stop reason: HOSPADM

## 2024-01-24 RX ORDER — DOCUSATE SODIUM 100 MG/1
100 CAPSULE, LIQUID FILLED ORAL 2 TIMES DAILY
Status: DISCONTINUED | OUTPATIENT
Start: 2024-01-24 | End: 2024-01-25 | Stop reason: HOSPADM

## 2024-01-24 RX ORDER — OXYCODONE HYDROCHLORIDE AND ACETAMINOPHEN 5; 325 MG/1; MG/1
2 TABLET ORAL EVERY 6 HOURS PRN
Status: DISCONTINUED | OUTPATIENT
Start: 2024-01-24 | End: 2024-01-25 | Stop reason: HOSPADM

## 2024-01-24 RX ORDER — CALCIUM CARBONATE 500 MG/1
500 TABLET, CHEWABLE ORAL 3 TIMES DAILY PRN
Status: DISCONTINUED | OUTPATIENT
Start: 2024-01-24 | End: 2024-01-25 | Stop reason: HOSPADM

## 2024-01-24 RX ORDER — MORPHINE SULFATE 4 MG/ML
4 INJECTION, SOLUTION INTRAMUSCULAR; INTRAVENOUS
Status: DISCONTINUED | OUTPATIENT
Start: 2024-01-24 | End: 2024-01-25 | Stop reason: HOSPADM

## 2024-01-24 RX ORDER — AMLODIPINE BESYLATE 5 MG/1
5 TABLET ORAL DAILY
Status: DISCONTINUED | OUTPATIENT
Start: 2024-01-24 | End: 2024-01-25 | Stop reason: HOSPADM

## 2024-01-24 RX ADMIN — MORPHINE SULFATE 4 MG: 4 INJECTION, SOLUTION INTRAMUSCULAR; INTRAVENOUS at 15:31

## 2024-01-24 RX ADMIN — ONDANSETRON 4 MG: 2 INJECTION INTRAMUSCULAR; INTRAVENOUS at 17:08

## 2024-01-24 RX ADMIN — ALTEPLASE 1 MG/HR: 2.2 INJECTION, POWDER, LYOPHILIZED, FOR SOLUTION INTRAVENOUS at 01:13

## 2024-01-24 RX ADMIN — OXYCODONE AND ACETAMINOPHEN 2 TABLET: 5; 325 TABLET ORAL at 10:51

## 2024-01-24 RX ADMIN — MORPHINE SULFATE 4 MG: 4 INJECTION, SOLUTION INTRAMUSCULAR; INTRAVENOUS at 03:45

## 2024-01-24 RX ADMIN — AMLODIPINE BESYLATE 5 MG: 5 TABLET ORAL at 12:20

## 2024-01-24 RX ADMIN — OXYCODONE AND ACETAMINOPHEN 2 TABLET: 5; 325 TABLET ORAL at 17:08

## 2024-01-24 RX ADMIN — ANTACID TABLETS 500 MG: 500 TABLET, CHEWABLE ORAL at 15:53

## 2024-01-24 RX ADMIN — MORPHINE SULFATE 4 MG: 4 INJECTION, SOLUTION INTRAMUSCULAR; INTRAVENOUS at 07:39

## 2024-01-24 RX ADMIN — METOPROLOL TARTRATE 25 MG: 25 TABLET, FILM COATED ORAL at 12:20

## 2024-01-24 RX ADMIN — Medication 10 ML: at 09:25

## 2024-01-24 RX ADMIN — HYDROMORPHONE HYDROCHLORIDE 1 MG: 1 INJECTION, SOLUTION INTRAMUSCULAR; INTRAVENOUS; SUBCUTANEOUS at 02:34

## 2024-01-24 RX ADMIN — HYDROMORPHONE HYDROCHLORIDE 1 MG: 1 INJECTION, SOLUTION INTRAMUSCULAR; INTRAVENOUS; SUBCUTANEOUS at 18:22

## 2024-01-24 RX ADMIN — ONDANSETRON 4 MG: 2 INJECTION INTRAMUSCULAR; INTRAVENOUS at 02:54

## 2024-01-24 RX ADMIN — DOCUSATE SODIUM 100 MG: 100 CAPSULE, LIQUID FILLED ORAL at 22:01

## 2024-01-24 RX ADMIN — HYDROMORPHONE HYDROCHLORIDE 1 MG: 1 INJECTION, SOLUTION INTRAMUSCULAR; INTRAVENOUS; SUBCUTANEOUS at 09:54

## 2024-01-24 RX ADMIN — MORPHINE SULFATE 4 MG: 4 INJECTION, SOLUTION INTRAMUSCULAR; INTRAVENOUS at 20:33

## 2024-01-24 RX ADMIN — SODIUM CHLORIDE: 9 INJECTION, SOLUTION INTRAVENOUS at 17:11

## 2024-01-24 RX ADMIN — SENNOSIDES 8.6 MG: 8.6 TABLET, FILM COATED ORAL at 20:35

## 2024-01-24 RX ADMIN — HYDROMORPHONE HYDROCHLORIDE 1 MG: 1 INJECTION, SOLUTION INTRAMUSCULAR; INTRAVENOUS; SUBCUTANEOUS at 13:56

## 2024-01-24 RX ADMIN — MORPHINE SULFATE 4 MG: 4 INJECTION, SOLUTION INTRAMUSCULAR; INTRAVENOUS at 12:07

## 2024-01-24 RX ADMIN — MORPHINE SULFATE 2 MG: 2 INJECTION, SOLUTION INTRAMUSCULAR; INTRAVENOUS at 00:21

## 2024-01-24 RX ADMIN — Medication 10 ML: at 22:51

## 2024-01-24 RX ADMIN — ALTEPLASE 1 MG/HR: 2.2 INJECTION, POWDER, LYOPHILIZED, FOR SOLUTION INTRAVENOUS at 15:31

## 2024-01-24 RX ADMIN — ONDANSETRON 4 MG: 2 INJECTION INTRAMUSCULAR; INTRAVENOUS at 11:01

## 2024-01-24 RX ADMIN — METOPROLOL TARTRATE 25 MG: 25 TABLET, FILM COATED ORAL at 20:34

## 2024-01-24 RX ADMIN — HYDROMORPHONE HYDROCHLORIDE 1 MG: 1 INJECTION, SOLUTION INTRAMUSCULAR; INTRAVENOUS; SUBCUTANEOUS at 22:51

## 2024-01-24 RX ADMIN — HYDROMORPHONE HYDROCHLORIDE 1 MG: 1 INJECTION, SOLUTION INTRAMUSCULAR; INTRAVENOUS; SUBCUTANEOUS at 06:21

## 2024-01-24 RX ADMIN — POLYETHYLENE GLYCOL 3350 17 G: 17 POWDER, FOR SOLUTION ORAL at 18:22

## 2024-01-24 RX ADMIN — Medication 10 ML: at 20:34

## 2024-01-24 ASSESSMENT — PAIN - FUNCTIONAL ASSESSMENT
PAIN_FUNCTIONAL_ASSESSMENT: PREVENTS OR INTERFERES SOME ACTIVE ACTIVITIES AND ADLS
PAIN_FUNCTIONAL_ASSESSMENT: PREVENTS OR INTERFERES SOME ACTIVE ACTIVITIES AND ADLS
PAIN_FUNCTIONAL_ASSESSMENT: PREVENTS OR INTERFERES WITH ALL ACTIVE AND SOME PASSIVE ACTIVITIES
PAIN_FUNCTIONAL_ASSESSMENT: INTOLERABLE, UNABLE TO DO ANY ACTIVE OR PASSIVE ACTIVITIES
PAIN_FUNCTIONAL_ASSESSMENT: INTOLERABLE, UNABLE TO DO ANY ACTIVE OR PASSIVE ACTIVITIES
PAIN_FUNCTIONAL_ASSESSMENT: PREVENTS OR INTERFERES SOME ACTIVE ACTIVITIES AND ADLS
PAIN_FUNCTIONAL_ASSESSMENT: INTOLERABLE, UNABLE TO DO ANY ACTIVE OR PASSIVE ACTIVITIES
PAIN_FUNCTIONAL_ASSESSMENT: PREVENTS OR INTERFERES SOME ACTIVE ACTIVITIES AND ADLS
PAIN_FUNCTIONAL_ASSESSMENT: PREVENTS OR INTERFERES SOME ACTIVE ACTIVITIES AND ADLS
PAIN_FUNCTIONAL_ASSESSMENT: PREVENTS OR INTERFERES WITH ALL ACTIVE AND SOME PASSIVE ACTIVITIES

## 2024-01-24 ASSESSMENT — PAIN DESCRIPTION - DESCRIPTORS
DESCRIPTORS: THROBBING
DESCRIPTORS: ACHING
DESCRIPTORS: ACHING;THROBBING
DESCRIPTORS: BURNING
DESCRIPTORS: BURNING
DESCRIPTORS: THROBBING
DESCRIPTORS: BURNING
DESCRIPTORS: ACHING
DESCRIPTORS: BURNING
DESCRIPTORS: ACHING;SPASM
DESCRIPTORS: BURNING

## 2024-01-24 ASSESSMENT — PAIN SCALES - GENERAL
PAINLEVEL_OUTOF10: 9
PAINLEVEL_OUTOF10: 10
PAINLEVEL_OUTOF10: 9
PAINLEVEL_OUTOF10: 2
PAINLEVEL_OUTOF10: 6
PAINLEVEL_OUTOF10: 5
PAINLEVEL_OUTOF10: 9
PAINLEVEL_OUTOF10: 6
PAINLEVEL_OUTOF10: 10
PAINLEVEL_OUTOF10: 6
PAINLEVEL_OUTOF10: 5
PAINLEVEL_OUTOF10: 7
PAINLEVEL_OUTOF10: 8
PAINLEVEL_OUTOF10: 9
PAINLEVEL_OUTOF10: 0
PAINLEVEL_OUTOF10: 10
PAINLEVEL_OUTOF10: 9
PAINLEVEL_OUTOF10: 4
PAINLEVEL_OUTOF10: 4
PAINLEVEL_OUTOF10: 6
PAINLEVEL_OUTOF10: 10
PAINLEVEL_OUTOF10: 3
PAINLEVEL_OUTOF10: 9
PAINLEVEL_OUTOF10: 3
PAINLEVEL_OUTOF10: 4
PAINLEVEL_OUTOF10: 0

## 2024-01-24 ASSESSMENT — PAIN DESCRIPTION - DIRECTION
RADIATING_TOWARDS: LEG
RADIATING_TOWARDS: LEG
RADIATING_TOWARDS: RIGHT LEG
RADIATING_TOWARDS: LEG

## 2024-01-24 ASSESSMENT — PAIN DESCRIPTION - FREQUENCY
FREQUENCY: CONTINUOUS

## 2024-01-24 ASSESSMENT — PAIN DESCRIPTION - LOCATION
LOCATION: LEG;FOOT
LOCATION: LEG
LOCATION: FOOT
LOCATION: LEG;FOOT
LOCATION: LEG
LOCATION: INCISION
LOCATION: LEG
LOCATION: FOOT

## 2024-01-24 ASSESSMENT — PAIN DESCRIPTION - PAIN TYPE
TYPE: SURGICAL PAIN
TYPE: ACUTE PAIN
TYPE: SURGICAL PAIN
TYPE: ACUTE PAIN
TYPE: SURGICAL PAIN

## 2024-01-24 ASSESSMENT — PAIN DESCRIPTION - ONSET
ONSET: ON-GOING

## 2024-01-24 ASSESSMENT — PAIN DESCRIPTION - ORIENTATION
ORIENTATION: RIGHT

## 2024-01-24 NOTE — TELEPHONE ENCOUNTER
Attempted to call Danielle back to further discuss. LVM  Yes, that is correct. Patient underwent peripheral angiogram with Dr. Richard 1/23/24 at [facility].

## 2024-01-24 NOTE — OP NOTE
[facility]     Pre-operative Diagnosis:   1. Peripheral arterial disease with critical limb ischemia jose category 4    Post-operative Diagnosis:   1. Same     Procedures Performed:  - Left femoral artery access under fluroscopic and ultrasound guidance  - Left iliofemoral arteriogram.  - Right lower extremity arteriogram  - Percutaneous balloon angioplasty of right anterior tibial artery , popliteal artery, superficial femoral artery using a 4.0 mm x 150 mm balloon   - Aspiration thrombectomy of the right anterior tibial, popliteal and superficial femoral artery with prodigy8   - Percutaneous insertion of EKOS thrombolytic catheter       :  Espinoza Richard MD.    Assistant:  None .    Anesthesia:  IV sedation and local anesthesia.      Estimated Blood Loss:  5 mL.    Indications for Procedure:  Harvey Kaminski is a 68 y.o. male who was evaluated as an outpatient with chronic arterial insufficiency with rest pain of the right foot  and was deemed an appropriate candidate for an angiogram and possible intervention. Informed consent was obtained after discussion of potential benefits, alternatives and risks of the procedure, including but not limited to bleeding, infection, worsening of arterial insufficiency, and kidney injury due to contrast administration.    Details of Procedure:  The patient was taken to the cardiac cath lab and placed in supine position.  IV sedation anesthesia was administered by the anesthesia team. The operative area was clipped, prepared and draped in sterile fashion.  A brief time out was performed per hospital protocol.    The left femoral artery was accessed under fluroscopic and ultrasound guidance with a micropuncture needle, wire, then sheath. A 4-Togolese sheath was inserted over a wire. An iliofemoral angiogram was performed.  Using a JR diagnostic a right lower extremity angiogram was then performed.     Right Common Iliac:  0%  Stenosis  Right Internal

## 2024-01-24 NOTE — TELEPHONE ENCOUNTER
Spoke with Jada at [facility]. She states that they need recent EKG and office note for upcoming I&D of left stump wound 1/31/24. Cardiac clearance letter created and faxed per encounter 1/3/2024. Per Dr. Richard, okay to hold Plavix 5 days prior to procedure, continue ASA throughout the operative period. Jada inquiring if still okay to proceed and hold Plavix with patient's recent peripheral angiogram. Will advise with Dr. Richard and prepare updated clearance letter if needed.

## 2024-01-24 NOTE — TELEPHONE ENCOUNTER
Danielle from [facility] called the office wanting to obtain some information for a procedure. Danielle asked for the following:   -EKG  -OV notes  -Angiogram report    I informed that her that Harvey recently had an angiogram at [facility]. She was unaware.    Please contact to confirm if information given to Danielle is correct.     Danielle's callback: [phone]  Danielle's fax: [phone]

## 2024-01-24 NOTE — TELEPHONE ENCOUNTER
Please print and fax most recent EKG lead in chart from 5/17/2023 and office note with Dr. Richard from 12/11/2023 and fax to Jada at [facility]. [phone]  Thanks!

## 2024-01-24 NOTE — PLAN OF CARE
Problem: Pain  Goal: Verbalizes/displays adequate comfort level or baseline comfort level  Outcome: Progressing  Flowsheets (Taken 1/23/2024 2034 by Eloisa Asencio RN)  Verbalizes/displays adequate comfort level or baseline comfort level:   Encourage patient to monitor pain and request assistance   Assess pain using appropriate pain scale   Administer analgesics based on type and severity of pain and evaluate response   Implement non-pharmacological measures as appropriate and evaluate response   Notify Licensed Independent Practitioner if interventions unsuccessful or patient reports new pain  Note: Pt with consistent c/o pain this morning to RLE. PRN pain meds administered and pt repositioned for comfort. Will monitor.      Problem: Skin/Tissue Integrity  Goal: Absence of new skin breakdown  Description: 1.  Monitor for areas of redness and/or skin breakdown  2.  Assess vascular access sites hourly  3.  Every 4-6 hours minimum:  Change oxygen saturation probe site  4.  Every 4-6 hours:  If on nasal continuous positive airway pressure, respiratory therapy assess nares and determine need for appliance change or resting period.  Outcome: Progressing  Note: Pt at risk for skin breakdown. See Kip score. Pt remains on bedrest. Unable to reposition self in bed. Heels elevated off bed. Sacral heart mepilex intact to protect, site inspected and intact underneath.  Will continue to turn and reposition patient every two hours and as needed. Will continue to keep patient clean and dry, applying skin care cream as needed. Pillows used for repositioning q2hs.  Will continue to monitor and assess for skin breakdown.     Problem: Safety - Adult  Goal: Free from fall injury  Outcome: Progressing  Note: Pt is a fall risk. Fall risk protocol in place. See Tarango Fall Score. Pt bed is in low position, bed alarm is on, side rails up, fall risk bracelet applied., non-skid footwear in use. Patient/family educated on fall risk

## 2024-01-24 NOTE — H&P
H&P     History of Present Illness: The patient is 68 y.o. male with a past medical history significant for CAD s/p CABG, HLD, HTN, carotid disease, prior DVT, CVA, PAD s/p SFA stent, left BKA, s/p nephrectomy, prior nicotine addiction and alcohol abuse. He presented to [facility] 5/2020 with severe chest pain and found to have NSTEMI. He underwent heart catheterization revealing severe triple-vessel disease and subsequently underwent CABG x 4 on 5/8/20 with Dr. Robles. He did suffer from encephalopathy/delirium following bypass. He is s/p tracheostomy and PEG placement. He was discharged to [facility] for skilled nursing care. He was seen in office with Dr. Robles for follow up where it was noted that he was admitted to  for bilateral foot wounds. He underwent peripheral angiograms with intervention to both legs while inpatient at . He underwent left BKA on 10/22/20. Lower extremity dopplers showed significant stenosis and underwent angiography 2/21/21 that showed severe right distal SFA/popliteal and tibial disease. S/p successful revascularization and SFA stenting. He reported worsening right foot pain and underwent peripheral that resulted in right popliteal aneurysm s/p successful stenting with 6.0 Viabahn. He underwent carotid stent 5/2023.      Patient presents today for follow up. He states overall he is doing well. Reports his right foot has been notably swollen and red for about a week. He denies any new cardiac complaints.  He denies any chest pains or worsening shortness of breath. He reports compliance with his medications and tolerating. He denies any abnormal bleeding or bruising. Patient denies exertional chest pain/pressure, dyspnea at rest, worsening KOVACS, PND, orthopnea, palpitations, lightheadedness, weight changes, changes in LE edema, and syncope.     Past Medical History        Past Medical History:   Diagnosis Date    Acute cerebral infarction (HCC) 05/2020     R posterior frontal lobe    Alcohol  withdrawal (HCC) 2020    Bilateral carotid artery stenosis 2020     bilat 50-79% stenosis    CAD (coronary artery disease)      Hepatitis C antibody positive in blood 2020    History of bronchitis      History of DVT of lower extremity 2018     RLL, no previous scans to know whether supf or dvt. no coumadin. put on plavix.    Hypertension      NSTEMI (non-ST elevated myocardial infarction) (HCC) 2020     3VD    PAD (peripheral artery disease) ([facility])      Respiratory compromise 2020     chemical exposure at work, seen at [facility], covid neg, given steroids    S/p nephrectomy 1970     age 15, pt not sure why     Tattoos           Past Surgical History         Past Surgical History:   Procedure Laterality Date    CARDIAC CATHETERIZATION   2020     Dr. Krause - w/placement of IABP    CARDIAC CATHETERIZATION         peripheral angiogram, 22 & 22    CAROTID STENT PLACEMENT   2023    CORONARY ARTERY BYPASS GRAFT N/A 2020     Dr. Resendiz - urgent x4 (LIMA-LAD, L SVG-D1, SVG-OM, SVG-PDA)    GASTROSTOMY TUBE PLACEMENT N/A 2020     PERCUTANEOUS ENDOSCOPIC GASTROSTOMY TUBE PLACEMENT performed by Gem Wolfe MD at [facility] ENDOSCOPY    LEG AMPUTATION BELOW KNEE        STOMACH SURGERY N/A 07/10/2020     REMOVAL  GASTROSTOMY FEEDING TUBE  (90813) performed by Gem Wolfe MD at [facility] ENDOSCOPY    THORACENTESIS Left 2020     Dr. Guerin, IR - US guided, 1300 ml drained    TOTAL NEPHRECTOMY Left       16 yo - pt doesn't know why    TRACHEOSTOMY   2020     Dr. Robert - w/bronchoscopy    TRANSESOPHAGEAL ECHOCARDIOGRAM   2020     during CABG         Family History         Family History   Problem Relation Age of Onset    Heart Disease Mother            of MI age 42    Heart Disease Brother           MI mid 50s    Heart Disease Father            of MI age 77    Heart Disease Paternal Aunt           MI in her 50s         Social History

## 2024-01-24 NOTE — PROGRESS NOTES
Pt's pain and nausea has been constant throughout the day see MAR for med admin. Pt's nausea has increased as the day has advanced, and abdomen is more taut than it was this morning. Pt \"spit up\" some of his water; MD notified. See new orders for scheduled glycolax and senna. Will monitor.

## 2024-01-24 NOTE — TELEPHONE ENCOUNTER
Per Dr. Richard, patient cardiac clearance unchanged from previous encounter 1/3/24 \"low risk and okay to hold. Must continue aspirin throughout the operative period.\"   Please notify Jada at [facility] patient still cleared to proceed and hold Plavix as previously stated for upcoming procedure 1/31/24.   Thanks

## 2024-01-24 NOTE — CARE COORDINATION
01/24/24 8474   Service Assessment   Patient Orientation Alert and Oriented   Cognition Alert   History Provided By Patient;Significant Other   Primary Caregiver Other (Comment)  (sign other, Pedro)   Support Systems Spouse/Significant Other   Patient's Healthcare Decision Maker is: Named in Scanned ACP Document   PCP Verified by CM Yes  (Dr Lopez  )   Last Visit to PCP Within last 3 months   Prior Functional Level Assistance with the following:;Bathing;Dressing;Cooking;Housework;Shopping;Mobility  (pt is non ambulatory; wheelchair bound for the last three years)   Current Functional Level Assistance with the following:;Bathing;Dressing;Cooking;Housework;Shopping;Mobility   Can patient return to prior living arrangement Yes   Ability to make needs known: Good   Family able to assist with home care needs: Yes   Would you like for me to discuss the discharge plan with any other family members/significant others, and if so, who? Yes  (Pedro, sign other)   Financial Resources Medicare;Medicaid   Community Resources None   Social/Functional History   Lives With Significant other   Type of Home House   Home Layout One level   Discharge Planning   Type of Residence House   Living Arrangements Spouse/Significant Other   Current Services Prior To Admission Durable Medical Equipment   Current DME Prior to Arrival Wheelchair   Potential Assistance Needed N/A   DME Ordered? No   Potential Assistance Purchasing Medications No   Type of Home Care Services None   Patient expects to be discharged to: House   One/Two Story Residence One story   History of falls? 0   Services At/After Discharge   Transition of Care Consult (CM Consult) N/A   Services At/After Discharge None    Resource Information Provided? No   Mode of Transport at Discharge Other (see comment)  (sign heladio Vasquez will transport home)

## 2024-01-24 NOTE — CARE COORDINATION
Chart Reviewed.  Gowned and gloved to meet with pt for assessment purposes and to present initial IMM letter.  IMM letter presnted.  He wants me to speak with his sign Pedro leija outside the room.  Met with Pedro for assessment.  No needs for DC to home.    Case Management Assessment  Initial Evaluation    Date/Time of Evaluation: 1/24/2024 4:48 PM  Assessment Completed by: SARAH Dudley    If patient is discharged prior to next notation, then this note serves as note for discharge by case management.    Patient Name: Harvey Kaminski                   YOB: 1955  Diagnosis: Critical ischemia of lower extremity (HCC) [I70.229]                   Date / Time: 1/23/2024  2:13 PM    Patient Admission Status: Inpatient   Readmission Risk (Low < 19, Mod (19-27), High > 27): Readmission Risk Score: 16    Current PCP: Jolie Lopez APRN - NP  PCP verified by CM? Yes (Dr Lopez  )    Chart Reviewed: Yes      History Provided by: Patient, Significant Other  Patient Orientation: Alert and Oriented    Patient Cognition: Alert    Hospitalization in the last 30 days (Readmission):  No    If yes, Readmission Assessment in CM Navigator will be completed.    Advance Directives:      Code Status: Full Code   Patient's Primary Decision Maker is: Named in Scanned ACP Document    Primary Decision Maker: Pedro Hector - Domestic Partner - [phone]    Primary Decision Maker: Jean-Claude Kaminski - Child - [phone]    Discharge Planning:    Patient lives with: Spouse/Significant Other Type of Home: House  Primary Care Giver: Other (Comment) (sign Pedro leija)  Patient Support Systems include: Spouse/Significant Other   Current Financial resources: Medicare, Medicaid  Current community resources: None  Current services prior to admission: Durable Medical Equipment            Current DME: Wheelchair            Type of Home Care services:  None    ADLS  Prior functional level: Assistance with the following:,

## 2024-01-25 VITALS
RESPIRATION RATE: 19 BRPM | WEIGHT: 233.03 LBS | OXYGEN SATURATION: 93 % | HEIGHT: 72 IN | BODY MASS INDEX: 31.56 KG/M2 | DIASTOLIC BLOOD PRESSURE: 69 MMHG | TEMPERATURE: 97.9 F | HEART RATE: 95 BPM | SYSTOLIC BLOOD PRESSURE: 146 MMHG

## 2024-01-25 LAB
ANION GAP SERPL CALCULATED.3IONS-SCNC: 7 MMOL/L (ref 3–16)
BASOPHILS # BLD: 0.1 K/UL (ref 0–0.2)
BASOPHILS NFR BLD: 0.5 %
BUN SERPL-MCNC: 15 MG/DL (ref 7–20)
CALCIUM SERPL-MCNC: 8 MG/DL (ref 8.3–10.6)
CHLORIDE SERPL-SCNC: 106 MMOL/L (ref 99–110)
CO2 SERPL-SCNC: 25 MMOL/L (ref 21–32)
CREAT SERPL-MCNC: 1 MG/DL (ref 0.8–1.3)
DEPRECATED RDW RBC AUTO: 15.9 % (ref 12.4–15.4)
EOSINOPHIL # BLD: 0.1 K/UL (ref 0–0.6)
EOSINOPHIL NFR BLD: 0.6 %
GFR SERPLBLD CREATININE-BSD FMLA CKD-EPI: >60 ML/MIN/{1.73_M2}
GLUCOSE SERPL-MCNC: 159 MG/DL (ref 70–99)
HCT VFR BLD AUTO: 34.7 % (ref 40.5–52.5)
HGB BLD-MCNC: 11.4 G/DL (ref 13.5–17.5)
LYMPHOCYTES # BLD: 0.9 K/UL (ref 1–5.1)
LYMPHOCYTES NFR BLD: 7.3 %
MCH RBC QN AUTO: 26.9 PG (ref 26–34)
MCHC RBC AUTO-ENTMCNC: 32.9 G/DL (ref 31–36)
MCV RBC AUTO: 81.8 FL (ref 80–100)
MONOCYTES # BLD: 0.9 K/UL (ref 0–1.3)
MONOCYTES NFR BLD: 7.6 %
NEUTROPHILS # BLD: 10 K/UL (ref 1.7–7.7)
NEUTROPHILS NFR BLD: 84 %
PLATELET # BLD AUTO: 277 K/UL (ref 135–450)
PMV BLD AUTO: 7.4 FL (ref 5–10.5)
POC ACT LR: 159 SEC
POTASSIUM SERPL-SCNC: 4.5 MMOL/L (ref 3.5–5.1)
RBC # BLD AUTO: 4.24 M/UL (ref 4.2–5.9)
SODIUM SERPL-SCNC: 138 MMOL/L (ref 136–145)
WBC # BLD AUTO: 12 K/UL (ref 4–11)

## 2024-01-25 PROCEDURE — 37214 CESSJ THERAPY CATH REMOVAL: CPT

## 2024-01-25 PROCEDURE — 37799 UNLISTED PX VASCULAR SURGERY: CPT

## 2024-01-25 PROCEDURE — 6360000002 HC RX W HCPCS: Performed by: INTERNAL MEDICINE

## 2024-01-25 PROCEDURE — C1760 CLOSURE DEV, VASC: HCPCS | Performed by: INTERNAL MEDICINE

## 2024-01-25 PROCEDURE — 2500000003 HC RX 250 WO HCPCS

## 2024-01-25 PROCEDURE — 6360000004 HC RX CONTRAST MEDICATION: Performed by: INTERNAL MEDICINE

## 2024-01-25 PROCEDURE — 2580000003 HC RX 258: Performed by: INTERNAL MEDICINE

## 2024-01-25 PROCEDURE — 85347 COAGULATION TIME ACTIVATED: CPT

## 2024-01-25 PROCEDURE — 85025 COMPLETE CBC W/AUTO DIFF WBC: CPT

## 2024-01-25 PROCEDURE — 80048 BASIC METABOLIC PNL TOTAL CA: CPT

## 2024-01-25 PROCEDURE — 6360000002 HC RX W HCPCS: Performed by: NURSE PRACTITIONER

## 2024-01-25 PROCEDURE — C9764 REVASC INTRAVASC LITHOTRIPSY: HCPCS

## 2024-01-25 PROCEDURE — 6370000000 HC RX 637 (ALT 250 FOR IP): Performed by: INTERNAL MEDICINE

## 2024-01-25 PROCEDURE — C1887 CATHETER, GUIDING: HCPCS | Performed by: INTERNAL MEDICINE

## 2024-01-25 PROCEDURE — 6360000002 HC RX W HCPCS

## 2024-01-25 PROCEDURE — C1725 CATH, TRANSLUMIN NON-LASER: HCPCS | Performed by: INTERNAL MEDICINE

## 2024-01-25 PROCEDURE — 99152 MOD SED SAME PHYS/QHP 5/>YRS: CPT

## 2024-01-25 PROCEDURE — C1769 GUIDE WIRE: HCPCS | Performed by: INTERNAL MEDICINE

## 2024-01-25 PROCEDURE — 94761 N-INVAS EAR/PLS OXIMETRY MLT: CPT

## 2024-01-25 PROCEDURE — 2700000000 HC OXYGEN THERAPY PER DAY

## 2024-01-25 PROCEDURE — 99153 MOD SED SAME PHYS/QHP EA: CPT

## 2024-01-25 RX ORDER — SODIUM CHLORIDE 0.9 % (FLUSH) 0.9 %
5-40 SYRINGE (ML) INJECTION EVERY 12 HOURS SCHEDULED
Status: DISCONTINUED | OUTPATIENT
Start: 2024-01-25 | End: 2024-01-25 | Stop reason: HOSPADM

## 2024-01-25 RX ORDER — SODIUM CHLORIDE 9 MG/ML
INJECTION, SOLUTION INTRAVENOUS PRN
Status: DISCONTINUED | OUTPATIENT
Start: 2024-01-25 | End: 2024-01-25 | Stop reason: HOSPADM

## 2024-01-25 RX ORDER — DOXYCYCLINE HYCLATE 100 MG/1
100 CAPSULE ORAL 2 TIMES DAILY
COMMUNITY

## 2024-01-25 RX ORDER — LORAZEPAM 2 MG/ML
2 INJECTION INTRAMUSCULAR ONCE
Status: COMPLETED | OUTPATIENT
Start: 2024-01-25 | End: 2024-01-25

## 2024-01-25 RX ORDER — ACETAMINOPHEN 325 MG/1
650 TABLET ORAL EVERY 4 HOURS PRN
Status: DISCONTINUED | OUTPATIENT
Start: 2024-01-25 | End: 2024-01-25 | Stop reason: HOSPADM

## 2024-01-25 RX ORDER — SODIUM CHLORIDE 0.9 % (FLUSH) 0.9 %
5-40 SYRINGE (ML) INJECTION PRN
Status: DISCONTINUED | OUTPATIENT
Start: 2024-01-25 | End: 2024-01-25 | Stop reason: HOSPADM

## 2024-01-25 RX ADMIN — IOPAMIDOL 60 ML: 755 INJECTION, SOLUTION INTRAVENOUS at 13:02

## 2024-01-25 RX ADMIN — Medication 10 ML: at 09:00

## 2024-01-25 RX ADMIN — Medication 10 ML: at 00:45

## 2024-01-25 RX ADMIN — MORPHINE SULFATE 4 MG: 4 INJECTION, SOLUTION INTRAMUSCULAR; INTRAVENOUS at 01:50

## 2024-01-25 RX ADMIN — Medication 10 ML: at 04:42

## 2024-01-25 RX ADMIN — ANTACID TABLETS 500 MG: 500 TABLET, CHEWABLE ORAL at 16:17

## 2024-01-25 RX ADMIN — METOPROLOL TARTRATE 25 MG: 25 TABLET, FILM COATED ORAL at 09:09

## 2024-01-25 RX ADMIN — Medication 10 ML: at 05:35

## 2024-01-25 RX ADMIN — Medication 10 ML: at 01:50

## 2024-01-25 RX ADMIN — OXYCODONE AND ACETAMINOPHEN 2 TABLET: 5; 325 TABLET ORAL at 00:48

## 2024-01-25 RX ADMIN — HYDROMORPHONE HYDROCHLORIDE 1 MG: 1 INJECTION, SOLUTION INTRAMUSCULAR; INTRAVENOUS; SUBCUTANEOUS at 04:42

## 2024-01-25 RX ADMIN — Medication 10 ML: at 07:00

## 2024-01-25 RX ADMIN — AMLODIPINE BESYLATE 5 MG: 5 TABLET ORAL at 09:09

## 2024-01-25 RX ADMIN — OXYCODONE AND ACETAMINOPHEN 2 TABLET: 5; 325 TABLET ORAL at 07:00

## 2024-01-25 RX ADMIN — ONDANSETRON 4 MG: 2 INJECTION INTRAMUSCULAR; INTRAVENOUS at 16:25

## 2024-01-25 RX ADMIN — DOCUSATE SODIUM 100 MG: 100 CAPSULE, LIQUID FILLED ORAL at 09:09

## 2024-01-25 RX ADMIN — ONDANSETRON 4 MG: 2 INJECTION INTRAMUSCULAR; INTRAVENOUS at 07:00

## 2024-01-25 RX ADMIN — SENNOSIDES 8.6 MG: 8.6 TABLET, FILM COATED ORAL at 10:39

## 2024-01-25 RX ADMIN — LORAZEPAM 2 MG: 2 INJECTION INTRAMUSCULAR; INTRAVENOUS at 11:36

## 2024-01-25 RX ADMIN — ALTEPLASE 1 MG/HR: 2.2 INJECTION, POWDER, LYOPHILIZED, FOR SOLUTION INTRAVENOUS at 04:37

## 2024-01-25 RX ADMIN — ONDANSETRON 4 MG: 2 INJECTION INTRAMUSCULAR; INTRAVENOUS at 00:45

## 2024-01-25 ASSESSMENT — PAIN DESCRIPTION - LOCATION
LOCATION: LEG;FOOT
LOCATION: LEG;FOOT
LOCATION: FOOT;LEG
LOCATION: FOOT;LEG

## 2024-01-25 ASSESSMENT — PAIN - FUNCTIONAL ASSESSMENT
PAIN_FUNCTIONAL_ASSESSMENT: PREVENTS OR INTERFERES SOME ACTIVE ACTIVITIES AND ADLS

## 2024-01-25 ASSESSMENT — PAIN DESCRIPTION - ORIENTATION
ORIENTATION: RIGHT

## 2024-01-25 ASSESSMENT — PAIN DESCRIPTION - DESCRIPTORS
DESCRIPTORS: THROBBING

## 2024-01-25 ASSESSMENT — PAIN SCALES - GENERAL
PAINLEVEL_OUTOF10: 9
PAINLEVEL_OUTOF10: 8
PAINLEVEL_OUTOF10: 0
PAINLEVEL_OUTOF10: 9
PAINLEVEL_OUTOF10: 0
PAINLEVEL_OUTOF10: 0
PAINLEVEL_OUTOF10: 9
PAINLEVEL_OUTOF10: 0

## 2024-01-25 ASSESSMENT — PAIN DESCRIPTION - PAIN TYPE
TYPE: ACUTE PAIN

## 2024-01-25 NOTE — PROGRESS NOTES
Patient returned from cath lab. EKOS removed. Pt having confusion at times.     Electronically signed by Renu Trinidad RN on 1/25/2024 at 1:10 PM

## 2024-01-25 NOTE — PROGRESS NOTES
Late entry due to patient care.    @ 2025 to 2045 - Shift assessment completed. He's alert and oriented (x4), ST w/ occasional PVCs on the monitor, on room air, not in any distress, and w/ EKOS running through the L femoral arterial sheath. He has doppler R dorsalis pedis & R posterior tibial signals. He also has warm R thigh, leg, & foot and his RLE is flushed/ red. Plan of care for this shift was explained to him and he verbalized understanding. He is aware that he'll be NPO after midnight for the Cath Lab procedure tomorrow. He received his due medications, including PRN pain medicine (see MAR) for a 10/10 RLE \"throbbing\" pain. Call light, urinal, and bedside table are within his reach.    Electronically signed by Cindy Vernon RN on 1/25/2024 at 12:55 AM

## 2024-01-25 NOTE — PLAN OF CARE
Problem: Discharge Planning  Goal: Discharge to home or other facility with appropriate resources  Outcome: Adequate for Discharge     Problem: Pain  Goal: Verbalizes/displays adequate comfort level or baseline comfort level  1/25/2024 1733 by Renu Trinidad RN  Outcome: Adequate for Discharge  1/25/2024 0818 by Cindy Vernon RN  Outcome: Progressing  Flowsheets (Taken 1/25/2024 0056)  Verbalizes/displays adequate comfort level or baseline comfort level:   Encourage patient to monitor pain and request assistance   Assess pain using appropriate pain scale   Administer analgesics based on type and severity of pain and evaluate response   Implement non-pharmacological measures as appropriate and evaluate response   Notify Licensed Independent Practitioner if interventions unsuccessful or patient reports new pain     Problem: Skin/Tissue Integrity  Goal: Absence of new skin breakdown  Description: 1.  Monitor for areas of redness and/or skin breakdown  2.  Assess vascular access sites hourly  3.  Every 4-6 hours minimum:  Change oxygen saturation probe site  4.  Every 4-6 hours:  If on nasal continuous positive airway pressure, respiratory therapy assess nares and determine need for appliance change or resting period.  Outcome: Adequate for Discharge     Problem: Safety - Adult  Goal: Free from fall injury  Outcome: Adequate for Discharge     Problem: ABCDS Injury Assessment  Goal: Absence of physical injury  1/25/2024 1733 by Renu Trinidad RN  Outcome: Adequate for Discharge  1/25/2024 0818 by Cindy Vernon RN  Outcome: Progressing  Flowsheets (Taken 1/25/2024 0155)  Absence of Physical Injury: Implement safety measures based on patient assessment     Problem: Respiratory - Adult  Goal: Achieves optimal ventilation and oxygenation  1/25/2024 1733 by Renu Trinidad RN  Outcome: Adequate for Discharge  1/25/2024 0818 by Cindy Vernon RN  Outcome: Progressing  Flowsheets (Taken

## 2024-01-25 NOTE — PLAN OF CARE
Problem: Pain  Goal: Verbalizes/displays adequate comfort level or baseline comfort level  Outcome: Progressing  Verbalizes/displays adequate comfort level or baseline comfort level:   Encourage patient to monitor pain and request assistance   Assess pain using appropriate pain scale   Administer analgesics based on type and severity of pain and evaluate response   Implement non-pharmacological measures as appropriate and evaluate response   Notify Licensed Independent Practitioner if interventions unsuccessful or patient reports new pain     Problem: Cardiovascular - Adult  Goal: Maintains optimal cardiac output and hemodynamic stability  Outcome: Progressing  Maintains optimal cardiac output and hemodynamic stability:   Monitor blood pressure and heart rate   Monitor urine output and notify Licensed Independent Practitioner for values outside of normal range   Assess for signs of decreased cardiac output     Problem: Respiratory - Adult  Goal: Achieves optimal ventilation and oxygenation  Outcome: Progressing  Achieves optimal ventilation and oxygenation:   Assess for changes in respiratory status   Assess for changes in mentation and behavior   Position to facilitate oxygenation and minimize respiratory effort   Oxygen supplementation based on oxygen saturation or arterial blood gases   Encourage broncho-pulmonary hygiene including cough, deep breathe, incentive spirometry   Assess and instruct to report shortness of breath or any respiratory difficulty     Problem: Hematologic - Adult  Goal: Maintains hematologic stability  Outcome: Progressing  Maintains hematologic stability:   Assess for signs and symptoms of bleeding or hemorrhage   Monitor labs for bleeding or clotting disorders   Administer blood products/factors as ordered     Problem: ABCDS Injury Assessment  Goal: Absence of physical injury  Outcome: Progressing  Absence of Physical Injury: Implement safety measures based on patient  assessment    Electronically signed by Cindy Vernon RN on 1/25/2024 at 8:19 AM

## 2024-01-25 NOTE — PROGRESS NOTES
4 Eyes Skin Assessment     NAME:  Harvey Kaminski  YOB: 1955  MEDICAL RECORD NUMBER:  [id number]    The patient is being assessed for  Shift Handoff    I agree that at least one RN has performed a thorough Head to Toe Skin Assessment on the patient. ALL assessment sites listed below have been assessed.      Areas assessed by both nurses:    Head, Face, Ears, Shoulders, Back, Chest, Arms, Elbows, Hands, Sacrum. Buttock, Coccyx, Ischium, Legs. Feet and Heels, and Under Medical Devices         Does the Patient have a Wound? No noted wound(s)       Kip Prevention initiated by RN: No  Wound Care Orders initiated by RN: No    Pressure Injury (Stage 3,4, Unstageable, DTI, NWPT, and Complex wounds) if present, place Wound referral order by RN under : No    New Ostomies, if present place, Ostomy referral order under : No     Nurse 1 eSignature: Electronically signed by Cindy Vernon RN on 1/25/24 at 8:12 AM EST    **SHARE this note so that the co-signing nurse can place an eSignature**    Nurse 2 eSignature: Electronically signed by Renu Trinidad RN on 1/25/24 at 2:55 PM EST

## 2024-01-25 NOTE — PROGRESS NOTES
Discharge instructions reviewed with patient and family member.  Patient and family verbalized understanding.  All home medications have been reviewed, questions answered and patient voiced understanding. All medication side effects reviewed and patient and family verbalized understanding. Follow up appointment(s) reviewed with patient and all attempts made to schedule within 7-10 days of discharge.  Patient given prescriptions, discharge instructions, and appointment times.  Patient discharged to home with family via private car.  Taken to lobby via wheelchair.      Electronically signed by Renu Trinidad RN on 1/25/2024 at 6:30 PM

## 2024-01-25 NOTE — PROGRESS NOTES
Nursing Shift Summary    He had an uneventful night. He remains to have doppler signals on his R dorsalis pedis & R posterior tibial. His RLE is warm and has been numb (no new last night). He utilized the ordered PRN Morphine IV, Dilaudid IV, and Percocet (see MAR) to control his RLE \"throbbing\" pain. He was satisfied w/ pain control and verbalized that he had a good night's sleep last night.    Electronically signed by Cindy Vernon RN on 1/25/2024 at 8:18 AM

## 2024-01-25 NOTE — PROGRESS NOTES
Late entry due to patient care.    This RN contacted Dr. Espinoza Richard regarding patient's continued nausea despite PRN Zofran IV administration earlier around 5 PM. This RN asked Dr. Richard if MD wanted additional anti-nausea for the pt. This RN also informed Dr. Richard that patient has active bowel sounds, that he passes gas, that his abdomen is taut but soft, and that he hasn't had any BM yet today. Dr. Richard ordered to add Colace, to start tonight, in addition to previously ordered Miralax and Senna (see MAR).    Electronically signed by Cindy Vernon RN on 1/24/2024 at 11:39 PM

## 2024-01-25 NOTE — PROGRESS NOTES
Bedside shift hand-off done w/ oncoming RN Renu UMANA, who will assume pt. care. All questions were answered. This RN handed-off the pt. in a stable condition.    Electronically signed by Cindy Vernon RN on 1/25/2024 at 8:13 AM

## 2024-01-25 NOTE — PROGRESS NOTES
H&P     Interval history:  S/p POBA/aspiration thrombectomy and EKOS right SFA  DP doppler today   +abdominal distention     History of Present Illness: The patient is 68 y.o. male with a past medical history significant for CAD s/p CABG, HLD, HTN, carotid disease, prior DVT, CVA, PAD s/p SFA stent, left BKA, s/p nephrectomy, prior nicotine addiction and alcohol abuse. He presented to [facility] 5/2020 with severe chest pain and found to have NSTEMI. He underwent heart catheterization revealing severe triple-vessel disease and subsequently underwent CABG x 4 on 5/8/20 with Dr. Robles. He did suffer from encephalopathy/delirium following bypass. He is s/p tracheostomy and PEG placement. He was discharged to [place] for skilled nursing care. He was seen in office with Dr. Robles for follow up where it was noted that he was admitted to  for bilateral foot wounds. He underwent peripheral angiograms with intervention to both legs while inpatient at . He underwent left BKA on 10/22/20. Lower extremity dopplers showed significant stenosis and underwent angiography 2/21/21 that showed severe right distal SFA/popliteal and tibial disease. S/p successful revascularization and SFA stenting. He reported worsening right foot pain and underwent peripheral that resulted in right popliteal aneurysm s/p successful stenting with 6.0 Viabahn. He underwent carotid stent 5/2023.      Patient presents today for follow up. He states overall he is doing well. Reports his right foot has been notably swollen and red for about a week. He denies any new cardiac complaints.  He denies any chest pains or worsening shortness of breath. He reports compliance with his medications and tolerating. He denies any abnormal bleeding or bruising. Patient denies exertional chest pain/pressure, dyspnea at rest, worsening KOVACS, PND, orthopnea, palpitations, lightheadedness, weight changes, changes in LE edema, and syncope.     Past Medical History        Past

## 2024-02-02 ENCOUNTER — TELEPHONE (OUTPATIENT)
Dept: CARDIOLOGY CLINIC | Age: 69
End: 2024-02-02

## 2024-02-02 RX ORDER — RIVAROXABAN 2.5 MG/1
2.5 TABLET, FILM COATED ORAL 2 TIMES DAILY
Qty: 90 TABLET | Refills: 5 | Status: SHIPPED | OUTPATIENT
Start: 2024-02-02

## 2024-02-02 NOTE — TELEPHONE ENCOUNTER
Pedro called into the office to ask if Harvey needs to stay on Plavix or change to Xarelto after Harvey's hospital stay?    Please advise.    Pedro's callback: [phone]

## 2024-02-02 NOTE — TELEPHONE ENCOUNTER
Called Pedro to clarify that per Dr. Richard, patient to continue asa/plavix and begin Xarelto 2.5 mg. Pedro v/u. Xarelto prescription sent to [facility] pharmacy in [place].

## 2024-03-04 ENCOUNTER — OFFICE VISIT (OUTPATIENT)
Dept: CARDIOLOGY CLINIC | Age: 69
End: 2024-03-04

## 2024-03-04 VITALS — OXYGEN SATURATION: 96 % | SYSTOLIC BLOOD PRESSURE: 128 MMHG | HEART RATE: 80 BPM | DIASTOLIC BLOOD PRESSURE: 74 MMHG

## 2024-03-04 DIAGNOSIS — I73.9 PAD (PERIPHERAL ARTERY DISEASE) (HCC): Primary | ICD-10-CM

## 2024-03-29 RX ORDER — ATORVASTATIN CALCIUM 80 MG/1
80 TABLET, FILM COATED ORAL DAILY
Qty: 90 TABLET | Refills: 3 | Status: SHIPPED | OUTPATIENT
Start: 2024-03-29

## 2024-06-12 NOTE — PROGRESS NOTES
pulses.   Monophasic flow noted in the common femoral artery. This may indicate inflow   disease or may be a result of severe outflow disease.   Elevated velocity of the profunda femoral artery suggests a greater than 70%   stenosis.   The femoral artery is occluded throughout from zone 2.   The popliteal artery has been previously stented and is now occluded.      The peroneal artery is occluded.   There is reconstitution of flow at the level of the mid posterior tibial   artery and the mid anterior tibial artery, however, flow is severely blunted   and hyperemic.   The dorsalis pedis artery is occluded.      Findings are consistent with chronic limb threatening ischemia, though,   there may be an acute component as well considering timeline of symptom   progression and duplex findings.    Carotid Doppler 12/14/2022  Right ICA stenosis of 50-79% based on velocity criteria - likely 70-80%   based on ratio. This stenosis appears to have progressed since last study of   6/2021.  Left ICA stenosis of 50-79% by velocity criteria.  Patent bilateral vertebral arteries with antegrade flow    Carotid doppler 1/19/2024   Summary      The right internal carotid artery stent velocities are consistent with <50%   stenosis.   The left internal carotid artery appears to have a 50-79% diameter reducing   stenosis based on velocity criteria.    All above diagnostic testing and laboratory data was independently visualized and reviewed by me (not simply review of report)       Assessment and Plan   1) PAD   -omar cat 4  -s/p left BKA, follows with wound care at [place]  -right popliteal aneursym s/p successful stenting with 6.0 Viabahn 7/2022  -Asa/plavix/statin/xarelto 2.5   -s/p successful balloon angioplasty/aspiration thrombectomy of the right anterior tibial, popliteal and SFA and EKOS thrombolytic catheter placement 1/23/2024  -right PT/DP bounding doppler on today's visit   -left BKA incision site still slow to heal,

## 2024-06-17 ENCOUNTER — OFFICE VISIT (OUTPATIENT)
Dept: CARDIOLOGY CLINIC | Age: 69
End: 2024-06-17

## 2024-06-17 VITALS — HEART RATE: 78 BPM | SYSTOLIC BLOOD PRESSURE: 112 MMHG | OXYGEN SATURATION: 98 % | DIASTOLIC BLOOD PRESSURE: 70 MMHG

## 2024-06-17 DIAGNOSIS — I73.9 PAD (PERIPHERAL ARTERY DISEASE) (HCC): Primary | ICD-10-CM

## 2024-06-17 RX ORDER — BLOOD SUGAR DIAGNOSTIC
STRIP MISCELLANEOUS
COMMUNITY
Start: 2024-05-31

## 2024-06-17 RX ORDER — BLOOD-GLUCOSE METER
EACH MISCELLANEOUS
COMMUNITY
Start: 2024-05-31

## 2024-06-17 RX ORDER — LANCETS
EACH MISCELLANEOUS
COMMUNITY
Start: 2024-06-03

## 2024-06-20 ENCOUNTER — TELEPHONE (OUTPATIENT)
Dept: CARDIOLOGY CLINIC | Age: 69
End: 2024-06-20

## 2024-06-20 DIAGNOSIS — R93.6 ABNORMAL FINDINGS ON DIAGNOSTIC IMAGING OF LIMBS: ICD-10-CM

## 2024-06-20 DIAGNOSIS — I73.9 PAD (PERIPHERAL ARTERY DISEASE) (HCC): Primary | ICD-10-CM

## 2024-06-20 DIAGNOSIS — I70.241: ICD-10-CM

## 2024-06-20 NOTE — TELEPHONE ENCOUNTER
Pedro called the office after Harvey consulted with Wound Care doctor and he approved Harvey to proceed with procedure.

## 2024-06-21 NOTE — TELEPHONE ENCOUNTER
Spoke with Pedro, patient's partner (on HIPAA form).   Discussed recommendations per Dr. Richard, agreeable to proceed. Before hanging up Pedro ask that I make her number primary contact per patient request as she handles their calendar and scheduling appts. Updated chart.     Stacy,   Please assist with scheduling patient for peripheral angiogram of the left leg with possible interventions with Dr. Richard. Order is in.   Thanks!     Please go for blood work one week prior to your procedure.       The morning of your procedure you will park in the hospital parking lot and report directly to the cath lab to check in.     Pre-Procedure Instructions   You will need to fast for at least 8 hours prior to procedure. No caffeine the morning of.   You will need to hold your anticoagulation, rivaroxaban (Xarelto) for 2 days prior.   Hold all diabetic medications including, Metfomin.  If you take Lantus/Levemir only take ½ your normal dose the evening before.  All other medications can be taken in the morning with sips of water.   You will need to take 325 mg aspirin the morning of.  If you are currently taking 81 mg please take 4 tablets that morning.   Do not use any lotions, creams or perfume the morning of procedure.   Pre-procedure lab work will need to be completed 5-7 days prior to procedure.   Please have a responsible adult to drive you home after procedure. We advise you have someone to stay with you for 24 hours following procedure for precautionary measures. Depending on procedure you may require an overnight stay.   Cath lab will provide you with all post procedure instructions.     If you have any questions regarding the procedure itself or medications, please call [phone] and ask to speak with a nurse.

## 2024-06-21 NOTE — TELEPHONE ENCOUNTER
Per Dr. Richard, will proceed with peripheral angiogram of the left leg with possible interventions.   Attempted to call patient to update, lvm to return call.

## 2024-06-25 NOTE — TELEPHONE ENCOUNTER
Spoke with the Pedro and got him scheduled for procedure. We went over instructions below and she verbalized understanding.     Procedure - Periph Angio L Leg w/poss interv  Date: 8/8/24  Arrival time:7:00 am    Procedure time: 9:30 am     The morning of your procedure you will park in the hospital parking lot and report directly to the cath lab to check in.      Pre-Procedure Instructions   You will need to fast for at least 8 hours prior to procedure. No caffeine the morning of.   You will need to hold your anticoagulation, rivaroxaban (Xarelto) for 2 days prior.   Hold all diabetic medications including, Metfomin.  If you take Lantus/Levemir only take ½ your normal dose the evening before.  All other medications can be taken in the morning with sips of water.   You will need to take 325 mg aspirin the morning of.  If you are currently taking 81 mg please take 4 tablets that morning.   Do not use any lotions, creams or perfume the morning of procedure.   Pre-procedure lab work will need to be completed 5-7 days prior to procedure.   Please have a responsible adult to drive you home after procedure. We advise you have someone to stay with you for 24 hours following procedure for precautionary measures. Depending on procedure you may require an overnight stay.   Cath lab will provide you with all post procedure instructions.      If you have any questions regarding the procedure itself or medications, please call [phone] and ask to speak with a nurse.       Qgenda updated / updated cupid /emailed cath lab

## 2024-08-01 DIAGNOSIS — I73.9 PAD (PERIPHERAL ARTERY DISEASE) (HCC): ICD-10-CM

## 2024-08-01 DIAGNOSIS — R93.6 ABNORMAL FINDINGS ON DIAGNOSTIC IMAGING OF LIMBS: ICD-10-CM

## 2024-08-01 LAB
ANION GAP SERPL CALCULATED.3IONS-SCNC: 11 MMOL/L (ref 3–16)
BUN SERPL-MCNC: 14 MG/DL (ref 7–20)
CALCIUM SERPL-MCNC: 9.2 MG/DL (ref 8.3–10.6)
CHLORIDE SERPL-SCNC: 106 MMOL/L (ref 99–110)
CO2 SERPL-SCNC: 26 MMOL/L (ref 21–32)
CREAT SERPL-MCNC: 1.1 MG/DL (ref 0.8–1.3)
DEPRECATED RDW RBC AUTO: 17.4 % (ref 12.4–15.4)
GFR SERPLBLD CREATININE-BSD FMLA CKD-EPI: 74 ML/MIN/{1.73_M2}
GLUCOSE SERPL-MCNC: 95 MG/DL (ref 70–99)
HCT VFR BLD AUTO: 38.5 % (ref 40.5–52.5)
HGB BLD-MCNC: 12.6 G/DL (ref 13.5–17.5)
MCH RBC QN AUTO: 27.4 PG (ref 26–34)
MCHC RBC AUTO-ENTMCNC: 32.7 G/DL (ref 31–36)
MCV RBC AUTO: 83.8 FL (ref 80–100)
PLATELET # BLD AUTO: 336 K/UL (ref 135–450)
PMV BLD AUTO: 8.1 FL (ref 5–10.5)
POTASSIUM SERPL-SCNC: 4.9 MMOL/L (ref 3.5–5.1)
RBC # BLD AUTO: 4.59 M/UL (ref 4.2–5.9)
SODIUM SERPL-SCNC: 143 MMOL/L (ref 136–145)
WBC # BLD AUTO: 8.6 K/UL (ref 4–11)

## 2024-08-07 NOTE — H&P
H&P Update -                      Cardiology Consultation      Harvey Kaminski  1955     PCP: Jolie Lopez APRN - NP  Referring Physician: Dr. Trung Cuba   Reason for Referral: PAD   Chief Complaint: \"I feel good\"       Chief Complaint   Patient presents with    Follow-up      Subjective:   History of Present Illness: The patient is 68 y.o. male with a past medical history significant for CAD s/p CABG, HLD, HTN, carotid disease, prior DVT, CVA, PAD s/p SFA stent, left BKA, s/p nephrectomy, prior nicotine addiction and alcohol abuse. He presented to [facility] 5/2020 with severe chest pain and found to have NSTEMI. He underwent heart catheterization revealing severe triple-vessel disease and subsequently underwent CABG x 4 on 5/8/20 with Dr. Robles. He did suffer from encephalopathy/delirium following bypass. He is s/p tracheostomy and PEG placement. He was discharged to [place] for skilled nursing care. He was seen in office with Dr. Robles for follow up where it was noted that he was admitted to  for bilateral foot wounds. He underwent peripheral angiograms with intervention to both legs while inpatient at . He underwent left BKA on 10/22/20. Lower extremity dopplers showed significant stenosis and underwent angiography 2/21/21 that showed severe right distal SFA/popliteal and tibial disease. S/p successful revascularization and SFA stenting. He reported worsening right foot pain and underwent peripheral that resulted in right popliteal aneurysm s/p successful stenting with 6.0 Viabahn. He underwent carotid stent 5/2023. S/p revascularization RLE 1/2024. Patient here today for follow up. Reports overall he is doing good. States he was just released from [place] wound care regarding his left BKA, however wife reports that incision opened up again on Saturday. Has not gotten a prosthetic yet. Patient denies any new wounds or claudication on right leg. States he does do exercises daily for his right  velocities noted in the proximal femoral artery.  Findings are suggestive of multi level arterial disease.  Left Impression  Left SYBIL not available due to BKA.  The left popliteal artery is occluded.  Findings are suggestive of inflow and outflow disease.     Arterial doppler 1/19/2024  Summary      Right SYBIL was not available due to inadequate pulses.   Monophasic flow noted in the common femoral artery. This may indicate inflow   disease or may be a result of severe outflow disease.   Elevated velocity of the profunda femoral artery suggests a greater than 70%   stenosis.   The femoral artery is occluded throughout from zone 2.   The popliteal artery has been previously stented and is now occluded.      The peroneal artery is occluded.   There is reconstitution of flow at the level of the mid posterior tibial   artery and the mid anterior tibial artery, however, flow is severely blunted   and hyperemic.   The dorsalis pedis artery is occluded.      Findings are consistent with chronic limb threatening ischemia, though,   there may be an acute component as well considering timeline of symptom   progression and duplex findings.     Carotid Doppler 12/14/2022  Right ICA stenosis of 50-79% based on velocity criteria - likely 70-80%   based on ratio. This stenosis appears to have progressed since last study of   6/2021.  Left ICA stenosis of 50-79% by velocity criteria.  Patent bilateral vertebral arteries with antegrade flow     Carotid doppler 1/19/2024   Summary      The right internal carotid artery stent velocities are consistent with <50%   stenosis.   The left internal carotid artery appears to have a 50-79% diameter reducing   stenosis based on velocity criteria.     All above diagnostic testing and laboratory data was independently visualized and reviewed by me (not simply review of report)         Assessment and Plan   1) PAD   -omar cat 4  -s/p left BKA, follows with wound care at [place]  -right

## 2024-08-08 ENCOUNTER — HOSPITAL ENCOUNTER (OUTPATIENT)
Age: 69
Setting detail: OUTPATIENT SURGERY
Discharge: HOME | End: 2024-08-08
Attending: INTERNAL MEDICINE | Admitting: INTERNAL MEDICINE
Payer: MEDICARE

## 2024-08-08 VITALS
DIASTOLIC BLOOD PRESSURE: 63 MMHG | TEMPERATURE: 97 F | WEIGHT: 228 LBS | SYSTOLIC BLOOD PRESSURE: 132 MMHG | BODY MASS INDEX: 30.88 KG/M2 | RESPIRATION RATE: 25 BRPM | HEIGHT: 72 IN | HEART RATE: 59 BPM | OXYGEN SATURATION: 99 %

## 2024-08-08 DIAGNOSIS — I73.9 PAD (PERIPHERAL ARTERY DISEASE) (HCC): ICD-10-CM

## 2024-08-08 PROCEDURE — C1769 GUIDE WIRE: HCPCS | Performed by: INTERNAL MEDICINE

## 2024-08-08 PROCEDURE — 6360000004 HC RX CONTRAST MEDICATION: Performed by: INTERNAL MEDICINE

## 2024-08-08 PROCEDURE — 75710 ARTERY X-RAYS ARM/LEG: CPT | Performed by: INTERNAL MEDICINE

## 2024-08-08 PROCEDURE — 2500000003 HC RX 250 WO HCPCS: Performed by: INTERNAL MEDICINE

## 2024-08-08 PROCEDURE — C1894 INTRO/SHEATH, NON-LASER: HCPCS | Performed by: INTERNAL MEDICINE

## 2024-08-08 PROCEDURE — C1725 CATH, TRANSLUMIN NON-LASER: HCPCS | Performed by: INTERNAL MEDICINE

## 2024-08-08 PROCEDURE — 99153 MOD SED SAME PHYS/QHP EA: CPT | Performed by: INTERNAL MEDICINE

## 2024-08-08 PROCEDURE — 2580000003 HC RX 258: Performed by: INTERNAL MEDICINE

## 2024-08-08 PROCEDURE — C9764 REVASC INTRAVASC LITHOTRIPSY: HCPCS | Performed by: INTERNAL MEDICINE

## 2024-08-08 PROCEDURE — 37224 PR REVSC OPN/PRG FEM/POP W/ANGIOPLASTY UNI: CPT | Performed by: INTERNAL MEDICINE

## 2024-08-08 PROCEDURE — 7100000010 HC PHASE II RECOVERY - FIRST 15 MIN: Performed by: INTERNAL MEDICINE

## 2024-08-08 PROCEDURE — C1887 CATHETER, GUIDING: HCPCS | Performed by: INTERNAL MEDICINE

## 2024-08-08 PROCEDURE — 6360000002 HC RX W HCPCS: Performed by: INTERNAL MEDICINE

## 2024-08-08 PROCEDURE — 37799 UNLISTED PX VASCULAR SURGERY: CPT | Performed by: INTERNAL MEDICINE

## 2024-08-08 PROCEDURE — 7100000011 HC PHASE II RECOVERY - ADDTL 15 MIN: Performed by: INTERNAL MEDICINE

## 2024-08-08 PROCEDURE — 2709999900 HC NON-CHARGEABLE SUPPLY: Performed by: INTERNAL MEDICINE

## 2024-08-08 PROCEDURE — 99152 MOD SED SAME PHYS/QHP 5/>YRS: CPT | Performed by: INTERNAL MEDICINE

## 2024-08-08 RX ORDER — ACETAMINOPHEN 325 MG/1
650 TABLET ORAL EVERY 4 HOURS PRN
Status: DISCONTINUED | OUTPATIENT
Start: 2024-08-08 | End: 2024-08-08 | Stop reason: HOSPADM

## 2024-08-08 RX ORDER — HEPARIN SODIUM 1000 [USP'U]/ML
INJECTION, SOLUTION INTRAVENOUS; SUBCUTANEOUS PRN
Status: DISCONTINUED | OUTPATIENT
Start: 2024-08-08 | End: 2024-08-08 | Stop reason: HOSPADM

## 2024-08-08 RX ORDER — SODIUM CHLORIDE 0.9 % (FLUSH) 0.9 %
5-40 SYRINGE (ML) INJECTION EVERY 12 HOURS SCHEDULED
Status: DISCONTINUED | OUTPATIENT
Start: 2024-08-08 | End: 2024-08-08 | Stop reason: HOSPADM

## 2024-08-08 RX ORDER — 0.9 % SODIUM CHLORIDE 0.9 %
500 INTRAVENOUS SOLUTION INTRAVENOUS ONCE
Status: COMPLETED | OUTPATIENT
Start: 2024-08-08 | End: 2024-08-08

## 2024-08-08 RX ORDER — ASPIRIN 325 MG
325 TABLET ORAL ONCE
Status: DISCONTINUED | OUTPATIENT
Start: 2024-08-08 | End: 2024-08-08 | Stop reason: HOSPADM

## 2024-08-08 RX ORDER — SODIUM CHLORIDE 0.9 % (FLUSH) 0.9 %
5-40 SYRINGE (ML) INJECTION PRN
Status: DISCONTINUED | OUTPATIENT
Start: 2024-08-08 | End: 2024-08-08 | Stop reason: HOSPADM

## 2024-08-08 RX ORDER — SODIUM CHLORIDE 9 MG/ML
INJECTION, SOLUTION INTRAVENOUS PRN
Status: DISCONTINUED | OUTPATIENT
Start: 2024-08-08 | End: 2024-08-08 | Stop reason: HOSPADM

## 2024-08-08 RX ORDER — MIDAZOLAM HYDROCHLORIDE 1 MG/ML
INJECTION INTRAMUSCULAR; INTRAVENOUS PRN
Status: DISCONTINUED | OUTPATIENT
Start: 2024-08-08 | End: 2024-08-08 | Stop reason: HOSPADM

## 2024-08-08 RX ADMIN — SODIUM CHLORIDE 500 ML: 9 INJECTION, SOLUTION INTRAVENOUS at 08:09

## 2024-08-08 NOTE — PROGRESS NOTES
1120 - Received from cath lab post procedure, awake and alert,  Dr. Richard at bedside reviewing the procedure.  TR band intact right radial with 1cc air in band. Left groin site intact, lying supine. IV continues to infuse at 125/hour for post procedure hydration     1215 - initiating air removal from TR band, instructions given, taking fluids and snack    1345 - Radial compression band removed from right wrist without difficulty. No complications noted to cath site; gauze, tegaderm dressing and arm board applied. Education provided to patient and wife in regards to post cath restrictions. All questions answered. Patient verbalized understanding. Will monitor.      1350 - discharge instructions given. IV discontinued    1414 - to car via wheelchair, discharged to home

## 2024-08-08 NOTE — DISCHARGE INSTRUCTIONS
PERIPHERAL ANGIOGRAM    Care of your puncture site:  Remove bandages 24 hours after the procedure.  May shower in 24 hours but do not sit in a bathtub/pool of water for 5 days or until the wound is healed.  Gently clean groin using soap and water.  Dry thoroughly and apply a Band-Aid that covers the entire site. Use Band-Aid until skin heals over in about 3-5 days.  Do not apply powder or lotion.      Normal Observations:  Soreness or tenderness which may last one week.  Mild oozing from the incision site.  Possible bruising that could last 2 weeks.    Activity:  You may resume driving 24 hours following the procedure.  Do not make important / legal decisions within 24 hours after procedure.  You may resume normal activity in 5 days or after the wound heals.  Avoid lifting more than 10 pounds for 5 days or until the wound heals.  Avoid strenuous exercise or activity for 1 week.      Nutrition:  Regular diet   Drink at least 8 to 10 glasses of decaffeinated, non-alcoholic fluid for the next 24 hours to flush the x-ray dye used for your angiogram out of your body.    Call your doctor immediately if your condition worsens, for any other concerns, for a follow-up appointment or if you experience any of the following:  Increased swelling on the groin or leg.  Unusual pain, numbness, or tingling of the groin or down the leg.  Any signs of infection such as: redness, yellow drainage at the site, swelling or pain.    IF GROIN OR WRIST STARTS BLEEDING SIGNIFICANTLY:  HOLD FIRM DIRECT PRESSURE, AND CALL 911

## 2024-08-11 LAB — ECHO BSA: 2.29 M2

## 2024-08-26 NOTE — TELEPHONE ENCOUNTER
Last OV: 24 Hilary   Next OV: 24 Hilary   Last labs: 24 CBC and BMP  Last EK23  Last filled:     Disp Refills Start End    clopidogrel (PLAVIX) 75 MG tablet 90 tablet 3 2023 --    Sig: TAKE 1 TABLET BY MOUTH EVERY DAY    Sent to pharmacy as: Clopidogrel Bisulfate 75 MG Oral Tablet (PLAVIX)    Cosign for Ordering: Accepted by Espinoza Richard MD on 2023  4:06 PM    E-Prescribing Status: Receipt confirmed by pharmacy (2023  1:17 PM EDT)

## 2024-08-27 RX ORDER — CLOPIDOGREL BISULFATE 75 MG/1
TABLET ORAL
Qty: 90 TABLET | Refills: 3 | Status: SHIPPED | OUTPATIENT
Start: 2024-08-27

## 2024-09-16 NOTE — PROGRESS NOTES
Cardiology Consultation     Harvey Kaminski  1955    PCP: Jolie Lopez APRN - NP  Referring Physician: Dr. Trung Cuba   Reason for Referral: PAD   Chief Complaint: \"I feel good\"   No chief complaint on file.    Subjective:   History of Present Illness: The patient is 69 y.o. male with a past medical history significant for CAD s/p CABG, HLD, HTN, carotid disease, prior DVT, CVA, PAD s/p SFA stent, left BKA, s/p nephrectomy, prior nicotine addiction and alcohol abuse. He presented to [facility] 5/2020 with severe chest pain and found to have NSTEMI. He underwent heart catheterization revealing severe triple-vessel disease and subsequently underwent CABG x 4 on 5/8/20 with Dr. Robles. He did suffer from encephalopathy/delirium following bypass. He is s/p tracheostomy and PEG placement. He was discharged to [facility] for skilled nursing care. He was seen in office with Dr. Robles for follow up where it was noted that he was admitted to  for bilateral foot wounds. He underwent peripheral angiograms with intervention to both legs while inpatient at . He underwent left BKA on 10/22/20. Lower extremity dopplers showed significant stenosis and underwent angiography 2/21/21 that showed severe right distal SFA/popliteal and tibial disease. S/p successful revascularization and SFA stenting. He reported worsening right foot pain and underwent peripheral that resulted in right popliteal aneurysm s/p successful stenting with 6.0 Viabahn. He underwent carotid stent 5/2023. S/p revascularization RLE 1/2024. Patient here today for follow up. Reports overall he is doing good. States he was just released from [facility] wound care regarding his left BKA, however wife reports that incision opened up again on Saturday. Has not gotten a prosthetic yet. Patient denies any new wounds or claudication on right leg. States he does do exercises daily for his right leg.       Past Medical History:   Diagnosis

## 2024-09-18 ENCOUNTER — OFFICE VISIT (OUTPATIENT)
Dept: CARDIOLOGY CLINIC | Age: 69
End: 2024-09-18

## 2024-09-18 VITALS
BODY MASS INDEX: 30.92 KG/M2 | HEART RATE: 72 BPM | DIASTOLIC BLOOD PRESSURE: 60 MMHG | OXYGEN SATURATION: 95 % | SYSTOLIC BLOOD PRESSURE: 136 MMHG | HEIGHT: 72 IN

## 2024-09-18 DIAGNOSIS — I73.9 PAD (PERIPHERAL ARTERY DISEASE) (HCC): Primary | ICD-10-CM

## 2024-10-30 ENCOUNTER — TELEPHONE (OUTPATIENT)
Dept: CARDIOLOGY CLINIC | Age: 69
End: 2024-10-30

## 2024-10-30 RX ORDER — RIVAROXABAN 2.5 MG/1
2.5 TABLET, FILM COATED ORAL 2 TIMES DAILY
Qty: 60 TABLET | Refills: 8 | Status: SHIPPED | OUTPATIENT
Start: 2024-10-30

## 2024-10-30 NOTE — TELEPHONE ENCOUNTER
Last ov 9/18/24 Hilary  Upcoming ov- 12/2/24 and 4/1/25 Hilary, does patient need both office visits?    Bmp/cbc 8/1/24

## 2024-10-30 NOTE — TELEPHONE ENCOUNTER
Attempted to reach patient. LVM to return call to office.  If patient returns call please inform him that we cancelled 12/2 appt

## 2024-10-31 ENCOUNTER — HOSPITAL ENCOUNTER (OUTPATIENT)
Dept: CARDIOLOGY | Age: 69
Discharge: HOME | End: 2024-11-02
Attending: INTERNAL MEDICINE
Payer: MEDICARE

## 2024-10-31 ENCOUNTER — TELEPHONE (OUTPATIENT)
Dept: CARDIOLOGY CLINIC | Age: 69
End: 2024-10-31

## 2024-10-31 DIAGNOSIS — I70.211 ATHEROSCLEROSIS OF NATIVE ARTERIES OF EXTREMITIES WITH INTERMITTENT CLAUDICATION, RIGHT LEG (HCC): ICD-10-CM

## 2024-10-31 DIAGNOSIS — I73.9 PAD (PERIPHERAL ARTERY DISEASE) (HCC): ICD-10-CM

## 2024-10-31 DIAGNOSIS — I73.9 PAD (PERIPHERAL ARTERY DISEASE) (HCC): Primary | ICD-10-CM

## 2024-10-31 DIAGNOSIS — R93.6 ABNORMAL FINDINGS ON DIAGNOSTIC IMAGING OF LIMBS: ICD-10-CM

## 2024-10-31 LAB
VAS LEFT ARM BP: 139 MMHG
VAS RIGHT ABI: 0.42
VAS RIGHT ARM BP: 138 MMHG
VAS RIGHT ATA DIST PSV: 15.6 CM/S
VAS RIGHT CFA DIST PSV: 136 CM/S
VAS RIGHT CFA PROX PSV: 63 CM/S
VAS RIGHT DIST OUTFLOW PSV: 13 CM/S
VAS RIGHT DORSALIS PEDIS BP: 36 MMHG
VAS RIGHT GRAFT 1: NORMAL
VAS RIGHT INFLOW ARTERY PSV: 13 CM/S
VAS RIGHT MID OUTFLOW PSV: 465 CM/S
VAS RIGHT OUTFLOW VESSEL PSV: 0 CM/S
VAS RIGHT PERONEAL MID PSV: 0 CM/S
VAS RIGHT PFA PROX PSV: 409 CM/S
VAS RIGHT POP A DIST PSV: 13 CM/S
VAS RIGHT POP A DIST VEL RATIO: 0.03
VAS RIGHT POP A MID PSV: 465 CM/S
VAS RIGHT POP A MID VEL RATIO: 16.2
VAS RIGHT POP A PROX PSV: 28.7 CM/S
VAS RIGHT POP A PROX VEL RATIO: 2.21
VAS RIGHT PROX OUTFLOW PSV: 29 CM/S
VAS RIGHT PTA BP: 58 MMHG
VAS RIGHT PTA DIST PSV: 56.2 CM/S
VAS RIGHT PTA MID PSV: 33.9 CM/S
VAS RIGHT SFA DIST PSV: 13 CM/S
VAS RIGHT SFA DIST VEL RATIO: 0.2
VAS RIGHT SFA MID PSV: 64.1 CM/S
VAS RIGHT SFA MID VEL RATIO: 0.3
VAS RIGHT SFA PROX PSV: 187 CM/S
VAS RIGHT SFA PROX VEL RATIO: 1.4

## 2024-10-31 PROCEDURE — 93926 LOWER EXTREMITY STUDY: CPT

## 2024-10-31 NOTE — TELEPHONE ENCOUNTER
Patient completed arterial doppler of the right leg today which showed decreased SYBIL and significant PAD. Per Dr. Richard, patient will need peripheral angiogram.     Stacy,  Please schedule patient for peripheral angiogram of the right lower extremity with possible interventions with Dr. Richard.   Orders are in.   Thanks!     Please go for blood work one week prior to your procedure.       The morning of your procedure you will park in the hospital parking lot and report directly to the registration desk for check in.    Pre-Procedure Instructions   You will need to fast for at least 8 hours prior to procedure. No caffeine the morning of.   You will need to hold your anticoagulation, Xarelto for 2 days prior.   Hold all diabetic medications including, Metfomin.  If you take Lantus/Levemir only take ½ your normal dose the evening before.  All other medications can be taken in the morning with sips of water.   You will need to take 325 mg aspirin the morning of.  If you are currently taking 81 mg please take 4 tablets that morning.   Do not use any lotions, creams or perfume the morning of procedure.   Pre-procedure lab work will need to be completed 5-7 days prior to procedure.   Please have a responsible adult to drive you home after procedure. We advise you have someone to stay with you for 24 hours following procedure for precautionary measures. Depending on procedure you may require an overnight stay.   Cath lab will provide you with all post procedure instructions.     If you have any questions regarding the procedure itself or medications, please call [phone] and ask to speak with a nurse.

## 2024-11-01 NOTE — TELEPHONE ENCOUNTER
Date of Procedure: Thursday 11/14/24 @ [facility] with Dr. Richard     Time of arrival: 10:00 am     Procedure time: 11:30 am     Spoke to Su and she is agreeable to date and time. Reviewed instructions and she verbalized understanding. Encouraged to call with any questions or concerns.     Published on [employer] and e-mail to Nakita.

## 2024-11-07 DIAGNOSIS — R93.6 ABNORMAL FINDINGS ON DIAGNOSTIC IMAGING OF LIMBS: ICD-10-CM

## 2024-11-07 DIAGNOSIS — I73.9 PAD (PERIPHERAL ARTERY DISEASE) (HCC): ICD-10-CM

## 2024-11-08 LAB
ANION GAP SERPL CALCULATED.3IONS-SCNC: 12 MMOL/L (ref 3–16)
BUN SERPL-MCNC: 17 MG/DL (ref 7–20)
CALCIUM SERPL-MCNC: 9.2 MG/DL (ref 8.3–10.6)
CHLORIDE SERPL-SCNC: 102 MMOL/L (ref 99–110)
CO2 SERPL-SCNC: 26 MMOL/L (ref 21–32)
CREAT SERPL-MCNC: 1.1 MG/DL (ref 0.8–1.3)
DEPRECATED RDW RBC AUTO: 16.1 % (ref 12.4–15.4)
GFR SERPLBLD CREATININE-BSD FMLA CKD-EPI: 73 ML/MIN/{1.73_M2}
GLUCOSE SERPL-MCNC: 122 MG/DL (ref 70–99)
HCT VFR BLD AUTO: 41.9 % (ref 40.5–52.5)
HGB BLD-MCNC: 13.5 G/DL (ref 13.5–17.5)
MCH RBC QN AUTO: 27.7 PG (ref 26–34)
MCHC RBC AUTO-ENTMCNC: 32.2 G/DL (ref 31–36)
MCV RBC AUTO: 86 FL (ref 80–100)
PLATELET # BLD AUTO: 319 K/UL (ref 135–450)
PMV BLD AUTO: 8.1 FL (ref 5–10.5)
POTASSIUM SERPL-SCNC: 5.2 MMOL/L (ref 3.5–5.1)
RBC # BLD AUTO: 4.88 M/UL (ref 4.2–5.9)
SODIUM SERPL-SCNC: 140 MMOL/L (ref 136–145)
WBC # BLD AUTO: 8.8 K/UL (ref 4–11)

## 2024-11-14 ENCOUNTER — HOSPITAL ENCOUNTER (OUTPATIENT)
Dept: VASCULAR LAB | Age: 69
Setting detail: OUTPATIENT SURGERY
Discharge: HOME | End: 2024-11-16
Attending: INTERNAL MEDICINE
Payer: MEDICARE

## 2024-11-14 ENCOUNTER — HOSPITAL ENCOUNTER (OUTPATIENT)
Age: 69
Setting detail: OUTPATIENT SURGERY
Discharge: HOME | End: 2024-11-14
Attending: INTERNAL MEDICINE | Admitting: INTERNAL MEDICINE
Payer: MEDICARE

## 2024-11-14 VITALS
SYSTOLIC BLOOD PRESSURE: 142 MMHG | TEMPERATURE: 98.5 F | HEIGHT: 72 IN | DIASTOLIC BLOOD PRESSURE: 76 MMHG | OXYGEN SATURATION: 93 % | HEART RATE: 63 BPM | BODY MASS INDEX: 29.8 KG/M2 | RESPIRATION RATE: 18 BRPM | WEIGHT: 220 LBS

## 2024-11-14 DIAGNOSIS — I70.229 CRITICAL ISCHEMIA OF LOWER EXTREMITY (HCC): Primary | ICD-10-CM

## 2024-11-14 DIAGNOSIS — I25.10 CORONARY ARTERY DISEASE INVOLVING NATIVE CORONARY ARTERY OF NATIVE HEART WITHOUT ANGINA PECTORIS: ICD-10-CM

## 2024-11-14 DIAGNOSIS — I70.229 CRITICAL LOWER LIMB ISCHEMIA (HCC): ICD-10-CM

## 2024-11-14 DIAGNOSIS — I73.9 PAD (PERIPHERAL ARTERY DISEASE) (HCC): ICD-10-CM

## 2024-11-14 LAB
ECHO BSA: 2.25 M2
VAS RIGHT GSV ANKLE DIAM: 2.4 MM
VAS RIGHT GSV AT KNEE DIAM: 3.9 MM
VAS RIGHT GSV BK DIST DIAM: 3.2 MM
VAS RIGHT GSV BK MID DIAM: 3.5 MM
VAS RIGHT GSV BK PROX DIAM: 4.1 MM
VAS RIGHT GSV THIGH DIST DIAM: 4 MM
VAS RIGHT GSV THIGH MID DIAM: 3.8 MM
VAS RIGHT GSV THIGH PROX DIAM: 5.5 MM

## 2024-11-14 PROCEDURE — 2580000003 HC RX 258: Performed by: INTERNAL MEDICINE

## 2024-11-14 PROCEDURE — 93971 EXTREMITY STUDY: CPT

## 2024-11-14 PROCEDURE — 99152 MOD SED SAME PHYS/QHP 5/>YRS: CPT | Performed by: INTERNAL MEDICINE

## 2024-11-14 PROCEDURE — 7100000010 HC PHASE II RECOVERY - FIRST 15 MIN: Performed by: INTERNAL MEDICINE

## 2024-11-14 PROCEDURE — 75710 ARTERY X-RAYS ARM/LEG: CPT | Performed by: INTERNAL MEDICINE

## 2024-11-14 PROCEDURE — 6360000004 HC RX CONTRAST MEDICATION: Performed by: INTERNAL MEDICINE

## 2024-11-14 PROCEDURE — 36247 INS CATH ABD/L-EXT ART 3RD: CPT | Performed by: INTERNAL MEDICINE

## 2024-11-14 PROCEDURE — C1769 GUIDE WIRE: HCPCS | Performed by: INTERNAL MEDICINE

## 2024-11-14 PROCEDURE — C1887 CATHETER, GUIDING: HCPCS | Performed by: INTERNAL MEDICINE

## 2024-11-14 PROCEDURE — 75774 ARTERY X-RAY EACH VESSEL: CPT | Performed by: INTERNAL MEDICINE

## 2024-11-14 PROCEDURE — 2709999900 HC NON-CHARGEABLE SUPPLY: Performed by: INTERNAL MEDICINE

## 2024-11-14 PROCEDURE — 7100000011 HC PHASE II RECOVERY - ADDTL 15 MIN: Performed by: INTERNAL MEDICINE

## 2024-11-14 PROCEDURE — 2500000003 HC RX 250 WO HCPCS: Performed by: INTERNAL MEDICINE

## 2024-11-14 PROCEDURE — C1894 INTRO/SHEATH, NON-LASER: HCPCS | Performed by: INTERNAL MEDICINE

## 2024-11-14 PROCEDURE — 75625 CONTRAST EXAM ABDOMINL AORTA: CPT | Performed by: INTERNAL MEDICINE

## 2024-11-14 PROCEDURE — 6360000002 HC RX W HCPCS: Performed by: INTERNAL MEDICINE

## 2024-11-14 RX ORDER — MIDAZOLAM HYDROCHLORIDE 1 MG/ML
INJECTION, SOLUTION INTRAMUSCULAR; INTRAVENOUS PRN
Status: DISCONTINUED | OUTPATIENT
Start: 2024-11-14 | End: 2024-11-14 | Stop reason: HOSPADM

## 2024-11-14 RX ORDER — SODIUM CHLORIDE 0.9 % (FLUSH) 0.9 %
5-40 SYRINGE (ML) INJECTION EVERY 12 HOURS SCHEDULED
Status: DISCONTINUED | OUTPATIENT
Start: 2024-11-14 | End: 2024-11-14 | Stop reason: HOSPADM

## 2024-11-14 RX ORDER — SODIUM CHLORIDE 9 MG/ML
INJECTION, SOLUTION INTRAVENOUS PRN
Status: DISCONTINUED | OUTPATIENT
Start: 2024-11-14 | End: 2024-11-14 | Stop reason: HOSPADM

## 2024-11-14 RX ORDER — IOPAMIDOL 755 MG/ML
INJECTION, SOLUTION INTRAVASCULAR PRN
Status: DISCONTINUED | OUTPATIENT
Start: 2024-11-14 | End: 2024-11-14 | Stop reason: HOSPADM

## 2024-11-14 RX ORDER — ACETAMINOPHEN 325 MG/1
650 TABLET ORAL EVERY 4 HOURS PRN
Status: DISCONTINUED | OUTPATIENT
Start: 2024-11-14 | End: 2024-11-14 | Stop reason: HOSPADM

## 2024-11-14 RX ORDER — FENTANYL CITRATE 50 UG/ML
INJECTION, SOLUTION INTRAMUSCULAR; INTRAVENOUS PRN
Status: DISCONTINUED | OUTPATIENT
Start: 2024-11-14 | End: 2024-11-14 | Stop reason: HOSPADM

## 2024-11-14 RX ORDER — SODIUM CHLORIDE 0.9 % (FLUSH) 0.9 %
5-40 SYRINGE (ML) INJECTION PRN
Status: DISCONTINUED | OUTPATIENT
Start: 2024-11-14 | End: 2024-11-14 | Stop reason: HOSPADM

## 2024-11-14 RX ADMIN — Medication 10 ML: at 10:22

## 2024-11-14 RX ADMIN — SODIUM CHLORIDE: 9 INJECTION, SOLUTION INTRAVENOUS at 10:22

## 2024-11-14 NOTE — PROGRESS NOTES
Patient received post procedure in stable condition.   Post-cath handoff/site assessment performed to left wrist.  TR band on and in place without complication.   Pt is awake and alert. No distress noted. VSS-see flowsheet.   Post-cath restrictions/instructions provided  Patient provided with snack/drink.   No further needs at this time, call light within reach.   MD to talk to patient and family soon.     Electronically signed by Sahara Boykin RN on 11/14/2024 at 12:08 PM

## 2024-11-14 NOTE — FLOWSHEET NOTE
Discharge instructions reviewed with patient and his spouse. Medications and follow-up appointment(s) reviewed.  All questions answered at present time. Patient verbalized understanding. Left radial cath site remains free from complication. PIV removed; dressing applied to site. Patient dressed and ready for discharge. Patient discharge off unit via wheelchair with belongings.     Electronically signed by Sahara Boykin RN on 11/14/2024 at 2:13 PM

## 2024-11-14 NOTE — H&P
were no vitals taken for this visit.      Wt Readings from Last 3 Encounters:   08/08/24 103.4 kg (228 lb)   01/25/24 105.7 kg (233 lb 0.4 oz)   01/19/24 101.5 kg (223 lb 12.3 oz)      Constitutional: He is oriented to person, place, and time. He appears well-developed and well-nourished. In no acute distress.   Head: Normocephalic and atraumatic. Pupils equal and round.  Neck: Neck supple. No JVP or carotid bruit appreciated. No mass and no thyromegaly present. No lymphadenopathy present.  Cardiovascular: Normal rate. Normal heart sounds. Exam reveals no gallop and no friction rub. No murmur heard.  Pulmonary/Chest: Effort normal and breath sounds normal. No respiratory distress. He has no wheezes, rhonchi or rales.   Abdominal: Soft, non-tender. Bowel sounds are normal. He exhibits no organomegaly, mass or bruit.   Extremities: No edema. No cyanosis or clubbing. Pulses are 2+ radial/carotid bilaterally. Left BKA.   Neurological: No gross cranial nerve deficit. Coordination normal.   Skin: Skin is warm and dry. There is no rash or diaphoresis.   Psychiatric: He has a normal mood and affect. His speech is normal and behavior is normal.      Lab Review:   FLP:          Lab Results   Component Value Date/Time     TRIG 111 05/25/2020 04:05 AM     HDL 33 06/01/2021 01:26 PM      BUN/Creatinine:          Lab Results   Component Value Date/Time     BUN 14 08/01/2024 01:15 PM     CREATININE 1.1 08/01/2024 01:15 PM      PT/INR, TNI, HGB A1C:         Lab Results   Component Value Date/Time     TROPONINI 0.11 (H) 05/07/2020 01:17 PM     LABA1C 5.9 05/07/2020 12:00 PM      No results found for: \"CBCAUTODIF\"     EKG 11/11/21: Sinus rhythm  EKG 12/23/21: Sinus  Rhythm. Nonspecific IVCD     Image Review:      Peripheral angiogram 7/22/20 ([facility])   1. Left lower extremity angiogram with recanalization of the distal superficial femoral, popliteal, and peroneal artery.  2. Angioplasty of the peroneal and popliteal artery with a  velocities noted in the proximal femoral artery.  Findings are suggestive of multi level arterial disease.  Left Impression  Left SYBIL not available due to BKA.  The left popliteal artery is occluded.  Findings are suggestive of inflow and outflow disease.     Arterial doppler 1/19/2024  Summary      Right SYBIL was not available due to inadequate pulses.   Monophasic flow noted in the common femoral artery. This may indicate inflow   disease or may be a result of severe outflow disease.   Elevated velocity of the profunda femoral artery suggests a greater than 70%   stenosis.   The femoral artery is occluded throughout from zone 2.   The popliteal artery has been previously stented and is now occluded.      The peroneal artery is occluded.   There is reconstitution of flow at the level of the mid posterior tibial   artery and the mid anterior tibial artery, however, flow is severely blunted   and hyperemic.   The dorsalis pedis artery is occluded.      Findings are consistent with chronic limb threatening ischemia, though,   there may be an acute component as well considering timeline of symptom   progression and duplex findings.     Carotid Doppler 12/14/2022  Right ICA stenosis of 50-79% based on velocity criteria - likely 70-80%   based on ratio. This stenosis appears to have progressed since last study of   6/2021.  Left ICA stenosis of 50-79% by velocity criteria.  Patent bilateral vertebral arteries with antegrade flow     Carotid doppler 1/19/2024   Summary      The right internal carotid artery stent velocities are consistent with <50%   stenosis.   The left internal carotid artery appears to have a 50-79% diameter reducing   stenosis based on velocity criteria.     Peripheral 8/8/24    S/p successful balloon angioplasty of the left SFA and left common femoral artery using 5.0/100 mm balloon    S/p successful shockwave lithotripsy of the left SFA and left common femoral artery using 7.0 X 60 mm shockwave

## 2024-11-14 NOTE — DISCHARGE INSTRUCTIONS
PERIPHERAL ANGIOGRAM - RADIAL ACCESS    Care of your puncture site:  Remove clear bandage 24 hours after the procedure.  May shower in 24 hours  Inspect the site daily and gently clean using soap and water.  Dry thoroughly and apply a Band-Aid.    Normal Observations:  Soreness or tenderness which may last one week.  Possible bruising that could last 2 weeks.    Activity:  You may resume driving 24 hours following the procedure.  You may resume normal activity in 3 days or after the wound heals.  Avoid lifting more than 10 pounds for 3 days with affected arm.  Do not make important / legal decisions within 24 hours after procedure.    Nutrition:  Regular diet   Drink at least 8 to 10 glasses of decaffeinated, non-alcoholic fluid for the next 24 hours to flush the x-ray dye used for your angiogram out of your body.    Call your doctor immediately if your condition worsens, for any other concerns, for a follow-up appointment or if you experience any of the following:  Significant bleeding that does not stop after 10 minutes of applying firm pressure on the puncture site.  Increased swelling of the wrist.  Unusual pain, numbness, or tingling of the wrist/arm.   Any signs of infection such as: redness, yellow drainage at the site, swelling or pain.      Other Instructions:  Hold Metformin or Metformin containing drugs for 48 hours after procedure if applicable.

## 2024-11-14 NOTE — PROGRESS NOTES
Radial compression band removed from left wrist without difficulty. No complications noted to cath site; gauze, tegaderm dressing and coban applied. Education provided to patient and his spouse in regards to post cath restrictions. All questions answered. Patient verbalized understanding. Will monitor.      Electronically signed by Sahara Boykin RN on 11/14/2024 at 2:11 PM

## 2024-11-16 LAB — ECHO BSA: 2.25 M2

## 2024-12-16 ENCOUNTER — OFFICE VISIT (OUTPATIENT)
Dept: VASCULAR SURGERY | Age: 69
End: 2024-12-16
Payer: MEDICARE

## 2024-12-16 VITALS
SYSTOLIC BLOOD PRESSURE: 140 MMHG | HEIGHT: 72 IN | BODY MASS INDEX: 29.8 KG/M2 | WEIGHT: 220 LBS | DIASTOLIC BLOOD PRESSURE: 76 MMHG

## 2024-12-16 DIAGNOSIS — S88.112A BELOW-KNEE AMPUTATION OF LEFT LOWER EXTREMITY, INITIAL ENCOUNTER (HCC): ICD-10-CM

## 2024-12-16 DIAGNOSIS — I73.9 PAD (PERIPHERAL ARTERY DISEASE) (HCC): Primary | ICD-10-CM

## 2024-12-16 DIAGNOSIS — I70.201 RIGHT POPLITEAL ARTERY OCCLUSION (HCC): ICD-10-CM

## 2024-12-16 PROCEDURE — 3078F DIAST BP <80 MM HG: CPT | Performed by: SURGERY

## 2024-12-16 PROCEDURE — G8427 DOCREV CUR MEDS BY ELIG CLIN: HCPCS | Performed by: SURGERY

## 2024-12-16 PROCEDURE — G8417 CALC BMI ABV UP PARAM F/U: HCPCS | Performed by: SURGERY

## 2024-12-16 PROCEDURE — G8484 FLU IMMUNIZE NO ADMIN: HCPCS | Performed by: SURGERY

## 2024-12-16 PROCEDURE — 99205 OFFICE O/P NEW HI 60 MIN: CPT | Performed by: SURGERY

## 2024-12-16 PROCEDURE — 3077F SYST BP >= 140 MM HG: CPT | Performed by: SURGERY

## 2024-12-16 ASSESSMENT — ENCOUNTER SYMPTOMS
COLOR CHANGE: 1
GASTROINTESTINAL NEGATIVE: 1
ALLERGIC/IMMUNOLOGIC NEGATIVE: 1
EYES NEGATIVE: 1
RESPIRATORY NEGATIVE: 1

## 2024-12-16 NOTE — PROGRESS NOTES
Harvey Kaminski (:  1955) is a 69 y.o. male, here for evaluation of the following chief complaint(s):  New Patient (Ref by Dr. Richard, discuss bypass surgery, numb)      Subjective   SUBJECTIVE/OBJECTIVE:  HPI  Mr. Felix is a 63-year-old male who presents to clinic evaluation of right lower extremity bypass.  Patient has significant history of multiple prior endovascular procedures with cardiology for management of his PAD.  He is status post a left BKA.  Patient has a right distal SFA/popliteal stent that is now occluded.  His BK pop and TP trunk are also occluded on recent angiogram.  He has reconstitution of his posterior tibial artery with good flow to the foot.  Patient presents for evaluation due to intermittent pain and claudication type symptoms in his right leg. Patient has prior wound site of healed after his prior interventions.  Patient is otherwise been well.      Review of Systems   Constitutional: Negative.    HENT: Negative.     Eyes: Negative.    Respiratory: Negative.     Cardiovascular: Negative.    Gastrointestinal: Negative.    Endocrine: Negative.    Genitourinary: Negative.    Musculoskeletal: Negative.    Skin:  Positive for color change.   Allergic/Immunologic: Negative.    Neurological:  Positive for numbness.   Hematological:  Bruises/bleeds easily.   Psychiatric/Behavioral: Negative.            Objective   Physical Exam  Vitals and nursing note reviewed.   Constitutional:       General: He is not in acute distress.     Appearance: Normal appearance.   HENT:      Head: Normocephalic and atraumatic.   Cardiovascular:      Rate and Rhythm: Normal rate and regular rhythm.   Pulmonary:      Effort: Pulmonary effort is normal.   Abdominal:      General: There is no distension.      Palpations: Abdomen is soft.      Tenderness: There is no abdominal tenderness.   Musculoskeletal:         General: Normal range of motion.      Left Lower Extremity: Left leg is amputated below knee.

## 2024-12-16 NOTE — H&P (VIEW-ONLY)
Harvey Kaminski (:  1955) is a 69 y.o. male, here for evaluation of the following chief complaint(s):  New Patient (Ref by Dr. Richard, discuss bypass surgery, numb)      Subjective   SUBJECTIVE/OBJECTIVE:  HPI  Mr. Felix is a 63-year-old male who presents to clinic evaluation of right lower extremity bypass.  Patient has significant history of multiple prior endovascular procedures with cardiology for management of his PAD.  He is status post a left BKA.  Patient has a right distal SFA/popliteal stent that is now occluded.  His BK pop and TP trunk are also occluded on recent angiogram.  He has reconstitution of his posterior tibial artery with good flow to the foot.  Patient presents for evaluation due to intermittent pain and claudication type symptoms in his right leg. Patient has prior wound site of healed after his prior interventions.  Patient is otherwise been well.      Review of Systems   Constitutional: Negative.    HENT: Negative.     Eyes: Negative.    Respiratory: Negative.     Cardiovascular: Negative.    Gastrointestinal: Negative.    Endocrine: Negative.    Genitourinary: Negative.    Musculoskeletal: Negative.    Skin:  Positive for color change.   Allergic/Immunologic: Negative.    Neurological:  Positive for numbness.   Hematological:  Bruises/bleeds easily.   Psychiatric/Behavioral: Negative.            Objective   Physical Exam  Vitals and nursing note reviewed.   Constitutional:       General: He is not in acute distress.     Appearance: Normal appearance.   HENT:      Head: Normocephalic and atraumatic.   Cardiovascular:      Rate and Rhythm: Normal rate and regular rhythm.   Pulmonary:      Effort: Pulmonary effort is normal.   Abdominal:      General: There is no distension.      Palpations: Abdomen is soft.      Tenderness: There is no abdominal tenderness.   Musculoskeletal:         General: Normal range of motion.      Left Lower Extremity: Left leg is amputated below knee.    Skin:     General: Skin is warm and dry.   Neurological:      General: No focal deficit present.      Mental Status: He is alert and oriented to person, place, and time.   Psychiatric:         Mood and Affect: Mood normal.     Vascular: Monophasic DP/PT signal on right     Vascular US Venous Mapping Lower Right    Patient Name: Harvey Kaminski   Patient MRN: [id number]   Patient : 1955 (69 y.o.)   Gender Identity: Male   Height: 1.829 m (6')   Weight: 99.8 kg (220 lb)   BSA: 2.25 m²   Blood Pressure: 106/57    Accession Number: [id number]   Date of Study: 2024 ( 1:19 PM)   Ordering Provider: Espinoza Richard MD   Clinical Indications: Pre-Op vascular mapping       Reading Physicians  Performing Staff   Vascular: David Whatley MD    Tech/Nurse: Stacy Pratt        Vascular History    Vascular duplex vein mapping lower right (Order #[id number]) on 24     Reason for Exam  Priority: Routine  Pre-Op vascular mapping   Dx: Critical ischemia of lower extremity (HCC) [I70.229 (ICD-10-CM)]     PACS Images     Show images for Vascular duplex vein mapping lower right  Interpretation Summary  Show Result Comparison     Vessel diameters as noted in the table below.     Procedure Details    A gray scale, color Doppler imaging and B-mode ultrasound was performed. During the study longitudinal and transverse views were obtained. Pulsed wave doppler was performed. Overall the study quality was adequate.     Lower Extremity Venous Findings    Right Lower Venous    Right GSV of adequate caliber and quality for bypass.     Right Lower Extremity Venous Measurements     Vein Diam   GSV Thigh Prox 5.5 mm         GSV Thigh Mid 3.8 mm         GSV Thigh Dist 4 mm         GSV At Knee 3.9 mm         GSV Below Knee Prox 4.1 mm         GSV Below Knee Mid 3.5 mm         GSV Below Knee Dist 3.2 mm         GSV Ankle 2.4 mm           Assessment & Plan   ASSESSMENT/PLAN:  1. PAD (peripheral artery disease) (HCC)  2. Below-knee

## 2024-12-18 ENCOUNTER — PREP FOR PROCEDURE (OUTPATIENT)
Dept: VASCULAR SURGERY | Age: 69
End: 2024-12-18

## 2024-12-18 DIAGNOSIS — I73.9 PAD (PERIPHERAL ARTERY DISEASE): ICD-10-CM

## 2024-12-18 DIAGNOSIS — Z01.818 PREOP TESTING: Primary | ICD-10-CM

## 2024-12-18 RX ORDER — SODIUM CHLORIDE 9 MG/ML
INJECTION, SOLUTION INTRAVENOUS PRN
Status: CANCELLED | OUTPATIENT
Start: 2024-12-18

## 2024-12-18 RX ORDER — SODIUM CHLORIDE 0.9 % (FLUSH) 0.9 %
5-40 SYRINGE (ML) INJECTION PRN
Status: CANCELLED | OUTPATIENT
Start: 2024-12-18

## 2024-12-18 RX ORDER — SODIUM CHLORIDE 0.9 % (FLUSH) 0.9 %
5-40 SYRINGE (ML) INJECTION EVERY 12 HOURS SCHEDULED
Status: CANCELLED | OUTPATIENT
Start: 2024-12-18

## 2024-12-18 NOTE — PROGRESS NOTES
[facility] PRE-OPERATIVE INSTRUCTIONS    Day of Procedure:   12/26             Arrival time:  0630              Surgery time:0830    Take the following medications with a sip of water:  Follow your MD/Surgeons pre-procedure instructions regarding your medications     Do not eat or drink anything after 12:00 midnight prior to your surgery.  This includes water, chewing gum, mints and ice chips.   You may brush your teeth and gargle the morning of your surgery, but do not swallow the water.     Please see your family doctor/pediatrician for a history and physical and/or concerning medications.   Bring any test results/reports from your physicians office.   If you are under the care of a heart doctor or specialist doctor, please be aware that you may be asked to see them for clearance.    You may be asked to stop blood thinners such as Coumadin, Plavix, Fragmin, Lovenox, etc., or any anti-inflammatories such as:  Aspirin, Ibuprofen, Advil, Naproxen prior to your surgery.    We also ask that you stop any over the counter medications such as fish oil, vitamin E, glucosamine, garlic, Multivitamins, COQ 10, etc.    We ask that you do not smoke 24 hours prior to surgery.  We ask that you do not  drink any alcoholic beverages 24 hours prior to surgery     You must make arrangements for a responsible adult to take you home after your surgery.    For your safety, you will not be allowed to leave alone or drive yourself home.  Your surgery will be cancelled if you do not have a ride home.     Also for your safety, you must have someone stay with you the first 24 hours after your surgery.     A parent or legal guardian must accompany a child scheduled for surgery and plan to stay at the hospital until the child is discharged.    Please do not bring other children with you.    For your comfort, please wear simple loose fitting clothing to the hospital.  Please do not bring valuables.    Do not wear any make-up on  your surgery.    C-Difficile admission screening and protocol:       * Admitted with diarrhea?                         [] YES    [x]  NO     *Prior history of C-Diff. In last 3 months? [] YES    [x]  NO     *Antibiotic use in the past 6-8 weeks?      [x]  NO    []  YES                 If yes, which ANTIBIOTIC AND REASON______     *Prior hospitalization or nursing home in the last month? []  YES    [x]  NO        SAFETY FIRST...call before you fall!!!

## 2024-12-21 DIAGNOSIS — Z01.818 PREOP TESTING: ICD-10-CM

## 2024-12-21 DIAGNOSIS — I73.9 PAD (PERIPHERAL ARTERY DISEASE) (HCC): ICD-10-CM

## 2024-12-21 LAB
ANION GAP SERPL CALCULATED.3IONS-SCNC: 12 MMOL/L (ref 3–16)
APTT BLD: 32.6 SEC (ref 22.1–36.4)
BUN SERPL-MCNC: 16 MG/DL (ref 7–20)
CALCIUM SERPL-MCNC: 9.4 MG/DL (ref 8.3–10.6)
CHLORIDE SERPL-SCNC: 103 MMOL/L (ref 99–110)
CO2 SERPL-SCNC: 25 MMOL/L (ref 21–32)
CREAT SERPL-MCNC: 1.1 MG/DL (ref 0.8–1.3)
DEPRECATED RDW RBC AUTO: 15 % (ref 12.4–15.4)
GFR SERPLBLD CREATININE-BSD FMLA CKD-EPI: 73 ML/MIN/{1.73_M2}
GLUCOSE SERPL-MCNC: 92 MG/DL (ref 70–99)
HCT VFR BLD AUTO: 45.5 % (ref 40.5–52.5)
HGB BLD-MCNC: 14.7 G/DL (ref 13.5–17.5)
INR PPP: 1.17 (ref 0.85–1.15)
MCH RBC QN AUTO: 27.4 PG (ref 26–34)
MCHC RBC AUTO-ENTMCNC: 32.3 G/DL (ref 31–36)
MCV RBC AUTO: 84.7 FL (ref 80–100)
PLATELET # BLD AUTO: 297 K/UL (ref 135–450)
PMV BLD AUTO: 8.2 FL (ref 5–10.5)
POTASSIUM SERPL-SCNC: 4.7 MMOL/L (ref 3.5–5.1)
PROTHROMBIN TIME: 15.1 SEC (ref 11.9–14.9)
RBC # BLD AUTO: 5.37 M/UL (ref 4.2–5.9)
SODIUM SERPL-SCNC: 140 MMOL/L (ref 136–145)
WBC # BLD AUTO: 10 K/UL (ref 4–11)

## 2024-12-24 ENCOUNTER — ANESTHESIA EVENT (OUTPATIENT)
Dept: OPERATING ROOM | Age: 69
End: 2024-12-24
Payer: MEDICARE

## 2024-12-26 ENCOUNTER — ANESTHESIA (OUTPATIENT)
Dept: OPERATING ROOM | Age: 69
End: 2024-12-26
Payer: MEDICARE

## 2024-12-26 ENCOUNTER — HOSPITAL ENCOUNTER (INPATIENT)
Age: 69
LOS: 2 days | Discharge: NURSING, REHAB OR LONG-TERM CARE | End: 2024-12-28
Attending: SURGERY | Admitting: SURGERY
Payer: MEDICARE

## 2024-12-26 DIAGNOSIS — I73.9 PAD (PERIPHERAL ARTERY DISEASE) (HCC): ICD-10-CM

## 2024-12-26 DIAGNOSIS — I70.211 ATHEROSCLEROSIS OF NATIVE ARTERIES OF EXTREMITIES WITH INTERMITTENT CLAUDICATION, RIGHT LEG (HCC): Primary | ICD-10-CM

## 2024-12-26 LAB
ABO + RH BLD: NORMAL
BLD GP AB SCN SERPL QL: NORMAL

## 2024-12-26 PROCEDURE — 86901 BLOOD TYPING SEROLOGIC RH(D): CPT

## 2024-12-26 PROCEDURE — 6360000002 HC RX W HCPCS: Performed by: SURGERY

## 2024-12-26 PROCEDURE — 86900 BLOOD TYPING SEROLOGIC ABO: CPT

## 2024-12-26 PROCEDURE — 6360000002 HC RX W HCPCS: Performed by: ANESTHESIOLOGY

## 2024-12-26 PROCEDURE — 35355 RECHANNELING OF ARTERY: CPT | Performed by: SURGERY

## 2024-12-26 PROCEDURE — C1768 GRAFT, VASCULAR: HCPCS | Performed by: SURGERY

## 2024-12-26 PROCEDURE — 2100000000 HC CCU R&B

## 2024-12-26 PROCEDURE — 35372 RECHANNELING OF ARTERY: CPT | Performed by: SURGERY

## 2024-12-26 PROCEDURE — 04CK0ZZ EXTIRPATION OF MATTER FROM RIGHT FEMORAL ARTERY, OPEN APPROACH: ICD-10-PCS | Performed by: ORTHOPAEDIC SURGERY

## 2024-12-26 PROCEDURE — 2580000003 HC RX 258: Performed by: SURGERY

## 2024-12-26 PROCEDURE — 2500000003 HC RX 250 WO HCPCS

## 2024-12-26 PROCEDURE — 04CH0ZZ EXTIRPATION OF MATTER FROM RIGHT EXTERNAL ILIAC ARTERY, OPEN APPROACH: ICD-10-PCS | Performed by: ORTHOPAEDIC SURGERY

## 2024-12-26 PROCEDURE — 3700000000 HC ANESTHESIA ATTENDED CARE: Performed by: SURGERY

## 2024-12-26 PROCEDURE — 86850 RBC ANTIBODY SCREEN: CPT

## 2024-12-26 PROCEDURE — 88304 TISSUE EXAM BY PATHOLOGIST: CPT

## 2024-12-26 PROCEDURE — 6370000000 HC RX 637 (ALT 250 FOR IP): Performed by: SURGERY

## 2024-12-26 PROCEDURE — 94150 VITAL CAPACITY TEST: CPT

## 2024-12-26 PROCEDURE — 6360000002 HC RX W HCPCS

## 2024-12-26 PROCEDURE — 2500000003 HC RX 250 WO HCPCS: Performed by: SURGERY

## 2024-12-26 PROCEDURE — 2720000010 HC SURG SUPPLY STERILE: Performed by: SURGERY

## 2024-12-26 PROCEDURE — 2580000003 HC RX 258

## 2024-12-26 PROCEDURE — 3600000004 HC SURGERY LEVEL 4 BASE: Performed by: SURGERY

## 2024-12-26 PROCEDURE — 2700000000 HC OXYGEN THERAPY PER DAY

## 2024-12-26 PROCEDURE — 3700000001 HC ADD 15 MINUTES (ANESTHESIA): Performed by: SURGERY

## 2024-12-26 PROCEDURE — 3600000014 HC SURGERY LEVEL 4 ADDTL 15MIN: Performed by: SURGERY

## 2024-12-26 PROCEDURE — P9045 ALBUMIN (HUMAN), 5%, 250 ML: HCPCS

## 2024-12-26 PROCEDURE — 94761 N-INVAS EAR/PLS OXIMETRY MLT: CPT

## 2024-12-26 PROCEDURE — 2709999900 HC NON-CHARGEABLE SUPPLY: Performed by: SURGERY

## 2024-12-26 PROCEDURE — C1757 CATH, THROMBECTOMY/EMBOLECT: HCPCS | Performed by: SURGERY

## 2024-12-26 DEVICE — XENOSURE BIOLOGIC PATCH, 1CM X 14CM, EIFU
Type: IMPLANTABLE DEVICE | Site: GROIN | Status: FUNCTIONAL
Brand: XENOSURE BIOLOGIC PATCH

## 2024-12-26 RX ORDER — FENTANYL CITRATE 50 UG/ML
50 INJECTION, SOLUTION INTRAMUSCULAR; INTRAVENOUS EVERY 5 MIN PRN
Status: DISCONTINUED | OUTPATIENT
Start: 2024-12-26 | End: 2024-12-27

## 2024-12-26 RX ORDER — PROMETHAZINE HYDROCHLORIDE 25 MG/1
12.5 TABLET ORAL EVERY 6 HOURS PRN
Status: DISCONTINUED | OUTPATIENT
Start: 2024-12-26 | End: 2024-12-28 | Stop reason: HOSPADM

## 2024-12-26 RX ORDER — SUCCINYLCHOLINE/SOD CL,ISO/PF 200MG/10ML
SYRINGE (ML) INTRAVENOUS
Status: DISCONTINUED | OUTPATIENT
Start: 2024-12-26 | End: 2024-12-26 | Stop reason: SDUPTHER

## 2024-12-26 RX ORDER — SODIUM CHLORIDE 0.9 % (FLUSH) 0.9 %
5-40 SYRINGE (ML) INJECTION EVERY 12 HOURS SCHEDULED
Status: DISCONTINUED | OUTPATIENT
Start: 2024-12-26 | End: 2024-12-26 | Stop reason: HOSPADM

## 2024-12-26 RX ORDER — ENOXAPARIN SODIUM 100 MG/ML
30 INJECTION SUBCUTANEOUS 2 TIMES DAILY
Status: DISCONTINUED | OUTPATIENT
Start: 2024-12-26 | End: 2024-12-28 | Stop reason: HOSPADM

## 2024-12-26 RX ORDER — LORAZEPAM 2 MG/ML
0.5 INJECTION INTRAMUSCULAR
Status: COMPLETED | OUTPATIENT
Start: 2024-12-26 | End: 2024-12-26

## 2024-12-26 RX ORDER — SODIUM CHLORIDE 0.9 % (FLUSH) 0.9 %
5-40 SYRINGE (ML) INJECTION PRN
Status: DISCONTINUED | OUTPATIENT
Start: 2024-12-26 | End: 2024-12-28 | Stop reason: HOSPADM

## 2024-12-26 RX ORDER — MIDAZOLAM HYDROCHLORIDE 1 MG/ML
INJECTION, SOLUTION INTRAMUSCULAR; INTRAVENOUS
Status: DISCONTINUED | OUTPATIENT
Start: 2024-12-26 | End: 2024-12-26 | Stop reason: SDUPTHER

## 2024-12-26 RX ORDER — SODIUM CHLORIDE 9 MG/ML
INJECTION, SOLUTION INTRAVENOUS PRN
Status: DISCONTINUED | OUTPATIENT
Start: 2024-12-26 | End: 2024-12-27

## 2024-12-26 RX ORDER — ONDANSETRON 2 MG/ML
4 INJECTION INTRAMUSCULAR; INTRAVENOUS
Status: DISCONTINUED | OUTPATIENT
Start: 2024-12-26 | End: 2024-12-27

## 2024-12-26 RX ORDER — PROTAMINE SULFATE 10 MG/ML
INJECTION, SOLUTION INTRAVENOUS
Status: DISCONTINUED | OUTPATIENT
Start: 2024-12-26 | End: 2024-12-26 | Stop reason: SDUPTHER

## 2024-12-26 RX ORDER — SODIUM CHLORIDE 9 MG/ML
INJECTION, SOLUTION INTRAVENOUS PRN
Status: DISCONTINUED | OUTPATIENT
Start: 2024-12-26 | End: 2024-12-26 | Stop reason: HOSPADM

## 2024-12-26 RX ORDER — FENTANYL CITRATE 50 UG/ML
INJECTION, SOLUTION INTRAMUSCULAR; INTRAVENOUS
Status: DISCONTINUED | OUTPATIENT
Start: 2024-12-26 | End: 2024-12-26 | Stop reason: SDUPTHER

## 2024-12-26 RX ORDER — SODIUM CHLORIDE 0.9 % (FLUSH) 0.9 %
5-40 SYRINGE (ML) INJECTION EVERY 12 HOURS SCHEDULED
Status: DISCONTINUED | OUTPATIENT
Start: 2024-12-26 | End: 2024-12-28 | Stop reason: HOSPADM

## 2024-12-26 RX ORDER — NALOXONE HYDROCHLORIDE 0.4 MG/ML
INJECTION, SOLUTION INTRAMUSCULAR; INTRAVENOUS; SUBCUTANEOUS PRN
Status: DISCONTINUED | OUTPATIENT
Start: 2024-12-26 | End: 2024-12-28 | Stop reason: HOSPADM

## 2024-12-26 RX ORDER — ACETAMINOPHEN 325 MG/1
650 TABLET ORAL EVERY 6 HOURS
Status: DISCONTINUED | OUTPATIENT
Start: 2024-12-26 | End: 2024-12-28 | Stop reason: HOSPADM

## 2024-12-26 RX ORDER — SODIUM CHLORIDE 0.9 % (FLUSH) 0.9 %
5-40 SYRINGE (ML) INJECTION EVERY 12 HOURS SCHEDULED
Status: DISCONTINUED | OUTPATIENT
Start: 2024-12-26 | End: 2024-12-27

## 2024-12-26 RX ORDER — MORPHINE SULFATE 2 MG/ML
2 INJECTION, SOLUTION INTRAMUSCULAR; INTRAVENOUS
Status: DISCONTINUED | OUTPATIENT
Start: 2024-12-26 | End: 2024-12-28 | Stop reason: HOSPADM

## 2024-12-26 RX ORDER — ALBUMIN HUMAN 50 G/1000ML
SOLUTION INTRAVENOUS
Status: DISCONTINUED | OUTPATIENT
Start: 2024-12-26 | End: 2024-12-26 | Stop reason: SDUPTHER

## 2024-12-26 RX ORDER — OXYCODONE HYDROCHLORIDE 10 MG/1
10 TABLET ORAL EVERY 4 HOURS PRN
Status: DISCONTINUED | OUTPATIENT
Start: 2024-12-26 | End: 2024-12-28 | Stop reason: HOSPADM

## 2024-12-26 RX ORDER — OXYCODONE HYDROCHLORIDE 5 MG/1
5 TABLET ORAL EVERY 4 HOURS PRN
Status: DISCONTINUED | OUTPATIENT
Start: 2024-12-26 | End: 2024-12-28 | Stop reason: HOSPADM

## 2024-12-26 RX ORDER — DIPHENHYDRAMINE HYDROCHLORIDE 50 MG/ML
12.5 INJECTION INTRAMUSCULAR; INTRAVENOUS
Status: DISCONTINUED | OUTPATIENT
Start: 2024-12-26 | End: 2024-12-27

## 2024-12-26 RX ORDER — MORPHINE SULFATE 4 MG/ML
4 INJECTION, SOLUTION INTRAMUSCULAR; INTRAVENOUS
Status: DISCONTINUED | OUTPATIENT
Start: 2024-12-26 | End: 2024-12-28 | Stop reason: HOSPADM

## 2024-12-26 RX ORDER — SODIUM CHLORIDE 0.9 % (FLUSH) 0.9 %
5-40 SYRINGE (ML) INJECTION PRN
Status: DISCONTINUED | OUTPATIENT
Start: 2024-12-26 | End: 2024-12-27

## 2024-12-26 RX ORDER — LIDOCAINE HYDROCHLORIDE 20 MG/ML
INJECTION, SOLUTION EPIDURAL; INFILTRATION; INTRACAUDAL; PERINEURAL
Status: DISCONTINUED | OUTPATIENT
Start: 2024-12-26 | End: 2024-12-26 | Stop reason: SDUPTHER

## 2024-12-26 RX ORDER — MEPERIDINE HYDROCHLORIDE 50 MG/ML
12.5 INJECTION INTRAMUSCULAR; INTRAVENOUS; SUBCUTANEOUS
Status: DISCONTINUED | OUTPATIENT
Start: 2024-12-26 | End: 2024-12-27

## 2024-12-26 RX ORDER — LABETALOL HYDROCHLORIDE 5 MG/ML
INJECTION, SOLUTION INTRAVENOUS
Status: DISCONTINUED | OUTPATIENT
Start: 2024-12-26 | End: 2024-12-26 | Stop reason: SDUPTHER

## 2024-12-26 RX ORDER — ROCURONIUM BROMIDE 10 MG/ML
INJECTION, SOLUTION INTRAVENOUS
Status: DISCONTINUED | OUTPATIENT
Start: 2024-12-26 | End: 2024-12-26 | Stop reason: SDUPTHER

## 2024-12-26 RX ORDER — HALOPERIDOL 5 MG/ML
1 INJECTION INTRAMUSCULAR
Status: DISCONTINUED | OUTPATIENT
Start: 2024-12-26 | End: 2024-12-27

## 2024-12-26 RX ORDER — SODIUM CHLORIDE 9 MG/ML
INJECTION, SOLUTION INTRAVENOUS CONTINUOUS
Status: DISCONTINUED | OUTPATIENT
Start: 2024-12-26 | End: 2024-12-28 | Stop reason: HOSPADM

## 2024-12-26 RX ORDER — ONDANSETRON 2 MG/ML
INJECTION INTRAMUSCULAR; INTRAVENOUS
Status: DISCONTINUED | OUTPATIENT
Start: 2024-12-26 | End: 2024-12-26 | Stop reason: SDUPTHER

## 2024-12-26 RX ORDER — FENTANYL CITRATE 50 UG/ML
INJECTION, SOLUTION INTRAMUSCULAR; INTRAVENOUS
Status: COMPLETED
Start: 2024-12-26 | End: 2024-12-26

## 2024-12-26 RX ORDER — SODIUM CHLORIDE 9 MG/ML
INJECTION, SOLUTION INTRAVENOUS PRN
Status: DISCONTINUED | OUTPATIENT
Start: 2024-12-26 | End: 2024-12-28 | Stop reason: HOSPADM

## 2024-12-26 RX ORDER — PROPOFOL 10 MG/ML
INJECTION, EMULSION INTRAVENOUS
Status: DISCONTINUED | OUTPATIENT
Start: 2024-12-26 | End: 2024-12-26 | Stop reason: SDUPTHER

## 2024-12-26 RX ORDER — DEXAMETHASONE SODIUM PHOSPHATE 4 MG/ML
INJECTION, SOLUTION INTRA-ARTICULAR; INTRALESIONAL; INTRAMUSCULAR; INTRAVENOUS; SOFT TISSUE
Status: DISCONTINUED | OUTPATIENT
Start: 2024-12-26 | End: 2024-12-26 | Stop reason: SDUPTHER

## 2024-12-26 RX ORDER — HEPARIN SODIUM 1000 [USP'U]/ML
INJECTION, SOLUTION INTRAVENOUS; SUBCUTANEOUS
Status: DISCONTINUED | OUTPATIENT
Start: 2024-12-26 | End: 2024-12-26 | Stop reason: SDUPTHER

## 2024-12-26 RX ORDER — ONDANSETRON 2 MG/ML
4 INJECTION INTRAMUSCULAR; INTRAVENOUS EVERY 6 HOURS PRN
Status: DISCONTINUED | OUTPATIENT
Start: 2024-12-26 | End: 2024-12-28 | Stop reason: HOSPADM

## 2024-12-26 RX ORDER — SODIUM CHLORIDE 0.9 % (FLUSH) 0.9 %
5-40 SYRINGE (ML) INJECTION PRN
Status: DISCONTINUED | OUTPATIENT
Start: 2024-12-26 | End: 2024-12-26 | Stop reason: HOSPADM

## 2024-12-26 RX ORDER — POLYETHYLENE GLYCOL 3350 17 G/17G
17 POWDER, FOR SOLUTION ORAL DAILY
Status: DISCONTINUED | OUTPATIENT
Start: 2024-12-26 | End: 2024-12-28 | Stop reason: HOSPADM

## 2024-12-26 RX ADMIN — HEPARIN SODIUM 8000 UNITS: 1000 INJECTION INTRAVENOUS; SUBCUTANEOUS at 09:28

## 2024-12-26 RX ADMIN — OXYCODONE HYDROCHLORIDE 10 MG: 10 TABLET ORAL at 17:27

## 2024-12-26 RX ADMIN — Medication 160 MG: at 08:37

## 2024-12-26 RX ADMIN — SODIUM CHLORIDE 2000 MG: 900 INJECTION INTRAVENOUS at 08:40

## 2024-12-26 RX ADMIN — ENOXAPARIN SODIUM 30 MG: 100 INJECTION SUBCUTANEOUS at 20:15

## 2024-12-26 RX ADMIN — ALBUMIN (HUMAN) 12.5 G: 12.5 INJECTION, SOLUTION INTRAVENOUS at 08:42

## 2024-12-26 RX ADMIN — HYDROMORPHONE HYDROCHLORIDE 0.25 MG: 1 INJECTION, SOLUTION INTRAMUSCULAR; INTRAVENOUS; SUBCUTANEOUS at 12:14

## 2024-12-26 RX ADMIN — FENTANYL CITRATE 50 MCG: 50 INJECTION INTRAMUSCULAR; INTRAVENOUS at 11:40

## 2024-12-26 RX ADMIN — ACETAMINOPHEN 650 MG: 325 TABLET ORAL at 13:12

## 2024-12-26 RX ADMIN — FENTANYL CITRATE 25 MCG: 50 INJECTION INTRAMUSCULAR; INTRAVENOUS at 08:55

## 2024-12-26 RX ADMIN — SODIUM CHLORIDE: 9 INJECTION, SOLUTION INTRAVENOUS at 11:39

## 2024-12-26 RX ADMIN — MORPHINE SULFATE 4 MG: 4 INJECTION, SOLUTION INTRAMUSCULAR; INTRAVENOUS at 15:23

## 2024-12-26 RX ADMIN — PHENYLEPHRINE HYDROCHLORIDE 25 MCG/MIN: 10 INJECTION INTRAVENOUS at 08:43

## 2024-12-26 RX ADMIN — OXYCODONE HYDROCHLORIDE 10 MG: 10 TABLET ORAL at 21:42

## 2024-12-26 RX ADMIN — ROCURONIUM BROMIDE 20 MG: 10 INJECTION, SOLUTION INTRAVENOUS at 09:48

## 2024-12-26 RX ADMIN — PROPOFOL 40 MG: 10 INJECTION, EMULSION INTRAVENOUS at 08:37

## 2024-12-26 RX ADMIN — ACETAMINOPHEN 650 MG: 325 TABLET ORAL at 20:15

## 2024-12-26 RX ADMIN — LABETALOL HYDROCHLORIDE 5 MG: 5 INJECTION, SOLUTION INTRAVENOUS at 11:17

## 2024-12-26 RX ADMIN — HYDROMORPHONE HYDROCHLORIDE 0.25 MG: 1 INJECTION, SOLUTION INTRAMUSCULAR; INTRAVENOUS; SUBCUTANEOUS at 12:56

## 2024-12-26 RX ADMIN — HEPARIN SODIUM 1000 UNITS: 1000 INJECTION INTRAVENOUS; SUBCUTANEOUS at 10:02

## 2024-12-26 RX ADMIN — FENTANYL CITRATE 50 MCG: 50 INJECTION INTRAMUSCULAR; INTRAVENOUS at 11:50

## 2024-12-26 RX ADMIN — SODIUM CHLORIDE: 9 INJECTION, SOLUTION INTRAVENOUS at 07:33

## 2024-12-26 RX ADMIN — ONDANSETRON 4 MG: 2 INJECTION, SOLUTION INTRAMUSCULAR; INTRAVENOUS at 08:38

## 2024-12-26 RX ADMIN — PROTAMINE SULFATE 40 MG: 10 INJECTION, SOLUTION INTRAVENOUS at 10:39

## 2024-12-26 RX ADMIN — SODIUM CHLORIDE, PRESERVATIVE FREE 10 ML: 5 INJECTION INTRAVENOUS at 20:17

## 2024-12-26 RX ADMIN — ROCURONIUM BROMIDE 5 MG: 10 INJECTION, SOLUTION INTRAVENOUS at 08:37

## 2024-12-26 RX ADMIN — PHENYLEPHRINE HYDROCHLORIDE 100 MCG: 10 INJECTION INTRAVENOUS at 08:44

## 2024-12-26 RX ADMIN — LORAZEPAM 0.5 MG: 2 INJECTION INTRAMUSCULAR; INTRAVENOUS at 14:15

## 2024-12-26 RX ADMIN — MORPHINE SULFATE 2 MG: 2 INJECTION, SOLUTION INTRAMUSCULAR; INTRAVENOUS at 13:09

## 2024-12-26 RX ADMIN — MORPHINE SULFATE 4 MG: 4 INJECTION, SOLUTION INTRAMUSCULAR; INTRAVENOUS at 18:38

## 2024-12-26 RX ADMIN — ROCURONIUM BROMIDE 55 MG: 10 INJECTION, SOLUTION INTRAVENOUS at 08:42

## 2024-12-26 RX ADMIN — FENTANYL CITRATE 50 MCG: 50 INJECTION INTRAMUSCULAR; INTRAVENOUS at 10:18

## 2024-12-26 RX ADMIN — Medication 40 MG: at 08:37

## 2024-12-26 RX ADMIN — HYDROMORPHONE HYDROCHLORIDE 0.25 MG: 1 INJECTION, SOLUTION INTRAMUSCULAR; INTRAVENOUS; SUBCUTANEOUS at 12:51

## 2024-12-26 RX ADMIN — Medication 10 MG: at 09:37

## 2024-12-26 RX ADMIN — MIDAZOLAM 2 MG: 1 INJECTION INTRAMUSCULAR; INTRAVENOUS at 08:30

## 2024-12-26 RX ADMIN — SUGAMMADEX 250 MG: 100 INJECTION, SOLUTION INTRAVENOUS at 10:47

## 2024-12-26 RX ADMIN — DEXAMETHASONE SODIUM PHOSPHATE 6 MG: 4 INJECTION, SOLUTION INTRAMUSCULAR; INTRAVENOUS at 08:38

## 2024-12-26 RX ADMIN — LIDOCAINE HYDROCHLORIDE 100 MG: 20 INJECTION, SOLUTION EPIDURAL; INFILTRATION; INTRACAUDAL; PERINEURAL at 08:37

## 2024-12-26 RX ADMIN — HYDROMORPHONE HYDROCHLORIDE 0.25 MG: 1 INJECTION, SOLUTION INTRAMUSCULAR; INTRAVENOUS; SUBCUTANEOUS at 12:02

## 2024-12-26 RX ADMIN — FENTANYL CITRATE 50 MCG: 50 INJECTION INTRAMUSCULAR; INTRAVENOUS at 20:23

## 2024-12-26 RX ADMIN — LABETALOL HYDROCHLORIDE 10 MG: 5 INJECTION, SOLUTION INTRAVENOUS at 11:21

## 2024-12-26 RX ADMIN — PHENYLEPHRINE HYDROCHLORIDE 100 MCG: 10 INJECTION INTRAVENOUS at 08:50

## 2024-12-26 RX ADMIN — OXYCODONE HYDROCHLORIDE 10 MG: 10 TABLET ORAL at 13:11

## 2024-12-26 RX ADMIN — LABETALOL HYDROCHLORIDE 10 MG: 5 INJECTION, SOLUTION INTRAVENOUS at 11:23

## 2024-12-26 RX ADMIN — FENTANYL CITRATE 25 MCG: 50 INJECTION INTRAMUSCULAR; INTRAVENOUS at 08:30

## 2024-12-26 RX ADMIN — PHENYLEPHRINE HYDROCHLORIDE 200 MCG: 10 INJECTION INTRAVENOUS at 08:54

## 2024-12-26 RX ADMIN — PROTAMINE SULFATE 10 MG: 10 INJECTION, SOLUTION INTRAVENOUS at 10:52

## 2024-12-26 ASSESSMENT — PAIN DESCRIPTION - LOCATION
LOCATION: FOOT;GROIN
LOCATION: FOOT;GROIN
LOCATION: LEG;GROIN
LOCATION: GROIN

## 2024-12-26 ASSESSMENT — PAIN DESCRIPTION - DESCRIPTORS
DESCRIPTORS: ACHING;DULL
DESCRIPTORS: ACHING
DESCRIPTORS: ACHING
DESCRIPTORS: ACHING;BURNING
DESCRIPTORS: ACHING
DESCRIPTORS: ACHING
DESCRIPTORS: ACHING;DULL
DESCRIPTORS: ACHING
DESCRIPTORS: SHARP;DISCOMFORT
DESCRIPTORS: ACHING
DESCRIPTORS: ACHING

## 2024-12-26 ASSESSMENT — PAIN SCALES - GENERAL
PAINLEVEL_OUTOF10: 6
PAINLEVEL_OUTOF10: 7
PAINLEVEL_OUTOF10: 10
PAINLEVEL_OUTOF10: 10
PAINLEVEL_OUTOF10: 8
PAINLEVEL_OUTOF10: 9
PAINLEVEL_OUTOF10: 8
PAINLEVEL_OUTOF10: 7
PAINLEVEL_OUTOF10: 9
PAINLEVEL_OUTOF10: 9
PAINLEVEL_OUTOF10: 8
PAINLEVEL_OUTOF10: 8
PAINLEVEL_OUTOF10: 7
PAINLEVEL_OUTOF10: 9
PAINLEVEL_OUTOF10: 7
PAINLEVEL_OUTOF10: 8
PAINLEVEL_OUTOF10: 7
PAINLEVEL_OUTOF10: 7
PAINLEVEL_OUTOF10: 9
PAINLEVEL_OUTOF10: 8
PAINLEVEL_OUTOF10: 7
PAINLEVEL_OUTOF10: 6
PAINLEVEL_OUTOF10: 7
PAINLEVEL_OUTOF10: 8
PAINLEVEL_OUTOF10: 6

## 2024-12-26 ASSESSMENT — PAIN DESCRIPTION - ORIENTATION
ORIENTATION: RIGHT

## 2024-12-26 ASSESSMENT — PAIN - FUNCTIONAL ASSESSMENT: PAIN_FUNCTIONAL_ASSESSMENT: 0-10

## 2024-12-26 ASSESSMENT — LIFESTYLE VARIABLES: SMOKING_STATUS: 0

## 2024-12-26 NOTE — INTERVAL H&P NOTE
Update History & Physical    The patient's History and Physical of December 16, 2024 was reviewed with the patient and I examined the patient. There was no change. The surgical site was confirmed by the patient and me.     Plan: After discussion with the patient plan was changed from right SFA to posterior tibial artery bypass to right femoral endarterectomy with profundoplasty to improve inflow for future bypass.  The risks, benefits, expected outcome, and alternative to the recommended procedure have been discussed with the patient. Patient understands and wants to proceed with the procedure.     Electronically signed by David Whatley MD on 12/26/2024 at 8:19 AM

## 2024-12-26 NOTE — ANESTHESIA PRE PROCEDURE
Department of Anesthesiology  Preprocedure Note       Name:  Harvey Kaminski   Age:  69 y.o.  :  1955                                          MRN:  [id number]         Date:  2024      Surgeon: Surgeon(s):  David Whatley MD    Procedure: Procedure(s):  RIGHT FEMORAL POSTERIOR TIBIAL BYPASS    Medications prior to admission:   Prior to Admission medications    Medication Sig Start Date End Date Taking? Authorizing Provider   Loratadine-Pseudoephedrine (CLARITIN-D 24 HOUR PO) Take by mouth daily   Yes Yuli Jackson MD   XARELTO 2.5 MG TABS tablet TAKE 1 TABLET BY MOUTH TWICE A DAY 10/30/24  Yes Espinoza Richard MD   clopidogrel (PLAVIX) 75 MG tablet TAKE 1 TABLET BY MOUTH EVERY DAY 24  Yes Espinoza Richard MD   DULoxetine HCl 30 MG CSDR Take by mouth 24  Yes Yuli Jackson MD   atorvastatin (LIPITOR) 80 MG tablet TAKE 1 TABLET BY MOUTH EVERY DAY 3/29/24  Yes Espinoza Richard MD   amLODIPine (NORVASC) 5 MG tablet TAKE 1 TABLET BY MOUTH EVERY DAY 24  Yes Trung Cuba MD   Omega-3 Fatty Acids (FISH OIL) 1200 MG CPDR Take by mouth   Yes Yuli Jackson MD   vitamin B-12 (CYANOCOBALAMIN) 1000 MCG tablet Take 1 tablet by mouth daily   Yes Yuli Jackson MD   zinc gluconate 50 MG tablet Take 1 tablet by mouth daily   Yes Yuli Jackson MD   vitamin C (ASCORBIC ACID) 500 MG tablet Take 2 tablets by mouth daily   Yes Yuli Jackson MD   turmeric 500 MG CAPS Take by mouth daily   Yes Yuli Jackson MD   Coenzyme Q10 (COQ10) 100 MG CAPS Take by mouth   Yes Yuli Jackson MD   vitamin D3 (CHOLECALCIFEROL) 10 MCG (400 UNIT) TABS tablet Take 1 tablet by mouth daily   Yes Yuli Jackson MD   vitamin E 400 UNIT capsule Take 1 capsule by mouth daily   Yes Yuli Jackson MD   [employer]S ASPIRIN ADULT LOW DOSE 81 MG chewable tablet TAKE 1 TABLET BY MOUTH EVERY DAY 22  Yes Trung Cuba MD   QUEtiapine (SEROQUEL) 100 MG tablet Take 1 tablet

## 2024-12-26 NOTE — PROGRESS NOTES
Pt arrived to CVU at 1150. 10/10 pain, see MAR for administration. Groin has GIAN drain that had issues with seal. Clean, dry and intact. No hematoma. AOx4, left BKA, R pedal pulse strong doppler, R posterior Tib very weak doppler.       4 Eyes Skin Assessment     NAME:  Harvey Kaminski  YOB: 1955  MEDICAL RECORD NUMBER:  [id number]    The patient is being assessed for  Transfer to New Unit    I agree that at least one RN has performed a thorough Head to Toe Skin Assessment on the patient. ALL assessment sites listed below have been assessed.      Areas assessed by both nurses:    Head, Face, Ears, Shoulders, Back, Chest, Arms, Elbows, Hands, Sacrum. Buttock, Coccyx, Ischium, Legs. Feet and Heels, and Under Medical Devices         Does the Patient have a Wound? No noted wound(s)       Kip Prevention initiated by RN: Yes  Wound Care Orders initiated by RN: Yes    Pressure Injury (Stage 3,4, Unstageable, DTI, NWPT, and Complex wounds) if present, place Wound referral order by RN under : No    New Ostomies, if present place, Ostomy referral order under : No     Nurse 1 eSignature: Electronically signed by Eloisa Maldonado RN on 12/26/24 at 4:36 PM EST    **SHARE this note so that the co-signing nurse can place an eSignature**    Nurse 2 eSignature: {Esignature:[id number]}

## 2024-12-26 NOTE — BRIEF OP NOTE
Brief Postoperative Note      Patient: Harvey Kaminski  YOB: 1955  MRN: [id number]    Date of Procedure: 12/26/2024    Pre-Op Diagnosis Codes:      * PAD (peripheral artery disease) ([facility]) [I73.9]    Post-Op Diagnosis: Same       Procedure(s):  RIGHT FEMORAL ENDARTERECTOMY AND PROFUNDAPLASTY, RIGHT FEMORAL THROMBECTOMY    Surgeon(s):  David Whatley MD    Assistant:  Surgical Assistant: Billie Martínez Casey R    Anesthesia: General    Estimated Blood Loss (mL): 150    Complications: None    Specimens:   ID Type Source Tests Collected by Time Destination   A : SUPERFICIAL FEMORAL ARTERY THROMBUS Tissue Tissue SURGICAL PATHOLOGY David Whatley MD 12/26/2024 0950    B : RIGHT FEMORAL ARTERY PLAQUE Tissue Tissue SURGICAL PATHOLOGY David Whatley MD 12/26/2024 0952        Implants:  Implant Name Type Inv. Item Serial No.  Lot No. LRB No. Used Action   GRAFT VASCULAR PATCH 1X14CM TAPERED - [id number]  GRAFT VASCULAR PATCH 1X14CM TAPERED  [employer]- [id number] Implanted         Drains:   Negative Pressure Wound Therapy Groin (Active)       Urinary Catheter 12/26/24 2 Way (Active)   $ Urethral catheter insertion Inserted for procedure 12/26/24 1044   Catheter Indications Perioperative use for selected surgical procedures 12/26/24 1044   Urine Color Yellow 12/26/24 1044   Urine Appearance Clear 12/26/24 1044   Securement Method Tape 12/26/24 1044       Findings:  Infection Present At Time Of Surgery (PATOS) (choose all levels that have infection present):  No infection present  Other Findings: significant atherosclerotic plaque no back bleeding from SFA therefore thrombectomy performed with significant thrombus removal and improved back bleeding.  Patch angioplasty completed.  Good flow via doppler at completion.     [id number]    Electronically signed by David Whatley MD on 12/26/2024 at 11:22 AM

## 2024-12-26 NOTE — OP NOTE
noted intermittent nocturnal rest pain, especially with elevation.  The patient has undergone multiple previous procedures with Cardiology for management of his PAD including a right popliteal stent.  At last angiogram, the stent was noted to be occluded and therefore, he was sent to me for evaluation.  I discussed with him possibility of bypass as well as endarterectomy and recommended endarterectomy initially, which he agreed to.    DESCRIPTION OF PROCEDURE:  The patient was identified in the preoperative holding area, where consent was obtained.  The risks, benefits, and alternatives were explained.  The patient agreed to proceed and signed informed consent.  The patient was taken to the operative suite, where he was placed supine on the operating room table.  General endotracheal anesthesia was initiated.  The patient was prepped and draped in the usual sterile fashion.  A time-out was completed.  The patient, surgical site, and procedure were verified.  Attention was turned to the patient's right groin.  A vertical incision was made over the common femoral artery, and electrocautery was used to dissect down through subcutaneous tissues.  The inguinal ligament was identified, and I then dissected distally from the inguinal ligament over top of the femoral artery onto the SFA.  I did have to extend the incision slightly onto the anterior thigh.  I then used Metzenbaum scissors to sharply dissect around the common femoral artery.  I did have to ligate a couple of arterial branches to mobilize the femoral artery enough to visualize the profunda.  I then dissected onto the profunda and up to the second surgical portion of the profunda, where the first large major branches were.  At this point, the branches of the profunda felt soft without significant calcific plaque.  I then turned my attention proximally dissecting underneath the inguinal ligament, identifying the vein and ligating it, and then retracting the  inguinal ligament, the distal external iliac artery was noted to be soft and appropriate for clamping.  I encircled the lateral circumflex artery as well as the epigastric artery with vessel loops to control them.  I also encircled distally the branches of the profunda with vessel loops to control them.  At this point in time, the patient was systemically heparinized.  After adequate circulation time, I clamped proximally and distally and cinched the Ponce ties on the smaller vessels to occlude flow.  I then created an arteriotomy with an 11 blade scalpel and extended with Ponce scissors, first extending proximally to the level of the inguinal ligament, then distally onto the anterior aspect of the profunda opening the entire first surgical portion of the profunda femoris.  With this exposed, an elevator was used to elevate the significant calcific plaque.  I transected the plaque at the level of the common femoral bifurcation.  I proceeded distally first endarterectomizing the profunda and using eversion technique to endarterectomize the 2 major branches of the profunda that were clamped and then turned my attention to the SFA performing an eversion endarterectomy of the SFA.  This was passed to the back table to be a specimen.  I then proceeded with endarterectomy proximally removing a significant amount of plaque in the common femoral artery.  The plaque feathered appropriately and there was a good endpoint.  I then back bled all vessels, the branches of the profunda, which had good brisk backbleeding.  I flushed the external iliac, which had good strong bleeding and then on backbleeding of the SFA, there was no backbleeding noted.  I therefore passed a #3 Daija down the SFA removing significant amount of clot.  I passed the Daija a total of 4 times.  In the last 2 passes, the 3rd pass, there was minimal thrombus to retrieve and on the 4th pass, the balloon ruptured, but there was good backbleeding and  therefore, I elected to not further thrombectomize the artery.  At this point in time, all vessels were re-clamped.  I then had the 1 x 14 cm bovine pericardial patch passed onto the field and sutured this in place, sewing from proximal to the midportion with 5-0 Prolene and then sewing from the distal end of the profunda to the midportion with 5-0 Prolene.  Two 5-0 Prolene sutures were tied in the middle.  Prior to tying down the sutures, I did backbleed all vessels as well as flush the external iliac to de-air the graft as well as to assure no   thrombus in these vessels and none was noted.  I then tied down the patch and restored flow, and there was some bleeding that required repair on the profunda as well as the area of where the plaque tore through on the endarterectomy of the SFA.  These were all repaired with 6-0 Prolene suture with good hemostasis.  Once adequate hemostasis was achieved with suture repair.  Heparin was reversed with protamine.  The wound was then packed with fibrillar and pressure held to assist with hemostasis of needle hole bleeding.  Once hemostasis was achieved with this, the wound was copiously irrigated with sterile saline.  The patch repair was inspected one last time and Doppler signal obtained.  There was noted to be good Doppler signal in the profunda.  There was some water-hammer signal in the SFA, though the main flow was through the profunda.  The wound was irrigated one last time and then closed in multiple layers using 2-0 Vicryl for deep tissues, 3-0 Vicryl for subcutaneous tissues, and 4-0 Monocryl for skin.  Skin was covered with a GIAN device.  The patient awakened from anesthesia, extubated in the OR suite, and taken to the CVU in stable condition.  There were no noted immediate postoperative complications.  All instrument, needle, and sponge counts were correct at the end of the procedure.          DA TURNER MD      D:  12/26/2024 11:34:37     T:  12/26/2024 16:51:40

## 2024-12-26 NOTE — ANESTHESIA POSTPROCEDURE EVALUATION
Department of Anesthesiology  Postprocedure Note    Patient: Harvey Kaminski  MRN: [id number]  YOB: 1955  Date of evaluation: 12/26/2024    Procedure Summary       Date: 12/26/24 Room / Location: [address]    Anesthesia Start: 0830 Anesthesia Stop: 1129    Procedure: RIGHT FEMORAL ENDARTERECTOMY AND PROFUNDAPLASTY, RIGHT FEMORAL THROMBECTOMY (Right: Leg Upper) Diagnosis:       PAD (peripheral artery disease) (HCC)      (PAD (peripheral artery disease) (HCC) [I73.9])    Surgeons: David Whatley MD Responsible Provider: Jean-Claude Putnam MD    Anesthesia Type: General ASA Status: 3            Anesthesia Type: General    Fernando Phase I: Fernando Score: 10    Fernando Phase II:      Anesthesia Post Evaluation    Patient location during evaluation: PACU  Level of consciousness: awake and alert  Airway patency: patent  Nausea & Vomiting: no nausea and no vomiting  Cardiovascular status: blood pressure returned to baseline  Respiratory status: acceptable  Hydration status: euvolemic  Comments: Postoperative Anesthesia Note    Name:    Harvey Kaminski  MRN:      [id number]    Patient Vitals in the past 12 hrs:  12/26/24 1300, BP:(!) 113/59, Pulse:81, Resp:14  12/26/24 1256, Pulse:82, Resp:16  12/26/24 1200, BP:120/68, Temp:97.9 °F (36.6 °C), Temp src:Oral, Pulse:82, Resp:23  12/26/24 1154, Pulse:80, Resp:23, SpO2:98 %  12/26/24 1150, BP:134/61, Pulse:81, Resp:17  12/26/24 0720, BP:(!) 143/77, Temp:98 °F (36.7 °C), Temp src:Oral, Pulse:70, Resp:18, SpO2:96 %, Height:1.829 m (6'), Weight:104.3 kg (230 lb)     LABS:    CBC  Lab Results       Component                Value               Date/Time                  WBC                      10.0                12/21/2024 10:49 AM        HGB                      14.7                12/21/2024 10:49 AM        HCT                      45.5                12/21/2024 10:49 AM        PLT                      297                 12/21/2024 10:49 AM

## 2024-12-27 LAB
ANION GAP SERPL CALCULATED.3IONS-SCNC: 10 MMOL/L (ref 3–16)
BUN SERPL-MCNC: 19 MG/DL (ref 7–20)
CALCIUM SERPL-MCNC: 8 MG/DL (ref 8.3–10.6)
CHLORIDE SERPL-SCNC: 104 MMOL/L (ref 99–110)
CO2 SERPL-SCNC: 23 MMOL/L (ref 21–32)
CREAT SERPL-MCNC: 1.1 MG/DL (ref 0.8–1.3)
DEPRECATED RDW RBC AUTO: 15.2 % (ref 12.4–15.4)
GFR SERPLBLD CREATININE-BSD FMLA CKD-EPI: 73 ML/MIN/{1.73_M2}
GLUCOSE SERPL-MCNC: 135 MG/DL (ref 70–99)
HCT VFR BLD AUTO: 36.3 % (ref 40.5–52.5)
HGB BLD-MCNC: 12.1 G/DL (ref 13.5–17.5)
MCH RBC QN AUTO: 27.4 PG (ref 26–34)
MCHC RBC AUTO-ENTMCNC: 33.2 G/DL (ref 31–36)
MCV RBC AUTO: 82.5 FL (ref 80–100)
PLATELET # BLD AUTO: 255 K/UL (ref 135–450)
PMV BLD AUTO: 7.7 FL (ref 5–10.5)
POTASSIUM SERPL-SCNC: 4.3 MMOL/L (ref 3.5–5.1)
RBC # BLD AUTO: 4.4 M/UL (ref 4.2–5.9)
SODIUM SERPL-SCNC: 137 MMOL/L (ref 136–145)
WBC # BLD AUTO: 12.1 K/UL (ref 4–11)

## 2024-12-27 PROCEDURE — 97166 OT EVAL MOD COMPLEX 45 MIN: CPT

## 2024-12-27 PROCEDURE — 2100000000 HC CCU R&B

## 2024-12-27 PROCEDURE — 85027 COMPLETE CBC AUTOMATED: CPT

## 2024-12-27 PROCEDURE — 6360000002 HC RX W HCPCS: Performed by: SURGERY

## 2024-12-27 PROCEDURE — 97535 SELF CARE MNGMENT TRAINING: CPT

## 2024-12-27 PROCEDURE — 97162 PT EVAL MOD COMPLEX 30 MIN: CPT

## 2024-12-27 PROCEDURE — 99024 POSTOP FOLLOW-UP VISIT: CPT | Performed by: SURGERY

## 2024-12-27 PROCEDURE — 2580000003 HC RX 258: Performed by: SURGERY

## 2024-12-27 PROCEDURE — 97530 THERAPEUTIC ACTIVITIES: CPT

## 2024-12-27 PROCEDURE — 80048 BASIC METABOLIC PNL TOTAL CA: CPT

## 2024-12-27 PROCEDURE — 2500000003 HC RX 250 WO HCPCS: Performed by: SURGERY

## 2024-12-27 PROCEDURE — 94761 N-INVAS EAR/PLS OXIMETRY MLT: CPT

## 2024-12-27 PROCEDURE — 36415 COLL VENOUS BLD VENIPUNCTURE: CPT

## 2024-12-27 PROCEDURE — 6370000000 HC RX 637 (ALT 250 FOR IP): Performed by: SURGERY

## 2024-12-27 RX ORDER — ATORVASTATIN CALCIUM 80 MG/1
80 TABLET, FILM COATED ORAL DAILY
Status: DISCONTINUED | OUTPATIENT
Start: 2024-12-27 | End: 2024-12-28 | Stop reason: HOSPADM

## 2024-12-27 RX ORDER — QUETIAPINE FUMARATE 100 MG/1
100 TABLET, FILM COATED ORAL NIGHTLY
Status: DISCONTINUED | OUTPATIENT
Start: 2024-12-27 | End: 2024-12-28 | Stop reason: HOSPADM

## 2024-12-27 RX ORDER — CLOPIDOGREL BISULFATE 75 MG/1
75 TABLET ORAL DAILY
Status: DISCONTINUED | OUTPATIENT
Start: 2024-12-27 | End: 2024-12-28 | Stop reason: HOSPADM

## 2024-12-27 RX ORDER — METOPROLOL TARTRATE 25 MG/1
25 TABLET, FILM COATED ORAL 2 TIMES DAILY
Status: DISCONTINUED | OUTPATIENT
Start: 2024-12-27 | End: 2024-12-28 | Stop reason: HOSPADM

## 2024-12-27 RX ORDER — GABAPENTIN 400 MG/1
800 CAPSULE ORAL 3 TIMES DAILY
Status: DISCONTINUED | OUTPATIENT
Start: 2024-12-27 | End: 2024-12-28 | Stop reason: HOSPADM

## 2024-12-27 RX ORDER — ASPIRIN 81 MG/1
81 TABLET, CHEWABLE ORAL DAILY
Status: DISCONTINUED | OUTPATIENT
Start: 2024-12-27 | End: 2024-12-28 | Stop reason: HOSPADM

## 2024-12-27 RX ORDER — AMLODIPINE BESYLATE 5 MG/1
5 TABLET ORAL DAILY
Status: DISCONTINUED | OUTPATIENT
Start: 2024-12-27 | End: 2024-12-28 | Stop reason: HOSPADM

## 2024-12-27 RX ADMIN — SODIUM CHLORIDE, PRESERVATIVE FREE 10 ML: 5 INJECTION INTRAVENOUS at 23:03

## 2024-12-27 RX ADMIN — ATORVASTATIN CALCIUM 80 MG: 80 TABLET, FILM COATED ORAL at 12:45

## 2024-12-27 RX ADMIN — ASPIRIN 81 MG: 81 TABLET, CHEWABLE ORAL at 12:46

## 2024-12-27 RX ADMIN — OXYCODONE HYDROCHLORIDE 10 MG: 10 TABLET ORAL at 11:06

## 2024-12-27 RX ADMIN — GABAPENTIN 800 MG: 400 CAPSULE ORAL at 12:45

## 2024-12-27 RX ADMIN — OXYCODONE HYDROCHLORIDE 10 MG: 10 TABLET ORAL at 06:18

## 2024-12-27 RX ADMIN — SODIUM CHLORIDE: 9 INJECTION, SOLUTION INTRAVENOUS at 16:08

## 2024-12-27 RX ADMIN — MORPHINE SULFATE 4 MG: 4 INJECTION, SOLUTION INTRAMUSCULAR; INTRAVENOUS at 07:40

## 2024-12-27 RX ADMIN — OXYCODONE HYDROCHLORIDE 10 MG: 10 TABLET ORAL at 01:49

## 2024-12-27 RX ADMIN — QUETIAPINE FUMARATE 100 MG: 100 TABLET ORAL at 23:00

## 2024-12-27 RX ADMIN — MORPHINE SULFATE 4 MG: 4 INJECTION, SOLUTION INTRAMUSCULAR; INTRAVENOUS at 03:08

## 2024-12-27 RX ADMIN — METOPROLOL TARTRATE 25 MG: 25 TABLET, FILM COATED ORAL at 23:01

## 2024-12-27 RX ADMIN — MORPHINE SULFATE 4 MG: 4 INJECTION, SOLUTION INTRAMUSCULAR; INTRAVENOUS at 17:14

## 2024-12-27 RX ADMIN — ENOXAPARIN SODIUM 30 MG: 100 INJECTION SUBCUTANEOUS at 23:02

## 2024-12-27 RX ADMIN — GABAPENTIN 800 MG: 400 CAPSULE ORAL at 20:12

## 2024-12-27 RX ADMIN — MORPHINE SULFATE 4 MG: 4 INJECTION, SOLUTION INTRAMUSCULAR; INTRAVENOUS at 20:11

## 2024-12-27 RX ADMIN — ACETAMINOPHEN 650 MG: 325 TABLET ORAL at 21:30

## 2024-12-27 RX ADMIN — MORPHINE SULFATE 4 MG: 4 INJECTION, SOLUTION INTRAMUSCULAR; INTRAVENOUS at 14:32

## 2024-12-27 RX ADMIN — ACETAMINOPHEN 650 MG: 325 TABLET ORAL at 07:36

## 2024-12-27 RX ADMIN — METOPROLOL TARTRATE 25 MG: 25 TABLET, FILM COATED ORAL at 12:52

## 2024-12-27 RX ADMIN — SODIUM CHLORIDE: 9 INJECTION, SOLUTION INTRAVENOUS at 01:55

## 2024-12-27 RX ADMIN — AMLODIPINE BESYLATE 5 MG: 5 TABLET ORAL at 12:45

## 2024-12-27 RX ADMIN — MORPHINE SULFATE 4 MG: 4 INJECTION, SOLUTION INTRAMUSCULAR; INTRAVENOUS at 05:09

## 2024-12-27 RX ADMIN — CLOPIDOGREL BISULFATE 75 MG: 75 TABLET ORAL at 12:46

## 2024-12-27 RX ADMIN — MORPHINE SULFATE 4 MG: 4 INJECTION, SOLUTION INTRAMUSCULAR; INTRAVENOUS at 11:50

## 2024-12-27 RX ADMIN — OXYCODONE HYDROCHLORIDE 10 MG: 10 TABLET ORAL at 19:15

## 2024-12-27 RX ADMIN — MORPHINE SULFATE 4 MG: 4 INJECTION, SOLUTION INTRAMUSCULAR; INTRAVENOUS at 09:40

## 2024-12-27 RX ADMIN — ACETAMINOPHEN 650 MG: 325 TABLET ORAL at 12:45

## 2024-12-27 RX ADMIN — ACETAMINOPHEN 650 MG: 325 TABLET ORAL at 01:49

## 2024-12-27 RX ADMIN — ENOXAPARIN SODIUM 30 MG: 100 INJECTION SUBCUTANEOUS at 08:40

## 2024-12-27 ASSESSMENT — PAIN DESCRIPTION - ONSET
ONSET: ON-GOING

## 2024-12-27 ASSESSMENT — PAIN DESCRIPTION - LOCATION
LOCATION: LEG
LOCATION: INCISION;GROIN
LOCATION: LEG
LOCATION: GROIN
LOCATION: LEG
LOCATION: GROIN;INCISION
LOCATION: LEG
LOCATION: INCISION
LOCATION: LEG

## 2024-12-27 ASSESSMENT — PAIN DESCRIPTION - FREQUENCY
FREQUENCY: CONTINUOUS

## 2024-12-27 ASSESSMENT — PAIN SCALES - GENERAL
PAINLEVEL_OUTOF10: 6
PAINLEVEL_OUTOF10: 3
PAINLEVEL_OUTOF10: 8
PAINLEVEL_OUTOF10: 9
PAINLEVEL_OUTOF10: 5
PAINLEVEL_OUTOF10: 8
PAINLEVEL_OUTOF10: 8
PAINLEVEL_OUTOF10: 7
PAINLEVEL_OUTOF10: 8
PAINLEVEL_OUTOF10: 7
PAINLEVEL_OUTOF10: 5
PAINLEVEL_OUTOF10: 6
PAINLEVEL_OUTOF10: 5
PAINLEVEL_OUTOF10: 8
PAINLEVEL_OUTOF10: 5
PAINLEVEL_OUTOF10: 8
PAINLEVEL_OUTOF10: 3

## 2024-12-27 ASSESSMENT — PAIN DESCRIPTION - DESCRIPTORS
DESCRIPTORS: ACHING
DESCRIPTORS: ACHING;DULL
DESCRIPTORS: ACHING
DESCRIPTORS: ACHING
DESCRIPTORS: ACHING;BURNING
DESCRIPTORS: ACHING
DESCRIPTORS: ACHING;CRAMPING
DESCRIPTORS: ACHING
DESCRIPTORS: ACHING;BURNING
DESCRIPTORS: ACHING;SHARP
DESCRIPTORS: ACHING;DISCOMFORT

## 2024-12-27 ASSESSMENT — PAIN DESCRIPTION - ORIENTATION
ORIENTATION: RIGHT

## 2024-12-27 ASSESSMENT — PAIN DESCRIPTION - PAIN TYPE
TYPE: ACUTE PAIN
TYPE: ACUTE PAIN;SURGICAL PAIN
TYPE: ACUTE PAIN

## 2024-12-27 ASSESSMENT — PAIN - FUNCTIONAL ASSESSMENT
PAIN_FUNCTIONAL_ASSESSMENT: PREVENTS OR INTERFERES SOME ACTIVE ACTIVITIES AND ADLS

## 2024-12-27 NOTE — PLAN OF CARE
Problem: Discharge Planning  Goal: Discharge to home or other facility with appropriate resources  12/26/2024 2359 by Dawson Antonio RN  Outcome: Progressing  Flowsheets (Taken 12/26/2024 2000)  Discharge to home or other facility with appropriate resources:   Identify barriers to discharge with patient and caregiver   Arrange for needed discharge resources and transportation as appropriate   Identify discharge learning needs (meds, wound care, etc)  12/26/2024 1745 by Eloisa Maldonado RN  Outcome: Progressing     Problem: Safety - Adult  Goal: Free from fall injury  12/26/2024 2359 by Dawson Antonio RN  Outcome: Progressing  12/26/2024 1745 by Eloisa Maldonado RN  Outcome: Progressing     Problem: Pain  Goal: Verbalizes/displays adequate comfort level or baseline comfort level  12/26/2024 2359 by Dawson Antonio RN  Outcome: Progressing  Flowsheets (Taken 12/26/2024 2000)  Verbalizes/displays adequate comfort level or baseline comfort level:   Encourage patient to monitor pain and request assistance   Assess pain using appropriate pain scale   Administer analgesics based on type and severity of pain and evaluate response   Implement non-pharmacological measures as appropriate and evaluate response  12/26/2024 1745 by Eloisa Maldonado RN  Outcome: Progressing     Problem: Chronic Conditions and Co-morbidities  Goal: Patient's chronic conditions and co-morbidity symptoms are monitored and maintained or improved  12/26/2024 2359 by Dawson Antonio RN  Outcome: Progressing  Flowsheets (Taken 12/26/2024 2000)  Care Plan - Patient's Chronic Conditions and Co-Morbidity Symptoms are Monitored and Maintained or Improved:   Monitor and assess patient's chronic conditions and comorbid symptoms for stability, deterioration, or improvement   Collaborate with multidisciplinary team to address chronic and comorbid conditions and prevent exacerbation or deterioration  12/26/2024 1745 by Eloisa Maldonado  RN  Outcome: Progressing     Problem: ABCDS Injury Assessment  Goal: Absence of physical injury  Outcome: Progressing

## 2024-12-27 NOTE — PROGRESS NOTES
Vascular surgery    Postop day 1 right iliofemoral endarterectomy and profundoplasty  No acute events overnight  Pain controlled  Monophasic DP/PT signal  Chronic decreased motor function right foot from Achilles contracture    No further vascular surgery intervention this admission  PT/OT/OOB  P.o. pain control  Remove Hoang  GIAN remain for 7 days to right groin  Plan for patient to discharge to inpatient rehab at [facility] tomorrow    David Whatley MD, RPVI  [facility]  12/27/2024  5:12 PM

## 2024-12-27 NOTE — CARE COORDINATION
12/27/24 3415   Service Assessment   Patient Orientation Alert and Oriented   Cognition Alert   History Provided By Patient;Significant Other   Primary Caregiver Self   Accompanied By/Relationship Sign other   Support Systems Spouse/Significant Other   Patient's Healthcare Decision Maker is: Named in Scanned ACP Document  (Pedro Stanton.)   PCP Verified by CM Yes  (Jolie John   )   Last Visit to PCP Within last 3 months   Prior Functional Level Independent in ADLs/IADLs   Current Functional Level Independent in ADLs/IADLs   Can patient return to prior living arrangement No  (Therapy and MD recommend ARU)   Ability to make needs known: Good   Family able to assist with home care needs: Yes   Would you like for me to discuss the discharge plan with any other family members/significant others, and if so, who? Yes  ([facility] appoints Pedro Stanton as decision maker)   Financial Resources Medicare;Medicaid   Community Resources None   Discharge Planning   Type of Residence House   Living Arrangements Spouse/Significant Other   Current Services Prior To Admission Durable Medical Equipment   Current DME Prior to Arrival Wheelchair;Walker;Other (Comment)  (prosthetic that no longer fits.)   Potential Assistance Needed Other (Comment)  (therapy recommends ARU)   DME Ordered? No   Potential Assistance Purchasing Medications No   Type of Home Care Services None   Patient expects to be discharged to: Acute rehab   One/Two Story Residence Two story, live on first floor   # of Interior Steps 0   Interior Rails None   Lift Chair Available Yes  (ramped entry)   History of falls? 0   Services At/After Discharge   Transition of Care Consult (CM Consult) Acute Rehab   Services At/After Discharge Acute Hospital   [place] Resource Information Provided? No   Mode of Transport at Discharge Other (see comment)  (sign other)   Confirm Follow Up Transport Family   Condition of Participation: Discharge

## 2024-12-27 NOTE — PROGRESS NOTES
NAME:  Harvey Kaminski  YOB: 1955  MEDICAL RECORD NUMBER:  [id number]    Shift Summary: Up to chair with PT. Omalley catheter removed at 1300. Diet advanced. Pt continues with absent pedal pulse and fleeting/monophasic PT pulse. Dr Whatley made aware. Per Dr Whatley pt has poor doppler signals and to monitor closely for changes in sensation or movement in extremity.    Family updated: Yes:  pt's wife at bedside    Rhythm: Normal Sinus Rhythm     Most recent vitals:   Visit Vitals  /68   Pulse 88   Temp 97.9 °F (36.6 °C) (Oral)   Resp 13   Ht 1.829 m (6')   Wt 104.3 kg (230 lb)   SpO2 96%   BMI 31.19 kg/m²           No data found.    No data found.      Respiratory support needed (if any):  - RA    Admission weight Weight - Scale: 104.3 kg (230 lb) (12/18/24 1214)    Today's weight    Wt Readings from Last 1 Encounters:   12/26/24 104.3 kg (230 lb)        Omalley need assessed each shift: N/A - no omalley present  UOP >30ml/hr: YES  Last documented BM (in last 48 hrs):  Patient Vitals for the past 48 hrs:   Last BM (including prior to admit)   12/26/24 2000 12/25/24                Restraints (in use currently or dc'd in last 12 hrs): No    Order current and documentation up to date? No    Lines/Drains reviewed @ bedside.  Peripheral IV 12/26/24 Distal;Left;Anterior Cephalic (Active)   Number of days: 1         Drip rates at handoff:    sodium chloride 75 mL/hr at 12/27/24 1821    sodium chloride         Lab Data:   CBC:   Recent Labs     12/27/24  0418   WBC 12.1*   HGB 12.1*   HCT 36.3*   MCV 82.5        BMP:    Recent Labs     12/27/24  0418      K 4.3   CO2 23   BUN 19   CREATININE 1.1     LIVR: No results for input(s): \"AST\", \"ALT\" in the last 72 hours.  PT/INR: No results for input(s): \"INR\" in the last 72 hours.    Invalid input(s): \"PROT\"  APTT: No results for input(s): \"APTT\" in the last 72 hours.  ABG: No results for input(s): \"PHART\", \"NMD6ZBF\", \"PO2ART\" in the last 72 hours.    Any

## 2024-12-27 NOTE — PROGRESS NOTES
Occupational Therapy  Facility/Department: [address]  Occupational Therapy Initial Assessment    Name: Harvey Kaminski  : 1955  MRN: [id number]  Date of Service: 2024    Discharge Recommendations:  5-7 sessions per week, Patient would benefit from continued therapy after discharge     Harvey Kaminski scored a 16/24 on the AM-PAC ADL Inpatient form. Current research shows that an AM-PAC score of 17 or less is typically not associated with a discharge to the patient's home setting. Based on the patient's AM-PAC score and their current ADL deficits, it is recommended that the patient have 5-7 sessions per week of Occupational Therapy at d/c to increase the patient's independence.  At this time, this patient demonstrates complex nursing, medical, and rehabilitative needs, and would benefit from intensive rehabilitation services upon discharge from the Inpatient setting.  This patient demonstrates the ability to participate in and benefit from an intensive therapy program with a coordinated interdisciplinary team approach to foster frequent, structured, and documented communication among disciplines, who will work together to establish, prioritize, and achieve treatment goals. Please see assessment section for further patient specific details.    If patient discharges prior to next session this note will serve as a discharge summary.  Please see below for the latest assessment towards goals.      Patient Diagnosis(es): The encounter diagnosis was PAD (peripheral artery disease) ([facility]).  Past Medical History:  has a past medical history of Acute cerebral infarction (HCC), Alcohol withdrawal (HCC), Bilateral carotid artery stenosis, CAD (coronary artery disease), Cerebral artery occlusion with cerebral infarction (HCC), Hepatitis C antibody positive in blood, History of bronchitis, History of DVT of lower extremity, Hypertension, NSTEMI (non-ST elevated myocardial infarction) (HCC), PAD (peripheral artery  mod Ax2 to/from EOB/recliner to Stedy. Min Ax2 from seat of Stedy. Pt very shaky, min impulsive with movements  Vision  Vision: Within Functional Limits  Hearing  Hearing: Within functional limits  Cognition  Overall Cognitive Status: [facility]  Cognition Comment: Diminished insight/safety awareness; can be abit impulsive.  Orientation  Overall Orientation Status: Within Functional Limits               Static Sitting Balance Exercises: Min A-CGA sitting EOB d/t full body shaking  Education Given To: Patient  Education Provided: Transfer Training;Plan of Care;ADL Adaptive Strategies;Role of Therapy  Education Provided Comments: d/c recs  Education Method: Demonstration;Verbal  Barriers to Learning: Cognition  Education Outcome: Verbalized understanding;Continued education needed     AM-PAC - ADL  AM-PAC Daily Activity - Inpatient   How much help is needed for putting on and taking off regular lower body clothing?: A Lot  How much help is needed for bathing (which includes washing, rinsing, drying)?: A Lot  How much help is needed for toileting (which includes using toilet, bedpan, or urinal)?: A Lot  How much help is needed for putting on and taking off regular upper body clothing?: A Little  How much help is needed for taking care of personal grooming?: A Little  How much help for eating meals?: None  AM-PAC Inpatient Daily Activity Raw Score: 16  AM-PAC Inpatient ADL T-Scale Score : 35.96  ADL Inpatient CMS 0-100% Score: 53.32  ADL Inpatient CMS G-Code Modifier : CK      Goals  Short Term Goals  Time Frame for Short Term Goals: Prior to d/c  Short Term Goal 1: Pt will complete ADL transfers w/ min A using LRAD  Short Term Goal 2: Pt will toilet w/ min A  Short Term Goal 3: Pt will bathe w/ min A  Short Term Goal 4: Pt will groom sinkside w/ setup  Short Term Goal 5: Pt will complete UE HEP independently to improve strength/endurance for ADLs/transfers  Long Term Goals  Time Frame for Long Term Goals : LTG=STG  Patient

## 2024-12-27 NOTE — CARE COORDINATION
Case Management Assessment  Initial Evaluation    Date/Time of Evaluation: 12/27/2024 1:44 PM  Assessment Completed by: SARAH Dudley    If patient is discharged prior to next notation, then this note serves as note for discharge by case management.    Patient Name: Harvey Kaminski                   YOB: 1955  Diagnosis: PAD (peripheral artery disease) (HCC) [I73.9]  Atherosclerosis of native arteries of extremities with intermittent claudication, right leg (HCC) [I70.211]                   Date / Time: 12/26/2024  6:31 AM    Patient Admission Status: Inpatient   Readmission Risk (Low < 19, Mod (19-27), High > 27): Readmission Risk Score: 14.9    Current PCP: Jolie Lopez APRN - NP  PCP verified by CM? Yes (Jolie Lopez   )    Chart Reviewed: Yes      History Provided by: Patient, Significant Other  Patient Orientation: Alert and Oriented    Patient Cognition: Alert    Hospitalization in the last 30 days (Readmission):  No    If yes, Readmission Assessment in CM Navigator will be completed.    Advance Directives:      Code Status: Full Code   Patient's Primary Decision Maker is: Named in Scanned ACP Document (Sign Pedro leija.)    Primary Decision Maker: Pedro Hector - Domestic Partner - [phone]    Secondary Decision Maker: Jean-Claude Kaminski - Child - [phone]    Discharge Planning:    Patient lives with: Spouse/Significant Other Type of Home: House  Primary Care Giver: Self  Patient Support Systems include: Spouse/Significant Other   Current Financial resources: Medicare, Medicaid  Current community resources: None  Current services prior to admission: Durable Medical Equipment            Current DME: Wheelchair, Walker, Other (Comment) (prosthetic that no longer fits.)            Type of Home Care services:  None    ADLS  Prior functional level: Independent in ADLs/IADLs  Current functional level: Independent in ADLs/IADLs    PT AM-PAC: 11 /24  OT AM-PAC: 16

## 2024-12-27 NOTE — PROGRESS NOTES
Physical Therapy  Facility/Department: [address] [facility]  Physical Therapy Initial Assessment    Name: Harvey Kaminski  : 1955  MRN: [id number]  Date of Service: 2024    Discharge Recommendations:  Continue to assess pending progress, IP Rehab   PT Equipment Recommendations  Other: Defer to next level of care.      Harvey Kaminski scored a  on the AM-PAC short mobility form. Current research shows that an AM-PAC score of 17 or less is typically not associated with a discharge to the patient's home setting. Based on the patient's AM-PAC score and their current functional mobility deficits, it is recommended that the patient have 5-7 sessions per week of Physical Therapy at d/c to increase the patient's independence.  At this time, this patient demonstrates complex nursing, medical, and rehabilitative needs, and would benefit from intensive rehabilitation services upon discharge from the Inpatient setting.  This patient demonstrates the ability to participate in and benefit from an intensive therapy program with a coordinated interdisciplinary team approach to foster frequent, structured, and documented communication among disciplines, who will work together to establish, prioritize, and achieve treatment goals. Please see assessment section for further patient specific details.    If patient discharges prior to next session this note will serve as a discharge summary.  Please see below for the latest assessment towards goals.      Assessment  Body Structures, Functions, Activity Limitations Requiring Skilled Therapeutic Intervention: Decreased functional mobility ;Decreased strength;Decreased safe awareness;Decreased endurance;Decreased balance  Assessment: 68 y/o male admit 2024 with PAD, Subacute Thrombosis of R Sup Fem Artery. 2024 S/;P R Iliofem Endarterectomy, R Profundoplasty, R Sup Fem and Pop Artery Thrombectomy. PMH as noted including CAD/CABG (2020), PVD, L BKA (10/2020), L LE DVT,  Yes  Additional Pertinent Hx: 68 y/o male admit 12/26/2024 with PAD, Subacute Thrombosis of R Sup Fem Artery.  12/26/2024 S/;P R Iliofem Endarterectomy, R Profundoplasty, R Sup Fem and Pop Artery Thrombectomy.   PMH as noted including CAD/CABG (5/2020), PVD, L BKA (10/2020), L LE DVT, CVA, Nephrectomy.  Family/Caregiver Present: No  Referring Practitioner: Dr. Whatley  Other (Comment): Pt able to follow simple commands; appears somewhat impulsive/safety awareness.  Subjective  Subjective: Pt agreeable to PT Eval/Rx.         Social/Functional History  Social/Functional History  Lives With: Significant other (Girlfriend and 13 yo granddtr)  Type of Home: House  Home Layout: Two level, Able to Live on Main level with bedroom/bathroom (1 story with basement; laundry on main level.)  Home Access: Ramped entrance  Bathroom Shower/Tub: Walk-in shower, Tub only (Pt has walk-in shower and tub; pt gets into tub and takes tub baths)  Bathroom Toilet: Standard  Bathroom Equipment: None  Bathroom Accessibility: Wheelchair accessible  Home Equipment: Wheelchair - Manual (Has LE Prosthesis although hasn't worn in few years (skin breakdown issues; now doesn't fit).)  Has the patient had two or more falls in the past year or any fall with injury in the past year?: No  Prior Level of Assist for ADLs: Independent  Prior Level of Assist for Homemaking: Independent (Shared with family.)  Prior Level of Assist for Ambulation: Independent in home with wheelchair and able to pivot transfer  Prior Level of Assist for Transfers: Independent  Active : Yes  Mode of Transportation: Truck  Additional Comments: 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  Vision/Hearing  Vision  Vision: Within Functional Limits  Hearing  Hearing: Within functional limits    Cognition   Orientation  Overall Orientation Status: Within Functional Limits  Cognition  Overall Cognitive Status: WFL  Cognition Comment:  Diminished insight/safety awareness; can be abit impulsive.    Objective       Observation/Palpation  Observation: Shaky, sweating profusely with/following oob activities.  R Foot abit cool to touch.  Scar: Well healed surg scar L BKA.  Gross Assessment  AROM: Generally decreased, functional  Strength: Generally decreased, functional                   Bed mobility  Supine to Sit: Minimal assistance (HOB elevated; use of bedrail.)  Bed Mobility Comments: Pt very shaky upon moving to EOB, moves impulsively with decreased safety awareness.  Transfers  Sit to Stand: Minimal Assistance;Moderate Assistance;2 Person Assistance  Stand to Sit: Minimal Assistance;Moderate Assistance;2 Person Assistance  Bed to Chair: Dependent/Total (Via Stedy.)  Comment: Transfers via Stedy : from eob, recliner : Mod assist x 2.  From Seat of Stedy : Min assist x 2.  Very shaky, needs cues for safe techniques.        Balance  Comments: EOB : Static Sitting Min/CGA; shaky.  Static sitting on Stedy : ongoing Min assist.          AM-PAC - Mobility    AM-PAC Basic Mobility - Inpatient   How much help is needed turning from your back to your side while in a flat bed without using bedrails?: A Little  How much help is needed moving from lying on your back to sitting on the side of a flat bed without using bedrails?: A Little  How much help is needed moving to and from a bed to a chair?: Total  How much help is needed standing up from a chair using your arms?: A Lot  How much help is needed walking in hospital room?: Total  How much help is needed climbing 3-5 steps with a railing?: Total  AM-PAC Inpatient Mobility Raw Score : 11  AM-PAC Inpatient T-Scale Score : 33.86  Mobility Inpatient CMS 0-100% Score: 72.57  Mobility Inpatient CMS G-Code Modifier : CL         Goals  Short Term Goals  Time Frame for Short Term Goals: Upon d/c acute care setting.  Short Term Goal 1: Bed Mob Supervision.  Short Term Goal 2: EOB : static/dynamic SBA; in prep

## 2024-12-27 NOTE — PROGRESS NOTES
NAME:  Harvey Kaminski  YOB: 1955  MEDICAL RECORD NUMBER:  [id number]    Shift Summary: Pt remains on CVU s/p R fem endart. GIAN dressing to R fem site is dry and intact with a small amount of sanguinous drainage. Pedal pulse fleeting to RLE. PT pulse audible via doppler. Dr. Whatley aware. Pain managed with PRNs. No other acute events overnight.     Family updated: No    Rhythm: Normal Sinus Rhythm     Most recent vitals:   Visit Vitals  BP (!) 120/96   Pulse 77   Temp 97.9 °F (36.6 °C) (Oral)   Resp 16   Ht 1.829 m (6')   Wt 104.3 kg (230 lb)   SpO2 94%   BMI 31.19 kg/m²           No data found.    No data found.      Respiratory support needed (if any):  - RA    Admission weight Weight - Scale: 104.3 kg (230 lb) (12/18/24 1214)    Today's weight    Wt Readings from Last 1 Encounters:   12/26/24 104.3 kg (230 lb)        Omalley need assessed each shift: YES -  - continue omalley r/t - Fluid volume management of critically ill. Out later today.   UOP >30ml/hr: YES  Last documented BM (in last 48 hrs):  Patient Vitals for the past 48 hrs:   Last BM (including prior to admit)   12/26/24 2000 12/25/24                Restraints (in use currently or dc'd in last 12 hrs): No    Order current and documentation up to date? NA    Lines/Drains reviewed @ bedside.  Peripheral IV 12/26/24 Distal;Left;Anterior Cephalic (Active)   Number of days: 0       Urinary Catheter 12/26/24 2 Way (Active)   Number of days: 0         Drip rates at handoff:    sodium chloride      sodium chloride 75 mL/hr at 12/27/24 0155    sodium chloride         Lab Data:   CBC:   Recent Labs     12/27/24 0418   WBC 12.1*   HGB 12.1*   HCT 36.3*   MCV 82.5        BMP:    Recent Labs     12/27/24 0418      K 4.3   CO2 23   BUN 19   CREATININE 1.1     LIVR: No results for input(s): \"AST\", \"ALT\" in the last 72 hours.  PT/INR: No results for input(s): \"INR\" in the last 72 hours.    Invalid input(s): \"PROT\"  APTT: No results for  input(s): \"APTT\" in the last 72 hours.  ABG: No results for input(s): \"PHART\", \"ETD0IFT\", \"PO2ART\" in the last 72 hours.    Any consults during the shift? No    Any signed and held orders to be released?  No        4 Eyes Skin Assessment       The patient is being assessed for  Shift Handoff    I agree that at least one RN has performed a thorough Head to Toe Skin Assessment on the patient. ALL assessment sites listed below have been assessed.      Areas assessed by both nurses: Head, Face, Ears, Shoulders, Back, Chest, Arms, Elbows, Hands, Sacrum. Buttock, Coccyx, Ischium, Legs. Feet and Heels, and Under Medical Devices         Does the Patient have a Wound? No noted wound(s)    Wound Care Orders initiated by RN: No       Kip Prevention initiated by RN: No    Pressure Injury (Stage 3,4, Unstageable, DTI, NWPT, and Complex wounds) if present, place Wound referral order by RN under : No    New Ostomies, if present place, Ostomy referral order under : No     Nurse 1 eSignature: Electronically signed by Dawson Antonio RN on 12/27/24 at 6:37 AM EST    **SHARE this note so that the co-signing nurse can place an eSignature**    Nurse 2 eSignature: Electronically signed by Lucía Jerez RN on 12/27/24 at 7:00 AM EST

## 2024-12-27 NOTE — CONSULTS
Department of Physical Medicine & Rehabilitation  Dr. Lane Progress Note  12/27/2024  2:57 PM    Patient Name:   Harvey Kaminski  YOB: 1955    Diagnosis: PAD (peripheral artery disease) ([facility])        Subjective: Consult received. Patient seen. Chart reviewed. 69-year-old male with a long history of PAD. He has a prior left below-the-knee amputation. He presented to clinic for evaluation for possible bypass or management of his PAD due to severe short-distance claudication of his right lower extremity when using a prosthetic. Yesterday, he was taken to surgery and had a Right iliofemoral endarterectomy, Right profundoplasty, and Right superficial femoral artery and popliteal thrombectomy. He started in therapies today. He needs min assist for transfers and bed mobility. He lives with his girlfriend in a 2 level house. He has regular Medicare insurance. Our beds are full on our rehab unit, and we won't have a bed open up until next week.     /66   Pulse 84   Temp 97.6 °F (36.4 °C) (Oral)   Resp 12   Ht 1.829 m (6')   Wt 104.3 kg (230 lb)   SpO2 97%   BMI 31.19 kg/m²     Last 24 hour lab  Recent Results (from the past 24 hour(s))   Basic Metabolic Panel w/ Reflex to MG    Collection Time: 12/27/24  4:18 AM   Result Value Ref Range    Sodium 137 136 - 145 mmol/L    Potassium reflex Magnesium 4.3 3.5 - 5.1 mmol/L    Chloride 104 99 - 110 mmol/L    CO2 23 21 - 32 mmol/L    Anion Gap 10 3 - 16    Glucose 135 (H) 70 - 99 mg/dL    BUN 19 7 - 20 mg/dL    Creatinine 1.1 0.8 - 1.3 mg/dL    Est, Glom Filt Rate 73 >60    Calcium 8.0 (L) 8.3 - 10.6 mg/dL   CBC    Collection Time: 12/27/24  4:18 AM   Result Value Ref Range    WBC 12.1 (H) 4.0 - 11.0 K/uL    RBC 4.40 4.20 - 5.90 M/uL    Hemoglobin 12.1 (L) 13.5 - 17.5 g/dL    Hematocrit 36.3 (L) 40.5 - 52.5 %    MCV 82.5 80.0 - 100.0 fL    MCH 27.4 26.0 - 34.0 pg    MCHC 33.2 31.0 - 36.0 g/dL    RDW 15.2 12.4 - 15.4 %    Platelets 255 135 - 450 K/uL    MPV

## 2024-12-27 NOTE — CARE COORDINATION
Rec'd call back from [facility] who reports he does not think this will be an issue.  He will review with their physician.  If DC'd over the weekend, call Chelsy jasmine at [phone].  SARAH Bauer     Case Management   [phone]    12/27/2024  2:30 PM

## 2024-12-27 NOTE — CARE COORDINATION
Referral made to [facility] of [facility] but they have a waiting list and will not be able to accept him at this time.    Gowned and gloved to return to the room to inform.  He states, \"I'll just go home and use the same home care agency I usually use.\"  He called his sign other on the phone and she states they want to use [facility].    Spoke with bedside RN Lucía.  Called to FERNANDEZ Boykin who states he needed sign help with transfers today and they would recommend [facility].  Gowned and gloved to return to the room again to review.  He is agreeable to a referral to [facility] in [place].    Referral made to Renu at [phone] to leave message.    SARAH Bauer     Case Management   [phone]    12/27/2024  2:12 PM

## 2024-12-27 NOTE — PLAN OF CARE
Problem: Discharge Planning  Goal: Discharge to home or other facility with appropriate resources  12/27/2024 1137 by Lucía Jerez RN  Outcome: Progressing  Flowsheets (Taken 12/27/2024 1137)  Discharge to home or other facility with appropriate resources:   Identify barriers to discharge with patient and caregiver   Arrange for needed discharge resources and transportation as appropriate     Problem: Safety - Adult  Goal: Free from fall injury  12/27/2024 1137 by Lucía Jerez RN  Outcome: Progressing  Flowsheets (Taken 12/27/2024 1137)  Free From Fall Injury: Instruct family/caregiver on patient safety     Problem: Pain  Goal: Verbalizes/displays adequate comfort level or baseline comfort level  12/27/2024 1137 by Lucía Jerez RN  Outcome: Progressing  Flowsheets  Taken 12/27/2024 1137 by Lucía Jerez RN  Verbalizes/displays adequate comfort level or baseline comfort level:   Encourage patient to monitor pain and request assistance   Assess pain using appropriate pain scale  Taken 12/27/2024 0000 by Dawson Antonio RN  Verbalizes/displays adequate comfort level or baseline comfort level:   Encourage patient to monitor pain and request assistance   Administer analgesics based on type and severity of pain and evaluate response     Problem: Chronic Conditions and Co-morbidities  Goal: Patient's chronic conditions and co-morbidity symptoms are monitored and maintained or improved  12/27/2024 1137 by Lucía Jerez RN  Outcome: Progressing  Flowsheets (Taken 12/27/2024 1137)  Care Plan - Patient's Chronic Conditions and Co-Morbidity Symptoms are Monitored and Maintained or Improved: Monitor and assess patient's chronic conditions and comorbid symptoms for stability, deterioration, or improvement     Problem: ABCDS Injury Assessment  Goal: Absence of physical injury  12/27/2024 1137 by Lucía Jerez, RN  Outcome: Progressing  Flowsheets (Taken 12/27/2024 1137)  Absence of Physical

## 2024-12-28 VITALS
HEIGHT: 72 IN | DIASTOLIC BLOOD PRESSURE: 80 MMHG | RESPIRATION RATE: 19 BRPM | HEART RATE: 78 BPM | OXYGEN SATURATION: 98 % | WEIGHT: 224.21 LBS | TEMPERATURE: 98.1 F | SYSTOLIC BLOOD PRESSURE: 139 MMHG | BODY MASS INDEX: 30.37 KG/M2

## 2024-12-28 PROCEDURE — 6360000002 HC RX W HCPCS: Performed by: SURGERY

## 2024-12-28 PROCEDURE — 6370000000 HC RX 637 (ALT 250 FOR IP): Performed by: SURGERY

## 2024-12-28 PROCEDURE — 99024 POSTOP FOLLOW-UP VISIT: CPT | Performed by: SURGERY

## 2024-12-28 PROCEDURE — 94760 N-INVAS EAR/PLS OXIMETRY 1: CPT

## 2024-12-28 RX ORDER — OXYCODONE HYDROCHLORIDE 5 MG/1
5 TABLET ORAL EVERY 4 HOURS PRN
Qty: 30 TABLET | Refills: 0 | Status: SHIPPED | OUTPATIENT
Start: 2024-12-28 | End: 2025-01-02

## 2024-12-28 RX ORDER — ASPIRIN 81 MG/1
81 TABLET, CHEWABLE ORAL DAILY
Qty: 90 TABLET | Refills: 3 | Status: SHIPPED | OUTPATIENT
Start: 2024-12-28

## 2024-12-28 RX ADMIN — ATORVASTATIN CALCIUM 80 MG: 80 TABLET, FILM COATED ORAL at 08:46

## 2024-12-28 RX ADMIN — AMLODIPINE BESYLATE 5 MG: 5 TABLET ORAL at 08:46

## 2024-12-28 RX ADMIN — ACETAMINOPHEN 650 MG: 325 TABLET ORAL at 13:04

## 2024-12-28 RX ADMIN — GABAPENTIN 800 MG: 400 CAPSULE ORAL at 13:04

## 2024-12-28 RX ADMIN — METOPROLOL TARTRATE 25 MG: 25 TABLET, FILM COATED ORAL at 08:46

## 2024-12-28 RX ADMIN — ENOXAPARIN SODIUM 30 MG: 100 INJECTION SUBCUTANEOUS at 08:45

## 2024-12-28 RX ADMIN — MORPHINE SULFATE 4 MG: 4 INJECTION, SOLUTION INTRAMUSCULAR; INTRAVENOUS at 00:29

## 2024-12-28 RX ADMIN — GABAPENTIN 800 MG: 400 CAPSULE ORAL at 08:46

## 2024-12-28 RX ADMIN — ASPIRIN 81 MG: 81 TABLET, CHEWABLE ORAL at 08:46

## 2024-12-28 RX ADMIN — OXYCODONE 5 MG: 5 TABLET ORAL at 14:14

## 2024-12-28 RX ADMIN — CLOPIDOGREL BISULFATE 75 MG: 75 TABLET ORAL at 08:46

## 2024-12-28 RX ADMIN — POLYETHYLENE GLYCOL 3350 17 G: 17 POWDER, FOR SOLUTION ORAL at 10:12

## 2024-12-28 RX ADMIN — MORPHINE SULFATE 4 MG: 4 INJECTION, SOLUTION INTRAMUSCULAR; INTRAVENOUS at 02:19

## 2024-12-28 RX ADMIN — ACETAMINOPHEN 650 MG: 325 TABLET ORAL at 02:15

## 2024-12-28 RX ADMIN — ACETAMINOPHEN 650 MG: 325 TABLET ORAL at 08:45

## 2024-12-28 ASSESSMENT — PAIN - FUNCTIONAL ASSESSMENT: PAIN_FUNCTIONAL_ASSESSMENT: PREVENTS OR INTERFERES SOME ACTIVE ACTIVITIES AND ADLS

## 2024-12-28 ASSESSMENT — PAIN SCALES - GENERAL
PAINLEVEL_OUTOF10: 8
PAINLEVEL_OUTOF10: 6
PAINLEVEL_OUTOF10: 8
PAINLEVEL_OUTOF10: 0
PAINLEVEL_OUTOF10: 5
PAINLEVEL_OUTOF10: 5

## 2024-12-28 ASSESSMENT — PAIN DESCRIPTION - DESCRIPTORS
DESCRIPTORS: ACHING

## 2024-12-28 ASSESSMENT — PAIN DESCRIPTION - PAIN TYPE: TYPE: ACUTE PAIN

## 2024-12-28 ASSESSMENT — PAIN DESCRIPTION - LOCATION
LOCATION: LEG

## 2024-12-28 ASSESSMENT — PAIN DESCRIPTION - ORIENTATION
ORIENTATION: RIGHT

## 2024-12-28 ASSESSMENT — PAIN DESCRIPTION - FREQUENCY: FREQUENCY: CONTINUOUS

## 2024-12-28 ASSESSMENT — PAIN DESCRIPTION - ONSET: ONSET: ON-GOING

## 2024-12-28 NOTE — CARE COORDINATION
DISCHARGE SUMMARY      DATE OF DISCHARGE: 12/28/2024     DISCHARGE DESTINATION:      FACILITY:   Discharging to Facility/ Agency   Name:   [facility]        Report Number: [phone]     Fax Number:  [phone]        Notified: RN, Facility     TRANSPORTATION: [facility]     Company Name: [employer]      Time: 4 PM     Phone Number: [phone]      NEW DME ORDERED: no      Discharging nurse to complete RENU, reconcile AVS, and place final copy with patient's discharge packet. Discharging RN to ensure that written prescriptions for Level II medications are sent with patient to the facility as per protocol.    Electronically signed by Alee Erazo RN MSN on 12/28/2024 at 12:47 PM

## 2024-12-28 NOTE — PROGRESS NOTES
Report called to nursing supervisor at [facility]. All questions answered at this time. [facility] transport to come get pt at 1600. Pt updated on plan of care.

## 2024-12-28 NOTE — PLAN OF CARE
Problem: Discharge Planning  Goal: Discharge to home or other facility with appropriate resources  Outcome: Adequate for Discharge     Problem: Safety - Adult  Goal: Free from fall injury  Outcome: Adequate for Discharge     Problem: Pain  Goal: Verbalizes/displays adequate comfort level or baseline comfort level  Outcome: Adequate for Discharge     Problem: Chronic Conditions and Co-morbidities  Goal: Patient's chronic conditions and co-morbidity symptoms are monitored and maintained or improved  Outcome: Adequate for Discharge     Problem: ABCDS Injury Assessment  Goal: Absence of physical injury  Outcome: Adequate for Discharge     Problem: Skin/Tissue Integrity  Goal: Absence of new skin breakdown  Description: 1.  Monitor for areas of redness and/or skin breakdown  2.  Assess vascular access sites hourly  3.  Every 4-6 hours minimum:  Change oxygen saturation probe site  4.  Every 4-6 hours:  If on nasal continuous positive airway pressure, respiratory therapy assess nares and determine need for appliance change or resting period.  Outcome: Adequate for Discharge

## 2024-12-28 NOTE — DISCHARGE INSTR - COC
Continuity of Care Form    Patient Name: Harvey Kaminski   :  1955  MRN:  [id number]    Admit date:  2024  Discharge date:  2024    Code Status Order: Full Code   Advance Directives:   Advance Care Flowsheet Documentation        Date/Time Healthcare Directive Type of Healthcare Directive Copy in Chart Healthcare Agent Appointed Healthcare Agent's Name Healthcare Agent's Phone Number    24 0711 Yes, patient has an advance directive for healthcare treatment  Durable power of  for health care;Living will  Yes, copy in chart  --  --  --                     Admitting Physician:  David Whatley MD  PCP: Jolie Lopez APRN - NP    [facility]15/1304-01  Discharging Unit Phone Number: [phone]    Emergency Contact:   Extended Emergency Contact Information  Primary Emergency Contact: Pedro Hector  Address: [address] [place] [address] of [facility]  Home Phone: [phone]  Mobile Phone: [phone]  Relation: Domestic Partner  Secondary Emergency Contact: Jean-Claude Kaminski  Home Phone: [phone]  Mobile Phone: [phone]  Relation: Child    Past Surgical History:  Past Surgical History:   Procedure Laterality Date    CARDIAC CATHETERIZATION  2020    Dr. Krause - w/placement of IABP    CARDIAC CATHETERIZATION      peripheral angiogram, 22 & 22    CARDIAC PROCEDURE Left 2024    Peripheral angiography performed by Espinoza Richard MD at [facility] CARDIAC CATH LAB    CARDIAC PROCEDURE Right 2024    Peripheral angiography performed by Espinoza Richard MD at [facility] CARDIAC CATH LAB    CAROTID STENT PLACEMENT  2023    CORONARY ARTERY BYPASS GRAFT N/A 2020    Dr. Resendiz - urgent x4 (LIMA-LAD, L SVG-D1, SVG-OM, SVG-PDA)    FEMORAL-TIBIAL BYPASS GRAFT Right 2024    RIGHT FEMORAL ENDARTERECTOMY AND PROFUNDAPLASTY, RIGHT FEMORAL THROMBECTOMY performed by David Whatley MD  EST

## 2024-12-28 NOTE — PROGRESS NOTES
NAME:  Harvey Kaminski  YOB: 1955  MEDICAL RECORD NUMBER:  [id number]    Shift Summary: Back to bed last evening with RN. Patient did not sleep much, if at all. PRN morphine given multiple times. Voiding per urinal. VSS. Candida dressing with same old drainage as beginning of shift; light is green. Unable to doppler pedal pulse but patient sensation and color are at baseline for patient.     Family updated: No    Rhythm: Normal Sinus Rhythm     Most recent vitals:   Visit Vitals  BP (!) 143/67   Pulse 67   Temp 98.4 °F (36.9 °C) (Oral)   Resp 15   Ht 1.829 m (6')   Wt 101.7 kg (224 lb 3.3 oz)   SpO2 99%   BMI 30.41 kg/m²           No data found.    No data found.      Respiratory support needed (if any):  - RA    Admission weight Weight - Scale: 104.3 kg (230 lb) (12/18/24 1214)    Today's weight    Wt Readings from Last 1 Encounters:   12/28/24 101.7 kg (224 lb 3.3 oz)        Omalley need assessed each shift: N/A - no omalley present  UOP >30ml/hr: YES  Last documented BM (in last 48 hrs):  Patient Vitals for the past 48 hrs:   Last BM (including prior to admit)   12/26/24 2000 12/25/24 12/27/24 2000 12/25/24                Restraints (in use currently or dc'd in last 12 hrs): No    Order current and documentation up to date? No    Lines/Drains reviewed @ bedside.  Peripheral IV 12/26/24 Distal;Left;Anterior Cephalic (Active)   Number of days: 2       Peripheral IV 12/28/24 Right Wrist (Active)   Number of days: 0         Drip rates at handoff:    sodium chloride 75 mL/hr at 12/27/24 1821    sodium chloride         Lab Data:   CBC:   Recent Labs     12/27/24 0418   WBC 12.1*   HGB 12.1*   HCT 36.3*   MCV 82.5        BMP:    Recent Labs     12/27/24 0418      K 4.3   CO2 23   BUN 19   CREATININE 1.1     LIVR: No results for input(s): \"AST\", \"ALT\" in the last 72 hours.  PT/INR: No results for input(s): \"INR\" in the last 72 hours.    Invalid input(s): \"PROT\"  APTT: No results for input(s):

## 2024-12-28 NOTE — PROGRESS NOTES
[facility] at bedside. Pt discharged in stable condition with personal wheelchair and belongings. GIAN drain remains in place, pedal and PT pulses heard with doppler.

## 2024-12-28 NOTE — PROGRESS NOTES
Vascular Surgery Progress Note      SUBJECTIVE:  POD#2 RIGHT FEMORAL ENDARTERECTOMY AND PROFUNDAPLASTY, RIGHT FEMORAL THROMBECTOMY   Sitting up in chair comfortably.  No complaints.  Eating ok.  Pain controlled.     OBJECTIVE  Physical  CURRENT VITALS:  /73   Pulse 73   Temp 99.9 °F (37.7 °C) (Oral)   Resp 21   Ht 1.829 m (6')   Wt 101.7 kg (224 lb 3.3 oz)   SpO2 98%   BMI 30.41 kg/m²   TEMPERATURE:  Current - Temp: 99.9 °F (37.7 °C); Max - Temp  Av.5 °F (36.9 °C)  Min: 97.8 °F (36.6 °C)  Max: 99.9 °F (37.7 °C)    General:  AAO x 3.  NAD.  Right groin incision covered with GIAN--small stain on dressing.  Dry.  Right foot warm and well-perfused with faint DP/PT signal.  Motor--able to wiggle forefoot well.  Also states sensation feels normal.    Data  CBC:   Lab Results   Component Value Date/Time    WBC 12.1 2024 04:18 AM    RBC 4.40 2024 04:18 AM    HGB 12.1 2024 04:18 AM    HCT 36.3 2024 04:18 AM    MCV 82.5 2024 04:18 AM    MCH 27.4 2024 04:18 AM    MCHC 33.2 2024 04:18 AM    RDW 15.2 2024 04:18 AM     2024 04:18 AM    MPV 7.7 2024 04:18 AM     BMP:    Lab Results   Component Value Date/Time     2024 04:18 AM    K 4.3 2024 04:18 AM     2024 04:18 AM    CO2 23 2024 04:18 AM    BUN 19 2024 04:18 AM    CREATININE 1.1 2024 04:18 AM    CALCIUM 8.0 2024 04:18 AM    GFRAA >60 2022 07:45 AM    LABGLOM 73 2024 04:18 AM    LABGLOM >60 2024 05:35 AM    GLUCOSE 135 2024 04:18 AM     Current Medications  Current Facility-Administered Medications: amLODIPine (NORVASC) tablet 5 mg, 5 mg, Oral, Daily  atorvastatin (LIPITOR) tablet 80 mg, 80 mg, Oral, Daily  clopidogrel (PLAVIX) tablet 75 mg, 75 mg, Oral, Daily  aspirin chewable tablet 81 mg, 81 mg, Oral, Daily  gabapentin (NEURONTIN) capsule 800 mg, 800 mg, Oral, TID  metoprolol tartrate (LOPRESSOR) tablet 25 mg, 25

## 2024-12-31 ENCOUNTER — TELEPHONE (OUTPATIENT)
Dept: VASCULAR SURGERY | Age: 69
End: 2024-12-31

## 2024-12-31 NOTE — TELEPHONE ENCOUNTER
Patient's partner, Pedro, called in asking for a call back about the wound vac placed on leeanne's leg and wondering when that will come off and who will be taking it off? Could you please give her a call back at [phone], thank you

## 2025-01-03 NOTE — TELEPHONE ENCOUNTER
Called Pedro and TREMAINE.  We would be the ones taking the wound vac off when it is ready to come off.  We would need to make sure the wound is healing good enough at his post op.

## 2025-01-06 RX ORDER — AMLODIPINE BESYLATE 5 MG/1
TABLET ORAL
Qty: 90 TABLET | Refills: 3 | Status: SHIPPED | OUTPATIENT
Start: 2025-01-06

## 2025-01-17 ENCOUNTER — TELEPHONE (OUTPATIENT)
Dept: VASCULAR SURGERY | Age: 70
End: 2025-01-17

## 2025-01-17 NOTE — TELEPHONE ENCOUNTER
Spring from [facility] pt took bandages off incision site when he showered. Spring noticed the site was slightly open with some light drainage. She has since covered the site but, wants to know if she should put some ointment as well.     Best call back number [phone].

## 2025-01-17 NOTE — TELEPHONE ENCOUNTER
Called Spring and spoke to her.  She does not need to use any ointment, but she can paint the incision with betadine and she is going to send a picture so Dr. Whatley can see the slight opening.

## 2025-01-24 ENCOUNTER — OFFICE VISIT (OUTPATIENT)
Dept: VASCULAR SURGERY | Age: 70
End: 2025-01-24

## 2025-01-24 VITALS
WEIGHT: 224 LBS | BODY MASS INDEX: 30.34 KG/M2 | DIASTOLIC BLOOD PRESSURE: 80 MMHG | SYSTOLIC BLOOD PRESSURE: 138 MMHG | HEIGHT: 72 IN

## 2025-01-24 DIAGNOSIS — I70.201 RIGHT POPLITEAL ARTERY OCCLUSION (HCC): Primary | ICD-10-CM

## 2025-01-24 DIAGNOSIS — S88.112A BELOW-KNEE AMPUTATION OF LEFT LOWER EXTREMITY, INITIAL ENCOUNTER (HCC): ICD-10-CM

## 2025-01-24 DIAGNOSIS — I73.9 PAD (PERIPHERAL ARTERY DISEASE) (HCC): ICD-10-CM

## 2025-01-24 PROCEDURE — 99024 POSTOP FOLLOW-UP VISIT: CPT | Performed by: SURGERY

## 2025-01-24 NOTE — PROGRESS NOTES
[facility] Vascular and Endovascular Surgery  David Whatley MD, RPVI      Chief Complaint:   Chief Complaint   Patient presents with    Post-Op Check     1st post op, r fem endart, wound check, doing well        Postop right femoral endarterectomy    HPI  Harvey Kaminski is a 69 y.o. male who is being seen for postop follow-up after right femoral endarterectomy.  Patient is doing well has no complaints or concerns.  He states his right foot feels much better and he is doing better with standing and mobility.  Patient needs to be refitted for left lower extremity prosthesis prior to increasing his ambulation.  Overall patient happy with his outcome.  His wound is healing appropriately.     PMH  Past Medical History:   Diagnosis Date    Acute cerebral infarction (HCC) 05/2020    R posterior frontal lobe    Alcohol withdrawal (HCC) 05/2020    Bilateral carotid artery stenosis 05/07/2020    bilat 50-79% stenosis    CAD (coronary artery disease)     Cerebral artery occlusion with cerebral infarction (HCC)     Hepatitis C antibody positive in blood 05/16/2020    History of bronchitis     History of DVT of lower extremity 2018    RLL, no previous scans to know whether supf or dvt. no coumadin. put on plavix.    Hypertension     NSTEMI (non-ST elevated myocardial infarction) (HCC) 05/07/2020    3VD    PAD (peripheral artery disease) ([facility])     Respiratory compromise 05/2020    chemical exposure at work, seen at [facility], covid neg, given steroids    S/p nephrectomy 1970    age 15, pt not sure why     Tattoos        H    Past Surgical History:   Procedure Laterality Date    CARDIAC CATHETERIZATION  05/07/2020    Dr. Krause - w/placement of IABP    CARDIAC CATHETERIZATION      peripheral angiogram, 7/28/22 & 8/21/22    CARDIAC PROCEDURE Left 08/08/2024    Peripheral angiography performed by Espinoza Richard MD at [facility] CARDIAC CATH LAB    CARDIAC PROCEDURE Right 11/14/2024    Peripheral angiography performed by Espinoza Richard

## 2025-01-30 ENCOUNTER — TELEPHONE (OUTPATIENT)
Dept: VASCULAR SURGERY | Age: 70
End: 2025-01-30

## 2025-01-30 NOTE — TELEPHONE ENCOUNTER
-Pt requesting to reschedule his 3/24/25 duplex scan & Dr. Whatley follow up on same day.    -Appt is currently on 3/24/25 & is asking to be rescheduled to 3/21/25 please.  Please Advise. Call [phone]    [facility] LOWER EXT DUPLEX [employer]

## 2025-02-04 NOTE — TELEPHONE ENCOUNTER
Called and LVM.  Patient can not have reschedule to the 3/21/25 due to not being 91 days out from last scan so insurance will not pay for it.  He will need to be rescheduled to after the 24th.  Also, there is no scan times on the 21st for him to get his scan.  His scan is scheduled at the hospital.

## 2025-02-24 DIAGNOSIS — E78.5 HYPERLIPIDEMIA LDL GOAL <70: Primary | ICD-10-CM

## 2025-02-24 DIAGNOSIS — Z79.899 MEDICATION MANAGEMENT: ICD-10-CM

## 2025-02-24 NOTE — TELEPHONE ENCOUNTER
Called pt left a vm letting him know he will need to call in to schedule an appt. And needs labs per note below.

## 2025-02-26 RX ORDER — ATORVASTATIN CALCIUM 80 MG/1
80 TABLET, FILM COATED ORAL DAILY
Qty: 90 TABLET | Refills: 0 | Status: SHIPPED | OUTPATIENT
Start: 2025-02-26

## 2025-03-29 DIAGNOSIS — E78.5 HYPERLIPIDEMIA LDL GOAL <70: ICD-10-CM

## 2025-03-29 DIAGNOSIS — Z79.899 MEDICATION MANAGEMENT: ICD-10-CM

## 2025-03-29 LAB
ALBUMIN SERPL-MCNC: 4.1 G/DL (ref 3.4–5)
ALP SERPL-CCNC: 63 U/L (ref 40–129)
ALT SERPL-CCNC: 28 U/L (ref 10–40)
AST SERPL-CCNC: 21 U/L (ref 15–37)
BILIRUB DIRECT SERPL-MCNC: <0.1 MG/DL (ref 0–0.3)
BILIRUB INDIRECT SERPL-MCNC: NORMAL MG/DL (ref 0–1)
BILIRUB SERPL-MCNC: <0.2 MG/DL (ref 0–1)
CHOLEST SERPL-MCNC: 119 MG/DL (ref 0–199)
HDLC SERPL-MCNC: 33 MG/DL (ref 40–60)
LDLC SERPL CALC-MCNC: 62 MG/DL
PROT SERPL-MCNC: 6.8 G/DL (ref 6.4–8.2)
TRIGL SERPL-MCNC: 120 MG/DL (ref 0–150)
VLDLC SERPL CALC-MCNC: 24 MG/DL

## 2025-04-01 ENCOUNTER — RESULTS FOLLOW-UP (OUTPATIENT)
Dept: CARDIOLOGY CLINIC | Age: 70
End: 2025-04-01

## 2025-04-07 ENCOUNTER — HOSPITAL ENCOUNTER (OUTPATIENT)
Dept: VASCULAR LAB | Age: 70
Discharge: HOME | End: 2025-04-09
Attending: SURGERY
Payer: MEDICARE

## 2025-04-07 ENCOUNTER — OFFICE VISIT (OUTPATIENT)
Dept: VASCULAR SURGERY | Age: 70
End: 2025-04-07
Attending: SURGERY
Payer: MEDICARE

## 2025-04-07 VITALS
OXYGEN SATURATION: 100 % | HEIGHT: 72 IN | SYSTOLIC BLOOD PRESSURE: 143 MMHG | BODY MASS INDEX: 30.38 KG/M2 | HEART RATE: 72 BPM | DIASTOLIC BLOOD PRESSURE: 75 MMHG

## 2025-04-07 DIAGNOSIS — I70.201 RIGHT POPLITEAL ARTERY OCCLUSION: ICD-10-CM

## 2025-04-07 DIAGNOSIS — I70.201 RIGHT POPLITEAL ARTERY OCCLUSION: Primary | ICD-10-CM

## 2025-04-07 DIAGNOSIS — I73.9 PAD (PERIPHERAL ARTERY DISEASE): ICD-10-CM

## 2025-04-07 DIAGNOSIS — S88.112A: ICD-10-CM

## 2025-04-07 LAB
VAS RIGHT ATA DIST PSV: 12 CM/S
VAS RIGHT ATA MID PSV: 45 CM/S
VAS RIGHT ATA PROX PSV: 23 CM/S
VAS RIGHT CFA DIST PSV: 38.7 CM/S
VAS RIGHT CFA PROX PSV: 91.2 CM/S
VAS RIGHT PERONEAL DIST PSV: 0 CM/S
VAS RIGHT PERONEAL MID PSV: 0 CM/S
VAS RIGHT PERONEAL PROX PSV: 0 CM/S
VAS RIGHT POP A DIST PSV: 0 CM/S
VAS RIGHT POP A PROX PSV: 0 CM/S
VAS RIGHT PTA DIST PSV: 24 CM/S
VAS RIGHT PTA MID PSV: 22 CM/S
VAS RIGHT PTA PROX PSV: 0 CM/S
VAS RIGHT SFA MID PSV: 0 CM/S
VAS RIGHT SFA PROX PSV: 0 CM/S
VAS RIGHT SFA PROX VEL RATIO: 0

## 2025-04-07 PROCEDURE — 1159F MED LIST DOCD IN RCRD: CPT | Performed by: SURGERY

## 2025-04-07 PROCEDURE — 3078F DIAST BP <80 MM HG: CPT | Performed by: SURGERY

## 2025-04-07 PROCEDURE — 3017F COLORECTAL CA SCREEN DOC REV: CPT | Performed by: SURGERY

## 2025-04-07 PROCEDURE — 99214 OFFICE O/P EST MOD 30 MIN: CPT | Performed by: SURGERY

## 2025-04-07 PROCEDURE — G8417 CALC BMI ABV UP PARAM F/U: HCPCS | Performed by: SURGERY

## 2025-04-07 PROCEDURE — 3077F SYST BP >= 140 MM HG: CPT | Performed by: SURGERY

## 2025-04-07 PROCEDURE — G8427 DOCREV CUR MEDS BY ELIG CLIN: HCPCS | Performed by: SURGERY

## 2025-04-07 PROCEDURE — 93926 LOWER EXTREMITY STUDY: CPT

## 2025-04-07 PROCEDURE — 1123F ACP DISCUSS/DSCN MKR DOCD: CPT | Performed by: SURGERY

## 2025-04-07 PROCEDURE — 1036F TOBACCO NON-USER: CPT | Performed by: SURGERY

## 2025-04-07 PROCEDURE — 93926 LOWER EXTREMITY STUDY: CPT | Performed by: INTERNAL MEDICINE

## 2025-04-07 NOTE — PROGRESS NOTES
Arterial    Common Femoral Artery: There is no evidence of aortic-iliac inflow disease.   Profunda Artery: Monophasic Doppler waveforms.   Proximal Superficial Femoral Artery: Absent Doppler waveforms.   Middle Superficial Femoral Artery: Absent Doppler waveforms.   Distal Superficial Femoral Artery: Absent Doppler waveforms.   Proximal Popliteal Artery: Absent Doppler waveforms.   Distal Popliteal Artery: Absent Doppler waveforms.   Anterior Tibial Artery: Monophasic Doppler waveforms.   Peroneal Artery: absent Doppler waveforms.   Proximal Posterior Tibial Artery: Absent Doppler waveforms.   Middle Posterior Tibial Artery: Monophasic Doppler waveforms  Distal Posterior Tibial Artery: Monophasic Doppler waveforms.     Right Lower Arterial Measurements     PSV [place] Ratio   CFA Prox 91.2 cm/s          CFA Dist 38.7 cm/s          SFA Prox 0 cm/s       0         SFA Mid 0 cm/s             Pop Prox 0 cm/s             Pop Dist 0 cm/s             PTA Prox 0 cm/s          PTA Mid 22 cm/s          PTA Dist 24 cm/s          JOSE Prox 23 cm/s          JOSE Mid 45 cm/s          JOSE Dist 12 cm/s          Sharon Prox 0 cm/s          Sharon Mid 0 cm/s          Sharon Dist 0 cm/s              Assessment & Plan   ASSESSMENT/PLAN:  1. Right popliteal artery occlusion  -     Vascular duplex lower extremity arteries right; Future  2. PAD (peripheral artery disease)  -     Vascular duplex lower extremity arteries right; Future  69-year-old male presenting for follow-up after right femoral endarterectomy for continued surveillance.    Patient is doing well and arterial duplex is relatively consistent with previous.  He has a new right SFA occlusion that this is likely due to preferential flow through the profunda after common femoral endarterectomy with profundoplasty.  Patient has Doppler signal in his right foot and is asymptomatic and at this time I do not recommend any further procedures.  We will continue to monitor him with interval

## 2025-04-10 NOTE — PROGRESS NOTES
suggesting no evidence of significant aorto-iliac inflow disease. There is atherosclerotic plaque involving the common femoral and superficial femoral arteries with velocities consistent with <50% stenosis. Elevated velocities in the proximal profunda femoral artery are consistent with >50% stenosis (Increase in peak velocity from 94 to 409 cm/s). There is a stent present from the proximal to distal popliteal artery with elevated velocities noted in the mid stent, consistent with >50% stenosis (Increase in peak velocity from 79 to 465 cm/s). The tibio-peroneal trunk and mid peroneal artery appear to be occluded.    Arterial doppler 4/7/25    Elevated velocities in the right profunda femoral artery are suggestive of a <50% stenosis.    The right femoral artery is occluded from its origin.    The right popliteal artery is occluded.    The right peroneal artery is occluded.    The right posterior tibial artery is occluded proximally and reconstitutes at mid thigh exhibiting reduced velocities and tardus parvus waveforms.    The right anterior tibial artery exhibits reduced velocities and tardus parvus waveforms throughout.    The right dorsalis pedis artery is occluded.    Right SYBIL is unavailable due to inadequate pulses. Findings are consistent with chronic limb threatening ischemia.    All above diagnostic testing and laboratory data was independently visualized and reviewed by me (not simply review of report)       Assessment and Plan   1) PAD   -omar cat 4  -s/p left BKA, follows with wound care at [facility]  -right popliteal aneursym s/p successful stenting with 6.0 Viabahn 7/2022  -Asa/plavix/statin/xarelto 2.5   -s/p successful balloon angioplasty/aspiration thrombectomy of the right anterior tibial, popliteal and SFA and EKOS thrombolytic catheter placement 1/23/2024  -right PT/DP bounding doppler on today's visit   -left BKA incision site slow to heal, popliteal artery occluded noted on doppler 2021  - s/p

## 2025-04-14 ENCOUNTER — OFFICE VISIT (OUTPATIENT)
Dept: CARDIOLOGY CLINIC | Age: 70
End: 2025-04-14
Payer: MEDICARE

## 2025-04-14 VITALS
HEIGHT: 72 IN | DIASTOLIC BLOOD PRESSURE: 70 MMHG | OXYGEN SATURATION: 96 % | HEART RATE: 61 BPM | SYSTOLIC BLOOD PRESSURE: 132 MMHG | BODY MASS INDEX: 30.38 KG/M2

## 2025-04-14 DIAGNOSIS — I25.10 CORONARY ARTERY DISEASE INVOLVING NATIVE CORONARY ARTERY OF NATIVE HEART WITHOUT ANGINA PECTORIS: Primary | ICD-10-CM

## 2025-04-14 DIAGNOSIS — Z95.1 S/P CABG (CORONARY ARTERY BYPASS GRAFT): ICD-10-CM

## 2025-04-14 PROCEDURE — G8427 DOCREV CUR MEDS BY ELIG CLIN: HCPCS | Performed by: INTERNAL MEDICINE

## 2025-04-14 PROCEDURE — 3017F COLORECTAL CA SCREEN DOC REV: CPT | Performed by: INTERNAL MEDICINE

## 2025-04-14 PROCEDURE — 3075F SYST BP GE 130 - 139MM HG: CPT | Performed by: INTERNAL MEDICINE

## 2025-04-14 PROCEDURE — G2211 COMPLEX E/M VISIT ADD ON: HCPCS | Performed by: INTERNAL MEDICINE

## 2025-04-14 PROCEDURE — 99214 OFFICE O/P EST MOD 30 MIN: CPT | Performed by: INTERNAL MEDICINE

## 2025-04-14 PROCEDURE — 1123F ACP DISCUSS/DSCN MKR DOCD: CPT | Performed by: INTERNAL MEDICINE

## 2025-04-14 PROCEDURE — 3078F DIAST BP <80 MM HG: CPT | Performed by: INTERNAL MEDICINE

## 2025-04-14 PROCEDURE — G8417 CALC BMI ABV UP PARAM F/U: HCPCS | Performed by: INTERNAL MEDICINE

## 2025-04-14 PROCEDURE — 1159F MED LIST DOCD IN RCRD: CPT | Performed by: INTERNAL MEDICINE

## 2025-04-14 PROCEDURE — 1036F TOBACCO NON-USER: CPT | Performed by: INTERNAL MEDICINE

## 2025-05-19 RX ORDER — CLOPIDOGREL BISULFATE 75 MG/1
75 TABLET ORAL DAILY
Qty: 90 TABLET | Refills: 3 | Status: SHIPPED | OUTPATIENT
Start: 2025-05-19

## 2025-06-09 ENCOUNTER — TELEPHONE (OUTPATIENT)
Dept: CARDIOLOGY CLINIC | Age: 70
End: 2025-06-09

## 2025-06-09 RX ORDER — ATORVASTATIN CALCIUM 80 MG/1
80 TABLET, FILM COATED ORAL DAILY
Qty: 90 TABLET | Refills: 0 | Status: SHIPPED | OUTPATIENT
Start: 2025-06-09

## 2025-06-09 NOTE — TELEPHONE ENCOUNTER
Requested Prescriptions     Pending Prescriptions Disp Refills    atorvastatin (LIPITOR) 80 MG tablet [Pharmacy Med Name: ATORVASTATIN 80 MG TABLET] 90 tablet 0     Sig: TAKE 1 TABLET BY MOUTH EVERY DAY        Last OV: 4/14/2025  Next OV: 10/22/2025  Last refill:2/26/2025  Most recent Labs: Lipid 3/29/2025

## 2025-06-09 NOTE — TELEPHONE ENCOUNTER
Medication Refill    When was your last appointment with cardiology?    (If 1 yr or longer, please schedule appointment)    (If patient has been told they do not need to follow-up - medications should be filled by PCP)  04/14/25  When did you last have labs drawn?     Medication needing refilled?  atorvastatin (LIPITOR)     Dosage of the medication?  80 MG tablet     How are you taking this medication (QD, BID, TID, QID, PRN)?  TAKE 1 TABLET BY MOUTH EVERY DAY     Do you want a 30 or 90 day supply?  90  When will you run out of your medication?     Which Pharmacy are we sending this medication to?  [facility]/pharmacy #[id number] [place] [id number]     Pharmacy Phone: [phone]   Pharmacy Fax: [phone]

## 2025-06-18 ENCOUNTER — HOSPITAL ENCOUNTER (OUTPATIENT)
Age: 70
Discharge: HOME | End: 2025-06-20
Attending: INTERNAL MEDICINE
Payer: MEDICARE

## 2025-06-18 ENCOUNTER — HOSPITAL ENCOUNTER (OUTPATIENT)
Dept: NUCLEAR MEDICINE | Age: 70
Discharge: HOME | End: 2025-06-18
Attending: INTERNAL MEDICINE
Payer: MEDICARE

## 2025-06-18 VITALS
SYSTOLIC BLOOD PRESSURE: 125 MMHG | HEART RATE: 102 BPM | DIASTOLIC BLOOD PRESSURE: 79 MMHG | BODY MASS INDEX: 30.75 KG/M2 | WEIGHT: 227 LBS | HEIGHT: 72 IN

## 2025-06-18 DIAGNOSIS — Z95.1 S/P CABG (CORONARY ARTERY BYPASS GRAFT): ICD-10-CM

## 2025-06-18 DIAGNOSIS — I25.10 CORONARY ARTERY DISEASE INVOLVING NATIVE CORONARY ARTERY OF NATIVE HEART WITHOUT ANGINA PECTORIS: ICD-10-CM

## 2025-06-18 LAB
ECHO BSA: 2.29 M2
NUC REST DIASTOLIC VOLUME INDEX: 97 ML/M2
NUC REST EJECTION FRACTION: 62 %
NUC REST SYSTOLIC VOLUME INDEX: 37 ML/M2
STRESS BASELINE DIAS BP: 79 MMHG
STRESS BASELINE HR: 103 BPM
STRESS BASELINE SYS BP: 125 MMHG
STRESS ESTIMATED WORKLOAD: 1 METS
STRESS EXERCISE DUR MIN: 1 MIN
STRESS EXERCISE DUR SEC: 40 SEC
STRESS PEAK DIAS BP: 75 MMHG
STRESS PEAK SYS BP: 157 MMHG
STRESS PERCENT HR ACHIEVED: 78 %
STRESS POST PEAK HR: 120 BPM
STRESS RATE PRESSURE PRODUCT: NORMAL BPM*MMHG
STRESS TARGET HR: 153 BPM

## 2025-06-18 PROCEDURE — 6360000002 HC RX W HCPCS: Performed by: INTERNAL MEDICINE

## 2025-06-18 PROCEDURE — 93017 CV STRESS TEST TRACING ONLY: CPT

## 2025-06-18 PROCEDURE — 93018 CV STRESS TEST I&R ONLY: CPT | Performed by: INTERNAL MEDICINE

## 2025-06-18 PROCEDURE — 78452 HT MUSCLE IMAGE SPECT MULT: CPT | Performed by: INTERNAL MEDICINE

## 2025-06-18 PROCEDURE — A9502 TC99M TETROFOSMIN: HCPCS | Performed by: INTERNAL MEDICINE

## 2025-06-18 PROCEDURE — 93016 CV STRESS TEST SUPVJ ONLY: CPT | Performed by: INTERNAL MEDICINE

## 2025-06-18 PROCEDURE — 78452 HT MUSCLE IMAGE SPECT MULT: CPT

## 2025-06-18 PROCEDURE — 3430000000 HC RX DIAGNOSTIC RADIOPHARMACEUTICAL: Performed by: INTERNAL MEDICINE

## 2025-06-18 RX ORDER — REGADENOSON 0.08 MG/ML
0.4 INJECTION, SOLUTION INTRAVENOUS
Status: COMPLETED | OUTPATIENT
Start: 2025-06-18 | End: 2025-06-18

## 2025-06-18 RX ADMIN — REGADENOSON 0.4 MG: 0.08 INJECTION, SOLUTION INTRAVENOUS at 10:00

## 2025-06-18 RX ADMIN — TETROFOSMIN 31.8 MILLICURIE: 1.38 INJECTION, POWDER, LYOPHILIZED, FOR SOLUTION INTRAVENOUS at 10:01

## 2025-06-18 RX ADMIN — TETROFOSMIN 10.8 MILLICURIE: 1.38 INJECTION, POWDER, LYOPHILIZED, FOR SOLUTION INTRAVENOUS at 09:05

## 2025-06-24 ENCOUNTER — RESULTS FOLLOW-UP (OUTPATIENT)
Dept: CARDIOLOGY CLINIC | Age: 70
End: 2025-06-24

## 2025-07-02 ENCOUNTER — TELEPHONE (OUTPATIENT)
Dept: CARDIOLOGY CLINIC | Age: 70
End: 2025-07-02

## 2025-07-02 DIAGNOSIS — R94.39 ABNORMAL CARDIOVASCULAR STRESS TEST: ICD-10-CM

## 2025-07-02 DIAGNOSIS — I25.10 CORONARY ARTERY DISEASE INVOLVING NATIVE CORONARY ARTERY OF NATIVE HEART WITHOUT ANGINA PECTORIS: Primary | ICD-10-CM

## 2025-07-02 DIAGNOSIS — R94.39 ABNORMAL STRESS TEST: ICD-10-CM

## 2025-07-02 NOTE — TELEPHONE ENCOUNTER
I called and spoke to Wilfrid's wife Pedro and offered Monday 7/14 and she stated that she will be out of town that week and is his only ride. She said the following week would be good.     Date of Procedure: Thursday 7/24/25 @ [facility] with Dr. Richard     Time of arrival: 6:30 am     Procedure time: 7:30 am     Pedro is agreeable to date and time. Reviewed and emailed Pedro Yuen's procedure date, time and instructions and she verbalized understanding. Encouraged to call with any questions or concerns.     Published on [email or web address] and e-mail to Nakita.

## 2025-07-02 NOTE — TELEPHONE ENCOUNTER
Please call to schedule [facility] with Dr Richard      The morning of your procedure you will park in the hospital parking lot and report directly to the cath lab to check in.     Pre-Procedure Instructions   You will need to fast for at least 8 hours prior to procedure. No caffeine the morning of.   You will need to hold your anticoagulation, Xarelto for 2 days prior.   All other medications can be taken in the morning with sips of water.   Do not use any lotions, creams or perfume the morning of procedure.   Pre-procedure lab work will need to be completed 5-7 days prior to procedure.   Please have a responsible adult to drive you home after procedure. We advise you have someone to stay with you for 24 hours following procedure for precautionary measures. Depending on procedure you may require an overnight stay.   Cath lab will provide you with all post procedure instructions.     If you have any questions regarding the procedure itself or medications, please call [phone] and ask to speak with a nurse.

## 2025-07-07 ENCOUNTER — OFFICE VISIT (OUTPATIENT)
Dept: VASCULAR SURGERY | Age: 70
End: 2025-07-07
Attending: SURGERY
Payer: MEDICARE

## 2025-07-07 ENCOUNTER — HOSPITAL ENCOUNTER (OUTPATIENT)
Dept: VASCULAR LAB | Age: 70
Discharge: HOME | End: 2025-07-09
Attending: SURGERY
Payer: MEDICARE

## 2025-07-07 VITALS
HEIGHT: 72 IN | WEIGHT: 227 LBS | BODY MASS INDEX: 30.75 KG/M2 | SYSTOLIC BLOOD PRESSURE: 125 MMHG | DIASTOLIC BLOOD PRESSURE: 79 MMHG

## 2025-07-07 DIAGNOSIS — I73.9 PAD (PERIPHERAL ARTERY DISEASE): ICD-10-CM

## 2025-07-07 DIAGNOSIS — S88.112A BELOW-KNEE AMPUTATION OF LEFT LOWER EXTREMITY, INITIAL ENCOUNTER (HCC): ICD-10-CM

## 2025-07-07 DIAGNOSIS — I70.201 RIGHT POPLITEAL ARTERY OCCLUSION: ICD-10-CM

## 2025-07-07 DIAGNOSIS — I70.201 RIGHT POPLITEAL ARTERY OCCLUSION: Primary | ICD-10-CM

## 2025-07-07 LAB
VAS LEFT ARM BP: 144 MMHG
VAS RIGHT ABI: 0.47
VAS RIGHT ARM BP: 138 MMHG
VAS RIGHT ATA DIST PSV: 18 CM/S
VAS RIGHT ATA MID PSV: 13 CM/S
VAS RIGHT ATA PROX PSV: 21 CM/S
VAS RIGHT CFA DIST PSV: 27.2 CM/S
VAS RIGHT CFA PROX PSV: 53 CM/S
VAS RIGHT PERONEAL MID PSV: 0 CM/S
VAS RIGHT PFA PROX PSV: 130 CM/S
VAS RIGHT POP A DIST PSV: 0 CM/S
VAS RIGHT POP A PROX PSV: 23 CM/S
VAS RIGHT PTA BP: 68 MMHG
VAS RIGHT PTA DIST PSV: 28 CM/S
VAS RIGHT PTA MID PSV: 23.7 CM/S
VAS RIGHT PTA PROX PSV: 0 CM/S
VAS RIGHT SFA DIST PSV: 0 CM/S
VAS RIGHT SFA MID PSV: 0 CM/S
VAS RIGHT SFA PROX PSV: 0 CM/S
VAS RIGHT SFA PROX VEL RATIO: 0

## 2025-07-07 PROCEDURE — G8427 DOCREV CUR MEDS BY ELIG CLIN: HCPCS | Performed by: SURGERY

## 2025-07-07 PROCEDURE — 3078F DIAST BP <80 MM HG: CPT | Performed by: SURGERY

## 2025-07-07 PROCEDURE — 1123F ACP DISCUSS/DSCN MKR DOCD: CPT | Performed by: SURGERY

## 2025-07-07 PROCEDURE — 93926 LOWER EXTREMITY STUDY: CPT

## 2025-07-07 PROCEDURE — 3017F COLORECTAL CA SCREEN DOC REV: CPT | Performed by: SURGERY

## 2025-07-07 PROCEDURE — 1036F TOBACCO NON-USER: CPT | Performed by: SURGERY

## 2025-07-07 PROCEDURE — 99214 OFFICE O/P EST MOD 30 MIN: CPT | Performed by: SURGERY

## 2025-07-07 PROCEDURE — 1159F MED LIST DOCD IN RCRD: CPT | Performed by: SURGERY

## 2025-07-07 PROCEDURE — 3074F SYST BP LT 130 MM HG: CPT | Performed by: SURGERY

## 2025-07-07 PROCEDURE — 93926 LOWER EXTREMITY STUDY: CPT | Performed by: SURGERY

## 2025-07-07 PROCEDURE — G8417 CALC BMI ABV UP PARAM F/U: HCPCS | Performed by: SURGERY

## 2025-07-07 NOTE — PROGRESS NOTES
bypass.          Return in about 3 months (around 10/7/2025) for f/u PAD s/p fem endart.       This document was dictated using voice recognition software.       David Whatley MD, [facility] Vascular and Endovascular Surgery

## 2025-07-08 PROBLEM — R94.39 ABNORMAL CARDIOVASCULAR STRESS TEST: Status: ACTIVE | Noted: 2025-07-02

## 2025-07-18 DIAGNOSIS — I25.10 CORONARY ARTERY DISEASE INVOLVING NATIVE CORONARY ARTERY OF NATIVE HEART WITHOUT ANGINA PECTORIS: ICD-10-CM

## 2025-07-18 DIAGNOSIS — R94.39 ABNORMAL STRESS TEST: ICD-10-CM

## 2025-07-18 LAB
ANION GAP SERPL CALCULATED.3IONS-SCNC: 10 MMOL/L (ref 3–16)
BUN SERPL-MCNC: 13 MG/DL (ref 7–20)
CALCIUM SERPL-MCNC: 9.3 MG/DL (ref 8.3–10.6)
CHLORIDE SERPL-SCNC: 106 MMOL/L (ref 99–110)
CO2 SERPL-SCNC: 27 MMOL/L (ref 21–32)
CREAT SERPL-MCNC: 1.2 MG/DL (ref 0.8–1.3)
DEPRECATED RDW RBC AUTO: 14.9 % (ref 12.4–15.4)
GFR SERPLBLD CREATININE-BSD FMLA CKD-EPI: 66 ML/MIN/{1.73_M2}
GLUCOSE SERPL-MCNC: 138 MG/DL (ref 70–99)
HCT VFR BLD AUTO: 40.5 % (ref 40.5–52.5)
HGB BLD-MCNC: 13.4 G/DL (ref 13.5–17.5)
MCH RBC QN AUTO: 27.6 PG (ref 26–34)
MCHC RBC AUTO-ENTMCNC: 33 G/DL (ref 31–36)
MCV RBC AUTO: 83.5 FL (ref 80–100)
PLATELET # BLD AUTO: 274 K/UL (ref 135–450)
PMV BLD AUTO: 8.2 FL (ref 5–10.5)
POTASSIUM SERPL-SCNC: 4.5 MMOL/L (ref 3.5–5.1)
RBC # BLD AUTO: 4.84 M/UL (ref 4.2–5.9)
SODIUM SERPL-SCNC: 143 MMOL/L (ref 136–145)
WBC # BLD AUTO: 6.7 K/UL (ref 4–11)

## 2025-07-24 ENCOUNTER — HOSPITAL ENCOUNTER (OUTPATIENT)
Age: 70
Setting detail: OUTPATIENT SURGERY
Discharge: HOME | End: 2025-07-24
Attending: INTERNAL MEDICINE | Admitting: INTERNAL MEDICINE
Payer: MEDICARE

## 2025-07-24 VITALS
HEART RATE: 64 BPM | OXYGEN SATURATION: 96 % | BODY MASS INDEX: 30.75 KG/M2 | WEIGHT: 227 LBS | DIASTOLIC BLOOD PRESSURE: 78 MMHG | TEMPERATURE: 97.2 F | HEIGHT: 72 IN | SYSTOLIC BLOOD PRESSURE: 122 MMHG | RESPIRATION RATE: 20 BRPM

## 2025-07-24 DIAGNOSIS — R94.39 ABNORMAL CARDIOVASCULAR STRESS TEST: ICD-10-CM

## 2025-07-24 LAB
EKG ATRIAL RATE: 68 BPM
EKG DIAGNOSIS: NORMAL
EKG P AXIS: 77 DEGREES
EKG P-R INTERVAL: 180 MS
EKG Q-T INTERVAL: 406 MS
EKG QRS DURATION: 106 MS
EKG QTC CALCULATION (BAZETT): 431 MS
EKG R AXIS: -28 DEGREES
EKG T AXIS: 26 DEGREES
EKG VENTRICULAR RATE: 68 BPM

## 2025-07-24 PROCEDURE — 7100000010 HC PHASE II RECOVERY - FIRST 15 MIN: Performed by: INTERNAL MEDICINE

## 2025-07-24 PROCEDURE — 6360000002 HC RX W HCPCS: Performed by: INTERNAL MEDICINE

## 2025-07-24 PROCEDURE — 93010 ELECTROCARDIOGRAM REPORT: CPT | Performed by: INTERNAL MEDICINE

## 2025-07-24 PROCEDURE — 2500000003 HC RX 250 WO HCPCS: Performed by: INTERNAL MEDICINE

## 2025-07-24 PROCEDURE — 7100000011 HC PHASE II RECOVERY - ADDTL 15 MIN: Performed by: INTERNAL MEDICINE

## 2025-07-24 PROCEDURE — 2709999900 HC NON-CHARGEABLE SUPPLY: Performed by: INTERNAL MEDICINE

## 2025-07-24 PROCEDURE — 2580000003 HC RX 258: Performed by: INTERNAL MEDICINE

## 2025-07-24 PROCEDURE — 93455 CORONARY ART/GRFT ANGIO S&I: CPT | Performed by: INTERNAL MEDICINE

## 2025-07-24 PROCEDURE — 93005 ELECTROCARDIOGRAM TRACING: CPT | Performed by: INTERNAL MEDICINE

## 2025-07-24 PROCEDURE — C1769 GUIDE WIRE: HCPCS | Performed by: INTERNAL MEDICINE

## 2025-07-24 PROCEDURE — 6360000004 HC RX CONTRAST MEDICATION: Performed by: INTERNAL MEDICINE

## 2025-07-24 PROCEDURE — 99152 MOD SED SAME PHYS/QHP 5/>YRS: CPT | Performed by: INTERNAL MEDICINE

## 2025-07-24 PROCEDURE — C1894 INTRO/SHEATH, NON-LASER: HCPCS | Performed by: INTERNAL MEDICINE

## 2025-07-24 RX ORDER — MIDAZOLAM HYDROCHLORIDE 1 MG/ML
INJECTION, SOLUTION INTRAMUSCULAR; INTRAVENOUS PRN
Status: DISCONTINUED | OUTPATIENT
Start: 2025-07-24 | End: 2025-07-24 | Stop reason: HOSPADM

## 2025-07-24 RX ORDER — ACETAMINOPHEN 325 MG/1
650 TABLET ORAL EVERY 4 HOURS PRN
OUTPATIENT
Start: 2025-07-24

## 2025-07-24 RX ORDER — IOPAMIDOL 755 MG/ML
INJECTION, SOLUTION INTRAVASCULAR PRN
Status: DISCONTINUED | OUTPATIENT
Start: 2025-07-24 | End: 2025-07-24 | Stop reason: HOSPADM

## 2025-07-24 RX ORDER — SODIUM CHLORIDE 0.9 % (FLUSH) 0.9 %
5-40 SYRINGE (ML) INJECTION EVERY 12 HOURS SCHEDULED
Status: DISCONTINUED | OUTPATIENT
Start: 2025-07-24 | End: 2025-07-24 | Stop reason: HOSPADM

## 2025-07-24 RX ORDER — SODIUM CHLORIDE 9 MG/ML
INJECTION, SOLUTION INTRAVENOUS PRN
OUTPATIENT
Start: 2025-07-24

## 2025-07-24 RX ORDER — SODIUM CHLORIDE 0.9 % (FLUSH) 0.9 %
5-40 SYRINGE (ML) INJECTION PRN
OUTPATIENT
Start: 2025-07-24

## 2025-07-24 RX ORDER — AMOXICILLIN 250 MG
1 CAPSULE ORAL NIGHTLY
COMMUNITY

## 2025-07-24 RX ORDER — SODIUM CHLORIDE 0.9 % (FLUSH) 0.9 %
5-40 SYRINGE (ML) INJECTION EVERY 12 HOURS SCHEDULED
OUTPATIENT
Start: 2025-07-24

## 2025-07-24 RX ORDER — SODIUM CHLORIDE 0.9 % (FLUSH) 0.9 %
5-40 SYRINGE (ML) INJECTION PRN
Status: DISCONTINUED | OUTPATIENT
Start: 2025-07-24 | End: 2025-07-24 | Stop reason: HOSPADM

## 2025-07-24 RX ORDER — SODIUM CHLORIDE 9 MG/ML
INJECTION, SOLUTION INTRAVENOUS PRN
Status: DISCONTINUED | OUTPATIENT
Start: 2025-07-24 | End: 2025-07-24 | Stop reason: HOSPADM

## 2025-07-24 RX ORDER — LIDOCAINE HYDROCHLORIDE 10 MG/ML
INJECTION, SOLUTION INFILTRATION; PERINEURAL PRN
Status: DISCONTINUED | OUTPATIENT
Start: 2025-07-24 | End: 2025-07-24 | Stop reason: HOSPADM

## 2025-07-24 RX ADMIN — Medication 10 ML: at 07:16

## 2025-07-24 RX ADMIN — SODIUM CHLORIDE: 0.9 INJECTION, SOLUTION INTRAVENOUS at 07:16

## 2025-07-24 NOTE — PROGRESS NOTES
Radial compression band removed from left wrist without difficulty. No complications noted to cath site; gauze and tegaderm dressing applied. Patient declined armboard. Education provided to patient and his wife in regards to post cath restrictions. All questions answered. Patient verbalized understanding. Will monitor.      Electronically signed by Sahara Boykin RN on 7/24/2025 at 10:17 AM

## 2025-07-24 NOTE — H&P
Cardiology Consultation      Harvey Kaminski  1955    Referring Physician: Dr. Trung Cuba   Reason for Referral: PAD   Chief Complaint: \"I am good\"        Chief Complaint   Patient presents with    Follow-up       PAD      Subjective:   History of Present Illness: The patient is 69 y.o. male with a past medical history significant for CAD s/p CABG x4 5/2020 with Dr. Robles, HLD, HTN, carotid disease, prior DVT, CVA, PAD s/p SFA stent, left BKA, s/p nephrectomy, prior nicotine addiction and alcohol abuse.  He did suffer from encephalopathy/delirium following bypass. He is s/p tracheostomy and PEG placement. He was discharged to [facility] for skilled nursing care. He underwent left BKA on 10/22/20. Lower extremity dopplers showed significant stenosis and underwent angiography 2/21/21 that showed severe right distal SFA/popliteal and tibial disease. S/p successful revascularization and SFA stenting. He reported worsening right foot pain and underwent peripheral that resulted in right popliteal aneurysm s/p successful stenting with 6.0 Viabahn. He underwent carotid stent 5/2023. S/p revascularization RLE 1/2024. Peripheral angiogram 11/14/24 showed 99% stenosed previous distal SFA and proximal popliteal stent, TP trunk 100% occluded, AT/% occluded. Referred to Dr. Whatley for surgery consult. /p Right iliofemoral endarterectomy, right profundoplasty, and right superficial femoral artery and popliteal thrombectomy 12/16/2024. Patient here today for routine follow up. Reports overall he is doing good since his procedure. Denies any new wounds. Working on getting his prosthetic made.         Past Medical History        Past Medical History:   Diagnosis Date    Acute cerebral infarction (HCC) 05/2020     R posterior frontal lobe    Alcohol withdrawal (HCC) 05/2020    Bilateral carotid artery stenosis 05/07/2020     bilat 50-79% stenosis    CAD (coronary artery disease)      Cerebral artery occlusion with cerebral

## 2025-07-24 NOTE — PROGRESS NOTES
Patient arrived to pre/post cath lab for a LHC with Dr. Richard. Patient accompanied by his wife, Pedro. Patient awake, alert and oriented x4. Denies any current pain. All questions answered at present time. Consent signed. Patient took 81mg ASA and 75mg Plavix at home. Call light within reach.     Electronically signed by Sahara Boykin RN on 7/24/2025 at 7:45 AM

## 2025-07-24 NOTE — PROGRESS NOTES
Discharge instructions reviewed with patient and his wife. Medications and follow-up appointment(s) reviewed.  All questions answered at present time. Patient verbalized understanding. Left wrist cath site remains free from complication. PIV removed; dressing applied to site. Patient dressed and ready for discharge. Patient discharge off unit via wheelchair with belongings.     Electronically signed by Sahara Boykin RN on 7/24/2025 at 10:37 AM

## 2025-07-24 NOTE — DISCHARGE INSTRUCTIONS
CARDIAC ANGIOGRAM (LEFT HEART CATH) - RADIAL ACCESS    Care of your puncture site:  Remove clear bandage 24 hours after the procedure.  May shower in 24 hours  Inspect the site daily and gently clean using soap and water.  Dry thoroughly and apply a Band-Aid.    Normal Observations:  Soreness or tenderness which may last one week.  Possible bruising that could last 2 weeks.    Activity:  You may resume driving 24 hours following the procedure.  You may resume normal activity in 3 days or after the wound heals.  Avoid lifting more than 10 pounds for 3 days with affected arm.  Do not make important / legal decisions within 24 hours after procedure.    Nutrition:  Regular diet   Drink at least 8 to 10 glasses of decaffeinated, non-alcoholic fluid for the next 24 hours to flush the x-ray dye used for your angiogram out of your body.    Call your doctor immediately if your condition worsens, for any other concerns, for a follow-up appointment or if you experience any of the following:  Significant bleeding that does not stop after 10 minutes of applying firm pressure on the puncture site.  Increased swelling of the wrist.  Unusual pain, numbness, or tingling of the wrist/arm.   Any signs of infection such as: redness, yellow drainage at the site, swelling or pain.  IF experiencing any recurrent chest pain, arm or jaw pain, shortness of breath,sweating,nausea & vomiting, lighteheadedness or sudden weak.      Other Instructions:  Hold Metformin or Metformin containing drugs for 48 hours after procedure if applicable   CERVICAL SPINE:

 

Technique: Lateral neutral, flexion, and extension images of the cervical spine. 

 

Date: 1-31-18 

 

Comparison: 2-11-16

 

History: Neck pain, prior surgery. Cervical radiculopathy. 

 

FINDINGS: 

The neutral lateral exam demonstrates anterior discectomy and fusion hardware at C5-6 with no evidenc
e for hardware failure. At C6-7 there is disc space narrowing with degenerative endplate change and a
nterior osteophyte formation. There is mild posterior osteophyte at C6-7 as well. Neutral imaging dem
onstrates no significant anterolisthesis or retrolisthesis. With flexion and extension no anterolisth
esis or retrolisthesis is noted. No prevertebral soft tissue swelling. 

 

IMPRESSION: 

Anterior discectomy and fusion hardware at C5-6 with significant degenerative disc disease noted at C
6-7. 

 

POS: LETITIA

## 2025-07-24 NOTE — PROGRESS NOTES
Patient received post procedure in stable condition.   Post-cath handoff/site assessment performed to left wrist  TR band on and in place without complications   Pt is awake and alert. No distress noted. VSS-see flowsheet.   Post-cath restrictions/instructions provided  Patient provided with snack/drink.   No further needs at this time, call light within reach.   MD to talk to patient and family soon.     Electronically signed by Sahara Boykin RN on 7/24/2025 at 8:34 AM

## 2025-07-27 LAB — ECHO BSA: 2.29 M2

## (undated) DEVICE — BLADE OPHTH 180DEG CUT SURF BLU STR SHRP DBL BVL GRINDLESS

## (undated) DEVICE — FAIRFIELD CABG ACCESSARY PK

## (undated) DEVICE — OPEN HRT BASIC PK

## (undated) DEVICE — SYRINGE 20ML LL S/C 50

## (undated) DEVICE — PRESSURE MONITORING SET: Brand: TRUWAVE

## (undated) DEVICE — SUTURE PERMAHAND SZ 2-0 L30IN NONABSORBABLE BLK SILK W/O A305H

## (undated) DEVICE — PENCIL SMOKE EVAC PUSH BUTTON COATED

## (undated) DEVICE — SUTURE NONABSORBABLE MONOFILAMENT 7-0 BV-1 1X24 IN PROLENE 8702H

## (undated) DEVICE — MAJOR VASCULAR: Brand: MEDLINE INDUSTRIES, INC.

## (undated) DEVICE — SOLUTION IRRIG 1000ML 0.9% SOD CHL USP POUR PLAS BTL

## (undated) DEVICE — TRANSFER SET 3": Brand: MEDLINE INDUSTRIES, INC.

## (undated) DEVICE — STERNUM BLADE, OFFSET (31.7 X 0.64 X 6.3MM)

## (undated) DEVICE — 1LYRTR 16FR10ML100%SIL UMS SNP: Brand: MEDLINE INDUSTRIES, INC.

## (undated) DEVICE — SUTURE S STL SZ 6 L18IN NONABSORBABLE SIL L48MM CCS 1/2 CIR M654G

## (undated) DEVICE — DRAPE,REIN 53X77,STERILE: Brand: MEDLINE

## (undated) DEVICE — ELECTRODE PT RET AD L9FT HI MOIST COND ADH HYDRGEL CORDED

## (undated) DEVICE — KIT OR ROOM TURNOVER W/STRAP

## (undated) DEVICE — SUTURE PERMAHAND SZ 4-0 L18IN NONABSORBABLE BLK SILK BRAID A183H

## (undated) DEVICE — SET PERF L15IN BLU CLMP MULT FEM LUER ON SGL INLET LEG DLP

## (undated) DEVICE — SUTURE PERMA-HAND SZ 4-0 L144IN NONABSORBABLE BLK LIGAPAK LA53G

## (undated) DEVICE — ENDOSCOPY KIT: Brand: MEDLINE INDUSTRIES, INC.

## (undated) DEVICE — X-RAY DETECTABLE SPONGES,12 PLY: Brand: VISTEC

## (undated) DEVICE — HI-TORQUE CONNECT 250T GUIDE WIRE .018" 300 CM: Brand: HI-TORQUE CONNECT

## (undated) DEVICE — COVER LT HNDL BLU PLAS

## (undated) DEVICE — 3M™ TEGADERM™ TRANSPARENT FILM DRESSING FRAME STYLE, 1624W, 2-3/8 IN X 2-3/4 IN (6 CM X 7 CM), 100/CT 4CT/CASE: Brand: 3M™ TEGADERM™

## (undated) DEVICE — AGENT HEMSTAT W4XL8IN OXIDIZED REGENERATED CELOS ABSRB

## (undated) DEVICE — SUTURE NONABSORBABLE MONOFILAMENT 5-0 C-1 1X24 IN PROLENE 8725H

## (undated) DEVICE — Device

## (undated) DEVICE — PROCEDURE KIT COMP 5Y10R8

## (undated) DEVICE — SPONGE LAP W18XL18IN WHT COT 4 PLY FLD STRUNG RADPQ DISP ST

## (undated) DEVICE — SYSTEM ENDOSCP VES HARV W/ TOOL CANN SEAL SHT PRT BLNT TIP

## (undated) DEVICE — TUBING, SUCTION, 1/4" X 12', STRAIGHT: Brand: MEDLINE

## (undated) DEVICE — HI-TORQUE COMMAND ES GUIDE WIRE .014" 300 CM: Brand: HI-TORQUE COMMAND

## (undated) DEVICE — SOLUTION IV 1000ML 0.9% SOD CHL PH 5 INJ USP VIAFLX PLAS

## (undated) DEVICE — HYPODERMIC SAFETY NEEDLE: Brand: MAGELLAN

## (undated) DEVICE — PAD THRM M SPL TORSO

## (undated) DEVICE — SET EXTN L41IN 2 NDL FREE VALVES FREE INJ PRT

## (undated) DEVICE — MONITOR LN W LUER LOK 72

## (undated) DEVICE — BLADE CLIPPER GEN PURP NS

## (undated) DEVICE — GUIDEWIRE VASC L260CM DIA0.035IN RAD 3MM J TIP L7CM PTFE

## (undated) DEVICE — SUTURE PROL SZ 6-0 L24IN NONABSORBABLE BLU L13MM C-1 3/8 8726H

## (undated) DEVICE — Device: Brand: MEDEX

## (undated) DEVICE — CONNECTOR IV TB L28MM CLR VLV ACCS NDLLSS DISP MAXPLUS

## (undated) DEVICE — STRIP,CLOSURE,WOUND,MEDI-STRIP,1/2X4: Brand: MEDLINE

## (undated) DEVICE — 60 ML SYRINGE,LUER-LOCK TIP: Brand: MONOJECT

## (undated) DEVICE — DRAPE SLUSH DISC W44XL66IN ST FOR RND BSIN HUSH SLUSH SYS

## (undated) DEVICE — PROCEDURE PACK FOR CABG ACCS CUST

## (undated) DEVICE — SWAN-GANZ CCOMBO THERMODILUTION CATHETER: Brand: SWAN-GANZ CCOMBO

## (undated) DEVICE — GLIDESHEATH SLENDER NITINOL HYDROPHILIC COATED INTRODUCER SHEATH: Brand: GLIDESHEATH SLENDER

## (undated) DEVICE — STERILE LATEX POWDER-FREE SURGICAL GLOVESWITH NITRILE COATING: Brand: PROTEXIS

## (undated) DEVICE — RADIFOCUS GLIDEWIRE ADVANTAGE GUIDEWIRE: Brand: GLIDEWIRE ADVANTAGE

## (undated) DEVICE — GAUZE SPONGES,12 PLY: Brand: CURITY

## (undated) DEVICE — CONNECTOR PERF L5 IN OD1 2 IN SAT HCT ST FEATURING B CARE 5

## (undated) DEVICE — SUTURE MCRYL SZ 4-0 L27IN ABSRB UD L19MM PS-2 1/2 CIR PRIM Y426H

## (undated) DEVICE — CATHETER IVL SHOCKWAVE  7.0MM 135CM

## (undated) DEVICE — BITE BLOCK ENDOSCP AD 60 FR W/ ADJ STRP PLAS GRN BLOX

## (undated) DEVICE — MIC* SAFETY PERCUTANEOUS ENDOSCOPIC GASTROSTOMY PEG KIT - 20 FR - PULL: Brand: MIC PEG TUBE

## (undated) DEVICE — CABLE THRESHOLD DISP FOR PACE ANALZR MERLIN

## (undated) DEVICE — TR BAND RADIAL ARTERY COMPRESSION DEVICE: Brand: TR BAND

## (undated) DEVICE — 3M™ IOBAN™ 2 ANTIMICROBIAL INCISE DRAPE 6650EZ: Brand: IOBAN™ 2

## (undated) DEVICE — 36" (91 CM) ARTERIAL PRESSURE TUBING: Brand: ICU MEDICAL

## (undated) DEVICE — SUTURE VICRYL + SZ 2-0 L27IN ABSRB CLR CT-1 1/2 CIR TAPERCUT VCP259H

## (undated) DEVICE — CATHETER 5FR CORDIS PIG 145DEG 110CM

## (undated) DEVICE — SPONGE LAP W18XL18IN WHT COT 4 PLY FLD STRUNG RADPQ DISP ST 2 PER PACK

## (undated) DEVICE — TRAY URIN CATH PED 16FR BLLN 5CC 2 CONN SIL STR TIP W/ URIN

## (undated) DEVICE — TIP PERF 10 IN

## (undated) DEVICE — CATHETER 5FR JR4 CORDIS 100CM

## (undated) DEVICE — SUTURE GOR TX SZ 2-0 L36IN NONABSORBABLE L18MM TH-18 1/2 3N04B

## (undated) DEVICE — PICO 7 10CM X 20CM: Brand: PICO™ 7

## (undated) DEVICE — GLOVE SURG SZ 7.5 L11.73IN FNGR THK9.8MIL STRW LTX POLYMER

## (undated) DEVICE — CANNULA PERF L15IN DIA29FR VEN 3 STG THN WALL DSGN W  VENT

## (undated) DEVICE — COVER LT HNDL CAM BLU DISP W/ SURG CTRL

## (undated) DEVICE — CATHETER PTCA 5FR L80CM BLLN L40MM DIA4MM 20ATM OTW LO PROF

## (undated) DEVICE — TOWEL,OR,DSP,ST,GREEN,DLX,4/PK,20PK/CS: Brand: MEDLINE

## (undated) DEVICE — HI-TORQUE COMMAND 18 ST GUIDE WIRE .018" 10 CM / 4 G, 300 CM: Brand: HI-TORQUE COMMAND

## (undated) DEVICE — TOWEL,OR,DSP,ST,BLUE,STD,4/PK,20PK/CS: Brand: MEDLINE

## (undated) DEVICE — SUTURE PROL SZ 7-0 L24IN NONABSORBABLE BLU L8MM BV175-6 3/8 8735H

## (undated) DEVICE — 48" PROBE COVER W/GEL, ULTRASOUND, STERILE: Brand: SITE-RITE

## (undated) DEVICE — MATERIAL IMPL BNE HEMSTAS 3.5 GM ALKYLENE STRL OSTENE

## (undated) DEVICE — SUTURE VICRYL + SZ 3-0 L36IN ABSRB UD L36MM CT-1 1/2 CIR VCP944H

## (undated) DEVICE — SPONGE GZ W4XL4IN COT 12 PLY TYP VII WVN C FLD DSGN

## (undated) DEVICE — Z INACTIVE USE 2540311 LEAD PACE L475MM CHN A OR V MYOCARDIAL STEROID ELUT SIL

## (undated) DEVICE — DRESSING TRNSPAR W FAB BORD SORBAVIEW ULT 475X425IN

## (undated) DEVICE — 12 FOOT DISPOSABLE EXTENSION CABLE WITH SAFE CONNECT / SCREW-DOWN

## (undated) DEVICE — BLOOD TRANSFUSION FILTER: Brand: HAEMONETICS

## (undated) DEVICE — NAVICROSS SUPPORT CATHETER: Brand: NAVICROSS

## (undated) DEVICE — APPLIER CLP L9.38IN M LIG TI DISP STR RNG HNDL LIGACLP

## (undated) DEVICE — 3M™ STERI-DRAPE™ ISOLATION BAG, 10 PER CARTON / 4 CARTONS PER CASE, 1003: Brand: 3M™ STERI-DRAPE™

## (undated) DEVICE — Z DISCONTINUED USE 2516375 APPLICATOR MEDICATED 3 CC CLR STRL CHLORAPREP

## (undated) DEVICE — RESERVOIR AUTOTRANSFUSION 225/120 CC GS FILTERED XTRA

## (undated) DEVICE — SUTURE PERMAHAND SZ 3-0 L18IN NONABSORBABLE BLK SILK BRAID A184H

## (undated) DEVICE — STERILE POLYISOPRENE POWDER-FREE SURGICAL GLOVES: Brand: PROTEXIS

## (undated) DEVICE — PLATELET CONCENTRATION PACK PROC 14-20 ML SMARTPREP 2

## (undated) DEVICE — SET ADMIN PRIMING 67ML L105IN NVENT 180UM FLTR 3 RLER CLMP

## (undated) DEVICE — APPROACH, HYDRO ST MICRO WIRE GUIDE WITH HYDROPHILIC COATING: Brand: APPROACH

## (undated) DEVICE — RESUSCITATOR MNL AD SELF INFL MASK PEEP VLV 40IN TB RESVR

## (undated) DEVICE — SUTURE PERMAHAND SZ 2-0 L12X18IN NONABSORBABLE BLK SILK A185H

## (undated) DEVICE — EZ GLIDE AORTIC CANNULA: Brand: EDWARDS LIFESCIENCES EZ GLIDE AORTIC CANNULA

## (undated) DEVICE — KIT ART LN 20GA L12CM FEP RADPQ 0.025X13.75IN SPR GWIRE

## (undated) DEVICE — AVA HIGH FLOW BUNDLE: Brand: MEDLINE

## (undated) DEVICE — SHEATH 5FR 11CM BRITETIP

## (undated) DEVICE — LOOP VES W1.3MM THK0.9MM MINI YEL SIL FLD REPELLENT

## (undated) DEVICE — APPLICATOR PERF 2 SYR

## (undated) DEVICE — LOOP VES W25MM THK1MM MAXI RED SIL FLD REPELLENT 100 PER

## (undated) DEVICE — BLANKET WRM CARD FOR MISTRAL AIR WRM SYS

## (undated) DEVICE — AEGIS 1" DISK 4MM HOLE, PEEL OPEN: Brand: MEDLINE

## (undated) DEVICE — HI-TORQUE PILOT 200 GUIDE WIRE .014 STRAIGHT TIP 3.0 CM X 300 CM: Brand: HI-TORQUE PILOT

## (undated) DEVICE — THORACIC CATHETER, RIGHT ANGLE, SILICONE, WITH CLOTSTOP®: Brand: AXIOM® ATRAUM™ WITH CLOTSTOP®

## (undated) DEVICE — CLOTH SURG PREP PREOPERATIVE CHLORHEXIDINE GLUC 2% READYPREP

## (undated) DEVICE — SYRINGE MED 10ML LUERLOCK TIP W/O SFTY DISP

## (undated) DEVICE — SUTURE VCRL SZ 0 L27IN ABSRB UD L36MM CT-1 1/2 CIR J260H

## (undated) DEVICE — SUTURE PERMAHAND SZ 2-0 L18IN NONABSORBABLE BLK L26MM SH C012D

## (undated) DEVICE — APPLICATOR PREP 26ML 0.7% IOD POVACRYLEX 74% ISO ALC ST

## (undated) DEVICE — 3M™ TEGADERM™ TRANSPARENT FILM DRESSING FRAME STYLE, 1626W, 4 IN X 4-3/4 IN (10 CM X 12 CM), 50/CT 4CT/CASE: Brand: 3M™ TEGADERM™

## (undated) DEVICE — DRAIN SURG SGL COLL PT TB FOR ATS BG OASIS

## (undated) DEVICE — SHEET,DRAPE,53X77,STERILE: Brand: MEDLINE

## (undated) DEVICE — CATHETER THOR 36FR L23CM DIA12MM POLYVI CHL TAPR CONN TIP

## (undated) DEVICE — LABEL MED CUST CVR

## (undated) DEVICE — PRESSURE MONITORING SET: Brand: TRUWAVE, VAMP PLUS

## (undated) DEVICE — 3F 40 CM NOVASIL SILICONE SINGLE LUMEN EMBOLECTOMY CATHETER, EIFU: Brand: LEMAITRE EMBOLECTOMY CATHETER

## (undated) DEVICE — [HIGH FLOW INSUFFLATOR,  DO NOT USE IF PACKAGE IS DAMAGED,  KEEP DRY,  KEEP AWAY FROM SUNLIGHT,  PROTECT FROM HEAT AND RADIOACTIVE SOURCES.]: Brand: PNEUMOSURE

## (undated) DEVICE — TOWEL,OR,DSP,ST,WHITE,DLX,XR,4/PK,20PK/C: Brand: MEDLINE

## (undated) DEVICE — AORTIC PNCH 4.0MM 8IN BX 10 DISP

## (undated) DEVICE — CANNULA PERF L125IN OD15FR POLYVI CHL VEN RG AUTO INFL SMOOT

## (undated) DEVICE — SUTURE ETHBND EXCEL SZ 2-0 L36IN NONABSORBABLE GRN L26MM SH X523H

## (undated) DEVICE — 260 CM J TIP WIRE .035

## (undated) DEVICE — DRESSING FOAM W6.3XL7.9IN TAN SACR SELF ADH MEPILEX BORD

## (undated) DEVICE — SUTURE PROL SZ 3-0 L36IN NONABSORBABLE BLU L17MM RB-1 1/2 8558H

## (undated) DEVICE — SET ADMIN PRIMING 12ML L36IN 2ND INCL RLER CLMP SPIN M

## (undated) DEVICE — Z INACTIVE USE 2582933 BAG BLD TRNSF 1 CPLR IV ST 600ML TERUFLEX

## (undated) DEVICE — SUTURE NONABSORBABLE MONOFILAMENT 4-0 RB-1 36 IN BLU PROLENE 8557H

## (undated) DEVICE — CANNULA PERF 7FR L5.5IN AORT ROOT RADPQ STD TIP W/ VENT LN

## (undated) DEVICE — KIT CATH 5.2FR 100CM 145DEG ANGIO VASC DIAG JUDKINS R 4

## (undated) DEVICE — STERILE LATEX POWDER-FREE SURGICAL GLOVESWITH NITRILE AND EMOLLIENT COATINGS: Brand: PROTEXIS

## (undated) DEVICE — BANDAGE COMPR W6INXL10YD ST M E WHITE/BEIGE

## (undated) DEVICE — BASIC SINGLE BASIN 1-LF: Brand: MEDLINE INDUSTRIES, INC.

## (undated) DEVICE — PACK,ORTOHMAX/CVMAX,UNIVERSAL,5/CS: Brand: MEDLINE

## (undated) DEVICE — AGENT HEMSTAT W4XL4IN OXIDIZED REGENERATED CELOS ABSRB SFT

## (undated) DEVICE — SUTURE NONABSORBABLE MONOFILAMENT 6-0 C-1 1X30 IN PROLENE 8706H

## (undated) DEVICE — SUTURE VCRL SZ 2-0 L36IN ABSRB UD L36MM CT-1 1/2 CIR J945H

## (undated) DEVICE — SOLUTION IV IRRIG POUR BRL 0.9% SODIUM CHL 2F7124

## (undated) DEVICE — MERCY HEALTH WEST TURNOVER: Brand: MEDLINE INDUSTRIES, INC.

## (undated) DEVICE — X-RAY CASSETTE COVER: Brand: CONVERTORS

## (undated) DEVICE — FOGARTY - HYDRAGRIP SURGICAL - CLAMP INSERTS: Brand: FOGARTY SOFTJAW

## (undated) DEVICE — EVERGRIP INSERT SET 86MM: Brand: FOGARTY EVERGRIP

## (undated) DEVICE — APPLIER CLP L9.375IN APER 2.1MM CLS L3.8MM 20 SM TI CLP

## (undated) DEVICE — R2P DESTINATION SLENDER GUIDING SHEATH: Brand: R2P DESTINATION SLENDER

## (undated) DEVICE — CATHETER ANGIO 5FR L100CM GRY S STL NYL JR4 3 SEG BRAID L

## (undated) DEVICE — DECANTER: Brand: UNBRANDED

## (undated) DEVICE — NEEDLE HYPO 18GA L1.5IN THN WALL PIVOTING SHLD BVL ORIENTED